# Patient Record
Sex: FEMALE | Race: WHITE | Employment: OTHER | URBAN - METROPOLITAN AREA
[De-identification: names, ages, dates, MRNs, and addresses within clinical notes are randomized per-mention and may not be internally consistent; named-entity substitution may affect disease eponyms.]

---

## 2017-04-04 ENCOUNTER — APPOINTMENT (OUTPATIENT)
Dept: GENERAL RADIOLOGY | Age: 72
End: 2017-04-04
Payer: MEDICARE

## 2017-04-04 ENCOUNTER — HOSPITAL ENCOUNTER (EMERGENCY)
Age: 72
Discharge: HOME | End: 2017-04-04
Attending: EMERGENCY MEDICINE
Payer: MEDICARE

## 2017-04-04 VITALS
HEIGHT: 62 IN | OXYGEN SATURATION: 99 % | SYSTOLIC BLOOD PRESSURE: 179 MMHG | BODY MASS INDEX: 37.54 KG/M2 | WEIGHT: 204 LBS | HEART RATE: 73 BPM | TEMPERATURE: 97.9 F | DIASTOLIC BLOOD PRESSURE: 62 MMHG | RESPIRATION RATE: 18 BRPM
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
## 2017-04-04 DIAGNOSIS — R07.81 RIB PAIN: ICD-10-CM

## 2017-04-04 DIAGNOSIS — M54.50 ACUTE RIGHT-SIDED LOW BACK PAIN WITHOUT SCIATICA: ICD-10-CM

## 2017-04-04 DIAGNOSIS — M79.18 ACUTE BUTTOCK PAIN: ICD-10-CM

## 2017-04-04 LAB
-: ABNORMAL
AMORPHOUS: ABNORMAL
BACTERIA: ABNORMAL
BILIRUBIN URINE: NEGATIVE
CASTS UA: ABNORMAL /LPF
COLOR: YELLOW
COMMENT UA: ABNORMAL
CRYSTALS, UA: ABNORMAL /HPF
EPITHELIAL CELLS UA: ABNORMAL /HPF (ref 0–5)
GLUCOSE URINE: NEGATIVE
KETONES, URINE: NEGATIVE
LEUKOCYTE ESTERASE, URINE: NEGATIVE
MUCUS: ABNORMAL
NITRITE, URINE: NEGATIVE
OTHER OBSERVATIONS UA: ABNORMAL
PH UA: 6.5 (ref 5–8)
PROTEIN UA: ABNORMAL
RBC UA: ABNORMAL /HPF (ref 0–2)
RENAL EPITHELIAL, UA: ABNORMAL /HPF
SPECIFIC GRAVITY UA: 1.01 (ref 1–1.03)
TRICHOMONAS: ABNORMAL
TURBIDITY: CLEAR
URINE HGB: ABNORMAL
UROBILINOGEN, URINE: ABNORMAL
WBC UA: ABNORMAL /HPF (ref 0–5)
YEAST: ABNORMAL

## 2017-04-04 PROCEDURE — 72170 X-RAY EXAM OF PELVIS: CPT

## 2017-04-04 PROCEDURE — 72100 X-RAY EXAM L-S SPINE 2/3 VWS: CPT

## 2017-04-04 PROCEDURE — 81001 URINALYSIS AUTO W/SCOPE: CPT

## 2017-04-04 PROCEDURE — 99283 EMERGENCY DEPT VISIT LOW MDM: CPT

## 2017-04-04 PROCEDURE — 71020 XR CHEST STANDARD TWO VW: CPT

## 2017-04-05 ENCOUNTER — HOSPITAL ENCOUNTER (EMERGENCY)
Age: 72
Discharge: HOME | End: 2017-04-05
Attending: EMERGENCY MEDICINE
Payer: MEDICARE

## 2017-04-05 ENCOUNTER — APPOINTMENT (OUTPATIENT)
Dept: CT IMAGING | Age: 72
End: 2017-04-05
Payer: MEDICARE

## 2017-04-05 VITALS
SYSTOLIC BLOOD PRESSURE: 105 MMHG | DIASTOLIC BLOOD PRESSURE: 78 MMHG | OXYGEN SATURATION: 98 % | RESPIRATION RATE: 20 BRPM | HEIGHT: 61 IN | BODY MASS INDEX: 37.95 KG/M2 | WEIGHT: 201 LBS | HEART RATE: 65 BPM | TEMPERATURE: 98.4 F

## 2017-04-05 DIAGNOSIS — S00.03XA CONTUSION OF SCALP, INITIAL ENCOUNTER: Primary | ICD-10-CM

## 2017-04-05 LAB
-: ABNORMAL
AMORPHOUS: ABNORMAL
ANION GAP SERPL CALCULATED.3IONS-SCNC: 14 MMOL/L (ref 9–17)
BACTERIA: ABNORMAL
BILIRUBIN URINE: NEGATIVE
BUN BLDV-MCNC: 16 MG/DL (ref 8–23)
BUN/CREAT BLD: ABNORMAL (ref 9–20)
CALCIUM SERPL-MCNC: 9.2 MG/DL (ref 8.6–10.4)
CASTS UA: ABNORMAL /LPF
CHLORIDE BLD-SCNC: 97 MMOL/L (ref 98–107)
CO2: 29 MMOL/L (ref 20–31)
COLOR: YELLOW
COMMENT UA: ABNORMAL
CREAT SERPL-MCNC: 0.81 MG/DL (ref 0.5–0.9)
CRYSTALS, UA: ABNORMAL /HPF
EPITHELIAL CELLS UA: ABNORMAL /HPF (ref 0–5)
GFR AFRICAN AMERICAN: >60 ML/MIN
GFR NON-AFRICAN AMERICAN: >60 ML/MIN
GFR SERPL CREATININE-BSD FRML MDRD: ABNORMAL ML/MIN/{1.73_M2}
GFR SERPL CREATININE-BSD FRML MDRD: ABNORMAL ML/MIN/{1.73_M2}
GLUCOSE BLD-MCNC: 97 MG/DL (ref 70–99)
GLUCOSE URINE: NEGATIVE
KETONES, URINE: NEGATIVE
LEUKOCYTE ESTERASE, URINE: NEGATIVE
MUCUS: ABNORMAL
NITRITE, URINE: NEGATIVE
OTHER OBSERVATIONS UA: ABNORMAL
PH UA: 7 (ref 5–8)
POTASSIUM SERPL-SCNC: 3.3 MMOL/L (ref 3.7–5.3)
PROTEIN UA: ABNORMAL
RBC UA: ABNORMAL /HPF (ref 0–2)
RENAL EPITHELIAL, UA: ABNORMAL /HPF
SODIUM BLD-SCNC: 140 MMOL/L (ref 135–144)
SPECIFIC GRAVITY UA: 1.01 (ref 1–1.03)
TRICHOMONAS: ABNORMAL
TURBIDITY: CLEAR
URINE HGB: ABNORMAL
UROBILINOGEN, URINE: ABNORMAL
WBC UA: ABNORMAL /HPF (ref 0–5)
YEAST: ABNORMAL

## 2017-04-05 PROCEDURE — 99284 EMERGENCY DEPT VISIT MOD MDM: CPT

## 2017-04-05 PROCEDURE — 36415 COLL VENOUS BLD VENIPUNCTURE: CPT

## 2017-04-05 PROCEDURE — 70450 CT HEAD/BRAIN W/O DYE: CPT

## 2017-04-05 PROCEDURE — 81001 URINALYSIS AUTO W/SCOPE: CPT

## 2017-04-05 PROCEDURE — 80048 BASIC METABOLIC PNL TOTAL CA: CPT

## 2017-08-21 ENCOUNTER — APPOINTMENT (OUTPATIENT)
Dept: GENERAL RADIOLOGY | Age: 72
End: 2017-08-21
Payer: MEDICARE

## 2017-08-21 ENCOUNTER — HOSPITAL ENCOUNTER (EMERGENCY)
Age: 72
Discharge: HOME | End: 2017-08-21
Attending: SPECIALIST
Payer: MEDICARE

## 2017-08-21 VITALS
DIASTOLIC BLOOD PRESSURE: 69 MMHG | BODY MASS INDEX: 37.22 KG/M2 | SYSTOLIC BLOOD PRESSURE: 158 MMHG | WEIGHT: 197 LBS | OXYGEN SATURATION: 96 % | RESPIRATION RATE: 16 BRPM | TEMPERATURE: 97.5 F | HEART RATE: 57 BPM

## 2017-08-21 DIAGNOSIS — R07.9 CHEST PAIN, UNSPECIFIED TYPE: Primary | ICD-10-CM

## 2017-08-21 DIAGNOSIS — R79.89 ELEVATED BRAIN NATRIURETIC PEPTIDE (BNP) LEVEL: ICD-10-CM

## 2017-08-21 LAB
ABSOLUTE EOS #: 0.1 K/UL (ref 0–0.4)
ABSOLUTE LYMPH #: 1.2 K/UL (ref 1–4.8)
ABSOLUTE MONO #: 1 K/UL (ref 0.1–1.2)
ALBUMIN SERPL-MCNC: 4.3 G/DL (ref 3.5–5.2)
ALBUMIN/GLOBULIN RATIO: 1.8 (ref 1–2.5)
ALP BLD-CCNC: 71 U/L (ref 35–104)
ALT SERPL-CCNC: 11 U/L (ref 5–33)
ANION GAP SERPL CALCULATED.3IONS-SCNC: 16 MMOL/L (ref 9–17)
AST SERPL-CCNC: 14 U/L
BASOPHILS # BLD: 1 %
BASOPHILS ABSOLUTE: 0 K/UL (ref 0–0.2)
BILIRUB SERPL-MCNC: 0.82 MG/DL (ref 0.3–1.2)
BNP INTERPRETATION: ABNORMAL
BUN BLDV-MCNC: 13 MG/DL (ref 8–23)
BUN/CREAT BLD: ABNORMAL (ref 9–20)
CALCIUM SERPL-MCNC: 9 MG/DL (ref 8.6–10.4)
CHLORIDE BLD-SCNC: 105 MMOL/L (ref 98–107)
CO2: 23 MMOL/L (ref 20–31)
CREAT SERPL-MCNC: 0.84 MG/DL (ref 0.5–0.9)
DIFFERENTIAL TYPE: ABNORMAL
EOSINOPHILS RELATIVE PERCENT: 2 %
GFR AFRICAN AMERICAN: >60 ML/MIN
GFR NON-AFRICAN AMERICAN: >60 ML/MIN
GFR SERPL CREATININE-BSD FRML MDRD: ABNORMAL ML/MIN/{1.73_M2}
GFR SERPL CREATININE-BSD FRML MDRD: ABNORMAL ML/MIN/{1.73_M2}
GLUCOSE BLD-MCNC: 92 MG/DL (ref 70–99)
HCT VFR BLD CALC: 37.8 % (ref 36–46)
HEMOGLOBIN: 12.5 G/DL (ref 12–16)
LIPASE: 80 U/L (ref 13–60)
LYMPHOCYTES # BLD: 16 %
MCH RBC QN AUTO: 29.8 PG (ref 26–34)
MCHC RBC AUTO-ENTMCNC: 33.1 G/DL (ref 31–37)
MCV RBC AUTO: 89.9 FL (ref 80–100)
MONOCYTES # BLD: 13 %
PDW BLD-RTO: 17.8 % (ref 12.5–15.4)
PLATELET # BLD: 215 K/UL (ref 140–450)
PLATELET ESTIMATE: ABNORMAL
PMV BLD AUTO: 10.1 FL (ref 6–12)
POTASSIUM SERPL-SCNC: 3.6 MMOL/L (ref 3.7–5.3)
PRO-BNP: 2967 PG/ML
RBC # BLD: 4.21 M/UL (ref 4–5.2)
RBC # BLD: ABNORMAL 10*6/UL
SEG NEUTROPHILS: 68 %
SEGMENTED NEUTROPHILS ABSOLUTE COUNT: 5.2 K/UL (ref 1.8–7.7)
SODIUM BLD-SCNC: 144 MMOL/L (ref 135–144)
TOTAL PROTEIN: 6.7 G/DL (ref 6.4–8.3)
TROPONIN INTERP: NORMAL
TROPONIN INTERP: NORMAL
TROPONIN T: <0.03 NG/ML
TROPONIN T: <0.03 NG/ML
WBC # BLD: 7.5 K/UL (ref 3.5–11)
WBC # BLD: ABNORMAL 10*3/UL

## 2017-08-21 PROCEDURE — 83880 ASSAY OF NATRIURETIC PEPTIDE: CPT

## 2017-08-21 PROCEDURE — 85025 COMPLETE CBC W/AUTO DIFF WBC: CPT

## 2017-08-21 PROCEDURE — 84484 ASSAY OF TROPONIN QUANT: CPT

## 2017-08-21 PROCEDURE — 36415 COLL VENOUS BLD VENIPUNCTURE: CPT

## 2017-08-21 PROCEDURE — 6370000000 HC RX 637 (ALT 250 FOR IP): Performed by: PHYSICIAN ASSISTANT

## 2017-08-21 PROCEDURE — 93005 ELECTROCARDIOGRAM TRACING: CPT

## 2017-08-21 PROCEDURE — 83690 ASSAY OF LIPASE: CPT

## 2017-08-21 PROCEDURE — 99284 EMERGENCY DEPT VISIT MOD MDM: CPT

## 2017-08-21 PROCEDURE — 71020 XR CHEST STANDARD TWO VW: CPT

## 2017-08-21 PROCEDURE — 80053 COMPREHEN METABOLIC PANEL: CPT

## 2017-08-21 RX ORDER — ASPIRIN 81 MG/1
324 TABLET, CHEWABLE ORAL ONCE
Status: COMPLETED | OUTPATIENT
Start: 2017-08-21 | End: 2017-08-21

## 2017-08-21 RX ADMIN — ASPIRIN 81 MG 324 MG: 81 TABLET ORAL at 16:27

## 2017-08-21 ASSESSMENT — PAIN DESCRIPTION - ONSET: ONSET: ON-GOING

## 2017-08-21 ASSESSMENT — PAIN DESCRIPTION - FREQUENCY: FREQUENCY: CONTINUOUS

## 2017-08-21 ASSESSMENT — PAIN SCALES - GENERAL: PAINLEVEL_OUTOF10: 8

## 2017-08-21 ASSESSMENT — PAIN DESCRIPTION - PAIN TYPE: TYPE: ACUTE PAIN

## 2017-08-21 ASSESSMENT — PAIN DESCRIPTION - LOCATION: LOCATION: CHEST

## 2017-08-23 LAB
EKG ATRIAL RATE: 51 BPM
EKG ATRIAL RATE: 55 BPM
EKG P AXIS: 54 DEGREES
EKG P AXIS: 88 DEGREES
EKG P-R INTERVAL: 136 MS
EKG P-R INTERVAL: 142 MS
EKG Q-T INTERVAL: 508 MS
EKG Q-T INTERVAL: 526 MS
EKG QRS DURATION: 76 MS
EKG QRS DURATION: 76 MS
EKG QTC CALCULATION (BAZETT): 484 MS
EKG QTC CALCULATION (BAZETT): 485 MS
EKG R AXIS: -44 DEGREES
EKG R AXIS: -46 DEGREES
EKG T AXIS: 90 DEGREES
EKG T AXIS: 92 DEGREES
EKG VENTRICULAR RATE: 51 BPM
EKG VENTRICULAR RATE: 55 BPM

## 2017-11-30 ENCOUNTER — HOSPITAL ENCOUNTER (OUTPATIENT)
Dept: CT IMAGING | Age: 72
Discharge: HOME | End: 2017-11-30
Payer: MEDICARE

## 2017-11-30 ENCOUNTER — HOSPITAL ENCOUNTER (OUTPATIENT)
Age: 72
Discharge: HOME | End: 2017-11-30
Payer: MEDICARE

## 2017-11-30 DIAGNOSIS — K31.1 PYLORIC STENOSIS: ICD-10-CM

## 2017-11-30 LAB
ABSOLUTE EOS #: 0.15 K/UL (ref 0–0.44)
ABSOLUTE IMMATURE GRANULOCYTE: 0.05 K/UL (ref 0–0.3)
ABSOLUTE LYMPH #: 1.38 K/UL (ref 1.1–3.7)
ABSOLUTE MONO #: 1 K/UL (ref 0.1–1.2)
ALBUMIN SERPL-MCNC: 4.2 G/DL (ref 3.5–5.2)
ALBUMIN/GLOBULIN RATIO: 1.4 (ref 1–2.5)
ALP BLD-CCNC: 75 U/L (ref 35–104)
ALT SERPL-CCNC: 14 U/L (ref 5–33)
ANION GAP SERPL CALCULATED.3IONS-SCNC: 13 MMOL/L (ref 9–17)
AST SERPL-CCNC: 17 U/L
BASOPHILS # BLD: 1 % (ref 0–2)
BASOPHILS ABSOLUTE: 0.06 K/UL (ref 0–0.2)
BILIRUB SERPL-MCNC: 0.33 MG/DL (ref 0.3–1.2)
BILIRUBIN DIRECT: 0.09 MG/DL
BILIRUBIN, INDIRECT: 0.24 MG/DL (ref 0–1)
BUN BLDV-MCNC: 22 MG/DL (ref 8–23)
BUN/CREAT BLD: NORMAL (ref 9–20)
CALCIUM SERPL-MCNC: 9.4 MG/DL (ref 8.6–10.4)
CHLORIDE BLD-SCNC: 98 MMOL/L (ref 98–107)
CO2: 27 MMOL/L (ref 20–31)
CREAT SERPL-MCNC: 0.85 MG/DL (ref 0.5–0.9)
DIFFERENTIAL TYPE: ABNORMAL
EOSINOPHILS RELATIVE PERCENT: 2 % (ref 1–4)
FERRITIN: 156 UG/L (ref 13–150)
FOLATE: 9 NG/ML
GFR AFRICAN AMERICAN: >60 ML/MIN
GFR NON-AFRICAN AMERICAN: >60 ML/MIN
GFR SERPL CREATININE-BSD FRML MDRD: NORMAL ML/MIN/{1.73_M2}
GFR SERPL CREATININE-BSD FRML MDRD: NORMAL ML/MIN/{1.73_M2}
GLOBULIN: NORMAL G/DL (ref 1.5–3.8)
GLUCOSE BLD-MCNC: 90 MG/DL (ref 70–99)
HCT VFR BLD CALC: 37.4 % (ref 36.3–47.1)
HEMOGLOBIN: 12.4 G/DL (ref 11.9–15.1)
IMMATURE GRANULOCYTES: 1 %
IRON SATURATION: 18 % (ref 20–55)
IRON: 60 UG/DL (ref 37–145)
LIPASE: 87 U/L (ref 13–60)
LYMPHOCYTES # BLD: 16 % (ref 24–43)
MCH RBC QN AUTO: 32 PG (ref 25.2–33.5)
MCHC RBC AUTO-ENTMCNC: 33.2 G/DL (ref 28.4–34.8)
MCV RBC AUTO: 96.6 FL (ref 82.6–102.9)
MONOCYTES # BLD: 11 % (ref 3–12)
PDW BLD-RTO: 15.1 % (ref 11.8–14.4)
PLATELET # BLD: 242 K/UL (ref 138–453)
PLATELET ESTIMATE: ABNORMAL
PMV BLD AUTO: 11.7 FL (ref 8.1–13.5)
POTASSIUM SERPL-SCNC: 4.6 MMOL/L (ref 3.7–5.3)
RBC # BLD: 3.87 M/UL (ref 3.95–5.11)
RBC # BLD: ABNORMAL 10*6/UL
SEG NEUTROPHILS: 69 % (ref 36–65)
SEGMENTED NEUTROPHILS ABSOLUTE COUNT: 6.15 K/UL (ref 1.5–8.1)
SODIUM BLD-SCNC: 138 MMOL/L (ref 135–144)
TOTAL IRON BINDING CAPACITY: 334 UG/DL (ref 250–450)
TOTAL PROTEIN: 7.1 G/DL (ref 6.4–8.3)
UNSATURATED IRON BINDING CAPACITY: 274 UG/DL (ref 112–347)
VITAMIN B-12: 204 PG/ML (ref 211–946)
WBC # BLD: 8.8 K/UL (ref 3.5–11.3)
WBC # BLD: ABNORMAL 10*3/UL

## 2017-11-30 PROCEDURE — 82746 ASSAY OF FOLIC ACID SERUM: CPT

## 2017-11-30 PROCEDURE — 83540 ASSAY OF IRON: CPT

## 2017-11-30 PROCEDURE — 2580000003 HC RX 258: Performed by: INTERNAL MEDICINE

## 2017-11-30 PROCEDURE — 82607 VITAMIN B-12: CPT

## 2017-11-30 PROCEDURE — 80076 HEPATIC FUNCTION PANEL: CPT

## 2017-11-30 PROCEDURE — 82728 ASSAY OF FERRITIN: CPT

## 2017-11-30 PROCEDURE — 74177 CT ABD & PELVIS W/CONTRAST: CPT

## 2017-11-30 PROCEDURE — 6360000004 HC RX CONTRAST MEDICATION: Performed by: INTERNAL MEDICINE

## 2017-11-30 PROCEDURE — 83690 ASSAY OF LIPASE: CPT

## 2017-11-30 PROCEDURE — 36415 COLL VENOUS BLD VENIPUNCTURE: CPT

## 2017-11-30 PROCEDURE — 80048 BASIC METABOLIC PNL TOTAL CA: CPT

## 2017-11-30 PROCEDURE — 85025 COMPLETE CBC W/AUTO DIFF WBC: CPT

## 2017-11-30 PROCEDURE — 83550 IRON BINDING TEST: CPT

## 2017-11-30 RX ORDER — SODIUM CHLORIDE 0.9 % (FLUSH) 0.9 %
10 SYRINGE (ML) INJECTION PRN
Status: DISCONTINUED | OUTPATIENT
Start: 2017-11-30 | End: 2017-12-03 | Stop reason: HOSPADM

## 2017-11-30 RX ORDER — 0.9 % SODIUM CHLORIDE 0.9 %
100 INTRAVENOUS SOLUTION INTRAVENOUS ONCE
Status: COMPLETED | OUTPATIENT
Start: 2017-11-30 | End: 2017-11-30

## 2017-11-30 RX ADMIN — IOHEXOL 50 ML: 240 INJECTION, SOLUTION INTRATHECAL; INTRAVASCULAR; INTRAVENOUS; ORAL at 16:13

## 2017-11-30 RX ADMIN — IOPAMIDOL 75 ML: 755 INJECTION, SOLUTION INTRAVENOUS at 16:14

## 2017-11-30 RX ADMIN — Medication 10 ML: at 16:14

## 2017-11-30 RX ADMIN — SODIUM CHLORIDE 80 ML: 9 INJECTION, SOLUTION INTRAVENOUS at 16:14

## 2017-12-19 ENCOUNTER — ANESTHESIA (OUTPATIENT)
Dept: ENDOSCOPY | Age: 72
End: 2017-12-19
Payer: MEDICARE

## 2017-12-19 ENCOUNTER — ANESTHESIA EVENT (OUTPATIENT)
Dept: ENDOSCOPY | Age: 72
End: 2017-12-19
Payer: MEDICARE

## 2017-12-19 ENCOUNTER — HOSPITAL ENCOUNTER (OUTPATIENT)
Age: 72
Setting detail: OUTPATIENT SURGERY
Discharge: HOME | End: 2017-12-19
Attending: INTERNAL MEDICINE | Admitting: INTERNAL MEDICINE
Payer: MEDICARE

## 2017-12-19 VITALS
BODY MASS INDEX: 35.7 KG/M2 | DIASTOLIC BLOOD PRESSURE: 58 MMHG | TEMPERATURE: 97.8 F | OXYGEN SATURATION: 98 % | HEIGHT: 62 IN | HEART RATE: 55 BPM | RESPIRATION RATE: 14 BRPM | WEIGHT: 194 LBS | SYSTOLIC BLOOD PRESSURE: 148 MMHG

## 2017-12-19 VITALS
RESPIRATION RATE: 18 BRPM | DIASTOLIC BLOOD PRESSURE: 40 MMHG | OXYGEN SATURATION: 99 % | SYSTOLIC BLOOD PRESSURE: 106 MMHG

## 2017-12-19 PROCEDURE — 2500000003 HC RX 250 WO HCPCS: Performed by: NURSE ANESTHETIST, CERTIFIED REGISTERED

## 2017-12-19 PROCEDURE — 7100000010 HC PHASE II RECOVERY - FIRST 15 MIN: Performed by: INTERNAL MEDICINE

## 2017-12-19 PROCEDURE — 3700000000 HC ANESTHESIA ATTENDED CARE: Performed by: INTERNAL MEDICINE

## 2017-12-19 PROCEDURE — 3609010300 HC COLONOSCOPY W/BIOPSY SINGLE/MULTIPLE: Performed by: INTERNAL MEDICINE

## 2017-12-19 PROCEDURE — C1726 CATH, BAL DIL, NON-VASCULAR: HCPCS | Performed by: INTERNAL MEDICINE

## 2017-12-19 PROCEDURE — 6360000002 HC RX W HCPCS: Performed by: NURSE ANESTHETIST, CERTIFIED REGISTERED

## 2017-12-19 PROCEDURE — 3609012400 HC EGD TRANSORAL BIOPSY SINGLE/MULTIPLE: Performed by: INTERNAL MEDICINE

## 2017-12-19 PROCEDURE — 3700000001 HC ADD 15 MINUTES (ANESTHESIA): Performed by: INTERNAL MEDICINE

## 2017-12-19 PROCEDURE — 3609012500 HC EGD DILATION BALLOON: Performed by: INTERNAL MEDICINE

## 2017-12-19 PROCEDURE — 88305 TISSUE EXAM BY PATHOLOGIST: CPT

## 2017-12-19 PROCEDURE — 2580000003 HC RX 258: Performed by: INTERNAL MEDICINE

## 2017-12-19 PROCEDURE — 7100000011 HC PHASE II RECOVERY - ADDTL 15 MIN: Performed by: INTERNAL MEDICINE

## 2017-12-19 RX ORDER — CHLORTHALIDONE 25 MG/1
25 TABLET ORAL DAILY
Status: ON HOLD | COMMUNITY
End: 2019-05-21 | Stop reason: HOSPADM

## 2017-12-19 RX ORDER — PROPOFOL 10 MG/ML
INJECTION, EMULSION INTRAVENOUS PRN
Status: DISCONTINUED | OUTPATIENT
Start: 2017-12-19 | End: 2017-12-19 | Stop reason: SDUPTHER

## 2017-12-19 RX ORDER — DILTIAZEM HYDROCHLORIDE 240 MG/1
240 CAPSULE, COATED, EXTENDED RELEASE ORAL DAILY
Status: ON HOLD | COMMUNITY
End: 2019-05-21 | Stop reason: HOSPADM

## 2017-12-19 RX ORDER — AZATHIOPRINE 50 MG/1
50 TABLET ORAL DAILY
COMMUNITY

## 2017-12-19 RX ORDER — SODIUM CHLORIDE 9 MG/ML
INJECTION, SOLUTION INTRAVENOUS CONTINUOUS
Status: DISCONTINUED | OUTPATIENT
Start: 2017-12-19 | End: 2017-12-19 | Stop reason: HOSPADM

## 2017-12-19 RX ORDER — LIDOCAINE HYDROCHLORIDE 10 MG/ML
INJECTION, SOLUTION EPIDURAL; INFILTRATION; INTRACAUDAL; PERINEURAL PRN
Status: DISCONTINUED | OUTPATIENT
Start: 2017-12-19 | End: 2017-12-19 | Stop reason: SDUPTHER

## 2017-12-19 RX ADMIN — SODIUM CHLORIDE: 9 INJECTION, SOLUTION INTRAVENOUS at 11:14

## 2017-12-19 RX ADMIN — SODIUM CHLORIDE: 9 INJECTION, SOLUTION INTRAVENOUS at 13:35

## 2017-12-19 RX ADMIN — LIDOCAINE HYDROCHLORIDE 50 MG: 10 INJECTION, SOLUTION EPIDURAL; INFILTRATION; INTRACAUDAL; PERINEURAL at 13:22

## 2017-12-19 RX ADMIN — PROPOFOL 100 MG: 10 INJECTION, EMULSION INTRAVENOUS at 13:45

## 2017-12-19 RX ADMIN — PROPOFOL 300 MG: 10 INJECTION, EMULSION INTRAVENOUS at 13:22

## 2017-12-19 RX ADMIN — PROPOFOL 60 MG: 10 INJECTION, EMULSION INTRAVENOUS at 13:55

## 2017-12-19 ASSESSMENT — PAIN - FUNCTIONAL ASSESSMENT: PAIN_FUNCTIONAL_ASSESSMENT: 0-10

## 2017-12-19 ASSESSMENT — LIFESTYLE VARIABLES: SMOKING_STATUS: 0

## 2017-12-19 ASSESSMENT — PAIN SCALES - GENERAL
PAINLEVEL_OUTOF10: 0

## 2017-12-19 ASSESSMENT — ENCOUNTER SYMPTOMS: SHORTNESS OF BREATH: 1

## 2017-12-19 NOTE — H&P
montelukast sodium; morphine; norvasc [amlodipine besylate]; phenobarbital; robaxin [methocarbamol]; sinequan [doxepin hcl]; sudafed [pseudoephedrine hcl]; tranquil-cecil; wellbutrin [bupropion hcl]; actifed cold-allergy [chlorpheniramine-phenylephrine]; clindamycin/lincomycin; codeine; metoprolol succinate; and seldane [terfenadine]. Family History    family history includes Other in her father, maternal aunt, mother, and sister. Family Status   Relation Status    Father     Mother     Sister     Maternal Aunt          Social History  Social History     Social History    Marital status: Single     Spouse name: N/A    Number of children: N/A    Years of education: N/A     Occupational History    Not on file. Social History Main Topics    Smoking status: Never Smoker    Smokeless tobacco: Never Used    Alcohol use No    Drug use: No    Sexual activity: No     Other Topics Concern    Not on file     Social History Narrative    No narrative on file                 Hobbies:  Brain games     OBJECTIVE:   VITALS:  vitals were not taken for this visit. CONSTITUTIONAL: Alert and oriented times 3, no acute distress and cooperative to examination. friendly and pleasant     SKIN: rash No    HEENT: Head is normocephalic, atraumatic. EOMI, PERRLA    Oral air way :slightly narrow Yes    NECK: neck supple, no lymphadenopathy noted, trachea midline and straight       2+ carotid, no bruit    LUNGS: Chest expands equally bilaterally upon respiration, no accessory muscle used. Ausculation reveals no adventitious breath sounds. CARDIOVASCULAR: \"Heart sounds are normal.  Regular rate and rhythm without murmur,    ABDOMEN: Bowel sounds are present in all four quadrants  , obese    GENATALIA:Deferred. NEUROLOGIC: \"CN II-XII are grossly intact.        EXTREMITIES: Pitting edema:  No,  Varicose veins: No     Dorsal pedal/posterior tibial pulses palpable: Yes         Strength:  Normal       Patient Active

## 2017-12-19 NOTE — ANESTHESIA PRE PROCEDURE
Department of Anesthesiology  Preprocedure Note       Name:  Erin Portillo   Age:  81 y.o.  :  1945                                          MRN:  [id number]         Date:  2017      Surgeon: Natasha Vasquez):  Diana Arango MD    Procedure: Procedure(s):  EGD DILATION SAVORY  COLONOSCOPY WITH BIOPSY    Medications prior to admission:   Prior to Admission medications    Medication Sig Start Date End Date Taking? Authorizing Provider   azaTHIOprine (IMURAN) 50 MG tablet Take 25 mg by mouth daily   Yes Historical Provider, MD   diltiazem (CARDIZEM CD) 240 MG extended release capsule Take 240 mg by mouth daily   Yes Historical Provider, MD   chlorthalidone (HYGROTON) 25 MG tablet Take 25 mg by mouth daily   Yes Historical Provider, MD   isosorbide mononitrate (IMDUR) 120 MG CR tablet Take 1 tablet by mouth daily 6/10/16  Yes Anna Anne MD   ranolazine (RANEXA) 500 MG SR tablet Take 500 mg by mouth 2 times daily. Yes Historical Provider, MD   PARoxetine (PAXIL) 20 MG tablet Take 20 mg by mouth every morning. Yes Historical Provider, MD   metoprolol (TOPROL XL) 50 MG XL tablet Take 50 mg by mouth daily. 1/2 tablet taken at bedtime. Yes Historical Provider, MD   acetaminophen (TYLENOL) 325 MG tablet Take 650 mg by mouth every 6 hours as needed. Yes Historical Provider, MD   bumetanide (BUMEX) 1 MG tablet Take 1 mg by mouth 2 times daily. Yes Historical Provider, MD   allopurinol (ZYLOPRIM) 100 MG tablet Take 100 mg by mouth daily. Yes Historical Provider, MD   clopidogrel (PLAVIX) 75 MG tablet Take 75 mg by mouth daily. Yes Historical Provider, MD   losartan (COZAAR) 100 MG tablet Take 100 mg by mouth daily. Yes Historical Provider, MD   pravastatin (PRAVACHOL) 40 MG tablet Take 40 mg by mouth daily. Yes Historical Provider, MD   BENZOYL PEROXIDE Apply  topically. Used on face for cystic acne    Yes Historical Provider, MD   Ciclopirox (LOPROX EX) Apply  topically.  2.5% strength, cream for facial acne    Yes Historical Provider, MD   nitroGLYCERIN (NITROSTAT) 0.4 MG SL tablet Place 0.4 mg under the tongue every 5 minutes. Indications: Chest Pain    Historical Provider, MD   RABEprazole Sodium (ACIPHEX PO) Take 20 mg by mouth daily. Historical Provider, MD   Calcium Carbonate Antacid (MAALOX) 600 MG CHEW Take  by mouth as needed. Historical Provider, MD   UNABLE TO FIND Actnomel cream for acne    Historical Provider, MD       Current medications:    Current Facility-Administered Medications   Medication Dose Route Frequency Provider Last Rate Last Dose    0.9 % sodium chloride infusion   Intravenous Continuous Yao Smith  mL/hr at 12/19/17 1114         Allergies:     Allergies   Allergen Reactions    Lisinopril-Hydrochlorothiazide Anaphylaxis and Hives    Sulfa Antibiotics Anaphylaxis    Penicillins Hives    Polyethylene Glycol Hives    Actifed Cold-Allergy [Chlorpheniramine-Phenylephrine]     Altace [Ramipril]      Chronic cough    Cephalosporins Hives    Coreg [Carvedilol]      bloating    Dml Facial Moisturizer     Elavil [Amitriptyline Hcl]     Entex [Ami-Jose]     Ethylene Glycol     Levofloxacin      ach tendon    Metoprolol      Stomach pain    Montelukast Sodium      Heart effects    Morphine Itching    Norvasc [Amlodipine Besylate]      Stomach pain    Phenobarbital Hives    Robaxin [Methocarbamol]     Sinequan [Doxepin Hcl]     Sudafed [Pseudoephedrine Hcl]     Tranquil-[place]     Wellbutrin [Bupropion Hcl]      Hypotension    Actifed Cold-Allergy [Chlorpheniramine-Phenylephrine] Palpitations    Clindamycin/Lincomycin Nausea And Vomiting    Codeine Nausea And Vomiting    Metoprolol Succinate Nausea And Vomiting    Seldane [Terfenadine] Palpitations       Problem List:    Patient Active Problem List   Diagnosis Code    Bloody discharge from nipple N64.52    Osteopenia M85.80    Multiple sclerosis (HCC) G35    HTN bleeding ulcers found    EYE SURGERY      FOREIGN BODY REMOVAL  1962    surgical sponges left in during appendectomy    HAMMER TOE SURGERY  1989    head resection of phalanx, left foot    LAPAROSCOPY  1983    with D&C    SEPTOPLASTY  1985    Turbinate resection with rt and left ethmoidectomy    SHOULDER ARTHROSCOPY  1998    rotator cuff impingement, right arm    SINUS ENDOSCOPY      SINUS SURGERY      multiple    UPPER GASTROINTESTINAL ENDOSCOPY      2-4 times per year for pyloric stenosis       Social History:    Social History   Substance Use Topics    Smoking status: Never Smoker    Smokeless tobacco: Never Used    Alcohol use No                                Counseling given: Not Answered      Vital Signs (Current):   Vitals:    12/19/17 1042 12/19/17 1116   BP: (!) 146/49    Pulse: 56    Resp: 22    Temp: 98.6 °F (37 °C)    TempSrc: Oral    SpO2: 97%    Weight:  194 lb (88 kg)   Height:  5' 2\" (1.575 m)                                              BP Readings from Last 3 Encounters:   12/19/17 (!) 146/49   08/21/17 (!) 158/69   04/05/17 105/78       NPO Status: Time of last liquid consumption: 0756                        Time of last solid consumption: 1200                        Date of last liquid consumption: 12/19/17 (sips with meds)                        Date of last solid food consumption: 12/15/17    BMI:   Wt Readings from Last 3 Encounters:   12/19/17 194 lb (88 kg)   08/21/17 197 lb (89.4 kg)   04/05/17 201 lb (91.2 kg)     Body mass index is 35.48 kg/m².     CBC:   Lab Results   Component Value Date    WBC 8.8 11/30/2017    RBC 3.87 11/30/2017    RBC 4.04 04/30/2012    HGB 12.4 11/30/2017    HCT 37.4 11/30/2017    MCV 96.6 11/30/2017    RDW 15.1 11/30/2017     11/30/2017     04/30/2012       CMP:   Lab Results   Component Value Date     11/30/2017    K 4.6 11/30/2017    CL 98 11/30/2017    CO2 27 11/30/2017    BUN 22 11/30/2017    CREATININE 0.85 11/30/2017

## 2017-12-19 NOTE — ANESTHESIA POSTPROCEDURE EVALUATION
Department of Anesthesiology  Postprocedure Note    Patient: Taylor Christian  MRN: [id number]  YOB: 1945  Date of evaluation: 12/19/2017  Time:  3:02 PM     Procedure Summary     Date:  12/19/17 Room / Location:  [facility] ENDO 04 / [facility]    Anesthesia Start:  1319 Anesthesia Stop:  1412    Procedures:       COLONOSCOPY WITH BIOPSY (N/A )      EGD BIOPSY      EGD DILATION BALLOON Diagnosis:  (PYLORIC STENOSIS, ADENOMATOUS POLYP)    Surgeon:  Kendra Mitchell MD Responsible Provider:  Tim Howard MD    Anesthesia Type:  MAC ASA Status:  4          Anesthesia Type: MAC    Brandi Phase I: Brandi Score: 10    Brandi Phase II: Brandi Score: 10    Last vitals: Reviewed and per EMR flowsheets.        Anesthesia Post Evaluation    Patient location during evaluation: PACU  Patient participation: complete - patient participated  Level of consciousness: awake and alert  Pain score: 1  Airway patency: patent  Nausea & Vomiting: no nausea and no vomiting  Complications: no  Cardiovascular status: blood pressure returned to baseline  Respiratory status: acceptable and spontaneous ventilation  Hydration status: euvolemic

## 2017-12-19 NOTE — OP NOTE
[id number] [facility]  EGD & COLONOSCOPY      Patient:   Carey Bonilla    :    1945    Referring/PCP: Gudelia Burdick MD  Procedure:   Colonoscopy with polypectomy (cold snare), polypectomy (cold biopsy); Esophagogastroduodenoscopy  --diagnostic, with biopsy, pyloric stenosis dilation with TTS balloon . Facility:  [address] [place] [address]  Date:     2017  Endoscopist:  Nancyann Seip, MD    Indication:  previous adenomatous polyp. Anemia , dysphagia     Anesthesia:  MAC    Complications: None    Description of Procedure:  Informed consent was obtained from the patient after explanation of the procedure including indications, description of the procedure,  benefits and possible risks and complications of the procedure, and alternatives. Questions were answered. The patient's history was reviewed and a directed physical examination was performed prior to the procedure. Patient was monitored throughout the procedure with pulse oximetry and periodic assessment of vital signs. Patient was sedated as noted above. With the patient initially in the left lateral decubitus position, a digital rectal examination was performed and revealed negative without mass, lesions or tenderness, negative without mass or lesions. The Olympus video colonoscope was placed in the patient's rectum and advanced without difficulty  to the cecum, which was identified by the ileocecal valve and appendiceal orifice. The prep was fair. Examination of the mucosa was performed during both introduction and withdrawal of the colonoscope. Retroflexed view of the rectum was performed. Findings:   1. 7 mm sessile polyp seen in the ascending colon . Cold snare polypectomy performed. Resection and retrieval was complete   2. 5 mm sessile polyp seen in the descending colon. Cold forceps' polypectomy performed . 3. Sigmoid diverticulosis   4. Internal hemorrhoids seen during retroflexion view.     With the patient in the left lateral decubitus position, the Olympus videoendoscope was placed in the patient's mouth and under direct visualization passed into the esophagus. Visualization of the esophagus, stomach, and duodenum was performed during both introduction and withdrawal of the endoscope and retroflexed view of the proximal stomach was obtained. The scope was passed to the second portion of the duodenum. The patient tolerated the procedure well and was taken to the recovery area in good condition. Findings[de-identified]   Esophagus: Irregular Z line . Biopsied    Stomach: Gastritis characterized by erythema and scattered erosions were seen in the antrum of the stomach. Biopsied to rule out H pylori infection. Sessile small gastric polyps seen in the body of the stomach. Biopsied                    Normal stomach seen during retroflexion view                    Mild pyloric stenosis seen. Dilation of the stenosis was done with TTS balloon with mild resistance at  12 mm and moderate resistance at 13.5 mm   Duodenum: Normal. Biopsies done to rule out celiac disease       Recommendations: Follow up with the biopsy results                                       Protonix 20 mg daily for 8 weeks                                       Repeat colon in 3 years as today's prep was fair                                       Restart Plavix after 24 hours. Follow up at the GI office . 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                                       Electronically signed Jaden Hathaway  on 12/19/2017 at 1:35 PM

## 2017-12-19 NOTE — ANESTHESIA PRE PROCEDURE
stenosis       Social History:    Social History   Substance Use Topics    Smoking status: Never Smoker    Smokeless tobacco: Never Used    Alcohol use No                                Counseling given: Not Answered      Vital Signs (Current): There were no vitals filed for this visit. BP Readings from Last 3 Encounters:   08/21/17 (!) 158/69   04/05/17 105/78   04/04/17 (!) 179/62       NPO Status:                                                                                 BMI:   Wt Readings from Last 3 Encounters:   08/21/17 197 lb (89.4 kg)   04/05/17 201 lb (91.2 kg)   04/04/17 204 lb (92.5 kg)     There is no height or weight on file to calculate BMI.    CBC:   Lab Results   Component Value Date    WBC 8.8 11/30/2017    RBC 3.87 11/30/2017    RBC 4.04 04/30/2012    HGB 12.4 11/30/2017    HCT 37.4 11/30/2017    MCV 96.6 11/30/2017    RDW 15.1 11/30/2017     11/30/2017     04/30/2012       CMP:   Lab Results   Component Value Date     11/30/2017    K 4.6 11/30/2017    CL 98 11/30/2017    CO2 27 11/30/2017    BUN 22 11/30/2017    CREATININE 0.85 11/30/2017    GFRAA >60 11/30/2017    LABGLOM >60 11/30/2017    GLUCOSE 90 11/30/2017    GLUCOSE 108 04/30/2012    PROT 7.1 11/30/2017    CALCIUM 9.4 11/30/2017    BILITOT 0.33 11/30/2017    ALKPHOS 75 11/30/2017    AST 17 11/30/2017    ALT 14 11/30/2017       POC Tests: No results for input(s): POCGLU, POCNA, POCK, POCCL, POCBUN, POCHEMO, POCHCT in the last 72 hours.     Coags:   Lab Results   Component Value Date    PROTIME 10.1 11/03/2015    INR 0.9 11/03/2015    APTT 24.8 11/03/2015       HCG (If Applicable): No results found for: PREGTESTUR, PREGSERUM, HCG, HCGQUANT     ABGs: No results found for: PHART, PO2ART, YPB1SGQ, VDZ7GLL, BEART, L5GXDACF     Type & Screen (If Applicable):  No results found for: LABABO, [address]    Anesthesia Evaluation  Nursing notes reviewed no history of anesthetic

## 2017-12-21 LAB — SURGICAL PATHOLOGY REPORT: NORMAL

## 2018-05-24 ENCOUNTER — HOSPITAL ENCOUNTER (OUTPATIENT)
Age: 73
Setting detail: OUTPATIENT SURGERY
Discharge: HOME | End: 2018-05-24
Attending: INTERNAL MEDICINE | Admitting: INTERNAL MEDICINE
Payer: MEDICARE

## 2018-05-24 ENCOUNTER — ANESTHESIA EVENT (OUTPATIENT)
Dept: ENDOSCOPY | Age: 73
End: 2018-05-24
Payer: MEDICARE

## 2018-05-24 ENCOUNTER — ANESTHESIA (OUTPATIENT)
Dept: ENDOSCOPY | Age: 73
End: 2018-05-24
Payer: MEDICARE

## 2018-05-24 VITALS
SYSTOLIC BLOOD PRESSURE: 112 MMHG | WEIGHT: 205 LBS | TEMPERATURE: 98.1 F | HEART RATE: 60 BPM | HEIGHT: 61 IN | BODY MASS INDEX: 38.71 KG/M2 | OXYGEN SATURATION: 98 % | RESPIRATION RATE: 17 BRPM | DIASTOLIC BLOOD PRESSURE: 44 MMHG

## 2018-05-24 VITALS
DIASTOLIC BLOOD PRESSURE: 42 MMHG | OXYGEN SATURATION: 98 % | RESPIRATION RATE: 16 BRPM | SYSTOLIC BLOOD PRESSURE: 111 MMHG

## 2018-05-24 PROCEDURE — 88305 TISSUE EXAM BY PATHOLOGIST: CPT

## 2018-05-24 PROCEDURE — 6360000002 HC RX W HCPCS: Performed by: NURSE ANESTHETIST, CERTIFIED REGISTERED

## 2018-05-24 PROCEDURE — 2580000003 HC RX 258: Performed by: INTERNAL MEDICINE

## 2018-05-24 PROCEDURE — 2500000003 HC RX 250 WO HCPCS: Performed by: NURSE ANESTHETIST, CERTIFIED REGISTERED

## 2018-05-24 PROCEDURE — 3609012700 HC EGD DILATION SAVORY: Performed by: INTERNAL MEDICINE

## 2018-05-24 PROCEDURE — 7100000010 HC PHASE II RECOVERY - FIRST 15 MIN: Performed by: INTERNAL MEDICINE

## 2018-05-24 PROCEDURE — 3700000000 HC ANESTHESIA ATTENDED CARE: Performed by: INTERNAL MEDICINE

## 2018-05-24 PROCEDURE — 3700000001 HC ADD 15 MINUTES (ANESTHESIA): Performed by: INTERNAL MEDICINE

## 2018-05-24 PROCEDURE — 7100000011 HC PHASE II RECOVERY - ADDTL 15 MIN: Performed by: INTERNAL MEDICINE

## 2018-05-24 RX ORDER — PROPOFOL 10 MG/ML
INJECTION, EMULSION INTRAVENOUS PRN
Status: DISCONTINUED | OUTPATIENT
Start: 2018-05-24 | End: 2018-05-24 | Stop reason: SDUPTHER

## 2018-05-24 RX ORDER — LIDOCAINE HYDROCHLORIDE 10 MG/ML
INJECTION, SOLUTION INFILTRATION; PERINEURAL PRN
Status: DISCONTINUED | OUTPATIENT
Start: 2018-05-24 | End: 2018-05-24 | Stop reason: SDUPTHER

## 2018-05-24 RX ORDER — SODIUM CHLORIDE 9 MG/ML
INJECTION, SOLUTION INTRAVENOUS CONTINUOUS
Status: DISCONTINUED | OUTPATIENT
Start: 2018-05-24 | End: 2018-05-24 | Stop reason: HOSPADM

## 2018-05-24 RX ORDER — PANTOPRAZOLE SODIUM 40 MG/1
40 TABLET, DELAYED RELEASE ORAL 2 TIMES DAILY
COMMUNITY

## 2018-05-24 RX ORDER — TIZANIDINE 2 MG/1
2 TABLET ORAL EVERY 6 HOURS PRN
COMMUNITY
End: 2020-07-26

## 2018-05-24 RX ORDER — GLYCOPYRROLATE 1 MG/5 ML
SYRINGE (ML) INTRAVENOUS PRN
Status: DISCONTINUED | OUTPATIENT
Start: 2018-05-24 | End: 2018-05-24 | Stop reason: SDUPTHER

## 2018-05-24 RX ADMIN — PROPOFOL 50 MG: 10 INJECTION, EMULSION INTRAVENOUS at 09:28

## 2018-05-24 RX ADMIN — PROPOFOL 10 MG: 10 INJECTION, EMULSION INTRAVENOUS at 09:38

## 2018-05-24 RX ADMIN — PROPOFOL 10 MG: 10 INJECTION, EMULSION INTRAVENOUS at 09:36

## 2018-05-24 RX ADMIN — PROPOFOL 10 MG: 10 INJECTION, EMULSION INTRAVENOUS at 09:34

## 2018-05-24 RX ADMIN — PROPOFOL 10 MG: 10 INJECTION, EMULSION INTRAVENOUS at 09:32

## 2018-05-24 RX ADMIN — PROPOFOL 20 MG: 10 INJECTION, EMULSION INTRAVENOUS at 09:30

## 2018-05-24 RX ADMIN — LIDOCAINE HYDROCHLORIDE 50 MG: 10 INJECTION, SOLUTION INFILTRATION; PERINEURAL at 09:28

## 2018-05-24 RX ADMIN — SODIUM CHLORIDE: 9 INJECTION, SOLUTION INTRAVENOUS at 09:02

## 2018-05-24 RX ADMIN — Medication 0.2 MG: at 09:25

## 2018-05-24 ASSESSMENT — ENCOUNTER SYMPTOMS
SHORTNESS OF BREATH: 1
STRIDOR: 0

## 2018-05-24 ASSESSMENT — LIFESTYLE VARIABLES: SMOKING_STATUS: 0

## 2018-05-24 ASSESSMENT — PAIN - FUNCTIONAL ASSESSMENT: PAIN_FUNCTIONAL_ASSESSMENT: 0-10

## 2018-05-24 ASSESSMENT — PAIN SCALES - GENERAL
PAINLEVEL_OUTOF10: 0
PAINLEVEL_OUTOF10: 0

## 2018-05-25 LAB — SURGICAL PATHOLOGY REPORT: NORMAL

## 2018-06-13 ENCOUNTER — HOSPITAL ENCOUNTER (EMERGENCY)
Age: 73
Discharge: HOME | End: 2018-06-13
Attending: EMERGENCY MEDICINE
Payer: MEDICARE

## 2018-06-13 ENCOUNTER — APPOINTMENT (OUTPATIENT)
Dept: GENERAL RADIOLOGY | Age: 73
End: 2018-06-13
Payer: MEDICARE

## 2018-06-13 VITALS
DIASTOLIC BLOOD PRESSURE: 51 MMHG | OXYGEN SATURATION: 95 % | HEART RATE: 54 BPM | TEMPERATURE: 98.1 F | SYSTOLIC BLOOD PRESSURE: 145 MMHG | RESPIRATION RATE: 18 BRPM | BODY MASS INDEX: 38.83 KG/M2 | WEIGHT: 211 LBS | HEIGHT: 62 IN

## 2018-06-13 DIAGNOSIS — N39.0 URINARY TRACT INFECTION WITHOUT HEMATURIA, SITE UNSPECIFIED: Primary | ICD-10-CM

## 2018-06-13 LAB
-: ABNORMAL
ABSOLUTE EOS #: 0.2 K/UL (ref 0–0.4)
ABSOLUTE IMMATURE GRANULOCYTE: ABNORMAL K/UL (ref 0–0.3)
ABSOLUTE LYMPH #: 0.8 K/UL (ref 1–4.8)
ABSOLUTE MONO #: 0.9 K/UL (ref 0.1–1.2)
ALBUMIN SERPL-MCNC: 4 G/DL (ref 3.5–5.2)
ALBUMIN/GLOBULIN RATIO: 1.3 (ref 1–2.5)
ALP BLD-CCNC: 82 U/L (ref 35–104)
ALT SERPL-CCNC: 11 U/L (ref 5–33)
AMORPHOUS: ABNORMAL
ANION GAP SERPL CALCULATED.3IONS-SCNC: 16 MMOL/L (ref 9–17)
AST SERPL-CCNC: 15 U/L
BACTERIA: ABNORMAL
BASOPHILS # BLD: 1 % (ref 0–2)
BASOPHILS ABSOLUTE: 0.1 K/UL (ref 0–0.2)
BILIRUB SERPL-MCNC: 0.34 MG/DL (ref 0.3–1.2)
BILIRUBIN URINE: ABNORMAL
BUN BLDV-MCNC: 23 MG/DL (ref 8–23)
BUN/CREAT BLD: ABNORMAL (ref 9–20)
CALCIUM SERPL-MCNC: 9.3 MG/DL (ref 8.6–10.4)
CASTS UA: ABNORMAL /LPF
CHLORIDE BLD-SCNC: 96 MMOL/L (ref 98–107)
CO2: 27 MMOL/L (ref 20–31)
COLOR: YELLOW
COMMENT UA: ABNORMAL
CREAT SERPL-MCNC: 1.01 MG/DL (ref 0.5–0.9)
CRYSTALS, UA: ABNORMAL /HPF
DIFFERENTIAL TYPE: ABNORMAL
EKG ATRIAL RATE: 60 BPM
EKG P AXIS: 47 DEGREES
EKG P-R INTERVAL: 144 MS
EKG Q-T INTERVAL: 466 MS
EKG QRS DURATION: 78 MS
EKG QTC CALCULATION (BAZETT): 466 MS
EKG R AXIS: -31 DEGREES
EKG T AXIS: 63 DEGREES
EKG VENTRICULAR RATE: 60 BPM
EOSINOPHILS RELATIVE PERCENT: 2 % (ref 1–4)
EPITHELIAL CELLS UA: ABNORMAL /HPF (ref 0–5)
GFR AFRICAN AMERICAN: >60 ML/MIN
GFR NON-AFRICAN AMERICAN: 54 ML/MIN
GFR SERPL CREATININE-BSD FRML MDRD: ABNORMAL ML/MIN/{1.73_M2}
GFR SERPL CREATININE-BSD FRML MDRD: ABNORMAL ML/MIN/{1.73_M2}
GLUCOSE BLD-MCNC: 104 MG/DL (ref 70–99)
GLUCOSE URINE: NEGATIVE
HCT VFR BLD CALC: 38.1 % (ref 36–46)
HEMOGLOBIN: 13.5 G/DL (ref 12–16)
IMMATURE GRANULOCYTES: ABNORMAL %
KETONES, URINE: NEGATIVE
LEUKOCYTE ESTERASE, URINE: ABNORMAL
LIPASE: 38 U/L (ref 13–60)
LYMPHOCYTES # BLD: 9 % (ref 24–44)
MCH RBC QN AUTO: 34.4 PG (ref 26–34)
MCHC RBC AUTO-ENTMCNC: 35.4 G/DL (ref 31–37)
MCV RBC AUTO: 97.3 FL (ref 80–100)
MONOCYTES # BLD: 11 % (ref 2–11)
MUCUS: ABNORMAL
NITRITE, URINE: POSITIVE
NRBC AUTOMATED: ABNORMAL PER 100 WBC
OTHER OBSERVATIONS UA: ABNORMAL
PDW BLD-RTO: 13.7 % (ref 12.5–15.4)
PH UA: 5.5 (ref 5–8)
PLATELET # BLD: 227 K/UL (ref 140–450)
PLATELET ESTIMATE: ABNORMAL
PMV BLD AUTO: 9.7 FL (ref 6–12)
POTASSIUM SERPL-SCNC: 4.1 MMOL/L (ref 3.7–5.3)
PROTEIN UA: ABNORMAL
RBC # BLD: 3.92 M/UL (ref 4–5.2)
RBC # BLD: ABNORMAL 10*6/UL
RBC UA: ABNORMAL /HPF (ref 0–2)
RENAL EPITHELIAL, UA: ABNORMAL /HPF
SEG NEUTROPHILS: 77 % (ref 36–66)
SEGMENTED NEUTROPHILS ABSOLUTE COUNT: 6.5 K/UL (ref 1.8–7.7)
SODIUM BLD-SCNC: 139 MMOL/L (ref 135–144)
SPECIFIC GRAVITY UA: 1.03 (ref 1–1.03)
TOTAL PROTEIN: 7.1 G/DL (ref 6.4–8.3)
TRICHOMONAS: ABNORMAL
TROPONIN INTERP: NORMAL
TROPONIN T: <0.03 NG/ML
TURBIDITY: ABNORMAL
URINE HGB: ABNORMAL
UROBILINOGEN, URINE: NORMAL
WBC # BLD: 8.4 K/UL (ref 3.5–11)
WBC # BLD: ABNORMAL 10*3/UL
WBC UA: ABNORMAL /HPF (ref 0–5)
YEAST: ABNORMAL

## 2018-06-13 PROCEDURE — 87186 SC STD MICRODIL/AGAR DIL: CPT

## 2018-06-13 PROCEDURE — 72100 X-RAY EXAM L-S SPINE 2/3 VWS: CPT

## 2018-06-13 PROCEDURE — 84484 ASSAY OF TROPONIN QUANT: CPT

## 2018-06-13 PROCEDURE — 6370000000 HC RX 637 (ALT 250 FOR IP): Performed by: PHYSICIAN ASSISTANT

## 2018-06-13 PROCEDURE — 87086 URINE CULTURE/COLONY COUNT: CPT

## 2018-06-13 PROCEDURE — 99284 EMERGENCY DEPT VISIT MOD MDM: CPT

## 2018-06-13 PROCEDURE — 85025 COMPLETE CBC W/AUTO DIFF WBC: CPT

## 2018-06-13 PROCEDURE — 81001 URINALYSIS AUTO W/SCOPE: CPT

## 2018-06-13 PROCEDURE — 87077 CULTURE AEROBIC IDENTIFY: CPT

## 2018-06-13 PROCEDURE — 71046 X-RAY EXAM CHEST 2 VIEWS: CPT

## 2018-06-13 PROCEDURE — 93005 ELECTROCARDIOGRAM TRACING: CPT

## 2018-06-13 PROCEDURE — 80053 COMPREHEN METABOLIC PANEL: CPT

## 2018-06-13 PROCEDURE — 83690 ASSAY OF LIPASE: CPT

## 2018-06-13 RX ORDER — NITROFURANTOIN 25; 75 MG/1; MG/1
100 CAPSULE ORAL 2 TIMES DAILY
Qty: 10 CAPSULE | Refills: 0 | Status: SHIPPED | OUTPATIENT
Start: 2018-06-13 | End: 2018-06-18

## 2018-06-13 RX ORDER — NITROFURANTOIN 25; 75 MG/1; MG/1
100 CAPSULE ORAL ONCE
Status: COMPLETED | OUTPATIENT
Start: 2018-06-13 | End: 2018-06-13

## 2018-06-13 RX ADMIN — NITROFURANTOIN MONOHYDRATE AND NITROFURANTOIN MACROCRYSTALLINE 100 MG: 75; 25 CAPSULE ORAL at 18:22

## 2018-06-13 ASSESSMENT — PAIN DESCRIPTION - LOCATION: LOCATION: BACK;HIP;HEAD

## 2018-06-13 ASSESSMENT — PAIN DESCRIPTION - PAIN TYPE: TYPE: ACUTE PAIN

## 2018-06-13 ASSESSMENT — PAIN DESCRIPTION - ORIENTATION: ORIENTATION: LEFT;RIGHT

## 2018-06-13 ASSESSMENT — PAIN SCALES - GENERAL: PAINLEVEL_OUTOF10: 8

## 2018-06-15 LAB
CULTURE: ABNORMAL
CULTURE: ABNORMAL
Lab: ABNORMAL
ORGANISM: ABNORMAL
SPECIMEN DESCRIPTION: ABNORMAL
SPECIMEN DESCRIPTION: ABNORMAL
STATUS: ABNORMAL

## 2018-09-28 ENCOUNTER — HOSPITAL ENCOUNTER (OUTPATIENT)
Dept: ULTRASOUND IMAGING | Age: 73
Discharge: HOME | End: 2018-09-30
Payer: MEDICARE

## 2018-09-28 DIAGNOSIS — R10.11 RIGHT UPPER QUADRANT PAIN: ICD-10-CM

## 2018-09-28 PROCEDURE — 76705 ECHO EXAM OF ABDOMEN: CPT

## 2019-01-12 ENCOUNTER — APPOINTMENT (OUTPATIENT)
Dept: GENERAL RADIOLOGY | Age: 74
End: 2019-01-12
Payer: MEDICARE

## 2019-01-12 ENCOUNTER — HOSPITAL ENCOUNTER (OUTPATIENT)
Age: 74
Setting detail: OBSERVATION
Discharge: HOME | End: 2019-01-13
Attending: EMERGENCY MEDICINE | Admitting: FAMILY MEDICINE
Payer: MEDICARE

## 2019-01-12 DIAGNOSIS — R07.9 CHEST PAIN, UNSPECIFIED TYPE: ICD-10-CM

## 2019-01-12 DIAGNOSIS — T50.905A ADVERSE EFFECT OF DRUG, INITIAL ENCOUNTER: ICD-10-CM

## 2019-01-12 DIAGNOSIS — I50.32 CHRONIC DIASTOLIC CONGESTIVE HEART FAILURE (HCC): Primary | Chronic | ICD-10-CM

## 2019-01-12 LAB
ABSOLUTE EOS #: 0.2 K/UL (ref 0–0.4)
ABSOLUTE IMMATURE GRANULOCYTE: ABNORMAL K/UL (ref 0–0.3)
ABSOLUTE LYMPH #: 1.3 K/UL (ref 1–4.8)
ABSOLUTE MONO #: 1.1 K/UL (ref 0.1–1.2)
ANION GAP SERPL CALCULATED.3IONS-SCNC: 13 MMOL/L (ref 9–17)
BASOPHILS # BLD: 0 % (ref 0–2)
BASOPHILS ABSOLUTE: 0 K/UL (ref 0–0.2)
BNP INTERPRETATION: ABNORMAL
BUN BLDV-MCNC: 21 MG/DL (ref 8–23)
BUN/CREAT BLD: ABNORMAL (ref 9–20)
CALCIUM SERPL-MCNC: 9.2 MG/DL (ref 8.6–10.4)
CHLORIDE BLD-SCNC: 100 MMOL/L (ref 98–107)
CO2: 27 MMOL/L (ref 20–31)
CREAT SERPL-MCNC: 1.09 MG/DL (ref 0.5–0.9)
DIFFERENTIAL TYPE: ABNORMAL
EOSINOPHILS RELATIVE PERCENT: 2 % (ref 1–4)
GFR AFRICAN AMERICAN: 60 ML/MIN
GFR NON-AFRICAN AMERICAN: 49 ML/MIN
GFR SERPL CREATININE-BSD FRML MDRD: ABNORMAL ML/MIN/{1.73_M2}
GFR SERPL CREATININE-BSD FRML MDRD: ABNORMAL ML/MIN/{1.73_M2}
GLUCOSE BLD-MCNC: 99 MG/DL (ref 70–99)
HCT VFR BLD CALC: 38.2 % (ref 36–46)
HEMOGLOBIN: 13.1 G/DL (ref 12–16)
IMMATURE GRANULOCYTES: ABNORMAL %
LYMPHOCYTES # BLD: 12 % (ref 24–44)
MCH RBC QN AUTO: 33 PG (ref 26–34)
MCHC RBC AUTO-ENTMCNC: 34.2 G/DL (ref 31–37)
MCV RBC AUTO: 96.6 FL (ref 80–100)
MONOCYTES # BLD: 10 % (ref 2–11)
MYOGLOBIN: 31 NG/ML (ref 25–58)
NRBC AUTOMATED: ABNORMAL PER 100 WBC
PDW BLD-RTO: 14.9 % (ref 12.5–15.4)
PLATELET # BLD: 232 K/UL (ref 140–450)
PLATELET ESTIMATE: ABNORMAL
PMV BLD AUTO: 9.6 FL (ref 6–12)
POTASSIUM SERPL-SCNC: 3.4 MMOL/L (ref 3.7–5.3)
PRO-BNP: 1098 PG/ML
RBC # BLD: 3.96 M/UL (ref 4–5.2)
RBC # BLD: ABNORMAL 10*6/UL
SEG NEUTROPHILS: 76 % (ref 36–66)
SEGMENTED NEUTROPHILS ABSOLUTE COUNT: 8.4 K/UL (ref 1.8–7.7)
SODIUM BLD-SCNC: 140 MMOL/L (ref 135–144)
TROPONIN INTERP: ABNORMAL
TROPONIN T: ABNORMAL NG/ML
TROPONIN, HIGH SENSITIVITY: 17 NG/L (ref 0–14)
WBC # BLD: 11.1 K/UL (ref 3.5–11)
WBC # BLD: ABNORMAL 10*3/UL

## 2019-01-12 PROCEDURE — 80048 BASIC METABOLIC PNL TOTAL CA: CPT

## 2019-01-12 PROCEDURE — 93005 ELECTROCARDIOGRAM TRACING: CPT

## 2019-01-12 PROCEDURE — 83880 ASSAY OF NATRIURETIC PEPTIDE: CPT

## 2019-01-12 PROCEDURE — 83874 ASSAY OF MYOGLOBIN: CPT

## 2019-01-12 PROCEDURE — 36415 COLL VENOUS BLD VENIPUNCTURE: CPT

## 2019-01-12 PROCEDURE — 85025 COMPLETE CBC W/AUTO DIFF WBC: CPT

## 2019-01-12 PROCEDURE — 84484 ASSAY OF TROPONIN QUANT: CPT

## 2019-01-12 PROCEDURE — 71046 X-RAY EXAM CHEST 2 VIEWS: CPT

## 2019-01-12 PROCEDURE — 99285 EMERGENCY DEPT VISIT HI MDM: CPT

## 2019-01-12 ASSESSMENT — PAIN SCALES - GENERAL: PAINLEVEL_OUTOF10: 5

## 2019-01-13 VITALS
SYSTOLIC BLOOD PRESSURE: 150 MMHG | OXYGEN SATURATION: 95 % | WEIGHT: 210.76 LBS | BODY MASS INDEX: 39.79 KG/M2 | RESPIRATION RATE: 16 BRPM | TEMPERATURE: 97.6 F | HEIGHT: 61 IN | DIASTOLIC BLOOD PRESSURE: 60 MMHG | HEART RATE: 59 BPM

## 2019-01-13 PROBLEM — I50.32 CHRONIC DIASTOLIC CONGESTIVE HEART FAILURE (HCC): Chronic | Status: ACTIVE | Noted: 2019-01-13

## 2019-01-13 LAB
MYOGLOBIN: 28 NG/ML (ref 25–58)
TROPONIN INTERP: ABNORMAL
TROPONIN T: ABNORMAL NG/ML
TROPONIN, HIGH SENSITIVITY: 15 NG/L (ref 0–14)
TROPONIN, HIGH SENSITIVITY: 17 NG/L (ref 0–14)
TROPONIN, HIGH SENSITIVITY: 19 NG/L (ref 0–14)
TSH SERPL DL<=0.05 MIU/L-ACNC: 2.15 MIU/L (ref 0.3–5)
URIC ACID: 6.3 MG/DL (ref 2.4–5.7)
VITAMIN D 25-HYDROXY: 11.6 NG/ML (ref 30–100)

## 2019-01-13 PROCEDURE — 96374 THER/PROPH/DIAG INJ IV PUSH: CPT

## 2019-01-13 PROCEDURE — 93005 ELECTROCARDIOGRAM TRACING: CPT

## 2019-01-13 PROCEDURE — 84550 ASSAY OF BLOOD/URIC ACID: CPT

## 2019-01-13 PROCEDURE — G0378 HOSPITAL OBSERVATION PER HR: HCPCS

## 2019-01-13 PROCEDURE — 82306 VITAMIN D 25 HYDROXY: CPT

## 2019-01-13 PROCEDURE — 83874 ASSAY OF MYOGLOBIN: CPT

## 2019-01-13 PROCEDURE — 99217 PR OBSERVATION CARE DISCHARGE MANAGEMENT: CPT | Performed by: FAMILY MEDICINE

## 2019-01-13 PROCEDURE — 6370000000 HC RX 637 (ALT 250 FOR IP): Performed by: FAMILY MEDICINE

## 2019-01-13 PROCEDURE — 84443 ASSAY THYROID STIM HORMONE: CPT

## 2019-01-13 PROCEDURE — 6370000000 HC RX 637 (ALT 250 FOR IP): Performed by: NURSE PRACTITIONER

## 2019-01-13 PROCEDURE — 96372 THER/PROPH/DIAG INJ SC/IM: CPT

## 2019-01-13 PROCEDURE — 6360000002 HC RX W HCPCS: Performed by: EMERGENCY MEDICINE

## 2019-01-13 PROCEDURE — 84484 ASSAY OF TROPONIN QUANT: CPT

## 2019-01-13 PROCEDURE — 6370000000 HC RX 637 (ALT 250 FOR IP): Performed by: EMERGENCY MEDICINE

## 2019-01-13 PROCEDURE — 6360000002 HC RX W HCPCS: Performed by: NURSE PRACTITIONER

## 2019-01-13 PROCEDURE — 2580000003 HC RX 258: Performed by: NURSE PRACTITIONER

## 2019-01-13 PROCEDURE — S0028 INJECTION, FAMOTIDINE, 20 MG: HCPCS | Performed by: EMERGENCY MEDICINE

## 2019-01-13 PROCEDURE — 36415 COLL VENOUS BLD VENIPUNCTURE: CPT

## 2019-01-13 RX ORDER — PAROXETINE HYDROCHLORIDE 20 MG/1
20 TABLET, FILM COATED ORAL EVERY MORNING
Status: DISCONTINUED | OUTPATIENT
Start: 2019-01-13 | End: 2019-01-13 | Stop reason: HOSPADM

## 2019-01-13 RX ORDER — ALLOPURINOL 100 MG/1
100 TABLET ORAL DAILY
Status: DISCONTINUED | OUTPATIENT
Start: 2019-01-13 | End: 2019-01-13 | Stop reason: HOSPADM

## 2019-01-13 RX ORDER — BUMETANIDE 1 MG/1
2 TABLET ORAL DAILY
Status: DISCONTINUED | OUTPATIENT
Start: 2019-01-13 | End: 2019-01-13

## 2019-01-13 RX ORDER — CHLORTHALIDONE 25 MG/1
25 TABLET ORAL DAILY
Status: DISCONTINUED | OUTPATIENT
Start: 2019-01-13 | End: 2019-01-13

## 2019-01-13 RX ORDER — BUMETANIDE 1 MG/1
1 TABLET ORAL DAILY
Qty: 30 TABLET | Refills: 3 | OUTPATIENT
Start: 2019-01-14

## 2019-01-13 RX ORDER — PSEUDOEPHEDRINE HCL 30 MG
100 TABLET ORAL 2 TIMES DAILY
Qty: 60 CAPSULE | Refills: 0 | OUTPATIENT
Start: 2019-01-13

## 2019-01-13 RX ORDER — ASPIRIN 81 MG/1
324 TABLET, CHEWABLE ORAL ONCE
Status: COMPLETED | OUTPATIENT
Start: 2019-01-13 | End: 2019-01-13

## 2019-01-13 RX ORDER — BUMETANIDE 1 MG/1
1 TABLET ORAL DAILY
Status: DISCONTINUED | OUTPATIENT
Start: 2019-01-14 | End: 2019-01-13 | Stop reason: HOSPADM

## 2019-01-13 RX ORDER — POTASSIUM CHLORIDE 7.45 MG/ML
10 INJECTION INTRAVENOUS PRN
Status: DISCONTINUED | OUTPATIENT
Start: 2019-01-13 | End: 2019-01-13 | Stop reason: HOSPADM

## 2019-01-13 RX ORDER — NITROGLYCERIN 0.4 MG/1
0.4 TABLET SUBLINGUAL
Status: DISCONTINUED | OUTPATIENT
Start: 2019-01-13 | End: 2019-01-13 | Stop reason: SDUPTHER

## 2019-01-13 RX ORDER — POTASSIUM CHLORIDE 20MEQ/15ML
40 LIQUID (ML) ORAL PRN
Status: DISCONTINUED | OUTPATIENT
Start: 2019-01-13 | End: 2019-01-13 | Stop reason: HOSPADM

## 2019-01-13 RX ORDER — ONDANSETRON 2 MG/ML
4 INJECTION INTRAMUSCULAR; INTRAVENOUS EVERY 6 HOURS PRN
Status: DISCONTINUED | OUTPATIENT
Start: 2019-01-13 | End: 2019-01-13 | Stop reason: HOSPADM

## 2019-01-13 RX ORDER — LOSARTAN POTASSIUM 50 MG/1
100 TABLET ORAL DAILY
Status: DISCONTINUED | OUTPATIENT
Start: 2019-01-13 | End: 2019-01-13 | Stop reason: HOSPADM

## 2019-01-13 RX ORDER — MAGNESIUM SULFATE 1 G/100ML
1 INJECTION INTRAVENOUS PRN
Status: DISCONTINUED | OUTPATIENT
Start: 2019-01-13 | End: 2019-01-13 | Stop reason: HOSPADM

## 2019-01-13 RX ORDER — POTASSIUM CHLORIDE 20 MEQ/1
40 TABLET, EXTENDED RELEASE ORAL PRN
Status: DISCONTINUED | OUTPATIENT
Start: 2019-01-13 | End: 2019-01-13 | Stop reason: HOSPADM

## 2019-01-13 RX ORDER — ACETAMINOPHEN 500 MG
1000 TABLET ORAL ONCE
Status: COMPLETED | OUTPATIENT
Start: 2019-01-13 | End: 2019-01-13

## 2019-01-13 RX ORDER — RANOLAZINE 500 MG/1
500 TABLET, EXTENDED RELEASE ORAL 2 TIMES DAILY
Status: DISCONTINUED | OUTPATIENT
Start: 2019-01-13 | End: 2019-01-13 | Stop reason: HOSPADM

## 2019-01-13 RX ORDER — ISOSORBIDE MONONITRATE 60 MG/1
60 TABLET, EXTENDED RELEASE ORAL DAILY
Status: DISCONTINUED | OUTPATIENT
Start: 2019-01-13 | End: 2019-01-13 | Stop reason: HOSPADM

## 2019-01-13 RX ORDER — PANTOPRAZOLE SODIUM 40 MG/1
40 TABLET, DELAYED RELEASE ORAL DAILY
Status: DISCONTINUED | OUTPATIENT
Start: 2019-01-13 | End: 2019-01-13 | Stop reason: HOSPADM

## 2019-01-13 RX ORDER — DILTIAZEM HYDROCHLORIDE 120 MG/1
240 CAPSULE, COATED, EXTENDED RELEASE ORAL DAILY
Status: DISCONTINUED | OUTPATIENT
Start: 2019-01-13 | End: 2019-01-13 | Stop reason: HOSPADM

## 2019-01-13 RX ORDER — POTASSIUM CHLORIDE 20 MEQ/1
40 TABLET, EXTENDED RELEASE ORAL ONCE
Status: COMPLETED | OUTPATIENT
Start: 2019-01-13 | End: 2019-01-13

## 2019-01-13 RX ORDER — CLOPIDOGREL BISULFATE 75 MG/1
75 TABLET ORAL DAILY
Status: DISCONTINUED | OUTPATIENT
Start: 2019-01-13 | End: 2019-01-13 | Stop reason: HOSPADM

## 2019-01-13 RX ORDER — NITROGLYCERIN 0.4 MG/1
0.4 TABLET SUBLINGUAL EVERY 5 MIN PRN
Status: DISCONTINUED | OUTPATIENT
Start: 2019-01-13 | End: 2019-01-13 | Stop reason: HOSPADM

## 2019-01-13 RX ORDER — ASPIRIN 81 MG/1
325 TABLET ORAL DAILY
Qty: 60 TABLET | Refills: 3 | OUTPATIENT
Start: 2019-01-14

## 2019-01-13 RX ORDER — BISACODYL 10 MG
10 SUPPOSITORY, RECTAL RECTAL DAILY PRN
Status: DISCONTINUED | OUTPATIENT
Start: 2019-01-13 | End: 2019-01-13 | Stop reason: HOSPADM

## 2019-01-13 RX ORDER — VALSARTAN 160 MG/1
80 TABLET ORAL DAILY
Qty: 60 TABLET | Refills: 0 | Status: SHIPPED | OUTPATIENT
Start: 2019-01-13 | End: 2019-03-10 | Stop reason: ALTCHOICE

## 2019-01-13 RX ORDER — METOPROLOL SUCCINATE 50 MG/1
50 TABLET, EXTENDED RELEASE ORAL DAILY
Status: DISCONTINUED | OUTPATIENT
Start: 2019-01-13 | End: 2019-01-13 | Stop reason: HOSPADM

## 2019-01-13 RX ORDER — PRAVASTATIN SODIUM 20 MG
40 TABLET ORAL DAILY
Status: DISCONTINUED | OUTPATIENT
Start: 2019-01-13 | End: 2019-01-13 | Stop reason: HOSPADM

## 2019-01-13 RX ORDER — AZATHIOPRINE 50 MG/1
25 TABLET ORAL DAILY
Status: DISCONTINUED | OUTPATIENT
Start: 2019-01-13 | End: 2019-01-13 | Stop reason: HOSPADM

## 2019-01-13 RX ORDER — SODIUM CHLORIDE 0.9 % (FLUSH) 0.9 %
10 SYRINGE (ML) INJECTION PRN
Status: DISCONTINUED | OUTPATIENT
Start: 2019-01-13 | End: 2019-01-13 | Stop reason: HOSPADM

## 2019-01-13 RX ORDER — SODIUM CHLORIDE 0.9 % (FLUSH) 0.9 %
10 SYRINGE (ML) INJECTION EVERY 12 HOURS SCHEDULED
Status: DISCONTINUED | OUTPATIENT
Start: 2019-01-13 | End: 2019-01-13 | Stop reason: HOSPADM

## 2019-01-13 RX ORDER — DOCUSATE SODIUM 100 MG/1
100 CAPSULE, LIQUID FILLED ORAL 2 TIMES DAILY
Status: DISCONTINUED | OUTPATIENT
Start: 2019-01-13 | End: 2019-01-13 | Stop reason: HOSPADM

## 2019-01-13 RX ADMIN — SODIUM CHLORIDE, PRESERVATIVE FREE 10 ML: 5 INJECTION INTRAVENOUS at 09:25

## 2019-01-13 RX ADMIN — DILTIAZEM HYDROCHLORIDE 240 MG: 120 CAPSULE, COATED, EXTENDED RELEASE ORAL at 09:04

## 2019-01-13 RX ADMIN — ENOXAPARIN SODIUM 40 MG: 40 INJECTION SUBCUTANEOUS at 09:03

## 2019-01-13 RX ADMIN — PANTOPRAZOLE SODIUM 40 MG: 40 TABLET, DELAYED RELEASE ORAL at 09:04

## 2019-01-13 RX ADMIN — CLOPIDOGREL BISULFATE 75 MG: 75 TABLET ORAL at 09:03

## 2019-01-13 RX ADMIN — POTASSIUM CHLORIDE 40 MEQ: 20 TABLET, EXTENDED RELEASE ORAL at 12:04

## 2019-01-13 RX ADMIN — PAROXETINE HYDROCHLORIDE HEMIHYDRATE 20 MG: 20 TABLET, FILM COATED ORAL at 09:04

## 2019-01-13 RX ADMIN — FAMOTIDINE 20 MG: 10 INJECTION, SOLUTION INTRAVENOUS at 02:51

## 2019-01-13 RX ADMIN — ASPIRIN 81 MG 324 MG: 81 TABLET ORAL at 02:51

## 2019-01-13 RX ADMIN — ALLOPURINOL 100 MG: 100 TABLET ORAL at 09:04

## 2019-01-13 RX ADMIN — ACETAMINOPHEN 1000 MG: 500 TABLET ORAL at 02:16

## 2019-01-13 RX ADMIN — LOSARTAN POTASSIUM 100 MG: 50 TABLET ORAL at 09:04

## 2019-01-13 ASSESSMENT — ENCOUNTER SYMPTOMS
SORE THROAT: 0
DIARRHEA: 0
WHEEZING: 0
COLOR CHANGE: 0
VOMITING: 0
ABDOMINAL PAIN: 0
NAUSEA: 0
STRIDOR: 0
EYE PAIN: 0
EYE REDNESS: 0
CONSTIPATION: 0
SHORTNESS OF BREATH: 1
EYE DISCHARGE: 0
COUGH: 0

## 2019-01-13 ASSESSMENT — PAIN SCALES - GENERAL
PAINLEVEL_OUTOF10: 4
PAINLEVEL_OUTOF10: 5

## 2019-01-13 ASSESSMENT — PAIN DESCRIPTION - LOCATION: LOCATION: GENERALIZED

## 2019-01-13 ASSESSMENT — PAIN DESCRIPTION - PAIN TYPE: TYPE: CHRONIC PAIN

## 2019-01-14 LAB
EKG ATRIAL RATE: 52 BPM
EKG ATRIAL RATE: 58 BPM
EKG P AXIS: 63 DEGREES
EKG P AXIS: 73 DEGREES
EKG P-R INTERVAL: 134 MS
EKG P-R INTERVAL: 140 MS
EKG Q-T INTERVAL: 502 MS
EKG Q-T INTERVAL: 524 MS
EKG QRS DURATION: 82 MS
EKG QRS DURATION: 84 MS
EKG QTC CALCULATION (BAZETT): 487 MS
EKG QTC CALCULATION (BAZETT): 492 MS
EKG R AXIS: -37 DEGREES
EKG R AXIS: -39 DEGREES
EKG T AXIS: 49 DEGREES
EKG T AXIS: 68 DEGREES
EKG VENTRICULAR RATE: 52 BPM
EKG VENTRICULAR RATE: 58 BPM

## 2019-03-03 ENCOUNTER — HOSPITAL ENCOUNTER (EMERGENCY)
Age: 74
Discharge: HOME | End: 2019-03-03
Attending: EMERGENCY MEDICINE
Payer: MEDICARE

## 2019-03-03 VITALS
RESPIRATION RATE: 18 BRPM | HEIGHT: 62 IN | DIASTOLIC BLOOD PRESSURE: 82 MMHG | HEART RATE: 61 BPM | OXYGEN SATURATION: 93 % | BODY MASS INDEX: 37.73 KG/M2 | SYSTOLIC BLOOD PRESSURE: 120 MMHG | WEIGHT: 205 LBS | TEMPERATURE: 97.5 F

## 2019-03-03 DIAGNOSIS — T20.26XA: Primary | ICD-10-CM

## 2019-03-03 DIAGNOSIS — T24.221A PARTIAL THICKNESS BURN OF RIGHT KNEE, INITIAL ENCOUNTER: ICD-10-CM

## 2019-03-03 PROCEDURE — 6370000000 HC RX 637 (ALT 250 FOR IP): Performed by: EMERGENCY MEDICINE

## 2019-03-03 PROCEDURE — 99283 EMERGENCY DEPT VISIT LOW MDM: CPT

## 2019-03-03 RX ORDER — GINSENG 100 MG
CAPSULE ORAL
Qty: 1 TUBE | Refills: 3 | Status: SHIPPED | OUTPATIENT
Start: 2019-03-03 | End: 2019-03-10 | Stop reason: ALTCHOICE

## 2019-03-03 RX ORDER — GINSENG 100 MG
CAPSULE ORAL 3 TIMES DAILY
Status: DISCONTINUED | OUTPATIENT
Start: 2019-03-03 | End: 2019-03-03 | Stop reason: HOSPADM

## 2019-03-03 RX ADMIN — BACITRACIN: 500 OINTMENT TOPICAL at 17:17

## 2019-03-03 ASSESSMENT — PAIN SCALES - GENERAL: PAINLEVEL_OUTOF10: 6

## 2019-03-03 ASSESSMENT — PAIN DESCRIPTION - PAIN TYPE: TYPE: CHRONIC PAIN

## 2019-03-10 ENCOUNTER — HOSPITAL ENCOUNTER (EMERGENCY)
Age: 74
Discharge: HOME | End: 2019-03-10
Attending: EMERGENCY MEDICINE
Payer: MEDICARE

## 2019-03-10 VITALS
DIASTOLIC BLOOD PRESSURE: 64 MMHG | SYSTOLIC BLOOD PRESSURE: 198 MMHG | HEART RATE: 63 BPM | HEIGHT: 62 IN | TEMPERATURE: 97.7 F | RESPIRATION RATE: 20 BRPM | OXYGEN SATURATION: 94 % | WEIGHT: 205 LBS | BODY MASS INDEX: 37.73 KG/M2

## 2019-03-10 DIAGNOSIS — L03.115 CELLULITIS OF RIGHT LOWER EXTREMITY: Primary | ICD-10-CM

## 2019-03-10 LAB
ABSOLUTE EOS #: 0.2 K/UL (ref 0–0.4)
ABSOLUTE IMMATURE GRANULOCYTE: ABNORMAL K/UL (ref 0–0.3)
ABSOLUTE LYMPH #: 1.3 K/UL (ref 1–4.8)
ABSOLUTE MONO #: 1.2 K/UL (ref 0.1–1.2)
ANION GAP SERPL CALCULATED.3IONS-SCNC: 15 MMOL/L (ref 9–17)
BASOPHILS # BLD: 1 % (ref 0–2)
BASOPHILS ABSOLUTE: 0 K/UL (ref 0–0.2)
BUN BLDV-MCNC: 15 MG/DL (ref 8–23)
BUN/CREAT BLD: ABNORMAL (ref 9–20)
CALCIUM SERPL-MCNC: 9.3 MG/DL (ref 8.6–10.4)
CHLORIDE BLD-SCNC: 100 MMOL/L (ref 98–107)
CO2: 27 MMOL/L (ref 20–31)
CREAT SERPL-MCNC: 1 MG/DL (ref 0.5–0.9)
DIFFERENTIAL TYPE: ABNORMAL
EOSINOPHILS RELATIVE PERCENT: 2 % (ref 1–4)
GFR AFRICAN AMERICAN: >60 ML/MIN
GFR NON-AFRICAN AMERICAN: 55 ML/MIN
GFR SERPL CREATININE-BSD FRML MDRD: ABNORMAL ML/MIN/{1.73_M2}
GFR SERPL CREATININE-BSD FRML MDRD: ABNORMAL ML/MIN/{1.73_M2}
GLUCOSE BLD-MCNC: 88 MG/DL (ref 70–99)
HCT VFR BLD CALC: 38.8 % (ref 36–46)
HEMOGLOBIN: 13.2 G/DL (ref 12–16)
IMMATURE GRANULOCYTES: ABNORMAL %
LYMPHOCYTES # BLD: 13 % (ref 24–44)
MCH RBC QN AUTO: 32.6 PG (ref 26–34)
MCHC RBC AUTO-ENTMCNC: 34 G/DL (ref 31–37)
MCV RBC AUTO: 96 FL (ref 80–100)
MONOCYTES # BLD: 12 % (ref 2–11)
NRBC AUTOMATED: ABNORMAL PER 100 WBC
PDW BLD-RTO: 14.8 % (ref 12.5–15.4)
PLATELET # BLD: 271 K/UL (ref 140–450)
PLATELET ESTIMATE: ABNORMAL
PMV BLD AUTO: 9.4 FL (ref 6–12)
POTASSIUM SERPL-SCNC: 3.6 MMOL/L (ref 3.7–5.3)
RBC # BLD: 4.04 M/UL (ref 4–5.2)
RBC # BLD: ABNORMAL 10*6/UL
SEG NEUTROPHILS: 72 % (ref 36–66)
SEGMENTED NEUTROPHILS ABSOLUTE COUNT: 7.3 K/UL (ref 1.8–7.7)
SODIUM BLD-SCNC: 142 MMOL/L (ref 135–144)
WBC # BLD: 10.1 K/UL (ref 3.5–11)
WBC # BLD: ABNORMAL 10*3/UL

## 2019-03-10 PROCEDURE — 80048 BASIC METABOLIC PNL TOTAL CA: CPT

## 2019-03-10 PROCEDURE — 87040 BLOOD CULTURE FOR BACTERIA: CPT

## 2019-03-10 PROCEDURE — 6370000000 HC RX 637 (ALT 250 FOR IP): Performed by: EMERGENCY MEDICINE

## 2019-03-10 PROCEDURE — 99283 EMERGENCY DEPT VISIT LOW MDM: CPT

## 2019-03-10 PROCEDURE — 85025 COMPLETE CBC W/AUTO DIFF WBC: CPT

## 2019-03-10 PROCEDURE — 36415 COLL VENOUS BLD VENIPUNCTURE: CPT

## 2019-03-10 RX ORDER — DOXYCYCLINE HYCLATE 100 MG
100 TABLET ORAL ONCE
Status: COMPLETED | OUTPATIENT
Start: 2019-03-10 | End: 2019-03-10

## 2019-03-10 RX ORDER — ACETAMINOPHEN 500 MG
1000 TABLET ORAL ONCE
Status: COMPLETED | OUTPATIENT
Start: 2019-03-10 | End: 2019-03-10

## 2019-03-10 RX ORDER — DOXYCYCLINE HYCLATE 100 MG
100 TABLET ORAL 2 TIMES DAILY
Qty: 20 TABLET | Refills: 0 | Status: SHIPPED | OUTPATIENT
Start: 2019-03-10 | End: 2019-03-20

## 2019-03-10 RX ORDER — ACETAMINOPHEN 500 MG
1000 TABLET ORAL 2 TIMES DAILY PRN
Status: ON HOLD | COMMUNITY
End: 2021-08-05

## 2019-03-10 RX ADMIN — ACETAMINOPHEN 1000 MG: 500 TABLET ORAL at 21:28

## 2019-03-10 RX ADMIN — DOXYCYCLINE HYCLATE 100 MG: 100 TABLET, COATED ORAL at 22:06

## 2019-03-10 ASSESSMENT — PAIN DESCRIPTION - LOCATION
LOCATION: LEG
LOCATION: LEG

## 2019-03-10 ASSESSMENT — PAIN DESCRIPTION - ORIENTATION
ORIENTATION: RIGHT
ORIENTATION: RIGHT

## 2019-03-10 ASSESSMENT — PAIN SCALES - GENERAL
PAINLEVEL_OUTOF10: 2
PAINLEVEL_OUTOF10: 4
PAINLEVEL_OUTOF10: 4

## 2019-03-17 LAB
CULTURE: NORMAL
CULTURE: NORMAL
Lab: NORMAL
Lab: NORMAL
SPECIMEN DESCRIPTION: NORMAL
SPECIMEN DESCRIPTION: NORMAL

## 2019-04-30 ENCOUNTER — HOSPITAL ENCOUNTER (OUTPATIENT)
Dept: NUCLEAR MEDICINE | Age: 74
Discharge: HOME | End: 2019-05-02
Payer: MEDICARE

## 2019-04-30 ENCOUNTER — HOSPITAL ENCOUNTER (OUTPATIENT)
Dept: NON INVASIVE DIAGNOSTICS | Age: 74
Discharge: HOME | End: 2019-04-30
Payer: MEDICARE

## 2019-04-30 DIAGNOSIS — I25.110 ATHEROSCLEROSIS OF NATIVE CORONARY ARTERY WITH UNSTABLE ANGINA PECTORIS, UNSPECIFIED WHETHER NATIVE OR TRANSPLANTED HEART (HCC): ICD-10-CM

## 2019-04-30 DIAGNOSIS — I50.32 CHRONIC DIASTOLIC CONGESTIVE HEART FAILURE (HCC): Chronic | ICD-10-CM

## 2019-04-30 LAB
LV EF: 65 %
LV EF: 72 %
LVEF MODALITY: NORMAL
LVEF MODALITY: NORMAL

## 2019-04-30 PROCEDURE — 93306 TTE W/DOPPLER COMPLETE: CPT

## 2019-04-30 PROCEDURE — 93017 CV STRESS TEST TRACING ONLY: CPT | Performed by: NURSE PRACTITIONER

## 2019-04-30 PROCEDURE — 2580000003 HC RX 258: Performed by: PHYSICIAN ASSISTANT

## 2019-04-30 PROCEDURE — A9500 TC99M SESTAMIBI: HCPCS | Performed by: PHYSICIAN ASSISTANT

## 2019-04-30 PROCEDURE — 3430000000 HC RX DIAGNOSTIC RADIOPHARMACEUTICAL: Performed by: PHYSICIAN ASSISTANT

## 2019-04-30 PROCEDURE — 6360000002 HC RX W HCPCS: Performed by: PHYSICIAN ASSISTANT

## 2019-04-30 PROCEDURE — 78452 HT MUSCLE IMAGE SPECT MULT: CPT

## 2019-04-30 RX ORDER — AMINOPHYLLINE DIHYDRATE 25 MG/ML
50 INJECTION, SOLUTION INTRAVENOUS PRN
Status: DISCONTINUED | OUTPATIENT
Start: 2019-04-30 | End: 2019-04-30

## 2019-04-30 RX ORDER — SODIUM CHLORIDE 0.9 % (FLUSH) 0.9 %
10 SYRINGE (ML) INJECTION PRN
Status: DISCONTINUED | OUTPATIENT
Start: 2019-04-30 | End: 2019-05-03 | Stop reason: HOSPADM

## 2019-04-30 RX ORDER — ATROPINE SULFATE 0.1 MG/ML
0.5 INJECTION INTRAVENOUS EVERY 5 MIN PRN
Status: DISCONTINUED | OUTPATIENT
Start: 2019-04-30 | End: 2019-04-30

## 2019-04-30 RX ORDER — NITROGLYCERIN 0.4 MG/1
0.4 TABLET SUBLINGUAL EVERY 5 MIN PRN
Status: DISCONTINUED | OUTPATIENT
Start: 2019-04-30 | End: 2019-04-30

## 2019-04-30 RX ORDER — SODIUM CHLORIDE 9 MG/ML
500 INJECTION, SOLUTION INTRAVENOUS CONTINUOUS PRN
Status: DISCONTINUED | OUTPATIENT
Start: 2019-04-30 | End: 2019-04-30

## 2019-04-30 RX ORDER — SODIUM CHLORIDE 0.9 % (FLUSH) 0.9 %
10 SYRINGE (ML) INJECTION PRN
Status: DISCONTINUED | OUTPATIENT
Start: 2019-04-30 | End: 2019-04-30

## 2019-04-30 RX ADMIN — TETRAKIS(2-METHOXYISOBUTYLISOCYANIDE)COPPER(I) TETRAFLUOROBORATE 16 MILLICURIE: 1 INJECTION, POWDER, LYOPHILIZED, FOR SOLUTION INTRAVENOUS at 08:50

## 2019-04-30 RX ADMIN — SODIUM CHLORIDE, PRESERVATIVE FREE 10 ML: 5 INJECTION INTRAVENOUS at 08:50

## 2019-04-30 RX ADMIN — Medication 10 ML: at 10:39

## 2019-04-30 RX ADMIN — SODIUM CHLORIDE, PRESERVATIVE FREE 10 ML: 5 INJECTION INTRAVENOUS at 10:47

## 2019-04-30 RX ADMIN — TETRAKIS(2-METHOXYISOBUTYLISOCYANIDE)COPPER(I) TETRAFLUOROBORATE 40.1 MILLICURIE: 1 INJECTION, POWDER, LYOPHILIZED, FOR SOLUTION INTRAVENOUS at 10:47

## 2019-04-30 RX ADMIN — REGADENOSON 0.4 MG: 0.08 INJECTION, SOLUTION INTRAVENOUS at 10:47

## 2019-05-18 ENCOUNTER — APPOINTMENT (OUTPATIENT)
Dept: GENERAL RADIOLOGY | Age: 74
DRG: 246 | End: 2019-05-18
Payer: MEDICARE

## 2019-05-18 ENCOUNTER — HOSPITAL ENCOUNTER (INPATIENT)
Age: 74
LOS: 3 days | Discharge: HOME | DRG: 246 | End: 2019-05-21
Attending: EMERGENCY MEDICINE | Admitting: INTERNAL MEDICINE
Payer: MEDICARE

## 2019-05-18 DIAGNOSIS — I50.9 ACUTE ON CHRONIC CONGESTIVE HEART FAILURE, UNSPECIFIED HEART FAILURE TYPE (HCC): Primary | ICD-10-CM

## 2019-05-18 PROBLEM — I50.33 ACUTE ON CHRONIC DIASTOLIC CHF (CONGESTIVE HEART FAILURE) (HCC): Status: ACTIVE | Noted: 2019-05-18

## 2019-05-18 PROBLEM — I50.31 ACUTE DIASTOLIC HF (HEART FAILURE) (HCC): Status: ACTIVE | Noted: 2019-05-18

## 2019-05-18 LAB
-: NORMAL
ABSOLUTE EOS #: 0.15 K/UL (ref 0–0.44)
ABSOLUTE IMMATURE GRANULOCYTE: 0.05 K/UL (ref 0–0.3)
ABSOLUTE LYMPH #: 1.02 K/UL (ref 1.1–3.7)
ABSOLUTE MONO #: 1.01 K/UL (ref 0.1–1.2)
ANION GAP SERPL CALCULATED.3IONS-SCNC: 17 MMOL/L (ref 9–17)
BASOPHILS # BLD: 0 % (ref 0–2)
BASOPHILS ABSOLUTE: 0.04 K/UL (ref 0–0.2)
BNP INTERPRETATION: ABNORMAL
BUN BLDV-MCNC: 17 MG/DL (ref 8–23)
BUN/CREAT BLD: ABNORMAL (ref 9–20)
CALCIUM SERPL-MCNC: 9.3 MG/DL (ref 8.6–10.4)
CHLORIDE BLD-SCNC: 103 MMOL/L (ref 98–107)
CO2: 24 MMOL/L (ref 20–31)
CREAT SERPL-MCNC: 0.66 MG/DL (ref 0.5–0.9)
DIFFERENTIAL TYPE: ABNORMAL
EOSINOPHILS RELATIVE PERCENT: 1 % (ref 1–4)
GFR AFRICAN AMERICAN: >60 ML/MIN
GFR NON-AFRICAN AMERICAN: >60 ML/MIN
GFR SERPL CREATININE-BSD FRML MDRD: ABNORMAL ML/MIN/{1.73_M2}
GFR SERPL CREATININE-BSD FRML MDRD: ABNORMAL ML/MIN/{1.73_M2}
GLUCOSE BLD-MCNC: 90 MG/DL (ref 70–99)
HCT VFR BLD CALC: 38.8 % (ref 36.3–47.1)
HEMOGLOBIN: 13.2 G/DL (ref 11.9–15.1)
IMMATURE GRANULOCYTES: 0 %
LYMPHOCYTES # BLD: 9 % (ref 24–43)
MCH RBC QN AUTO: 32 PG (ref 25.2–33.5)
MCHC RBC AUTO-ENTMCNC: 34 G/DL (ref 28.4–34.8)
MCV RBC AUTO: 94.2 FL (ref 82.6–102.9)
MONOCYTES # BLD: 9 % (ref 3–12)
NRBC AUTOMATED: 0 PER 100 WBC
PDW BLD-RTO: 15.1 % (ref 11.8–14.4)
PLATELET # BLD: 245 K/UL (ref 138–453)
PLATELET ESTIMATE: ABNORMAL
PMV BLD AUTO: 12.4 FL (ref 8.1–13.5)
POTASSIUM SERPL-SCNC: 3.2 MMOL/L (ref 3.7–5.3)
PRO-BNP: 1257 PG/ML
RBC # BLD: 4.12 M/UL (ref 3.95–5.11)
RBC # BLD: ABNORMAL 10*6/UL
REASON FOR REJECTION: NORMAL
SEG NEUTROPHILS: 81 % (ref 36–65)
SEGMENTED NEUTROPHILS ABSOLUTE COUNT: 9 K/UL (ref 1.5–8.1)
SODIUM BLD-SCNC: 144 MMOL/L (ref 135–144)
TROPONIN INTERP: ABNORMAL
TROPONIN T: ABNORMAL NG/ML
TROPONIN, HIGH SENSITIVITY: 18 NG/L (ref 0–14)
WBC # BLD: 11.3 K/UL (ref 3.5–11.3)
WBC # BLD: ABNORMAL 10*3/UL
ZZ NTE CLEAN UP: ORDERED TEST: NORMAL
ZZ NTE WITH NAME CLEAN UP: SPECIMEN SOURCE: NORMAL

## 2019-05-18 PROCEDURE — 99285 EMERGENCY DEPT VISIT HI MDM: CPT

## 2019-05-18 PROCEDURE — 84484 ASSAY OF TROPONIN QUANT: CPT

## 2019-05-18 PROCEDURE — 85025 COMPLETE CBC W/AUTO DIFF WBC: CPT

## 2019-05-18 PROCEDURE — 2500000003 HC RX 250 WO HCPCS: Performed by: EMERGENCY MEDICINE

## 2019-05-18 PROCEDURE — 2060000000 HC ICU INTERMEDIATE R&B

## 2019-05-18 PROCEDURE — 6370000000 HC RX 637 (ALT 250 FOR IP): Performed by: EMERGENCY MEDICINE

## 2019-05-18 PROCEDURE — 83880 ASSAY OF NATRIURETIC PEPTIDE: CPT

## 2019-05-18 PROCEDURE — 71046 X-RAY EXAM CHEST 2 VIEWS: CPT

## 2019-05-18 PROCEDURE — 96365 THER/PROPH/DIAG IV INF INIT: CPT

## 2019-05-18 PROCEDURE — 80048 BASIC METABOLIC PNL TOTAL CA: CPT

## 2019-05-18 PROCEDURE — 36415 COLL VENOUS BLD VENIPUNCTURE: CPT

## 2019-05-18 PROCEDURE — 93005 ELECTROCARDIOGRAM TRACING: CPT

## 2019-05-18 PROCEDURE — 96366 THER/PROPH/DIAG IV INF ADDON: CPT

## 2019-05-18 RX ORDER — BUMETANIDE 0.25 MG/ML
1 INJECTION, SOLUTION INTRAMUSCULAR; INTRAVENOUS ONCE
Status: COMPLETED | OUTPATIENT
Start: 2019-05-18 | End: 2019-05-19

## 2019-05-18 RX ORDER — FUROSEMIDE 10 MG/ML
20 INJECTION INTRAMUSCULAR; INTRAVENOUS ONCE
Status: DISCONTINUED | OUTPATIENT
Start: 2019-05-18 | End: 2019-05-18

## 2019-05-18 RX ORDER — NITROGLYCERIN 20 MG/100ML
5 INJECTION INTRAVENOUS CONTINUOUS
Status: DISCONTINUED | OUTPATIENT
Start: 2019-05-18 | End: 2019-05-19

## 2019-05-18 RX ORDER — POTASSIUM CHLORIDE 20 MEQ/1
40 TABLET, EXTENDED RELEASE ORAL ONCE
Status: COMPLETED | OUTPATIENT
Start: 2019-05-18 | End: 2019-05-19

## 2019-05-18 RX ORDER — ASPIRIN 81 MG/1
324 TABLET, CHEWABLE ORAL ONCE
Status: COMPLETED | OUTPATIENT
Start: 2019-05-18 | End: 2019-05-18

## 2019-05-18 RX ADMIN — ASPIRIN 81 MG 324 MG: 81 TABLET ORAL at 18:14

## 2019-05-18 RX ADMIN — NITROGLYCERIN 5 MCG/MIN: 20 INJECTION INTRAVENOUS at 20:31

## 2019-05-18 ASSESSMENT — ENCOUNTER SYMPTOMS
COUGH: 0
ABDOMINAL PAIN: 0
SORE THROAT: 0
SHORTNESS OF BREATH: 1
VOMITING: 0
NAUSEA: 0

## 2019-05-18 ASSESSMENT — PAIN DESCRIPTION - DESCRIPTORS: DESCRIPTORS: CONSTANT

## 2019-05-18 ASSESSMENT — PAIN SCALES - GENERAL: PAINLEVEL_OUTOF10: 8

## 2019-05-18 ASSESSMENT — PAIN DESCRIPTION - FREQUENCY: FREQUENCY: CONTINUOUS

## 2019-05-18 ASSESSMENT — PAIN DESCRIPTION - ONSET: ONSET: ON-GOING

## 2019-05-18 ASSESSMENT — PAIN DESCRIPTION - ORIENTATION: ORIENTATION: LEFT

## 2019-05-18 ASSESSMENT — PAIN DESCRIPTION - PAIN TYPE: TYPE: ACUTE PAIN;CHRONIC PAIN

## 2019-05-18 ASSESSMENT — PAIN DESCRIPTION - LOCATION: LOCATION: CHEST

## 2019-05-18 NOTE — ED TRIAGE NOTES
Pt presents to ED via EMS c/o shortness of breath x3 days and left sided chest pain radiating down her left arm x1 week. Pt states she has a heart cath scheduled for Wednesday 5/22/19. Pt very demanding during triage stating \"you need to call my doctor immediately! And I want to speak with the person in charge of the emergency department. \"  Writer seeking clarification, pt responded \"I'll just talk to them. \"  Pt placed on full cardiac monitor, changed into gown and provided with warm blanket. NAD noted rr even and non labored, lungs clear. Call light within reach, will continue to monitor.

## 2019-05-18 NOTE — ED PROVIDER NOTES
sinus surgery; Shoulder arthroscopy (1998); Esophagoscopy (1966, 1969); Colonoscopy (2010); Foreign Body Removal (1962); laparoscopy (5030); Coronary angioplasty with stent (5/2011); Coronary angioplasty with stent (6/2011); Upper gastrointestinal endoscopy; eye surgery; pr colonoscopy w/biopsy single/multiple (N/A, 12/19/2017); pr egd transoral biopsy single/multiple (12/19/2017); Upper gastrointestinal endoscopy (12/19/2017); and Upper gastrointestinal endoscopy (5/24/2018).     Social History     Socioeconomic History    Marital status: Single     Spouse name: Not on file    Number of children: Not on file    Years of education: Not on file    Highest education level: Not on file   Occupational History    Occupation: retired   Social Needs    Financial resource strain: Not on file    Food insecurity:     Worry: Not on file     Inability: Not on file   Cognitive Security needs:     Medical: Not on file     Non-medical: Not on file   Tobacco Use    Smoking status: Never Smoker    Smokeless tobacco: Never Used   Substance and Sexual Activity    Alcohol use: No    Drug use: No    Sexual activity: Never   Lifestyle    Physical activity:     Days per week: Not on file     Minutes per session: Not on file    Stress: Not on file   Relationships    Social connections:     Talks on phone: Not on file     Gets together: Not on file     Attends Faith service: Not on file     Active member of club or organization: Not on file     Attends meetings of clubs or organizations: Not on file     Relationship status: Not on file    Intimate partner violence:     Fear of current or ex partner: Not on file     Emotionally abused: Not on file     Physically abused: Not on file     Forced sexual activity: Not on file   Other Topics Concern    Not on file   Social History Narrative    She uses [facility] physician for primary care with Dr. Vic Wood       Family History   Problem Relation Age of Onset    Other Father Heart problems    Other Mother         heart problems requiring open heart surgery, HTN    Other Sister         HTN    Other Maternal Aunt         breast cancer     Allergies:  Lisinopril-hydrochlorothiazide; Sulfa antibiotics; Penicillins; Polyethylene glycol; Actifed cold-allergy [chlorpheniramine-phenylephrine]; Altace [ramipril]; Cephalosporins; Coreg [carvedilol]; Dml facial moisturizer; Elavil [amitriptyline hcl]; Entex [ami-margot]; Ethylene glycol; Levofloxacin; Lisinopril-hydrochlorothiazide; Metoprolol; Montelukast sodium; Morphine; Norvasc [amlodipine besylate]; Phenobarbital; Robaxin [methocarbamol]; Sinequan [doxepin hcl]; Sudafed [pseudoephedrine hcl]; Sudafed [pseudoephedrine hcl]; Tranquil-cecil; Wellbutrin [bupropion hcl]; Actifed cold-allergy [chlorpheniramine-phenylephrine]; Clindamycin/lincomycin; Codeine; Metoprolol succinate; and Seldane [terfenadine]    Home Medications:  Prior to Admission medications    Medication Sig Start Date End Date Taking?  Authorizing Provider   acetaminophen (TYLENOL) 500 MG tablet Take 1,000 mg by mouth 2 times daily as needed for Pain    Historical Provider, MD   Immune Globulin, Human, (HIZENTRA) 10 GM/50ML SOLN Inject 20 % into the skin    Historical Provider, MD   tiZANidine (ZANAFLEX) 2 MG tablet Take 2 mg by mouth every 6 hours as needed     Historical Provider, MD   pantoprazole (PROTONIX) 40 MG tablet Take 40 mg by mouth daily    Historical Provider, MD   azaTHIOprine (IMURAN) 50 MG tablet Take 50 mg by mouth daily     Historical Provider, MD   diltiazem (CARDIZEM CD) 240 MG extended release capsule Take 240 mg by mouth daily Patient states no longer taking taking this med    Historical Provider, MD   chlorthalidone (HYGROTON) 25 MG tablet Take 25 mg by mouth daily    Historical Provider, MD   isosorbide mononitrate (IMDUR) 120 MG CR tablet Take 1 tablet by mouth daily  Patient taking differently: Take 60 mg by mouth daily  6/10/16   Tawanna Taveras MD ranolazine (RANEXA) 500 MG SR tablet Take 500 mg by mouth 2 times daily. Historical Provider, MD   nitroGLYCERIN (NITROSTAT) 0.4 MG SL tablet Place 0.4 mg under the tongue every 5 minutes. Indications: Chest Pain    Historical Provider, MD   RABEprazole Sodium (ACIPHEX PO) Take 20 mg by mouth daily. Historical Provider, MD   PARoxetine (PAXIL) 20 MG tablet Take 20 mg by mouth every morning. Historical Provider, MD   metoprolol (TOPROL XL) 50 MG XL tablet Take 50 mg by mouth daily     Historical Provider, MD   Calcium Carbonate Antacid (MAALOX) 600 MG CHEW Take  by mouth as needed. Historical Provider, MD   bumetanide (BUMEX) 1 MG tablet Take 2 mg by mouth daily     Historical Provider, MD   allopurinol (ZYLOPRIM) 100 MG tablet Take 100 mg by mouth daily. Historical Provider, MD   clopidogrel (PLAVIX) 75 MG tablet Take 75 mg by mouth daily. Historical Provider, MD   losartan (COZAAR) 100 MG tablet Take 100 mg by mouth daily     Historical Provider, MD   pravastatin (PRAVACHOL) 40 MG tablet Take 40 mg by mouth daily. Historical Provider, MD   BENZOYL PEROXIDE Apply  topically. Used on face for cystic acne     Historical Provider, MD   Ciclopirox (LOPROX EX) Apply  topically. 2.5% strength, cream for facial acne     Historical Provider, MD       REVIEW OF SYSTEMS    (2-9 systems for level 4, 10 or more for level 5)      Review of Systems   Constitutional: Positive for chills. Negative for fever. HENT: Negative for sore throat. Respiratory: Positive for shortness of breath. Negative for cough. Cardiovascular: Positive for chest pain and leg swelling. Negative for palpitations. Positive for PND, positive for orthopnea   Gastrointestinal: Negative for abdominal pain, nausea and vomiting. Genitourinary: Negative for dysuria. Musculoskeletal: Negative for myalgias. Skin: Negative for wound. Neurological: Positive for light-headedness. Negative for syncope. 38.8 36.3 - 47.1 %    MCV 94.2 82.6 - 102.9 fL    MCH 32.0 25.2 - 33.5 pg    MCHC 34.0 28.4 - 34.8 g/dL    RDW 15.1 (H) 11.8 - 14.4 %    Platelets 727 222 - 288 k/uL    MPV 12.4 8.1 - 13.5 fL    NRBC Automated 0.0 0.0 per 100 WBC    Differential Type NOT REPORTED     Seg Neutrophils 81 (H) 36 - 65 %    Lymphocytes 9 (L) 24 - 43 %    Monocytes 9 3 - 12 %    Eosinophils % 1 1 - 4 %    Basophils 0 0 - 2 %    Immature Granulocytes 0 0 %    Segs Absolute 9.00 (H) 1.50 - 8.10 k/uL    Absolute Lymph # 1.02 (L) 1.10 - 3.70 k/uL    Absolute Mono # 1.01 0.10 - 1.20 k/uL    Absolute Eos # 0.15 0.00 - 0.44 k/uL    Basophils # 0.04 0.00 - 0.20 k/uL    Absolute Immature Granulocyte 0.05 0.00 - 0.30 k/uL    WBC Morphology NOT REPORTED     RBC Morphology ANISOCYTOSIS PRESENT     Platelet Estimate NOT REPORTED    SPECIMEN REJECTION   Result Value Ref Range    Specimen Source . BLOOD     Ordered Test BMPX, BNP, TROPI     Reason for Rejection Unable to perform testing: Specimen hemolyzed. - NOT REPORTED    SPECIMEN REJECTION   Result Value Ref Range    Specimen Source . BLOOD     Ordered Test BNP BMP TROPI     Reason for Rejection Unable to perform testing: Specimen hemolyzed. - NOT REPORTED    SPECIMEN REJECTION   Result Value Ref Range    Specimen Source . BLOOD     Ordered Test BMP BNP TROPI     Reason for Rejection Unable to perform testing: Specimen hemolyzed. - NOT REPORTED    SPECIMEN REJECTION   Result Value Ref Range    Specimen Source . BLOOD     Ordered Test BMP BNP TROPI     Reason for Rejection Unable to perform testing: Specimen hemolyzed.      - NOT REPORTED    Basic Metabolic Panel   Result Value Ref Range    Glucose 90 70 - 99 mg/dL    BUN 17 8 - 23 mg/dL    CREATININE 0.66 0.50 - 0.90 mg/dL    Bun/Cre Ratio NOT REPORTED 9 - 20    Calcium 9.3 8.6 - 10.4 mg/dL    Sodium 144 135 - 144 mmol/L    Potassium 3.2 (L) 3.7 - 5.3 mmol/L    Chloride 103 98 - 107 mmol/L    CO2 24 20 - 31 mmol/L    Anion Gap 17 9 - 17 mmol/L    GFR Non-African American >60 >60 mL/min    GFR African American >60 >60 mL/min    GFR Comment          GFR Staging NOT REPORTED    Brain Natriuretic Peptide   Result Value Ref Range    Pro-BNP 1,257 (H) <300 pg/mL    BNP Interpretation Pro-BNP Reference Range:    Troponin   Result Value Ref Range    Troponin, High Sensitivity 18 (H) 0 - 14 ng/L    Troponin T NOT REPORTED <0.03 ng/mL    Troponin Interp NOT REPORTED        RADIOLOGY:  Xr Chest Standard (2 Vw)    Result Date: 5/18/2019  EXAMINATION: TWO XRAY VIEWS OF THE CHEST 5/18/2019 6:52 pm COMPARISON: January 12, 2019 HISTORY: ORDERING SYSTEM PROVIDED HISTORY: chest pain, SOB TECHNOLOGIST PROVIDED HISTORY: chest pain, SOB Ordering Physician Provided Reason for Exam: SOB, CP Acuity: Unknown Type of Exam: Unknown FINDINGS: Moderate edema. Moderate cardiomegaly. Mediastinum normal.  Bony thorax intact. Moderate CHF.        EKG  Rhythm: normal sinus   Rate: normal  Axis: left  Ectopy: none  Conduction: normal  ST Segments: no acute change  T Waves: flattening in  II, III and aVf  Q Waves: multiple - none new compared to prior    Clinical Impression: no acute changes compared to prior dated 13-JAN-2019    Johan Aguila DO     All EKG's are interpreted by the Emergency Department Physician who either signs or Co-signs this chart in the absence of a cardiologist.    HEART Risk Score for Chest Pain Patients   History and Physical Exam Suspicion Level  (Nausea, Vomiting, Diaphoresis, Radiation, Exertion)   Slightly Suspicious (0 pts)   Moderately Suspicious (1 pt)   Highly Suspicious (2 pts)   EKG Interpretation   Normal (0 pts)   Non-Specific Repolarization Disturbance (1 pt)   Significant ST-Depression (2 pts)   Age of Patient (in years)   = 39 (0 pts)   46-64 (1 pt)   = 65 (2 pts)   Risk Factors   No Risk Factors (0 pts)   1-2 Risk Factors (1 pt)   = 3 Risk Factors (2 pts)   Risk Factors Include:   Hypercholesterolemia   Hypertension   Diabetes Mellitus   Cigarette smoking   Positive family history   Obesity   CAD   (SLE, CKDz, HIV, Cocaine abuse)   Troponin Levels   = Normal Limit (0 pts)   1-3 Times Normal Limit (1 pt)   > 3 Times Normal Limit (2 pts)  TOTAL: 7    Percent Risk for Major Adverse Cardiac Event (MACE)  0-3 pts indicates low risk for MACE   2.5% (DISCHARGE)   4-7 pts indicates moderate risk for MACE  20.3% (OBS)  8-10 pts indicates high risk for MACE  72.7% (EARLY INVASIVE TX)     DIFFERENTIAL DIAGNOSIS:  pulmonary embolism, pneumothorax, ACS/MI, pericarditis, esophageal rupture (Boerhave's), thoracic aortic dissection, pneumonia, CHF     EMERGENCY DEPARTMENT COURSE & MDM:  72 y.o. female presents with a chief complaint of chest pain and shortness of breath. Vitals notable for hypertension, otherwise stable. Patient is well-appearing and in no acute distress. Will get cardiac workup to further assess and anticipate touching base with the patient's cardiology group. Given patient has a cardiac cath scheduled for later this week, anticipate admission. ECHo from 4/30/2019 demonstrates advanced diastolic dysfunction, mild mitral regurg, trace tricuspid regurg. Stress test from 4/20/2019 demonstrates reversible medium size moderate severity anterior wall perfusion defect representing ischemia in the mid to apical region. ED Course as of May 18 2224   Sat May 18, 2019   1837 No anemia or leukocytosis noted on CBC. [BJ]   1957 Still waiting on labs    [BJ]   2025 CXR notable for moderate CHF - will start nitroglycerin drip. XR CHEST STANDARD (2 VW) [BJ]   2153 The patient's troponin is 18. EKG non-diagnostic for STEMI. Using the high sensitivity troponin flow sheet, will order another troponin value in 1 hour to further assess for ACS. Troponin, High Sensitivity(!): 18 [BJ]   2154 BNP elevated, consistent with history of CHF   Pro-BNP(!): 1,257 [BJ]   2154  Mild hyponatremia noted that does not require corrective this time.   Other electrolytes stable. Glucose level is stable; neither severe hyper- or hypo-glycemia is present to require immediate correction. Kidney function is normal; no ISABELLA or CKD. [BJ]   2223 Patient accepted for admission by internal medicine under Dr. Fanny Severs    [BJ]      ED Course User Index  [BJ] Cornel Keenan DO     [facility] did not return my page while patient was in the ED. Patient transported to floor on nitro drip. PROCEDURES:  None    CONSULTS:  None    CRITICAL CARE:  Please see attending physician note. FINAL IMPRESSION      1. Acute on chronic congestive heart failure, unspecified heart failure type ([facility] [address]          DISPOSITION / PLAN     DISPOSITION  Admitted    PATIENT REFERRED TO:  No follow-up provider specified.     DISCHARGE MEDICATIONS:  New Prescriptions    No medications on file       Cornel Keenan DO  Emergency Medicine Resident    (Please note that portions ofthis note were completed with a voice recognition program.  Efforts were made to edit the dictations but occasionally words are mis-transcribed.)        Cornel Keenan DO  Resident  05/19/19 0103

## 2019-05-18 NOTE — ED PROVIDER NOTES
I performed a history and physical examination of the patient and discussed management with the resident. I reviewed the residents note and agree with the documented findings and plan of care. Any areas of disagreement are noted on the chart. I was personally present for the key portions of any procedures. I have documented in the chart those procedures where I was not present during the key portions. I have reviewed the emergency nurses triage note. I agree with the chief complaint, past medical history, past surgical history, allergies, medications, social and family history as documented unless otherwise noted below. Documentation of the HPI, Physical Exam and Medical Decision Making performed by medical students or scribes is based on my personal performance of the HPI, PE and MDM. For Phys Assistant/ Nurse Practitioner cases/documentation I have personally evaluated this patient and have completed at least one if not all key elements of the E/M (history, physical exam, and MDM). I find the patient's history and physical exam are consistent with the NP/PA documentation. I agree with the care provided, treatment rendered, disposition and followup plan. Additional findings are as noted. Diego Corbin. Tiny Vega MD  Attending Emergency  Physician    EKG Interpretation    Interpreted by me    Rhythm: normal sinus   Rate: normal  Axis: Left axis deviation. Ectopy: none  Conduction: normal  ST Segments: no acute change  T Waves: Biphasic T waves noted in leads 3 and aVF and possibly lead to. Q Waves: Q waves in leads V1 and V2. Clinical Impression: no acute changes. 90s as noted above. T-wave abnormalities noted in leads 3, aVF and possibly lead II appear to be new when compared to prior tracing dated 13 January 2019    C/O CHEST PAIN, SOB, HERNANDEZ FOR SEV DAYS. NO FEVER, CHILLS, SPUTUM PROD, VOMITING, DIARRHEA, DIAPHORESIS. C/O MILD SWELLING LEFT FOOT ONLY. NO ORTHOPNEA. HX OF CAD, CHF.  SCHEDULED CATH IN 4D-SEE MOST RECENT STRESS TEST RESULTS. REPORTS SHE'S TAKING MEDS AS RX'ED. AWAKE, ALERT, COOP, RESP. LUNGS SLIGHT DRY BASILAR RALES OMAR. NO RHONCHI, WHEEZES, STRIDOR, RETRACTIONS. CARDIAC-S1S2, RRR, NO MRG. NO JVD, HJR, EDEMA. DORSUM OF LEFT FOOT MILDLY PUFFY. NO ERYTHEMA, WARMTH, TENDERNESS. ABD SOFT, NONDISTENDED, NONTENDER. NORMAL BOWEL SOUNDS. AVAILABLE LAB RESULTS REVIEWED. CXR-MILD/MOD CHF/CARDIOMEGALY. IMP-CHEST PAIN, CHF, CAD. PLAN-AWAIT LAB RESULTS. NTG INFUSION, FUROSEMIDE. ADMIT, CARDIOLOGY CONSULT.         Leonidas Ramirez MD  05/18/19 2032

## 2019-05-18 NOTE — ED NOTES
Dr. Anna Ross at bedside      [facility], [address] [place] [address]  05/18/19 [phone]

## 2019-05-19 ENCOUNTER — APPOINTMENT (OUTPATIENT)
Dept: GENERAL RADIOLOGY | Age: 74
DRG: 246 | End: 2019-05-19
Payer: MEDICARE

## 2019-05-19 PROBLEM — I25.10 CAD (CORONARY ARTERY DISEASE): Status: ACTIVE | Noted: 2019-05-19

## 2019-05-19 LAB
ABSOLUTE EOS #: 0.13 K/UL (ref 0–0.44)
ABSOLUTE IMMATURE GRANULOCYTE: 0.05 K/UL (ref 0–0.3)
ABSOLUTE LYMPH #: 0.76 K/UL (ref 1.1–3.7)
ABSOLUTE MONO #: 0.9 K/UL (ref 0.1–1.2)
ANION GAP SERPL CALCULATED.3IONS-SCNC: 15 MMOL/L (ref 9–17)
BASOPHILS # BLD: 1 % (ref 0–2)
BASOPHILS ABSOLUTE: 0.05 K/UL (ref 0–0.2)
BUN BLDV-MCNC: 17 MG/DL (ref 8–23)
BUN/CREAT BLD: ABNORMAL (ref 9–20)
CALCIUM SERPL-MCNC: 9 MG/DL (ref 8.6–10.4)
CHLORIDE BLD-SCNC: 100 MMOL/L (ref 98–107)
CHOLESTEROL/HDL RATIO: 3
CHOLESTEROL: 152 MG/DL
CO2: 26 MMOL/L (ref 20–31)
CREAT SERPL-MCNC: 0.89 MG/DL (ref 0.5–0.9)
DIFFERENTIAL TYPE: ABNORMAL
EOSINOPHILS RELATIVE PERCENT: 1 % (ref 1–4)
ESTIMATED AVERAGE GLUCOSE: 97 MG/DL
GFR AFRICAN AMERICAN: >60 ML/MIN
GFR NON-AFRICAN AMERICAN: >60 ML/MIN
GFR SERPL CREATININE-BSD FRML MDRD: ABNORMAL ML/MIN/{1.73_M2}
GFR SERPL CREATININE-BSD FRML MDRD: ABNORMAL ML/MIN/{1.73_M2}
GLUCOSE BLD-MCNC: 98 MG/DL (ref 70–99)
HBA1C MFR BLD: 5 % (ref 4–6)
HCT VFR BLD CALC: 37.5 % (ref 36.3–47.1)
HDLC SERPL-MCNC: 51 MG/DL
HEMOGLOBIN: 12.9 G/DL (ref 11.9–15.1)
IMMATURE GRANULOCYTES: 1 %
LDL CHOLESTEROL: 82 MG/DL (ref 0–130)
LYMPHOCYTES # BLD: 8 % (ref 24–43)
MAGNESIUM: 1.9 MG/DL (ref 1.6–2.6)
MCH RBC QN AUTO: 31.7 PG (ref 25.2–33.5)
MCHC RBC AUTO-ENTMCNC: 34.4 G/DL (ref 28.4–34.8)
MCV RBC AUTO: 92.1 FL (ref 82.6–102.9)
MONOCYTES # BLD: 9 % (ref 3–12)
NRBC AUTOMATED: 0 PER 100 WBC
PDW BLD-RTO: 14 % (ref 11.8–14.4)
PLATELET # BLD: 246 K/UL (ref 138–453)
PLATELET ESTIMATE: ABNORMAL
PMV BLD AUTO: 11.7 FL (ref 8.1–13.5)
POTASSIUM SERPL-SCNC: 3.5 MMOL/L (ref 3.7–5.3)
RBC # BLD: 4.07 M/UL (ref 3.95–5.11)
RBC # BLD: ABNORMAL 10*6/UL
SEG NEUTROPHILS: 80 % (ref 36–65)
SEGMENTED NEUTROPHILS ABSOLUTE COUNT: 7.74 K/UL (ref 1.5–8.1)
SODIUM BLD-SCNC: 141 MMOL/L (ref 135–144)
TRIGL SERPL-MCNC: 94 MG/DL
TROPONIN INTERP: ABNORMAL
TROPONIN INTERP: ABNORMAL
TROPONIN T: ABNORMAL NG/ML
TROPONIN T: ABNORMAL NG/ML
TROPONIN, HIGH SENSITIVITY: 22 NG/L (ref 0–14)
TROPONIN, HIGH SENSITIVITY: 22 NG/L (ref 0–14)
VLDLC SERPL CALC-MCNC: NORMAL MG/DL (ref 1–30)
WBC # BLD: 9.6 K/UL (ref 3.5–11.3)
WBC # BLD: ABNORMAL 10*3/UL

## 2019-05-19 PROCEDURE — 6360000002 HC RX W HCPCS: Performed by: HOSPITALIST

## 2019-05-19 PROCEDURE — 80048 BASIC METABOLIC PNL TOTAL CA: CPT

## 2019-05-19 PROCEDURE — 2060000000 HC ICU INTERMEDIATE R&B

## 2019-05-19 PROCEDURE — 36415 COLL VENOUS BLD VENIPUNCTURE: CPT

## 2019-05-19 PROCEDURE — 80061 LIPID PANEL: CPT

## 2019-05-19 PROCEDURE — 85025 COMPLETE CBC W/AUTO DIFF WBC: CPT

## 2019-05-19 PROCEDURE — 83735 ASSAY OF MAGNESIUM: CPT

## 2019-05-19 PROCEDURE — 71046 X-RAY EXAM CHEST 2 VIEWS: CPT

## 2019-05-19 PROCEDURE — 84484 ASSAY OF TROPONIN QUANT: CPT

## 2019-05-19 PROCEDURE — 2580000003 HC RX 258: Performed by: INTERNAL MEDICINE

## 2019-05-19 PROCEDURE — 2580000003 HC RX 258: Performed by: HOSPITALIST

## 2019-05-19 PROCEDURE — 99222 1ST HOSP IP/OBS MODERATE 55: CPT | Performed by: INTERNAL MEDICINE

## 2019-05-19 PROCEDURE — 6370000000 HC RX 637 (ALT 250 FOR IP): Performed by: HOSPITALIST

## 2019-05-19 PROCEDURE — 83036 HEMOGLOBIN GLYCOSYLATED A1C: CPT

## 2019-05-19 PROCEDURE — 2500000003 HC RX 250 WO HCPCS: Performed by: HOSPITALIST

## 2019-05-19 RX ORDER — ISOSORBIDE MONONITRATE 60 MG/1
60 TABLET, EXTENDED RELEASE ORAL DAILY
Status: DISCONTINUED | OUTPATIENT
Start: 2019-05-19 | End: 2019-05-21 | Stop reason: HOSPADM

## 2019-05-19 RX ORDER — LOSARTAN POTASSIUM 50 MG/1
100 TABLET ORAL DAILY
Status: DISCONTINUED | OUTPATIENT
Start: 2019-05-19 | End: 2019-05-21 | Stop reason: HOSPADM

## 2019-05-19 RX ORDER — SODIUM CHLORIDE 0.9 % (FLUSH) 0.9 %
10 SYRINGE (ML) INJECTION EVERY 12 HOURS SCHEDULED
Status: DISCONTINUED | OUTPATIENT
Start: 2019-05-19 | End: 2019-05-21 | Stop reason: HOSPADM

## 2019-05-19 RX ORDER — AZATHIOPRINE 50 MG/1
50 TABLET ORAL DAILY
Status: DISCONTINUED | OUTPATIENT
Start: 2019-05-19 | End: 2019-05-21 | Stop reason: HOSPADM

## 2019-05-19 RX ORDER — RANOLAZINE 500 MG/1
500 TABLET, EXTENDED RELEASE ORAL 2 TIMES DAILY
Status: DISCONTINUED | OUTPATIENT
Start: 2019-05-19 | End: 2019-05-21 | Stop reason: HOSPADM

## 2019-05-19 RX ORDER — POTASSIUM CHLORIDE 7.45 MG/ML
10 INJECTION INTRAVENOUS PRN
Status: DISCONTINUED | OUTPATIENT
Start: 2019-05-19 | End: 2019-05-21 | Stop reason: HOSPADM

## 2019-05-19 RX ORDER — ACETAMINOPHEN 325 MG/1
650 TABLET ORAL EVERY 4 HOURS PRN
Status: DISCONTINUED | OUTPATIENT
Start: 2019-05-19 | End: 2019-05-21 | Stop reason: HOSPADM

## 2019-05-19 RX ORDER — BUMETANIDE 0.25 MG/ML
0.5 INJECTION, SOLUTION INTRAMUSCULAR; INTRAVENOUS DAILY
Status: DISCONTINUED | OUTPATIENT
Start: 2019-05-20 | End: 2019-05-20

## 2019-05-19 RX ORDER — MAGNESIUM SULFATE 1 G/100ML
1 INJECTION INTRAVENOUS PRN
Status: DISCONTINUED | OUTPATIENT
Start: 2019-05-19 | End: 2019-05-21 | Stop reason: HOSPADM

## 2019-05-19 RX ORDER — PRAVASTATIN SODIUM 20 MG
40 TABLET ORAL DAILY
Status: DISCONTINUED | OUTPATIENT
Start: 2019-05-19 | End: 2019-05-21 | Stop reason: HOSPADM

## 2019-05-19 RX ORDER — METOPROLOL SUCCINATE 50 MG/1
50 TABLET, EXTENDED RELEASE ORAL DAILY
Status: DISCONTINUED | OUTPATIENT
Start: 2019-05-19 | End: 2019-05-21 | Stop reason: HOSPADM

## 2019-05-19 RX ORDER — PANTOPRAZOLE SODIUM 40 MG/1
40 TABLET, DELAYED RELEASE ORAL DAILY
Status: DISCONTINUED | OUTPATIENT
Start: 2019-05-19 | End: 2019-05-21 | Stop reason: HOSPADM

## 2019-05-19 RX ORDER — ONDANSETRON 2 MG/ML
4 INJECTION INTRAMUSCULAR; INTRAVENOUS EVERY 6 HOURS PRN
Status: DISCONTINUED | OUTPATIENT
Start: 2019-05-19 | End: 2019-05-21 | Stop reason: HOSPADM

## 2019-05-19 RX ORDER — NITROGLYCERIN 20 MG/100ML
5 INJECTION INTRAVENOUS CONTINUOUS
Status: DISCONTINUED | OUTPATIENT
Start: 2019-05-19 | End: 2019-05-19

## 2019-05-19 RX ORDER — CLOPIDOGREL BISULFATE 75 MG/1
75 TABLET ORAL DAILY
Status: DISCONTINUED | OUTPATIENT
Start: 2019-05-19 | End: 2019-05-21 | Stop reason: HOSPADM

## 2019-05-19 RX ORDER — ALLOPURINOL 100 MG/1
100 TABLET ORAL DAILY
Status: DISCONTINUED | OUTPATIENT
Start: 2019-05-19 | End: 2019-05-21 | Stop reason: HOSPADM

## 2019-05-19 RX ORDER — PAROXETINE HYDROCHLORIDE 20 MG/1
20 TABLET, FILM COATED ORAL EVERY MORNING
Status: DISCONTINUED | OUTPATIENT
Start: 2019-05-19 | End: 2019-05-21 | Stop reason: HOSPADM

## 2019-05-19 RX ORDER — BUMETANIDE 0.25 MG/ML
1 INJECTION, SOLUTION INTRAMUSCULAR; INTRAVENOUS DAILY
Status: DISCONTINUED | OUTPATIENT
Start: 2019-05-19 | End: 2019-05-19

## 2019-05-19 RX ORDER — SODIUM CHLORIDE 0.9 % (FLUSH) 0.9 %
10 SYRINGE (ML) INJECTION PRN
Status: DISCONTINUED | OUTPATIENT
Start: 2019-05-19 | End: 2019-05-21 | Stop reason: HOSPADM

## 2019-05-19 RX ORDER — POTASSIUM CHLORIDE 20 MEQ/1
40 TABLET, EXTENDED RELEASE ORAL PRN
Status: DISCONTINUED | OUTPATIENT
Start: 2019-05-19 | End: 2019-05-21 | Stop reason: HOSPADM

## 2019-05-19 RX ADMIN — ISOSORBIDE MONONITRATE 60 MG: 60 TABLET ORAL at 02:25

## 2019-05-19 RX ADMIN — PRAVASTATIN SODIUM 40 MG: 20 TABLET ORAL at 20:54

## 2019-05-19 RX ADMIN — BUMETANIDE 1 MG: 0.25 INJECTION INTRAMUSCULAR; INTRAVENOUS at 08:41

## 2019-05-19 RX ADMIN — Medication 10 ML: at 21:02

## 2019-05-19 RX ADMIN — AZATHIOPRINE 50 MG: 50 TABLET ORAL at 02:26

## 2019-05-19 RX ADMIN — NITROGLYCERIN 5 MCG: 20 INJECTION INTRAVENOUS at 04:39

## 2019-05-19 RX ADMIN — RANOLAZINE 500 MG: 500 TABLET, FILM COATED, EXTENDED RELEASE ORAL at 20:55

## 2019-05-19 RX ADMIN — Medication 10 ML: at 08:41

## 2019-05-19 RX ADMIN — POTASSIUM BICARBONATE 40 MEQ: 782 TABLET, EFFERVESCENT ORAL at 02:31

## 2019-05-19 RX ADMIN — PANTOPRAZOLE SODIUM 40 MG: 40 TABLET, DELAYED RELEASE ORAL at 08:40

## 2019-05-19 RX ADMIN — AZATHIOPRINE 50 MG: 50 TABLET ORAL at 20:54

## 2019-05-19 RX ADMIN — POTASSIUM CHLORIDE 40 MEQ: 1500 TABLET, EXTENDED RELEASE ORAL at 02:31

## 2019-05-19 RX ADMIN — Medication 10 ML: at 21:01

## 2019-05-19 RX ADMIN — RANOLAZINE 500 MG: 500 TABLET, FILM COATED, EXTENDED RELEASE ORAL at 08:41

## 2019-05-19 RX ADMIN — BUMETANIDE 1 MG: 0.25 INJECTION INTRAMUSCULAR; INTRAVENOUS at 02:27

## 2019-05-19 RX ADMIN — ISOSORBIDE MONONITRATE 60 MG: 60 TABLET ORAL at 20:54

## 2019-05-19 RX ADMIN — ENOXAPARIN SODIUM 40 MG: 40 INJECTION SUBCUTANEOUS at 08:45

## 2019-05-19 RX ADMIN — LOSARTAN POTASSIUM 100 MG: 50 TABLET, FILM COATED ORAL at 08:40

## 2019-05-19 RX ADMIN — PAROXETINE HYDROCHLORIDE HEMIHYDRATE 20 MG: 20 TABLET, FILM COATED ORAL at 08:40

## 2019-05-19 RX ADMIN — CLOPIDOGREL 75 MG: 75 TABLET, FILM COATED ORAL at 08:40

## 2019-05-19 RX ADMIN — RANOLAZINE 500 MG: 500 TABLET, FILM COATED, EXTENDED RELEASE ORAL at 02:25

## 2019-05-19 RX ADMIN — PRAVASTATIN SODIUM 40 MG: 20 TABLET ORAL at 02:25

## 2019-05-19 RX ADMIN — ALLOPURINOL 100 MG: 100 TABLET ORAL at 08:41

## 2019-05-19 ASSESSMENT — PAIN DESCRIPTION - ONSET
ONSET: ON-GOING
ONSET: ON-GOING

## 2019-05-19 ASSESSMENT — PAIN DESCRIPTION - LOCATION
LOCATION: LEG
LOCATION: LEG

## 2019-05-19 ASSESSMENT — PAIN SCALES - GENERAL
PAINLEVEL_OUTOF10: 0
PAINLEVEL_OUTOF10: 2
PAINLEVEL_OUTOF10: 0
PAINLEVEL_OUTOF10: 2

## 2019-05-19 ASSESSMENT — PAIN DESCRIPTION - FREQUENCY
FREQUENCY: CONTINUOUS
FREQUENCY: INTERMITTENT

## 2019-05-19 ASSESSMENT — PAIN DESCRIPTION - PAIN TYPE
TYPE: CHRONIC PAIN
TYPE: CHRONIC PAIN

## 2019-05-19 ASSESSMENT — PAIN DESCRIPTION - DESCRIPTORS
DESCRIPTORS: CRAMPING
DESCRIPTORS: CRAMPING

## 2019-05-19 ASSESSMENT — PAIN DESCRIPTION - ORIENTATION
ORIENTATION: RIGHT;LEFT
ORIENTATION: RIGHT;LEFT

## 2019-05-19 NOTE — H&P
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HCC)     Multiple sclerosis ([facility]     progressive    Myoclonic seizures ([facility]     Optic neuritis     Richardean Pavithra Pupil    Osteopenia     Pyloric stenosis     Raynaud's disease     Vasomotor rhinitis        PAST SURGICAL HISTORY:        Procedure Laterality Date    APPENDECTOMY  1955    sponges left in abdomen, at [address] [place] [address] Way    stereotactic type, right breast, benign    COLONOSCOPY  2010    Polyp    CORONARY ANGIOPLASTY WITH STENT PLACEMENT  5/2011    failed attempt to place stent    CORONARY ANGIOPLASTY WITH STENT PLACEMENT  6/2011    successful placement of 2 stent in same artery   [address]    bleeding ulcers found   [id number] HESKA    surgical sponges left in during appendectomy    HAMMER TOE SURGERY  1989    head resection of phalanx, left foot    LAPAROSCOPY  1983    with D&C    SD COLONOSCOPY W/BIOPSY SINGLE/MULTIPLE N/A 12/19/2017    COLONOSCOPY WITH BIOPSY performed by Blaire Tello MD at [facility]    SD EGD TRANSORAL BIOPSY SINGLE/MULTIPLE  12/19/2017    EGD BIOPSY performed by Blaire Tello MD at [facility] resection with rt and left ethmoidectomy    SHOULDER ARTHROSCOPY  1998    rotator cuff impingement, right arm    SINUS ENDOSCOPY      SINUS SURGERY      multiple    UPPER GASTROINTESTINAL ENDOSCOPY      2-4 times per year for pyloric stenosis    UPPER GASTROINTESTINAL ENDOSCOPY  12/19/2017    EGD DILATION BALLOON performed by Blaire Tello MD at [facility]    Actifed cold-allergy [chlorpheniramine-phenylephrine]; Altace [ramipril]; Cephalosporins; Coreg [carvedilol]; Dml facial moisturizer; Elavil [amitriptyline hcl]; Entex [ami-margot]; Ethylene glycol; Levofloxacin; Lisinopril-hydrochlorothiazide; Metoprolol; Montelukast sodium; Morphine; Norvasc [amlodipine besylate]; Phenobarbital; Robaxin [methocarbamol]; Sinequan [doxepin hcl]; Sudafed [pseudoephedrine hcl]; Sudafed [pseudoephedrine hcl]; Tranquil-cecil; Wellbutrin [bupropion hcl]; Actifed cold-allergy [chlorpheniramine-phenylephrine]; Clindamycin/lincomycin; Codeine; Metoprolol succinate; and Seldane [terfenadine]    SOCIAL HISTORY:   TOBACCO:   reports that she has never smoked. She has never used smokeless tobacco.  ETOH:   reports that she does not drink alcohol. DRUGS:   reports that she does not use drugs. FAMILY HISTORY:       Problem Relation Age of Onset    Other Father         Heart problems    Other Mother         heart problems requiring open heart surgery, HTN    Other Sister         HTN    Other Maternal Aunt         breast cancer       REVIEW OF SYSTEMS:  Constitutional:  Negative for fever. HENT: Negative for sore throat. Respiratory: Positive for shortness of breath. Negative for cough. Cardiovascular: Positive for chest pain and leg swelling. Negative for palpitations, positive for PND, orthopnea  Gastrointestinal: Negative for abdominal pain, nausea and vomiting. Genitourinary: Negative for dysuria. Musculoskeletal: Negative for myalgias. Skin: Negative for wound. Neurological: Negative for syncope. Hematological: Does not bruise/bleed easily.      PHYSICAL EXAM:  BP (!) 161/51   Pulse 60   Temp 98.4 °F (36.9 °C) (Oral)   Resp 18   SpO2 97%   CONSTITUTIONAL:  awake, alert, cooperative, no apparent distress, and appears stated age  LUNGS:  No increased work of breathing, good air exchange, clear to auscultation bilaterally, no crackles or wheezing  CARDIOVASCULAR:  Normal apical impulse, regular rate and rhythm, normal S1 and S2, no S3 or S4, and no murmur noted  ABDOMEN:  No scars, normal bowel sounds, soft, non-distended, non-tender, no masses palpated, no hepatosplenomegally  MUSCULOSKELETAL:  There is no redness, warmth, or swelling of the joints. Full range of motion noted. Motor strength is 5 out of 5 all extremities bilaterally. Tone is normal.  NEUROLOGIC:  Awake, alert, oriented to name, place and time. Cranial nerves II-XII are grossly intact. Motor is 5 out of 5 bilaterally. Cerebellar finger to nose, heel to shin intact. Sensory is intact.   Babinski down going, Romberg negative, and gait is normal.    DATA:  CBC with Differential:    Lab Results   Component Value Date    WBC 11.3 05/18/2019    RBC 4.12 05/18/2019    RBC 4.04 04/30/2012    HGB 13.2 05/18/2019    HCT 38.8 05/18/2019     05/18/2019     04/30/2012    MCV 94.2 05/18/2019    MCH 32.0 05/18/2019    MCHC 34.0 05/18/2019    RDW 15.1 05/18/2019    LYMPHOPCT 9 05/18/2019    MONOPCT 9 05/18/2019    BASOPCT 0 05/18/2019    MONOSABS 1.01 05/18/2019    LYMPHSABS 1.02 05/18/2019    EOSABS 0.15 05/18/2019    BASOSABS 0.04 05/18/2019    DIFFTYPE NOT REPORTED 05/18/2019     BMP:    Lab Results   Component Value Date     05/18/2019    K 3.2 05/18/2019     05/18/2019    CO2 24 05/18/2019    BUN 17 05/18/2019    LABALBU 4.0 06/13/2018    CREATININE 0.66 05/18/2019    CALCIUM 9.3 05/18/2019    GFRAA >60 05/18/2019    LABGLOM >60 05/18/2019    GLUCOSE 90 05/18/2019    GLUCOSE 108 04/30/2012     Hepatic Function Panel:    Lab Results   Component Value Date    ALKPHOS 82 06/13/2018    ALT 11 06/13/2018    AST 15 06/13/2018    PROT 7.1 06/13/2018    BILITOT 0.34 06/13/2018    BILIDIR 0.09 11/30/2017    IBILI 0.24 11/30/2017    LABALBU 4.0 06/13/2018     Ionized Calcium:  No results found for: IONCA  Magnesium:    Lab Results   Component Value Date    MG 2.2 10/23/2012     Phosphorus:  No results found for: PHOS  Warfarin PT/INR:  No components found for: Rafita Siddiqui  PTT:    Lab Results   Component Value Date    APTT 24.8 11/03/2015   [APTT}  Troponin:    Lab Results   Component Value Date    TROPONINI NOT REPORTED 03/10/2014    TROPONINI <0.01 04/30/2012     Last 3 Troponin:    Lab Results   Component Value Date    TROPONINI NOT REPORTED 03/10/2014    TROPONINI 0.01 03/22/2013    TROPONINI 0.01 03/22/2013    TROPONINI <0.01 04/30/2012    TROPONINI <0.01 04/30/2012    TROPONINI <0.01 04/30/2012     U/A:    Lab Results   Component Value Date    COLORU YELLOW 06/13/2018    PROTEINU TRACE 06/13/2018    PHUR 5.5 06/13/2018    WBCUA 5 TO 10 06/13/2018    RBCUA 2 TO 5 06/13/2018    MUCUS NOT REPORTED 06/13/2018    TRICHOMONAS NOT REPORTED 06/13/2018    YEAST NOT REPORTED 06/13/2018    BACTERIA MANY 06/13/2018    SPECGRAV 1.026 06/13/2018    LEUKOCYTESUR SMALL 06/13/2018    UROBILINOGEN Normal 06/13/2018    BILIRUBINUR SMALL 06/13/2018    GLUCOSEU NEGATIVE 06/13/2018    AMORPHOUS NOT REPORTED 06/13/2018     HgBA1c:  No results found for: LABA1C  FLP:    Lab Results   Component Value Date    TRIG 58 04/10/2015    HDL 59 04/10/2015     TSH:    Lab Results   Component Value Date    TSH 2.15 01/13/2019       NM MYOCARDIAL SPECT REST EXERCISE OR RX   Impression       Medium-size reversible anterior wall perfusion defect representing ischemia. This is in the mid to apical region.       Normal LVEF.     ECHO Complete 2D W Doppler W Color  Summary  Left ventricle is normal in size with normal systolic function. Calculated ejection fraction is 65%. Increased left ventricular wall thickness. Evidence of advance diastolic dysfunction. Left atrium is moderately dilated. Right atrial enlargement. Increase in right ventricular free wall thickness. Mildly dilated left ventricle with mildly reduced function. Aortic leaflet calcification without stenosis.   Mild thickening of the mitral leaflets without stenosis. Mild mitral regurgitation. Trace tricuspid regurgitation. Estimated right ventricular systolic pressure  is 34 mmHg. ASSESSMENT/PLAN:  Principal Problem:    Acute on chronic diastolic CHF (congestive heart failure) ([facility])  Active Problems:    Multiple sclerosis (HCC)    HTN (hypertension)    Dyslipidemia    GERD (gastroesophageal reflux disease)    Fibromyalgia    Chronic diastolic congestive heart failure (HCC)    Acute diastolic HF (heart failure) (HCC)    CAD (coronary artery disease)  Resolved Problems:    * No resolved hospital problems. *    Plan:  Acute on chronic diastolic CHF  Cont nitro drip to titrate for . Hold if pressures. Drop. Cont bumex 1 mg OD  Cardiology consulted  Trend troponins. Will likely need heart cath this admission. Cont toprol and losartan. Hold     CAD s/p stents   Plavix, pravastatin, ranexa, imdur,     Multiple sclerosis  Azathioprine     GERD  protonix     Discharge planning. PT/OT/SW    Paulina Mosqueda MD  Internal Medicine PGY3  5/18/2019 11:04 PM        BP running low this AM. Off Nitro drip  Chronic bradycardia  Hold Imdur and CCB  On BB  Needs coronary angiogram  Monitor I/O's  Attending Physician Statement  I have discussed the care of Jessie Frederick, including pertinent history and exam findings,  with the resident. I have seen and examined the patient and the key elements of all parts of the encounter have been performed by me. I agree with the assessment, plan and orders as documented by the resident.    Cristela Camarena MD

## 2019-05-19 NOTE — ED NOTES
Pt. Resting on stretcher, NAD, breathing unlabored, RR 17  Full cardiac monitor on  No needs expressed at this time  Will continue to monitor and reassess      Aaron Waller RN  05/18/19 [id number]

## 2019-05-19 NOTE — ED NOTES
Pt. Resting on stretcher with eyes closed, NAD, breathing unlabored RR 20  Full cardiac monitor on  Pt.  Denies all needs at this time  Will continue to monitor and reassess     Margi Apple RN  05/18/19 [id number]

## 2019-05-19 NOTE — CONSULTS
Cardiology Consult           Date of Admission:  5/18/2019  Date of Consultation:  5/19/2019      PCP:  Petra Kate MD      Chief Complaint: SOB and CP    History of Present Illness:  Amber Blackwell is a 72 y.o. female who presented to ED via EMS with worsening SOB and CP. Patient had been seen in one of our offices last month and stress test was ordered which showed moderate anterior wall ischemia. At that time cardiac cath was recommended and patient wanted here at [facility] and by Dr. Rachel mcfarland, this was scheduled for this coming Wednesday. Patient has diuresed some her in hospital which has improved her breathing. She still gets winded with minimal activity and has associated chest pressure. She does have history of prior LAD stent. She does seem to have a lot of stress and angst against the medical world and particularly mid-level providers. PMH:   has a past medical history of Abnormal Pap smear, Asthma, Bloody discharge from nipple, Cataracts, bilateral, Colon polyp, CVA (cerebral vascular accident) ([address] Demyelinating disease ([address] Fibromyalgia, High-risk sexual behavior, History of bone density study, History of migraines, HTN (hypertension), IgG deficiency ([address] Multiple sclerosis ([address] Myoclonic seizures ([address] Optic neuritis, Osteopenia, Pyloric stenosis, Raynaud's disease, and Vasomotor rhinitis. PSH:   has a past surgical history that includes Dilation & curettage (1982); Breast biopsy (1990); Appendectomy (1955); Septoplasty (1985); Hammer toe surgery (1989); Sinus endoscopy; sinus surgery; Shoulder arthroscopy (1998); Esophagoscopy (1966, 1969); Colonoscopy (2010); Foreign Body Removal (1962); laparoscopy (7943); Coronary angioplasty with stent (5/2011); Coronary angioplasty with stent (6/2011); Upper gastrointestinal endoscopy; eye surgery; pr colonoscopy w/biopsy single/multiple (N/A, 12/19/2017); pr egd transoral biopsy single/multiple (12/19/2017);  Upper gastrointestinal endoscopy (12/19/2017); and Upper gastrointestinal endoscopy (5/24/2018). Allergies: Allergies   Allergen Reactions    Lisinopril-Hydrochlorothiazide Anaphylaxis and Hives    Sulfa Antibiotics Anaphylaxis    Penicillins Hives    Polyethylene Glycol Hives    Actifed Cold-Allergy [Chlorpheniramine-Phenylephrine]     Altace [Ramipril]      Chronic cough    Cephalosporins Hives    Coreg [Carvedilol]      bloating    Dml Facial Moisturizer     Elavil [Amitriptyline Hcl]     Entex [Ami-Jose]     Ethylene Glycol     Levofloxacin      ach tendon    Lisinopril-Hydrochlorothiazide     Metoprolol      Stomach pain    Montelukast Sodium      Heart effects    Morphine Itching    Norvasc [Amlodipine Besylate]      Stomach pain    Phenobarbital Hives    Robaxin [Methocarbamol]     Sinequan [Doxepin Hcl]     Sudafed [Pseudoephedrine Hcl]     Sudafed [Pseudoephedrine Hcl]     Tranquil-[place]     Wellbutrin [Bupropion Hcl]      Hypotension    Actifed Cold-Allergy [Chlorpheniramine-Phenylephrine] Palpitations    Clindamycin/Lincomycin Nausea And Vomiting    Codeine Nausea And Vomiting    Metoprolol Succinate Nausea And Vomiting    Seldane [Terfenadine] Palpitations        Home Meds:    Prior to Admission medications    Medication Sig Start Date End Date Taking?  Authorizing Provider   acetaminophen (TYLENOL) 500 MG tablet Take 1,000 mg by mouth 2 times daily as needed for Pain    Historical Provider, MD   Immune Globulin, Human, (HIZENTRA) 10 GM/50ML SOLN Inject 20 % into the skin    Historical Provider, MD   tiZANidine (ZANAFLEX) 2 MG tablet Take 2 mg by mouth every 6 hours as needed     Historical Provider, MD   pantoprazole (PROTONIX) 40 MG tablet Take 40 mg by mouth daily    Historical Provider, MD   azaTHIOprine (IMURAN) 50 MG tablet Take 50 mg by mouth daily     Historical Provider, MD   diltiazem (CARDIZEM CD) 240 MG extended release capsule Take 240 mg by mouth daily Patient acetaminophen (TYLENOL) tablet 650 mg  650 mg Oral Q4H PRN Tiffanie Salgado MD        azaTHIOprine [facility]) tablet 50 mg  50 mg Oral Daily Letty Ledesma MD   50 mg at 05/19/19 0226    allopurinol (ZYLOPRIM) tablet 100 mg  100 mg Oral Daily Letty Ledesma MD   100 mg at 05/19/19 0841    clopidogrel (PLAVIX) tablet 75 mg  75 mg Oral Daily Letty Ledesma MD   75 mg at 05/19/19 0840    isosorbide mononitrate (IMDUR) extended release tablet 60 mg  60 mg Oral Daily Letty Ledesma MD   60 mg at 05/19/19 0225    losartan (COZAAR) tablet 100 mg  100 mg Oral Daily Letty Ledesma MD   100 mg at 05/19/19 0840    metoprolol succinate (TOPROL XL) extended release tablet 50 mg  50 mg Oral Daily Letty Ledesma MD        PARoxetine (PAXIL) tablet 20 mg  20 mg Oral QAM Letty Ledesma MD   20 mg at 05/19/19 0840    pravastatin (PRAVACHOL) tablet 40 mg  40 mg Oral Daily Letty Ledesma MD   40 mg at 05/19/19 0225    ranolazine (RANEXA) extended release tablet 500 mg  500 mg Oral BID Letty Ledesma MD   500 mg at 05/19/19 0841    sodium chloride flush 0.9 % injection 10 mL  10 mL Intravenous 2 times per day Letty Ledesma MD   10 mL at 05/19/19 0841    sodium chloride flush 0.9 % injection 10 mL  10 mL Intravenous PRN Letty Ledesma MD        ondansetron (ZOFRAN) injection 4 mg  4 mg Intravenous Q6H PRN Letty Ledesma MD        enoxaparin (LOVENOX) injection 40 mg  40 mg Subcutaneous Daily Letty Ledesma MD   40 mg at 05/19/19 0845    potassium chloride (KLOR-CON M) extended release tablet 40 mEq  40 mEq Oral PRN Letty Ledesma MD        Or    potassium bicarb-citric acid (EFFER-K) effervescent tablet 40 mEq  40 mEq Oral PRN Letty Ledesma MD   40 mEq at 05/19/19 0231    Or    potassium chloride 10 mEq/100 mL IVPB (Peripheral Line)  10 mEq Intravenous PRN Letty Ledesma MD        magnesium sulfate 1 g in dextrose 5% 100 mL IVPB  1 g Intravenous PRN Letty Stanton MD  magnesium hydroxide (MILK OF MAGNESIA) 400 MG/5ML suspension 30 mL  30 mL Oral Daily PRN Letty Ledesma MD        pantoprazole (PROTONIX) tablet 40 mg  40 mg Oral Daily Letty Ledesma MD   40 mg at 05/19/19 0840    [START ON 5/20/2019] bumetanide (BUMEX) injection 0.5 mg  0.5 mg Intravenous Daily Letty Ledesma MD           Social History:       TOBACCO:   reports that she has never smoked. She has never used smokeless tobacco.  ETOH:   reports that she does not drink alcohol. DRUGS:  reports that she does not use drugs. OCCUPATION:          Family Histroy:         Problem Relation Age of Onset    Other Father         Heart problems    Other Mother         heart problems requiring open heart surgery, HTN    Other Sister         HTN    Other Maternal Aunt         breast cancer           Review of Systems:   · Constitutional: there has been no unanticipated weight loss. There's been no change in energy level, sleep pattern, or activity level. · Eyes: No visual changes or diplopia. No scleral icterus. · ENT: No Headaches, hearing loss or vertigo. No mouth sores or sore throat. · Cardiovascular: No chest pain, dyspnea on exertion, palpitations or loss of consciousness. No cough, hemoptysis, pleuritic pain, or phlebitis. · Respiratory: No cough or wheezing, no sputum production. No hematemesis. · Gastrointestinal: No abdominal pain, appetite loss, blood in stools. No change in bowel or bladder habits. · Genitourinary: No dysuria, trouble voiding, or hematuria. · Musculoskeletal:  No gait disturbance, weakness or joint complaints. · Integumentary: No rash or pruritis. · Neurological: No headache, diplopia, change in muscle strength, numbness or tingling. No change in gait, balance, coordination, mood, affect, memory, mentation, behavior. · Psychiatric: No anxiety, or depression. · Endocrine: No temperature intolerance. No excessive thirst, fluid intake, or urination.  No tremor. · Hematologic/Lymphatic: No abnormal bruising or bleeding, blood clots or swollen lymph nodes. · Allergic/Immunologic: No nasal congestion or hives. Physical Exam    Vital Signs: BP (!) 123/37   Pulse 53   Temp 97.3 °F (36.3 °C) (Oral)   Resp 20   Ht 5' 1\" (1.549 m)   Wt 203 lb 8 oz (92.3 kg)   SpO2 (!) 87%   BMI 38.45 kg/m²        Admission Weight: 204 lb (92.5 kg)     General appearance: Awake, Alert Cooperative    Head: Normocephalic, without obvious abnormality, atraumatic    Eyes: Conjunctivae/corneas clear. PERRL, EOM's intact. Fundi benign    Neck: no adenopathy, no carotid bruit, no JVD, supple, symmetrical, trachea midline and thyroid: not enlarged, symmetric, no tenderness/mass/nodules    Lungs: clear to auscultation bilaterally    Heart: regular rate and rhythm, S1, S2 normal, no murmur, click, rub or gallop    Abdomen: Soft, non-tender. Bowel sounds normal. No masses,  no organomegaly    Extremities: extremities normal, atraumatic, no cyanosis or edema    Skin: Skin color, texture, turgor normal. No rashes or lesions    Neurologic: Grossly normal            Labs:      CBC:   Recent Labs     05/18/19 1815 05/19/19  1105   WBC 11.3 9.6   HGB 13.2 12.9   HCT 38.8 37.5   MCV 94.2 92.1    246     BMP:   Recent Labs     05/18/19 2059 05/19/19  1105    141   K 3.2* 3.5*    100   CO2 24 26   BUN 17 17   CREATININE 0.66 0.89     PT/INR: No results for input(s): PROTIME, INR in the last 72 hours. APTT: No results for input(s): APTT in the last 72 hours. MAG:   Recent Labs     05/19/19  1105   MG 1.9     D Dimer: No results for input(s): DDIMER in the last 72 hours.   Troponin T   Recent Labs     05/18/19 2059 05/19/19  0345 05/19/19  1105   TROPONINT NOT REPORTED NOT REPORTED NOT REPORTED     ProBNP Invalid input(s): PRO-BNP          Diagnosis:  Principal Problem:    Acute on chronic diastolic CHF (congestive heart failure) ([facility])  Active Problems:    Multiple sclerosis ([facility]    HTN (hypertension)    Dyslipidemia    GERD (gastroesophageal reflux disease)    Fibromyalgia    Chronic diastolic congestive heart failure (HCC)    Acute diastolic HF (heart failure) (HCC)    CAD (coronary artery disease)  Resolved Problems:    * No resolved hospital problems. *          Plan:    1. Unstable angina  2. HERNANDEZ  3. CAD with prior LAD stent  4. A on C diastolic heart failure  5. Abnormal stress test with anterior defect    Continue patients current home medications at their current doses. Will plan for cardiac cath tomorrow now rather then Wednesday given progression of symptoms. Further delineation of care dependent on cath findings.       Electronically signed by Richa Fletcher DO on 5/19/2019 at 12:04 PM

## 2019-05-19 NOTE — PROGRESS NOTES
DATE: 2019    NAME: Bharti Mckeon  MRN: [id number]   : 1945    Patient not seen this date for Physical Therapy due to:  [] Blood transfusion in progress  [] Hemodialysis  [] Patient Declined  [] Spine Precautions   [] Strict Bedrest  [] Surgery/ Procedure  [] Testing      [x] Other - pt has progressive MS and states she is functioning at baseline. Awaiting cardiac cath (scheduled  but may have sooner). Will eval following cath. [] PT being discontinued at this time. Patient independent. No further needs. [] PT being discontinued at this time as the patient has been transferred to palliative care. No further needs.     Stephanie Brandt, PT, DPT, CMPT

## 2019-05-20 ENCOUNTER — APPOINTMENT (OUTPATIENT)
Dept: CARDIAC CATH/INVASIVE PROCEDURES | Age: 74
DRG: 246 | End: 2019-05-20
Payer: MEDICARE

## 2019-05-20 LAB
ABSOLUTE EOS #: 0.14 K/UL (ref 0–0.44)
ABSOLUTE IMMATURE GRANULOCYTE: 0.04 K/UL (ref 0–0.3)
ABSOLUTE LYMPH #: 1.14 K/UL (ref 1.1–3.7)
ABSOLUTE MONO #: 0.99 K/UL (ref 0.1–1.2)
ANION GAP SERPL CALCULATED.3IONS-SCNC: 13 MMOL/L (ref 9–17)
BASOPHILS # BLD: 1 % (ref 0–2)
BASOPHILS ABSOLUTE: 0.04 K/UL (ref 0–0.2)
BUN BLDV-MCNC: 19 MG/DL (ref 8–23)
BUN/CREAT BLD: ABNORMAL (ref 9–20)
CALCIUM SERPL-MCNC: 8.7 MG/DL (ref 8.6–10.4)
CHLORIDE BLD-SCNC: 101 MMOL/L (ref 98–107)
CO2: 27 MMOL/L (ref 20–31)
CREAT SERPL-MCNC: 0.92 MG/DL (ref 0.5–0.9)
DIFFERENTIAL TYPE: ABNORMAL
EKG ATRIAL RATE: 62 BPM
EKG P AXIS: 80 DEGREES
EKG P-R INTERVAL: 150 MS
EKG Q-T INTERVAL: 480 MS
EKG QRS DURATION: 84 MS
EKG QTC CALCULATION (BAZETT): 487 MS
EKG R AXIS: -39 DEGREES
EKG T AXIS: 62 DEGREES
EKG VENTRICULAR RATE: 62 BPM
EOSINOPHILS RELATIVE PERCENT: 2 % (ref 1–4)
GFR AFRICAN AMERICAN: >60 ML/MIN
GFR NON-AFRICAN AMERICAN: >60 ML/MIN
GFR SERPL CREATININE-BSD FRML MDRD: ABNORMAL ML/MIN/{1.73_M2}
GFR SERPL CREATININE-BSD FRML MDRD: ABNORMAL ML/MIN/{1.73_M2}
GLUCOSE BLD-MCNC: 94 MG/DL (ref 70–99)
HCT VFR BLD CALC: 36.6 % (ref 36.3–47.1)
HEMOGLOBIN: 12.3 G/DL (ref 11.9–15.1)
IMMATURE GRANULOCYTES: 1 %
LYMPHOCYTES # BLD: 13 % (ref 24–43)
MAGNESIUM: 2.1 MG/DL (ref 1.6–2.6)
MCH RBC QN AUTO: 31.1 PG (ref 25.2–33.5)
MCHC RBC AUTO-ENTMCNC: 33.6 G/DL (ref 28.4–34.8)
MCV RBC AUTO: 92.4 FL (ref 82.6–102.9)
MONOCYTES # BLD: 12 % (ref 3–12)
NRBC AUTOMATED: 0 PER 100 WBC
PDW BLD-RTO: 14 % (ref 11.8–14.4)
PLATELET # BLD: 228 K/UL (ref 138–453)
PLATELET ESTIMATE: ABNORMAL
PMV BLD AUTO: 11.6 FL (ref 8.1–13.5)
POTASSIUM SERPL-SCNC: 3.4 MMOL/L (ref 3.7–5.3)
RBC # BLD: 3.96 M/UL (ref 3.95–5.11)
RBC # BLD: ABNORMAL 10*6/UL
SEG NEUTROPHILS: 71 % (ref 36–65)
SEGMENTED NEUTROPHILS ABSOLUTE COUNT: 6.22 K/UL (ref 1.5–8.1)
SODIUM BLD-SCNC: 141 MMOL/L (ref 135–144)
WBC # BLD: 8.6 K/UL (ref 3.5–11.3)
WBC # BLD: ABNORMAL 10*3/UL

## 2019-05-20 PROCEDURE — 6360000002 HC RX W HCPCS

## 2019-05-20 PROCEDURE — 80048 BASIC METABOLIC PNL TOTAL CA: CPT

## 2019-05-20 PROCEDURE — 2580000003 HC RX 258: Performed by: INTERNAL MEDICINE

## 2019-05-20 PROCEDURE — 93458 L HRT ARTERY/VENTRICLE ANGIO: CPT | Performed by: INTERNAL MEDICINE

## 2019-05-20 PROCEDURE — 6360000002 HC RX W HCPCS: Performed by: INTERNAL MEDICINE

## 2019-05-20 PROCEDURE — C1725 CATH, TRANSLUMIN NON-LASER: HCPCS

## 2019-05-20 PROCEDURE — 6370000000 HC RX 637 (ALT 250 FOR IP)

## 2019-05-20 PROCEDURE — B2111ZZ FLUOROSCOPY OF MULTIPLE CORONARY ARTERIES USING LOW OSMOLAR CONTRAST: ICD-10-PCS | Performed by: INTERNAL MEDICINE

## 2019-05-20 PROCEDURE — C1760 CLOSURE DEV, VASC: HCPCS

## 2019-05-20 PROCEDURE — C1769 GUIDE WIRE: HCPCS

## 2019-05-20 PROCEDURE — 6370000000 HC RX 637 (ALT 250 FOR IP): Performed by: INTERNAL MEDICINE

## 2019-05-20 PROCEDURE — 2709999900 HC NON-CHARGEABLE SUPPLY

## 2019-05-20 PROCEDURE — 85025 COMPLETE CBC W/AUTO DIFF WBC: CPT

## 2019-05-20 PROCEDURE — C1894 INTRO/SHEATH, NON-LASER: HCPCS

## 2019-05-20 PROCEDURE — C1887 CATHETER, GUIDING: HCPCS

## 2019-05-20 PROCEDURE — B2151ZZ FLUOROSCOPY OF LEFT HEART USING LOW OSMOLAR CONTRAST: ICD-10-PCS | Performed by: INTERNAL MEDICINE

## 2019-05-20 PROCEDURE — 99232 SBSQ HOSP IP/OBS MODERATE 35: CPT | Performed by: INTERNAL MEDICINE

## 2019-05-20 PROCEDURE — 6360000004 HC RX CONTRAST MEDICATION

## 2019-05-20 PROCEDURE — 027034Z DILATION OF CORONARY ARTERY, ONE ARTERY WITH DRUG-ELUTING INTRALUMINAL DEVICE, PERCUTANEOUS APPROACH: ICD-10-PCS | Performed by: INTERNAL MEDICINE

## 2019-05-20 PROCEDURE — C9600 PERC DRUG-EL COR STENT SING: HCPCS | Performed by: INTERNAL MEDICINE

## 2019-05-20 PROCEDURE — 2060000000 HC ICU INTERMEDIATE R&B

## 2019-05-20 PROCEDURE — 83735 ASSAY OF MAGNESIUM: CPT

## 2019-05-20 PROCEDURE — 4A023N7 MEASUREMENT OF CARDIAC SAMPLING AND PRESSURE, LEFT HEART, PERCUTANEOUS APPROACH: ICD-10-PCS | Performed by: INTERNAL MEDICINE

## 2019-05-20 PROCEDURE — 2500000003 HC RX 250 WO HCPCS

## 2019-05-20 RX ORDER — ONDANSETRON 2 MG/ML
4 INJECTION INTRAMUSCULAR; INTRAVENOUS EVERY 6 HOURS PRN
Status: DISCONTINUED | OUTPATIENT
Start: 2019-05-20 | End: 2019-05-21 | Stop reason: HOSPADM

## 2019-05-20 RX ORDER — BUMETANIDE 0.25 MG/ML
0.5 INJECTION, SOLUTION INTRAMUSCULAR; INTRAVENOUS DAILY
Status: DISCONTINUED | OUTPATIENT
Start: 2019-05-21 | End: 2019-05-21 | Stop reason: HOSPADM

## 2019-05-20 RX ORDER — HYDRALAZINE HYDROCHLORIDE 20 MG/ML
10 INJECTION INTRAMUSCULAR; INTRAVENOUS EVERY 4 HOURS PRN
Status: DISCONTINUED | OUTPATIENT
Start: 2019-05-20 | End: 2019-05-21 | Stop reason: HOSPADM

## 2019-05-20 RX ORDER — SODIUM CHLORIDE 0.9 % (FLUSH) 0.9 %
10 SYRINGE (ML) INJECTION PRN
Status: DISCONTINUED | OUTPATIENT
Start: 2019-05-20 | End: 2019-05-21 | Stop reason: HOSPADM

## 2019-05-20 RX ORDER — ACETAMINOPHEN 325 MG/1
650 TABLET ORAL EVERY 4 HOURS PRN
Status: DISCONTINUED | OUTPATIENT
Start: 2019-05-20 | End: 2019-05-21 | Stop reason: HOSPADM

## 2019-05-20 RX ORDER — SODIUM CHLORIDE 0.9 % (FLUSH) 0.9 %
10 SYRINGE (ML) INJECTION EVERY 12 HOURS SCHEDULED
Status: DISCONTINUED | OUTPATIENT
Start: 2019-05-20 | End: 2019-05-21 | Stop reason: HOSPADM

## 2019-05-20 RX ORDER — TIZANIDINE 2 MG/1
2 TABLET ORAL EVERY 6 HOURS PRN
Status: DISCONTINUED | OUTPATIENT
Start: 2019-05-20 | End: 2019-05-21 | Stop reason: HOSPADM

## 2019-05-20 RX ORDER — ASPIRIN 81 MG/1
81 TABLET, CHEWABLE ORAL DAILY
Status: DISCONTINUED | OUTPATIENT
Start: 2019-05-20 | End: 2019-05-21 | Stop reason: HOSPADM

## 2019-05-20 RX ORDER — ATORVASTATIN CALCIUM 80 MG/1
80 TABLET, FILM COATED ORAL NIGHTLY
Status: DISCONTINUED | OUTPATIENT
Start: 2019-05-20 | End: 2019-05-20

## 2019-05-20 RX ADMIN — PANTOPRAZOLE SODIUM 40 MG: 40 TABLET, DELAYED RELEASE ORAL at 11:56

## 2019-05-20 RX ADMIN — Medication 10 ML: at 21:00

## 2019-05-20 RX ADMIN — Medication 10 ML: at 11:58

## 2019-05-20 RX ADMIN — LOSARTAN POTASSIUM 100 MG: 50 TABLET, FILM COATED ORAL at 11:57

## 2019-05-20 RX ADMIN — ENOXAPARIN SODIUM 40 MG: 40 INJECTION SUBCUTANEOUS at 11:56

## 2019-05-20 RX ADMIN — ACETAMINOPHEN 650 MG: 325 TABLET ORAL at 12:14

## 2019-05-20 RX ADMIN — AZATHIOPRINE 50 MG: 50 TABLET ORAL at 21:00

## 2019-05-20 RX ADMIN — ALLOPURINOL 100 MG: 100 TABLET ORAL at 11:56

## 2019-05-20 RX ADMIN — ASPIRIN 81 MG: 81 TABLET, CHEWABLE ORAL at 21:02

## 2019-05-20 RX ADMIN — PAROXETINE HYDROCHLORIDE HEMIHYDRATE 20 MG: 20 TABLET, FILM COATED ORAL at 11:57

## 2019-05-20 RX ADMIN — POTASSIUM CHLORIDE 40 MEQ: 20 TABLET, EXTENDED RELEASE ORAL at 11:55

## 2019-05-20 RX ADMIN — RANOLAZINE 500 MG: 500 TABLET, FILM COATED, EXTENDED RELEASE ORAL at 11:57

## 2019-05-20 RX ADMIN — RANOLAZINE 500 MG: 500 TABLET, FILM COATED, EXTENDED RELEASE ORAL at 20:59

## 2019-05-20 RX ADMIN — PRAVASTATIN SODIUM 40 MG: 20 TABLET ORAL at 21:00

## 2019-05-20 RX ADMIN — Medication 10 ML: at 11:59

## 2019-05-20 RX ADMIN — CLOPIDOGREL 75 MG: 75 TABLET, FILM COATED ORAL at 11:56

## 2019-05-20 RX ADMIN — ACETAMINOPHEN 650 MG: 325 TABLET ORAL at 18:19

## 2019-05-20 RX ADMIN — ISOSORBIDE MONONITRATE 60 MG: 60 TABLET ORAL at 21:00

## 2019-05-20 ASSESSMENT — PAIN SCALES - GENERAL
PAINLEVEL_OUTOF10: 7
PAINLEVEL_OUTOF10: 0
PAINLEVEL_OUTOF10: 7
PAINLEVEL_OUTOF10: 0
PAINLEVEL_OUTOF10: 4
PAINLEVEL_OUTOF10: 0

## 2019-05-20 NOTE — PROGRESS NOTES
[address] [place] [address]   Department of Internal Medicine  Internal Medicine Residency Program  Daily Progress Note  ______________________________________________________________________    Patient: Pamela Morillo  YOB: 1945   MRN: [id number]    Acct: [de-identified]     Admit date: 5/18/2019  Today's date: 05/20/19    Admitting Diagnosis: acute on chronic diastolic heart failure. Subjective:   Pt seen and Chart reviewed. Patient went for cardiac cath today  Had one JEFFERY placed in proximal LAD.     Objective:   Vital Sign:  /79   Pulse 60   Temp 97.2 °F (36.2 °C) (Temporal)   Resp 16   Ht 5' 1\" (1.549 m)   Wt 204 lb (92.5 kg)   SpO2 95%   BMI 38.55 kg/m²       Physical Exam:  General appearance: alert, well appearing, and in no distress  Mental Status: alert, oriented to person, place, and time  Neurologic:  alert, oriented, normal speech, no focal findings or movement disorder noted  Lungs:  clear to auscultation, no wheezes, rales or rhonchi, symmetric air entry  Heart[de-identified] normal rate, regular rhythm, normal S1, S2, no murmurs, rubs, clicks or gallops  Abdomen:  soft, nontender, nondistended, no masses or organomegaly  Extremities: peripheral pulses normal, no pedal edema, no clubbing or cyanosis   Skin: normal coloration and turgor, no rashes, no suspicious skin lesions noted    Medications:  Scheduled Medications   [START ON 5/21/2019] bumetanide  0.5 mg Intravenous Daily    sodium chloride flush  10 mL Intravenous 2 times per day    azaTHIOprine  50 mg Oral Daily    allopurinol  100 mg Oral Daily    clopidogrel  75 mg Oral Daily    isosorbide mononitrate  60 mg Oral Daily    losartan  100 mg Oral Daily    metoprolol succinate  50 mg Oral Daily    PARoxetine  20 mg Oral QAM    pravastatin  40 mg Oral Daily    ranolazine  500 mg Oral BID    sodium chloride flush  10 mL Intravenous 2 times per day    enoxaparin  40 mg Subcutaneous Daily    pantoprazole  40 mg Yoly, including pertinent history and exam findings,  with the resident. I have seen and examined the patient and the key elements of all parts of the encounter have been performed by me. I agree with the assessment, plan and orders as documented by the resident.    Cristela Camarena MD

## 2019-05-20 NOTE — PROGRESS NOTES
Cardiac Catheterization       Ms. Frederick  YOB: 1945   [id number]      Pre-operative Diagnosis: ACS    Post-operative Diagnosis: Same    Informed consent was obtained from the patient after explanation of the procedure including indications, description of the procedure, benefits and possible risks and complications of the procedure, and alternatives. Questions were answered.  The patient's history was reviewed and a directed physical examination was performed prior to the procedure      Procedure: LHC, LVgram, Coronary angiography, Stent LAD     LV gram - Normal     Cors    LM - Normal    LAD - Severe    LCF - irregular    RCA - small     Catheters 6 Fr XB 3.5 LAD   0.014 Whisper   3.0 X 15 Xience      Anesthesia: conscious sedation    Surgeons/Assistants: Mer    Estimated Blood Loss: Minimal    Complications: None      Recommendations Patent vessel no residuals        Arabella Mckenzie MD, [facility]  5/20/2019 8:57 AM

## 2019-05-20 NOTE — PROGRESS NOTES
[facility]  Occupational Therapy Not Seen Note    DATE: 2019  Name: Nickie Wells  : 1945  MRN: [id number]    Patient not available for Occupational Therapy due to:    [] Testing:    [] Hemodialysis    [] Blood Transfusion in Progress    []Refusal by Patient:    [] Surgery/Procedure:    [x] Strict Bedrest Post cath lab x4 hrs    [] Sedation    [] Spine Precautions     [] Pt being transferred to palliative care at this time. Spoke with pt/family and OT services to be defered. [] Pt independent with functional mobility and functional tasks.  Pt with no OT acute care needs at this time, will defer OT eval.    [] Other    Next Scheduled Treatment: Check back as able or     Signature: Alesia RENNER/LISA

## 2019-05-20 NOTE — DISCHARGE INSTR - COC
Continuity of Care Form    Patient Name: Jairo Sosa   :  1945  MRN:  [id number]    Admit date:  2019  Discharge date:  ***    Code Status Order: Full Code   Advance Directives:   Advance Care Flowsheet Documentation     Date/Time Healthcare Directive Type of Healthcare Directive Copy in [address] Agent's Name Healthcare Agent's Phone Number    19 0110  No, patient does not have an advance directive for healthcare treatment -- -- -- -- --          Admitting Physician:  Cristofer Ritchie MD  PCP: Michael Canada MD    Discharging Nurse: [facility] Unit/Room#: 3005/3005-01  Discharging Unit Phone Number: ***    Emergency Contact:   Extended Emergency Contact Information  Primary Emergency Contact: Sharee Mendez  Address: [address] Phone: [phone]  Relation: Child   needed?  No    Past Surgical History:  Past Surgical History:   Procedure Laterality Date    APPENDECTOMY      sponges left in abdomen, at [address] [place] [address] Way    stereotactic type, right breast, benign    COLONOSCOPY      Polyp    CORONARY ANGIOPLASTY WITH STENT PLACEMENT  2011    failed attempt to place stent    CORONARY ANGIOPLASTY WITH STENT PLACEMENT  2011    successful placement of 2 stent in same artery   [address]    bleeding ulcers found   [address]    surgical sponges left in during appendectomy    HAMMER TOE SURGERY      head resection of phalanx, left foot   [facility]    with D&C    MS COLONOSCOPY W/BIOPSY SINGLE/MULTIPLE N/A 2017    COLONOSCOPY WITH BIOPSY performed by Tyler Hogue MD at [facility]    MS EGD TRANSORAL BIOPSY SINGLE/MULTIPLE  2017    EGD BIOPSY performed by Tyler Hogue MD at [address] [place] [address] resection with rt and left ethmoidectomy    SHOULDER ARTHROSCOPY  1998    rotator cuff impingement, right arm    SINUS ENDOSCOPY      SINUS SURGERY      multiple    UPPER GASTROINTESTINAL ENDOSCOPY      2-4 times per year for pyloric stenosis    UPPER GASTROINTESTINAL ENDOSCOPY  12/19/2017    EGD DILATION BALLOON performed by Elle Kulkarni MD at [address] [place] [address] ENDOSCOPY  5/24/2018    EGD DILATION SAVORY performed by Jeanna Warner MD at [facility]       Immunization History: There is no immunization history on file for this patient.     Active Problems:  Patient Active Problem List   Diagnosis Code    Bloody discharge from nipple N64.52    Osteopenia M85.80    Multiple sclerosis ([facility] G35    HTN (hypertension) I10    Asthma J45.909    Chest pain R07.9    SOB (shortness of breath) R06.02    Lower leg pain M79.669    Noncompliance with medications Z91.14    Dyslipidemia E78.5    GERD (gastroesophageal reflux disease) K21.9    Fibromyalgia M79.7    Pyloric stenosis K31.1    Demyelinating disease ([facility] G37.9    H/O: CVA (cerebrovascular accident) Z80.78    Epigastric abdominal pain R10.13    Incontinence in female R32    Chest pain with high risk for cardiac etiology R07.9    Migraine without status migrainosus, not intractable G43.909    Hypokalemia E87.6    Chronic diastolic congestive heart failure (HCC) I50.32    Acute diastolic HF (heart failure) (HCC) I50.31    Acute on chronic diastolic CHF (congestive heart failure) (HCC) I50.33    CAD (coronary artery disease) I25.10       Isolation/Infection:   Isolation          No Isolation            Nurse Assessment:  Last Vital Signs: BP (!) 141/40   Pulse 65   Temp 97.2 °F (36.2 °C) (Temporal)   Resp 17   Ht 5' 1\" (1.549 m)   Wt 204 lb (92.5 kg)   SpO2 95%   BMI 38.55 kg/m²     Last documented pain score (0-10 scale): Pain Level: 7  Last Weight:   Wt Readings from Last 1 Encounters:   05/20/19 204 lb (92.5 kg) Mental Status:  {IP PT MENTAL STATUS:14048}    IV Access:  { BINU IV ACCESS:626875870}    Nursing Mobility/ADLs:  Walking   {CHP DME MIVS:475814829}  Transfer  {CHP DME FTKA:276321864}  Bathing  {CHP DME OBTE:560469381}  Dressing  {CHP DME EUQB:147793555}  Toileting  {CHP DME IZFF:703906741}  Feeding  {CHP DME DSQN:329799130}  Med Admin  {CHP DME SQUE:712546129}  Med Delivery   { BINU MED Delivery:096315705}    Wound Care Documentation and Therapy:        Elimination:  Continence:   · Bowel: {YES / JZ:89728}  · Bladder: {YES / LV:60577}  Urinary Catheter: {Urinary Catheter:631934580}   Colostomy/Ileostomy/Ileal Conduit: {YES / FE:29738}       Date of Last BM: ***    Intake/Output Summary (Last 24 hours) at 2019 1227  Last data filed at 2019 0330  Gross per 24 hour   Intake 410 ml   Output 650 ml   Net -240 ml     I/O last 3 completed shifts: In: 540 [P.O.:530;  I.V.:10]  Out: 750 [Urine:750]    Safety Concerns:     [address] Safety Concerns:290768355}    Impairments/Disabilities:      [address] Impairments/Disabilities:105232075}    Nutrition Therapy:  Current Nutrition Therapy:   [address] Diet List:683386649}    Routes of Feeding: {[facility] DME Other Feedings:145699994}  Liquids: {Slp liquid thickness:31924}  Daily Fluid Restriction: {CHP DME Yes amt example:411375965}  Last Modified Barium Swallow with Video (Video Swallowing Test): {Done Not Done JBXV:447307473}    Treatments at the Time of Hospital Discharge:   Respiratory Treatments: ***  Oxygen Therapy:  {Therapy; copd oxygen:62801}  Ventilator:    { CC Vent TMDQ:320573802}    Rehab Therapies: {THERAPEUTIC INTERVENTION:1522258664}  Weight Bearing Status/Restrictions: [address]  Weight Bearin}  Other Medical Equipment (for information only, NOT a DME order):  {EQUIPMENT:665679113}  Other Treatments: ***    Patient's personal belongings (please select all that are sent with patient):  {CHP DME Belongings:640520743}    RN SIGNATURE: {Esignature:402016310}    CASE MANAGEMENT/SOCIAL WORK SECTION    Inpatient Status Date: ***    Readmission Risk Assessment Score:  Readmission Risk              Risk of Unplanned Readmission:        21           Discharging to Facility/ Agency   · Name:   · Address:  · Phone:  · Fax:    Dialysis Facility (if applicable)   · Name:  · Address:  · Dialysis Schedule:  · Phone:  · Fax:    / signature: {Esignature:463144850}    PHYSICIAN SECTION    Prognosis: {Prognosis:6203766257}    Condition at Discharge: [address] [place] [address] Patient Condition:293645035}    Rehab Potential (if transferring to Rehab): {Prognosis:0025962505}    Recommended Labs or Other Treatments After Discharge: ***    Physician Certification: I certify the above information and transfer of Az Ibarra  is necessary for the continuing treatment of the diagnosis listed and that she requires {Admit to Appropriate Level of Care:99778} for {GREATER/LESS:419453170} 30 days.      Update Admission H&P: {CHP DME Changes in ZRQLT:468613710}    PHYSICIAN SIGNATURE:  Electronically signed by Berry Vaughn MD on 5/20/19 at 12:27 PM

## 2019-05-20 NOTE — PROGRESS NOTES
Smoking Cessation - topics covered   []  Health Risks  []  Benefits of Quitting   []  Smoking Cessation  [x]  Patient has no history of tobacco use  []  Patient is former smoker. [x]  No need for tobacco cessation education. []  Booklet given  []  Patient verbalizes understanding. []  Patient denies need for tobacco cessation education. []  Unable to meet with patient today. Will follow up as able.   Venus Mckeon  10:45 AM

## 2019-05-21 VITALS
DIASTOLIC BLOOD PRESSURE: 71 MMHG | OXYGEN SATURATION: 98 % | HEART RATE: 55 BPM | WEIGHT: 203.5 LBS | HEIGHT: 61 IN | SYSTOLIC BLOOD PRESSURE: 113 MMHG | TEMPERATURE: 98.4 F | BODY MASS INDEX: 38.42 KG/M2 | RESPIRATION RATE: 19 BRPM

## 2019-05-21 LAB
ABSOLUTE EOS #: 0.27 K/UL (ref 0–0.44)
ABSOLUTE IMMATURE GRANULOCYTE: 0.05 K/UL (ref 0–0.3)
ABSOLUTE LYMPH #: 1.04 K/UL (ref 1.1–3.7)
ABSOLUTE MONO #: 1.06 K/UL (ref 0.1–1.2)
ANION GAP SERPL CALCULATED.3IONS-SCNC: 13 MMOL/L (ref 9–17)
BASOPHILS # BLD: 1 % (ref 0–2)
BASOPHILS ABSOLUTE: 0.05 K/UL (ref 0–0.2)
BUN BLDV-MCNC: 18 MG/DL (ref 8–23)
BUN/CREAT BLD: ABNORMAL (ref 9–20)
CALCIUM SERPL-MCNC: 8.8 MG/DL (ref 8.6–10.4)
CHLORIDE BLD-SCNC: 99 MMOL/L (ref 98–107)
CO2: 24 MMOL/L (ref 20–31)
CREAT SERPL-MCNC: 0.82 MG/DL (ref 0.5–0.9)
DIFFERENTIAL TYPE: ABNORMAL
EOSINOPHILS RELATIVE PERCENT: 3 % (ref 1–4)
GFR AFRICAN AMERICAN: >60 ML/MIN
GFR NON-AFRICAN AMERICAN: >60 ML/MIN
GFR SERPL CREATININE-BSD FRML MDRD: ABNORMAL ML/MIN/{1.73_M2}
GFR SERPL CREATININE-BSD FRML MDRD: ABNORMAL ML/MIN/{1.73_M2}
GLUCOSE BLD-MCNC: 86 MG/DL (ref 70–99)
HCT VFR BLD CALC: 36.7 % (ref 36.3–47.1)
HEMOGLOBIN: 12.5 G/DL (ref 11.9–15.1)
IMMATURE GRANULOCYTES: 1 %
LYMPHOCYTES # BLD: 11 % (ref 24–43)
MAGNESIUM: 2.2 MG/DL (ref 1.6–2.6)
MCH RBC QN AUTO: 31 PG (ref 25.2–33.5)
MCHC RBC AUTO-ENTMCNC: 34.1 G/DL (ref 28.4–34.8)
MCV RBC AUTO: 91.1 FL (ref 82.6–102.9)
MONOCYTES # BLD: 11 % (ref 3–12)
NRBC AUTOMATED: 0 PER 100 WBC
PDW BLD-RTO: 14 % (ref 11.8–14.4)
PLATELET # BLD: 180 K/UL (ref 138–453)
PLATELET ESTIMATE: ABNORMAL
PMV BLD AUTO: 11.7 FL (ref 8.1–13.5)
POTASSIUM SERPL-SCNC: 3.6 MMOL/L (ref 3.7–5.3)
RBC # BLD: 4.03 M/UL (ref 3.95–5.11)
RBC # BLD: ABNORMAL 10*6/UL
SEG NEUTROPHILS: 73 % (ref 36–65)
SEGMENTED NEUTROPHILS ABSOLUTE COUNT: 7.04 K/UL (ref 1.5–8.1)
SODIUM BLD-SCNC: 136 MMOL/L (ref 135–144)
WBC # BLD: 9.5 K/UL (ref 3.5–11.3)
WBC # BLD: ABNORMAL 10*3/UL

## 2019-05-21 PROCEDURE — 2580000003 HC RX 258: Performed by: INTERNAL MEDICINE

## 2019-05-21 PROCEDURE — 80048 BASIC METABOLIC PNL TOTAL CA: CPT

## 2019-05-21 PROCEDURE — 2500000003 HC RX 250 WO HCPCS: Performed by: INTERNAL MEDICINE

## 2019-05-21 PROCEDURE — 99232 SBSQ HOSP IP/OBS MODERATE 35: CPT | Performed by: INTERNAL MEDICINE

## 2019-05-21 PROCEDURE — 6360000002 HC RX W HCPCS: Performed by: INTERNAL MEDICINE

## 2019-05-21 PROCEDURE — 97530 THERAPEUTIC ACTIVITIES: CPT

## 2019-05-21 PROCEDURE — 85025 COMPLETE CBC W/AUTO DIFF WBC: CPT

## 2019-05-21 PROCEDURE — 36415 COLL VENOUS BLD VENIPUNCTURE: CPT

## 2019-05-21 PROCEDURE — 6370000000 HC RX 637 (ALT 250 FOR IP): Performed by: INTERNAL MEDICINE

## 2019-05-21 PROCEDURE — 6370000000 HC RX 637 (ALT 250 FOR IP): Performed by: STUDENT IN AN ORGANIZED HEALTH CARE EDUCATION/TRAINING PROGRAM

## 2019-05-21 PROCEDURE — 83735 ASSAY OF MAGNESIUM: CPT

## 2019-05-21 PROCEDURE — 97162 PT EVAL MOD COMPLEX 30 MIN: CPT

## 2019-05-21 RX ORDER — POTASSIUM CHLORIDE 20 MEQ/1
20 TABLET, EXTENDED RELEASE ORAL 2 TIMES DAILY WITH MEALS
Status: DISCONTINUED | OUTPATIENT
Start: 2019-05-21 | End: 2019-05-21 | Stop reason: HOSPADM

## 2019-05-21 RX ORDER — ASPIRIN 81 MG/1
81 TABLET, CHEWABLE ORAL DAILY
Qty: 30 TABLET | Refills: 3 | Status: SHIPPED | OUTPATIENT
Start: 2019-05-22 | End: 2020-07-26

## 2019-05-21 RX ADMIN — ENOXAPARIN SODIUM 40 MG: 40 INJECTION SUBCUTANEOUS at 08:56

## 2019-05-21 RX ADMIN — CLOPIDOGREL 75 MG: 75 TABLET, FILM COATED ORAL at 08:55

## 2019-05-21 RX ADMIN — PAROXETINE HYDROCHLORIDE HEMIHYDRATE 20 MG: 20 TABLET, FILM COATED ORAL at 09:05

## 2019-05-21 RX ADMIN — RANOLAZINE 500 MG: 500 TABLET, FILM COATED, EXTENDED RELEASE ORAL at 08:55

## 2019-05-21 RX ADMIN — LOSARTAN POTASSIUM 100 MG: 50 TABLET, FILM COATED ORAL at 08:55

## 2019-05-21 RX ADMIN — PANTOPRAZOLE SODIUM 40 MG: 40 TABLET, DELAYED RELEASE ORAL at 08:55

## 2019-05-21 RX ADMIN — Medication 10 ML: at 08:57

## 2019-05-21 RX ADMIN — POTASSIUM CHLORIDE 20 MEQ: 1500 TABLET, EXTENDED RELEASE ORAL at 10:35

## 2019-05-21 RX ADMIN — Medication 10 ML: at 08:58

## 2019-05-21 RX ADMIN — ALLOPURINOL 100 MG: 100 TABLET ORAL at 09:06

## 2019-05-21 RX ADMIN — BUMETANIDE 0.5 MG: 0.25 INJECTION INTRAMUSCULAR; INTRAVENOUS at 10:35

## 2019-05-21 RX ADMIN — ASPIRIN 81 MG: 81 TABLET, CHEWABLE ORAL at 08:55

## 2019-05-21 ASSESSMENT — PAIN SCALES - GENERAL: PAINLEVEL_OUTOF10: 6

## 2019-05-21 ASSESSMENT — PAIN DESCRIPTION - LOCATION: LOCATION: BACK

## 2019-05-21 ASSESSMENT — PAIN DESCRIPTION - PAIN TYPE: TYPE: CHRONIC PAIN

## 2019-05-21 ASSESSMENT — PAIN DESCRIPTION - ORIENTATION: ORIENTATION: LOWER

## 2019-05-21 NOTE — PROGRESS NOTES
(6/2011); Upper gastrointestinal endoscopy; eye surgery; pr colonoscopy w/biopsy single/multiple (N/A, 12/19/2017); pr egd transoral biopsy single/multiple (12/19/2017); Upper gastrointestinal endoscopy (12/19/2017); and Upper gastrointestinal endoscopy (5/24/2018). Restrictions  Restrictions/Precautions  Restrictions/Precautions: General Precautions, Fall Risk, Up as Tolerated  Required Braces or Orthoses?: No  Position Activity Restriction  Other position/activity restrictions: up with assist, Hx MS, pyloric stenosis (per pt). s/p JEFFERY proximal LAD 5/20  Vision/Hearing  Vision: Within Functional Limits  Hearing: Within functional limits     Subjective  General  Chart Reviewed: Yes  Patient assessed for rehabilitation services?: Yes  Response To Previous Treatment: Not applicable  Family / Caregiver Present: No  Follows Commands: Within Functional Limits  Subjective  Subjective: RN and pt agreeable to PT. Pt alert in bed upon arrival.   Pain Screening  Patient Currently in Pain: Yes  Pain Assessment  Pain Assessment: 0-10  Pain Level: 6  Pain Type: Chronic pain  Pain Location: Back  Pain Orientation: Lower(\"L5 OA per pt\")  Non-Pharmaceutical Pain Intervention(s): Ambulation/Increased Activity;Repositioned; Emotional support  Response to Pain Intervention: Patient Satisfied  Vital Signs  Patient Currently in Pain: Yes  Pre Treatment Pain Screening  Intervention List: Patient able to continue with treatment    Orientation  Orientation  Overall Orientation Status: Within Functional Limits  Social/Functional History  Social/Functional History  Lives With: Alone  Type of Home: Apartment  Home Layout: One level  Home Access: Level entry  Bathroom Shower/Tub: Tub/Shower unit  [employer]: Grab bars in shower  Bathroom Accessibility: Accessible  Home Equipment: (rollator, used going outside.  Per pt, it does not fit in house)  Receives Help From: Friend(s)  ADL Assistance: [address] [place] [address] Avenue: Independent  Homemaking Responsibilities: Yes  Ambulation Assistance: Independent(rollator use out of house)  Transfer Assistance: Independent  Active : Yes  Additional Comments: Pt reprots electric cart use at grocery store, and reprots difficulty bringing it into home. Cognition   Cognition  Overall Cognitive Status: WFL    Objective          AROM RLE (degrees)  RLE AROM: WFL  AROM LLE (degrees)  LLE AROM : WFL  AROM RUE (degrees)  RUE AROM : WFL  AROM LUE (degrees)  LUE AROM : WFL  Strength RLE  Strength RLE: WFL  Strength LLE  Strength LLE: WFL  Strength RUE  Strength RUE: WFL  Strength LUE  Strength LUE: WFL  Strength Other  Other: grossly 4-, Pt reprots weaker LLE from old cva and weaker RUE     Sensation  Overall Sensation Status: Impaired(chronic numbness/ tingling entires UEs (from MS) and intermittantly in LEs.)  Bed mobility  Supine to Sit: Stand by assistance  Sit to Supine: Stand by assistance  Scooting: Stand by assistance  Comment: increased time for bed mobility, rolled R sidelying first and multiple attempts to finished. Pt requested to try a few times herself before author assisted  Transfers  Sit to Stand: Stand by assistance  Stand to sit: Stand by assistance  Ambulation  Ambulation?: Yes  Ambulation 1  Surface: level tile  Device: Rolling Walker  Assistance: Contact guard assistance  Quality of Gait: intermittant LLE failing to clear and resulting self-corrected trip, slowed jin, no LOB, fatigued  Distance: 150'  Stairs/Curb  Stairs?: No        Exercises  Comments: increased time for mobility, fatigues, several mintues recovering EOB after amb prior to attempting rest room. RN notified to going to restroom.      Plan   Plan  Times per week: 3-5x/wk  Current Treatment Recommendations: Strengthening, Balance Training, Functional Mobility Training, Transfer Training, Endurance Training, Gait Training, Stair training, Home Exercise Program, Safety Education & Training, Equipment

## 2019-05-21 NOTE — PLAN OF CARE
Jessie Frederick requires the assistance of a Semi-Electric Hospital Bed to make frequent and immediate changes of body positions not feasible with an ordinary bed to alleviate pain. Patient needs bed height requirements to perform patient transfers, grooming, and daily living tasks. The patient requires the head of the bed to be elevated more than 30 degrees most of the time, due to patient's diagnosis. Pillows and wedges have been considered and ruled out.     Electronically signed by Nba Garrett RN on 5/20/2019 at 03:00 AM
Patient on Room Air, denies SOB, ambulated to bathroom, will continue to monitor   Problem: Falls - Risk of:  Goal: Will remain free from falls  Description  Will remain free from falls  5/19/2019 1851 by Cammy Hook RN  Outcome: Ongoing  5/19/2019 0525 by Tereza Street RN  Outcome: Ongoing  Goal: Absence of physical injury  Description  Absence of physical injury  5/19/2019 1851 by Cammy Hook RN  Outcome: Ongoing  5/19/2019 0525 by Tereza Street RN  Outcome: Ongoing     Problem: OXYGENATION/RESPIRATORY FUNCTION  Goal: Patient will maintain patent airway  5/19/2019 1851 by Cammy Hook RN  Outcome: Ongoing  5/19/2019 0525 by Tereza Street RN  Outcome: Ongoing  Goal: Patient will achieve/maintain normal respiratory rate/effort  Description  Respiratory rate and effort will be within normal limits for the patient  5/19/2019 1851 by Cammy Hook RN  Outcome: Ongoing  5/19/2019 0525 by Tereza Street RN  Outcome: Ongoing
Problem: Falls - Risk of:  Goal: Will remain free from falls  Description  Will remain free from falls  Outcome: Ongoing  Goal: Absence of physical injury  Description  Absence of physical injury  Outcome: Ongoing     Problem: OXYGENATION/RESPIRATORY FUNCTION  Goal: Patient will maintain patent airway  Outcome: Ongoing  Goal: Patient will achieve/maintain normal respiratory rate/effort  Description  Respiratory rate and effort will be within normal limits for the patient  Outcome: Ongoing     Problem: Pain:  Goal: Pain level will decrease  Description  Pain level will decrease  Outcome: Ongoing  Goal: Control of acute pain  Description  Control of acute pain  Outcome: Ongoing  Goal: Control of chronic pain  Description  Control of chronic pain  Outcome: Ongoing
Problem: Risk for Impaired Skin Integrity  Goal: Tissue integrity - skin and mucous membranes  Description  Structural intactness and normal physiological function of skin and  mucous membranes.   Outcome: Completed     Problem: Falls - Risk of:  Goal: Will remain free from falls  Description  Will remain free from falls  5/21/2019 1656 by Suzette Nunes RN  Outcome: Completed  5/21/2019 1055 by Suzette Nunes RN  Outcome: Ongoing  Goal: Absence of physical injury  Description  Absence of physical injury  5/21/2019 1656 by Suzette Nunes RN  Outcome: Completed  5/21/2019 1055 by Suzette Nunes RN  Outcome: Ongoing     Problem: OXYGENATION/RESPIRATORY FUNCTION  Goal: Patient will maintain patent airway  5/21/2019 1656 by Suzette Nunes RN  Outcome: Completed  5/21/2019 1055 by Suzette Nunes RN  Outcome: Ongoing  Goal: Patient will achieve/maintain normal respiratory rate/effort  Description  Respiratory rate and effort will be within normal limits for the patient  5/21/2019 1656 by Suzette Nunes RN  Outcome: Completed  5/21/2019 1055 by Suzette Nunes RN  Outcome: Ongoing     Problem: Pain:  Goal: Pain level will decrease  Description  Pain level will decrease  5/21/2019 1656 by Suzette Nunes RN  Outcome: Completed  5/21/2019 1055 by Suzette Nunes RN  Outcome: Ongoing  Goal: Control of acute pain  Description  Control of acute pain  5/21/2019 1656 by Suzette Nunes RN  Outcome: Completed  5/21/2019 1055 by Suzette Nunes RN  Outcome: Ongoing  Goal: Control of chronic pain  Description  Control of chronic pain  5/21/2019 1656 by Suzette Nunes RN  Outcome: Completed  5/21/2019 1055 by Suzette Nunes RN  Outcome: Ongoing     Problem: Musculor/Skeletal Functional Status  Goal: Highest potential functional level  Outcome: Completed
pain  Description  Control of chronic pain  5/20/2019 1123 by Madina Vazquez RN  Outcome: Ongoing  5/20/2019 0643 by Marianne Fofana RN  Outcome: Ongoing

## 2019-05-21 NOTE — PROGRESS NOTES
[address] [place] [address]   Department of Internal Medicine  Internal Medicine Residency Program  Daily Progress Note  ______________________________________________________________________    Patient: Bharti Mckeon  YOB: 1945   MRN: [id number]    Acct: [de-identified]     Admit date: 5/18/2019  Today's date: 05/21/19    Admitting Diagnosis: acute on chronic diastolic heart failure. Subjective:   Pt seen and Chart reviewed. S/p JEFFERY placement. Doing well today  Wishes to go home. Has not been evaluated by PT/OT as yet.      Objective:   Vital Sign:  BP (!) 119/44   Pulse 57   Temp 98.1 °F (36.7 °C) (Oral)   Resp 16   Ht 5' 1\" (1.549 m)   Wt 203 lb 8 oz (92.3 kg)   SpO2 94%   BMI 38.45 kg/m²       Physical Exam:  General appearance: alert, well appearing, and in no distress  Mental Status: alert, oriented to person, place, and time  Neurologic:  alert, oriented, normal speech, no focal findings or movement disorder noted  Lungs:  clear to auscultation, no wheezes, rales or rhonchi, symmetric air entry  Heart[de-identified] normal rate, regular rhythm, normal S1, S2, no murmurs, rubs, clicks or gallops  Abdomen:  soft, nontender, nondistended, no masses or organomegaly  Extremities: peripheral pulses normal, no pedal edema, no clubbing or cyanosis   Skin: normal coloration and turgor, no rashes, no suspicious skin lesions noted    Medications:  Scheduled Medications   bumetanide  0.5 mg Intravenous Daily    sodium chloride flush  10 mL Intravenous 2 times per day    aspirin  81 mg Oral Daily    sodium chloride flush  10 mL Intravenous 2 times per day    azaTHIOprine  50 mg Oral Daily    allopurinol  100 mg Oral Daily    clopidogrel  75 mg Oral Daily    isosorbide mononitrate  60 mg Oral Daily    losartan  100 mg Oral Daily    metoprolol succinate  50 mg Oral Daily    PARoxetine  20 mg Oral QAM    pravastatin  40 mg Oral Daily    ranolazine  500 mg Oral BID    sodium chloride flush 10 mL Intravenous 2 times per day    enoxaparin  40 mg Subcutaneous Daily    pantoprazole  40 mg Oral Daily       PRN Medications    sodium chloride flush 10 mL PRN   acetaminophen 650 mg Q4H PRN   magnesium hydroxide 30 mL Daily PRN   ondansetron 4 mg Q6H PRN   hydrALAZINE 10 mg Q4H PRN   tiZANidine 2 mg Q6H PRN   sodium chloride flush 10 mL PRN   acetaminophen 650 mg Q4H PRN   sodium chloride flush 10 mL PRN   ondansetron 4 mg Q6H PRN   potassium chloride 40 mEq PRN   Or     potassium alternative oral replacement 40 mEq PRN   Or     potassium chloride 10 mEq PRN   magnesium sulfate 1 g PRN   magnesium hydroxide 30 mL Daily PRN       Diagnostic Labs and Imaging:  CBC:  Recent Labs     05/19/19  1105 05/20/19  0528 05/21/19  0534   WBC 9.6 8.6 9.5   HGB 12.9 12.3 12.5    228 180     BMP:   Recent Labs     05/19/19  1105 05/20/19  0528 05/21/19  0534    141 136   K 3.5* 3.4* 3.6*    101 99   CO2 26 27 24   BUN 17 19 18   CREATININE 0.89 0.92* 0.82   GLUCOSE 98 94 86     Hepatic: No results for input(s): AST, ALT, ALB, BILITOT, ALKPHOS in the last 72 hours. INR: No results for input(s): INR in the last 72 hours. Troponin: No results for input(s): TROPONINI in the last 72 hours. Assessment and Plan:   Principal Problem:    Acute on chronic diastolic CHF (congestive heart failure) (HCC)  Active Problems:    Multiple sclerosis (HCC)    HTN (hypertension)    Dyslipidemia    GERD (gastroesophageal reflux disease)    Fibromyalgia    Chronic diastolic congestive heart failure (HCC)    Acute diastolic HF (heart failure) (HCC)    CAD (coronary artery disease)  Resolved Problems:    * No resolved hospital problems. *    Plan:  Acute on chronic diastolic CHF. Stable   Cont bumex. Cardiology following  S/p heart cath  Cont toprol and losartan. Hold cardizem      CAD s/p stents   Plavix, pravastatin, ranexa, imdur. Aspirin started yesterday. Cardiology following.  Will ask if ok with discharge.     Multiple sclerosis  Azathioprine   zanaflex for spasms     GERD  protonix      Discharge planning. Plan for discharge home possibly today. Needs to be seen by PT/OT today. If ambulating well will likely be going home,       Letty Gutierrez MD  Internal Medicine Resident PGY3  5/21/2019 7:32 AM        Attending Physician Statement  I have discussed the care of Jessie Frederick, including pertinent history and exam findings,  with the resident. I have seen and examined the patient and the key elements of all parts of the encounter have been performed by me. I agree with the assessment, plan and orders as documented by the resident.     Cristela Camarena MD

## 2019-05-21 NOTE — DISCHARGE SUMMARY
request rather than [facility]. She states she was unable to tolerate her shortness of breath and came in per her PCPs advice as there was concern for heart failure. She is on bumex at home which she takes every other day due to it causing her potassium to go down. She took it yesterday last. She states she has orthopnea, PND, and feels better lying more on her side or when using a few pillows to sleep on. She states she also has some pitting edema. In ER CXR was concerning for moderate CHF. Her blood pressure was elevated in the 200s. She was started on nitroglycerin drip and was given bumex IV. Her initial troponin was 18. EKG showed no ischemic changes. BNP 1257. She was initially started on nitro drip and was given bumex IV. Patient was seen by cardiology and planned to perform heart cath sooner. Heart cath done by Dr. Salina Esquivel and she had one JEFFERY stent placed in proximal LAD. Patient's symptoms improved. She was started on aspirin along with her plavix.      Consults:   cardiology    Procedures:  Cardiac cath    Any Hospital Acquired Infections: none     PATIENT'S DISCHARGE CONDITION:  Stable     PATIENT/FAMILY INSTRUCTIONS:   Current Discharge Medication List      START taking these medications    Details   aspirin 81 MG chewable tablet Take 1 tablet by mouth daily  Qty: 30 tablet, Refills: 3         CONTINUE these medications which have NOT CHANGED    Details   acetaminophen (TYLENOL) 500 MG tablet Take 1,000 mg by mouth 2 times daily as needed for Pain      Immune Globulin, Human, (HIZENTRA) 10 GM/50ML SOLN Inject 20 % into the skin      tiZANidine (ZANAFLEX) 2 MG tablet Take 2 mg by mouth every 6 hours as needed       pantoprazole (PROTONIX) 40 MG tablet Take 40 mg by mouth daily      azaTHIOprine (IMURAN) 50 MG tablet Take 50 mg by mouth daily       isosorbide mononitrate (IMDUR) 120 MG CR tablet Take 1 tablet by mouth daily  Qty: 30 tablet, Refills: 1      ranolazine (RANEXA) 500 MG SR tablet Take 500 mg by mouth 2 times daily. nitroGLYCERIN (NITROSTAT) 0.4 MG SL tablet Place 0.4 mg under the tongue every 5 minutes. Indications: Chest Pain      RABEprazole Sodium (ACIPHEX PO) Take 20 mg by mouth daily. PARoxetine (PAXIL) 20 MG tablet Take 20 mg by mouth every morning. metoprolol (TOPROL XL) 50 MG XL tablet Take 50 mg by mouth daily       Calcium Carbonate Antacid (MAALOX) 600 MG CHEW Take  by mouth as needed. bumetanide (BUMEX) 1 MG tablet Take 2 mg by mouth daily       allopurinol (ZYLOPRIM) 100 MG tablet Take 100 mg by mouth daily. clopidogrel (PLAVIX) 75 MG tablet Take 75 mg by mouth daily. losartan (COZAAR) 100 MG tablet Take 100 mg by mouth daily       pravastatin (PRAVACHOL) 40 MG tablet Take 40 mg by mouth daily. BENZOYL PEROXIDE Apply  topically. Used on face for cystic acne       Ciclopirox (LOPROX EX) Apply  topically. 2.5% strength, cream for facial acne          STOP taking these medications       diltiazem (CARDIZEM CD) 240 MG extended release capsule Comments:   Reason for Stopping:         chlorthalidone (HYGROTON) 25 MG tablet Comments:   Reason for Stopping:             Activity: activity as tolerated    Diet: cardiac diet    Disposition: home    Follow-up:  in 1 week with MD Herberth Kaba, [address]  [phone]    In 1 week  f/u [facility], [address] [place] [address] [phone]    In 2 weeks  s/p heart cath at [facility]. Vs.       Follow up labs: none     Follow up imaging: none     Time Spent on discharge is more than 30 minutes in the examination, evaluation, counseling and review of medications and discharge plan.     Letty Medina MD  PGY-3, Internal medicine resident  [facility], [place], [place]

## 2019-05-21 NOTE — PROGRESS NOTES
Okay to discharge follow-up [facility] in 2 weeks

## 2019-09-21 ENCOUNTER — HOSPITAL ENCOUNTER (EMERGENCY)
Age: 74
Discharge: HOME | End: 2019-09-21
Attending: EMERGENCY MEDICINE
Payer: MEDICARE

## 2019-09-21 ENCOUNTER — APPOINTMENT (OUTPATIENT)
Dept: GENERAL RADIOLOGY | Age: 74
End: 2019-09-21
Payer: MEDICARE

## 2019-09-21 ENCOUNTER — APPOINTMENT (OUTPATIENT)
Dept: CT IMAGING | Age: 74
End: 2019-09-21
Payer: MEDICARE

## 2019-09-21 VITALS
DIASTOLIC BLOOD PRESSURE: 45 MMHG | TEMPERATURE: 97.5 F | OXYGEN SATURATION: 96 % | HEART RATE: 49 BPM | RESPIRATION RATE: 16 BRPM | SYSTOLIC BLOOD PRESSURE: 147 MMHG

## 2019-09-21 DIAGNOSIS — T78.40XA ALLERGIC REACTION TO DRUG, INITIAL ENCOUNTER: Primary | ICD-10-CM

## 2019-09-21 LAB
ABSOLUTE EOS #: 0.1 K/UL (ref 0–0.4)
ABSOLUTE IMMATURE GRANULOCYTE: ABNORMAL K/UL (ref 0–0.3)
ABSOLUTE LYMPH #: 0.5 K/UL (ref 1–4.8)
ABSOLUTE MONO #: 0.4 K/UL (ref 0.1–1.2)
ANION GAP SERPL CALCULATED.3IONS-SCNC: 13 MMOL/L (ref 9–17)
BASOPHILS # BLD: 0 % (ref 0–2)
BASOPHILS ABSOLUTE: 0 K/UL (ref 0–0.2)
BNP INTERPRETATION: ABNORMAL
BUN BLDV-MCNC: 19 MG/DL (ref 8–23)
BUN/CREAT BLD: ABNORMAL (ref 9–20)
CALCIUM SERPL-MCNC: 8.8 MG/DL (ref 8.6–10.4)
CHLORIDE BLD-SCNC: 99 MMOL/L (ref 98–107)
CO2: 23 MMOL/L (ref 20–31)
CREAT SERPL-MCNC: 0.81 MG/DL (ref 0.5–0.9)
DIFFERENTIAL TYPE: ABNORMAL
EOSINOPHILS RELATIVE PERCENT: 2 % (ref 1–4)
GFR AFRICAN AMERICAN: >60 ML/MIN
GFR NON-AFRICAN AMERICAN: >60 ML/MIN
GFR SERPL CREATININE-BSD FRML MDRD: ABNORMAL ML/MIN/{1.73_M2}
GFR SERPL CREATININE-BSD FRML MDRD: ABNORMAL ML/MIN/{1.73_M2}
GLUCOSE BLD-MCNC: 105 MG/DL (ref 70–99)
HCT VFR BLD CALC: 35.5 % (ref 36–46)
HEMOGLOBIN: 11.9 G/DL (ref 12–16)
IMMATURE GRANULOCYTES: ABNORMAL %
LYMPHOCYTES # BLD: 7 % (ref 24–44)
MCH RBC QN AUTO: 31.8 PG (ref 26–34)
MCHC RBC AUTO-ENTMCNC: 33.3 G/DL (ref 31–37)
MCV RBC AUTO: 95.3 FL (ref 80–100)
MONOCYTES # BLD: 6 % (ref 2–11)
NRBC AUTOMATED: ABNORMAL PER 100 WBC
PDW BLD-RTO: 15.5 % (ref 12.5–15.4)
PLATELET # BLD: 175 K/UL (ref 140–450)
PLATELET ESTIMATE: ABNORMAL
PMV BLD AUTO: 10.3 FL (ref 6–12)
POTASSIUM SERPL-SCNC: 4 MMOL/L (ref 3.7–5.3)
PRO-BNP: 1039 PG/ML
RBC # BLD: 3.73 M/UL (ref 4–5.2)
RBC # BLD: ABNORMAL 10*6/UL
SEG NEUTROPHILS: 85 % (ref 36–66)
SEGMENTED NEUTROPHILS ABSOLUTE COUNT: 6.4 K/UL (ref 1.8–7.7)
SODIUM BLD-SCNC: 135 MMOL/L (ref 135–144)
TROPONIN INTERP: ABNORMAL
TROPONIN T: ABNORMAL NG/ML
TROPONIN, HIGH SENSITIVITY: 15 NG/L (ref 0–14)
WBC # BLD: 7.5 K/UL (ref 3.5–11)
WBC # BLD: ABNORMAL 10*3/UL

## 2019-09-21 PROCEDURE — 84484 ASSAY OF TROPONIN QUANT: CPT

## 2019-09-21 PROCEDURE — 93005 ELECTROCARDIOGRAM TRACING: CPT | Performed by: EMERGENCY MEDICINE

## 2019-09-21 PROCEDURE — 36415 COLL VENOUS BLD VENIPUNCTURE: CPT

## 2019-09-21 PROCEDURE — 71045 X-RAY EXAM CHEST 1 VIEW: CPT

## 2019-09-21 PROCEDURE — 85025 COMPLETE CBC W/AUTO DIFF WBC: CPT

## 2019-09-21 PROCEDURE — 83880 ASSAY OF NATRIURETIC PEPTIDE: CPT

## 2019-09-21 PROCEDURE — 70450 CT HEAD/BRAIN W/O DYE: CPT

## 2019-09-21 PROCEDURE — 99284 EMERGENCY DEPT VISIT MOD MDM: CPT

## 2019-09-21 PROCEDURE — 80048 BASIC METABOLIC PNL TOTAL CA: CPT

## 2019-09-21 RX ORDER — PREDNISONE 20 MG/1
20 TABLET ORAL DAILY
Qty: 3 TABLET | Refills: 0 | Status: SHIPPED | OUTPATIENT
Start: 2019-09-21 | End: 2019-09-24

## 2019-09-21 ASSESSMENT — PAIN DESCRIPTION - LOCATION: LOCATION: HEAD

## 2019-09-21 ASSESSMENT — ENCOUNTER SYMPTOMS: SHORTNESS OF BREATH: 1

## 2019-09-21 ASSESSMENT — PAIN DESCRIPTION - ONSET: ONSET: PROGRESSIVE

## 2019-09-21 ASSESSMENT — PAIN SCALES - GENERAL: PAINLEVEL_OUTOF10: 8

## 2019-09-21 ASSESSMENT — PAIN DESCRIPTION - FREQUENCY: FREQUENCY: CONTINUOUS

## 2019-09-21 ASSESSMENT — PAIN DESCRIPTION - PROGRESSION: CLINICAL_PROGRESSION: NOT CHANGED

## 2019-09-21 ASSESSMENT — PAIN DESCRIPTION - DESCRIPTORS: DESCRIPTORS: ACHING

## 2019-09-21 NOTE — ED PROVIDER NOTES
Constitutional: She appears well-developed and well-nourished. HENT:   Head: Normocephalic and atraumatic. Right Ear: External ear normal.   Left Ear: External ear normal.   Mouth/Throat: Oropharynx is clear and moist.   Eyes: Pupils are equal, round, and reactive to light. Conjunctivae and EOM are normal.   Neck: Normal range of motion. Neck supple. No JVD present. No tracheal deviation present. No meningeal signs   Cardiovascular: Normal rate, regular rhythm and normal heart sounds. Pulmonary/Chest: Effort normal and breath sounds normal. No stridor. No respiratory distress. She has no wheezes. She has no rales. Abdominal: Soft. Bowel sounds are normal. She exhibits no distension and no mass. There is no tenderness. There is no rebound and no guarding. Musculoskeletal: Normal range of motion. She exhibits no edema or tenderness. Lymphadenopathy:     She has no cervical adenopathy. Neurological: She is alert. GCS of 15 with no focal deficits appreciated   Skin: Skin is warm and dry. No rash noted. Psychiatric: She has a normal mood and affect. Nursing note and vitals reviewed. DIFFERENTIAL DIAGNOSIS/ MDM:     I will get a CT scan of the head a chest x-ray EKG and labs    DIAGNOSTIC RESULTS     EKG: All EKG's are interpreted by the Emergency Department Physician who either signs or Co-signs this chart in the [address] a cardiologist.      Interpreted by Jovita Edwards MD     Rhythm: Sinus bradycardia  Rate: 49  Axis: -35  Ectopy: none  Conduction: normal  ST Segments: no acute change  T Waves: no acute change  Q Waves: none    Clinical Impression: Sinus bradycardia with no acute changes/normal EKG. No acute infarction/ischemia noted.       RADIOLOGY:  Non-plain film images such as CT, Ultrasound and MRI are read by the radiologist. Plain radiographic images are visualized and the radiologist interpretations are reviewed as follows:         Interpretation per the Radiologist below, mg/dL    BUN 19 8 - 23 mg/dL    CREATININE 0.81 0.50 - 0.90 mg/dL    Bun/Cre Ratio NOT REPORTED 9 - 20    Calcium 8.8 8.6 - 10.4 mg/dL    Sodium 135 135 - 144 mmol/L    Potassium 4.0 3.7 - 5.3 mmol/L    Chloride 99 98 - 107 mmol/L    CO2 23 20 - 31 mmol/L    Anion Gap 13 9 - 17 mmol/L    GFR Non-African American >60 >60 mL/min    GFR African American >60 >60 mL/min    GFR Comment          GFR Staging NOT REPORTED    Troponin   Result Value Ref Range    Troponin, High Sensitivity 15 (H) 0 - 14 ng/L    Troponin T NOT REPORTED <0.03 ng/mL    Troponin Interp NOT REPORTED    Brain Natriuretic Peptide   Result Value Ref Range    Pro-BNP 1,039 (H) <300 pg/mL    BNP Interpretation Pro-BNP Reference Range:            EMERGENCY DEPARTMENT COURSE:   Vitals:    Vitals:    09/21/19 0919 09/21/19 0930 09/21/19 0945 09/21/19 1045   BP: (!) 186/62 (!) 169/61 (!) 188/68 (!) 147/45   Pulse: 56 51 53 (!) 49   Resp: 16 19 18 16   Temp: 97.5 °F (36.4 °C)      TempSrc: Oral      SpO2: 96% 97%  96%     -------------------------  BP: (!) 147/45, Temp: 97.5 °F (36.4 °C), Pulse: (!) 49, Resp: 16      RE-EVALUATION:  Ab work is improved from the patient's previous medical encounters the patient has chronic congestive heart failure she has had even greatly elevated BNP is previously she has no respiratory distress speaking in full sentences she is not hypoxic the patient believes this is an allergic reaction to her injection which she had she has had this previously when she is also had that same injection so I will place her on a 20 mg prednisone tablet for the next 3 days recommending that she return to the ER for worsening of symptoms increasing shortness of breath any chest pain abdominal pain fever greater than 101 vomiting or other concerns otherwise she is to follow-up with her family doctor calling on Monday for an appointment  At this time the patient is without objective evidence of an acute process requiring hospitalization or

## 2019-09-23 LAB
EKG ATRIAL RATE: 49 BPM
EKG P AXIS: 74 DEGREES
EKG P-R INTERVAL: 158 MS
EKG Q-T INTERVAL: 524 MS
EKG QRS DURATION: 86 MS
EKG QTC CALCULATION (BAZETT): 473 MS
EKG R AXIS: -35 DEGREES
EKG T AXIS: 58 DEGREES
EKG VENTRICULAR RATE: 49 BPM

## 2020-06-26 ENCOUNTER — HOSPITAL ENCOUNTER (OUTPATIENT)
Dept: NUCLEAR MEDICINE | Age: 75
Discharge: HOME | End: 2020-06-28
Payer: MEDICARE

## 2020-06-26 ENCOUNTER — HOSPITAL ENCOUNTER (OUTPATIENT)
Age: 75
Discharge: HOME | End: 2020-06-26
Payer: MEDICARE

## 2020-06-26 LAB
ALBUMIN SERPL-MCNC: 4.2 G/DL (ref 3.5–5.2)
ALBUMIN/GLOBULIN RATIO: 2 (ref 1–2.5)
ALP BLD-CCNC: 81 U/L (ref 35–104)
ALT SERPL-CCNC: 8 U/L (ref 5–33)
AST SERPL-CCNC: 14 U/L
BILIRUB SERPL-MCNC: 0.61 MG/DL (ref 0.3–1.2)
BILIRUBIN DIRECT: 0.19 MG/DL
BILIRUBIN, INDIRECT: 0.42 MG/DL (ref 0–1)
GLOBULIN: ABNORMAL G/DL (ref 1.5–3.8)
TOTAL PROTEIN: 6.3 G/DL (ref 6.4–8.3)

## 2020-06-26 PROCEDURE — 80076 HEPATIC FUNCTION PANEL: CPT

## 2020-06-26 PROCEDURE — A9541 TC99M SULFUR COLLOID: HCPCS | Performed by: INTERNAL MEDICINE

## 2020-06-26 PROCEDURE — 36415 COLL VENOUS BLD VENIPUNCTURE: CPT

## 2020-06-26 PROCEDURE — 3430000000 HC RX DIAGNOSTIC RADIOPHARMACEUTICAL: Performed by: INTERNAL MEDICINE

## 2020-06-26 PROCEDURE — 78264 GASTRIC EMPTYING IMG STUDY: CPT

## 2020-06-26 RX ADMIN — Medication 0.92 MILLICURIE: at 08:28

## 2020-06-27 ENCOUNTER — HOSPITAL ENCOUNTER (OUTPATIENT)
Dept: PREADMISSION TESTING | Age: 75
Setting detail: SPECIMEN
Discharge: HOME | End: 2020-07-01
Payer: MEDICARE

## 2020-06-27 PROCEDURE — U0003 INFECTIOUS AGENT DETECTION BY NUCLEIC ACID (DNA OR RNA); SEVERE ACUTE RESPIRATORY SYNDROME CORONAVIRUS 2 (SARS-COV-2) (CORONAVIRUS DISEASE [COVID-19]), AMPLIFIED PROBE TECHNIQUE, MAKING USE OF HIGH THROUGHPUT TECHNOLOGIES AS DESCRIBED BY CMS-2020-01-R: HCPCS

## 2020-06-29 ENCOUNTER — HOSPITAL ENCOUNTER (OUTPATIENT)
Dept: MAMMOGRAPHY | Age: 75
Discharge: HOME | End: 2020-07-01
Payer: MEDICARE

## 2020-06-29 PROCEDURE — 77063 BREAST TOMOSYNTHESIS BI: CPT

## 2020-06-30 LAB — SARS-COV-2, NAA: NOT DETECTED

## 2020-07-01 ENCOUNTER — TELEPHONE (OUTPATIENT)
Dept: PRIMARY CARE CLINIC | Age: 75
End: 2020-07-01

## 2020-07-02 ENCOUNTER — HOSPITAL ENCOUNTER (OUTPATIENT)
Dept: GENERAL RADIOLOGY | Age: 75
Discharge: HOME | End: 2020-07-04
Payer: MEDICARE

## 2020-07-02 ENCOUNTER — HOSPITAL ENCOUNTER (OUTPATIENT)
Dept: ULTRASOUND IMAGING | Age: 75
Discharge: HOME | End: 2020-07-04
Payer: MEDICARE

## 2020-07-02 PROCEDURE — 2500000003 HC RX 250 WO HCPCS: Performed by: INTERNAL MEDICINE

## 2020-07-02 PROCEDURE — 76705 ECHO EXAM OF ABDOMEN: CPT

## 2020-07-02 PROCEDURE — 74018 RADEX ABDOMEN 1 VIEW: CPT

## 2020-07-02 PROCEDURE — 74246 X-RAY XM UPR GI TRC 2CNTRST: CPT

## 2020-07-02 RX ADMIN — BARIUM SULFATE 140 ML: 980 POWDER, FOR SUSPENSION ORAL at 09:00

## 2020-07-17 ENCOUNTER — HOSPITAL ENCOUNTER (OUTPATIENT)
Age: 75
Discharge: HOME | End: 2020-07-19
Payer: MEDICARE

## 2020-07-17 ENCOUNTER — HOSPITAL ENCOUNTER (OUTPATIENT)
Dept: GENERAL RADIOLOGY | Age: 75
Discharge: HOME | End: 2020-07-19
Payer: MEDICARE

## 2020-07-17 PROCEDURE — 71046 X-RAY EXAM CHEST 2 VIEWS: CPT

## 2020-07-26 ENCOUNTER — HOSPITAL ENCOUNTER (EMERGENCY)
Age: 75
Discharge: HOME | End: 2020-07-26
Attending: SPECIALIST
Payer: MEDICARE

## 2020-07-26 VITALS
SYSTOLIC BLOOD PRESSURE: 136 MMHG | OXYGEN SATURATION: 95 % | RESPIRATION RATE: 20 BRPM | TEMPERATURE: 97.9 F | HEART RATE: 112 BPM | HEIGHT: 62 IN | DIASTOLIC BLOOD PRESSURE: 95 MMHG | BODY MASS INDEX: 36.44 KG/M2 | WEIGHT: 198 LBS

## 2020-07-26 PROCEDURE — 6370000000 HC RX 637 (ALT 250 FOR IP): Performed by: SPECIALIST

## 2020-07-26 PROCEDURE — 12001 RPR S/N/AX/GEN/TRNK 2.5CM/<: CPT

## 2020-07-26 PROCEDURE — 2500000003 HC RX 250 WO HCPCS: Performed by: SPECIALIST

## 2020-07-26 PROCEDURE — 99282 EMERGENCY DEPT VISIT SF MDM: CPT

## 2020-07-26 RX ORDER — LIDOCAINE HYDROCHLORIDE 10 MG/ML
5 INJECTION, SOLUTION INFILTRATION; PERINEURAL ONCE
Status: COMPLETED | OUTPATIENT
Start: 2020-07-26 | End: 2020-07-26

## 2020-07-26 RX ORDER — GINSENG 100 MG
CAPSULE ORAL ONCE
Status: COMPLETED | OUTPATIENT
Start: 2020-07-26 | End: 2020-07-26

## 2020-07-26 RX ADMIN — LIDOCAINE HYDROCHLORIDE 5 ML: 10 INJECTION, SOLUTION INFILTRATION; PERINEURAL at 21:23

## 2020-07-26 RX ADMIN — BACITRACIN: 500 OINTMENT TOPICAL at 21:19

## 2020-07-26 ASSESSMENT — PAIN DESCRIPTION - LOCATION: LOCATION: FINGER (COMMENT WHICH ONE)

## 2020-07-26 ASSESSMENT — PAIN SCALES - GENERAL
PAINLEVEL_OUTOF10: 5
PAINLEVEL_OUTOF10: 5

## 2020-07-26 ASSESSMENT — PAIN DESCRIPTION - ORIENTATION: ORIENTATION: LEFT

## 2020-07-27 NOTE — ED PROVIDER NOTES
Gracy [facility] 1778 ENCOUNTER      Pt Name: Rios Zavala  MRN: [id number]  Adonisgfmary 1945  Date of evaluation: 7/27/20      CHIEF COMPLAINT       Chief Complaint   Patient presents with    Laceration     cut finger with knife         HISTORY OF PRESENT ILLNESS    Rios Zavala is a 69 y.o. female who presents to the emergency department after patient was using the kitchen knife to cut the ribs into smaller portions when she accidentally sustained laceration on the left index finger at about 6 PM prior to arrival.  Patient is on Plavix and Eliquis and was unable to stop the bleeding and that she comes to the emergency department. She denies any tingling, numbness or weakness distally. She describes pain as sharp in character 5 out of 10 in intensity at the site of laceration. There are no exacerbating or relieving factors and patient has not taken any medication for the pain prior to arrival.  Last tetanus injection was in July 2015. Patient is right-hand dominant. She denies any previous injuries to the left hand. No history of diabetes mellitus. REVIEW OF SYSTEMS       Review of Systems    All systems reviewed and negative unless noted in HPI. The patient denies fever or constitutional symptoms. Denies any sore throat or rhinorrhea. Denies any neck pain or stiffness. Denies chest pain or shortness of breath. No nausea,  vomiting or diarrhea. Denies any dysuria. Denies urinary frequency or hematuria. Denies any weakness, numbness or focal neurologic deficit.         PAST MEDICAL HISTORY    has a past medical history of Abnormal Pap smear, Asthma, Bloody discharge from nipple, Cataracts, bilateral, Colon polyp, CVA (cerebral vascular accident) ([address] Demyelinating disease ([address] Fibromyalgia, High-risk sexual behavior, History of bone density study, History of migraines, HTN (hypertension), IgG deficiency ([address] Multiple sclerosis ([address] Myoclonic seizures ([facility] Optic neuritis, Osteopenia, Pyloric stenosis, Raynaud's disease, and Vasomotor rhinitis. SURGICAL HISTORY      has a past surgical history that includes Dilation & curettage (1982); Breast biopsy (1990); Appendectomy (1955); Septoplasty (1985); Hammer toe surgery (1989); Sinus endoscopy; sinus surgery; Shoulder arthroscopy (1998); Esophagoscopy (1966, 1969); Colonoscopy (2010); Foreign Body Removal (1962); laparoscopy (8939); Coronary angioplasty with stent (5/2011); Coronary angioplasty with stent (6/2011); Upper gastrointestinal endoscopy; eye surgery; pr colonoscopy w/biopsy single/multiple (N/A, 12/19/2017); pr egd transoral biopsy single/multiple (12/19/2017); Upper gastrointestinal endoscopy (12/19/2017); and Upper gastrointestinal endoscopy (5/24/2018). CURRENT MEDICATIONS       Discharge Medication List as of 7/26/2020  9:12 PM      CONTINUE these medications which have NOT CHANGED    Details   apixaban (ELIQUIS) 5 MG TABS tablet Take by mouth 2 times dailyHistorical Med      acetaminophen (TYLENOL) 500 MG tablet Take 1,000 mg by mouth 2 times daily as needed for PainHistorical Med      Immune Globulin, Human, (HIZENTRA) 10 GM/50ML SOLN Inject 20 % into the skinHistorical Med      pantoprazole (PROTONIX) 40 MG tablet Take 40 mg by mouth dailyHistorical Med      azaTHIOprine (IMURAN) 50 MG tablet Take 50 mg by mouth daily Historical Med      isosorbide mononitrate (IMDUR) 120 MG CR tablet Take 1 tablet by mouth daily, Disp-30 tablet, R-1      ranolazine (RANEXA) 500 MG SR tablet Take 500 mg by mouth 2 times daily. nitroGLYCERIN (NITROSTAT) 0.4 MG SL tablet Place 0.4 mg under the tongue every 5 minutes. Indications: Chest Pain      RABEprazole Sodium (ACIPHEX PO) Take 20 mg by mouth daily. PARoxetine (PAXIL) 20 MG tablet Take 20 mg by mouth every morning.         metoprolol (TOPROL XL) 50 MG XL tablet Take 50 mg by mouth daily Historical Med      Calcium Carbonate Antacid (MAALOX) 600 MG 89.8 kg (198 lb). Her oral temperature is 97.9 °F (36.6 °C). Her blood pressure is 136/95 (abnormal) and her pulse is 112. Her respiration is 20 and oxygen saturation is 95%. Physical Exam  Vitals signs and nursing note reviewed. Constitutional:       Appearance: She is well-developed. HENT:      Head: Normocephalic and atraumatic. Nose: Nose normal.   Eyes:      Extraocular Movements: Extraocular movements intact. Pupils: Pupils are equal, round, and reactive to light. Neck:      Musculoskeletal: Normal range of motion and neck supple. Cardiovascular:      Rate and Rhythm: Normal rate and regular rhythm. Heart sounds: Normal heart sounds. No murmur. Pulmonary:      Effort: Pulmonary effort is normal. No respiratory distress. Breath sounds: Normal breath sounds. Abdominal:      General: Bowel sounds are normal. There is no distension. Palpations: Abdomen is soft. Tenderness: There is no abdominal tenderness. Musculoskeletal:      Left hand: She exhibits laceration. Comments: There is approximately 0.75 cm laceration on the radial aspect of terminal phalanx of the left index finger near the nail plate. The nailbed and nail plate are not involved. Neurovascular examination is intact distally. Skin:     General: Skin is warm and dry. Findings: Laceration present. Neurological:      General: No focal deficit present. Mental Status: She is alert and oriented to person, place, and time.            DIFFERENTIAL DIAGNOSIS/ MDM:     Laceration left index finger  Will suture repair under ring block    DIAGNOSTIC RESULTS     EKG: All EKG's are interpreted by the Emergency Department Physician who either signs or Co-signs this chart in the absence of a cardiologist.    None obtained    RADIOLOGY:   Non-plain film images such as CT, Ultrasound and MRI are read by the radiologist. Plain radiographic images are visualized and the radiologist interpretations are reviewed as follows: No results found. LABS:  No results found for this visit on 07/26/20. EMERGENCY DEPARTMENT COURSE:   Vitals:    Vitals:    07/26/20 2022   BP: (!) 136/95   Pulse: 112   Resp: 20   Temp: 97.9 °F (36.6 °C)   TempSrc: Oral   SpO2: 95%   Weight: 89.8 kg (198 lb)   Height: 5' 1.5\" (1.562 m)     -------------------------  BP: (!) 136/95, Temp: 97.9 °F (36.6 °C), Pulse: 112, Resp: 20    Orders Placed This Encounter   Medications    lidocaine 1 % injection 5 mL    bacitracin ointment         During the emergency department course, laceration was suture repaired by myself. Please see procedure note. Patient tolerated procedure well. Bacitracin ointment and dressing was applied and patient was given wound care instructions. She was advised to watch carefully for any signs of infection, take Tylenol and/or ibuprofen as needed, sutures removal in 10 days, follow-up with PCP, return if worse. CONSULTS:  None    PROCEDURES:  Laceration Repair Procedure Note    Indication: Laceration    Procedure: The patient was placed in the appropriate position and anesthesia around the laceration was obtained with a full digital block of the left index finger using 1% Lidocaine without epinephrine. The area was then cleansed with betadine and draped in a sterile fashion. The laceration was closed with 5-0 Prolene using interrupted sutures. There were no additional lacerations requiring repair. The wound area was then dressed with bacitracin and a sterile dressing. Total repaired wound length: 0.75 cm. Other Items: Suture count: 2    The patient tolerated the procedure well. Complications: None        FINAL IMPRESSION      1.  Laceration of left index finger without foreign body without damage to nail, initial encounter          DISPOSITION/PLAN       PATIENT REFERRED TO:  Kimi Welch MD  [address] [place] [address]  [phone]    Call in 2 days  For wound re-check    Brenda Hernandez MD  [address] [facility] [address] [facility]  [phone]    Call in 10 days  For suture removal    [facility] ED  [address] [facility] [address] [phone]    If symptoms worsen      DISCHARGE MEDICATIONS:  Discharge Medication List as of 7/26/2020  9:12 PM          (Please note that portions of this note were completed with a voice recognition program.  Efforts were made to edit the dictations but occasionally words are mis-transcribed.)    Loo MD, F.A.C.E.P.   Attending Emergency Medicine Physician      Gurdeep Rodriguez MD  07/27/20 1872

## 2020-07-28 ENCOUNTER — HOSPITAL ENCOUNTER (OUTPATIENT)
Dept: NUCLEAR MEDICINE | Age: 75
Discharge: HOME | DRG: 251 | End: 2020-07-30
Payer: MEDICARE

## 2020-07-28 ENCOUNTER — HOSPITAL ENCOUNTER (OUTPATIENT)
Dept: NON INVASIVE DIAGNOSTICS | Age: 75
Discharge: HOME | DRG: 251 | End: 2020-07-28
Payer: MEDICARE

## 2020-07-28 LAB
LV EF: 57 %
LVEF MODALITY: NORMAL

## 2020-07-28 PROCEDURE — 78452 HT MUSCLE IMAGE SPECT MULT: CPT

## 2020-07-28 PROCEDURE — 6360000002 HC RX W HCPCS: Performed by: NURSE PRACTITIONER

## 2020-07-28 PROCEDURE — A9500 TC99M SESTAMIBI: HCPCS | Performed by: NURSE PRACTITIONER

## 2020-07-28 PROCEDURE — 3430000000 HC RX DIAGNOSTIC RADIOPHARMACEUTICAL: Performed by: NURSE PRACTITIONER

## 2020-07-28 PROCEDURE — 2580000003 HC RX 258: Performed by: NURSE PRACTITIONER

## 2020-07-28 PROCEDURE — 93017 CV STRESS TEST TRACING ONLY: CPT

## 2020-07-28 RX ORDER — SODIUM CHLORIDE 0.9 % (FLUSH) 0.9 %
10 SYRINGE (ML) INJECTION PRN
Status: DISCONTINUED | OUTPATIENT
Start: 2020-07-28 | End: 2020-07-28 | Stop reason: ALTCHOICE

## 2020-07-28 RX ORDER — SODIUM CHLORIDE 0.9 % (FLUSH) 0.9 %
10 SYRINGE (ML) INJECTION PRN
Status: DISCONTINUED | OUTPATIENT
Start: 2020-07-28 | End: 2020-07-31 | Stop reason: HOSPADM

## 2020-07-28 RX ORDER — NITROGLYCERIN 0.4 MG/1
0.4 TABLET SUBLINGUAL EVERY 5 MIN PRN
Status: DISCONTINUED | OUTPATIENT
Start: 2020-07-28 | End: 2020-07-28 | Stop reason: ALTCHOICE

## 2020-07-28 RX ORDER — ATROPINE SULFATE 0.1 MG/ML
0.5 INJECTION INTRAVENOUS EVERY 5 MIN PRN
Status: DISCONTINUED | OUTPATIENT
Start: 2020-07-28 | End: 2020-07-28 | Stop reason: ALTCHOICE

## 2020-07-28 RX ORDER — SODIUM CHLORIDE 9 MG/ML
500 INJECTION, SOLUTION INTRAVENOUS CONTINUOUS PRN
Status: DISCONTINUED | OUTPATIENT
Start: 2020-07-28 | End: 2020-07-28 | Stop reason: ALTCHOICE

## 2020-07-28 RX ORDER — AMINOPHYLLINE DIHYDRATE 25 MG/ML
50 INJECTION, SOLUTION INTRAVENOUS PRN
Status: DISCONTINUED | OUTPATIENT
Start: 2020-07-28 | End: 2020-07-28 | Stop reason: ALTCHOICE

## 2020-07-28 RX ADMIN — TETRAKIS(2-METHOXYISOBUTYLISOCYANIDE)COPPER(I) TETRAFLUOROBORATE 40 MILLICURIE: 1 INJECTION, POWDER, LYOPHILIZED, FOR SOLUTION INTRAVENOUS at 08:40

## 2020-07-28 RX ADMIN — SODIUM CHLORIDE, PRESERVATIVE FREE 10 ML: 5 INJECTION INTRAVENOUS at 10:20

## 2020-07-28 RX ADMIN — Medication 10 ML: at 10:18

## 2020-07-28 RX ADMIN — TETRAKIS(2-METHOXYISOBUTYLISOCYANIDE)COPPER(I) TETRAFLUOROBORATE 16 MILLICURIE: 1 INJECTION, POWDER, LYOPHILIZED, FOR SOLUTION INTRAVENOUS at 08:40

## 2020-07-28 RX ADMIN — REGADENOSON 0.4 MG: 0.08 INJECTION, SOLUTION INTRAVENOUS at 10:18

## 2020-07-28 RX ADMIN — Medication 10 ML: at 10:07

## 2020-07-28 RX ADMIN — SODIUM CHLORIDE, PRESERVATIVE FREE 10 ML: 5 INJECTION INTRAVENOUS at 08:40

## 2020-07-28 NOTE — PROCEDURES
[address] [facility] [address]                              CARDIAC STRESS TEST    PATIENT NAME: Samra Gold                     :        1945  MED REC NO:   [id number]                             ROOM:  ACCOUNT NO:   [de-identified]                           ADMIT DATE: 2020  PROVIDER:     Tyrone Madden STRESS STUDY    DATE OF STUDY:  2020  ORDERING PROVIDER:  MANUEL Walters  PRIMARY CARE PROVIDER:  Ant Muhammad  INDICATION: CAD  CONSENT:  The test was explained and consent was signed. PROTOCOL: Lexiscan, 0.4 mg infused. PREINFUSION EKG: Abnormal-poor R wave progression in V1-V3  PREINFUSION HR: 106 bpm, infusion HR, 121 bpm.  HR response to Lexiscan  was Normal,. PREINFUSION BP: 137/87 mmHg, infusion BP, 164/96 mmHg. BP response to  Lexiscan was appropriate. CHEST PAIN:  No chest discomfort with Lexiscan nor in recovery. ISCHEMIC EKG CHANGES: None    IMPRESSION:  Electrocardiographically negative Lexiscan stress study.   **Cardiolite report issued from the department of Nuclear Medicine**      Stanford Griffiths    D: 2020 14:09:48       T: 2020 14:11:01     [email or web address]  Job#: [id number]     Doc#: Unknown

## 2020-07-30 ENCOUNTER — APPOINTMENT (OUTPATIENT)
Dept: GENERAL RADIOLOGY | Age: 75
DRG: 251 | End: 2020-07-30
Payer: MEDICARE

## 2020-07-30 ENCOUNTER — HOSPITAL ENCOUNTER (INPATIENT)
Age: 75
LOS: 4 days | Discharge: HOME | DRG: 251 | End: 2020-08-03
Attending: EMERGENCY MEDICINE | Admitting: INTERNAL MEDICINE
Payer: MEDICARE

## 2020-07-30 LAB
ABSOLUTE EOS #: 0.1 K/UL (ref 0–0.4)
ABSOLUTE IMMATURE GRANULOCYTE: ABNORMAL K/UL (ref 0–0.3)
ABSOLUTE LYMPH #: 0.7 K/UL (ref 1–4.8)
ABSOLUTE MONO #: 0.6 K/UL (ref 0.1–1.2)
ANION GAP SERPL CALCULATED.3IONS-SCNC: 10 MMOL/L (ref 9–17)
ANION GAP SERPL CALCULATED.3IONS-SCNC: 17 MMOL/L (ref 9–17)
BASOPHILS # BLD: 1 % (ref 0–2)
BASOPHILS ABSOLUTE: 0.1 K/UL (ref 0–0.2)
BNP INTERPRETATION: ABNORMAL
BUN BLDV-MCNC: 22 MG/DL (ref 8–23)
BUN BLDV-MCNC: 23 MG/DL (ref 8–23)
BUN/CREAT BLD: ABNORMAL (ref 9–20)
BUN/CREAT BLD: ABNORMAL (ref 9–20)
CALCIUM SERPL-MCNC: 9.1 MG/DL (ref 8.6–10.4)
CALCIUM SERPL-MCNC: 9.4 MG/DL (ref 8.6–10.4)
CHLORIDE BLD-SCNC: 105 MMOL/L (ref 98–107)
CHLORIDE BLD-SCNC: 106 MMOL/L (ref 98–107)
CO2: 19 MMOL/L (ref 20–31)
CO2: 23 MMOL/L (ref 20–31)
CREAT SERPL-MCNC: 0.99 MG/DL (ref 0.5–0.9)
CREAT SERPL-MCNC: 1.01 MG/DL (ref 0.5–0.9)
DIFFERENTIAL TYPE: ABNORMAL
EOSINOPHILS RELATIVE PERCENT: 2 % (ref 1–4)
GFR AFRICAN AMERICAN: >60 ML/MIN
GFR AFRICAN AMERICAN: >60 ML/MIN
GFR NON-AFRICAN AMERICAN: 54 ML/MIN
GFR NON-AFRICAN AMERICAN: 55 ML/MIN
GFR SERPL CREATININE-BSD FRML MDRD: ABNORMAL ML/MIN/{1.73_M2}
GLUCOSE BLD-MCNC: 103 MG/DL (ref 70–99)
GLUCOSE BLD-MCNC: 109 MG/DL (ref 70–99)
HCT VFR BLD CALC: 36.8 % (ref 36–46)
HEMOGLOBIN: 12.5 G/DL (ref 12–16)
IMMATURE GRANULOCYTES: ABNORMAL %
LYMPHOCYTES # BLD: 9 % (ref 24–44)
MAGNESIUM: 2.1 MG/DL (ref 1.6–2.6)
MCH RBC QN AUTO: 33.2 PG (ref 26–34)
MCHC RBC AUTO-ENTMCNC: 34 G/DL (ref 31–37)
MCV RBC AUTO: 97.8 FL (ref 80–100)
MONOCYTES # BLD: 8 % (ref 2–11)
MYOGLOBIN: 45 NG/ML (ref 25–58)
NRBC AUTOMATED: ABNORMAL PER 100 WBC
PDW BLD-RTO: 15.3 % (ref 12.5–15.4)
PLATELET # BLD: 206 K/UL (ref 140–450)
PLATELET ESTIMATE: ABNORMAL
PMV BLD AUTO: 9.8 FL (ref 6–12)
POTASSIUM SERPL-SCNC: 4 MMOL/L (ref 3.7–5.3)
POTASSIUM SERPL-SCNC: 4.1 MMOL/L (ref 3.7–5.3)
PRO-BNP: 3057 PG/ML
RBC # BLD: 3.76 M/UL (ref 4–5.2)
RBC # BLD: ABNORMAL 10*6/UL
SARS-COV-2, PCR: NORMAL
SARS-COV-2, RAPID: NOT DETECTED
SARS-COV-2: NORMAL
SEG NEUTROPHILS: 80 % (ref 36–66)
SEGMENTED NEUTROPHILS ABSOLUTE COUNT: 5.9 K/UL (ref 1.8–7.7)
SODIUM BLD-SCNC: 138 MMOL/L (ref 135–144)
SODIUM BLD-SCNC: 142 MMOL/L (ref 135–144)
SOURCE: NORMAL
TROPONIN INTERP: ABNORMAL
TROPONIN T: ABNORMAL NG/ML
TROPONIN, HIGH SENSITIVITY: 18 NG/L (ref 0–14)
TROPONIN, HIGH SENSITIVITY: 19 NG/L (ref 0–14)
TROPONIN, HIGH SENSITIVITY: 22 NG/L (ref 0–14)
TSH SERPL DL<=0.05 MIU/L-ACNC: 1.96 MIU/L (ref 0.3–5)
WBC # BLD: 7.3 K/UL (ref 3.5–11)
WBC # BLD: ABNORMAL 10*3/UL

## 2020-07-30 PROCEDURE — 84443 ASSAY THYROID STIM HORMONE: CPT

## 2020-07-30 PROCEDURE — 80048 BASIC METABOLIC PNL TOTAL CA: CPT

## 2020-07-30 PROCEDURE — U0002 COVID-19 LAB TEST NON-CDC: HCPCS

## 2020-07-30 PROCEDURE — 99285 EMERGENCY DEPT VISIT HI MDM: CPT

## 2020-07-30 PROCEDURE — 6370000000 HC RX 637 (ALT 250 FOR IP): Performed by: PHYSICIAN ASSISTANT

## 2020-07-30 PROCEDURE — 83880 ASSAY OF NATRIURETIC PEPTIDE: CPT

## 2020-07-30 PROCEDURE — 36415 COLL VENOUS BLD VENIPUNCTURE: CPT

## 2020-07-30 PROCEDURE — 84484 ASSAY OF TROPONIN QUANT: CPT

## 2020-07-30 PROCEDURE — 83874 ASSAY OF MYOGLOBIN: CPT

## 2020-07-30 PROCEDURE — 96365 THER/PROPH/DIAG IV INF INIT: CPT

## 2020-07-30 PROCEDURE — 71045 X-RAY EXAM CHEST 1 VIEW: CPT

## 2020-07-30 PROCEDURE — 85025 COMPLETE CBC W/AUTO DIFF WBC: CPT

## 2020-07-30 PROCEDURE — 2580000003 HC RX 258: Performed by: PHYSICIAN ASSISTANT

## 2020-07-30 PROCEDURE — 6370000000 HC RX 637 (ALT 250 FOR IP): Performed by: NURSE PRACTITIONER

## 2020-07-30 PROCEDURE — 99222 1ST HOSP IP/OBS MODERATE 55: CPT | Performed by: NURSE PRACTITIONER

## 2020-07-30 PROCEDURE — 6360000002 HC RX W HCPCS: Performed by: PHYSICIAN ASSISTANT

## 2020-07-30 PROCEDURE — 93005 ELECTROCARDIOGRAM TRACING: CPT | Performed by: PHYSICIAN ASSISTANT

## 2020-07-30 PROCEDURE — 2060000000 HC ICU INTERMEDIATE R&B

## 2020-07-30 PROCEDURE — 6360000002 HC RX W HCPCS: Performed by: NURSE PRACTITIONER

## 2020-07-30 PROCEDURE — 83735 ASSAY OF MAGNESIUM: CPT

## 2020-07-30 RX ORDER — POTASSIUM CHLORIDE 7.45 MG/ML
10 INJECTION INTRAVENOUS PRN
Status: DISCONTINUED | OUTPATIENT
Start: 2020-07-30 | End: 2020-08-03 | Stop reason: HOSPADM

## 2020-07-30 RX ORDER — ASPIRIN 81 MG/1
324 TABLET, CHEWABLE ORAL ONCE
Status: COMPLETED | OUTPATIENT
Start: 2020-07-30 | End: 2020-07-30

## 2020-07-30 RX ORDER — ACETAMINOPHEN 325 MG/1
650 TABLET ORAL EVERY 6 HOURS PRN
Status: DISCONTINUED | OUTPATIENT
Start: 2020-07-30 | End: 2020-08-03 | Stop reason: HOSPADM

## 2020-07-30 RX ORDER — BUMETANIDE 0.25 MG/ML
1 INJECTION, SOLUTION INTRAMUSCULAR; INTRAVENOUS ONCE
Status: DISCONTINUED | OUTPATIENT
Start: 2020-07-30 | End: 2020-07-30

## 2020-07-30 RX ORDER — PANTOPRAZOLE SODIUM 40 MG/1
40 TABLET, DELAYED RELEASE ORAL DAILY
Status: DISCONTINUED | OUTPATIENT
Start: 2020-07-30 | End: 2020-08-03 | Stop reason: HOSPADM

## 2020-07-30 RX ORDER — ISOSORBIDE MONONITRATE 60 MG/1
120 TABLET, EXTENDED RELEASE ORAL DAILY
Status: DISCONTINUED | OUTPATIENT
Start: 2020-07-30 | End: 2020-08-03 | Stop reason: HOSPADM

## 2020-07-30 RX ORDER — METOPROLOL SUCCINATE 50 MG/1
50 TABLET, EXTENDED RELEASE ORAL DAILY
Status: DISCONTINUED | OUTPATIENT
Start: 2020-07-30 | End: 2020-08-02

## 2020-07-30 RX ORDER — ALLOPURINOL 100 MG/1
100 TABLET ORAL DAILY
Status: DISCONTINUED | OUTPATIENT
Start: 2020-07-30 | End: 2020-08-03 | Stop reason: HOSPADM

## 2020-07-30 RX ORDER — MAGNESIUM SULFATE 1 G/100ML
1 INJECTION INTRAVENOUS PRN
Status: DISCONTINUED | OUTPATIENT
Start: 2020-07-30 | End: 2020-08-03 | Stop reason: HOSPADM

## 2020-07-30 RX ORDER — CLOPIDOGREL BISULFATE 75 MG/1
75 TABLET ORAL DAILY
Status: DISCONTINUED | OUTPATIENT
Start: 2020-07-30 | End: 2020-08-03 | Stop reason: HOSPADM

## 2020-07-30 RX ORDER — RABEPRAZOLE SODIUM 20 MG/1
20 TABLET, DELAYED RELEASE ORAL DAILY
Status: DISCONTINUED | OUTPATIENT
Start: 2020-07-30 | End: 2020-07-30

## 2020-07-30 RX ORDER — ACETAMINOPHEN 500 MG
1000 TABLET ORAL EVERY 6 HOURS PRN
Status: DISCONTINUED | OUTPATIENT
Start: 2020-07-30 | End: 2020-08-03 | Stop reason: HOSPADM

## 2020-07-30 RX ORDER — BUMETANIDE 1 MG/1
2 TABLET ORAL DAILY
Status: DISCONTINUED | OUTPATIENT
Start: 2020-07-31 | End: 2020-08-02

## 2020-07-30 RX ORDER — PRAVASTATIN SODIUM 20 MG
40 TABLET ORAL DAILY
Status: DISCONTINUED | OUTPATIENT
Start: 2020-07-30 | End: 2020-08-03 | Stop reason: HOSPADM

## 2020-07-30 RX ORDER — RANOLAZINE 500 MG/1
500 TABLET, EXTENDED RELEASE ORAL 2 TIMES DAILY
Status: DISCONTINUED | OUTPATIENT
Start: 2020-07-30 | End: 2020-08-03 | Stop reason: HOSPADM

## 2020-07-30 RX ORDER — ONDANSETRON 2 MG/ML
4 INJECTION INTRAMUSCULAR; INTRAVENOUS EVERY 6 HOURS PRN
Status: DISCONTINUED | OUTPATIENT
Start: 2020-07-30 | End: 2020-08-03 | Stop reason: HOSPADM

## 2020-07-30 RX ORDER — SODIUM CHLORIDE 0.9 % (FLUSH) 0.9 %
10 SYRINGE (ML) INJECTION EVERY 12 HOURS SCHEDULED
Status: DISCONTINUED | OUTPATIENT
Start: 2020-07-30 | End: 2020-08-03 | Stop reason: HOSPADM

## 2020-07-30 RX ORDER — POTASSIUM CHLORIDE 20 MEQ/1
40 TABLET, EXTENDED RELEASE ORAL PRN
Status: DISCONTINUED | OUTPATIENT
Start: 2020-07-30 | End: 2020-08-03 | Stop reason: HOSPADM

## 2020-07-30 RX ORDER — LOSARTAN POTASSIUM 50 MG/1
100 TABLET ORAL DAILY
Status: DISCONTINUED | OUTPATIENT
Start: 2020-07-30 | End: 2020-08-02

## 2020-07-30 RX ORDER — PROMETHAZINE HYDROCHLORIDE 25 MG/1
12.5 TABLET ORAL EVERY 6 HOURS PRN
Status: DISCONTINUED | OUTPATIENT
Start: 2020-07-30 | End: 2020-08-03 | Stop reason: HOSPADM

## 2020-07-30 RX ORDER — ACETAMINOPHEN 650 MG/1
650 SUPPOSITORY RECTAL EVERY 6 HOURS PRN
Status: DISCONTINUED | OUTPATIENT
Start: 2020-07-30 | End: 2020-08-03 | Stop reason: HOSPADM

## 2020-07-30 RX ORDER — BUMETANIDE 1 MG/1
2 TABLET ORAL DAILY
Status: DISCONTINUED | OUTPATIENT
Start: 2020-07-30 | End: 2020-07-30

## 2020-07-30 RX ORDER — AZATHIOPRINE 50 MG/1
50 TABLET ORAL DAILY
Status: DISCONTINUED | OUTPATIENT
Start: 2020-07-30 | End: 2020-08-03 | Stop reason: HOSPADM

## 2020-07-30 RX ORDER — SODIUM CHLORIDE 0.9 % (FLUSH) 0.9 %
10 SYRINGE (ML) INJECTION PRN
Status: DISCONTINUED | OUTPATIENT
Start: 2020-07-30 | End: 2020-08-03 | Stop reason: HOSPADM

## 2020-07-30 RX ORDER — PAROXETINE HYDROCHLORIDE 20 MG/1
20 TABLET, FILM COATED ORAL EVERY MORNING
Status: DISCONTINUED | OUTPATIENT
Start: 2020-07-31 | End: 2020-08-03 | Stop reason: HOSPADM

## 2020-07-30 RX ORDER — ACETAMINOPHEN 500 MG
1000 TABLET ORAL ONCE
Status: COMPLETED | OUTPATIENT
Start: 2020-07-30 | End: 2020-07-30

## 2020-07-30 RX ADMIN — AZATHIOPRINE 50 MG: 50 TABLET ORAL at 23:16

## 2020-07-30 RX ADMIN — METOPROLOL SUCCINATE 50 MG: 50 TABLET, FILM COATED, EXTENDED RELEASE ORAL at 23:17

## 2020-07-30 RX ADMIN — ACETAMINOPHEN 1000 MG: 500 TABLET ORAL at 19:35

## 2020-07-30 RX ADMIN — ISOSORBIDE MONONITRATE 120 MG: 60 TABLET ORAL at 23:18

## 2020-07-30 RX ADMIN — ASPIRIN 81 MG 324 MG: 81 TABLET ORAL at 12:06

## 2020-07-30 RX ADMIN — PRAVASTATIN SODIUM 40 MG: 20 TABLET ORAL at 23:17

## 2020-07-30 RX ADMIN — ACETAMINOPHEN 1000 MG: 500 TABLET ORAL at 23:18

## 2020-07-30 RX ADMIN — RANOLAZINE 500 MG: 500 TABLET, FILM COATED, EXTENDED RELEASE ORAL at 23:17

## 2020-07-30 RX ADMIN — PANTOPRAZOLE SODIUM 40 MG: 40 TABLET, DELAYED RELEASE ORAL at 23:17

## 2020-07-30 RX ADMIN — AMIODARONE HYDROCHLORIDE 300 MG: 150 INJECTION, SOLUTION INTRAVENOUS at 15:58

## 2020-07-30 RX ADMIN — AMIODARONE HYDROCHLORIDE 1 MG/MIN: 50 INJECTION, SOLUTION INTRAVENOUS at 16:27

## 2020-07-30 RX ADMIN — APIXABAN 5 MG: 5 TABLET, FILM COATED ORAL at 23:18

## 2020-07-30 RX ADMIN — CLOPIDOGREL 75 MG: 75 TABLET, FILM COATED ORAL at 23:18

## 2020-07-30 ASSESSMENT — PAIN DESCRIPTION - FREQUENCY: FREQUENCY: CONTINUOUS

## 2020-07-30 ASSESSMENT — PAIN DESCRIPTION - DESCRIPTORS
DESCRIPTORS: PRESSURE;HEAVINESS
DESCRIPTORS: TIGHTNESS;HEAVINESS

## 2020-07-30 ASSESSMENT — PAIN DESCRIPTION - ONSET: ONSET: ON-GOING

## 2020-07-30 ASSESSMENT — PAIN DESCRIPTION - ORIENTATION
ORIENTATION: LEFT
ORIENTATION: LEFT

## 2020-07-30 ASSESSMENT — PAIN DESCRIPTION - PAIN TYPE
TYPE: ACUTE PAIN
TYPE: ACUTE PAIN

## 2020-07-30 ASSESSMENT — PAIN SCALES - GENERAL
PAINLEVEL_OUTOF10: 6
PAINLEVEL_OUTOF10: 8
PAINLEVEL_OUTOF10: 6
PAINLEVEL_OUTOF10: 8

## 2020-07-30 ASSESSMENT — PAIN DESCRIPTION - LOCATION
LOCATION: CHEST
LOCATION: CHEST

## 2020-07-30 ASSESSMENT — PAIN DESCRIPTION - PROGRESSION: CLINICAL_PROGRESSION: NOT CHANGED

## 2020-07-30 NOTE — ED PROVIDER NOTES
[address] [facility] ED  [address] [address] [facility] [facility] [address]  Phone: [phone]  Fax: [phone]      Attending Physician Attestation    I performed a history and physical examination of the patient and discussed management with the mid level provider. I reviewed the mid level provider's note and agree with the documented findings and plan of care. Any areas of disagreement are noted on the chart. I was personally present for the key portions of any procedures. I have documented in the chart those procedures where I was not present during the key portions. I have reviewed the emergency nurses triage note. I agree with the chief complaint, past medical history, past surgical history, allergies, medications, social and family history as documented unless otherwise noted below. Documentation of the HPI, Physical Exam and Medical Decision Making performed by mid level providers is based on my personal performance of the HPI, PE and MDM. For Physician Assistant/ Nurse Practitioner cases/documentation I have personally evaluated this patient and have completed at least one if not all key elements of the E/M (history, physical exam, and MDM). Additional findings are as noted.       CHIEF COMPLAINT       Chief Complaint   Patient presents with    Chest Pain     radiate into left shoulder       DIAGNOSTIC RESULTS     LABS:  Labs Reviewed   CBC WITH AUTO DIFFERENTIAL - Abnormal; Notable for the following components:       Result Value    RBC 3.76 (*)     Seg Neutrophils 80 (*)     Lymphocytes 9 (*)     Absolute Lymph # 0.70 (*)     All other components within normal limits   BASIC METABOLIC PANEL - Abnormal; Notable for the following components:    Glucose 109 (*)     CREATININE 0.99 (*)     GFR Non- 55 (*)     All other components within normal limits   TROPONIN - Abnormal; Notable for the following components:    Troponin, High Sensitivity 19 (*)     All other components within normal limits   BRAIN NATRIURETIC PEPTIDE - Abnormal; Notable for the following components:    Pro-BNP 3,057 (*)     All other components within normal limits   TROPONIN - Abnormal; Notable for the following components:    Troponin, High Sensitivity 18 (*)     All other components within normal limits   COVID-19       All other labs were within normal range or not returned as of this dictation. RADIOLOGY:  XR CHEST PORTABLE   Final Result   Cardiomegaly and congestion. No evidence of consolidation, pulmonary edema   or pleural disease. Stable chest x-ray. EMERGENCY DEPARTMENT COURSE:   Vitals:    Vitals:    07/30/20 1200 07/30/20 1230 07/30/20 1300 07/30/20 1330   BP: (!) 159/125 121/79 132/87 (!) 145/93   Pulse: 103 98 94    Resp: 21 19 19    Temp:       TempSrc:       SpO2: 96% 97% (!) 89%    Weight:       Height:         -------------------------  BP: (!) 145/93, Temp: 98.6 °F (37 °C), Pulse: 94, Resp: 19             PERTINENT ATTENDING PHYSICIAN COMMENTS:    Patient presents to the emergency department with the complaint of chest pain. She was recently diagnosed with a new onset of atrial fibrillation, she did have a stress test which did show some inferior defect. She was to be following up with her cardiologist but she does not want to see [facility] anymore. Her EKG did show signs of atrial fibrillation and she had some intermittent uncontrolled rates in the emergency department. Her troponin was negative but based on her chest pain and recent stress test abnormality we contacted cardiology and spoke with Dr. Marc Beckwith who is requesting she be admitted where she will likely undergo cardiac cath. He is requesting that we start the patient on amiodarone bolus and infusion and transfer her to [address] [place] [address] Drive is on a case-by-case basis, will also consider [facility] Tank Evans:  The high probability of sudden, clinically significant deterioration in the patient's condition required the highest level of my preparedness to intervene urgently. The services I provided to this patient were to treat and/or prevent clinically significant deterioration. Services included the following: chart data review, reviewing nursing notes and/or old charts, documentation time, consultant collaboration regarding findings and treatment options, medication orders and management, direct patient care, vital sign assessments and ordering, interpreting and reviewing diagnostic studies/lab tests. Aggregate critical care time includes only time during which I was engaged in work directly related to the patient's care, as described above, whether at the bedside or elsewhere in the Emergency Department. It did not include time spent performing other reported procedures or the services of residents, students, nurses or physician assistants.     Critical Care:   30 minutes      (Please note that portions of this note were completed with a voice recognition program.  Efforts were made to edit the dictations but occasionally words are mis-transcribed.)    Jefferson Howe DO  Attending Emergency Medicine Physician       Jefferson Howe DO  07/30/20 1525

## 2020-07-30 NOTE — ED PROVIDER NOTES
[address] [facility]  [address] [facility] [address]  Phone: [phone]  Fax: [address]      Pt Name: Effie Humphries  MRN: [id number]  [place] 1945  Date of evaluation: 7/30/2020  Provider: Cathie Apple PA-C    CHIEF COMPLAINT       Chief Complaint   Patient presents with    Chest Pain     radiate into left shoulder         HISTORY OF PRESENT ILLNESS  (Location/Symptom, Timing/Onset, Context/Setting, Quality, Duration, Modifying Factors, Severity.)   Effie Humphries is a 69 y.o. female who presents to the emergency department complaining of chest symptoms. Context/Setting:    Patient here for evaluation of 2 to 3 weeks of chest pain. Patient reportedly had to take 3 nitros yesterday for the symptoms. Patient has been following closely with her established cardiologist, and even had a stress test earlier this week. That cardiology office contacted her today in regards to her coming in to discuss some significant findings of that stress test.  Patient was unhappy with the fact that she is only seeing a nurse practitioner and not an actual doctor, so she refused to go to that office for this appointment and came here instead. The cardiology office did recommend that she come to emergency department as she was not going to make their appointment because she was having more frequent chest pain symptoms. Patient denies any fever/chills/nausea/vomiting. She reports that she has been having vacillating levels of exertional dyspnea over these last 2 to 3 weeks with this mild chest pain. She is having no diaphoresis or syncope. Patient has a CHF history, as well as previous stent placement and was just diagnosed with A. fib last week. Patient is now taking Eliquis in addition to her Plavix reportedly. She is having no new swelling of the legs. She has no other complaints for us. Location/Symptom:  Pleuritic pain? NO   Trauma?      NO pain/nausea/vomiting/diarrhea. Musculoskeletal: Denies focal weakness. Neurologic: per HPI  Skin:  No rash. Except as noted above the remainder of the review of systems was reviewed and negative. PAST MEDICAL HISTORY   History reviewed. Diagnosis Date    Abnormal Pap smear 10/2010    paucity or absence of TZx 3 years in a row. 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[address]     Multiple sclerosis ([address]     progressive    Myoclonic seizures ([address]     Optic neuritis     [employer] [employer] [facility]    Osteopenia     Pyloric stenosis     Raynaud's disease     Vasomotor rhinitis          SURGICAL HISTORY           Procedure Laterality Date    APPENDECTOMY  1955    sponges left in abdomen, at [address] [place] [address] Way    stereotactic type, right breast, benign    COLONOSCOPY  2010    Polyp    CORONARY ANGIOPLASTY WITH STENT PLACEMENT  5/2011    failed attempt to place stent    CORONARY ANGIOPLASTY WITH STENT PLACEMENT  6/2011    successful placement of 2 stent in same artery   [address]    bleeding ulcers found   [address]    surgical sponges left in during appendectomy    HAMMER TOE SURGERY  1989    head resection of phalanx, left foot   [facility]    with D&C    NC COLONOSCOPY W/BIOPSY SINGLE/MULTIPLE N/A 12/19/2017    COLONOSCOPY WITH BIOPSY performed by Fayetta Spurling, MD at [facility]    NC EGD TRANSORAL BIOPSY SINGLE/MULTIPLE  12/19/2017    EGD BIOPSY performed by Lafrances Olszewski, MD at [address] resection with rt and left ethmoidectomy    SHOULDER ARTHROSCOPY  1998    rotator cuff impingement, right arm    SINUS ENDOSCOPY      SINUS SURGERY      multiple    UPPER GASTROINTESTINAL ENDOSCOPY      2-4 times per year for pyloric stenosis    UPPER GASTROINTESTINAL ENDOSCOPY  12/19/2017    EGD DILATION BALLOON performed by Lafrances Olszewski, MD at [facility] ENDOSCOPY  5/24/2018    EGD DILATION SAVORY performed by Thee Clark MD at [facility]       Previous Medications    ACETAMINOPHEN (TYLENOL) 500 MG TABLET    Take 1,000 mg by mouth 2 times daily as needed for Pain    ALLOPURINOL (ZYLOPRIM) 100 MG TABLET    Take 100 mg by mouth daily. APIXABAN (ELIQUIS) 5 MG TABS TABLET    Take by mouth 2 times daily    AZATHIOPRINE (IMURAN) 50 MG TABLET    Take 50 mg by mouth daily     BENZOYL PEROXIDE    Apply  topically. Used on face for cystic acne     BUMETANIDE (BUMEX) 1 MG TABLET    Take 2 mg by mouth daily     CALCIUM CARBONATE ANTACID (MAALOX) 600 MG CHEW    Take  by mouth as needed. CICLOPIROX (LOPROX EX)    Apply  topically. 2.5% strength, cream for facial acne     CLOPIDOGREL (PLAVIX) 75 MG TABLET    Take 75 mg by mouth daily. IMMUNE GLOBULIN, HUMAN, (HIZENTRA) 10 GM/50ML SOLN    Inject 20 % into the skin    ISOSORBIDE MONONITRATE (IMDUR) 120 MG CR TABLET    Take 1 tablet by mouth daily    LOSARTAN (COZAAR) 100 MG TABLET    Take 100 mg by mouth daily     METOPROLOL (TOPROL XL) 50 MG XL TABLET    Take 50 mg by mouth daily     NITROGLYCERIN (NITROSTAT) 0.4 MG SL TABLET    Place 0.4 mg under the tongue every 5 minutes. Indications: Chest Pain    PANTOPRAZOLE (PROTONIX) 40 MG TABLET    Take 40 mg by mouth daily    PAROXETINE (PAXIL) 20 MG TABLET    Take 20 mg by mouth every morning.       PRAVASTATIN ears normal, nose normal  Respiratory:  Clear to auscultation bilaterally with good air exchange, no W/R/R. No chestwall tenderness  Cardiovascular:  Intermittent tachycardiac, <105 BPM.  irreg rate and rhythm. Normal S1 and S2. Gastrointestinal/Abdomen:  Soft, NT. BS wnl. Musculoskeletal:  No edema, no tenderness, no deformities. Compression stockings BLE, no edema appreciated. Back:  No CVA tenderness. Integument:  No rash. Neurologic:  Alert, appropriate mentation/interaction, no focal deficits noted     DIAGNOSTIC RESULTS     EKG: All EKG's are interpreted by the Emergency Department Physician who either signs or Co-signs this chart in the absence of a cardiologist.    EKG Interpretation    Interpreted by attending    Rhythm: Atrial flutter  Rate: Tachy  Axis: normal  Ectopy: none  Conduction: variable AV block  ST Segments: no acute change  T Waves: no acute change  Q Waves: none    Clinical Impression: no acute changes,  Abnormal  EKG    ___________________________    7/28/20   Pedrito  [facility], [facility]  \"Mild reversible defect is noted in the anteriro apical and lateral apical segments. 10% of the myocardium is involved with perfusion defects at stress, 4% at rest\". RADIOLOGY:   Non-plain film images such as CT, Ultrasound and MRI are read by the radiologist. Plain radiographic images are visualized and preliminarily interpreted by the emergency physician with the below findings:      Interpretation per the Radiologist below, if available at the time of this note:    XR CHEST PORTABLE   Final Result   Cardiomegaly and congestion. No evidence of consolidation, pulmonary edema   or pleural disease. Stable chest x-ray.                LABS:  Labs Reviewed   CBC WITH AUTO DIFFERENTIAL - Abnormal; Notable for the following components:       Result Value    RBC 3.76 (*)     Seg Neutrophils 80 (*)     Lymphocytes 9 (*)     Absolute Lymph # 0.70 (*)     All other components within normal limits   BASIC METABOLIC PANEL - Abnormal; Notable for the following components:    Glucose 109 (*)     CREATININE 0.99 (*)     GFR Non- 55 (*)     All other components within normal limits   TROPONIN - Abnormal; Notable for the following components:    Troponin, High Sensitivity 19 (*)     All other components within normal limits   BRAIN NATRIURETIC PEPTIDE - Abnormal; Notable for the following components:    Pro-BNP 3,057 (*)     All other components within normal limits   TROPONIN - Abnormal; Notable for the following components:    Troponin, High Sensitivity 18 (*)     All other components within normal limits   COVID-19       All other labs were within normal range or not returned as of this dictation. EMERGENCY DEPARTMENT COURSE and DIFFERENTIAL DIAGNOSIS/MDM:   Vitals:    Vitals:    07/30/20 1230 07/30/20 1300 07/30/20 1330 07/30/20 1530   BP: 121/79 132/87 (!) 145/93 (!) 149/89   Pulse: 98 94  95   Resp: 19 19  17   Temp:       TempSrc:       SpO2: 97% (!) 89%  94%   Weight:       Height:         Orders Placed This Encounter   Medications    aspirin chewable tablet 324 mg    amiodarone (CORDARONE) 300 mg in dextrose 5 % 100 mL bolus    amiodarone (CORDARONE) 450 mg in dextrose 5 % 250 mL infusion       1202  Cardiac workup initiated. Pt here with CP complaint but appears very comfortable. HR high 80s to 100s. Extensive CAD history and just had Stress Test earlier this week. Was to have appt with NP this week at [address] [facility] to discussed significant findings on that stress test. Pt unhappy with seeing an NP and no an MD.  She took 3 Nitros last night so with that and intermittent CP they told her to be seen at an ED today. I have touched base with that [facility] office, they are aware she is here. Small \"reversible defect\" of LV on that Stress test and they are recommending a cath. 1254  I am paging out our Cardiologist to discuss treatment plan.   Pt adamant that she does

## 2020-07-30 NOTE — ED NOTES
Pt to exam room 6 via wheelchair with c/o left sided chest pain, with pain radiation to left shoulder. she had quite an extensive cardiac history, she also has A fib and is on plavix and elequist. This Am she began to have chest pain, she took 3 SL Nitro, no relief, she reported here for treatment. She denies nausea, vomiting, or diaphoresis. She had a lexiscan stress test Tuesday at [facility], she also has had several stents placed from a previous cardiac cath. She has a history of CHF.    pt placed on cardiac monitor and continuous pulse ox  Hua Lackey, ANGELIA  07/30/20 [address], RN  07/30/20 [address], RN  07/30/20 1195

## 2020-07-31 ENCOUNTER — APPOINTMENT (OUTPATIENT)
Dept: CARDIAC CATH/INVASIVE PROCEDURES | Age: 75
DRG: 251 | End: 2020-07-31
Payer: MEDICARE

## 2020-07-31 PROBLEM — I50.33 ACUTE ON CHRONIC DIASTOLIC CONGESTIVE HEART FAILURE (HCC): Status: ACTIVE | Noted: 2019-01-13

## 2020-07-31 PROBLEM — I48.92 ATRIAL FLUTTER (HCC): Status: ACTIVE | Noted: 2020-07-31

## 2020-07-31 LAB
ACTIVATED CLOTTING TIME: 293 SEC (ref 79–149)
ANION GAP SERPL CALCULATED.3IONS-SCNC: 15 MMOL/L (ref 9–17)
BUN BLDV-MCNC: 22 MG/DL (ref 8–23)
BUN/CREAT BLD: ABNORMAL (ref 9–20)
CALCIUM SERPL-MCNC: 9 MG/DL (ref 8.6–10.4)
CHLORIDE BLD-SCNC: 106 MMOL/L (ref 98–107)
CO2: 19 MMOL/L (ref 20–31)
CREAT SERPL-MCNC: 0.9 MG/DL (ref 0.5–0.9)
GFR AFRICAN AMERICAN: >60 ML/MIN
GFR NON-AFRICAN AMERICAN: >60 ML/MIN
GFR SERPL CREATININE-BSD FRML MDRD: ABNORMAL ML/MIN/{1.73_M2}
GFR SERPL CREATININE-BSD FRML MDRD: ABNORMAL ML/MIN/{1.73_M2}
GLUCOSE BLD-MCNC: 121 MG/DL (ref 70–99)
HCT VFR BLD CALC: 36.6 % (ref 36.3–47.1)
HEMOGLOBIN: 12 G/DL (ref 11.9–15.1)
INR BLD: 1.1
MCH RBC QN AUTO: 33.2 PG (ref 25.2–33.5)
MCHC RBC AUTO-ENTMCNC: 32.8 G/DL (ref 28.4–34.8)
MCV RBC AUTO: 101.4 FL (ref 82.6–102.9)
MYOGLOBIN: 38 NG/ML (ref 25–58)
MYOGLOBIN: 40 NG/ML (ref 25–58)
NRBC AUTOMATED: 0 PER 100 WBC
PDW BLD-RTO: 14.7 % (ref 11.8–14.4)
PLATELET # BLD: 193 K/UL (ref 138–453)
PMV BLD AUTO: 12 FL (ref 8.1–13.5)
POTASSIUM SERPL-SCNC: 4 MMOL/L (ref 3.7–5.3)
PROTHROMBIN TIME: 11.1 SEC (ref 9–12)
RBC # BLD: 3.61 M/UL (ref 3.95–5.11)
SODIUM BLD-SCNC: 140 MMOL/L (ref 135–144)
TROPONIN INTERP: ABNORMAL
TROPONIN INTERP: ABNORMAL
TROPONIN T: ABNORMAL NG/ML
TROPONIN T: ABNORMAL NG/ML
TROPONIN, HIGH SENSITIVITY: 20 NG/L (ref 0–14)
TROPONIN, HIGH SENSITIVITY: 22 NG/L (ref 0–14)
WBC # BLD: 8.1 K/UL (ref 3.5–11.3)

## 2020-07-31 PROCEDURE — 97166 OT EVAL MOD COMPLEX 45 MIN: CPT

## 2020-07-31 PROCEDURE — 83874 ASSAY OF MYOGLOBIN: CPT

## 2020-07-31 PROCEDURE — 6360000002 HC RX W HCPCS

## 2020-07-31 PROCEDURE — 02703ZZ DILATION OF CORONARY ARTERY, ONE ARTERY, PERCUTANEOUS APPROACH: ICD-10-PCS | Performed by: INTERNAL MEDICINE

## 2020-07-31 PROCEDURE — 93458 L HRT ARTERY/VENTRICLE ANGIO: CPT | Performed by: INTERNAL MEDICINE

## 2020-07-31 PROCEDURE — C1887 CATHETER, GUIDING: HCPCS

## 2020-07-31 PROCEDURE — 6370000000 HC RX 637 (ALT 250 FOR IP)

## 2020-07-31 PROCEDURE — C1894 INTRO/SHEATH, NON-LASER: HCPCS

## 2020-07-31 PROCEDURE — 85610 PROTHROMBIN TIME: CPT

## 2020-07-31 PROCEDURE — 85027 COMPLETE CBC AUTOMATED: CPT

## 2020-07-31 PROCEDURE — B2111ZZ FLUOROSCOPY OF MULTIPLE CORONARY ARTERIES USING LOW OSMOLAR CONTRAST: ICD-10-PCS | Performed by: INTERNAL MEDICINE

## 2020-07-31 PROCEDURE — 2580000003 HC RX 258: Performed by: NURSE PRACTITIONER

## 2020-07-31 PROCEDURE — 4A023N7 MEASUREMENT OF CARDIAC SAMPLING AND PRESSURE, LEFT HEART, PERCUTANEOUS APPROACH: ICD-10-PCS | Performed by: INTERNAL MEDICINE

## 2020-07-31 PROCEDURE — 92960 CARDIOVERSION ELECTRIC EXT: CPT | Performed by: INTERNAL MEDICINE

## 2020-07-31 PROCEDURE — 6370000000 HC RX 637 (ALT 250 FOR IP): Performed by: NURSE PRACTITIONER

## 2020-07-31 PROCEDURE — 99232 SBSQ HOSP IP/OBS MODERATE 35: CPT | Performed by: STUDENT IN AN ORGANIZED HEALTH CARE EDUCATION/TRAINING PROGRAM

## 2020-07-31 PROCEDURE — 6360000002 HC RX W HCPCS: Performed by: NURSE PRACTITIONER

## 2020-07-31 PROCEDURE — 93325 DOPPLER ECHO COLOR FLOW MAPG: CPT

## 2020-07-31 PROCEDURE — 85347 COAGULATION TIME ACTIVATED: CPT

## 2020-07-31 PROCEDURE — 6360000004 HC RX CONTRAST MEDICATION

## 2020-07-31 PROCEDURE — B2151ZZ FLUOROSCOPY OF LEFT HEART USING LOW OSMOLAR CONTRAST: ICD-10-PCS | Performed by: INTERNAL MEDICINE

## 2020-07-31 PROCEDURE — 2060000000 HC ICU INTERMEDIATE R&B

## 2020-07-31 PROCEDURE — 97530 THERAPEUTIC ACTIVITIES: CPT

## 2020-07-31 PROCEDURE — 36415 COLL VENOUS BLD VENIPUNCTURE: CPT

## 2020-07-31 PROCEDURE — 92920 PRQ TRLUML C ANGIOP 1ART&/BR: CPT | Performed by: INTERNAL MEDICINE

## 2020-07-31 PROCEDURE — 80048 BASIC METABOLIC PNL TOTAL CA: CPT

## 2020-07-31 PROCEDURE — 93005 ELECTROCARDIOGRAM TRACING: CPT | Performed by: STUDENT IN AN ORGANIZED HEALTH CARE EDUCATION/TRAINING PROGRAM

## 2020-07-31 PROCEDURE — 76937 US GUIDE VASCULAR ACCESS: CPT

## 2020-07-31 PROCEDURE — 2709999900 HC NON-CHARGEABLE SUPPLY

## 2020-07-31 PROCEDURE — 2580000003 HC RX 258: Performed by: INTERNAL MEDICINE

## 2020-07-31 PROCEDURE — 93320 DOPPLER ECHO COMPLETE: CPT

## 2020-07-31 PROCEDURE — 93312 ECHO TRANSESOPHAGEAL: CPT

## 2020-07-31 PROCEDURE — C1769 GUIDE WIRE: HCPCS

## 2020-07-31 PROCEDURE — 5A2204Z RESTORATION OF CARDIAC RHYTHM, SINGLE: ICD-10-PCS | Performed by: INTERNAL MEDICINE

## 2020-07-31 PROCEDURE — C1725 CATH, TRANSLUMIN NON-LASER: HCPCS

## 2020-07-31 PROCEDURE — B246ZZ4 ULTRASONOGRAPHY OF RIGHT AND LEFT HEART, TRANSESOPHAGEAL: ICD-10-PCS | Performed by: INTERNAL MEDICINE

## 2020-07-31 PROCEDURE — 2500000003 HC RX 250 WO HCPCS

## 2020-07-31 PROCEDURE — 97535 SELF CARE MNGMENT TRAINING: CPT

## 2020-07-31 PROCEDURE — 84484 ASSAY OF TROPONIN QUANT: CPT

## 2020-07-31 PROCEDURE — 97162 PT EVAL MOD COMPLEX 30 MIN: CPT

## 2020-07-31 RX ORDER — SODIUM CHLORIDE 0.9 % (FLUSH) 0.9 %
10 SYRINGE (ML) INJECTION PRN
Status: DISCONTINUED | OUTPATIENT
Start: 2020-07-31 | End: 2020-08-03 | Stop reason: HOSPADM

## 2020-07-31 RX ORDER — SODIUM CHLORIDE 9 MG/ML
INJECTION, SOLUTION INTRAVENOUS CONTINUOUS
Status: DISCONTINUED | OUTPATIENT
Start: 2020-07-31 | End: 2020-08-02

## 2020-07-31 RX ORDER — SODIUM CHLORIDE 0.9 % (FLUSH) 0.9 %
10 SYRINGE (ML) INJECTION EVERY 12 HOURS SCHEDULED
Status: DISCONTINUED | OUTPATIENT
Start: 2020-07-31 | End: 2020-08-03 | Stop reason: HOSPADM

## 2020-07-31 RX ORDER — ACETAMINOPHEN 325 MG/1
650 TABLET ORAL EVERY 4 HOURS PRN
Status: DISCONTINUED | OUTPATIENT
Start: 2020-07-31 | End: 2020-08-03 | Stop reason: HOSPADM

## 2020-07-31 RX ADMIN — BUMETANIDE 2 MG: 1 TABLET ORAL at 08:29

## 2020-07-31 RX ADMIN — PRAVASTATIN SODIUM 40 MG: 20 TABLET ORAL at 19:54

## 2020-07-31 RX ADMIN — AMIODARONE HYDROCHLORIDE 1 MG/MIN: 50 INJECTION, SOLUTION INTRAVENOUS at 08:33

## 2020-07-31 RX ADMIN — ISOSORBIDE MONONITRATE 120 MG: 60 TABLET ORAL at 08:29

## 2020-07-31 RX ADMIN — CLOPIDOGREL 75 MG: 75 TABLET, FILM COATED ORAL at 08:30

## 2020-07-31 RX ADMIN — PANTOPRAZOLE SODIUM 40 MG: 40 TABLET, DELAYED RELEASE ORAL at 08:30

## 2020-07-31 RX ADMIN — RANOLAZINE 500 MG: 500 TABLET, FILM COATED, EXTENDED RELEASE ORAL at 08:30

## 2020-07-31 RX ADMIN — METOPROLOL SUCCINATE 50 MG: 50 TABLET, FILM COATED, EXTENDED RELEASE ORAL at 08:30

## 2020-07-31 RX ADMIN — RANOLAZINE 500 MG: 500 TABLET, FILM COATED, EXTENDED RELEASE ORAL at 19:55

## 2020-07-31 RX ADMIN — PAROXETINE HYDROCHLORIDE HEMIHYDRATE 20 MG: 20 TABLET, FILM COATED ORAL at 08:30

## 2020-07-31 RX ADMIN — AZATHIOPRINE 50 MG: 50 TABLET ORAL at 10:00

## 2020-07-31 RX ADMIN — LOSARTAN POTASSIUM 100 MG: 50 TABLET, FILM COATED ORAL at 08:30

## 2020-07-31 RX ADMIN — SODIUM CHLORIDE: 9 INJECTION, SOLUTION INTRAVENOUS at 17:24

## 2020-07-31 RX ADMIN — APIXABAN 5 MG: 5 TABLET, FILM COATED ORAL at 19:54

## 2020-07-31 RX ADMIN — ACETAMINOPHEN 1000 MG: 500 TABLET ORAL at 19:54

## 2020-07-31 RX ADMIN — ALLOPURINOL 100 MG: 100 TABLET ORAL at 08:29

## 2020-07-31 ASSESSMENT — PAIN SCALES - GENERAL
PAINLEVEL_OUTOF10: 7
PAINLEVEL_OUTOF10: 8
PAINLEVEL_OUTOF10: 6
PAINLEVEL_OUTOF10: 0

## 2020-07-31 ASSESSMENT — ENCOUNTER SYMPTOMS
VOMITING: 0
BLOOD IN STOOL: 0
NAUSEA: 0
SHORTNESS OF BREATH: 1
DIARRHEA: 0
CHEST TIGHTNESS: 1
COUGH: 0
CONSTIPATION: 0
STRIDOR: 0
WHEEZING: 0
ABDOMINAL PAIN: 0

## 2020-07-31 ASSESSMENT — PAIN DESCRIPTION - LOCATION
LOCATION: CHEST

## 2020-07-31 ASSESSMENT — PAIN DESCRIPTION - PAIN TYPE
TYPE: ACUTE PAIN

## 2020-07-31 ASSESSMENT — PAIN DESCRIPTION - DESCRIPTORS: DESCRIPTORS: DULL;ACHING

## 2020-07-31 NOTE — PROGRESS NOTES
[address]     Second Visit Note  For more detailed information please refer to the progress note of the day      7/31/2020    6:16 PM    Name:   Rios Zavala  MRN:     [id number]     [place]:      [de-identified]   Room:   [facility]2852-93   Day:  1  Admit Date:  7/30/2020 11:10 AM    PCP:   Lisa Hamm MD  Code Status:  Full Code      Pt vitals were reviewed   New labs were reviewed   Patient was seen had cardioversion and cardiac cathterization. Feels tired after procedure. Complaining if wrist bleed but appears to have stopped. Patient asymptomatic bradycardia. Cardiology aware. Updated plan :     1. Resume eliquis and plavix  2. Remains on amio per cardiology  3. Follow up PT, OT  4.  Continue other home meds        Ayana Guerra MD  7/31/2020  6:16 PM

## 2020-07-31 NOTE — CARE COORDINATION
Case Management Initial Discharge Plan  Jessie Frederick,             Met with:patient to discuss discharge plans. Information verified: address, contacts, phone number, , insurance Yes    Emergency Contact/Next of Kin name & number: Paxton Sheffield (daughter) [phone]    PCP: Stan Garcia MD  Date of last visit: 20     Insurance Provider: Pauline Villagran    Discharge Planning    Living Arrangements:  Alone   Support Systems:  Children, Family Members    Home has 1 stories  0 stairs to climb to get into front door, n/a stairs to climb to reach second floor  Location of bedroom/bathroom in home main level    Patient able to perform ADL's:Independent    Current Services (outpatient & in home) DME  DME equipment: Rollator  DME provider:      Receiving oral anticoagulation therapy? Yes    If indicated:   Physician managing anticoagulation treatment:    Where does patient obtain lab work for ATC treatment? n/a      Potential Assistance Needed:  [address] [place]    Patient agreeable to home care: No  [place] of choice provided:  n/a    Prior SNF/Rehab Placement and Facility: Yes  Agreeable to SNF/Rehab: No  [place] of choice provided: n/a     Evaluation: yes    Expected Discharge date:  20    Patient expects to be discharged to:  home with assistance  Follow Up Appointment: Best Day/ Time: Wednesday AM    Transportation provider: self  Transportation arrangements needed for discharge: Yes     Readmission Risk              Risk of Unplanned Readmission:        15             Does patient have a readmission risk score greater than 14?: Yes  If yes, follow-up appointment must be made within 7 days of discharge. Goals of Care:       Discharge Plan: Home independently, has Rollator, declined HC (has had before and not helpful)  She has limited support at home, only has 1 daughter who is not around much d/t COVID precautions.  Patient expressed having difficulty with bills due to fixed income and has not had any luck obtaining assistance through [facility] includes the [facility]. Patient states IRS has recently started taking money away from monthly income and plans to obtain an  to fight this. SW consult entered for utility and legal resources.     Home pharmacy: [facility]      Electronically signed by Luciana Ward RN on 7/31/20 at 12:10 PM EDT

## 2020-07-31 NOTE — PROGRESS NOTES
Occupational Therapy   Occupational Therapy Initial Assessment  Date: 2020   Patient Name: Howard Thomas  MRN: [id number]     : 1945    Date of Service: 2020    Discharge Recommendations:    Further therapy recommended at discharge. continue to assess  OT Equipment Recommendations  Equipment Needed: Yes  Mobility Devices: ADL Assistive Devices  ADL Assistive Devices: Shower Chair with back    Assessment   Performance deficits / Impairments: Decreased functional mobility ; Decreased ADL status; Decreased high-level IADLs;Decreased endurance  Assessment: pt would benefit from further assistance provided in home environment, due to pt reported difficulties with ADLS and IADLs. pt reported fatiguing quickly and having falls at home due to [address] Treatment Diagnosis: chest pain  Prognosis: Good  Decision Making: Medium Complexity  Patient Education: pt ed on POC, purpose of eval, importance of balancing rest with movement, modifications to daily tasks. good return  REQUIRES OT FOLLOW UP: Yes  Activity Tolerance  Activity Tolerance: Patient limited by fatigue;Patient Tolerated treatment well  Safety Devices  Safety Devices in place: Yes  Type of devices: Left in bed;Call light within reach  Restraints  Initially in place: No           Patient Diagnosis(es): The encounter diagnosis was Chest pain due to myocardial ischemia, unspecified ischemic chest pain type. has a past medical history of Abnormal Pap smear, Asthma, Bloody discharge from nipple, Cataracts, bilateral, Colon polyp, CVA (cerebral vascular accident) ([address] Demyelinating disease ([address] Fibromyalgia, High-risk sexual behavior, History of bone density study, History of migraines, HTN (hypertension), IgG deficiency ([address] Multiple sclerosis ([address] Myoclonic seizures ([address] Optic neuritis, Osteopenia, Pyloric stenosis, Raynaud's disease, and Vasomotor rhinitis. has a past surgical history that includes Dilation & curettage ();  Breast biopsy (1990); Appendectomy (1955); Septoplasty (1985); Hammer toe surgery (1989); Sinus endoscopy; sinus surgery; Shoulder arthroscopy (1998); Esophagoscopy (1966, 1969); Colonoscopy (2010); Foreign Body Removal (1962); laparoscopy (6847); Coronary angioplasty with stent (5/2011); Coronary angioplasty with stent (6/2011); Upper gastrointestinal endoscopy; eye surgery; pr colonoscopy w/biopsy single/multiple (N/A, 12/19/2017); pr egd transoral biopsy single/multiple (12/19/2017); Upper gastrointestinal endoscopy (12/19/2017); and Upper gastrointestinal endoscopy (5/24/2018). Treatment Diagnosis: chest pain      Restrictions  Restrictions/Precautions  Restrictions/Precautions: Fall Risk  Required Braces or Orthoses?: No  Position Activity Restriction  Other position/activity restrictions: up with assist    Subjective   General  Patient assessed for rehabilitation services?: Yes  Family / Caregiver Present: No  Diagnosis: chest pain  Subjective  Subjective: pt verbalized frustrations with medical issues and problems over the years. writer provided emotional support and active listening throughout  General Comment  Comments: RN ok'd for therapy this morning. Pt agreeable to participate in session and cooperative/pleasant throughout  Patient Currently in Pain: Yes  Pain Assessment  Pain Assessment: 0-10  Pain Level: 7  Pain Type: Acute pain  Pain Location: Chest  Non-Pharmaceutical Pain Intervention(s): Ambulation/Increased Activity; Distraction; Therapeutic presence  Response to Pain Intervention: Patient Satisfied    Social/Functional History  Social/Functional History  Lives With: Alone  Type of Home: Apartment  Home Layout: Multi-level(pt reported living on 1st floor)  Home Access: Level entry  Bathroom Shower/Tub: Tub/Shower unit  Bathroom Toilet: Standard  Bathroom Equipment: Grab bars in [address] [place]: 4 wheeled walker(pt reported inability to use walker due to doesn't fit through apartment doorways)  ADL Assistance: Independent  Homemaking Assistance: Independent  Homemaking Responsibilities: Yes  Meal Prep Responsibility: Primary  Laundry Responsibility: Primary(in apartment)  Cleaning Responsibility: Primary  Ambulation Assistance: Independent(pt reported holds ont furniture)  Transfer Assistance: Independent  Active : Yes  Mode of Transportation: Car  Occupation: Retired  Additional Comments: pt reported using electric cart in grocery store. pt limited support from family/friends       Objective   Vision: Within Functional Limits  Hearing: Within functional limits    Orientation  Overall Orientation Status: Within Functional Limits     Balance  Sitting Balance: Modified independent (~35 minutes on chair in bathroom, on toilet, on commode and on EOB)  Standing Balance: Contact guard assistance  Standing Balance  Time: ~4 minutes total  Activity: pt completed functional mobility bathroom>bed>commode>bed. pt slow to complete functional mobility but no major LOB  Comment: pt limited due to endurance/fatigue  ADL  Feeding: Modified independent   Grooming: Modified independent   UE Bathing: Supervision  LE Bathing: Contact guard assistance  UE Dressing: Supervision  LE Dressing: Contact guard assistance  Toileting: Stand by assistance  Additional Comments: pt washing face and hands sitting at sink in bathroom upon arrival. pt transferred to toilet and used restroom with SBA for casimiro-care. Pt also used commode during session with SBA. pt reported significant fatigue and some SOB/chest pain throughout session.   Tone RUE  RUE Tone: Normotonic  Tone LUE  LUE Tone: Normotonic  Coordination  Movements Are Fluid And Coordinated: Yes     Bed mobility  Supine to Sit: (pt in bathroom upon arrival)  Sit to Supine: Supervision  Scooting: Supervision  Transfers  Sit to stand: Stand by assistance  Stand to sit: Stand by assistance     Cognition  Overall Cognitive Status: WFL        Sensation  Overall Sensation Status: Impaired(pt reported chronic N/T to B UE and  BLE due to progressive MS)        LUE AROM (degrees)  LUE AROM : WFL  Left Hand AROM (degrees)  Left Hand AROM: WFL  RUE AROM (degrees)  RUE AROM : WFL  Right Hand AROM (degrees)  Right Hand AROM: WFL  LUE Strength  Gross LUE Strength: Exceptions to [facility]  L Hand General: 4/5  RUE Strength  Gross RUE Strength: Exceptions to [facility]  R Hand General: 4/5         Plan   Plan  Times per week: 2-4x/wk           AM-PAC Score        AM-PAC Inpatient Daily Activity Raw Score: 20 (07/31/20 1046)  AM-PAC Inpatient ADL T-Scale Score : 42.03 (07/31/20 1046)  ADL Inpatient CMS 0-100% Score: 38.32 (07/31/20 1046)  ADL Inpatient CMS G-Code Modifier : CJ (07/31/20 1046)    Goals  Short term goals  Time Frame for Short term goals: pt will, by discharge  Short term goal 1: complete LB ADLs and toileting tasks with supervision and AE, as needed  Short term goal 2: complete UB ADLs and grooming tasks with mod I  Short term goal 3: dem understanding and ind initiate use of 2-3 energy conservation tech  Short term goal 4: increase activity tolerance to 20+ minutes in order to participate in daily tasks  Short term goal 5: dem supervision during functional transfers/functional mobility with LRD, as needed       Therapy Time   Individual Concurrent Group Co-treatment   Time In 0945         Time Out 1026         Minutes 41      co-eval with PT    Timed Code Treatment Minutes: [address] [facility], OTR/L

## 2020-07-31 NOTE — PROGRESS NOTES
Cardiology notified via Perfect Serve of pt's bradycardia. Pt states asymptomatic. Awaiting further instructions. Will continue to monitor.

## 2020-07-31 NOTE — PLAN OF CARE
Problem: Pain:  Goal: Pain level will decrease  Description: Pain level will decrease  7/31/2020 1758 by Armin Wagner RN  Outcome: Met This Shift     Problem: Falls - Risk of:  Goal: Will remain free from falls  Description: Will remain free from falls  7/31/2020 1758 by Armin Wagner RN  Outcome: Met This Shift

## 2020-07-31 NOTE — PROCEDURES
[facility]   JOVITA Cardioversion Procedure Note         Today's Date:  7/31/2020  Patient name:  Bettye Newton  MRN:   [id number]  YOB: 1945  PCP:    Laz Rivera MD    Indication:  Atrial Flutter  Operators:    Primary: Robert Dela Cruz  Assistant: Amilcar Davidson MD (CV Fellow)    Patient seen and examined. History and Physical reviewed. Labs reviewed. After informed consent was obtained with explanation of the risks and benefits, the patient was brought to Cath lab. All sedation was administered by the cardiologist. The oropharynx was pre-anesthetisized with cetacaine spray. Transesophageal Echocardiogram (JOVITA) :    Structures:  LA: Normal  CARMEN: No thrombus  RA: Normal  RV:  Normal  LV: Normal in size with mildly reduced systolic function   Estimated LVEF: 45%    Aorta: Mild atheromatous disease arch  Percardium: No pericardial effusion  Septum: No intracardiac shunt via color Doppler. Valves:  Mitral Valve: Structurally normal. Mild regurgitation is identified. Aortic Valve: The aortic valve is trileaflet and opens adequately. Mild regurgitiation is identified. Tricuspid valve: Structurally normal. Mild regurgitation is identified. Pulmonary valve: Normal. No significant regurgitation    No valvular vegetations or thrombus identified. Summary:     1. A JOVITA was performed without complications. 2. Normal LV size with mildly reduced systolic function. Estimated LVEF 45%  3. No thrombus or valvular vegetation identified. 4. Mild MR, TR and AI      CARDIOVERSION:  After an adequate level of sedation was achieved, 100J in biphasic synchronized delivery was administered. conversion to normal sinus rhythm with PACs. The patient awoke without complications. A post procedure 12 L ECG was ordered and reviewed.         Amilcar Davidson MD  Fellow, Cardiovascular Diseases    [facility]        Attending Physician Statement  I was present during entire procedure and performed all critical elements.        Zahira Ward MD, [facility]

## 2020-07-31 NOTE — CONSULTS
stress test showed mild reversible defect in the apical and lateral apical region. Patient's last heart cath was in 2019 and at that time she underwent LAD PCI. Patient was also diagnosed with A. fib around a week ago, started on Eliquis outpatient. No cardioversion done. Patient presented to the ED with chest pain 8 on 10 intensity, progressive, radiating to left shoulder and arm and back. Patient took 4 nitros without any relief, was given aspirin loading dose. Patient was in atrial flutter, started on amiodarone drip. Past Medical History:   has a past medical history of Abnormal Pap smear, Asthma, Bloody discharge from nipple, Cataracts, bilateral, Colon polyp, CVA (cerebral vascular accident) ([address] Demyelinating disease ([address] Fibromyalgia, High-risk sexual behavior, History of bone density study, History of migraines, HTN (hypertension), IgG deficiency ([address] Multiple sclerosis ([address] Myoclonic seizures ([address] Optic neuritis, Osteopenia, Pyloric stenosis, Raynaud's disease, and Vasomotor rhinitis. Past Surgical History:   has a past surgical history that includes Dilation & curettage (1982); Breast biopsy (1990); Appendectomy (1955); Septoplasty (1985); Hammer toe surgery (1989); Sinus endoscopy; sinus surgery; Shoulder arthroscopy (1998); Esophagoscopy (1966, 1969); Colonoscopy (2010); Foreign Body Removal (1962); laparoscopy (0016); Coronary angioplasty with stent (5/2011); Coronary angioplasty with stent (6/2011); Upper gastrointestinal endoscopy; eye surgery; pr colonoscopy w/biopsy single/multiple (N/A, 12/19/2017); pr egd transoral biopsy single/multiple (12/19/2017); Upper gastrointestinal endoscopy (12/19/2017); and Upper gastrointestinal endoscopy (5/24/2018). Home Medications:    Prior to Admission medications    Medication Sig Start Date End Date Taking?  Authorizing Provider   apixaban (ELIQUIS) 5 MG TABS tablet Take by mouth 2 times daily   Yes Historical Provider, MD acetaminophen (TYLENOL) 500 MG tablet Take 1,000 mg by mouth 2 times daily as needed for Pain   Yes Historical Provider, MD   pantoprazole (PROTONIX) 40 MG tablet Take 40 mg by mouth daily   Yes Historical Provider, MD   azaTHIOprine (IMURAN) 50 MG tablet Take 50 mg by mouth daily    Yes Historical Provider, MD   ranolazine (RANEXA) 500 MG SR tablet Take 500 mg by mouth 2 times daily. Yes Historical Provider, MD   PARoxetine (PAXIL) 20 MG tablet Take 20 mg by mouth every morning. Yes Historical Provider, MD   metoprolol (TOPROL XL) 50 MG XL tablet Take 50 mg by mouth daily    Yes Historical Provider, MD   bumetanide (BUMEX) 1 MG tablet Take 2 mg by mouth daily    Yes Historical Provider, MD   allopurinol (ZYLOPRIM) 100 MG tablet Take 100 mg by mouth daily. Yes Historical Provider, MD   clopidogrel (PLAVIX) 75 MG tablet Take 75 mg by mouth daily. Yes Historical Provider, MD   losartan (COZAAR) 100 MG tablet Take 100 mg by mouth daily    Yes Historical Provider, MD   pravastatin (PRAVACHOL) 40 MG tablet Take 40 mg by mouth daily. Yes Historical Provider, MD   isosorbide mononitrate (IMDUR) 120 MG CR tablet Take 1 tablet by mouth daily  Patient taking differently: Take 60 mg by mouth daily  6/10/16   Gala Dancer, MD   nitroGLYCERIN (NITROSTAT) 0.4 MG SL tablet Place 0.4 mg under the tongue every 5 minutes. Indications: Chest Pain    Historical Provider, MD   Calcium Carbonate Antacid (MAALOX) 600 MG CHEW Take  by mouth as needed. Historical Provider, MD   BENZOYL PEROXIDE Apply  topically. Used on face for cystic acne     Historical Provider, MD   Ciclopirox (LOPROX EX) Apply  topically.  2.5% strength, cream for facial acne     Historical Provider, MD      Current Facility-Administered Medications: amiodarone (CORDARONE) 450 mg in dextrose 5 % 250 mL infusion, 1 mg/min, Intravenous, Continuous  acetaminophen (TYLENOL) tablet 1,000 mg, 1,000 mg, Oral, Q6H PRN  allopurinol (ZYLOPRIM) tablet 100 Phenobarbital; Robaxin [methocarbamol]; Sinequan [doxepin hcl]; Sudafed [pseudoephedrine hcl]; Sudafed [pseudoephedrine hcl]; Tranquil-cecil; Wellbutrin [bupropion hcl]; Actifed cold-allergy [chlorpheniramine-phenylephrine]; Clindamycin/lincomycin; Codeine; Metoprolol succinate; and Seldane [terfenadine]    Social History:   reports that she has never smoked. She has never used smokeless tobacco. She reports that she does not drink alcohol or use drugs. Family History: family history includes Breast Cancer in her maternal aunt; Heart Disease in her father and mother; Hypertension in her sister; Other in her father, maternal aunt, mother, and sister. No h/o sudden cardiac death. Yes for premature CAD    REVIEW OF SYSTEMS:    · Constitutional: there has been no unanticipated weight loss. There's been No change in energy level, No change in activity level. · Eyes: No visual changes or diplopia. No scleral icterus. · ENT: No Headaches  · Cardiovascular: Positive cardiac history as above   · respiratory: No previous pulmonary problems, No cough  · Gastrointestinal: No abdominal pain. No change in bowel or bladder habits. · Genitourinary: GERD positive, recent diagnosis of gastroparesis  · Musculoskeletal:  No gait disturbance, No weakness or joint complaints. · Integumentary: No rash or pruritis. · Neurological: History of MS  · Psychiatric: No anxiety, or depression. · Endocrine: No temperature intolerance. No excessive thirst, fluid intake, or urination. No tremor. PHYSICAL EXAM:      BP (!) 116/53   Pulse 83   Temp 97.9 °F (36.6 °C) (Oral)   Resp 21   Ht 5' 1.5\" (1.562 m)   Wt 200 lb 12.8 oz (91.1 kg)   SpO2 97%   BMI 37.33 kg/m²    Constitutional and General Appearance: alert, cooperative, no distress and appears stated age  HEENT: PERRL, no cervical lymphadenopathy. No masses palpable.  Normal oral mucosa  Respiratory:  · Normal excursion and expansion without use of accessory muscles  · Resp angina  Multiple sclerosis  Gastroparesis    RECOMMENDATIONS:  Patient is significant history of coronary artery disease and now has reversible apical defect on the stress test, will need heart cath on this admission. Hold Eliquis morning dose will plan heart cath today. Covid negative    Patient has atrial flutter, will plan for JOVITA and possible cardioversion today. No Signs of fluid overload    Discussed with patient and Nurse.     Electronically signed by Delaney Luna MD on 7/31/2020 at 8:38 AM    [facility]      [phone]

## 2020-07-31 NOTE — H&P
[address] [facility]    HISTORY AND PHYSICAL EXAMINATION            Date:   7/30/2020  Patient name:  Bettye Newton  Date of admission:  7/30/2020 11:10 AM  MRN:   [id number]  Account:  [de-identified]  YOB: 1945  PCP:    Laz Rivera MD  Room:   8922/8489-17  Code Status:    Full Code    Chief Complaint:     Chief Complaint   Patient presents with    Chest Pain     radiate into left shoulder       History Obtained From:     patient, electronic medical record    History of Present Illness:     Bettye Newton is a 69 y.o. Non-/non  female who presents with Chest Pain (radiate into left shoulder)   and is admitted to the hospital for the management of Atrial Flutter    Patient presented to [facility] Emergency room for the complain of worsening fatigue and chest pain. Patient states that she has been having ongoing chest pain for about 2-3 weeks and actually just had a stress test that she found out was \"abnormal\". Patient states that developed worsening of the mid chest pain with worsening of the dyspnea and took 3 SL nitro and decided to seek treatment in the emergency room. Her work up in the emergency room showed    Metabolic panel with a sodium of 138, potassium 4.1, chloride 105, CO2 23, creat 0.99. Her ProBNP 3057 and high sensitivity troponin of 18. Hemogram with no leukocytosis, or acute anemias with a wbc of 7.3, hgb 12.5, 36.8,   CXR with mild congestion no consolidations. EKG Atrial flutter with variable A-V block Left axis deviation Septal infarct , age undetermined Possible Lateral infarct , age undetermined Abnormal ECG. Past Medical History:     Past Medical History:   Diagnosis Date    Abnormal Pap smear 10/2010    paucity or absence of TZx 3 years in a row.   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HCC)     Multiple sclerosis ([facility]     progressive    Myoclonic seizures ([facility]     Optic neuritis     Earnest[employer] Dang Pupil    Osteopenia     Pyloric stenosis     Raynaud's disease     Vasomotor rhinitis         Past Surgical History:     Past Surgical History:   Procedure Laterality Date    APPENDECTOMY  1955    sponges left in abdomen, at [address] [place] [address] Way    stereotactic type, right breast, benign    COLONOSCOPY  2010    Polyp    CORONARY ANGIOPLASTY WITH STENT PLACEMENT  5/2011    failed attempt to place stent    CORONARY ANGIOPLASTY WITH STENT PLACEMENT  6/2011    successful placement of 2 stent in same artery   [address]    bleeding ulcers found   [id number]    surgical sponges left in during appendectomy    HAMMER TOE SURGERY  1989    head resection of phalanx, left foot   [address]    with D&C    OR COLONOSCOPY W/BIOPSY SINGLE/MULTIPLE N/A 12/19/2017    COLONOSCOPY WITH BIOPSY performed by Vonn Simmonds, MD at [facility]    OR EGD TRANSORAL BIOPSY SINGLE/MULTIPLE  12/19/2017    EGD BIOPSY performed by Vonn Simmonds, MD at [facility] resection with rt and left ethmoidectomy    SHOULDER ARTHROSCOPY  1998    rotator cuff impingement, right arm    SINUS ENDOSCOPY      SINUS SURGERY      multiple    UPPER GASTROINTESTINAL ENDOSCOPY      2-4 times per year for pyloric stenosis    UPPER GASTROINTESTINAL ENDOSCOPY  12/19/2017    EGD DILATION BALLOON performed by Mracela Muro MD at [facility]    UPPER GASTROINTESTINAL ENDOSCOPY  5/24/2018    EGD DILATION SAVORY performed by Shoshana Brooks MD at [facility]        Medications Prior to Admission:     Prior to Admission medications    Medication Sig Start Date End Date Taking? Authorizing Provider   apixaban (ELIQUIS) 5 MG TABS tablet Take by mouth 2 times daily   Yes Historical Provider, MD   acetaminophen (TYLENOL) 500 MG tablet Take 1,000 mg by mouth 2 times daily as needed for Pain   Yes Historical Provider, MD   pantoprazole (PROTONIX) 40 MG tablet Take 40 mg by mouth daily   Yes Historical Provider, MD   azaTHIOprine (IMURAN) 50 MG tablet Take 50 mg by mouth daily    Yes Historical Provider, MD   ranolazine (RANEXA) 500 MG SR tablet Take 500 mg by mouth 2 times daily. Yes Historical Provider, MD   PARoxetine (PAXIL) 20 MG tablet Take 20 mg by mouth every morning. Yes Historical Provider, MD   metoprolol (TOPROL XL) 50 MG XL tablet Take 50 mg by mouth daily    Yes Historical Provider, MD   bumetanide (BUMEX) 1 MG tablet Take 2 mg by mouth daily    Yes Historical Provider, MD   allopurinol (ZYLOPRIM) 100 MG tablet Take 100 mg by mouth daily. Yes Historical Provider, MD   clopidogrel (PLAVIX) 75 MG tablet Take 75 mg by mouth daily. Yes Historical Provider, MD   losartan (COZAAR) 100 MG tablet Take 100 mg by mouth daily    Yes Historical Provider, MD   pravastatin (PRAVACHOL) 40 MG tablet Take 40 mg by mouth daily. Yes Historical Provider, MD   isosorbide mononitrate (IMDUR) 120 MG CR tablet Take 1 tablet by mouth daily  Patient taking differently: Take 60 mg by mouth daily  6/10/16   Satya Almaraz MD   nitroGLYCERIN (NITROSTAT) 0.4 MG SL tablet Place 0.4 mg under the tongue every 5 minutes. Indications: Chest Pain    Historical Provider, MD   Calcium Carbonate Antacid (MAALOX) 600 MG CHEW Take  by mouth as needed. Historical Provider, MD   BENZOYL PEROXIDE Apply  topically.  Used on face for cystic acne     Historical Provider, MD   Ciclopirox (LOPROX EX) Apply  topically. 2.5% strength, cream for facial acne     Historical Provider, MD        Allergies:     Lisinopril-hydrochlorothiazide; Sulfa antibiotics; Penicillins; Polyethylene glycol; Actifed cold-allergy [chlorpheniramine-phenylephrine]; Altace [ramipril]; Cephalosporins; Coreg [carvedilol]; Dml facial moisturizer; Elavil [amitriptyline hcl]; Entex [ami-margot]; Ethylene glycol; Fentanyl; Hizentra [immune globulin (human)]; Hydralazine; Levofloxacin; Lisinopril-hydrochlorothiazide; Metoprolol; Montelukast sodium; Morphine; Nifedipine; Norvasc [amlodipine besylate]; Other; Phenobarbital; Robaxin [methocarbamol]; Sinequan [doxepin hcl]; Sudafed [pseudoephedrine hcl]; Sudafed [pseudoephedrine hcl]; Tranquil-cecil; Wellbutrin [bupropion hcl]; Actifed cold-allergy [chlorpheniramine-phenylephrine]; Clindamycin/lincomycin; Codeine; Metoprolol succinate; and Seldane [terfenadine]    Social History:     Tobacco:    reports that she has never smoked. She has never used smokeless tobacco.  Alcohol:      reports no history of alcohol use. Drug Use:  reports no history of drug use. Family History:     Family History   Problem Relation Age of Onset    Other Father         Heart problems    Other Mother         heart problems requiring open heart surgery, HTN    Other Sister         HTN    Other Maternal Aunt         breast cancer       Review of Systems:     Positive and Negative as described in HPI. Review of Systems   Constitutional: Positive for fatigue. Negative for chills, diaphoresis and fever. HENT: Negative for congestion and hearing loss. Respiratory: Positive for chest tightness and shortness of breath. Negative for cough, wheezing and stridor. Cardiovascular: Positive for chest pain (\"preassure, not pain\"). Negative for palpitations and leg swelling (stable).    Gastrointestinal: Negative for abdominal pain, blood in stool, constipation, diarrhea, nausea and vomiting. Genitourinary: Negative for dysuria and frequency. Musculoskeletal: Positive for myalgias. Skin: Negative for rash. Neurological: Positive for weakness. Negative for dizziness, seizures and headaches. Psychiatric/Behavioral: Negative for suicidal ideas. The patient is not nervous/anxious. Physical Exam:   /78   Pulse 88   Temp 97.8 °F (36.6 °C) (Oral)   Resp 16   Ht 5' 1.5\" (1.562 m)   Wt 203 lb 6.4 oz (92.3 kg)   SpO2 96%   BMI 37.81 kg/m²   Temp (24hrs), Av.2 °F (36.8 °C), Min:97.8 °F (36.6 °C), Max:98.6 °F (37 °C)    No results for input(s): POCGLU in the last 72 hours. Intake/Output Summary (Last 24 hours) at 2020  Last data filed at 2020 1620  Gross per 24 hour   Intake 100 ml   Output --   Net 100 ml       Physical Exam  Vitals signs and nursing note reviewed. Constitutional:       General: She is not in acute distress. Appearance: She is well-developed. She is not diaphoretic. HENT:      Head: Normocephalic and atraumatic. Right Ear: Hearing normal.      Left Ear: Hearing normal.      Nose: Nose normal. No rhinorrhea. Eyes:      General: Lids are normal.      Extraocular Movements:      Right eye: Normal extraocular motion. Left eye: Normal extraocular motion. Conjunctiva/sclera: Conjunctivae normal.      Right eye: Right conjunctiva is not injected. Left eye: Left conjunctiva is not injected. Pupils: Pupils are equal, round, and reactive to light. Pupils are equal.      Right eye: Pupil is reactive. Left eye: Pupil is reactive. Neck:      Musculoskeletal: Neck supple. Thyroid: No thyromegaly. Vascular: No carotid bruit. Trachea: Trachea and phonation normal. No tracheal deviation. Cardiovascular:      Rate and Rhythm: Normal rate. Rhythm irregularly irregular. Pulses: Normal pulses. Heart sounds: Normal heart sounds. No murmur.       Comments: Compression stockings on ( brown) therefore unable to gauge true edema. Pulmonary:      Effort: Pulmonary effort is normal. No respiratory distress. Breath sounds: Normal breath sounds. No stridor. No decreased breath sounds. Abdominal:      General: Bowel sounds are normal. There is no distension. Palpations: Abdomen is soft. There is no mass. Tenderness: There is no abdominal tenderness. There is no guarding. Musculoskeletal:         General: No tenderness. Skin:     General: Skin is warm and dry. Findings: No erythema, lesion or rash. Neurological:      General: No focal deficit present. Mental Status: She is alert and oriented to person, place, and time. She is not disoriented. GCS: GCS eye subscore is 4. GCS verbal subscore is 5. GCS motor subscore is 6. Cranial Nerves: No cranial nerve deficit or dysarthria. Sensory: No sensory deficit. Motor: Weakness and atrophy (mild) present. Comments: Generalized weakness   Psychiatric:         Speech: Speech normal.         Behavior: Behavior normal. Behavior is cooperative.          Investigations:      Laboratory Testing:  Recent Results (from the past 24 hour(s))   EKG 12 Lead    Collection Time: 07/30/20 11:15 AM   Result Value Ref Range    Ventricular Rate 105 BPM    Atrial Rate 312 BPM    QRS Duration 82 ms    Q-T Interval 382 ms    QTc Calculation (Bazett) 504 ms    R Axis -56 degrees    T Axis 77 degrees   CBC Auto Differential    Collection Time: 07/30/20 11:26 AM   Result Value Ref Range    WBC 7.3 3.5 - 11.0 k/uL    RBC 3.76 (L) 4.0 - 5.2 m/uL    Hemoglobin 12.5 12.0 - 16.0 g/dL    Hematocrit 36.8 36 - 46 %    MCV 97.8 80 - 100 fL    MCH 33.2 26 - 34 pg    MCHC 34.0 31 - 37 g/dL    RDW 15.3 12.5 - 15.4 %    Platelets 023 368 - 430 k/uL    MPV 9.8 6.0 - 12.0 fL    NRBC Automated NOT REPORTED per 100 WBC    Differential Type NOT REPORTED     Seg Neutrophils 80 (H) 36 - 66 %    Lymphocytes 9 (L) 24 - 44 %    Monocytes 8 2 - 11 %    Eosinophils % 2 1 - 4 %    Basophils 1 0 - 2 %    Immature Granulocytes NOT REPORTED 0 %    Segs Absolute 5.90 1.8 - 7.7 k/uL    Absolute Lymph # 0.70 (L) 1.0 - 4.8 k/uL    Absolute Mono # 0.60 0.1 - 1.2 k/uL    Absolute Eos # 0.10 0.0 - 0.4 k/uL    Basophils Absolute 0.10 0.0 - 0.2 k/uL    Absolute Immature Granulocyte NOT REPORTED 0.00 - 0.30 k/uL    WBC Morphology NOT REPORTED     RBC Morphology NOT REPORTED     Platelet Estimate NOT REPORTED    Basic Metabolic Panel    Collection Time: 07/30/20 11:26 AM   Result Value Ref Range    Glucose 109 (H) 70 - 99 mg/dL    BUN 23 8 - 23 mg/dL    CREATININE 0.99 (H) 0.50 - 0.90 mg/dL    Bun/Cre Ratio NOT REPORTED 9 - 20    Calcium 9.4 8.6 - 10.4 mg/dL    Sodium 138 135 - 144 mmol/L    Potassium 4.1 3.7 - 5.3 mmol/L    Chloride 105 98 - 107 mmol/L    CO2 23 20 - 31 mmol/L    Anion Gap 10 9 - 17 mmol/L    GFR Non-African American 55 (L) >60 mL/min    GFR African American >60 >60 mL/min    GFR Comment          GFR Staging NOT REPORTED    Troponin    Collection Time: 07/30/20 11:26 AM   Result Value Ref Range    Troponin, High Sensitivity 19 (H) 0 - 14 ng/L    Troponin T NOT REPORTED <0.03 ng/mL    Troponin Interp NOT REPORTED    Brain Natriuretic Peptide    Collection Time: 07/30/20 11:26 AM   Result Value Ref Range    Pro-BNP 3,057 (H) <300 pg/mL    BNP Interpretation Pro-BNP Reference Range:    Troponin    Collection Time: 07/30/20  2:00 PM   Result Value Ref Range    Troponin, High Sensitivity 18 (H) 0 - 14 ng/L    Troponin T NOT REPORTED <0.03 ng/mL    Troponin Interp NOT REPORTED    COVID-19    Collection Time: 07/30/20  3:55 PM    Specimen: Other   Result Value Ref Range    SARS-CoV-2          SARS-CoV-2, Rapid Not Detected Not Detected    Source COLLECTION TIME CORRECTED PREVIOUSLY 1355     SARS-CoV-2, PCR             Imaging/Diagnostics:  Xr Chest Portable    Result Date: 7/30/2020  Cardiomegaly and congestion.   No evidence of consolidation, pulmonary edema or pleural disease. Stable chest x-ray. Nm Myocardial Spect Rest Exercise Or Rx    Result Date: 7/29/2020  Perfusion:  Normal exam Function:  Normal exam Risk stratification: Intermediate Note on Risk Stratification: The above risk stratification is based on myocardial perfusion findings. Final risk assessment may be adjusted based on other clinical and noninvasive test findings. Risk stratification criteria are adapted from \"Noninvasive Risk Stratification\" criteria from [facility]. al, ACC/AATS/AHA/ASE/ASNC/SCAI/SCCT/STS 2017 Appropriate Use Criteria For Coronary Revascularization in Patients With Stable Ischemic Heart Disease [facility] Volume 69, Issue 17, May 2017 High risk (>3% annual death or MI) 1. Severe resting LV dysfunction (LVEF >35%) not readily explained by non coronary causes 2. Resting perfusion abnormalities greater than 10% of the myocardium in patients without prior history or evidence of MI 3. Stress-induced perfusion abnormalities encumbering greater than or equal to 10% myocardium or stress segmental scores indicating multiple vascular territories with abnormalities 4. Stress-induced LV dilatation (TID ratio greater than 1.19 for exercise and greater than 1.39 for regadenoson) Intermediate risk (1% to 3% annual death or MI) 1. Mild/moderate resting LV dysfunction (LVEF 35% to 49%) not readily explained by non coronary causes. 2. Resting perfusion abnormalities in 5%-9.9% of the myocardium in patients without a history or prior evidence of MI 3. Stress-induced perfusion abnormality encumbering 5%-9.9% of the myocardium or stress segmental scores indicating 1 vascular territory with abnormalities but without LV dilation 4. Small wall motion abnormality involving 1-2 segments and only 1 coronary bed. Low Risk (Less than 1% annual death or MI) 1. Normal or small myocardial perfusion defect at rest or with stress encumbering less than 5% of the myocardium.

## 2020-07-31 NOTE — PROGRESS NOTES
Post cardiac cath - POBA to LAD, no stent  - resume eliquis from tonight  - continue daily plavix    [facility]

## 2020-07-31 NOTE — PROGRESS NOTES
138, potassium 4.1, chloride 105, CO2 23, creat 0.99. Her ProBNP 3057 and high sensitivity troponin of 18. Hemogram with no leukocytosis, or acute anemias with a wbc of 7.3, hgb 12.5, 36.8,   CXR with mild congestion no consolidations. EKG Atrial flutter with variable A-V block Left axis deviation Septal infarct , age undetermined Possible Lateral infarct , age undetermined Abnormal ECG. Review of Systems:     Constitutional:  negative for chills, fevers, sweats  Respiratory:  negative for cough, dyspnea on exertion, shortness of breath, wheezing  Cardiovascular: Positive for some chest pain, negative for chest pressure/discomfort, lower extremity edema, palpitations  Gastrointestinal:  negative for abdominal pain, constipation, diarrhea, nausea, vomiting  Neurological:  negative for dizziness, headache    Medications: Allergies:     Allergies   Allergen Reactions    Lisinopril-Hydrochlorothiazide Anaphylaxis and Hives    Sulfa Antibiotics Anaphylaxis    Penicillins Hives    Polyethylene Glycol Hives    Actifed Cold-Allergy [Chlorpheniramine-Phenylephrine]     Altace [Ramipril]      Chronic cough    Cephalosporins Hives    Coreg [Carvedilol]      bloating    Dml Facial Moisturizer     Elavil [Amitriptyline Hcl]     Entex [Ami-Jose]     Ethylene Glycol     Fentanyl     Hizentra [Immune Globulin (Human)]     Hydralazine     Levofloxacin      ach tendon    Lisinopril-Hydrochlorothiazide     Metoprolol      Stomach pain    Montelukast Sodium      Heart effects    Morphine Itching    Nifedipine     Norvasc [Amlodipine Besylate]      Stomach pain    Other      IV IG     Phenobarbital Hives    Robaxin [Methocarbamol]     Sinequan [Doxepin Hcl]     Sudafed [Pseudoephedrine Hcl]     Sudafed [Pseudoephedrine Hcl]     Tranquil-[place]     Wellbutrin [Bupropion Hcl]      Hypotension    Actifed Cold-Allergy [Chlorpheniramine-Phenylephrine] Palpitations    Clindamycin/Lincomycin Nausea And Vomiting    Codeine Nausea And Vomiting    Metoprolol Succinate Nausea And Vomiting    Seldane [Terfenadine] Palpitations       Current Meds:   Scheduled Meds:    allopurinol  100 mg Oral Daily    apixaban  5 mg Oral BID    azaTHIOprine  50 mg Oral Daily    clopidogrel  75 mg Oral Daily    isosorbide mononitrate  120 mg Oral Daily    losartan  100 mg Oral Daily    metoprolol succinate  50 mg Oral Daily    pantoprazole  40 mg Oral Daily    PARoxetine  20 mg Oral QAM    pravastatin  40 mg Oral Daily    ranolazine  500 mg Oral BID    sodium chloride flush  10 mL Intravenous 2 times per day    bumetanide  2 mg Oral Daily     Continuous Infusions:    sodium chloride      amiodarone 450mg/250ml D5W infusion 1 mg/min (07/31/20 0833)     PRN Meds: acetaminophen, sodium chloride flush, potassium chloride **OR** potassium alternative oral replacement **OR** potassium chloride, magnesium sulfate, acetaminophen **OR** acetaminophen, promethazine **OR** ondansetron    Data:     Past Medical History:   has a past medical history of Abnormal Pap smear, Asthma, Bloody discharge from nipple, Cataracts, bilateral, Colon polyp, CVA (cerebral vascular accident) ([facility] [address] Demyelinating disease ([facility] [address] Fibromyalgia, High-risk sexual behavior, History of bone density study, History of migraines, HTN (hypertension), IgG deficiency ([facility] [address] Multiple sclerosis ([facility] [address] Myoclonic seizures ([facility] [address] Optic neuritis, Osteopenia, Pyloric stenosis, Raynaud's disease, and Vasomotor rhinitis. Social History:   reports that she has never smoked. She has never used smokeless tobacco. She reports that she does not drink alcohol or use drugs.      Family History:   Family History   Problem Relation Age of Onset    Other Father         Heart problems    Heart Disease Father     Other Mother         heart problems requiring open heart surgery, HTN    Heart Disease Mother     Other Sister         HTN    Hypertension Sister    [facility] Other Maternal Aunt         breast cancer    Breast Cancer Maternal Aunt        Vitals:  BP (!) 152/87   Pulse 84   Temp 97.9 °F (36.6 °C) (Temporal)   Resp 17   Ht 5' 1.5\" (1.562 m)   Wt 200 lb 12.8 oz (91.1 kg)   SpO2 99%   BMI 37.33 kg/m²   Temp (24hrs), Av.9 °F (36.6 °C), Min:97.8 °F (36.6 °C), Max:97.9 °F (36.6 °C)    No results for input(s): POCGLU in the last 72 hours. I/O (24Hr): Intake/Output Summary (Last 24 hours) at 2020 1257  Last data filed at 2020 1620  Gross per 24 hour   Intake 100 ml   Output --   Net 100 ml       Labs:  Hematology:  Recent Labs     20  1126 20  0725   WBC 7.3 8.1   RBC 3.76* 3.61*   HGB 12.5 12.0   HCT 36.8 36.6   MCV 97.8 101.4   MCH 33.2 33.2   MCHC 34.0 32.8   RDW 15.3 14.7*    193   MPV 9.8 12.0   INR  --  1.1     Chemistry:  Recent Labs     20  1126  20  2117 20  0236 20  0725     --  142  --  140   K 4.1  --  4.0  --  4.0     --  106  --  106   CO2 23  --  19*  --  19*   GLUCOSE 109*  --  103*  --  121*   BUN 23  --  22  --  22   CREATININE 0.99*  --  1.01*  --  0.90   MG  --   --  2.1  --   --    ANIONGAP 10  --  17  --  15   LABGLOM 55*  --  54*  --  >60   GFRAA >60  --  >60  --  >60   CALCIUM 9.4  --  9.1  --  9.0   PROBNP 3,057*  --   --   --   --    TROPHS 19*   < > 22* 22* 20*   MYOGLOBIN  --   --  45 40 38    < > = values in this interval not displayed. Recent Labs     207   TSH 1.96     ABG:No results found for: POCPH, PHART, PH, POCPCO2, CUT5QYJ, PCO2, POCPO2, PO2ART, PO2, POCHCO3, TCP4TEW, HCO3, NBEA, PBEA, BEART, BE, THGBART, THB, OCE2XKZ, FGOI1FYC, L7AMKCRV, O2SAT, FIO2  Lab Results   Component Value Date/Time    [facility] 03/10/2019 08:42 PM     Lab Results   Component Value Date/Time    CULTURE NO GROWTH 6 DAYS 03/10/2019 08:42 PM       Radiology:  [facility] Chest Portable    Result Date: 2020  Cardiomegaly and congestion.   No evidence of consolidation, pulmonary edema or pleural disease. Stable chest x-ray. Nm Myocardial Spect Rest Exercise Or Rx    Result Date: 7/29/2020  Perfusion:  Normal exam Function:  Normal exam Risk stratification: Intermediate Note on Risk Stratification: The above risk stratification is based on myocardial perfusion findings. Final risk assessment may be adjusted based on other clinical and noninvasive test findings. Risk stratification criteria are adapted from \"Noninvasive Risk Stratification\" criteria from [facility]. al, ACC/AATS/AHA/ASE/ASNC/SCAI/SCCT/STS 2017 Appropriate Use Criteria For Coronary Revascularization in Patients With Stable Ischemic Heart Disease [facility] Volume 69, Issue 17, May 2017 High risk (>3% annual death or MI) 1. Severe resting LV dysfunction (LVEF >35%) not readily explained by non coronary causes 2. Resting perfusion abnormalities greater than 10% of the myocardium in patients without prior history or evidence of MI 3. Stress-induced perfusion abnormalities encumbering greater than or equal to 10% myocardium or stress segmental scores indicating multiple vascular territories with abnormalities 4. Stress-induced LV dilatation (TID ratio greater than 1.19 for exercise and greater than 1.39 for regadenoson) Intermediate risk (1% to 3% annual death or MI) 1. Mild/moderate resting LV dysfunction (LVEF 35% to 49%) not readily explained by non coronary causes. 2. Resting perfusion abnormalities in 5%-9.9% of the myocardium in patients without a history or prior evidence of MI 3. Stress-induced perfusion abnormality encumbering 5%-9.9% of the myocardium or stress segmental scores indicating 1 vascular territory with abnormalities but without LV dilation 4. Small wall motion abnormality involving 1-2 segments and only 1 coronary bed. Low Risk (Less than 1% annual death or MI) 1. Normal or small myocardial perfusion defect at rest or with stress encumbering less than 5% of the myocardium.        Physical Examination: General appearance:  alert, cooperative and no distress  Mental Status:  oriented to person, place and time and normal affect  Lungs:  clear to auscultation bilaterally, normal effort  Heart:  irregularly irregular, no S3, no S4. No murmur  Abdomen:  soft, nontender, nondistended, normal bowel sounds  Extremities:  no edema, redness, tenderness in the calves  Skin:  no gross lesions, rashes, induration    Assessment:        Hospital Problems           Last Modified POA    * (Principal) Chest pain 7/31/2020 Yes    Multiple sclerosis ([facility] [address] 7/31/2020 Yes    Essential hypertension 7/31/2020 Yes    Demyelinating disease ([facility] [address] 7/31/2020 Yes    Overview Signed 5/24/2015 11:03 AM by NENA Cordova CNP     ephaphtic transmissions due to advanced demyelination, NOT SEIZURES         H/O: CVA (cerebrovascular accident) 7/31/2020 Yes    CAD (coronary artery disease) 7/31/2020 Yes    Atrial flutter ([facility] [address] 7/31/2020 Yes          Plan:        1. With history of coronary artery disease and LAD stentsLeft-sided chest pain with history of diastolic heart failure. Was evaluated by cardiology this morning. On amiodarone drip. Plan for possible JOVITA and cardioversion today. Eliquis morning dose was held plan for heart cath today as well Per cardiology. Continue Ranexa, metoprolol, Bumex home dose, Plavix, Cozaar 100 mg, Pravachol 40 mg, Imdur 120 mg  2. History of atrial flutter. Eliquis is held, plan for possible JOVITA cardioversion today  3. History of multiple sclerosis. Continue home medications of Imuran, allopurinol  4. Continue to monitor intake and output.     Ayana Guerra MD  7/31/2020  12:57 PM

## 2020-07-31 NOTE — PROGRESS NOTES
Physical Therapy    Facility/Department: [facility] 4B STEPDOWN  Initial Assessment    NAME: Ok Sandhu  : 1945  MRN: [id number]    Chief Complaint   Patient presents with    Chest Pain     radiate into left shoulder       Date of Service: 2020    Discharge Recommendations:    Further therapy recommended at discharge. PT Equipment Recommendations  Equipment Needed: No    Assessment   Body structures, Functions, Activity limitations: Decreased functional mobility ; Decreased endurance  Assessment: Pt grossly CGA to SBA, pt amb 15' no AD CGA. Pt would benefit from continued acute PT to address deficits. Prognosis: Good  Decision Making: Medium Complexity  PT Education: Plan of Care;General Safety  REQUIRES PT FOLLOW UP: Yes  Activity Tolerance  Activity Tolerance: Patient Tolerated treatment well;Patient limited by endurance       Patient Diagnosis(es): The encounter diagnosis was Chest pain due to myocardial ischemia, unspecified ischemic chest pain type. has a past medical history of Abnormal Pap smear, Asthma, Bloody discharge from nipple, Cataracts, bilateral, Colon polyp, CVA (cerebral vascular accident) ([address] Demyelinating disease ([address] Fibromyalgia, High-risk sexual behavior, History of bone density study, History of migraines, HTN (hypertension), IgG deficiency ([address] Multiple sclerosis ([address] Myoclonic seizures ([address] Optic neuritis, Osteopenia, Pyloric stenosis, Raynaud's disease, and Vasomotor rhinitis. has a past surgical history that includes Dilation & curettage (); Breast biopsy (); Appendectomy (); Septoplasty (); Hammer toe surgery (); Sinus endoscopy; sinus surgery; Shoulder arthroscopy (); Esophagoscopy (, ); Colonoscopy (); Foreign Body Removal (); laparoscopy (); Coronary angioplasty with stent (2011); Coronary angioplasty with stent (2011);  Upper gastrointestinal endoscopy; eye surgery; pr colonoscopy w/biopsy single/multiple (N/A, 12/19/2017); pr egd transoral biopsy single/multiple (12/19/2017); Upper gastrointestinal endoscopy (12/19/2017); and Upper gastrointestinal endoscopy (5/24/2018). Restrictions  Restrictions/Precautions  Restrictions/Precautions: Fall Risk, General Precautions  Required Braces or Orthoses?: No  Position Activity Restriction  Other position/activity restrictions: up with assist  Vision/Hearing  Vision: Within Functional Limits  Hearing: Within functional limits     Subjective  General  Chart Reviewed: Yes  Patient assessed for rehabilitation services?: Yes  Response To Previous Treatment: Not applicable  Family / Caregiver Present: No  Follows Commands: Within Functional Limits  General Comment  Comments: OT co-eval  Subjective  Subjective: RN and pt agreeable to PT. Pt alert in bed upon arrival.  Pain Screening  Patient Currently in Pain: Yes  Pain Assessment  Pain Assessment: 0-10  Pain Level: 7  Pain Type: Acute pain  Pain Location: Chest  Pain Descriptors: Dull;Aching  Non-Pharmaceutical Pain Intervention(s): Ambulation/Increased Activity; Distraction; Emotional support  Response to Pain Intervention: Patient Satisfied  Vital Signs  Patient Currently in Pain: Yes  Pre Treatment Pain Screening  Intervention List: Patient able to continue with treatment    Orientation  Orientation  Overall Orientation Status: Within Functional Limits  Social/Functional History  Social/Functional History  Lives With: Alone  Type of Home: Apartment  Home Layout: Multi-level(pt reported living on 1st floor)  Home Access: Level entry  Bathroom Shower/Tub: Tub/Shower unit  Bathroom Toilet: Standard  Bathroom Equipment: Grab bars in [address] [place]: 4 wheeled walker(pt reported inability to use walker due to doesn't fit through apartment doorways)  ADL Assistance: Independent  Homemaking Assistance: Independent  Homemaking Responsibilities: Yes  Meal Prep Responsibility: Primary  Laundry Responsibility: Primary(in apartment)  Cleaning Responsibility: Primary  Ambulation Assistance: Independent(pt reported holds Vivaty)  Transfer Assistance: Independent  Active : Yes  Mode of Transportation: Car  Occupation: Retired  Additional Comments: pt reported using electric cart in grocery store.  pt limited support from family/friends  Cognition        Objective          AROM RLE (degrees)  RLE AROM: WFL  AROM LLE (degrees)  LLE AROM : WFL  AROM RUE (degrees)  RUE AROM : WFL  AROM LUE (degrees)  LUE AROM : WFL  Strength RLE  Strength RLE: WFL  Strength LLE  Strength LLE: WFL  Strength RUE  Strength RUE: WFL  Strength LUE  Strength LUE: WFL     Sensation  Overall Sensation Status: Impaired(reports numbness tingling all extreminities, chronic d/t MS)  Bed mobility  Supine to Sit: (Pt in bathroom upon arrival)  Sit to Supine: Supervision  Scooting: Supervision  Transfers  Sit to Stand: Contact guard assistance  Stand to sit: Contact guard assistance  Ambulation  Ambulation?: Yes  Ambulation 1  Surface: level tile  Device: No Device  Assistance: Contact guard assistance  Quality of Gait: steady, no LOB  Distance: 15'  Stairs/Curb  Stairs?: No     Balance  Posture: Good  Sitting - Static: Good  Sitting - Dynamic: Good  Standing - Static: Good;-  Standing - Dynamic: Fair;+  Comments: no AD used  Exercises  Comments: toileted, see OT     Plan   Plan  Times per week: 5x/wk  Current Treatment Recommendations: Strengthening, Balance Training, Endurance Training, Functional Mobility Training, Transfer Training, Gait Training, Home Exercise Program, Safety Education & Training, Patient/Caregiver Education & Training, Equipment Evaluation, Education, & procurement  Safety Devices  Type of devices: Call light within reach, Nurse notified, Left in bed, All fall risk precautions in place  Restraints  Initially in place: No    AM-PAC Score  AM-PAC Inpatient Mobility Raw Score : 21 (07/31/20 1511)  AM-PAC Inpatient T-Scale Score : 50.25 (07/31/20 1511)  Mobility Inpatient CMS 0-100% Score: 28.97 (07/31/20 1511)  Mobility Inpatient CMS G-Code Modifier : CJ (07/31/20 1511)          Goals  Short term goals  Time Frame for Short term goals: 14 visits  Short term goal 1: Pt will be [place] bed mobility  Short term goal 2: Pt will be [place] transfers  Short term goal 3: Pt will be [place] amb 200'       Therapy Time   Individual Concurrent Group Co-treatment   Time In 3533         Time Out 1027         Minutes 40         Timed Code Treatment Minutes: 8 Minutes       Tyree Walters, PT

## 2020-07-31 NOTE — OP NOTE
and non-dominant, patent, mild disease      Coronary Tree        Dominance: Left     The LV gram not performed. Estimated Blood Loss: 5 mL     Conclusions        Procedure Summary        1. CAD with patent previous LAD stent    2. High grade stenosis in apical LAD s/p POBA due to abnormal stress test    in apical area and anginal symptoms        Recommendations        1. Routine Post Cath orders.    2. Medical therapy    3. Risk factor modification. ____________________________________________________________________    History and Risk Factors    [x] Hypertension     [] Family history of CAD  [x] Hyperlipidemia     [] Cerebrovascular Disease   [] Prior MI       [] Peripheral Vascular disease   [x] Prior PCI              [] Diabetes Mellitus    [] Left Main PCI. [] Currently on Dialysis. [] Prior CABG. [] Currently smoker. [] Cardiac Arrest outside of healthcare facility. [] Yes    [x] No        Witnessed     [] Yes   [] No     Arrest after arrival of EMS  [] Yes   [] No     [] Cardiac Arrest at other Facility. [] Yes   [x] No    Pre-Procedure Information. Heart Failure       [] Yes    [x] No        Class  [] I      [] II  [] III    [] IV. New Diagnosis    [] Yes  [] No    HF Type      [] Systolic   [] Diastolic          [] Unknown. Diagnostic Test:   EKG       [] Normal   [x] Abnormal    New antiarrhythmia medications:    [] Yes   [x] No   New onset atrial fibrillation / Flutter     [x] Yes   [] No   ECG Abnormalities:      [] V. Fib   [] [place] V. Tach           [] NS V. T   [] New LBBB           [] T.  Inv  []  ST dev > 0.5 mm         [] PVC's freq  [] PVC's infrequent    Stress Test Performed:      [x] Yes    [] No     Type:     [] Stress Echo   [] Exercise Stress Test (no imaging)      [] Stress Nuclear  [x] Stress Imaging     Results   [] Negative   [x] Positive        [] Indeterminate  [] Unavailable     If Positive/ Risk / Extent of Ischemia:       [] Low  [] Intermediate [x] High  [] Unavailable      Cardiac CTA Performed:     [] Yes    [x] No      Results   [] CAD   [] Non obstructive CAD      [] No CAD   [] Uncertain      [] Unknown   [] Structural Disease.      Pre Procedure Medications:   [x] Yes    [] No         [] ASA   [x] Beta Blockers      [] Nitrate   [] Ca Channel Blockers      [] Ranolazine   [x] Statin       [x] Plavix/Others antiplatelets      Attending Physician     Anders Watson MD, [facility] [place]

## 2020-08-01 PROBLEM — J44.89 CHRONIC BRONCHIOLITIS: Status: ACTIVE | Noted: 2020-08-01

## 2020-08-01 PROBLEM — J44.9 CHRONIC BRONCHIOLITIS (HCC): Status: ACTIVE | Noted: 2020-08-01

## 2020-08-01 PROBLEM — I48.91 ATRIAL FIBRILLATION STATUS POST CARDIOVERSION (HCC): Status: ACTIVE | Noted: 2020-08-01

## 2020-08-01 PROBLEM — R00.1 SINUS BRADYCARDIA: Status: ACTIVE | Noted: 2020-08-01

## 2020-08-01 LAB
ANION GAP SERPL CALCULATED.3IONS-SCNC: 12 MMOL/L (ref 9–17)
BUN BLDV-MCNC: 27 MG/DL (ref 8–23)
BUN/CREAT BLD: ABNORMAL (ref 9–20)
CALCIUM SERPL-MCNC: 8.8 MG/DL (ref 8.6–10.4)
CHLORIDE BLD-SCNC: 106 MMOL/L (ref 98–107)
CO2: 22 MMOL/L (ref 20–31)
CREAT SERPL-MCNC: 1.06 MG/DL (ref 0.5–0.9)
EKG ATRIAL RATE: 312 BPM
EKG ATRIAL RATE: 45 BPM
EKG P AXIS: 76 DEGREES
EKG P-R INTERVAL: 154 MS
EKG Q-T INTERVAL: 382 MS
EKG Q-T INTERVAL: 524 MS
EKG QRS DURATION: 78 MS
EKG QRS DURATION: 82 MS
EKG QTC CALCULATION (BAZETT): 453 MS
EKG QTC CALCULATION (BAZETT): 504 MS
EKG R AXIS: -39 DEGREES
EKG R AXIS: -56 DEGREES
EKG T AXIS: 73 DEGREES
EKG T AXIS: 77 DEGREES
EKG VENTRICULAR RATE: 105 BPM
EKG VENTRICULAR RATE: 45 BPM
GFR AFRICAN AMERICAN: >60 ML/MIN
GFR NON-AFRICAN AMERICAN: 51 ML/MIN
GFR SERPL CREATININE-BSD FRML MDRD: ABNORMAL ML/MIN/{1.73_M2}
GFR SERPL CREATININE-BSD FRML MDRD: ABNORMAL ML/MIN/{1.73_M2}
GLUCOSE BLD-MCNC: 100 MG/DL (ref 70–99)
POTASSIUM SERPL-SCNC: 3.7 MMOL/L (ref 3.7–5.3)
SODIUM BLD-SCNC: 140 MMOL/L (ref 135–144)

## 2020-08-01 PROCEDURE — 6360000002 HC RX W HCPCS: Performed by: NURSE PRACTITIONER

## 2020-08-01 PROCEDURE — 1200000000 HC SEMI PRIVATE

## 2020-08-01 PROCEDURE — 36415 COLL VENOUS BLD VENIPUNCTURE: CPT

## 2020-08-01 PROCEDURE — 6370000000 HC RX 637 (ALT 250 FOR IP): Performed by: NURSE PRACTITIONER

## 2020-08-01 PROCEDURE — 2580000003 HC RX 258: Performed by: NURSE PRACTITIONER

## 2020-08-01 PROCEDURE — 93005 ELECTROCARDIOGRAM TRACING: CPT | Performed by: STUDENT IN AN ORGANIZED HEALTH CARE EDUCATION/TRAINING PROGRAM

## 2020-08-01 PROCEDURE — 99232 SBSQ HOSP IP/OBS MODERATE 35: CPT | Performed by: STUDENT IN AN ORGANIZED HEALTH CARE EDUCATION/TRAINING PROGRAM

## 2020-08-01 PROCEDURE — 80048 BASIC METABOLIC PNL TOTAL CA: CPT

## 2020-08-01 PROCEDURE — 2580000003 HC RX 258: Performed by: STUDENT IN AN ORGANIZED HEALTH CARE EDUCATION/TRAINING PROGRAM

## 2020-08-01 RX ADMIN — ACETAMINOPHEN 1000 MG: 500 TABLET ORAL at 10:57

## 2020-08-01 RX ADMIN — RANOLAZINE 500 MG: 500 TABLET, FILM COATED, EXTENDED RELEASE ORAL at 21:28

## 2020-08-01 RX ADMIN — PANTOPRAZOLE SODIUM 40 MG: 40 TABLET, DELAYED RELEASE ORAL at 09:16

## 2020-08-01 RX ADMIN — APIXABAN 5 MG: 5 TABLET, FILM COATED ORAL at 09:18

## 2020-08-01 RX ADMIN — ISOSORBIDE MONONITRATE 120 MG: 60 TABLET ORAL at 09:13

## 2020-08-01 RX ADMIN — AZATHIOPRINE 50 MG: 50 TABLET ORAL at 23:11

## 2020-08-01 RX ADMIN — PRAVASTATIN SODIUM 40 MG: 20 TABLET ORAL at 21:28

## 2020-08-01 RX ADMIN — ALLOPURINOL 100 MG: 100 TABLET ORAL at 09:17

## 2020-08-01 RX ADMIN — Medication 10 ML: at 09:19

## 2020-08-01 RX ADMIN — ACETAMINOPHEN 1000 MG: 500 TABLET ORAL at 21:28

## 2020-08-01 RX ADMIN — BUMETANIDE 2 MG: 1 TABLET ORAL at 09:16

## 2020-08-01 RX ADMIN — LOSARTAN POTASSIUM 100 MG: 50 TABLET, FILM COATED ORAL at 09:15

## 2020-08-01 RX ADMIN — SODIUM CHLORIDE, PRESERVATIVE FREE 10 ML: 5 INJECTION INTRAVENOUS at 22:47

## 2020-08-01 RX ADMIN — APIXABAN 5 MG: 5 TABLET, FILM COATED ORAL at 21:30

## 2020-08-01 RX ADMIN — CLOPIDOGREL 75 MG: 75 TABLET, FILM COATED ORAL at 09:15

## 2020-08-01 RX ADMIN — PAROXETINE HYDROCHLORIDE HEMIHYDRATE 20 MG: 20 TABLET, FILM COATED ORAL at 09:16

## 2020-08-01 RX ADMIN — RANOLAZINE 500 MG: 500 TABLET, FILM COATED, EXTENDED RELEASE ORAL at 09:16

## 2020-08-01 ASSESSMENT — PAIN SCALES - GENERAL
PAINLEVEL_OUTOF10: 6
PAINLEVEL_OUTOF10: 6
PAINLEVEL_OUTOF10: 7

## 2020-08-01 NOTE — PLAN OF CARE
Problem: Falls - Risk of:  Goal: Will remain free from falls  Description: Will remain free from falls  8/1/2020 0235 by Ruiz Hernandez RN  Outcome: Met This Shift  Patient's bed remained locked and in lowest position throughout shift. Patient belonings and call light remain within reach. 2/4 side rails are up, and non-skid footwear is on. Problem: Cardiac:  Goal: Ability to maintain an adequate cardiac output will improve  Description: Ability to maintain an adequate cardiac output will improve  Outcome: Met This Shift     Problem: Respiratory:  Goal: Respiratory status will improve  Description: Respiratory status will improve  Outcome: Met This Shift     Problem: Skin Integrity:  Goal: Will show no infection signs and symptoms  Description: Will show no infection signs and symptoms  Outcome: Met This Shift     Problem: Pain:  Description: Pain management should include both nonpharmacologic and pharmacologic interventions. Goal: Pain level will decrease  Description: Pain level will decrease  8/1/2020 0235 by Ruiz Hernandez RN  Outcome: Ongoing  Patient rated pain a  5 on a scale from 0-10. Writer administered PRN pain medications to patient.     Electronically signed by Ruiz Hernandez RN on 8/1/2020 at 2:37 AM

## 2020-08-01 NOTE — PROGRESS NOTES
Leopoldo Roy 21    Progress Note    8/1/2020    11:29 AM    Name:   Howard Thomas  MRN:     [id number]     Lalyside:      [de-identified]   Room:   [address] [place] [address] Day:  2  Admit Date:  7/30/2020 11:10 AM    PCP:   Tawanda Rebolledo MD  Code Status:  Full Code    Subjective:     C/C:   Chief Complaint   Patient presents with    Chest Pain     radiate into left shoulder     Interval History Status: improved. Chest pain shortness of breath have improved. Patient still feels fatigued. States that she feels lethargic unable to move around. Yesterday underwent JOVITA cardioversion as well as cardiac catheterization. No stent was placed yesterday, bradycardia improved when amiodarone was stopped. Has been eating okay, has been having difficulty with eating larger meals. Brief History:     Briefly this is a 53-year-old female past medical history of MS that presented to [facility] emergency room for worsening fatigue and chest pain. Has been having on and off for about 2 to 3 weeks underwent abnormal stress test at the end of July 2020. Review of Systems:     Constitutional:  negative for chills, fevers, sweats  Respiratory:  negative for cough, dyspnea on exertion, shortness of breath, wheezing  Cardiovascular:  negative for chest pain, chest pressure/discomfort, lower extremity edema, palpitations  Gastrointestinal:  negative for abdominal pain, constipation, diarrhea, nausea, vomiting  Neurological:  negative for dizziness, headache    Medications: Allergies:     Allergies   Allergen Reactions    Lisinopril-Hydrochlorothiazide Anaphylaxis and Hives    Sulfa Antibiotics Anaphylaxis    Penicillins Hives    Polyethylene Glycol Hives    Actifed Cold-Allergy [Chlorpheniramine-Phenylephrine]     Altace [Ramipril]      Chronic cough    Cephalosporins Hives    Coreg [Carvedilol]      bloating    Dml Facial Moisturizer     Elavil [Amitriptyline Hcl]     Entex [Ami-Jose]     Ethylene Glycol     Fentanyl     Hizentra [Immune Globulin (Human)]     Hydralazine     Levofloxacin      ach tendon    Lisinopril-Hydrochlorothiazide     Metoprolol      Stomach pain    Montelukast Sodium      Heart effects    Morphine Itching    Nifedipine     Norvasc [Amlodipine Besylate]      Stomach pain    Other      IV IG     Phenobarbital Hives    Robaxin [Methocarbamol]     Sinequan [Doxepin Hcl]     Sudafed [Pseudoephedrine Hcl]     Sudafed [Pseudoephedrine Hcl]     Tranquil-London     Wellbutrin [Bupropion Hcl]      Hypotension    Actifed Cold-Allergy [Chlorpheniramine-Phenylephrine] Palpitations    Clindamycin/Lincomycin Nausea And Vomiting    Codeine Nausea And Vomiting    Metoprolol Succinate Nausea And Vomiting    Seldane [Terfenadine] Palpitations       Current Meds:   Scheduled Meds:    sodium chloride flush  10 mL Intravenous 2 times per day    allopurinol  100 mg Oral Daily    apixaban  5 mg Oral BID    azaTHIOprine  50 mg Oral Daily    clopidogrel  75 mg Oral Daily    isosorbide mononitrate  120 mg Oral Daily    losartan  100 mg Oral Daily    metoprolol succinate  50 mg Oral Daily    pantoprazole  40 mg Oral Daily    PARoxetine  20 mg Oral QAM    pravastatin  40 mg Oral Daily    ranolazine  500 mg Oral BID    sodium chloride flush  10 mL Intravenous 2 times per day    bumetanide  2 mg Oral Daily     Continuous Infusions:    sodium chloride 75 mL/hr at 07/31/20 1724     PRN Meds: sodium chloride flush, acetaminophen, acetaminophen, sodium chloride flush, potassium chloride **OR** potassium alternative oral replacement **OR** potassium chloride, magnesium sulfate, acetaminophen **OR** acetaminophen, promethazine **OR** ondansetron    Data:     Past Medical History:   has a past medical history of Abnormal Pap smear, Asthma, Bloody discharge from nipple, Cataracts, bilateral, Colon polyp, CVA (cerebral vascular accident) [facility]), Demyelinating disease ([facility] [address] Fibromyalgia, High-risk sexual behavior, History of bone density study, History of migraines, HTN (hypertension), IgG deficiency ([facility] [address] Multiple sclerosis ([facility] [address] Myoclonic seizures ([facility] [address] Optic neuritis, Osteopenia, Pyloric stenosis, Raynaud's disease, and Vasomotor rhinitis. Social History:   reports that she has never smoked. She has never used smokeless tobacco. She reports that she does not drink alcohol or use drugs. Family History:   Family History   Problem Relation Age of Onset    Other Father         Heart problems    Heart Disease Father     Other Mother         heart problems requiring open heart surgery, HTN    Heart Disease Mother     Other Sister         HTN    Hypertension Sister     Other Maternal Aunt         breast cancer    Breast Cancer Maternal Aunt        Vitals:  BP (!) 158/70   Pulse 55   Temp 98.3 °F (36.8 °C) (Axillary)   Resp 18   Ht 5' 1.5\" (1.562 m)   Wt 198 lb 8 oz (90 kg)   SpO2 96%   BMI 36.90 kg/m²   Temp (24hrs), Av.2 °F (36.8 °C), Min:97.4 °F (36.3 °C), Max:98.6 °F (37 °C)    No results for input(s): POCGLU in the last 72 hours. I/O (24Hr):     Intake/Output Summary (Last 24 hours) at 2020 1129  Last data filed at 2020 0612  Gross per 24 hour   Intake 1245 ml   Output 300 ml   Net 945 ml       Labs:  Hematology:  Recent Labs     20  11220  0725   WBC 7.3 8.1   RBC 3.76* 3.61*   HGB 12.5 12.0   HCT 36.8 36.6   MCV 97.8 101.4   MCH 33.2 33.2   MCHC 34.0 32.8   RDW 15.3 14.7*    193   MPV 9.8 12.0   INR  --  1.1     Chemistry:  Recent Labs     20  1126  20  2117 20  0236 20  0725 20  0744     --  142  --  140 140   K 4.1  --  4.0  --  4.0 3.7     --  106  --  106 106   CO2 23  --  19*  --  19* 22   GLUCOSE 109*  --  103*  --  121* 100*   BUN 23  --  22  --  22 27*   CREATININE 0.99*  --  1.01*  --  0.90 1.06*   MG  --   --  2.1  --   --   -- mg daily  2. Appreciate cardiology recommendations  3. We will continue to encourage patient to work with physical therapy and Occupational Therapy as patient is severely fatigued and is having deconditioning  4. Continue home medications for multiple sclerosis  5. We will monitor how patient performs today and see if she improves enough to go home if not we will monitor her and possibly think about discharging to nursing facility tomorrow  6.  GI prophylaxis with Protonix    Angel Kumar MD  8/1/2020  11:29 AM

## 2020-08-01 NOTE — CARE COORDINATION
Met with pt after receiving a social work consult for resources in [facility] and legal resources. Pt is alert and oriented. Discussed her home situation. She has MS and she says she can't get any help. Pt stated that she has applied to [facility] and [facility] for services and they both turned her down. Asked pt to explain why they denied services. Pt stated that they will not provide services for free and she does not feel she should have to pay for care. Explained to pt that Passport through [facility] is a Medicaid program and she cannot have more than 1500 in the bank. Pt says she has money in the bank but she needs that for car repairs and other things that come up. Attempted to explain that she is eligible for services but she would have to private pay. Pt does not seem to understand that and just kept saying that she has worked all her life and has always taken care of others. Pt states that she has one daughter but she is busy raising her family and working and doesn't have time for her. Discussed her legal issues and she stated that her SSI is being garnished by $400 a month. Pt doesn't seem to understand why this is happening. Provided pt the number for  to see if they can help. Pt declined a Passport application as she will not pay down the money she has in the bank to be eligible.

## 2020-08-01 NOTE — PROGRESS NOTES
[facility]   Progress Note                   Date:   8/1/2020  Patient name: Bettye Newton  Date of admission:  7/30/2020 11:10 AM  MRN:   [id number]  YOB: 1945  PCP: Laz Rivera MD    Reason for Admission:  Atrial flutter, angina     Subjective: There were no acute events overnight, remained hemodynamically stable, denies chest pain, dyspnea, orthopnea or palpitations. Remains in normal sinus rhythm, resting HR  50-55    Medications:   Scheduled Meds:   sodium chloride flush  10 mL Intravenous 2 times per day    allopurinol  100 mg Oral Daily    apixaban  5 mg Oral BID    azaTHIOprine  50 mg Oral Daily    clopidogrel  75 mg Oral Daily    isosorbide mononitrate  120 mg Oral Daily    losartan  100 mg Oral Daily    metoprolol succinate  50 mg Oral Daily    pantoprazole  40 mg Oral Daily    PARoxetine  20 mg Oral QAM    pravastatin  40 mg Oral Daily    ranolazine  500 mg Oral BID    sodium chloride flush  10 mL Intravenous 2 times per day    bumetanide  2 mg Oral Daily       Continuous Infusions:   sodium chloride 75 mL/hr at 07/31/20 1724       CBC:   Recent Labs     07/30/20  1126 07/31/20  0725   WBC 7.3 8.1   HGB 12.5 12.0    193     BMP:    Recent Labs     07/30/20  2117 07/31/20  0725 08/01/20  0744    140 140   K 4.0 4.0 3.7    106 106   CO2 19* 19* 22   BUN 22 22 27*   CREATININE 1.01* 0.90 1.06*   GLUCOSE 103* 121* 100*     Hepatic: No results for input(s): AST, ALT, ALB, BILITOT, ALKPHOS in the last 72 hours. Troponin: No results for input(s): TROPONINI in the last 72 hours. BNP: No results for input(s): BNP in the last 72 hours. Lipids: No results for input(s): CHOL, HDL in the last 72 hours.     Invalid input(s): LDLCALCU  INR:   Recent Labs     07/31/20  0725   INR 1.1       Objective:   Vitals: BP (!) 158/70   Pulse 53   Temp 98.3 °F (36.8 °C) (Axillary)   Resp 18   Ht 5' 1.5\" (1.562 m)   Wt 198 lb 8 oz (90 kg) SpO2 94%   BMI 36.90 kg/m²     General appearance: awake, alert, in no apparent respiratory distress   HEENT: Head: Normocephalic, no lesions, without obvious abnormality  Neck: no JVD  Lungs: clear to auscultation bilaterally, no basilar rales, no wheezing   Heart: regular rate and rhythm, S1, S2 normal, no murmur, click, rub or gallop  Abdomen: soft, non-tender; bowel sounds normal  Extremities: No LE edema  Neurologic: Mental status: Alert, oriented. Motor and sensory not done. Echocardiogram 4/30/2019  Left ventricle is normal in size with normal systolic function. Calculated ejection fraction is 65%. Increased left ventricular wall thickness. Evidence of advance diastolic dysfunction. Left atrium is moderately dilated. Right atrial enlargement. Increase in right ventricular free wall thickness. Mildly dilated left ventricle with mildly reduced function. Aortic leaflet calcification without stenosis. Mild thickening of the mitral leaflets without stenosis. Mild mitral regurgitation. Trace tricuspid regurgitation. Estimated right ventricular systolic pressure  is 34 mmH      Coronary Angiography 7/31/20:   LMCA: Mild irregularities 10-20%. LAD: Patent mid stent, distal 40% stenosis. Apical vessel had 80% stenosis, given symptoms and apical ischemia on stress   test, balloon angioplasty was performed with reduction of stenosis to 20%. LCx: Mild irregularities 10-20%. Large and dominant with mild disease   OM1 and OM2 are large, patent with mild disease   RCA: Small and non-dominant, patent, mild disease      JOVITA/CV: 7/31/2020    Assessment:   1. Atrial flutter, new onset, s/p syuccessgful JOVITA/CV   2. Angina, crescendo, with recent abnormal stress test s/p POBA apical LAD due to ischemia in that territory ( stenting was deferred due to small caliber vessel)  3.  Multiple sclerosis       Patient Active Problem List:     Bloody discharge from nipple     Osteopenia     Multiple sclerosis ([facility] [address] Essential hypertension     Asthma     Chest pain     SOB (shortness of breath)     Lower leg pain     Noncompliance with medications     Dyslipidemia     GERD (gastroesophageal reflux disease)     Fibromyalgia     Pyloric stenosis     Demyelinating disease (HCC)     H/O: CVA (cerebrovascular accident)     Epigastric abdominal pain     Incontinence in female     Chest pain with high risk for cardiac etiology     Migraine without status migrainosus, not intractable     Hypokalemia     Acute on chronic diastolic congestive heart failure (HCC)     Acute diastolic HF (heart failure) (HCC)     Acute on chronic diastolic CHF (congestive heart failure) (HCC)     CAD (coronary artery disease)     Atrial flutter ([facility] [address]      Treatment Plan:   1. D/c amio infusion   2. Cont toprol xl 50 mg qd   3. Anticoagulation with eliquis 5 mg bid   4. Cont plavix and high intensity statin  5. Ok to d.c home from cardiology standpoint with op follow up in 4 weeks       Discussed with patient and nursing.        Cris Miller MD, [facility]

## 2020-08-02 PROCEDURE — 1200000000 HC SEMI PRIVATE

## 2020-08-02 PROCEDURE — 6370000000 HC RX 637 (ALT 250 FOR IP): Performed by: STUDENT IN AN ORGANIZED HEALTH CARE EDUCATION/TRAINING PROGRAM

## 2020-08-02 PROCEDURE — 6360000002 HC RX W HCPCS: Performed by: NURSE PRACTITIONER

## 2020-08-02 PROCEDURE — 93005 ELECTROCARDIOGRAM TRACING: CPT | Performed by: STUDENT IN AN ORGANIZED HEALTH CARE EDUCATION/TRAINING PROGRAM

## 2020-08-02 PROCEDURE — 2580000003 HC RX 258: Performed by: NURSE PRACTITIONER

## 2020-08-02 PROCEDURE — 2580000003 HC RX 258: Performed by: STUDENT IN AN ORGANIZED HEALTH CARE EDUCATION/TRAINING PROGRAM

## 2020-08-02 PROCEDURE — 6370000000 HC RX 637 (ALT 250 FOR IP): Performed by: NURSE PRACTITIONER

## 2020-08-02 PROCEDURE — 99232 SBSQ HOSP IP/OBS MODERATE 35: CPT | Performed by: STUDENT IN AN ORGANIZED HEALTH CARE EDUCATION/TRAINING PROGRAM

## 2020-08-02 RX ORDER — METOPROLOL SUCCINATE 25 MG/1
25 TABLET, EXTENDED RELEASE ORAL DAILY
Status: DISCONTINUED | OUTPATIENT
Start: 2020-08-02 | End: 2020-08-03 | Stop reason: HOSPADM

## 2020-08-02 RX ORDER — LOSARTAN POTASSIUM 50 MG/1
50 TABLET ORAL DAILY
Status: DISCONTINUED | OUTPATIENT
Start: 2020-08-03 | End: 2020-08-03 | Stop reason: HOSPADM

## 2020-08-02 RX ORDER — BUMETANIDE 1 MG/1
1 TABLET ORAL DAILY
Status: DISCONTINUED | OUTPATIENT
Start: 2020-08-03 | End: 2020-08-03

## 2020-08-02 RX ORDER — SODIUM CHLORIDE 9 MG/ML
INJECTION, SOLUTION INTRAVENOUS CONTINUOUS
Status: ACTIVE | OUTPATIENT
Start: 2020-08-02 | End: 2020-08-02

## 2020-08-02 RX ADMIN — APIXABAN 5 MG: 5 TABLET, FILM COATED ORAL at 09:38

## 2020-08-02 RX ADMIN — ACETAMINOPHEN 1000 MG: 500 TABLET ORAL at 09:53

## 2020-08-02 RX ADMIN — SODIUM CHLORIDE, PRESERVATIVE FREE 10 ML: 5 INJECTION INTRAVENOUS at 09:56

## 2020-08-02 RX ADMIN — PANTOPRAZOLE SODIUM 40 MG: 40 TABLET, DELAYED RELEASE ORAL at 09:38

## 2020-08-02 RX ADMIN — ALLOPURINOL 100 MG: 100 TABLET ORAL at 09:38

## 2020-08-02 RX ADMIN — PAROXETINE HYDROCHLORIDE HEMIHYDRATE 20 MG: 20 TABLET, FILM COATED ORAL at 09:38

## 2020-08-02 RX ADMIN — SODIUM CHLORIDE, PRESERVATIVE FREE 10 ML: 5 INJECTION INTRAVENOUS at 22:22

## 2020-08-02 RX ADMIN — APIXABAN 5 MG: 5 TABLET, FILM COATED ORAL at 20:24

## 2020-08-02 RX ADMIN — RANOLAZINE 500 MG: 500 TABLET, FILM COATED, EXTENDED RELEASE ORAL at 20:24

## 2020-08-02 RX ADMIN — ACETAMINOPHEN 1000 MG: 500 TABLET ORAL at 20:34

## 2020-08-02 RX ADMIN — CLOPIDOGREL 75 MG: 75 TABLET, FILM COATED ORAL at 09:38

## 2020-08-02 RX ADMIN — PRAVASTATIN SODIUM 40 MG: 20 TABLET ORAL at 20:24

## 2020-08-02 RX ADMIN — AZATHIOPRINE 50 MG: 50 TABLET ORAL at 09:37

## 2020-08-02 RX ADMIN — SODIUM CHLORIDE: 9 INJECTION, SOLUTION INTRAVENOUS at 09:44

## 2020-08-02 RX ADMIN — METOPROLOL SUCCINATE 25 MG: 50 TABLET, FILM COATED, EXTENDED RELEASE ORAL at 09:22

## 2020-08-02 RX ADMIN — RANOLAZINE 500 MG: 500 TABLET, FILM COATED, EXTENDED RELEASE ORAL at 09:38

## 2020-08-02 RX ADMIN — Medication 10 ML: at 09:55

## 2020-08-02 ASSESSMENT — PAIN SCALES - GENERAL
PAINLEVEL_OUTOF10: 7
PAINLEVEL_OUTOF10: 6

## 2020-08-02 NOTE — PLAN OF CARE
Problem: Falls - Risk of:  Goal: Will remain free from falls  Description: Will remain free from falls  Outcome: Met This Shift   Patient's bed remained locked and in lowest position throughout shift. Patient belonings and call light remain within reach. 2/4 side rails are up, and non-skid footwear is on. Problem: Skin Integrity:  Goal: Will show no infection signs and symptoms  Description: Will show no infection signs and symptoms  Outcome: Met This Shift     Problem: Pain:  Description: Pain management should include both nonpharmacologic and pharmacologic interventions. Goal: Pain level will decrease  Description: Pain level will decrease  Outcome: Ongoing   Patient rated pain a  6 on a scale from 0-10. Writer administered PRN pain medications to patient.       Problem: Cardiac:  Goal: Ability to maintain an adequate cardiac output will improve  Description: Ability to maintain an adequate cardiac output will improve  Outcome: Ongoing   Electronically signed by Ta Bowman RN on 8/2/2020 at 2:57 AM

## 2020-08-02 NOTE — PROGRESS NOTES
Leopoldo Roy 19    Progress Note    8/2/2020    9:28 AM    Name:   Sandrine Malone  MRN:     [id number]     Leanne Sales:      [de-identified]   Room:   [facility] [place] [address] Day:  3  Admit Date:  7/30/2020 11:10 AM    PCP:   Makayla Pace MD  Code Status:  Full Code    Subjective:     C/C:   Chief Complaint   Patient presents with    Chest Pain     radiate into left shoulder     Interval History Status: improved. Patient appears to have improved but she states she is still feeling weak. HR was in 47 when sleeping but did improve up to 60 when she was awake and answering questions. Blood pressure lower today and borderline hypotensive. Patient admits to poor appetite. Brief History:     Briefly this is a 22-year-old female past medical history of MS that presented to [facility] emergency room for worsening fatigue and chest pain. Has been having on and off for about 2 to 3 weeks underwent abnormal stress test at the end of July 2020. Review of Systems:     Constitutional: positive for fatigue  negative for chills, fevers, sweats  Respiratory:  negative for cough, dyspnea on exertion, shortness of breath, wheezing  Cardiovascular:  negative for chest pain, chest pressure/discomfort, lower extremity edema, palpitations  Gastrointestinal:  negative for abdominal pain, constipation, diarrhea, nausea, vomiting  Neurological:  negative for dizziness, headache    Medications: Allergies:     Allergies   Allergen Reactions    Lisinopril-Hydrochlorothiazide Anaphylaxis and Hives    Sulfa Antibiotics Anaphylaxis    Penicillins Hives    Polyethylene Glycol Hives    Actifed Cold-Allergy [Chlorpheniramine-Phenylephrine]     Altace [Ramipril]      Chronic cough    Cephalosporins Hives    Coreg [Carvedilol]      bloating    Dml Facial Moisturizer     Elavil [Amitriptyline Hcl]     Entex [Ami-Jose]     Ethylene Glycol     Fentanyl     Hizentra [Immune Globulin (Human)]     Hydralazine     Levofloxacin      ach tendon    Lisinopril-Hydrochlorothiazide     Metoprolol      Stomach pain    Montelukast Sodium      Heart effects    Morphine Itching    Nifedipine     Norvasc [Amlodipine Besylate]      Stomach pain    Other      IV IG     Phenobarbital Hives    Robaxin [Methocarbamol]     Sinequan [Doxepin Hcl]     Sudafed [Pseudoephedrine Hcl]     Sudafed [Pseudoephedrine Hcl]     Tranquil-London     Wellbutrin [Bupropion Hcl]      Hypotension    Actifed Cold-Allergy [Chlorpheniramine-Phenylephrine] Palpitations    Clindamycin/Lincomycin Nausea And Vomiting    Codeine Nausea And Vomiting    Metoprolol Succinate Nausea And Vomiting    Seldane [Terfenadine] Palpitations       Current Meds:   Scheduled Meds:    metoprolol succinate  25 mg Oral Daily    sodium chloride flush  10 mL Intravenous 2 times per day    allopurinol  100 mg Oral Daily    apixaban  5 mg Oral BID    azaTHIOprine  50 mg Oral Daily    clopidogrel  75 mg Oral Daily    isosorbide mononitrate  120 mg Oral Daily    losartan  100 mg Oral Daily    pantoprazole  40 mg Oral Daily    PARoxetine  20 mg Oral QAM    pravastatin  40 mg Oral Daily    ranolazine  500 mg Oral BID    sodium chloride flush  10 mL Intravenous 2 times per day    bumetanide  2 mg Oral Daily     Continuous Infusions:    sodium chloride       PRN Meds: sodium chloride flush, acetaminophen, acetaminophen, sodium chloride flush, potassium chloride **OR** potassium alternative oral replacement **OR** potassium chloride, magnesium sulfate, acetaminophen **OR** acetaminophen, promethazine **OR** ondansetron    Data:     Past Medical History:   has a past medical history of Abnormal Pap smear, Asthma, Bloody discharge from nipple, Cataracts, bilateral, Colon polyp, CVA (cerebral vascular accident) ([facility] [address] Demyelinating disease ([facility] [address] Fibromyalgia, High-risk sexual behavior, History of bone density study, History of migraines, HTN (hypertension), IgG deficiency (HCC), Multiple sclerosis ([place] [address] Myoclonic seizures ([facility] [address] Optic neuritis, Osteopenia, Pyloric stenosis, Raynaud's disease, and Vasomotor rhinitis. Social History:   reports that she has never smoked. She has never used smokeless tobacco. She reports that she does not drink alcohol or use drugs. Family History:   Family History   Problem Relation Age of Onset    Other Father         Heart problems    Heart Disease Father     Other Mother         heart problems requiring open heart surgery, HTN    Heart Disease Mother     Other Sister         HTN    Hypertension Sister     Other Maternal Aunt         breast cancer    Breast Cancer Maternal Aunt        Vitals:  BP (!) 129/40   Pulse (!) 47   Temp 97.2 °F (36.2 °C) (Temporal)   Resp 16   Ht 5' 1.5\" (1.562 m)   Wt 202 lb (91.6 kg)   SpO2 99%   BMI 37.55 kg/m²   Temp (24hrs), Av.1 °F (36.7 °C), Min:97.2 °F (36.2 °C), Max:98.8 °F (37.1 °C)    No results for input(s): POCGLU in the last 72 hours. I/O (24Hr):     Intake/Output Summary (Last 24 hours) at 2020 09  Last data filed at 2020 0603  Gross per 24 hour   Intake 1760 ml   Output 1500 ml   Net 260 ml       Labs:  Hematology:  Recent Labs     20  1126 20  0725   WBC 7.3 8.1   RBC 3.76* 3.61*   HGB 12.5 12.0   HCT 36.8 36.6   MCV 97.8 101.4   MCH 33.2 33.2   MCHC 34.0 32.8   RDW 15.3 14.7*    193   MPV 9.8 12.0   INR  --  1.1     Chemistry:  Recent Labs     20  1126  20  2117 20  0236 20  0725 20  0744     --  142  --  140 140   K 4.1  --  4.0  --  4.0 3.7     --  106  --  106 106   CO2 23  --  19*  --  19* 22   GLUCOSE 109*  --  103*  --  121* 100*   BUN 23  --  22  --  22 27*   CREATININE 0.99*  --  1.01*  --  0.90 1.06*   MG  --   --  2.1  --   --   --    ANIONGAP 10  --  17  --  15 12   LABGLOM 55*  --  54*  --  >60 51*   GFRAA >60  --  >60  --  >60 >60   CALCIUM 9.4  --  9.1  --  9.0 8.8   PROBNP 3,057*  --   --   --   --   --    TROPHS 19*   < > 22* 22* 20*  --    MYOGLOBIN  --   --  45 40 38  --     < > = values in this interval not displayed. Recent Labs     07/30/20  2117   TSH 1.96     ABG:No results found for: POCPH, PHART, PH, POCPCO2, OEY6HZA, PCO2, POCPO2, PO2ART, PO2, POCHCO3, BYQ2YYZ, HCO3, NBEA, PBEA, BEART, BE, THGBART, THB, ZXM2PUG, BWWB6UMO, D9AKVPYI, O2SAT, FIO2  Lab Results   Component Value Date/Time    SPECIAL 20 ML RIGHT [facility] 03/10/2019 08:42 PM     Lab Results   Component Value Date/Time    CULTURE NO GROWTH 6 DAYS 03/10/2019 08:42 PM       Radiology:  [facility] Chest Portable    Result Date: 7/30/2020  Cardiomegaly and congestion. No evidence of consolidation, pulmonary edema or pleural disease. Stable chest x-ray. Nm Myocardial Spect Rest Exercise Or Rx    Result Date: 7/29/2020  Perfusion:  Normal exam Function:  Normal exam Risk stratification: Intermediate Note on Risk Stratification: The above risk stratification is based on myocardial perfusion findings. Final risk assessment may be adjusted based on other clinical and noninvasive test findings. Risk stratification criteria are adapted from \"Noninvasive Risk Stratification\" criteria from [facility]. al, ACC/AATS/AHA/ASE/ASNC/SCAI/SCCT/STS 2017 Appropriate Use Criteria For Coronary Revascularization in Patients With Stable Ischemic Heart Disease [facility] Volume 69, Issue 17, May 2017 High risk (>3% annual death or MI) 1. Severe resting LV dysfunction (LVEF >35%) not readily explained by non coronary causes 2. Resting perfusion abnormalities greater than 10% of the myocardium in patients without prior history or evidence of MI 3. Stress-induced perfusion abnormalities encumbering greater than or equal to 10% myocardium or stress segmental scores indicating multiple vascular territories with abnormalities 4.  Stress-induced LV dilatation (TID ratio greater than 1.19 for exercise and greater than 1.39 for XL decreased to 25 mg, eluquis 5 mg BID, Plavix, pravochol, ranexa 500 mg BID  3. Appreciate cardiology recs  4. Mionitor for hypotension  5. Hold imdur, bumex, imdur today  6. Continue MS medications imuran and zyloprim  7.  Encourage ambulation    Ayana Guerra MD  8/2/2020  9:28 AM

## 2020-08-02 NOTE — PROGRESS NOTES
Patient was discharged to [place] [address] voluntarily via [place]. LDAs were removed before discharged. 2 bags of personal belongings were given to [employer]. No signs of distress were noted at time of discharge.

## 2020-08-03 VITALS
SYSTOLIC BLOOD PRESSURE: 137 MMHG | OXYGEN SATURATION: 97 % | BODY MASS INDEX: 37.17 KG/M2 | DIASTOLIC BLOOD PRESSURE: 53 MMHG | RESPIRATION RATE: 16 BRPM | TEMPERATURE: 98 F | WEIGHT: 202 LBS | HEIGHT: 62 IN | HEART RATE: 55 BPM

## 2020-08-03 LAB
ANION GAP SERPL CALCULATED.3IONS-SCNC: 14 MMOL/L (ref 9–17)
BUN BLDV-MCNC: 19 MG/DL (ref 8–23)
BUN/CREAT BLD: ABNORMAL (ref 9–20)
CALCIUM SERPL-MCNC: 8.8 MG/DL (ref 8.6–10.4)
CHLORIDE BLD-SCNC: 103 MMOL/L (ref 98–107)
CO2: 22 MMOL/L (ref 20–31)
CREAT SERPL-MCNC: 0.96 MG/DL (ref 0.5–0.9)
EKG ATRIAL RATE: 47 BPM
EKG ATRIAL RATE: 54 BPM
EKG P AXIS: 71 DEGREES
EKG P AXIS: 73 DEGREES
EKG P-R INTERVAL: 156 MS
EKG P-R INTERVAL: 166 MS
EKG Q-T INTERVAL: 600 MS
EKG Q-T INTERVAL: 668 MS
EKG QRS DURATION: 82 MS
EKG QRS DURATION: 86 MS
EKG QTC CALCULATION (BAZETT): 569 MS
EKG QTC CALCULATION (BAZETT): 591 MS
EKG R AXIS: -43 DEGREES
EKG R AXIS: -49 DEGREES
EKG T AXIS: -119 DEGREES
EKG T AXIS: -89 DEGREES
EKG VENTRICULAR RATE: 47 BPM
EKG VENTRICULAR RATE: 54 BPM
GFR AFRICAN AMERICAN: >60 ML/MIN
GFR NON-AFRICAN AMERICAN: 57 ML/MIN
GFR SERPL CREATININE-BSD FRML MDRD: ABNORMAL ML/MIN/{1.73_M2}
GFR SERPL CREATININE-BSD FRML MDRD: ABNORMAL ML/MIN/{1.73_M2}
GLUCOSE BLD-MCNC: 94 MG/DL (ref 70–99)
MAGNESIUM: 1.8 MG/DL (ref 1.6–2.6)
POTASSIUM SERPL-SCNC: 3.5 MMOL/L (ref 3.7–5.3)
SODIUM BLD-SCNC: 139 MMOL/L (ref 135–144)

## 2020-08-03 PROCEDURE — 93010 ELECTROCARDIOGRAM REPORT: CPT | Performed by: INTERNAL MEDICINE

## 2020-08-03 PROCEDURE — 6370000000 HC RX 637 (ALT 250 FOR IP): Performed by: STUDENT IN AN ORGANIZED HEALTH CARE EDUCATION/TRAINING PROGRAM

## 2020-08-03 PROCEDURE — 83735 ASSAY OF MAGNESIUM: CPT

## 2020-08-03 PROCEDURE — 36415 COLL VENOUS BLD VENIPUNCTURE: CPT

## 2020-08-03 PROCEDURE — 80048 BASIC METABOLIC PNL TOTAL CA: CPT

## 2020-08-03 PROCEDURE — 6370000000 HC RX 637 (ALT 250 FOR IP): Performed by: NURSE PRACTITIONER

## 2020-08-03 PROCEDURE — 2580000003 HC RX 258: Performed by: STUDENT IN AN ORGANIZED HEALTH CARE EDUCATION/TRAINING PROGRAM

## 2020-08-03 PROCEDURE — 99239 HOSP IP/OBS DSCHRG MGMT >30: CPT | Performed by: STUDENT IN AN ORGANIZED HEALTH CARE EDUCATION/TRAINING PROGRAM

## 2020-08-03 RX ORDER — ISOSORBIDE MONONITRATE 120 MG/1
60 TABLET, EXTENDED RELEASE ORAL DAILY
Qty: 15 TABLET | Refills: 0 | Status: SHIPPED | OUTPATIENT
Start: 2020-08-03 | End: 2022-02-06

## 2020-08-03 RX ORDER — BUMETANIDE 1 MG/1
0.5 TABLET ORAL DAILY
Status: DISCONTINUED | OUTPATIENT
Start: 2020-08-03 | End: 2020-08-03 | Stop reason: HOSPADM

## 2020-08-03 RX ORDER — METOPROLOL SUCCINATE 25 MG/1
25 TABLET, EXTENDED RELEASE ORAL DAILY
Qty: 30 TABLET | Refills: 3 | Status: ON HOLD | OUTPATIENT
Start: 2020-08-04 | End: 2020-08-27 | Stop reason: HOSPADM

## 2020-08-03 RX ORDER — BUMETANIDE 1 MG/1
1 TABLET ORAL DAILY
Qty: 30 TABLET | Refills: 0 | Status: ON HOLD | OUTPATIENT
Start: 2020-08-03 | End: 2020-08-27 | Stop reason: SDUPTHER

## 2020-08-03 RX ADMIN — BUMETANIDE 0.5 MG: 1 TABLET ORAL at 08:57

## 2020-08-03 RX ADMIN — Medication 10 ML: at 08:58

## 2020-08-03 RX ADMIN — ALLOPURINOL 100 MG: 100 TABLET ORAL at 08:58

## 2020-08-03 RX ADMIN — ACETAMINOPHEN 1000 MG: 500 TABLET ORAL at 09:36

## 2020-08-03 RX ADMIN — PANTOPRAZOLE SODIUM 40 MG: 40 TABLET, DELAYED RELEASE ORAL at 08:57

## 2020-08-03 RX ADMIN — RANOLAZINE 500 MG: 500 TABLET, FILM COATED, EXTENDED RELEASE ORAL at 08:57

## 2020-08-03 RX ADMIN — LOSARTAN POTASSIUM 50 MG: 50 TABLET, FILM COATED ORAL at 08:57

## 2020-08-03 RX ADMIN — APIXABAN 5 MG: 5 TABLET, FILM COATED ORAL at 08:58

## 2020-08-03 RX ADMIN — CLOPIDOGREL 75 MG: 75 TABLET, FILM COATED ORAL at 08:58

## 2020-08-03 RX ADMIN — ISOSORBIDE MONONITRATE 120 MG: 60 TABLET ORAL at 08:57

## 2020-08-03 RX ADMIN — PAROXETINE HYDROCHLORIDE HEMIHYDRATE 20 MG: 20 TABLET, FILM COATED ORAL at 08:57

## 2020-08-03 ASSESSMENT — PAIN SCALES - GENERAL: PAINLEVEL_OUTOF10: 5

## 2020-08-03 NOTE — DISCHARGE SUMMARY
Leopoldo Roy [facility]    Discharge Summary     Patient ID: Alejandra Mcarthur  :  1945   MRN: [id number]     ACCOUNT:  [de-identified]   Patient's PCP: Cody Albert MD  Admit Date: 2020   Discharge Date: 8/3/2020     Length of Stay: 4  Code Status:  Prior  Admitting Physician: Pollyann Krabbe, MD  Discharge Physician: Angel Kumar MD     Active Discharge Diagnoses:     Hospital Problem Lists:  Principal Problem:    Chest pain  Active Problems:    Multiple sclerosis ([address]    Essential hypertension    SOB (shortness of breath)    Dyslipidemia    Demyelinating disease ([address]    H/O: CVA (cerebrovascular accident)    CAD (coronary artery disease)    Atrial flutter ([facility]    Chronic bronchiolitis ([facility]    Atrial fibrillation status post cardioversion [facility])    Sinus bradycardia  Resolved Problems:    * No resolved hospital problems. *      Admission Condition:  poor     Discharged Condition: fair    Hospital Stay:     Hospital Course:  Alejandra Mcarthur is a 69 y.o. female who was admitted for the management of   Chest pain , presented to ER with Chest Pain (radiate into left shoulder)  79-year-old female past medical history of MS presented to [facility] emergency room for worsening fatigue and chest pain. Patient also noted to have abnormal stress test at the end of July of this year. Patient was admitted was noted to be in atrial fibrillation underwent JOVITA cardioversion as well as cardiac catheterization. No stent was placed. Patient was encouraged to continue her medications. Patient stated after her cardiac catheterization due to prolonged bradycardia that improved by decreasing her beta-blocker and stopping amiodarone. Patient instructed that she should follow-up with her cardiologist as an outpatient, and her PCP as well.         Significant therapeutic interventions:     Cardiac cath 2020:   1. CAD with patent previous LAD stent annual death or MI) 1. Severe resting LV dysfunction (LVEF >35%) not readily explained by non coronary causes 2. Resting perfusion abnormalities greater than 10% of the myocardium in patients without prior history or evidence of MI 3. Stress-induced perfusion abnormalities encumbering greater than or equal to 10% myocardium or stress segmental scores indicating multiple vascular territories with abnormalities 4. Stress-induced LV dilatation (TID ratio greater than 1.19 for exercise and greater than 1.39 for regadenoson) Intermediate risk (1% to 3% annual death or MI) 1. Mild/moderate resting LV dysfunction (LVEF 35% to 49%) not readily explained by non coronary causes. 2. Resting perfusion abnormalities in 5%-9.9% of the myocardium in patients without a history or prior evidence of MI 3. Stress-induced perfusion abnormality encumbering 5%-9.9% of the myocardium or stress segmental scores indicating 1 vascular territory with abnormalities but without LV dilation 4. Small wall motion abnormality involving 1-2 segments and only 1 coronary bed. Low Risk (Less than 1% annual death or MI) 1. Normal or small myocardial perfusion defect at rest or with stress encumbering less than 5% of the myocardium. Consultations:    Consults:     Final Specialist Recommendations/Findings:   IP CONSULT TO CARDIOLOGY  IP CONSULT TO IV TEAM  IP CONSULT TO SOCIAL WORK      The patient was seen and examined on day of discharge and this discharge summary is in conjunction with any daily progress note from day of discharge.     Discharge plan:     Disposition: Home    Physician Follow Up:     Brenda Hernandez MD  [address] [place] [address]  [phone]             Requiring Further Evaluation/Follow Up POST HOSPITALIZATION/Incidental Findings: None    Diet: cardiac diet    Activity: As tolerated    Instructions to Patient:   Please follow up with your PCP and Cardiologist as outpatient    Discharge Medications:      Medication List      CHANGE how you take these medications    bumetanide 1 MG tablet  Commonly known as:  BUMEX  Take 1 tablet by mouth daily  What changed:  how much to take     metoprolol succinate 25 MG extended release tablet  Commonly known as:  TOPROL XL  Take 1 tablet by mouth daily  Start taking on:  August 4, 2020  What changed:    · medication strength  · how much to take        CONTINUE taking these medications    acetaminophen 500 MG tablet  Commonly known as:  TYLENOL     allopurinol 100 MG tablet  Commonly known as:  ZYLOPRIM     azaTHIOprine 50 MG tablet  Commonly known as:  IMURAN     BENZOYL PEROXIDE     Eliquis 5 MG Tabs tablet  Generic drug:  apixaban     isosorbide mononitrate 120 MG extended release tablet  Commonly known as:  IMDUR  Take 0.5 tablets by mouth daily     LOPROX EX     losartan 100 MG tablet  Commonly known as:  COZAAR     Maalox 600 MG Chew  Generic drug:  Calcium Carbonate Antacid     nitroGLYCERIN 0.4 MG SL tablet  Commonly known as:  NITROSTAT     pantoprazole 40 MG tablet  Commonly known as:  PROTONIX     PARoxetine 20 MG tablet  Commonly known as:  PAXIL     Plavix 75 MG tablet  Generic drug:  clopidogrel     pravastatin 40 MG tablet  Commonly known as:  PRAVACHOL     ranolazine 500 MG extended release tablet  Commonly known as:  RANEXA        STOP taking these medications    ACIPHEX PO     Hizentra 10 GM/50ML Soln  Generic drug:  Immune Globulin (Human)           Where to Get Your Medications      These medications were sent to [facility] [address] [facility] [address] [address] - P [phone] - F [phone]  [address] [place] [address]    Phone:  [phone]   · bumetanide 1 MG tablet  · isosorbide mononitrate 120 MG extended release tablet  · metoprolol succinate 25 MG extended release tablet         No discharge procedures on file.     Time Spent on discharge is  31 mins in patient examination, evaluation, counseling as well as medication reconciliation, prescriptions for required medications, discharge plan and follow up. Electronically signed by   Reginald Juares MD  8/3/2020  6:57 PM      Thank you Dr. Makayla Pace MD for the opportunity to be involved in this patient's care.

## 2020-08-03 NOTE — PROGRESS NOTES
Leopoldo Roy [address]    Progress Note    8/3/2020    9:16 AM    Name:   Mike Grimaldo  MRN:     [id number]     [place]:      [de-identified]   Room:   [address] [place] [address] Day:  4  Admit Date:  7/30/2020 11:10 AM    PCP:   Ayo Summers MD  Code Status:  Full Code    Subjective:     C/C:   Chief Complaint   Patient presents with    Chest Pain     radiate into left shoulder     Interval History Status: significantly improved. Feeling better. Unfortunately was not able to sleep due to commotion from other patient rooms. No acute complaints would like to go home. Appears in better mood than yesterday. Brief History:        Briefly this is a 82-year-old female past medical history of MS that presented to [facility] emergency room for worsening fatigue and chest pain.  Has been having on and off for about 2 to 3 weeks underwent abnormal stress test at the end of July 2020.        Review of Systems:     Constitutional:  negative for chills, fevers, sweats  Respiratory:  negative for cough, dyspnea on exertion, shortness of breath, wheezing  Cardiovascular:  negative for chest pain, chest pressure/discomfort, lower extremity edema, palpitations  Gastrointestinal:  negative for abdominal pain, constipation, diarrhea, nausea, vomiting  Neurological:  negative for dizziness, headache    Medications: Allergies:     Allergies   Allergen Reactions    Lisinopril-Hydrochlorothiazide Anaphylaxis and Hives    Sulfa Antibiotics Anaphylaxis    Penicillins Hives    Polyethylene Glycol Hives    Actifed Cold-Allergy [Chlorpheniramine-Phenylephrine]     Altace [Ramipril]      Chronic cough    Cephalosporins Hives    Coreg [Carvedilol]      bloating    Dml Facial Moisturizer     Elavil [Amitriptyline Hcl]     Entex [Ami-Jose]     Ethylene Glycol     Fentanyl     Hizentra [Immune Globulin (Human)]     Hydralazine     Levofloxacin      ach tendon    THGBART, THB, KQW2RLI, FMQN0UYC, B1FENCGM, O2SAT, FIO2  Lab Results   Component Value Date/Time    SPECIAL 20 ML RIGHT [facility] 03/10/2019 08:42 PM     Lab Results   Component Value Date/Time    CULTURE NO GROWTH 6 DAYS 03/10/2019 08:42 PM       Radiology:  Xr Chest Portable    Result Date: 7/30/2020  Cardiomegaly and congestion. No evidence of consolidation, pulmonary edema or pleural disease. Stable chest x-ray. Nm Myocardial Spect Rest Exercise Or Rx    Result Date: 7/29/2020  Perfusion:  Normal exam Function:  Normal exam Risk stratification: Intermediate Note on Risk Stratification: The above risk stratification is based on myocardial perfusion findings. Final risk assessment may be adjusted based on other clinical and noninvasive test findings. Risk stratification criteria are adapted from \"Noninvasive Risk Stratification\" criteria from [facility]. al, ACC/AATS/AHA/ASE/ASNC/SCAI/SCCT/STS 2017 Appropriate Use Criteria For Coronary Revascularization in Patients With Stable Ischemic Heart Disease [facility] Volume 69, Issue 17, May 2017 High risk (>3% annual death or MI) 1. Severe resting LV dysfunction (LVEF >35%) not readily explained by non coronary causes 2. Resting perfusion abnormalities greater than 10% of the myocardium in patients without prior history or evidence of MI 3. Stress-induced perfusion abnormalities encumbering greater than or equal to 10% myocardium or stress segmental scores indicating multiple vascular territories with abnormalities 4. Stress-induced LV dilatation (TID ratio greater than 1.19 for exercise and greater than 1.39 for regadenoson) Intermediate risk (1% to 3% annual death or MI) 1. Mild/moderate resting LV dysfunction (LVEF 35% to 49%) not readily explained by non coronary causes. 2. Resting perfusion abnormalities in 5%-9.9% of the myocardium in patients without a history or prior evidence of MI 3.  Stress-induced perfusion abnormality encumbering 5%-9.9% of the myocardium or stress segmental scores indicating 1 vascular territory with abnormalities but without LV dilation 4. Small wall motion abnormality involving 1-2 segments and only 1 coronary bed. Low Risk (Less than 1% annual death or MI) 1. Normal or small myocardial perfusion defect at rest or with stress encumbering less than 5% of the myocardium. Physical Examination:        General appearance:  alert, cooperative and no distress  Mental Status:  oriented to person, place and time and normal affect  Lungs:  clear to auscultation bilaterally, normal effort  Heart:  regular rate and rhythm, no murmur  Abdomen:  soft, nontender, nondistended, normal bowel sounds, no masses, hepatomegaly, splenomegaly  Extremities:  no edema, redness, tenderness in the calves  Skin:  no gross lesions, rashes, induration    Assessment:        Hospital Problems           Last Modified POA    * (Principal) Chest pain 7/31/2020 Yes    Multiple sclerosis ([address] 7/31/2020 Yes    Essential hypertension 7/31/2020 Yes    SOB (shortness of breath) 8/1/2020 Yes    Dyslipidemia 8/1/2020 Yes    Demyelinating disease ([address] 7/31/2020 Yes    Overview Signed 5/24/2015 11:03 AM by NENA Holman CNP     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 Conitnue toprol XL at current dose  2. Follow up with cardiology as outpaitent  3. Bradycardia improved  4. Imdur bumex resumed  5. Conintue MS meds  6.  Discharge today    Ellen Gomes MD  8/3/2020  9:16 AM

## 2020-08-03 NOTE — CARE COORDINATION
Discharge [facility] Case Management Department  Written by: Dipika Hernandez RN    Patient Name: Rosemary Griffin  Attending Provider: Lula Townsend MD  Admit Date: 2020 11:10 AM  MRN: [id number]  Account: [de-identified]                     : 1945  Discharge Date:  2020    Disposition: home independently. Daughter to transport home. Refuses any skilled needs.     Dipika Hernandez RN

## 2020-08-13 NOTE — PROGRESS NOTES
Preprocedure call completed. History and medications were reviewed and pt was instructed on NPO status. Informed which meds to take, which to hold prior to procedure and to bring them to hospital in labeled bottles.    Discussed current visitor policy and where to enter hospital.

## 2020-08-14 ENCOUNTER — HOSPITAL ENCOUNTER (OUTPATIENT)
Dept: CARDIAC CATH/INVASIVE PROCEDURES | Age: 75
Discharge: HOME | End: 2020-08-14
Payer: MEDICARE

## 2020-08-14 VITALS
WEIGHT: 194 LBS | OXYGEN SATURATION: 96 % | RESPIRATION RATE: 19 BRPM | BODY MASS INDEX: 36.63 KG/M2 | SYSTOLIC BLOOD PRESSURE: 97 MMHG | TEMPERATURE: 97.8 F | DIASTOLIC BLOOD PRESSURE: 48 MMHG | HEIGHT: 61 IN | HEART RATE: 50 BPM

## 2020-08-14 PROCEDURE — 2580000003 HC RX 258: Performed by: STUDENT IN AN ORGANIZED HEALTH CARE EDUCATION/TRAINING PROGRAM

## 2020-08-14 PROCEDURE — 7100000001 HC PACU RECOVERY - ADDTL 15 MIN

## 2020-08-14 PROCEDURE — 92960 CARDIOVERSION ELECTRIC EXT: CPT | Performed by: INTERNAL MEDICINE

## 2020-08-14 PROCEDURE — 2709999900 HC NON-CHARGEABLE SUPPLY

## 2020-08-14 PROCEDURE — 6360000002 HC RX W HCPCS

## 2020-08-14 PROCEDURE — 6360000002 HC RX W HCPCS: Performed by: STUDENT IN AN ORGANIZED HEALTH CARE EDUCATION/TRAINING PROGRAM

## 2020-08-14 PROCEDURE — 7100000000 HC PACU RECOVERY - FIRST 15 MIN

## 2020-08-14 PROCEDURE — 93005 ELECTROCARDIOGRAM TRACING: CPT | Performed by: INTERNAL MEDICINE

## 2020-08-14 RX ORDER — SODIUM CHLORIDE 9 MG/ML
INJECTION, SOLUTION INTRAVENOUS CONTINUOUS
Status: DISCONTINUED | OUTPATIENT
Start: 2020-08-14 | End: 2020-08-15 | Stop reason: HOSPADM

## 2020-08-14 RX ORDER — ACETAMINOPHEN 325 MG/1
650 TABLET ORAL EVERY 4 HOURS PRN
Status: CANCELLED | OUTPATIENT
Start: 2020-08-14

## 2020-08-14 RX ORDER — SODIUM CHLORIDE 0.9 % (FLUSH) 0.9 %
10 SYRINGE (ML) INJECTION EVERY 12 HOURS SCHEDULED
Status: CANCELLED | OUTPATIENT
Start: 2020-08-14

## 2020-08-14 RX ORDER — SODIUM CHLORIDE 0.9 % (FLUSH) 0.9 %
10 SYRINGE (ML) INJECTION PRN
Status: CANCELLED | OUTPATIENT
Start: 2020-08-14

## 2020-08-14 RX ORDER — AMIODARONE HYDROCHLORIDE 200 MG/1
200 TABLET ORAL DAILY
Qty: 30 TABLET | Refills: 1 | Status: ON HOLD | OUTPATIENT
Start: 2020-08-15 | End: 2020-08-27 | Stop reason: HOSPADM

## 2020-08-14 RX ADMIN — SODIUM CHLORIDE: 9 INJECTION, SOLUTION INTRAVENOUS at 10:38

## 2020-08-14 RX ADMIN — AMIODARONE HYDROCHLORIDE 150 MG: 150 INJECTION, SOLUTION INTRAVENOUS at 12:25

## 2020-08-14 NOTE — H&P
[facility]  Procedure History and Physical Update          Patient Name: Bettye Newton  MRN:    [id number]  YOB: 1945  Date of evaluation:  8/14/2020    Procedure:   Transesophageal Echocardiogram (JOVITA)/Cardioversion (CV)    Indication for procedure:  Atrial Fibrillation      Please refer to the office note completed by Dr. Robert Dela Cruz on 08/13/2020 in the medical record and note that:    [x] I have examined the patient and reviewed the H&P/Consult and there are no changes to be made to the assessment or plan. [] I have examined the patient and reviewed the H&P/Consult and have noted the following changes:    Past Medical History:   Diagnosis Date    Abnormal Pap smear 10/2010    paucity or absence of TZx 3 years in a row. 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HCC)     Multiple sclerosis ([address]     progressive    Myoclonic seizures ([address]     Optic neuritis     Araceli Cedric Pupil    Osteopenia     Pyloric stenosis     Raynaud's disease     Vasomotor rhinitis        Past Surgical History:   Procedure Laterality Date    APPENDECTOMY  1955    sponges left in abdomen, at [address] [place] [address] Way    stereotactic type, right breast, benign    COLONOSCOPY  2010    Polyp    CORONARY ANGIOPLASTY WITH STENT PLACEMENT  5/2011    failed attempt to place stent    CORONARY ANGIOPLASTY WITH STENT PLACEMENT  6/2011    successful placement of 2 stent in same artery   Akash Rashid8 ESOPHAGOSCOPY  1966, 1969    bleeding ulcers found    EYE SURGERY      FOREIGN BODY REMOVAL  1962    surgical sponges left in during appendectomy    HAMMER TOE SURGERY  1989    head resection of phalanx, left foot    LAPAROSCOPY  1983    with D&C    NH COLONOSCOPY W/BIOPSY SINGLE/MULTIPLE N/A 12/19/2017    COLONOSCOPY WITH BIOPSY performed by Vonn Simmonds, MD at [facility]    NH EGD TRANSORAL BIOPSY SINGLE/MULTIPLE  12/19/2017    EGD BIOPSY performed by Vonn Simmonds, MD at [facility]    SEPTOPLASTY  1985    Turbinate resection with rt and left ethmoidectomy    SHOULDER ARTHROSCOPY  1998    rotator cuff impingement, right arm    SINUS ENDOSCOPY      SINUS SURGERY      multiple    UPPER GASTROINTESTINAL ENDOSCOPY      2-4 times per year for pyloric stenosis    UPPER GASTROINTESTINAL ENDOSCOPY  12/19/2017    EGD DILATION BALLOON performed by Vonn Simmonds, MD at [facility]    UPPER GASTROINTESTINAL ENDOSCOPY  5/24/2018    EGD DILATION SAVORY performed by Jose Ryan MD at [facility]       Family History   Problem Relation Age of Onset    Other Father         Heart problems    Heart Disease Father     Other Mother         heart problems requiring open heart surgery, HTN    Heart Disease Mother     Other Sister         HTN    Hypertension Sister     Other Maternal Aunt         breast cancer    Breast Cancer Maternal Aunt        Allergies   Allergen Reactions    Lisinopril-Hydrochlorothiazide Anaphylaxis and Hives    Sulfa Antibiotics Anaphylaxis    Penicillins Hives    Polyethylene Glycol Hives    Actifed Cold-Allergy [Chlorpheniramine-Phenylephrine]     Altace [Ramipril]      Chronic cough    Cephalosporins Hives    Coreg [Carvedilol]      bloating    Dml Facial Moisturizer     Elavil [Amitriptyline Hcl]     Entex [Ami-Jose]     Ethylene Glycol     Fentanyl     Hizentra [Immune Globulin (Human)]     Hydralazine     Levofloxacin      ach mouth as needed. Historical Provider, MD   allopurinol (ZYLOPRIM) 100 MG tablet Take 100 mg by mouth daily. Historical Provider, MD   clopidogrel (PLAVIX) 75 MG tablet Take 75 mg by mouth daily. Historical Provider, MD   losartan (COZAAR) 100 MG tablet Take 100 mg by mouth daily     Historical Provider, MD   pravastatin (PRAVACHOL) 40 MG tablet Take 40 mg by mouth daily. Historical Provider, MD   BENZOYL PEROXIDE Apply  topically. Used on face for cystic acne     Historical Provider, MD   Ciclopirox (LOPROX EX) Apply  topically. 2.5% strength, cream for facial acne     Historical Provider, MD         There were no vitals filed for this visit. Constitutional and General Appearance:   alert, cooperative, no distress and appears stated age  [de-identified]:  · PERRL, EOMI  Respiratory:  · Normal excursion and expansion without use of accessory muscles  · Resp Auscultation:  Good respiratory effort. No for increased work of breathing. On auscultation: clear to auscultation bilaterally  Cardiovascular:  · Regular rate and rhythm. · S1/S2  · No murmurs  · The apical impulse is not displaced  Abdomen:  · Soft  · Bowel sounds present  · Non-tender to palpation  Extremities:  · No cyanosis or clubbing  · No Lower extremity edema  Skin:  · Warm and dry  Neurological:  · Alert and oriented. · Moves all extremities well      Plan:  · Proceed with planned procedure. · Further orders to follow. Transesophageal echocardiography (procedure) has been fully reviewed with the patient and written informed consent has been obtained. The risks, benefits, and alternatives were discussed, in detail, with patient. Briefly, the potential risks include, but are not limited to, bleeding, damage to teeth or pharynx, esophageal damage or rupture, chamber perforation, tamponade, respiratory failure requiring intubation, need for emergent surgery, and even death. All questions and concerns were answered.  Patient agreed to

## 2020-08-14 NOTE — PROGRESS NOTES
Received post procedure to [facility] to room 4. Assessment obtained. Restrictions reviewed with patient. Post procedure pathway initiated. ,  Family at side. Patient without complaints.

## 2020-08-14 NOTE — PROGRESS NOTES
All discharge instructions reviewed, questions answered, paper signed and given copy. Patient discharged per ambulatory with friend and belongings. Amiodarone prescription originally sent to [facility] pharmacy. Arrangements made to transfer script to [place].

## 2020-08-14 NOTE — H&P
SURGERY      FOREIGN BODY REMOVAL  1962    surgical sponges left in during appendectomy    HAMMER TOE SURGERY  1989    head resection of phalanx, left foot    LAPAROSCOPY  1983    with D&C    DE COLONOSCOPY W/BIOPSY SINGLE/MULTIPLE N/A 12/19/2017    COLONOSCOPY WITH BIOPSY performed by Jerry Patel MD at [facility]    DE EGD TRANSORAL BIOPSY SINGLE/MULTIPLE  12/19/2017    EGD BIOPSY performed by [facility]el [facility] at [facility] resection with rt and left ethmoidectomy    SHOULDER ARTHROSCOPY  1998    rotator cuff impingement, right arm    SINUS ENDOSCOPY      SINUS SURGERY      multiple    UPPER GASTROINTESTINAL ENDOSCOPY      2-4 times per year for pyloric stenosis    UPPER GASTROINTESTINAL ENDOSCOPY  12/19/2017    EGD DILATION BALLOON performed by Jerry Patel MD at [address] [place] [address]  5/24/2018    EGD DILATION SAVORY performed by Roque Tolbert MD at [facility]        reports that she has never smoked. She has never used smokeless tobacco. She reports that she does not drink alcohol or use drugs. Prior to Admission medications    Medication Sig Start Date End Date Taking?  Authorizing Provider   bumetanide (BUMEX) 1 MG tablet Take 1 tablet by mouth daily 8/3/20 9/2/20 Yes Maximino Foote MD   isosorbide mononitrate (IMDUR) 120 MG extended release tablet Take 0.5 tablets by mouth daily  Patient taking differently: Take 120 mg by mouth daily  8/3/20 9/2/20 Yes Maximino Foote MD   metoprolol succinate (TOPROL XL) 25 MG extended release tablet Take 1 tablet by mouth daily 8/4/20 9/3/20 Yes Maximino Foote MD   apixaban (ELIQUIS) 5 MG TABS tablet Take by mouth 2 times daily   Yes Historical Provider, MD   acetaminophen (TYLENOL) 500 MG tablet Take 1,000 mg by mouth 2 times daily as needed for Pain   Yes Historical Provider, MD   pantoprazole (PROTONIX) 40 MG tablet Take 40 mg by mouth daily Yes Historical Provider, MD   azaTHIOprine (IMURAN) 50 MG tablet Take 50 mg by mouth daily    Yes Historical Provider, MD   ranolazine (RANEXA) 500 MG SR tablet Take 500 mg by mouth 2 times daily. Yes Historical Provider, MD   PARoxetine (PAXIL) 20 MG tablet Take 20 mg by mouth every morning. Yes Historical Provider, MD   allopurinol (ZYLOPRIM) 100 MG tablet Take 100 mg by mouth daily. Yes Historical Provider, MD   clopidogrel (PLAVIX) 75 MG tablet Take 75 mg by mouth daily. Yes Historical Provider, MD   losartan (COZAAR) 100 MG tablet Take 100 mg by mouth daily    Yes Historical Provider, MD   pravastatin (PRAVACHOL) 40 MG tablet Take 40 mg by mouth daily. Yes Historical Provider, MD   nitroGLYCERIN (NITROSTAT) 0.4 MG SL tablet Place 0.4 mg under the tongue every 5 minutes.  Indications: Chest Pain    Historical Provider, MD       Allergies   Allergen Reactions    Lisinopril-Hydrochlorothiazide Anaphylaxis and Hives    Sulfa Antibiotics Anaphylaxis    Penicillins Hives    Polyethylene Glycol Hives    Actifed Cold-Allergy [Chlorpheniramine-Phenylephrine]     Altace [Ramipril]      Chronic cough    Cephalosporins Hives    Coreg [Carvedilol]      bloating    Dml Facial Moisturizer     Elavil [Amitriptyline Hcl]     Entex [Ami-Jose]     Ethylene Glycol     Fentanyl     Hizentra [Immune Globulin (Human)]     Hydralazine     Levofloxacin      ach tendon    Lisinopril-Hydrochlorothiazide     Metoprolol      Stomach pain    Montelukast Sodium      Heart effects    Morphine Itching    Nifedipine     Norvasc [Amlodipine Besylate]      Stomach pain    Other      IV IG     Phenobarbital Hives    Robaxin [Methocarbamol]     Sinequan [Doxepin Hcl]     Sudafed [Pseudoephedrine Hcl]     Sudafed [Pseudoephedrine Hcl]     Tranquil-[place]     Wellbutrin [Bupropion Hcl]      Hypotension    Actifed Cold-Allergy [Chlorpheniramine-Phenylephrine] Palpitations    Clindamycin/Lincomycin Nausea And Vomiting    Codeine Nausea And Vomiting    Metoprolol Succinate Nausea And Vomiting    Seldane [Terfenadine] Palpitations         REVIEW OF SYSTEMS:     A detailed review of system was performed as already noted and is otherwise as above. PHYSICAL EXAM:     Vitals:    08/14/20 1024   BP: 118/67   Pulse: 88   Resp: 16   Temp: 97.8 °F (36.6 °C)   SpO2: 96%        Constitutional and General Appearance: alert, cooperative, no distress and appears stated age  HEENT: PERRL, no cervical lymphadenopathy. No masses palpable. Normal oral mucosa  Respiratory:  · Normal excursion and expansion without use of accessory muscles  · Resp Auscultation: Good respiratory effort. No for increased work of breathing. On auscultation: clear to auscultation bilaterally  Cardiovascular:  · The apical impulse is not displaced  · Heart tones are crisp and normal. irregular S1 and S2.  · Jugular venous pulsation Normal  · The carotid upstroke is normal in amplitude and contour without delay or bruit  · Peripheral pulses are symmetrical and full  Abdomen:  · No masses or tenderness  · Bowel sounds present  Extremities:  ·  No Cyanosis or Clubbing  ·  Lower extremity edema: No  · Skin: Warm and dry  Neurological:  · Alert and oriented. · Moves all extremities well  · No abnormalities of mood, affect, memory, mentation, or behavior are noted      Active Problems:    * No active hospital problems. *  Resolved Problems:    * No resolved hospital problems. *      Assessment:  1. Coronary disease status post PCI to LAD with balloon angioplasty only   2. Paroxysmal atrial flutter,/fibrillation post JOVITA cardioversion in July 2020 with conversion to normal sinus rhythm, patient is back in atrial fibrillation with rapid ventricular response  3. Multiple sclerosis    Plan:  1. Proceed with planned cardioversion . 2. Further orders to follow.         The risks, benefits, and alternatives of the prcoedure were discussed in detail with the patient. The patient agrees to proceed with the procedure, verbalizes understanding and signed consent.        Shea Jonas MD  Fellow, [facility]

## 2020-08-14 NOTE — PROGRESS NOTES
Ambulated to bathroom and in halls. Gait steady. Slightly unsteady. States this is from her MS.  Also states she is less groggy now

## 2020-08-14 NOTE — PROGRESS NOTES
Patient admitted, consent signed and questions answered. Patient ready for procedure. Call light to reach with side rails up 2 of 2. More Relic at bedside with patient. History and physical complete. EKG done.

## 2020-08-15 LAB
EKG ATRIAL RATE: 264 BPM
EKG Q-T INTERVAL: 274 MS
EKG QRS DURATION: 74 MS
EKG QTC CALCULATION (BAZETT): 368 MS
EKG R AXIS: -62 DEGREES
EKG T AXIS: 106 DEGREES
EKG VENTRICULAR RATE: 109 BPM

## 2020-08-24 ENCOUNTER — APPOINTMENT (OUTPATIENT)
Dept: GENERAL RADIOLOGY | Age: 75
DRG: 291 | End: 2020-08-24
Payer: MEDICARE

## 2020-08-24 ENCOUNTER — HOSPITAL ENCOUNTER (INPATIENT)
Age: 75
LOS: 2 days | Discharge: HOME | DRG: 291 | End: 2020-08-27
Attending: EMERGENCY MEDICINE | Admitting: INTERNAL MEDICINE
Payer: MEDICARE

## 2020-08-24 LAB
ABSOLUTE EOS #: 0.2 K/UL (ref 0–0.4)
ABSOLUTE IMMATURE GRANULOCYTE: ABNORMAL K/UL (ref 0–0.3)
ABSOLUTE LYMPH #: 1 K/UL (ref 1–4.8)
ABSOLUTE MONO #: 0.9 K/UL (ref 0.1–1.2)
ANION GAP SERPL CALCULATED.3IONS-SCNC: 15 MMOL/L (ref 9–17)
BASOPHILS # BLD: 0 % (ref 0–2)
BASOPHILS ABSOLUTE: 0 K/UL (ref 0–0.2)
BNP INTERPRETATION: ABNORMAL
BUN BLDV-MCNC: 21 MG/DL (ref 8–23)
BUN/CREAT BLD: ABNORMAL (ref 9–20)
CALCIUM SERPL-MCNC: 9.1 MG/DL (ref 8.6–10.4)
CHLORIDE BLD-SCNC: 101 MMOL/L (ref 98–107)
CO2: 23 MMOL/L (ref 20–31)
CREAT SERPL-MCNC: 0.85 MG/DL (ref 0.5–0.9)
D-DIMER QUANTITATIVE: <0.19 MG/L FEU
DIFFERENTIAL TYPE: ABNORMAL
EOSINOPHILS RELATIVE PERCENT: 2 % (ref 1–4)
GFR AFRICAN AMERICAN: >60 ML/MIN
GFR NON-AFRICAN AMERICAN: >60 ML/MIN
GFR SERPL CREATININE-BSD FRML MDRD: ABNORMAL ML/MIN/{1.73_M2}
GFR SERPL CREATININE-BSD FRML MDRD: ABNORMAL ML/MIN/{1.73_M2}
GLUCOSE BLD-MCNC: 93 MG/DL (ref 70–99)
HCT VFR BLD CALC: 33.8 % (ref 36–46)
HEMOGLOBIN: 11.8 G/DL (ref 12–16)
IMMATURE GRANULOCYTES: ABNORMAL %
LYMPHOCYTES # BLD: 10 % (ref 24–44)
MCH RBC QN AUTO: 33 PG (ref 26–34)
MCHC RBC AUTO-ENTMCNC: 34.8 G/DL (ref 31–37)
MCV RBC AUTO: 94.7 FL (ref 80–100)
MONOCYTES # BLD: 10 % (ref 2–11)
NRBC AUTOMATED: ABNORMAL PER 100 WBC
PDW BLD-RTO: 15.5 % (ref 12.5–15.4)
PLATELET # BLD: 211 K/UL (ref 140–450)
PLATELET ESTIMATE: ABNORMAL
PMV BLD AUTO: 9.8 FL (ref 6–12)
POTASSIUM SERPL-SCNC: 3.4 MMOL/L (ref 3.7–5.3)
PRO-BNP: 2247 PG/ML
RBC # BLD: 3.57 M/UL (ref 4–5.2)
RBC # BLD: ABNORMAL 10*6/UL
SEG NEUTROPHILS: 78 % (ref 36–66)
SEGMENTED NEUTROPHILS ABSOLUTE COUNT: 7.4 K/UL (ref 1.8–7.7)
SODIUM BLD-SCNC: 139 MMOL/L (ref 135–144)
TROPONIN INTERP: ABNORMAL
TROPONIN INTERP: ABNORMAL
TROPONIN T: ABNORMAL NG/ML
TROPONIN T: ABNORMAL NG/ML
TROPONIN, HIGH SENSITIVITY: 16 NG/L (ref 0–14)
TROPONIN, HIGH SENSITIVITY: 20 NG/L (ref 0–14)
WBC # BLD: 9.5 K/UL (ref 3.5–11)
WBC # BLD: ABNORMAL 10*3/UL

## 2020-08-24 PROCEDURE — 71045 X-RAY EXAM CHEST 1 VIEW: CPT

## 2020-08-24 PROCEDURE — 96375 TX/PRO/DX INJ NEW DRUG ADDON: CPT

## 2020-08-24 PROCEDURE — 93005 ELECTROCARDIOGRAM TRACING: CPT | Performed by: EMERGENCY MEDICINE

## 2020-08-24 PROCEDURE — 83880 ASSAY OF NATRIURETIC PEPTIDE: CPT

## 2020-08-24 PROCEDURE — 6360000002 HC RX W HCPCS: Performed by: EMERGENCY MEDICINE

## 2020-08-24 PROCEDURE — 80048 BASIC METABOLIC PNL TOTAL CA: CPT

## 2020-08-24 PROCEDURE — 84484 ASSAY OF TROPONIN QUANT: CPT

## 2020-08-24 PROCEDURE — 85379 FIBRIN DEGRADATION QUANT: CPT

## 2020-08-24 PROCEDURE — 85025 COMPLETE CBC W/AUTO DIFF WBC: CPT

## 2020-08-24 PROCEDURE — 99285 EMERGENCY DEPT VISIT HI MDM: CPT

## 2020-08-24 PROCEDURE — 36415 COLL VENOUS BLD VENIPUNCTURE: CPT

## 2020-08-24 RX ORDER — ASPIRIN 81 MG/1
324 TABLET, CHEWABLE ORAL ONCE
Status: DISCONTINUED | OUTPATIENT
Start: 2020-08-24 | End: 2020-08-27 | Stop reason: HOSPADM

## 2020-08-24 RX ORDER — MAGNESIUM SULFATE 1 G/100ML
1 INJECTION INTRAVENOUS ONCE
Status: COMPLETED | OUTPATIENT
Start: 2020-08-24 | End: 2020-08-25

## 2020-08-24 RX ORDER — BUMETANIDE 0.25 MG/ML
2 INJECTION, SOLUTION INTRAMUSCULAR; INTRAVENOUS ONCE
Status: DISCONTINUED | OUTPATIENT
Start: 2020-08-24 | End: 2020-08-24

## 2020-08-24 RX ORDER — FUROSEMIDE 10 MG/ML
40 INJECTION INTRAMUSCULAR; INTRAVENOUS ONCE
Status: COMPLETED | OUTPATIENT
Start: 2020-08-24 | End: 2020-08-24

## 2020-08-24 RX ADMIN — FUROSEMIDE 40 MG: 10 INJECTION, SOLUTION INTRAMUSCULAR; INTRAVENOUS at 21:59

## 2020-08-24 ASSESSMENT — PAIN DESCRIPTION - ORIENTATION: ORIENTATION: MID;UPPER

## 2020-08-24 ASSESSMENT — PAIN SCALES - GENERAL: PAINLEVEL_OUTOF10: 7

## 2020-08-24 ASSESSMENT — PAIN DESCRIPTION - PAIN TYPE: TYPE: ACUTE PAIN

## 2020-08-24 ASSESSMENT — PAIN DESCRIPTION - FREQUENCY: FREQUENCY: CONTINUOUS

## 2020-08-24 ASSESSMENT — PAIN DESCRIPTION - LOCATION: LOCATION: CHEST

## 2020-08-24 ASSESSMENT — PAIN DESCRIPTION - DESCRIPTORS: DESCRIPTORS: HEAVINESS

## 2020-08-24 NOTE — ED PROVIDER NOTES
daily.      RANOLAZINE (RANEXA) 500 MG SR TABLET    Take 500 mg by mouth 2 times daily. ALLERGIES     is allergic to lisinopril-hydrochlorothiazide; sulfa antibiotics; penicillins; polyethylene glycol; actifed cold-allergy [chlorpheniramine-phenylephrine]; altace [ramipril]; cephalosporins; coreg [carvedilol]; dml facial moisturizer; elavil [amitriptyline hcl]; entex [ami-margot]; ethylene glycol; fentanyl; hizentra [immune globulin (human)]; hydralazine; levofloxacin; lisinopril-hydrochlorothiazide; metoprolol; montelukast sodium; morphine; nifedipine; norvasc [amlodipine besylate]; other; phenobarbital; robaxin [methocarbamol]; sinequan [doxepin hcl]; sudafed [pseudoephedrine hcl]; sudafed [pseudoephedrine hcl]; tranquil-cecil; wellbutrin [bupropion hcl]; actifed cold-allergy [chlorpheniramine-phenylephrine]; clindamycin/lincomycin; codeine; metoprolol succinate; and seldane [terfenadine]. FAMILY HISTORY     She indicated that the status of her mother is unknown. She indicated that the status of her father is unknown. She indicated that the status of her sister is unknown. She indicated that the status of her maternal aunt is unknown.     family history includes Breast Cancer in her maternal aunt; Heart Disease in her father and mother; Hypertension in her sister; Other in her father, maternal aunt, mother, and sister. SOCIAL HISTORY      reports that she has never smoked. She has never used smokeless tobacco. She reports that she does not drink alcohol or use drugs.     PHYSICAL EXAM       ED Triage Vitals [08/24/20 1839]   BP Temp Temp src Pulse Resp SpO2 Height Weight   (!) 153/51 -- -- 50 16 97 % 5' 1.5\" (1.562 m) 202 lb (91.6 kg)       Constitutional: Alert, oriented x3, nontoxic, answering questions appropriately, acting properly for age, in no acute distress   HEENT: Extraocular muscles intact,   Neck: Trachea midline   Cardiovascular: Regular rhythm and rate no S3, S4, or murmurs   Respiratory: Clear follow-up provider specified.     DISCHARGE MEDICATIONS:  New Prescriptions    No medications on file       (Please note that portions of thisnote were completed with a voice recognition program.  Efforts were made to edit the dictations but occasionally words are mis-transcribed.)    Hernandez DO  Attending Emergency Physician        Art Fried DO  08/24/20 1391

## 2020-08-24 NOTE — ED PROVIDER NOTES
[id number] [facility] ED  [address] [facility] [place] [address]  Phone: [phone]  Fax: [phone]        ADDENDUM:      Care of this patient was assumed from Dr. Efrain Don. The patient was seen for Chest Pain and Shortness of Breath  . The patient's initial evaluation and plan have been discussed with the prior provider who initially evaluated the patient. Nursing Notes, Past Medical Hx, Past Surgical Hx, Allergies, were all reviewed. PAST MEDICAL HISTORY    has a past medical history of Abnormal Pap smear, Asthma, Bloody discharge from nipple, CAD (coronary artery disease), Cataracts, bilateral, Colon polyp, CVA (cerebral vascular accident) ([address] Demyelinating disease ([address] Fibromyalgia, High-risk sexual behavior, History of bone density study, History of migraines, HTN (hypertension), IgG deficiency ([address] Multiple sclerosis ([address] Myoclonic seizures ([address] Optic neuritis, Osteopenia, Pyloric stenosis, Raynaud's disease, and Vasomotor rhinitis. SURGICAL HISTORY      has a past surgical history that includes Dilation & curettage (1982); Breast biopsy (1990); Appendectomy (1955); Septoplasty (1985); Hammer toe surgery (1989); Sinus endoscopy; sinus surgery; Shoulder arthroscopy (1998); Esophagoscopy (1966, 1969); Colonoscopy (2010); Foreign Body Removal (1962); laparoscopy (1289); Coronary angioplasty with stent (5/2011); Coronary angioplasty with stent (6/2011); Upper gastrointestinal endoscopy; eye surgery; pr colonoscopy w/biopsy single/multiple (N/A, 12/19/2017); pr egd transoral biopsy single/multiple (12/19/2017); Upper gastrointestinal endoscopy (12/19/2017); Upper gastrointestinal endoscopy (5/24/2018); and Cardiac surgery. CURRENT MEDICATIONS       Previous Medications    ACETAMINOPHEN (TYLENOL) 500 MG TABLET    Take 1,000 mg by mouth 2 times daily as needed for Pain    ALLOPURINOL (ZYLOPRIM) 100 MG TABLET    Take 100 mg by mouth daily.       AMIODARONE (CORDARONE) 200 MG TABLET    Take 1 tablet by mouth daily    APIXABAN (ELIQUIS) 5 MG TABS TABLET    Take by mouth 2 times daily    AZATHIOPRINE (IMURAN) 50 MG TABLET    Take 50 mg by mouth daily     BUMETANIDE (BUMEX) 1 MG TABLET    Take 1 tablet by mouth daily    CLOPIDOGREL (PLAVIX) 75 MG TABLET    Take 75 mg by mouth daily. ISOSORBIDE MONONITRATE (IMDUR) 120 MG EXTENDED RELEASE TABLET    Take 0.5 tablets by mouth daily    LOSARTAN (COZAAR) 100 MG TABLET    Take 100 mg by mouth daily     METOPROLOL SUCCINATE (TOPROL XL) 25 MG EXTENDED RELEASE TABLET    Take 1 tablet by mouth daily    NITROGLYCERIN (NITROSTAT) 0.4 MG SL TABLET    Place 0.4 mg under the tongue every 5 minutes. Indications: Chest Pain    PANTOPRAZOLE (PROTONIX) 40 MG TABLET    Take 40 mg by mouth daily    PAROXETINE (PAXIL) 20 MG TABLET    Take 20 mg by mouth every morning. PRAVASTATIN (PRAVACHOL) 40 MG TABLET    Take 40 mg by mouth daily. RANOLAZINE (RANEXA) 500 MG SR TABLET    Take 500 mg by mouth 2 times daily. ALLERGIES     is allergic to lisinopril-hydrochlorothiazide; sulfa antibiotics; penicillins; polyethylene glycol; actifed cold-allergy [chlorpheniramine-phenylephrine]; altace [ramipril]; cephalosporins; coreg [carvedilol]; dml facial moisturizer; elavil [amitriptyline hcl]; entex [ami-margot]; ethylene glycol; fentanyl; hizentra [immune globulin (human)]; hydralazine; levofloxacin; lisinopril-hydrochlorothiazide; metoprolol; montelukast sodium; morphine; nifedipine; norvasc [amlodipine besylate]; other; phenobarbital; robaxin [methocarbamol]; sinequan [doxepin hcl]; sudafed [pseudoephedrine hcl]; sudafed [pseudoephedrine hcl]; tranquil-cecil; wellbutrin [bupropion hcl]; actifed cold-allergy [chlorpheniramine-phenylephrine]; clindamycin/lincomycin; codeine; metoprolol succinate; and seldane [terfenadine].       Diagnostic Results     LABS:   Results for orders placed or performed during the hospital encounter of 08/24/20   CBC Auto Differential Result Value Ref Range    WBC 9.5 3.5 - 11.0 k/uL    RBC 3.57 (L) 4.0 - 5.2 m/uL    Hemoglobin 11.8 (L) 12.0 - 16.0 g/dL    Hematocrit 33.8 (L) 36 - 46 %    MCV 94.7 80 - 100 fL    MCH 33.0 26 - 34 pg    MCHC 34.8 31 - 37 g/dL    RDW 15.5 (H) 12.5 - 15.4 %    Platelets 375 685 - 382 k/uL    MPV 9.8 6.0 - 12.0 fL    NRBC Automated NOT REPORTED per 100 WBC    Differential Type NOT REPORTED     Seg Neutrophils 78 (H) 36 - 66 %    Lymphocytes 10 (L) 24 - 44 %    Monocytes 10 2 - 11 %    Eosinophils % 2 1 - 4 %    Basophils 0 0 - 2 %    Immature Granulocytes NOT REPORTED 0 %    Segs Absolute 7.40 1.8 - 7.7 k/uL    Absolute Lymph # 1.00 1.0 - 4.8 k/uL    Absolute Mono # 0.90 0.1 - 1.2 k/uL    Absolute Eos # 0.20 0.0 - 0.4 k/uL    Basophils Absolute 0.00 0.0 - 0.2 k/uL    Absolute Immature Granulocyte NOT REPORTED 0.00 - 0.30 k/uL    WBC Morphology NOT REPORTED     RBC Morphology NOT REPORTED     Platelet Estimate NOT REPORTED    Basic Metabolic Panel   Result Value Ref Range    Glucose 93 70 - 99 mg/dL    BUN 21 8 - 23 mg/dL    CREATININE 0.85 0.50 - 0.90 mg/dL    Bun/Cre Ratio NOT REPORTED 9 - 20    Calcium 9.1 8.6 - 10.4 mg/dL    Sodium 139 135 - 144 mmol/L    Potassium 3.4 (L) 3.7 - 5.3 mmol/L    Chloride 101 98 - 107 mmol/L    CO2 23 20 - 31 mmol/L    Anion Gap 15 9 - 17 mmol/L    GFR Non-African American >60 >60 mL/min    GFR African American >60 >60 mL/min    GFR Comment          GFR Staging NOT REPORTED    Troponin   Result Value Ref Range    Troponin, High Sensitivity 20 (H) 0 - 14 ng/L    Troponin T NOT REPORTED <0.03 ng/mL    Troponin Interp NOT REPORTED    Brain Natriuretic Peptide   Result Value Ref Range    Pro-BNP 2,247 (H) <300 pg/mL    BNP Interpretation Pro-BNP Reference Range:    D-Dimer, Quantitative   Result Value Ref Range    D-Dimer, Quant <0.19 mg/L FEU   Troponin   Result Value Ref Range    Troponin, High Sensitivity 16 (H) 0 - 14 ng/L    Troponin T NOT REPORTED <0.03 ng/mL    Troponin Interp NOT REPORTED    EKG 12 Lead   Result Value Ref Range    Ventricular Rate 48 BPM    Atrial Rate 48 BPM    P-R Interval 144 ms    QRS Duration 80 ms    Q-T Interval 536 ms    QTc Calculation (Bazett) 478 ms    P Axis 78 degrees    R Axis -46 degrees    T Axis 87 degrees       RADIOLOGY:  XR CHEST PORTABLE   Final Result   Chronic findings in the chest without acute airspace disease identified. RECENT VITALS:  BP: (!) 148/47, Temp: 98 °F (36.7 °C), Pulse: 54, Resp: 20     ED Course     The patient was given the following medications:  Orders Placed This Encounter   Medications    aspirin chewable tablet 324 mg    DISCONTD: bumetanide (BUMEX) injection 2 mg    furosemide (LASIX) injection 40 mg    magnesium sulfate 1 g in dextrose 5% 100 mL IVPB       Medical Decision Making           The patient is a 79-year-old female with history of CAD status post recent angioplasty, CHF, multiple sclerosis, hypertension, asthma, hyperlipidemia, fibromyalgia, pyloric stenosis, demyelinating disease, CVA, migraines, immunocompromise, and A. fib status post cardioversion who presents for evaluation of chest pain. The patient reports that she developed chest pain last night that did not improve with nitroglycerin. She called her cardiologist, Dr. Cuba Grayson, and was sent to the emergency department for further evaluation. Initial EKG interpreted by Dr. sIaiah Davies is sinus bradycardia without any ST changes. Vital signs are stable. At time of signout lab work and chest x-ray are pending. Troponin x2 are unchanged from baseline. CBC, BMP, and d-dimer are unremarkable. BNP is elevated at 2247 but this is improved from 1 month ago. Chest x-ray shows chronic findings of vascular congestion without any effusion or focal airspace disease.   Repeat EKG 2037 shows sinus bradycardia, rate 48 bpm, left axis deviation, normal NH and QRS intervals, QT prolonged at 536 ms,  ms, no ST elevation or depression, T wave inversion in

## 2020-08-25 PROBLEM — N18.30 CHRONIC RENAL INSUFFICIENCY, STAGE III (MODERATE) (HCC): Chronic | Status: ACTIVE | Noted: 2018-10-12

## 2020-08-25 PROBLEM — I48.0 PAROXYSMAL A-FIB (HCC): Chronic | Status: ACTIVE | Noted: 2020-08-01

## 2020-08-25 PROBLEM — I50.41 ACUTE COMBINED SYSTOLIC AND DIASTOLIC CHF, NYHA CLASS 2 (HCC): Status: ACTIVE | Noted: 2020-08-25

## 2020-08-25 PROBLEM — F09 MILD COGNITIVE DISORDER: Status: ACTIVE | Noted: 2018-06-25

## 2020-08-25 PROBLEM — I50.43 ACUTE ON CHRONIC COMBINED SYSTOLIC AND DIASTOLIC CHF (CONGESTIVE HEART FAILURE) (HCC): Status: ACTIVE | Noted: 2019-05-18

## 2020-08-25 PROBLEM — I48.0 PAROXYSMAL A-FIB (HCC): Status: ACTIVE | Noted: 2020-08-01

## 2020-08-25 PROBLEM — N18.30 CHRONIC RENAL INSUFFICIENCY, STAGE III (MODERATE) (HCC): Status: ACTIVE | Noted: 2018-10-12

## 2020-08-25 PROBLEM — I25.10 CAD (CORONARY ARTERY DISEASE): Chronic | Status: ACTIVE | Noted: 2019-05-19

## 2020-08-25 PROBLEM — I48.92 ATRIAL FLUTTER (HCC): Chronic | Status: ACTIVE | Noted: 2020-07-31

## 2020-08-25 PROBLEM — R07.89 ATYPICAL CHEST PAIN: Status: ACTIVE | Noted: 2020-08-25

## 2020-08-25 LAB
EKG ATRIAL RATE: 48 BPM
EKG ATRIAL RATE: 53 BPM
EKG P AXIS: 65 DEGREES
EKG P AXIS: 78 DEGREES
EKG P-R INTERVAL: 136 MS
EKG P-R INTERVAL: 144 MS
EKG Q-T INTERVAL: 434 MS
EKG Q-T INTERVAL: 536 MS
EKG QRS DURATION: 76 MS
EKG QRS DURATION: 80 MS
EKG QTC CALCULATION (BAZETT): 407 MS
EKG QTC CALCULATION (BAZETT): 478 MS
EKG R AXIS: -46 DEGREES
EKG R AXIS: -46 DEGREES
EKG T AXIS: 87 DEGREES
EKG T AXIS: 91 DEGREES
EKG VENTRICULAR RATE: 48 BPM
EKG VENTRICULAR RATE: 53 BPM
MAGNESIUM: 2.2 MG/DL (ref 1.6–2.6)
POTASSIUM SERPL-SCNC: 3.8 MMOL/L (ref 3.7–5.3)
SARS-COV-2, PCR: NORMAL
SARS-COV-2, RAPID: NOT DETECTED
SARS-COV-2: NORMAL
SOURCE: NORMAL
TROPONIN INTERP: ABNORMAL
TROPONIN T: ABNORMAL NG/ML
TROPONIN, HIGH SENSITIVITY: 25 NG/L (ref 0–14)
VITAMIN D 25-HYDROXY: 12 NG/ML (ref 30–100)

## 2020-08-25 PROCEDURE — 6370000000 HC RX 637 (ALT 250 FOR IP): Performed by: NURSE PRACTITIONER

## 2020-08-25 PROCEDURE — 2580000003 HC RX 258: Performed by: NURSE PRACTITIONER

## 2020-08-25 PROCEDURE — G0378 HOSPITAL OBSERVATION PER HR: HCPCS

## 2020-08-25 PROCEDURE — 84484 ASSAY OF TROPONIN QUANT: CPT

## 2020-08-25 PROCEDURE — 6360000002 HC RX W HCPCS: Performed by: INTERNAL MEDICINE

## 2020-08-25 PROCEDURE — 1210000000 HC MED SURG R&B

## 2020-08-25 PROCEDURE — APPSS180 APP SPLIT SHARED TIME > 60 MINUTES: Performed by: NURSE PRACTITIONER

## 2020-08-25 PROCEDURE — 82272 OCCULT BLD FECES 1-3 TESTS: CPT

## 2020-08-25 PROCEDURE — 83735 ASSAY OF MAGNESIUM: CPT

## 2020-08-25 PROCEDURE — 84132 ASSAY OF SERUM POTASSIUM: CPT

## 2020-08-25 PROCEDURE — U0002 COVID-19 LAB TEST NON-CDC: HCPCS

## 2020-08-25 PROCEDURE — 36415 COLL VENOUS BLD VENIPUNCTURE: CPT

## 2020-08-25 PROCEDURE — 96365 THER/PROPH/DIAG IV INF INIT: CPT

## 2020-08-25 PROCEDURE — 6360000002 HC RX W HCPCS: Performed by: EMERGENCY MEDICINE

## 2020-08-25 PROCEDURE — 6370000000 HC RX 637 (ALT 250 FOR IP): Performed by: INTERNAL MEDICINE

## 2020-08-25 PROCEDURE — 82306 VITAMIN D 25 HYDROXY: CPT

## 2020-08-25 PROCEDURE — 6360000002 HC RX W HCPCS: Performed by: NURSE PRACTITIONER

## 2020-08-25 PROCEDURE — 99220 PR INITIAL OBSERVATION CARE/DAY 70 MINUTES: CPT | Performed by: INTERNAL MEDICINE

## 2020-08-25 RX ORDER — ACETAMINOPHEN 325 MG/1
650 TABLET ORAL EVERY 6 HOURS PRN
Status: DISCONTINUED | OUTPATIENT
Start: 2020-08-25 | End: 2020-08-25

## 2020-08-25 RX ORDER — ACETAMINOPHEN 650 MG/1
650 SUPPOSITORY RECTAL EVERY 6 HOURS PRN
Status: DISCONTINUED | OUTPATIENT
Start: 2020-08-25 | End: 2020-08-25

## 2020-08-25 RX ORDER — CLOPIDOGREL BISULFATE 75 MG/1
75 TABLET ORAL DAILY
Status: DISCONTINUED | OUTPATIENT
Start: 2020-08-25 | End: 2020-08-27 | Stop reason: HOSPADM

## 2020-08-25 RX ORDER — POTASSIUM CHLORIDE 20 MEQ/1
40 TABLET, EXTENDED RELEASE ORAL PRN
Status: DISCONTINUED | OUTPATIENT
Start: 2020-08-25 | End: 2020-08-27 | Stop reason: HOSPADM

## 2020-08-25 RX ORDER — FUROSEMIDE 10 MG/ML
40 INJECTION INTRAMUSCULAR; INTRAVENOUS 2 TIMES DAILY
Status: DISCONTINUED | OUTPATIENT
Start: 2020-08-25 | End: 2020-08-26

## 2020-08-25 RX ORDER — AZATHIOPRINE 50 MG/1
50 TABLET ORAL DAILY
Status: DISCONTINUED | OUTPATIENT
Start: 2020-08-25 | End: 2020-08-27 | Stop reason: HOSPADM

## 2020-08-25 RX ORDER — ISOSORBIDE MONONITRATE 60 MG/1
120 TABLET, EXTENDED RELEASE ORAL DAILY
Status: DISCONTINUED | OUTPATIENT
Start: 2020-08-25 | End: 2020-08-25 | Stop reason: SDUPTHER

## 2020-08-25 RX ORDER — AMIODARONE HYDROCHLORIDE 200 MG/1
200 TABLET ORAL DAILY
Status: DISCONTINUED | OUTPATIENT
Start: 2020-08-25 | End: 2020-08-25

## 2020-08-25 RX ORDER — MAGNESIUM SULFATE 1 G/100ML
1 INJECTION INTRAVENOUS PRN
Status: DISCONTINUED | OUTPATIENT
Start: 2020-08-25 | End: 2020-08-27 | Stop reason: HOSPADM

## 2020-08-25 RX ORDER — NITROGLYCERIN 0.4 MG/1
0.4 TABLET SUBLINGUAL
Status: DISCONTINUED | OUTPATIENT
Start: 2020-08-25 | End: 2020-08-25 | Stop reason: SDUPTHER

## 2020-08-25 RX ORDER — SENNA PLUS 8.6 MG/1
1 TABLET ORAL DAILY PRN
Status: DISCONTINUED | OUTPATIENT
Start: 2020-08-25 | End: 2020-08-27 | Stop reason: HOSPADM

## 2020-08-25 RX ORDER — PROMETHAZINE HYDROCHLORIDE 25 MG/1
12.5 TABLET ORAL EVERY 6 HOURS PRN
Status: DISCONTINUED | OUTPATIENT
Start: 2020-08-25 | End: 2020-08-27 | Stop reason: HOSPADM

## 2020-08-25 RX ORDER — PAROXETINE HYDROCHLORIDE 20 MG/1
40 TABLET, FILM COATED ORAL EVERY MORNING
Status: DISCONTINUED | OUTPATIENT
Start: 2020-08-25 | End: 2020-08-25

## 2020-08-25 RX ORDER — ISOSORBIDE MONONITRATE 120 MG/1
120 TABLET, EXTENDED RELEASE ORAL DAILY
Status: DISCONTINUED | OUTPATIENT
Start: 2020-08-26 | End: 2020-08-27 | Stop reason: HOSPADM

## 2020-08-25 RX ORDER — ALLOPURINOL 100 MG/1
100 TABLET ORAL DAILY
Status: DISCONTINUED | OUTPATIENT
Start: 2020-08-25 | End: 2020-08-27 | Stop reason: HOSPADM

## 2020-08-25 RX ORDER — PAROXETINE HYDROCHLORIDE 20 MG/1
20 TABLET, FILM COATED ORAL EVERY MORNING
Status: DISCONTINUED | OUTPATIENT
Start: 2020-08-25 | End: 2020-08-27 | Stop reason: HOSPADM

## 2020-08-25 RX ORDER — SODIUM CHLORIDE 0.9 % (FLUSH) 0.9 %
10 SYRINGE (ML) INJECTION EVERY 12 HOURS SCHEDULED
Status: DISCONTINUED | OUTPATIENT
Start: 2020-08-25 | End: 2020-08-27 | Stop reason: HOSPADM

## 2020-08-25 RX ORDER — ONDANSETRON 2 MG/ML
4 INJECTION INTRAMUSCULAR; INTRAVENOUS EVERY 6 HOURS PRN
Status: DISCONTINUED | OUTPATIENT
Start: 2020-08-25 | End: 2020-08-27 | Stop reason: HOSPADM

## 2020-08-25 RX ORDER — NITROGLYCERIN 0.4 MG/1
0.4 TABLET SUBLINGUAL EVERY 5 MIN PRN
Status: DISCONTINUED | OUTPATIENT
Start: 2020-08-25 | End: 2020-08-27 | Stop reason: HOSPADM

## 2020-08-25 RX ORDER — POTASSIUM CHLORIDE 7.45 MG/ML
10 INJECTION INTRAVENOUS PRN
Status: DISCONTINUED | OUTPATIENT
Start: 2020-08-25 | End: 2020-08-27 | Stop reason: HOSPADM

## 2020-08-25 RX ORDER — FUROSEMIDE 10 MG/ML
40 INJECTION INTRAMUSCULAR; INTRAVENOUS DAILY
Status: DISCONTINUED | OUTPATIENT
Start: 2020-08-25 | End: 2020-08-25

## 2020-08-25 RX ORDER — PANTOPRAZOLE SODIUM 40 MG/1
40 TABLET, DELAYED RELEASE ORAL
Status: DISCONTINUED | OUTPATIENT
Start: 2020-08-25 | End: 2020-08-25 | Stop reason: SDUPTHER

## 2020-08-25 RX ORDER — AMIODARONE HYDROCHLORIDE 200 MG/1
100 TABLET ORAL DAILY
Status: DISCONTINUED | OUTPATIENT
Start: 2020-08-26 | End: 2020-08-26

## 2020-08-25 RX ORDER — SODIUM CHLORIDE 0.9 % (FLUSH) 0.9 %
10 SYRINGE (ML) INJECTION PRN
Status: DISCONTINUED | OUTPATIENT
Start: 2020-08-25 | End: 2020-08-27 | Stop reason: HOSPADM

## 2020-08-25 RX ORDER — RANOLAZINE 500 MG/1
500 TABLET, EXTENDED RELEASE ORAL 2 TIMES DAILY
Status: DISCONTINUED | OUTPATIENT
Start: 2020-08-25 | End: 2020-08-27 | Stop reason: HOSPADM

## 2020-08-25 RX ORDER — ACETAMINOPHEN 500 MG
1000 TABLET ORAL EVERY 6 HOURS PRN
Status: DISCONTINUED | OUTPATIENT
Start: 2020-08-25 | End: 2020-08-27 | Stop reason: HOSPADM

## 2020-08-25 RX ORDER — PRAVASTATIN SODIUM 40 MG
40 TABLET ORAL NIGHTLY
Status: DISCONTINUED | OUTPATIENT
Start: 2020-08-25 | End: 2020-08-27 | Stop reason: HOSPADM

## 2020-08-25 RX ORDER — BUMETANIDE 1 MG/1
2 TABLET ORAL DAILY
Status: DISCONTINUED | OUTPATIENT
Start: 2020-08-25 | End: 2020-08-25

## 2020-08-25 RX ORDER — PRAVASTATIN SODIUM 20 MG
40 TABLET ORAL NIGHTLY
Status: DISCONTINUED | OUTPATIENT
Start: 2020-08-25 | End: 2020-08-25 | Stop reason: SDUPTHER

## 2020-08-25 RX ORDER — METOPROLOL SUCCINATE 25 MG/1
12.5 TABLET, EXTENDED RELEASE ORAL DAILY
Status: DISCONTINUED | OUTPATIENT
Start: 2020-08-25 | End: 2020-08-26

## 2020-08-25 RX ORDER — FAMOTIDINE 20 MG/1
20 TABLET, FILM COATED ORAL 2 TIMES DAILY
Status: DISCONTINUED | OUTPATIENT
Start: 2020-08-25 | End: 2020-08-25

## 2020-08-25 RX ORDER — PANTOPRAZOLE SODIUM 40 MG/1
40 TABLET, DELAYED RELEASE ORAL
Status: DISCONTINUED | OUTPATIENT
Start: 2020-08-26 | End: 2020-08-27 | Stop reason: HOSPADM

## 2020-08-25 RX ADMIN — PANTOPRAZOLE SODIUM 40 MG: 40 TABLET, DELAYED RELEASE ORAL at 14:47

## 2020-08-25 RX ADMIN — MAGNESIUM SULFATE HEPTAHYDRATE 1 G: 1 INJECTION, SOLUTION INTRAVENOUS at 00:22

## 2020-08-25 RX ADMIN — RANOLAZINE 500 MG: 500 TABLET, FILM COATED, EXTENDED RELEASE ORAL at 20:49

## 2020-08-25 RX ADMIN — Medication 10 ML: at 20:49

## 2020-08-25 RX ADMIN — FUROSEMIDE 40 MG: 10 INJECTION, SOLUTION INTRAMUSCULAR; INTRAVENOUS at 20:49

## 2020-08-25 RX ADMIN — AZATHIOPRINE 50 MG: 50 TABLET ORAL at 14:47

## 2020-08-25 RX ADMIN — Medication 40 MG: at 20:49

## 2020-08-25 RX ADMIN — APIXABAN 5 MG: 5 TABLET, FILM COATED ORAL at 20:49

## 2020-08-25 RX ADMIN — ALLOPURINOL 100 MG: 100 TABLET ORAL at 14:47

## 2020-08-25 RX ADMIN — CLOPIDOGREL BISULFATE 75 MG: 75 TABLET ORAL at 14:47

## 2020-08-25 RX ADMIN — ISOSORBIDE MONONITRATE 120 MG: 60 TABLET ORAL at 14:46

## 2020-08-25 RX ADMIN — POTASSIUM CHLORIDE 40 MEQ: 1500 TABLET, EXTENDED RELEASE ORAL at 14:47

## 2020-08-25 RX ADMIN — Medication 1000 MG: at 23:58

## 2020-08-25 RX ADMIN — METOPROLOL SUCCINATE 12.5 MG: 25 TABLET, EXTENDED RELEASE ORAL at 14:49

## 2020-08-25 RX ADMIN — RANOLAZINE 500 MG: 500 TABLET, FILM COATED, EXTENDED RELEASE ORAL at 14:47

## 2020-08-25 RX ADMIN — Medication 1000 MG: at 17:37

## 2020-08-25 RX ADMIN — PAROXETINE HYDROCHLORIDE 20 MG: 20 TABLET, FILM COATED ORAL at 14:47

## 2020-08-25 RX ADMIN — APIXABAN 5 MG: 5 TABLET, FILM COATED ORAL at 14:47

## 2020-08-25 RX ADMIN — FUROSEMIDE 40 MG: 10 INJECTION, SOLUTION INTRAMUSCULAR; INTRAVENOUS at 14:47

## 2020-08-25 ASSESSMENT — ENCOUNTER SYMPTOMS
EYES NEGATIVE: 1
SHORTNESS OF BREATH: 1
GASTROINTESTINAL NEGATIVE: 1
ALLERGIC/IMMUNOLOGIC NEGATIVE: 1

## 2020-08-25 ASSESSMENT — PAIN SCALES - GENERAL
PAINLEVEL_OUTOF10: 5
PAINLEVEL_OUTOF10: 0
PAINLEVEL_OUTOF10: 3
PAINLEVEL_OUTOF10: 6

## 2020-08-25 NOTE — CARE COORDINATION
Case Management Initial Discharge Plan  Jessie Frederick,             Met with:patient to discuss discharge plans. Information verified: address, contacts, phone number, , insurance Yes    Emergency Contact/Next of Kin name & number: daughter Anselmo Madrigal [phone]    PCP: Arik Orellana MD  Date of last visit: 2020    Insurance Provider: Medicare, [facility]    Discharge Planning    Living Arrangements:      Support Systems:       Home has 1 stories   stairs to climb to get into front door, stairs to climb to reach second floor  Location of bedroom/bathroom in home 1    Patient able to perform ADL's:Independent    Current Services (outpatient & in home)   DME equipment: rollator  DME provider:     Receiving oral anticoagulation therapy? Yes    If indicated:   Physician managing anticoagulation treatment:   Where does patient obtain lab work for ATC treatment? Potential Assistance Needed:       Patient agreeable to home care: No  [place] of choice provided:  no    Prior SNF/Rehab Placement and Facility: no  Agreeable to SNF/Rehab: No  [place] of choice provided: no     Evaluation: no    Expected Discharge date:       Patient expects to be discharged to: Follow Up Appointment: Best Day/ Time:      Transportation provider: self  Transportation arrangements needed for discharge: No    Readmission Risk              Risk of Unplanned Readmission:        20             Does patient have a readmission risk score greater than 14?: Yes  If yes, follow-up appointment must be made within 7 days of discharge.      Goals of Care: elana       Discharge Plan: home, independent - may need transport home          Electronically signed by Vivien Davis RN on 20 at 2:15 PM EDT

## 2020-08-25 NOTE — ED PROVIDER NOTES
[address] [facility]  [address] [facility] [address]  Phone: [phone]        ADDENDUM:      Care of this patient was assumed from Dr. Tr Raymond. The patient was seen for Chest Pain and Shortness of Breath  . The patient's initial evaluation and plan have been discussed with the prior provider who initially evaluated the patient. Nursing Notes, Past Medical Hx, Past Surgical Hx, Allergies, were all reviewed. PAST MEDICAL HISTORY    has a past medical history of Abnormal Pap smear, Asthma, Atrial fibrillation (HCC), Bloody discharge from nipple, CAD (coronary artery disease), Cataracts, bilateral, Colon polyp, CVA (cerebral vascular accident) ([facility] [address] Demyelinating disease ([facility] [address] Fibromyalgia, High-risk sexual behavior, History of bone density study, History of migraines, HTN (hypertension), IgG deficiency ([facility] [address] Multiple sclerosis ([facility] [address] Myoclonic seizures ([facility] [address] Optic neuritis, Osteopenia, Pyloric stenosis, Raynaud's disease, and Vasomotor rhinitis. SURGICAL HISTORY      has a past surgical history that includes Dilation & curettage (1982); Breast biopsy (1990); Appendectomy (1955); Septoplasty (1985); Hammer toe surgery (1989); Sinus endoscopy; sinus surgery; Shoulder arthroscopy (1998); Esophagoscopy (1966, 1969); Colonoscopy (2010); Foreign Body Removal (1962); laparoscopy (5389); Coronary angioplasty with stent (5/2011); Coronary angioplasty with stent (6/2011); Upper gastrointestinal endoscopy; eye surgery; pr colonoscopy w/biopsy single/multiple (N/A, 12/19/2017); pr egd transoral biopsy single/multiple (12/19/2017); Upper gastrointestinal endoscopy (12/19/2017); Upper gastrointestinal endoscopy (5/24/2018); Cardiac surgery; Cardiac catheterization (07/31/2020); Cardioversion (08/14/2020); and Cardioversion (07/31/2020).     CURRENT MEDICATIONS       Current Discharge Medication List      CONTINUE these medications which have NOT CHANGED    Details   isosorbide mononitrate (IMDUR) 120 MG extended release tablet Take 0.5 tablets by mouth daily  Qty: 15 tablet, Refills: 0      metoprolol succinate (TOPROL XL) 25 MG extended release tablet Take 1 tablet by mouth daily  Qty: 30 tablet, Refills: 3      apixaban (ELIQUIS) 5 MG TABS tablet Take by mouth 2 times daily      pantoprazole (PROTONIX) 40 MG tablet Take 40 mg by mouth daily      azaTHIOprine (IMURAN) 50 MG tablet Take 50 mg by mouth daily       ranolazine (RANEXA) 500 MG SR tablet Take 500 mg by mouth 2 times daily. PARoxetine (PAXIL) 20 MG tablet Take 20 mg by mouth every morning. allopurinol (ZYLOPRIM) 100 MG tablet Take 100 mg by mouth daily. clopidogrel (PLAVIX) 75 MG tablet Take 75 mg by mouth daily. pravastatin (PRAVACHOL) 40 MG tablet Take 40 mg by mouth daily. amiodarone (CORDARONE) 200 MG tablet Take 1 tablet by mouth daily  Qty: 30 tablet, Refills: 1      bumetanide (BUMEX) 1 MG tablet Take 1 tablet by mouth daily  Qty: 30 tablet, Refills: 0      acetaminophen (TYLENOL) 500 MG tablet Take 1,000 mg by mouth 2 times daily as needed for Pain      nitroGLYCERIN (NITROSTAT) 0.4 MG SL tablet Place 0.4 mg under the tongue every 5 minutes.  Indications: Chest Pain      losartan (COZAAR) 100 MG tablet Take 100 mg by mouth daily              ALLERGIES     is allergic to lisinopril-hydrochlorothiazide; sulfa antibiotics; penicillins; polyethylene glycol; actifed cold-allergy [chlorpheniramine-phenylephrine]; altace [ramipril]; cephalosporins; coreg [carvedilol]; dml facial moisturizer; elavil [amitriptyline hcl]; entex [ami-margot]; ethylene glycol; fentanyl; hizentra [immune globulin (human)]; hydralazine; levofloxacin; lisinopril-hydrochlorothiazide; metoprolol; montelukast sodium; morphine; nifedipine; norvasc [amlodipine besylate]; other; phenobarbital; robaxin [methocarbamol]; sinequan [doxepin hcl]; sudafed [pseudoephedrine hcl]; sudafed [pseudoephedrine hcl]; tranquil-cecil; wellbutrin [bupropion hcl]; actifed cold-allergy [chlorpheniramine-phenylephrine]; clindamycin/lincomycin; codeine; metoprolol succinate; and seldane [terfenadine].       Diagnostic Results       LABS:   Results for orders placed or performed during the hospital encounter of 08/24/20   CBC Auto Differential   Result Value Ref Range    WBC 9.5 3.5 - 11.0 k/uL    RBC 3.57 (L) 4.0 - 5.2 m/uL    Hemoglobin 11.8 (L) 12.0 - 16.0 g/dL    Hematocrit 33.8 (L) 36 - 46 %    MCV 94.7 80 - 100 fL    MCH 33.0 26 - 34 pg    MCHC 34.8 31 - 37 g/dL    RDW 15.5 (H) 12.5 - 15.4 %    Platelets 847 892 - 127 k/uL    MPV 9.8 6.0 - 12.0 fL    NRBC Automated NOT REPORTED per 100 WBC    Differential Type NOT REPORTED     Seg Neutrophils 78 (H) 36 - 66 %    Lymphocytes 10 (L) 24 - 44 %    Monocytes 10 2 - 11 %    Eosinophils % 2 1 - 4 %    Basophils 0 0 - 2 %    Immature Granulocytes NOT REPORTED 0 %    Segs Absolute 7.40 1.8 - 7.7 k/uL    Absolute Lymph # 1.00 1.0 - 4.8 k/uL    Absolute Mono # 0.90 0.1 - 1.2 k/uL    Absolute Eos # 0.20 0.0 - 0.4 k/uL    Basophils Absolute 0.00 0.0 - 0.2 k/uL    Absolute Immature Granulocyte NOT REPORTED 0.00 - 0.30 k/uL    WBC Morphology NOT REPORTED     RBC Morphology NOT REPORTED     Platelet Estimate NOT REPORTED    Basic Metabolic Panel   Result Value Ref Range    Glucose 93 70 - 99 mg/dL    BUN 21 8 - 23 mg/dL    CREATININE 0.85 0.50 - 0.90 mg/dL    Bun/Cre Ratio NOT REPORTED 9 - 20    Calcium 9.1 8.6 - 10.4 mg/dL    Sodium 139 135 - 144 mmol/L    Potassium 3.4 (L) 3.7 - 5.3 mmol/L    Chloride 101 98 - 107 mmol/L    CO2 23 20 - 31 mmol/L    Anion Gap 15 9 - 17 mmol/L    GFR Non-African American >60 >60 mL/min    GFR African American >60 >60 mL/min    GFR Comment          GFR Staging NOT REPORTED    Troponin   Result Value Ref Range    Troponin, High Sensitivity 20 (H) 0 - 14 ng/L    Troponin T NOT REPORTED <0.03 ng/mL    Troponin Interp NOT REPORTED    Brain Natriuretic Peptide   Result Value Ref Range    Pro-BNP 2,247 (H) <300 pg/mL    BNP Interpretation Pro-BNP Reference Range:    D-Dimer, Quantitative   Result Value Ref Range    D-Dimer, Quant <0.19 mg/L FEU   Troponin   Result Value Ref Range    Troponin, High Sensitivity 16 (H) 0 - 14 ng/L    Troponin T NOT REPORTED <0.03 ng/mL    Troponin Interp NOT REPORTED    COVID-19    Specimen: Other   Result Value Ref Range    SARS-CoV-2          SARS-CoV-2, Rapid Not Detected Not Detected    Source . NASOPHARYNGEAL SWAB     SARS-CoV-2, PCR         Troponin   Result Value Ref Range    Troponin, High Sensitivity 25 (H) 0 - 14 ng/L    Troponin T NOT REPORTED <0.03 ng/mL    Troponin Interp NOT REPORTED    EKG 12 Lead   Result Value Ref Range    Ventricular Rate 53 BPM    Atrial Rate 53 BPM    P-R Interval 136 ms    QRS Duration 76 ms    Q-T Interval 434 ms    QTc Calculation (Bazett) 407 ms    P Axis 65 degrees    R Axis -46 degrees    T Axis 91 degrees   EKG 12 Lead   Result Value Ref Range    Ventricular Rate 48 BPM    Atrial Rate 48 BPM    P-R Interval 144 ms    QRS Duration 80 ms    Q-T Interval 536 ms    QTc Calculation (Bazett) 478 ms    P Axis 78 degrees    R Axis -46 degrees    T Axis 87 degrees       RADIOLOGY:  XR CHEST PORTABLE   Final Result   Chronic findings in the chest without acute airspace disease identified.              RECENT VITALS:  BP: (!) 142/65, Temp: 98 °F (36.7 °C), Pulse: (!) 49, Resp: 14     ED Course     The patient was given the following medications:  Orders Placed This Encounter   Medications    aspirin chewable tablet 324 mg    DISCONTD: bumetanide (BUMEX) injection 2 mg    furosemide (LASIX) injection 40 mg    allopurinol (ZYLOPRIM) tablet 100 mg    DISCONTD: amiodarone (CORDARONE) tablet 200 mg    apixaban (ELIQUIS) tablet 5 mg    azaTHIOprine (IMURAN) tablet 50 mg    DISCONTD: bumetanide (BUMEX) tablet 2 mg    clopidogrel (PLAVIX) tablet 75 mg    isosorbide mononitrate (IMDUR) extended release tablet 120 mg    nitroGLYCERIN (NITROSTAT) SL tablet 0.4 mg    pantoprazole (PROTONIX) tablet 40 mg    PARoxetine (PAXIL) tablet 40 mg    pravastatin (PRAVACHOL) tablet 40 mg    ranolazine (RANEXA) extended release tablet 500 mg    sodium chloride flush 0.9 % injection 10 mL    sodium chloride flush 0.9 % injection 10 mL    OR Linked Order Group     potassium chloride (KLOR-CON M) extended release tablet 40 mEq     potassium bicarb-citric acid (EFFER-K) effervescent tablet 40 mEq     potassium chloride 10 mEq/100 mL IVPB (Peripheral Line)    OR Linked Order Group     acetaminophen (TYLENOL) tablet 650 mg     acetaminophen (TYLENOL) suppository 650 mg    OR Linked Order Group     promethazine (PHENERGAN) tablet 12.5 mg     ondansetron (ZOFRAN) injection 4 mg    famotidine (PEPCID) tablet 20 mg     May cancel order if already on H2 blocker/PPI alternative as a home medication to avoid duplication of therapy.  nitroGLYCERIN (NITROSTAT) SL tablet 0.4 mg    perflutren lipid microspheres (DEFINITY) injection 1.65 mg    DISCONTD: aspirin EC tablet 325 mg    senna (SENOKOT) tablet 8.6 mg    magnesium sulfate 1 g in dextrose 5% 100 mL IVPB    DISCONTD: furosemide (LASIX) injection 40 mg    furosemide (LASIX) injection 40 mg    amiodarone (CORDARONE) tablet 100 mg    metoprolol succinate (TOPROL XL) extended release tablet 12.5 mg       Medical Decision Making      Dr. Núñez informs me that the patient is to be transferred to [facility] at the request of the cardiologist.  She had a recent cardiac procedure and they thought she should go there. There are no beds available, so she is being held in the emergency department.     EMERGENCY DEPARTMENT COURSE:   Vitals:    Vitals:    08/25/20 1130 08/25/20 1200 08/25/20 1239 08/25/20 1240   BP:    (!) 142/65   Pulse: 50 51 (!) 49    Resp:       Temp:       SpO2:   95%    Weight:       Height:         -------------------------  BP: (!) 142/65, Temp: 98 °F (36.7 °C), Pulse: (!) 49, Resp: 14      RE-EVALUATION:    The patient has had no complaint while here. The nursing supervisor arranged for the patient to be admitted here rather than to go to see [facility].  I did not speak with any practitioners about this, but the nursing staff did speak with the nurse practitioner on for [facility]. The patient has been accepted here in [place]. The patient is admitted in stable condition. CONSULTS:    56  [facility] agrees to accept pt to [facility]. PROCEDURES:  None    FINAL IMPRESSION      1. Chest pain due to myocardial ischemia, unspecified ischemic chest pain type    2. Acute on chronic congestive heart failure, unspecified heart failure type [facility])          DISPOSITION/PLAN   DISPOSITION Admitted 08/25/2020 11:51:58 AM      CONDITION ON DISPOSITION:     stable    PATIENT REFERRED TO:  Aspen Chua MD  [address] [place] [address]  [phone]            DISCHARGE MEDICATIONS:  Current Discharge Medication List          (Please note that portions of this note were completed with a voicerecognition program.  Efforts were made to edit the dictations but occasionally words are mis-transcribed.)    Kelly MD, F.A.C.E.P.   Attending Emergency Medicine Physician                     Emmit Mcburney, MD  08/25/20 2847

## 2020-08-25 NOTE — H&P
[facility]    HISTORY AND PHYSICAL EXAMINATION            Date:   8/25/2020  Patient name:  Sandrine Malone  Date of admission:  8/24/2020  6:23 PM  MRN:   [id number]  Account:  [de-identified]  YOB: 1945  PCP:    Makayla Pace MD  Room:   637/377-96  Code Status:    Full Code    Chief Complaint:     Chief Complaint   Patient presents with    Chest Pain    Shortness of Breath       History Obtained From:     patient, electronic medical record    History of Present Illness:     Sandrine Malone is a 69 y.o. Non-/non  female who presents with Chest Pain and Shortness of Breath   and is admitted to the hospital for the management of Acute on chronic combined systolic and diastolic CHF (congestive heart failure) ([facility] [address] The patient complains of constant substernal chest pressure and SOB that started Sunday evening. She states she took a Nitro last night but had no relief. She states she was up all night and feels fatigued. She denies N/V or Diaphoresis. She denies new leg swelling. She had a recent cardiac cath with a POBA to the LAD and was cardioverted on 7/31 and again on 8/14. After the cardioversion she was prescribed amiodarone but states she never started taking it. She also misunderstood her discharge instructions from [address] [place] [address] and has been taking Toprol XL 50 mg not 25 mg. She admits to skipping her Bumex on Sunday and Monday morning because she didn't feel well.      On arrival to the ER her vitals were 153/51,50,16, 36.7  CXR shows no acute changes  K+ 3.4, BNP 2247, trops 20-16-25  EKG 1833 sinus rhythm rate 53  QRS 76  no acute ST elevations  Repeat EKG 2037 shows sinus bradycardia, rate 48 bpm, left axis deviation, normal WA and QRS intervals, QT prolonged at 536 ms,  ms, no ST elevation or depression, T wave inversion in 3, poor R wave progression, Q wave in aVR, no change from first EKG COLONOSCOPY  2010    Polyp    CORONARY ANGIOPLASTY WITH STENT PLACEMENT  5/2011    failed attempt to place stent    CORONARY ANGIOPLASTY WITH STENT PLACEMENT  6/2011    successful placement of 2 stent in same artery   [address]    bleeding ulcers found   [address]    surgical sponges left in during appendectomy    HAMMER TOE SURGERY  1989    head resection of phalanx, left foot    LAPAROSCOPY  1983    with D&C    NC COLONOSCOPY W/BIOPSY SINGLE/MULTIPLE N/A 12/19/2017    COLONOSCOPY WITH BIOPSY performed by Vonn Simmonds, MD at [facility]    NC EGD TRANSORAL BIOPSY SINGLE/MULTIPLE  12/19/2017    EGD BIOPSY performed by Vonn Simmonds, MD at [address] [place] [address] resection with rt and left ethmoidectomy    SHOULDER ARTHROSCOPY  1998    rotator cuff impingement, right arm    SINUS ENDOSCOPY      SINUS SURGERY      multiple    UPPER GASTROINTESTINAL ENDOSCOPY      2-4 times per year for pyloric stenosis    UPPER GASTROINTESTINAL ENDOSCOPY  12/19/2017    EGD DILATION BALLOON performed by Vonn Simmonds, MD at [facility]    UPPER GASTROINTESTINAL ENDOSCOPY  5/24/2018    EGD DILATION SAVORY performed by Jose Ryan MD at [facility]        Medications Prior to Admission:     Prior to Admission medications    Medication Sig Start Date End Date Taking?  Authorizing Provider   isosorbide mononitrate (IMDUR) 120 MG extended release tablet Take 0.5 tablets by mouth daily  Patient taking differently: Take 120 mg by mouth daily  8/3/20 9/2/20 Yes Pierre Corey MD   metoprolol succinate (TOPROL XL) 25 MG extended release tablet Take 1 tablet by mouth daily 8/4/20 9/3/20 Yes Pierre Corey MD   apixaban (ELIQUIS) 5 MG TABS tablet Take by mouth 2 times daily   Yes Historical Provider, MD   pantoprazole (PROTONIX) 40 MG tablet Take 40 mg by mouth daily   Yes Historical Provider, MD   azaTHIOprine (IMURAN) 50 MG tablet Take 50 mg by mouth daily    Yes Historical Provider, MD   ranolazine (RANEXA) 500 MG SR tablet Take 500 mg by mouth 2 times daily. Yes Historical Provider, MD   PARoxetine (PAXIL) 20 MG tablet Take 20 mg by mouth every morning. Yes Historical Provider, MD   allopurinol (ZYLOPRIM) 100 MG tablet Take 100 mg by mouth daily. Yes Historical Provider, MD   clopidogrel (PLAVIX) 75 MG tablet Take 75 mg by mouth daily. Yes Historical Provider, MD   pravastatin (PRAVACHOL) 40 MG tablet Take 40 mg by mouth daily. Yes Historical Provider, MD   amiodarone (CORDARONE) 200 MG tablet Take 1 tablet by mouth daily 8/15/20   Julian Marrero MD   bumetanide (BUMEX) 1 MG tablet Take 1 tablet by mouth daily  Patient taking differently: Take 2 mg by mouth daily  8/3/20 9/2/20  Ayana Guerra MD   acetaminophen (TYLENOL) 500 MG tablet Take 1,000 mg by mouth 2 times daily as needed for Pain    Historical Provider, MD   nitroGLYCERIN (NITROSTAT) 0.4 MG SL tablet Place 0.4 mg under the tongue every 5 minutes. Indications: Chest Pain    Historical Provider, MD   losartan (COZAAR) 100 MG tablet Take 100 mg by mouth daily     Historical Provider, MD        Allergies:     Lisinopril-hydrochlorothiazide; Sulfa antibiotics; Penicillins; Polyethylene glycol; Actifed cold-allergy [chlorpheniramine-phenylephrine]; Altace [ramipril]; Cephalosporins; Coreg [carvedilol]; Dml facial moisturizer; Elavil [amitriptyline hcl]; Entex [ami-margot]; Ethylene glycol; Fentanyl; Hizentra [immune globulin (human)]; Hydralazine; Levofloxacin; Lisinopril-hydrochlorothiazide; Metoprolol; Montelukast sodium; Morphine; Nifedipine; Norvasc [amlodipine besylate]; Other; Phenobarbital; Robaxin [methocarbamol]; Sinequan [doxepin hcl]; Sudafed [pseudoephedrine hcl]; Sudafed [pseudoephedrine hcl]; Tranquil-cecil; Wellbutrin [bupropion hcl];  Actifed cold-allergy [chlorpheniramine-phenylephrine]; Clindamycin/lincomycin; Codeine; Metoprolol succinate; and Seldane [terfenadine]    Social History:     Tobacco:    reports that she has never smoked. She has never used smokeless tobacco.  Alcohol:      reports no history of alcohol use. Drug Use:  reports no history of drug use. Family History:     Family History   Problem Relation Age of Onset    Other Father         Heart problems    Heart Disease Father     Other Mother         heart problems requiring open heart surgery, HTN    Heart Disease Mother     Other Sister         HTN    Hypertension Sister     Other Maternal Aunt         breast cancer    Breast Cancer Maternal Aunt        Review of Systems:     Positive and Negative as described in HPI. Review of Systems   Constitutional: Positive for activity change (unable to sleep). HENT: Negative. Eyes: Negative. Respiratory: Positive for shortness of breath. Cardiovascular: Positive for chest pain. Gastrointestinal: Negative. Endocrine: Negative. Genitourinary: Negative. Musculoskeletal: Negative. Skin: Negative. Allergic/Immunologic: Negative. Neurological: Negative. Hematological: Negative. Psychiatric/Behavioral: Negative. Depressed       Physical Exam:   BP (!) 148/70   Pulse 54   Temp 97.8 °F (36.6 °C) (Oral)   Resp 20   Ht 5' 1\" (1.549 m)   Wt 201 lb 6.4 oz (91.4 kg)   SpO2 97%   BMI 38.05 kg/m²   Temp (24hrs), Av.9 °F (36.6 °C), Min:97.8 °F (36.6 °C), Max:98 °F (36.7 °C)    No results for input(s): POCGLU in the last 72 hours. No intake or output data in the 24 hours ending 20 1319    Physical Exam  Vitals signs and nursing note reviewed. Constitutional:       Appearance: Normal appearance. Eyes:      Extraocular Movements: Extraocular movements intact. Conjunctiva/sclera: Conjunctivae normal.   Neck:      Musculoskeletal: Normal range of motion. Cardiovascular:      Rate and Rhythm: Regular rhythm.  Bradycardia present. Pulses: Normal pulses. Heart sounds: Normal heart sounds. Pulmonary:      Effort: Pulmonary effort is normal.      Breath sounds: Normal breath sounds. Abdominal:      General: Bowel sounds are normal.      Palpations: Abdomen is soft. Musculoskeletal: Normal range of motion. Right lower leg: No edema. Left lower leg: No edema. Skin:     General: Skin is warm and dry. Capillary Refill: Capillary refill takes less than 2 seconds. Neurological:      Mental Status: She is alert and oriented to person, place, and time. GCS: GCS eye subscore is 4. GCS verbal subscore is 5. GCS motor subscore is 6. Psychiatric:         Attention and Perception: Attention normal.         Mood and Affect: Mood is depressed. Behavior: Behavior is cooperative.          Investigations:      Laboratory Testing:  Recent Results (from the past 24 hour(s))   EKG 12 Lead    Collection Time: 08/24/20  6:33 PM   Result Value Ref Range    Ventricular Rate 53 BPM    Atrial Rate 53 BPM    P-R Interval 136 ms    QRS Duration 76 ms    Q-T Interval 434 ms    QTc Calculation (Bazett) 407 ms    P Axis 65 degrees    R Axis -46 degrees    T Axis 91 degrees   CBC Auto Differential    Collection Time: 08/24/20  6:35 PM   Result Value Ref Range    WBC 9.5 3.5 - 11.0 k/uL    RBC 3.57 (L) 4.0 - 5.2 m/uL    Hemoglobin 11.8 (L) 12.0 - 16.0 g/dL    Hematocrit 33.8 (L) 36 - 46 %    MCV 94.7 80 - 100 fL    MCH 33.0 26 - 34 pg    MCHC 34.8 31 - 37 g/dL    RDW 15.5 (H) 12.5 - 15.4 %    Platelets 629 450 - 010 k/uL    MPV 9.8 6.0 - 12.0 fL    NRBC Automated NOT REPORTED per 100 WBC    Differential Type NOT REPORTED     Seg Neutrophils 78 (H) 36 - 66 %    Lymphocytes 10 (L) 24 - 44 %    Monocytes 10 2 - 11 %    Eosinophils % 2 1 - 4 %    Basophils 0 0 - 2 %    Immature Granulocytes NOT REPORTED 0 %    Segs Absolute 7.40 1.8 - 7.7 k/uL    Absolute Lymph # 1.00 1.0 - 4.8 k/uL    Absolute Mono # 0.90 0.1 - 1.2 k/uL Absolute Eos # 0.20 0.0 - 0.4 k/uL    Basophils Absolute 0.00 0.0 - 0.2 k/uL    Absolute Immature Granulocyte NOT REPORTED 0.00 - 0.30 k/uL    WBC Morphology NOT REPORTED     RBC Morphology NOT REPORTED     Platelet Estimate NOT REPORTED    Basic Metabolic Panel    Collection Time: 08/24/20  6:35 PM   Result Value Ref Range    Glucose 93 70 - 99 mg/dL    BUN 21 8 - 23 mg/dL    CREATININE 0.85 0.50 - 0.90 mg/dL    Bun/Cre Ratio NOT REPORTED 9 - 20    Calcium 9.1 8.6 - 10.4 mg/dL    Sodium 139 135 - 144 mmol/L    Potassium 3.4 (L) 3.7 - 5.3 mmol/L    Chloride 101 98 - 107 mmol/L    CO2 23 20 - 31 mmol/L    Anion Gap 15 9 - 17 mmol/L    GFR Non-African American >60 >60 mL/min    GFR African American >60 >60 mL/min    GFR Comment          GFR Staging NOT REPORTED    Troponin    Collection Time: 08/24/20  6:35 PM   Result Value Ref Range    Troponin, High Sensitivity 20 (H) 0 - 14 ng/L    Troponin T NOT REPORTED <0.03 ng/mL    Troponin Interp NOT REPORTED    Brain Natriuretic Peptide    Collection Time: 08/24/20  6:35 PM   Result Value Ref Range    Pro-BNP 2,247 (H) <300 pg/mL    BNP Interpretation Pro-BNP Reference Range:    D-Dimer, Quantitative    Collection Time: 08/24/20  6:35 PM   Result Value Ref Range    D-Dimer, Quant <0.19 mg/L FEU   Troponin    Collection Time: 08/24/20  8:35 PM   Result Value Ref Range    Troponin, High Sensitivity 16 (H) 0 - 14 ng/L    Troponin T NOT REPORTED <0.03 ng/mL    Troponin Interp NOT REPORTED    EKG 12 Lead    Collection Time: 08/24/20  8:37 PM   Result Value Ref Range    Ventricular Rate 48 BPM    Atrial Rate 48 BPM    P-R Interval 144 ms    QRS Duration 80 ms    Q-T Interval 536 ms    QTc Calculation (Bazett) 478 ms    P Axis 78 degrees    R Axis -46 degrees    T Axis 87 degrees   Troponin    Collection Time: 08/25/20  7:44 AM   Result Value Ref Range    Troponin, High Sensitivity 25 (H) 0 - 14 ng/L    Troponin T NOT REPORTED <0.03 ng/mL    Troponin Interp NOT REPORTED breath    Discussed medication compliance with the patient    Encourage patient to follow-up with CHF clinic/care coordinator as outpatient. She states she followed with them in the past but felt it was an unnecessary expense.         Consultations:   [facility] CONSULT TO CARDIOLOGY        NENA Wilson - CNP  8/25/2020  1:19 PM    Copy sent to Dr. Emerson Hamm MD

## 2020-08-25 NOTE — ED NOTES
[id number] [facility] with water, soap, washcloths and towels given. Patient states she does not need assistance other than set-up of supplies.      Hernan Guerrero RN  08/25/20 7635

## 2020-08-25 NOTE — ED NOTES
[facility] Access notified patient will be kept here at [facility].      Hernan Guerrero RN  08/25/20 6394

## 2020-08-25 NOTE — PLAN OF CARE
I discussed her Saira b 12 level with her and offered replacement. She declines starting any treatment and states she will follow up with her PCP about it. She states she takes too medications already.

## 2020-08-25 NOTE — ED NOTES
Spoke with Dr. Dinesh Magallanes (cardiology), update given, he is [facility] [address] [facility] [address] [facility] [address]  08/25/20 1129

## 2020-08-25 NOTE — ED NOTES
Pt. Given sandwich, cookies, and a water. Pt. On bedside commode. RR equal and non labored. NAD noted. Will continue to monitor.      Vesta Mercedes RN  08/24/20 3929

## 2020-08-25 NOTE — PLAN OF CARE
I notified Dr. Murray Arvizu that her reported Toprol dose and amnio were inaccurate.  He said to continue with the current doses he prescribed

## 2020-08-25 NOTE — ED NOTES
Dr. Michael Newman at bedside updating pt. On poc for admission to [facility]. Pt. Agreeable to poc.      Dayne Hernandez RN  08/24/20 [id number]

## 2020-08-25 NOTE — FLOWSHEET NOTE
Writer received referral from RN to visit Patient, who appeared to be sleeping. Writer offered silent prayer. Writer told RN that Pt was not available.      08/25/20 5338   Encounter Summary   Services provided to: Patient   Referral/Consult From: Nurse   Support System Family members   Continue Visiting   (8/25/20)   Complexity of Encounter Low   Length of Encounter 15 minutes   Routine   Type Initial   Assessment Sleeping   Intervention Sustaining presence/ Ministry of presence   Outcome Did not respond     Electronically signed by Mia Carr, Oncology Outpatient [facility] 19, [address] [facility] Radiation Oncology  8/25/2020  6:19 PM

## 2020-08-25 NOTE — CONSULTS
[facility]  In Patient Cardiology Consult             Date:   8/25/2020  Patient name: Maria Guadalupe Coates  Date of admission:  8/24/2020  6:23 PM  MRN:   [id number]  YOB: 1945    Reason for Admission:  SOB, CP    CHIEF COMPLAINT:  SOB, CP     History Obtained From:  Pt and chart    HISTORY OF PRESENT ILLNESS:      This is a 69year old female who presents with SOB and CP. States she has been dealing with LE edema, weight gain and SOB. Denies any chest pressure, diaphoresis, nausea, vomiting. Had recent admission with abnormal stress test, had cardiac cath with POBA to apical LAD, then JOVITA/ CV. Then had another CV as outpatient on 8/14/20. We were consulted for acute on chronic CHF. Past Medical History:   has a past medical history of Abnormal Pap smear, Asthma, Bloody discharge from nipple, CAD (coronary artery disease), Cataracts, bilateral, Colon polyp, CVA (cerebral vascular accident) ([address] Demyelinating disease ([address] Fibromyalgia, High-risk sexual behavior, History of bone density study, History of migraines, HTN (hypertension), IgG deficiency ([address] Multiple sclerosis ([address] Myoclonic seizures ([address] Optic neuritis, Osteopenia, Pyloric stenosis, Raynaud's disease, and Vasomotor rhinitis. Past Surgical History:   has a past surgical history that includes Dilation & curettage (1982); Breast biopsy (1990); Appendectomy (1955); Septoplasty (1985); Hammer toe surgery (1989); Sinus endoscopy; sinus surgery; Shoulder arthroscopy (1998); Esophagoscopy (1966, 1969); Colonoscopy (2010); Foreign Body Removal (1962); laparoscopy (4177); Coronary angioplasty with stent (5/2011); Coronary angioplasty with stent (6/2011); Upper gastrointestinal endoscopy; eye surgery; pr colonoscopy w/biopsy single/multiple (N/A, 12/19/2017); pr egd transoral biopsy single/multiple (12/19/2017); Upper gastrointestinal endoscopy (12/19/2017); Upper gastrointestinal endoscopy (5/24/2018);  Cardiac surgery; Cardiac catheterization (07/31/2020); and Cardioversion (08/14/2020). Home Medications:    Prior to Admission medications    Medication Sig Start Date End Date Taking? Authorizing Provider   isosorbide mononitrate (IMDUR) 120 MG extended release tablet Take 0.5 tablets by mouth daily  Patient taking differently: Take 120 mg by mouth daily  8/3/20 9/2/20 Yes Kristal Freeman MD   metoprolol succinate (TOPROL XL) 25 MG extended release tablet Take 1 tablet by mouth daily 8/4/20 9/3/20 Yes Kristal Freeman MD   apixaban (ELIQUIS) 5 MG TABS tablet Take by mouth 2 times daily   Yes Historical Provider, MD   pantoprazole (PROTONIX) 40 MG tablet Take 40 mg by mouth daily   Yes Historical Provider, MD   azaTHIOprine (IMURAN) 50 MG tablet Take 50 mg by mouth daily    Yes Historical Provider, MD   ranolazine (RANEXA) 500 MG SR tablet Take 500 mg by mouth 2 times daily. Yes Historical Provider, MD   PARoxetine (PAXIL) 20 MG tablet Take 20 mg by mouth every morning. Yes Historical Provider, MD   allopurinol (ZYLOPRIM) 100 MG tablet Take 100 mg by mouth daily. Yes Historical Provider, MD   clopidogrel (PLAVIX) 75 MG tablet Take 75 mg by mouth daily. Yes Historical Provider, MD   pravastatin (PRAVACHOL) 40 MG tablet Take 40 mg by mouth daily. Yes Historical Provider, MD   amiodarone (CORDARONE) 200 MG tablet Take 1 tablet by mouth daily 8/15/20   More Manzo MD   bumetanide (BUMEX) 1 MG tablet Take 1 tablet by mouth daily  Patient taking differently: Take 2 mg by mouth daily  8/3/20 9/2/20  Kristal Freeman MD   acetaminophen (TYLENOL) 500 MG tablet Take 1,000 mg by mouth 2 times daily as needed for Pain    Historical Provider, MD   nitroGLYCERIN (NITROSTAT) 0.4 MG SL tablet Place 0.4 mg under the tongue every 5 minutes.  Indications: Chest Pain    Historical Provider, MD   losartan (COZAAR) 100 MG tablet Take 100 mg by mouth daily     Historical Provider, MD       Allergies: Lisinopril-hydrochlorothiazide; Sulfa antibiotics; Penicillins; Polyethylene glycol; Actifed cold-allergy [chlorpheniramine-phenylephrine]; Altace [ramipril]; Cephalosporins; Coreg [carvedilol]; Dml facial moisturizer; Elavil [amitriptyline hcl]; Entex [ami-margot]; Ethylene glycol; Fentanyl; Hizentra [immune globulin (human)]; Hydralazine; Levofloxacin; Lisinopril-hydrochlorothiazide; Metoprolol; Montelukast sodium; Morphine; Nifedipine; Norvasc [amlodipine besylate]; Other; Phenobarbital; Robaxin [methocarbamol]; Sinequan [doxepin hcl]; Sudafed [pseudoephedrine hcl]; Sudafed [pseudoephedrine hcl]; Tranquil-cecil; Wellbutrin [bupropion hcl]; Actifed cold-allergy [chlorpheniramine-phenylephrine]; Clindamycin/lincomycin; Codeine; Metoprolol succinate; and Seldane [terfenadine]    Social History:   reports that she has never smoked. She has never used smokeless tobacco. She reports that she does not drink alcohol or use drugs. Family History:   neg for early CAD    REVIEW OF SYSTEMS:    · Constitutional: there has been no unanticipated weight loss. There's been No change in energy level, No change in activity level. · Eyes: No visual changes or diplopia. No scleral icterus. · ENT: No Headaches, hearing loss or vertigo. No mouth sores or sore throat. · Cardiovascular: As HPI  · Respiratory: As HPI  · Gastrointestinal: No abdominal pain, appetite loss, blood in stools. No change in bowel or bladder habits. · Genitourinary: No dysuria, trouble voiding, or hematuria. · Musculoskeletal:  No gait disturbance, No weakness or joint complaints. · Integumentary: No rash or pruritis. · Neurological: No headache, diplopia, change in muscle strength, numbness or tingling. No change in gait, balance, coordination, mood, affect, memory, mentation, behavior. · Psychiatric: No anxiety, or depression. · Endocrine: No temperature intolerance. No excessive thirst, fluid intake, or urination.  No evident in Lateral leads  QT has lengthened     Labs:     CBC:   Recent Labs     08/24/20  1835   WBC 9.5   HGB 11.8*   HCT 33.8*        BMP:   Recent Labs     08/24/20  1835      K 3.4*   CO2 23   BUN 21   CREATININE 0.85   LABGLOM >60   GLUCOSE 93     BNP: No results for input(s): BNP in the last 72 hours. PT/INR: No results for input(s): PROTIME, INR in the last 72 hours. APTT:No results for input(s): APTT in the last 72 hours. CARDIAC ENZYMES:  Recent Labs     08/24/20  1835 08/24/20  2035   TROPHS 20* 16*     FASTING LIPID PANEL:  Lab Results   Component Value Date    HDL 51 05/19/2019    TRIG 94 05/19/2019     LIVER PROFILE:No results for input(s): AST, ALT, LABALBU in the last 72 hours. Echocardiogram 4/30/2019  Left ventricle is normal in size with normal systolic function. Calculated ejection fraction is 65%. Increased left ventricular wall thickness. Evidence of advance diastolic dysfunction. Left atrium is moderately dilated. Right atrial enlargement. Increase in right ventricular free wall thickness. Mildly dilated left ventricle with mildly reduced function. Aortic leaflet calcification without stenosis. Mild thickening of the mitral leaflets without stenosis. Mild mitral regurgitation. Trace tricuspid regurgitation. Estimated right ventricular systolic pressure  is 34 mmH        Coronary Angiography 7/31/20:   LMCA: Mild irregularities 10-20%. LAD: Patent mid stent, distal 40% stenosis. Apical vessel had 80% stenosis, given symptoms and apical ischemia on stress   test, balloon angioplasty was performed with reduction of stenosis to 20%. LCx: Mild irregularities 10-20%. Large and dominant with mild disease   OM1 and OM2 are large, patent with mild disease   RCA: Small and non-dominant, patent, mild disease    CARDIOVERSION:  After an adequate level of sedation was achieved, 150J in biphasic synchronized delivery was administered. conversion to normal sinus rhythm.  The patient awoke without complications.      The patient will continue with the discharge m. There were no complications encountered.        Fina Stoddard MD  Fellow, [address]         IMPRESSION:       · Acute on chronic combined systolic/diastolic CHF- EF 70%. She missed some doses of lasix due to frequent urination  · Parox Atrial flutter s/p JOVITA CV on 7/31/20- then 8/14/20- in NSR with bradycardia  · CAD s/p Cath on 8/20- s/p POBA apical LAD due to ischemia in that territory ( stenting was deferred due to small caliber vessel)  · Multiple sclerosis       RECOMMENDATIONS:  - acute MI ruled out with troponins  - IV lasix 40 mg bid  - decrease amio  - decrease toprol  - continue Eliquis, plavix, imdur, statin  - strict I and O    Dispo: tomorrow hopefully change to po bumex 2 mg po bid and insure compliance. Discussed with patient and nursing. Thank you for allowing me to participate in the care of this patient, please do not hesitate to call if you have any questions. Adin Steel DO, [facility] [place], [facility]  [email or web address]  [phone]

## 2020-08-26 LAB
ALBUMIN SERPL-MCNC: 4 G/DL (ref 3.5–5.2)
ALBUMIN/GLOBULIN RATIO: 2.1 (ref 1–2.5)
ALP BLD-CCNC: 75 U/L (ref 35–104)
ALT SERPL-CCNC: 7 U/L (ref 5–33)
ANION GAP SERPL CALCULATED.3IONS-SCNC: 14 MMOL/L (ref 9–17)
AST SERPL-CCNC: 8 U/L
BILIRUB SERPL-MCNC: 0.65 MG/DL (ref 0.3–1.2)
BNP INTERPRETATION: ABNORMAL
BUN BLDV-MCNC: 22 MG/DL (ref 8–23)
BUN/CREAT BLD: ABNORMAL (ref 9–20)
CALCIUM SERPL-MCNC: 8.9 MG/DL (ref 8.6–10.4)
CHLORIDE BLD-SCNC: 101 MMOL/L (ref 98–107)
CHOLESTEROL/HDL RATIO: 2.5
CHOLESTEROL: 128 MG/DL
CO2: 27 MMOL/L (ref 20–31)
CREAT SERPL-MCNC: 1 MG/DL (ref 0.5–0.9)
EKG ATRIAL RATE: 93 BPM
EKG P AXIS: 68 DEGREES
EKG P-R INTERVAL: 136 MS
EKG Q-T INTERVAL: 384 MS
EKG QRS DURATION: 80 MS
EKG QTC CALCULATION (BAZETT): 339 MS
EKG R AXIS: -37 DEGREES
EKG T AXIS: 149 DEGREES
EKG VENTRICULAR RATE: 47 BPM
GFR AFRICAN AMERICAN: >60 ML/MIN
GFR NON-AFRICAN AMERICAN: 55 ML/MIN
GFR SERPL CREATININE-BSD FRML MDRD: ABNORMAL ML/MIN/{1.73_M2}
GFR SERPL CREATININE-BSD FRML MDRD: ABNORMAL ML/MIN/{1.73_M2}
GLUCOSE BLD-MCNC: 97 MG/DL (ref 70–99)
HCT VFR BLD CALC: 35 % (ref 36–46)
HDLC SERPL-MCNC: 52 MG/DL
HEMOGLOBIN: 12.2 G/DL (ref 12–16)
LDL CHOLESTEROL: 60 MG/DL (ref 0–130)
MAGNESIUM: 2.1 MG/DL (ref 1.6–2.6)
MCH RBC QN AUTO: 33.1 PG (ref 26–34)
MCHC RBC AUTO-ENTMCNC: 35 G/DL (ref 31–37)
MCV RBC AUTO: 94.7 FL (ref 80–100)
NRBC AUTOMATED: ABNORMAL PER 100 WBC
PDW BLD-RTO: 15.3 % (ref 12.5–15.4)
PLATELET # BLD: 194 K/UL (ref 140–450)
PMV BLD AUTO: 9.8 FL (ref 6–12)
POTASSIUM SERPL-SCNC: 3.4 MMOL/L (ref 3.7–5.3)
PRO-BNP: 1912 PG/ML
RBC # BLD: 3.69 M/UL (ref 4–5.2)
SODIUM BLD-SCNC: 142 MMOL/L (ref 135–144)
TOTAL PROTEIN: 5.9 G/DL (ref 6.4–8.3)
TRIGL SERPL-MCNC: 78 MG/DL
VLDLC SERPL CALC-MCNC: NORMAL MG/DL (ref 1–30)
WBC # BLD: 7.3 K/UL (ref 3.5–11)

## 2020-08-26 PROCEDURE — 2580000003 HC RX 258: Performed by: NURSE PRACTITIONER

## 2020-08-26 PROCEDURE — 1210000000 HC MED SURG R&B

## 2020-08-26 PROCEDURE — 93005 ELECTROCARDIOGRAM TRACING: CPT | Performed by: NURSE PRACTITIONER

## 2020-08-26 PROCEDURE — 83735 ASSAY OF MAGNESIUM: CPT

## 2020-08-26 PROCEDURE — 83880 ASSAY OF NATRIURETIC PEPTIDE: CPT

## 2020-08-26 PROCEDURE — 85027 COMPLETE CBC AUTOMATED: CPT

## 2020-08-26 PROCEDURE — APPSS45 APP SPLIT SHARED TIME 31-45 MINUTES: Performed by: NURSE PRACTITIONER

## 2020-08-26 PROCEDURE — 6370000000 HC RX 637 (ALT 250 FOR IP): Performed by: NURSE PRACTITIONER

## 2020-08-26 PROCEDURE — 36415 COLL VENOUS BLD VENIPUNCTURE: CPT

## 2020-08-26 PROCEDURE — 80053 COMPREHEN METABOLIC PANEL: CPT

## 2020-08-26 PROCEDURE — 99232 SBSQ HOSP IP/OBS MODERATE 35: CPT | Performed by: INTERNAL MEDICINE

## 2020-08-26 PROCEDURE — 80061 LIPID PANEL: CPT

## 2020-08-26 RX ORDER — BUMETANIDE 1 MG/1
1 TABLET ORAL DAILY
Status: DISCONTINUED | OUTPATIENT
Start: 2020-08-26 | End: 2020-08-27 | Stop reason: HOSPADM

## 2020-08-26 RX ORDER — POTASSIUM CHLORIDE 20 MEQ/1
20 TABLET, EXTENDED RELEASE ORAL
Status: DISCONTINUED | OUTPATIENT
Start: 2020-08-27 | End: 2020-08-27 | Stop reason: HOSPADM

## 2020-08-26 RX ORDER — METOPROLOL SUCCINATE 25 MG/1
25 TABLET, EXTENDED RELEASE ORAL DAILY
Status: DISCONTINUED | OUTPATIENT
Start: 2020-08-26 | End: 2020-08-27 | Stop reason: HOSPADM

## 2020-08-26 RX ADMIN — Medication 1000 MG: at 08:35

## 2020-08-26 RX ADMIN — Medication 10 ML: at 20:28

## 2020-08-26 RX ADMIN — CLOPIDOGREL BISULFATE 75 MG: 75 TABLET ORAL at 08:38

## 2020-08-26 RX ADMIN — PAROXETINE HYDROCHLORIDE 20 MG: 20 TABLET, FILM COATED ORAL at 08:37

## 2020-08-26 RX ADMIN — APIXABAN 5 MG: 5 TABLET, FILM COATED ORAL at 20:26

## 2020-08-26 RX ADMIN — APIXABAN 5 MG: 5 TABLET, FILM COATED ORAL at 08:36

## 2020-08-26 RX ADMIN — POTASSIUM CHLORIDE 40 MEQ: 1500 TABLET, EXTENDED RELEASE ORAL at 08:42

## 2020-08-26 RX ADMIN — Medication 40 MG: at 20:26

## 2020-08-26 RX ADMIN — ALLOPURINOL 100 MG: 100 TABLET ORAL at 08:37

## 2020-08-26 RX ADMIN — PANTOPRAZOLE SODIUM 40 MG: 40 TABLET, DELAYED RELEASE ORAL at 06:33

## 2020-08-26 RX ADMIN — RANOLAZINE 500 MG: 500 TABLET, FILM COATED, EXTENDED RELEASE ORAL at 20:26

## 2020-08-26 RX ADMIN — METOPROLOL SUCCINATE 25 MG: 25 TABLET, FILM COATED, EXTENDED RELEASE ORAL at 18:46

## 2020-08-26 RX ADMIN — RANOLAZINE 500 MG: 500 TABLET, FILM COATED, EXTENDED RELEASE ORAL at 08:37

## 2020-08-26 RX ADMIN — ISOSORBIDE MONONITRATE 120 MG: 120 TABLET, EXTENDED RELEASE ORAL at 08:38

## 2020-08-26 RX ADMIN — AZATHIOPRINE 50 MG: 50 TABLET ORAL at 08:37

## 2020-08-26 RX ADMIN — Medication 1000 MG: at 23:17

## 2020-08-26 ASSESSMENT — PAIN DESCRIPTION - LOCATION
LOCATION: HEAD;HIP
LOCATION: HIP;BACK
LOCATION: HEAD;HIP

## 2020-08-26 ASSESSMENT — ENCOUNTER SYMPTOMS
ABDOMINAL PAIN: 0
SHORTNESS OF BREATH: 0
NAUSEA: 0
BLOOD IN STOOL: 0
WHEEZING: 0
VOMITING: 0
DIARRHEA: 0
COUGH: 0
CONSTIPATION: 0
STRIDOR: 0

## 2020-08-26 ASSESSMENT — PAIN DESCRIPTION - FREQUENCY
FREQUENCY: CONTINUOUS
FREQUENCY: CONTINUOUS

## 2020-08-26 ASSESSMENT — PAIN SCALES - GENERAL
PAINLEVEL_OUTOF10: 5
PAINLEVEL_OUTOF10: 6
PAINLEVEL_OUTOF10: 4
PAINLEVEL_OUTOF10: 6

## 2020-08-26 ASSESSMENT — PAIN DESCRIPTION - PAIN TYPE
TYPE: CHRONIC PAIN

## 2020-08-26 ASSESSMENT — PAIN DESCRIPTION - PROGRESSION: CLINICAL_PROGRESSION: NOT CHANGED

## 2020-08-26 ASSESSMENT — PAIN DESCRIPTION - DESCRIPTORS
DESCRIPTORS: ACHING;DISCOMFORT
DESCRIPTORS: ACHING

## 2020-08-26 ASSESSMENT — PAIN DESCRIPTION - ORIENTATION: ORIENTATION: LEFT;MID

## 2020-08-26 ASSESSMENT — PAIN DESCRIPTION - ONSET: ONSET: ON-GOING

## 2020-08-26 NOTE — PROGRESS NOTES
@[facility] [email or web address]@    [address] [place] [address]    Progress Note    8/26/2020    7:18 AM    Name:   Desiree Villagomez  MRN:     [id number]     Acct:      [de-identified]   Room:   [address] [place] [address] Day:  2  Admit Date:  8/24/2020  6:23 PM    PCP:   Arik Orellana MD  Code Status:  Full Code    Subjective:     C/C:   Chief Complaint   Patient presents with    Chest Pain    Shortness of Breath     Interval History Status: improved. She denies further chest pressure or pain. She denies feeling sob but she's been primarily resting in bed. I encouraged her to get up and ambulate today. She states she has been taking Bumex 2 mg po every other day but sometimes skips doses because she has too spend too much time in the bathroom. She is agreeable to try Bumex 1 mg po daily. I explained to her the importance of medication compliance. Brief History:     Per previous documentation    Desiree Villagomez is a 69 y.o. Non-/non  female who presents with Chest Pain and Shortness of Breath   and is admitted to the hospital for the management of Acute on chronic combined systolic and diastolic CHF (congestive heart failure) ([facility]    The patient complains of constant substernal chest pressure and SOB that started Sunday evening. She states she took a Nitro last night but had no relief. She states she was up all night and feels fatigued. She denies N/V or Diaphoresis. She denies new leg swelling. She had a recent cardiac cath with a POBA to the LAD and was cardioverted on 7/31 and again on 8/14. After the cardioversion she was prescribed amiodarone but states she never started taking it. She also misunderstood her discharge instructions from [address] [place] [address] and has been taking Toprol XL 50 mg not 25 mg.  She admits to skipping her Bumex on Sunday and Monday morning because she didn't feel well.      On arrival to the ER her vitals were 153/51,50,16, 36.7  CXR shows no acute changes  K+ 3.4, BNP 2247, trops 20-16-25  EKG 1833 sinus rhythm rate 53  QRS 76  no acute ST elevations  Repeat EKG 2037 shows sinus bradycardia, rate 48 bpm, left axis deviation, normal OR and QRS intervals, QT prolonged at 536 ms,  ms, no ST elevation or depression, T wave inversion in 3, poor R wave progression, Q wave in aVR, no change from first EKG except for prolonged QT.     She was given lasix 40 mg IV x's 1. Initially cardiology requested the patient go to [facility]'s but after 15 hrs in the ER and negative trop Dr. Reyes Learn approved the patient be admitted to [facility] and be treated for Acute CHF.      EKG this morning reveals Sinus rhythm with 2nd degree A-V block with 2:1 A-V conduction   Left axis deviation   Septal infarct (cited on or before 24-AUG-2020)   Abnormal ECG   When compared with ECG of 24-AUG-2020 20:37,   Sinus rhythm is now with 2nd degree A-V block   T wave inversion now evident in Inferior leads   QT has shortened    Toprol XL and amiodarone held until cardiology notified of rhythm. Lasix IV stopped. Bumex 1mg po daily ordered. Activity encouraged.          Proximal atrial flutter status post JOVITA cardioversion on 7/31/2020 then again on 8/14/2020 patient was in normal sinus rhythm with bradycardia post cardioversion.     CAD status post cardiac cath on 8/20/2020. POBA apical LAD due to ischemia in that area.     Echocardiogram 4/30/2019  Left ventricle is normal in size with normal systolic function. Calculated ejection fraction is 65%. Review of Systems:     Review of Systems   Constitutional: Negative for chills, diaphoresis and fever. HENT: Negative for congestion and hearing loss. Respiratory: Negative for cough, shortness of breath, wheezing and stridor. Cardiovascular: Negative for chest pain, palpitations and leg swelling. Gastrointestinal: Negative for abdominal pain, blood in stool, constipation, diarrhea, nausea and vomiting.    Genitourinary: Negative for dysuria and frequency. Musculoskeletal: Positive for myalgias (left hip pain). Skin: Negative for rash. Neurological: Negative for dizziness, seizures and headaches. Psychiatric/Behavioral: The patient is not nervous/anxious. Medications: Allergies:     Allergies   Allergen Reactions    Lisinopril-Hydrochlorothiazide Anaphylaxis and Hives    Sulfa Antibiotics Anaphylaxis    Penicillins Hives    Polyethylene Glycol Hives    Actifed Cold-Allergy [Chlorpheniramine-Phenylephrine]     Altace [Ramipril]      Chronic cough    Cephalosporins Hives    Coreg [Carvedilol]      bloating    Dml Facial Moisturizer     Elavil [Amitriptyline Hcl]     Entex [Ami-Jose]     Ethylene Glycol     Fentanyl     Hizentra [Immune Globulin (Human)]     Hydralazine     Levofloxacin      ach tendon    Lisinopril-Hydrochlorothiazide     Metoprolol      Stomach pain    Montelukast Sodium      Heart effects    Morphine Itching    Nifedipine     Norvasc [Amlodipine Besylate]      Stomach pain    Other      IV IG     Phenobarbital Hives    Robaxin [Methocarbamol]     Sinequan [Doxepin Hcl]     Sudafed [Pseudoephedrine Hcl]     Sudafed [Pseudoephedrine Hcl]     Tranquil-[place]     Wellbutrin [Bupropion Hcl]      Hypotension    Actifed Cold-Allergy [Chlorpheniramine-Phenylephrine] Palpitations    Clindamycin/Lincomycin Nausea And Vomiting    Codeine Nausea And Vomiting    Metoprolol Succinate Nausea And Vomiting    Seldane [Terfenadine] Palpitations       Current Meds:   Scheduled Meds:    allopurinol  100 mg Oral Daily    apixaban  5 mg Oral BID    azaTHIOprine  50 mg Oral Daily    clopidogrel  75 mg Oral Daily    ranolazine  500 mg Oral BID    sodium chloride flush  10 mL Intravenous 2 times per day    furosemide  40 mg Intravenous BID    amiodarone  100 mg Oral Daily    [Held by provider] metoprolol succinate  12.5 mg Oral Daily    PARoxetine  20 mg Oral QAM    isosorbide mononitrate  120 mg Oral Daily    pantoprazole  40 mg Oral BID AC    pravastatin  40 mg Oral Nightly    aspirin  324 mg Oral Once     Continuous Infusions:   PRN Meds: sodium chloride flush, potassium chloride **OR** potassium alternative oral replacement **OR** potassium chloride, promethazine **OR** ondansetron, nitroGLYCERIN, senna, magnesium sulfate, acetaminophen    Data:     Past Medical History:   has a past medical history of Abnormal Pap smear, Asthma, Atrial fibrillation ([facility] Bloody discharge from nipple, CAD (coronary artery disease), Cataracts, bilateral, Colon polyp, CVA (cerebral vascular accident) ([facility] Demyelinating disease ([facility] Fibromyalgia, High-risk sexual behavior, History of bone density study, History of migraines, HTN (hypertension), IgG deficiency ([facility] Multiple sclerosis ([facility] Myoclonic seizures ([facility] Optic neuritis, Osteopenia, Pyloric stenosis, Raynaud's disease, and Vasomotor rhinitis. Social History:   reports that she has never smoked. She has never used smokeless tobacco. She reports that she does not drink alcohol or use drugs. Family History:   Family History   Problem Relation Age of Onset    Other Father         Heart problems    Heart Disease Father     Other Mother         heart problems requiring open heart surgery, HTN    Heart Disease Mother     Other Sister         HTN    Hypertension Sister     Other Maternal Aunt         breast cancer    Breast Cancer Maternal Aunt        Vitals:  BP (!) 109/45   Pulse 50   Temp 98.2 °F (36.8 °C) (Oral)   Resp 16   Ht 5' 1\" (1.549 m)   Wt 202 lb 3.2 oz (91.7 kg)   SpO2 97%   BMI 38.21 kg/m²   Temp (24hrs), Av.1 °F (36.7 °C), Min:97.8 °F (36.6 °C), Max:98.3 °F (36.8 °C)    No results for input(s): POCGLU in the last 72 hours. I/O (24Hr):     Intake/Output Summary (Last 24 hours) at 2020 0718  Last data filed at 2020 0636  Gross per 24 hour   Intake --   Output 700 ml   Net -700 ml Labs:  Hematology:  Recent Labs     08/24/20 1835 08/26/20  0609   WBC 9.5 7.3   RBC 3.57* 3.69*   HGB 11.8* 12.2   HCT 33.8* 35.0*   MCV 94.7 94.7   MCH 33.0 33.1   MCHC 34.8 35.0   RDW 15.5* 15.3    194   MPV 9.8 9.8   DDIMER <0.19  --      Chemistry:  Recent Labs     08/24/20 1835 08/24/20 2035 08/25/20  0744 08/25/20 1822 08/26/20  0609     --   --   --  142   K 3.4*  --   --  3.8 3.4*     --   --   --  101   CO2 23  --   --   --  27   GLUCOSE 93  --   --   --  97   BUN 21  --   --   --  22   CREATININE 0.85  --   --   --  1.00*   MG  --   --   --  2.2 2.1   ANIONGAP 15  --   --   --  14   LABGLOM >60  --   --   --  55*   GFRAA >60  --   --   --  >60   CALCIUM 9.1  --   --   --  8.9   PROBNP 2,247*  --   --   --  1,912*   TROPHS 20* 16* 25*  --   --      Recent Labs     08/26/20  0609   PROT 5.9*   LABALBU 4.0   AST 8   ALT 7   ALKPHOS 75   BILITOT 0.65     ABG:No results found for: POCPH, PHART, PH, POCPCO2, LWG0EKT, PCO2, POCPO2, PO2ART, PO2, POCHCO3, HHY5EEQ, HCO3, NBEA, PBEA, BEART, BE, THGBART, THB, TTY7GHH, SBQO2PCU, N8WQWYHU, O2SAT, FIO2  Lab Results   Component Value Date/Time    SPECIAL 20 ML RIGHT [facility] 03/10/2019 08:42 PM     Lab Results   Component Value Date/Time    CULTURE NO GROWTH 6 DAYS 03/10/2019 08:42 PM       Radiology:  [facility] Chest Portable    Result Date: 8/24/2020  Chronic findings in the chest without acute airspace disease identified. Physical Examination:     Physical Exam  Vitals signs and nursing note reviewed. HENT:      Mouth/Throat:      Mouth: Mucous membranes are moist.   Eyes:      Conjunctiva/sclera: Conjunctivae normal.   Cardiovascular:      Rate and Rhythm: Regular rhythm. Bradycardia present. Pulses: Normal pulses. Pulmonary:      Effort: Pulmonary effort is normal.      Breath sounds: Normal breath sounds. Abdominal:      General: Bowel sounds are normal.      Palpations: Abdomen is soft. Musculoskeletal: Normal range of motion. Right lower leg: No edema. Left lower leg: No edema. Skin:     General: Skin is warm and dry. Capillary Refill: Capillary refill takes less than 2 seconds. Neurological:      Mental Status: She is alert and oriented to person, place, and time. Assessment:     Hospital Problems           Last Modified POA    * (Principal) Acute on chronic combined systolic and diastolic CHF (congestive heart failure) ([address] 8/25/2020 Yes    Multiple sclerosis ([address] (Chronic) 8/25/2020 Yes    Essential hypertension (Chronic) 8/25/2020 Yes    Noncompliance with medications 8/25/2020 Yes    Dyslipidemia (Chronic) 8/25/2020 Yes    H/O: CVA (cerebrovascular accident) (Chronic) 8/25/2020 Yes    CAD (coronary artery disease) (Chronic) 8/25/2020 Yes    Atrial flutter ([address] (Chronic) 8/25/2020 Yes    Paroxysmal A-fib (HCC) (Chronic) 8/25/2020 Yes    Sinus bradycardia 8/25/2020 Yes    Atypical chest pain 8/25/2020 Yes    Chronic renal insufficiency, stage III (moderate) (HCC) (Chronic) 8/25/2020 Yes    Acute combined systolic and diastolic CHF, NYHA class 2 ([address] 8/25/2020 Yes          Plan:     Cardiology consulted. In his opinion the patient's symptoms are most likely related to exacerbation of CHF. Stop IV lasix, resume bumex po 1 mg daily. .  Related to her bradycardia ccardiology decreased her amiodarone to 100 mg daily and the Toprol XL was decreased from 25 mg to 12.5 mg daily. Hold Toprol-XL and amiodarone pending cardiology recommendations related to second-degree AV block    Strict I&O. Monitor and replace electrolytes as needed.     Continue Eliquis related to A.  Fib     Continue aspirin, Pravachol, Plavix, Imdur related to CAD/history of stent     Continue Protonix related to history of GERD     Continue home Imuran, Paxil, and Ranexa     CBC, CMP, fasting lipids, BMP and magnesium in a.m.     Cardiac, low sodium diet     Continue to monitor vital signs and telemetry     Monitor pulse ox related to patient complaint of shortness of breath    Vitamin B12 needs replacement but the patient refuses at this time.      Discussed medication compliance with the patient     Encourage patient to follow-up with CHF clinic/care coordinator as outpatient. She states she followed with them in the past but felt it was an unnecessary expense.     Marylou Fraga, NENA - CNP  8/26/2020  7:18 AM

## 2020-08-26 NOTE — CONSULTS
Nutrition Education    · Verbally reviewed information with Patient  · Educated on Heart Failure Nutrition Therapy  · Written educational materials provided. · Contact name and number provided. · Refer to Patient Education activity for more details.     Electronically signed by Marie Redding RD, LD on 8/26/20 at 10:06 AM EDT    Marie Redding RD,LD  Clinical Dietitian  [facility]  [phone]

## 2020-08-26 NOTE — CARE COORDINATION
TRANSITIONAL CARE PLANNING/ 2 Rehab [facility] Day: 2    Reason for Admission: Chest pain [R07.9]  Atypical chest pain [R07.89]  Acute combined systolic and diastolic CHF, NYHA class 2 (HCC) [I50.41]     Treatment Plan of Care: diurese, bradycardia    Tests/Procedures still needed: Holter monitor    Barriers to Discharge: see above    Readmission Risk :           Risk of Unplanned Readmission:        19       Patient goals/Treatment Preferences/Transitional Plan:     Referrals Made: none - requested assistance with locating a neuro ophthalmologic.  Declines HC referral     Follow Up needed:

## 2020-08-26 NOTE — PROGRESS NOTES
[facility]   Progress Note                   Date:   8/26/2020  Patient name: Rosemary Palacios  Date of admission:  8/24/2020  6:23 PM  MRN:   [id number]  YOB: 1945  PCP: Stan Garcia MD    Reason for Admission: Chest pain [R07.9]  Atypical chest pain [R07.89]  Acute combined systolic and diastolic CHF, NYHA class 2 ([address] [I50.41]    Subjective:       Clinical Changes / Abnormalities: Pt seen and examined in the room. Pt denies any CP. She remains SOB, with exertion, although does not do much do to her MS. This am, EKG reveals second degree AV block.      -700 ml Since admission   Medications:   Scheduled Meds:   bumetanide  1 mg Oral Daily    allopurinol  100 mg Oral Daily    apixaban  5 mg Oral BID    azaTHIOprine  50 mg Oral Daily    clopidogrel  75 mg Oral Daily    ranolazine  500 mg Oral BID    sodium chloride flush  10 mL Intravenous 2 times per day    [Held by provider] amiodarone  100 mg Oral Daily    [Held by provider] metoprolol succinate  12.5 mg Oral Daily    PARoxetine  20 mg Oral QAM    isosorbide mononitrate  120 mg Oral Daily    pantoprazole  40 mg Oral BID AC    pravastatin  40 mg Oral Nightly    aspirin  324 mg Oral Once     Continuous Infusions:  CBC:   Recent Labs     08/24/20 1835 08/26/20  0609   WBC 9.5 7.3   HGB 11.8* 12.2    194     BMP:    Recent Labs     08/24/20 1835 08/25/20 1822 08/26/20  0609     --  142   K 3.4* 3.8 3.4*     --  101   CO2 23  --  27   BUN 21  --  22   CREATININE 0.85  --  1.00*   GLUCOSE 93  --  97     Hepatic:   Recent Labs     08/26/20  0609   AST 8   ALT 7   BILITOT 0.65   ALKPHOS 75     Troponin:   Recent Labs     08/24/20 1835 08/24/20 2035 08/25/20  0744   TROPHS 20* 16* 25*     BNP: No results for input(s): BNP in the last 72 hours. Lipids: No results for input(s): CHOL, HDL in the last 72 hours.     Invalid input(s): LDLCALCU  INR: No results for input(s): INR in the last 72 hours.    Echocardiogram 4/30/2019  Left ventricle is normal in size with normal systolic function. Calculated ejection fraction is 65%. Increased left ventricular wall thickness. Evidence of advance diastolic dysfunction. Left atrium is moderately dilated. Right atrial enlargement. Increase in right ventricular free wall thickness. Mildly dilated left ventricle with mildly reduced function. Aortic leaflet calcification without stenosis. Mild thickening of the mitral leaflets without stenosis. Mild mitral regurgitation. Trace tricuspid regurgitation. Estimated right ventricular systolic pressure  is 34 mmH        Coronary Angiography 7/31/20:   LMCA: Mild irregularities 10-20%. LAD: Patent mid stent, distal 40% stenosis. Apical vessel had 80% stenosis, given symptoms and apical ischemia on stress   test, balloon angioplasty was performed with reduction of stenosis to 20%. LCx: Mild irregularities 10-20%. Large and dominant with mild disease   OM1 and OM2 are large, patent with mild disease   RCA: Small and non-dominant, patent, mild disease     CARDIOVERSION:  After an adequate level of sedation was achieved, 150J in biphasic synchronized delivery was administered. conversion to normal sinus rhythm. The patient awoke without complications.      The patient will continue with the discharge m. There were no complications encountered. Objective:   Vitals: BP (!) 152/65   Pulse 53   Temp 98.1 °F (36.7 °C) (Oral)   Resp 16   Ht 5' 1\" (1.549 m)   Wt 202 lb 3.2 oz (91.7 kg)   SpO2 95%   BMI 38.21 kg/m²   General appearance: alert and cooperative with exam  HEENT: Head: Normocephalic, no lesions, without obvious abnormality.   Neck: no JVD, trachea midline, no adenopathy  Lungs: Diminished to auscultation  Heart: Regular rate and rhythm, s1/s2 auscultated, no murmurs, SB   Abdomen: soft, non-tender, bowel sounds active  Extremities: no edema  Neurologic: not done        Assessment / Acute Cardiac Problems:   · Acute on chronic combined systolic/diastolic CHF- EF 20%. She missed some doses of lasix due to frequent urination  · Parox Atrial flutter s/p JOVITA CV on 7/31/20- then 8/14/20- in NSR with bradycardia  · CAD s/p Cath on 8/20- s/p POBA apical LAD due to ischemia in that territory ( stenting was deferred due to small caliber vessel)  · Multiple sclerosis     Patient Active Problem List:     Bloody discharge from nipple     Osteopenia     Multiple sclerosis (HCC)     Essential hypertension     Asthma     Chest pain     SOB (shortness of breath)     Lower leg pain     Noncompliance with medications     Dyslipidemia     GERD (gastroesophageal reflux disease)     Fibromyalgia     Pyloric stenosis     Demyelinating disease ([facility] [address]     H/O: CVA (cerebrovascular accident)     Epigastric abdominal pain     Incontinence in female     Chest pain with high risk for cardiac etiology     Migraine without status migrainosus, not intractable     Hypokalemia     Acute on chronic diastolic congestive heart failure (HCC)     Acute diastolic HF (heart failure) (HCC)     Acute on chronic combined systolic and diastolic CHF (congestive heart failure) (HCC)     CAD (coronary artery disease)     Atrial flutter (HCC)     Chronic bronchiolitis (HCC)     Paroxysmal A-fib (HCC)     Sinus bradycardia     Atypical chest pain     HTN (hypertension)     Chronic renal insufficiency, stage III (moderate) (HCC)     Mild cognitive disorder     Raynaud's disease     Acute combined systolic and diastolic CHF, NYHA class 2 ([facility] [address]      Plan of Treatment:   1. Acute on chronic diastolic CHF- Improved. Long discussion regarding diuretics. Will continue Bumex 1 mg daily. Advised to take additional 1 pill for weight gain, increased CHF and/swelling. Will add potassium supplement. No ACE or BB at this time. Discussed CHF clinic on discharge  2. AFib- Currently SB. EKG reviewed with Dr. Danuta Diaz and appears to be artifact.   Will hold Toprol on discharge  and Amiodarone 100mg at this time. Conitnue Eliquis   3. CAD- Recent POBA of RCA.  Continue  statin, plavix, Ranexa and imdur    Electronically signed by NENA Sanchez CNP on 8/26/2020 at 9:26 AM  [address]  [phone]

## 2020-08-26 NOTE — PROGRESS NOTES
Pt was earlier seen by our Nurse practetioner    I have talked and seen /examin pt   PLan      1. DC amiodarone    2 . EKG sinus bradycardia with no second degree HR , artifact    3. Will resume toprol 25 mg qd   4. Holter moniter on discharge , possible in am    5. Follow up with DR. Max Green in 1-2 wks    6.  Continue with CHF clinic , watch daily wt

## 2020-08-27 VITALS
DIASTOLIC BLOOD PRESSURE: 62 MMHG | HEART RATE: 50 BPM | RESPIRATION RATE: 20 BRPM | TEMPERATURE: 97.5 F | OXYGEN SATURATION: 99 % | SYSTOLIC BLOOD PRESSURE: 148 MMHG | BODY MASS INDEX: 38.09 KG/M2 | WEIGHT: 201.72 LBS | HEIGHT: 61 IN

## 2020-08-27 PROBLEM — I50.41 ACUTE COMBINED SYSTOLIC AND DIASTOLIC CHF, NYHA CLASS 2 (HCC): Status: RESOLVED | Noted: 2020-08-25 | Resolved: 2020-08-27

## 2020-08-27 PROBLEM — R07.89 ATYPICAL CHEST PAIN: Status: RESOLVED | Noted: 2020-08-25 | Resolved: 2020-08-27

## 2020-08-27 LAB
ANION GAP SERPL CALCULATED.3IONS-SCNC: 13 MMOL/L (ref 9–17)
BUN BLDV-MCNC: 22 MG/DL (ref 8–23)
BUN/CREAT BLD: ABNORMAL (ref 9–20)
CALCIUM SERPL-MCNC: 9.3 MG/DL (ref 8.6–10.4)
CHLORIDE BLD-SCNC: 98 MMOL/L (ref 98–107)
CO2: 27 MMOL/L (ref 20–31)
CREAT SERPL-MCNC: 0.88 MG/DL (ref 0.5–0.9)
GFR AFRICAN AMERICAN: >60 ML/MIN
GFR NON-AFRICAN AMERICAN: >60 ML/MIN
GFR SERPL CREATININE-BSD FRML MDRD: ABNORMAL ML/MIN/{1.73_M2}
GFR SERPL CREATININE-BSD FRML MDRD: ABNORMAL ML/MIN/{1.73_M2}
GLUCOSE BLD-MCNC: 105 MG/DL (ref 70–99)
MAGNESIUM: 2.1 MG/DL (ref 1.6–2.6)
POTASSIUM SERPL-SCNC: 4.4 MMOL/L (ref 3.7–5.3)
SODIUM BLD-SCNC: 138 MMOL/L (ref 135–144)

## 2020-08-27 PROCEDURE — 6370000000 HC RX 637 (ALT 250 FOR IP): Performed by: NURSE PRACTITIONER

## 2020-08-27 PROCEDURE — 83735 ASSAY OF MAGNESIUM: CPT

## 2020-08-27 PROCEDURE — APPSS45 APP SPLIT SHARED TIME 31-45 MINUTES: Performed by: NURSE PRACTITIONER

## 2020-08-27 PROCEDURE — APPNB45 APP NON BILLABLE 31-45 MINUTES: Performed by: NURSE PRACTITIONER

## 2020-08-27 PROCEDURE — 36415 COLL VENOUS BLD VENIPUNCTURE: CPT

## 2020-08-27 PROCEDURE — 99238 HOSP IP/OBS DSCHRG MGMT 30/<: CPT | Performed by: INTERNAL MEDICINE

## 2020-08-27 PROCEDURE — 80048 BASIC METABOLIC PNL TOTAL CA: CPT

## 2020-08-27 PROCEDURE — 2580000003 HC RX 258: Performed by: NURSE PRACTITIONER

## 2020-08-27 RX ORDER — POTASSIUM CHLORIDE 20 MEQ/1
20 TABLET, EXTENDED RELEASE ORAL DAILY
Qty: 180 TABLET | Refills: 1 | Status: SHIPPED | OUTPATIENT
Start: 2020-08-27

## 2020-08-27 RX ORDER — LOSARTAN POTASSIUM 100 MG/1
25 TABLET ORAL DAILY
Qty: 30 TABLET | Refills: 3 | Status: ON HOLD | COMMUNITY
Start: 2020-08-27 | End: 2021-08-08 | Stop reason: SDUPTHER

## 2020-08-27 RX ORDER — METOPROLOL SUCCINATE 25 MG/1
25 TABLET, EXTENDED RELEASE ORAL DAILY
Qty: 30 TABLET | Refills: 3 | Status: ON HOLD | OUTPATIENT
Start: 2020-08-28 | End: 2021-08-05

## 2020-08-27 RX ORDER — BUMETANIDE 1 MG/1
1 TABLET ORAL DAILY
Qty: 30 TABLET | Refills: 1 | Status: ON HOLD | OUTPATIENT
Start: 2020-08-27 | End: 2022-03-07

## 2020-08-27 RX ADMIN — Medication 1000 MG: at 10:12

## 2020-08-27 RX ADMIN — BUMETANIDE 1 MG: 1 TABLET ORAL at 10:04

## 2020-08-27 RX ADMIN — PANTOPRAZOLE SODIUM 40 MG: 40 TABLET, DELAYED RELEASE ORAL at 07:54

## 2020-08-27 RX ADMIN — METOPROLOL SUCCINATE 25 MG: 25 TABLET, FILM COATED, EXTENDED RELEASE ORAL at 10:02

## 2020-08-27 RX ADMIN — PAROXETINE HYDROCHLORIDE 20 MG: 20 TABLET, FILM COATED ORAL at 07:54

## 2020-08-27 RX ADMIN — POTASSIUM CHLORIDE 20 MEQ: 1500 TABLET, EXTENDED RELEASE ORAL at 07:53

## 2020-08-27 RX ADMIN — Medication 10 ML: at 10:04

## 2020-08-27 RX ADMIN — RANOLAZINE 500 MG: 500 TABLET, FILM COATED, EXTENDED RELEASE ORAL at 10:03

## 2020-08-27 RX ADMIN — APIXABAN 5 MG: 5 TABLET, FILM COATED ORAL at 10:03

## 2020-08-27 RX ADMIN — ALLOPURINOL 100 MG: 100 TABLET ORAL at 10:02

## 2020-08-27 ASSESSMENT — ENCOUNTER SYMPTOMS
COUGH: 0
BLOOD IN STOOL: 0
NAUSEA: 0
STRIDOR: 0
CONSTIPATION: 0
VOMITING: 0
DIARRHEA: 0
ABDOMINAL PAIN: 0
WHEEZING: 0
SHORTNESS OF BREATH: 0

## 2020-08-27 ASSESSMENT — PAIN DESCRIPTION - ONSET: ONSET: ON-GOING

## 2020-08-27 ASSESSMENT — PAIN SCALES - GENERAL
PAINLEVEL_OUTOF10: 7
PAINLEVEL_OUTOF10: 2

## 2020-08-27 ASSESSMENT — PAIN DESCRIPTION - LOCATION: LOCATION: HEAD

## 2020-08-27 ASSESSMENT — PAIN DESCRIPTION - DESCRIPTORS: DESCRIPTORS: ACHING;HEADACHE

## 2020-08-27 ASSESSMENT — PAIN DESCRIPTION - PAIN TYPE: TYPE: CHRONIC PAIN

## 2020-08-27 ASSESSMENT — PAIN DESCRIPTION - FREQUENCY: FREQUENCY: CONTINUOUS

## 2020-08-27 NOTE — PLAN OF CARE
Problem: Falls - Risk of:  Goal: Will remain free from falls  Description: Will remain free from falls  Outcome: Ongoing   Fall assessment preformed. Bed in low locked position with call light and tray table within reach. Education given. Will continue to monitor. Problem: Pain:  Goal: Pain level will decrease  Description: Pain level will decrease  Outcome: Ongoing   Pain scale preformed per protocol and pt treated for pain as documented. Education given. Problem: HEMODYNAMIC STATUS  Goal: Patient has stable vital signs and fluid balance  Outcome: Ongoing     Problem: FLUID AND ELECTROLYTE IMBALANCE  Goal: Fluid and electrolyte balance are achieved/maintained  Outcome: Ongoing   Vital signs collected and documented. Labs checked.  Will continue to monitor

## 2020-08-27 NOTE — PROGRESS NOTES
Pt assisted to [facility] per wheelchair; holter monitor placed per tech. Pt back to unit. Awaiting discharge orders.

## 2020-08-27 NOTE — PROGRESS NOTES
Pt off unit per wheelchair and in stable condition. Pt took all of her personal belongings including her home medications. Pt taken to her car which is parked in the ER parking lot; pt will be going to her home.

## 2020-08-27 NOTE — PLAN OF CARE
Problem: Falls - Risk of:  Goal: Will remain free from falls  Description: Will remain free from falls  8/27/2020 1324 by Octavio Cross RN  Outcome: Met This Shift     Problem: Skin Integrity:  Goal: Will show no infection signs and symptoms  Description: Will show no infection signs and symptoms  8/27/2020 1324 by Octavio Cross RN  Outcome: Met This Shift     Problem: Pain:  Goal: Pain level will decrease  Description: Pain level will decrease  8/27/2020 1324 by Octavio Cross RN  Outcome: Met This Shift     Problem: FLUID AND ELECTROLYTE IMBALANCE  Goal: Fluid and electrolyte balance are achieved/maintained  8/27/2020 1324 by Octavio Cross RN  Outcome: Met This Shift     Problem: ACTIVITY INTOLERANCE/IMPAIRED MOBILITY  Goal: Mobility/activity is maintained at optimum level for patient  8/27/2020 1324 by Octavio Cross RN  Outcome: Met This Shift

## 2020-08-27 NOTE — PROGRESS NOTES
@[facility]@    [address] [place] [address]    Progress Note    8/27/2020    7:43 AM    Name:   Brittani Grace  MRN:     [id number]     Acct:      [de-identified]   Room:   [address] [place] [address] Day:  2  Admit Date:  8/24/2020  6:23 PM    PCP:   Karen Steiner MD  Code Status:  Full Code    Subjective:     C/C:   Chief Complaint   Patient presents with    Chest Pain    Shortness of Breath     Interval History Status: improved. This morning she has no complaints. She denies shortness of breath, chest pain, or problems with bowel or bladder. I have encouraged her to get out of bed and ambulate in the halls today and she is agreeable. Tolerating p.o. intake without difficulty. Happy to go home later today. Brief History:     Per previous documentation    Brittani Grace is a 69 y.o. Non-/non  female who presents with Chest Pain and Shortness of Breath   and is admitted to the hospital for the management of Acute on chronic combined systolic and diastolic CHF (congestive heart failure) ([facility] [address]    The patient complains of constant substernal chest pressure and SOB that started Sunday evening. She states she took a Nitro last night but had no relief. She states she was up all night and feels fatigued. She denies N/V or Diaphoresis. She denies new leg swelling. She had a recent cardiac cath with a POBA to the LAD and was cardioverted on 7/31 and again on 8/14. After the cardioversion she was prescribed amiodarone but states she never started taking it. She also misunderstood her discharge instructions from [address] [place] [address] and has been taking Toprol XL 50 mg not 25 mg.  She admits to skipping her Bumex on Sunday and Monday morning because she didn't feel well.      On arrival to the ER her vitals were 153/51,50,16, 36.7  CXR shows no acute changes  K+ 3.4, BNP 2247, trops 20-16-25  EKG 1833 sinus rhythm rate 53  QRS 76  no acute ST elevations  Repeat EKG 2037 shows sinus bradycardia, rate 48 bpm, left axis deviation, normal SC and QRS intervals, QT prolonged at 536 ms,  ms, no ST elevation or depression, T wave inversion in 3, poor R wave progression, Q wave in aVR, no change from first EKG except for prolonged QT.     She was given lasix 40 mg IV x's 1. Initially cardiology requested the patient go to [facility] but after 15 hrs in the ER and negative trop Dr. Danish Tellez approved the patient be admitted to [facility] and be treated for Acute CHF.      EKG this morning reveals Sinus rhythm with 2nd degree A-V block with 2:1 A-V conduction   Left axis deviation   Septal infarct (cited on or before 24-AUG-2020)   Abnormal ECG   When compared with ECG of 24-AUG-2020 20:37,   Sinus rhythm is now with 2nd degree A-V block   T wave inversion now evident in Inferior leads   QT has shortened     Lasix IV stopped. Bumex 1mg po daily ordered. Activity encouraged. Toprol XL 25 mg p.o. daily continued per cardiology. Amiodarone stopped. Plan for discharge home later today         Proximal atrial flutter status post JOVITA cardioversion on 7/31/2020 then again on 8/14/2020 patient was in normal sinus rhythm with bradycardia post cardioversion.     CAD status post cardiac cath on 8/20/2020. POBA apical LAD due to ischemia in that area.     Echocardiogram 4/30/2019  Left ventricle is normal in size with normal systolic function. Calculated ejection fraction is 65%. Review of Systems:     Review of Systems   Constitutional: Negative for chills, diaphoresis and fever. HENT: Negative for congestion and hearing loss. Respiratory: Negative for cough, shortness of breath, wheezing and stridor. Cardiovascular: Negative for chest pain, palpitations and leg swelling. Gastrointestinal: Negative for abdominal pain, blood in stool, constipation, diarrhea, nausea and vomiting. Genitourinary: Negative for dysuria and frequency.    Musculoskeletal: Positive for myalgias (left Oral Nightly    aspirin  324 mg Oral Once     Continuous Infusions:   PRN Meds: sodium chloride flush, potassium chloride **OR** potassium alternative oral replacement **OR** potassium chloride, promethazine **OR** ondansetron, nitroGLYCERIN, senna, magnesium sulfate, acetaminophen    Data:     Past Medical History:   has a past medical history of Abnormal Pap smear, Asthma, Atrial fibrillation ([facility] [address] Bloody discharge from nipple, CAD (coronary artery disease), Cataracts, bilateral, Colon polyp, CVA (cerebral vascular accident) ([facility] [address] Demyelinating disease ([facility] [address] Fibromyalgia, High-risk sexual behavior, History of bone density study, History of migraines, HTN (hypertension), IgG deficiency ([facility] [address] Multiple sclerosis ([facility] [address] Myoclonic seizures ([facility] [address] Optic neuritis, Osteopenia, Pyloric stenosis, Raynaud's disease, and Vasomotor rhinitis. Social History:   reports that she has never smoked. She has never used smokeless tobacco. She reports that she does not drink alcohol or use drugs. Family History:   Family History   Problem Relation Age of Onset    Other Father         Heart problems    Heart Disease Father     Other Mother         heart problems requiring open heart surgery, HTN    Heart Disease Mother     Other Sister         HTN    Hypertension Sister     Other Maternal Aunt         breast cancer    Breast Cancer Maternal Aunt        Vitals:  BP (!) 108/51   Pulse 51   Temp 97.6 °F (36.4 °C) (Oral)   Resp 18   Ht 5' 1\" (1.549 m)   Wt 201 lb 11.5 oz (91.5 kg)   SpO2 97%   BMI 38.11 kg/m²   Temp (24hrs), Av °F (36.7 °C), Min:97.6 °F (36.4 °C), Max:98.8 °F (37.1 °C)    No results for input(s): POCGLU in the last 72 hours. I/O (24Hr):     Intake/Output Summary (Last 24 hours) at 2020 0743  Last data filed at 2020 0000  Gross per 24 hour   Intake 690 ml   Output --   Net 690 ml       Labs:  Hematology:  Recent Labs     20  1835 20  0609   WBC 9.5 7.3   RBC 3.57* 3.69* HGB 11.8* 12.2   HCT 33.8* 35.0*   MCV 94.7 94.7   MCH 33.0 33.1   MCHC 34.8 35.0   RDW 15.5* 15.3    194   MPV 9.8 9.8   DDIMER <0.19  --      Chemistry:  Recent Labs     08/24/20  1835 08/24/20  2035 08/25/20  0744 08/25/20  1822 08/26/20  0609 08/27/20  0528     --   --   --  142 138   K 3.4*  --   --  3.8 3.4* 4.4     --   --   --  101 98   CO2 23  --   --   --  27 27   GLUCOSE 93  --   --   --  97 105*   BUN 21  --   --   --  22 22   CREATININE 0.85  --   --   --  1.00* 0.88   MG  --   --   --  2.2 2.1 2.1   ANIONGAP 15  --   --   --  14 13   LABGLOM >60  --   --   --  55* >60   GFRAA >60  --   --   --  >60 >60   CALCIUM 9.1  --   --   --  8.9 9.3   PROBNP 2,247*  --   --   --  1,912*  --    TROPHS 20* 16* 25*  --   --   --      Recent Labs     08/26/20  0609   PROT 5.9*   LABALBU 4.0   AST 8   ALT 7   ALKPHOS 75   BILITOT 0.65   CHOL 128   HDL 52   LDLCHOLESTEROL 60   CHOLHDLRATIO 2.5   TRIG 78   VLDL NOT REPORTED     ABG:No results found for: POCPH, PHART, PH, POCPCO2, PTY1YXF, PCO2, POCPO2, PO2ART, PO2, POCHCO3, EPA2GVQ, HCO3, NBEA, PBEA, BEART, BE, THGBART, THB, TUX1TRH, YDIZ3PFJ, I9LGYBWR, O2SAT, FIO2  Lab Results   Component Value Date/Time    SPECIAL 20 ML RIGHT [facility] 03/10/2019 08:42 PM     Lab Results   Component Value Date/Time    CULTURE NO GROWTH 6 DAYS 03/10/2019 08:42 PM       Radiology:  [facility] Chest Portable    Result Date: 8/24/2020  Chronic findings in the chest without acute airspace disease identified. Physical Examination:     Physical Exam  Vitals signs and nursing note reviewed. HENT:      Mouth/Throat:      Mouth: Mucous membranes are moist.   Eyes:      Conjunctiva/sclera: Conjunctivae normal.   Cardiovascular:      Rate and Rhythm: Regular rhythm. Bradycardia present. Pulses: Normal pulses. Pulmonary:      Effort: Pulmonary effort is normal.      Breath sounds: Normal breath sounds.    Abdominal:      General: Bowel sounds are normal.      Palpations: Abdomen is soft. Musculoskeletal: Normal range of motion. Right lower leg: No edema. Left lower leg: No edema. Skin:     General: Skin is warm and dry. Capillary Refill: Capillary refill takes less than 2 seconds. Neurological:      Mental Status: She is alert and oriented to person, place, and time. Assessment:     Hospital Problems           Last Modified POA    * (Principal) Acute on chronic combined systolic and diastolic CHF (congestive heart failure) ([address] 8/25/2020 Yes    Multiple sclerosis ([address] (Chronic) 8/25/2020 Yes    Essential hypertension (Chronic) 8/25/2020 Yes    Noncompliance with medications 8/25/2020 Yes    Dyslipidemia (Chronic) 8/25/2020 Yes    H/O: CVA (cerebrovascular accident) (Chronic) 8/25/2020 Yes    CAD (coronary artery disease) (Chronic) 8/25/2020 Yes    Atrial flutter ([address] (Chronic) 8/25/2020 Yes    Paroxysmal A-fib (HCC) (Chronic) 8/25/2020 Yes    Sinus bradycardia 8/25/2020 Yes    Atypical chest pain 8/25/2020 Yes    Chronic renal insufficiency, stage III (moderate) (HCC) (Chronic) 8/25/2020 Yes    Acute combined systolic and diastolic CHF, NYHA class 2 ([address] 8/25/2020 Yes          Plan:     Cardiology consulted. In his opinion the patient's symptoms are most likely related to exacerbation of CHF. Stop IV lasix, resume bumex po 1 mg daily. Toprol XL 25 mg p.o. daily continued. Amiodarone discontinued. Dr. Elmer Montes like the patient to have a Holter monitor placed at discharge    Strict I&O. Monitor and replace electrolytes as needed.     Continue Eliquis related to A.  Fib     Continue aspirin, Pravachol, Plavix, Imdur related to CAD/history of stent     Continue Protonix related to history of GERD     Continue home Imuran, Paxil, and Ranexa     CBC, CMP, fasting lipids, BMP and magnesium in a.m.     Cardiac, low sodium diet     Continue to monitor vital signs and telemetry     Monitor pulse ox related to patient complaint of shortness of breath    Vitamin B12 needs replacement but the patient refuses at this time.      Discussed medication compliance with the patient     Encourage patient to follow-up with CHF clinic/care coordinator as outpatient. She states she followed with them in the past but felt it was an unnecessary expense.     Griselda Bast, NENA - CNP  8/27/2020  7:43 AM

## 2020-08-27 NOTE — DISCHARGE SUMMARY
female who presents with Chest Pain and Shortness of Breath   and is admitted to the hospital for the management of Acute on chronic combined systolic and diastolic CHF (congestive heart failure) ([facility] [address]    The patient complains of constant substernal chest pressure and SOB that started Sunday evening. She states she took a Nitro last night but had no relief. She states she was up all night and feels fatigued. She denies N/V or Diaphoresis. She denies new leg swelling. She had a recent cardiac cath with a POBA to the LAD and was cardioverted on 7/31 and again on 8/14. After the cardioversion she was prescribed amiodarone but states she never started taking it. She also misunderstood her discharge instructions from [address] [place] [address] and has been taking Toprol XL 50 mg not 25 mg. She admits to skipping her Bumex on Sunday and Monday morning because she didn't feel well.      On arrival to the ER her vitals were 153/51,50,16, 36.7  CXR shows no acute changes  K+ 3.4, BNP 2247, trops 20-16-25  PSC 5210 sinus rhythm rate 53  QRS 76  no acute ST elevations  Repeat EKG 2037 shows sinus bradycardia, rate 48 bpm, left axis deviation, normal NH and QRS intervals, QT prolonged at 536 ms,  ms, no ST elevation or depression, T wave inversion in 3, poor R wave progression, Q wave in aVR, no change from first EKG except for prolonged QT.     She was given lasix 40 mg IV x's 1. Initially cardiology requested the patient go to [facility] but after 15 hrs in the ER and negative trop Dr. Filipe Johnson approved the patient be admitted to \[facility]\"" and be treated for Acute CHF.  The patient was admitted and Lasix 40 mg IV twice daily was given to continue diuresis. Patient had very good results with diuresis and felt much better the next day. Her EKG revealed Sinus rhythm with 2nd degree A-V block with 2:1 A-V conduction. Toprol XL 25 mg p.o. daily continued per cardiology. Amiodarone stopped.   IV Lasix was transitioned to Bumex 1mg po daily. Her vitamin B12 level was 12.0. During this admission the patient refused replacement. Prior to discharge the patient was taken down to the cardiology office and a Holter monitor was placed.        Proximal atrial flutter status post JOVITA cardioversion on 7/31/2020 then again on 8/14/2020 patient was in normal sinus rhythm with bradycardia post cardioversion.     CAD status post cardiac cath on 8/20/2020.  POBA apical LAD due to ischemia in that area.     Echocardiogram 4/30/2019  Left ventricle is normal in size with normal systolic function. Calculated ejection fraction is 65%. Significant therapeutic interventions: Lasix IV, Toprol-XL dose adjusted. Home dose of Bumex adjusted. Amiodarone discontinued.   Holter monitor at discharge    Significant Diagnostic Studies:   Labs / Micro:  CBC:   Lab Results   Component Value Date    WBC 7.3 08/26/2020    RBC 3.69 08/26/2020    RBC 4.04 04/30/2012    HGB 12.2 08/26/2020    HCT 35.0 08/26/2020    MCV 94.7 08/26/2020    MCH 33.1 08/26/2020    MCHC 35.0 08/26/2020    RDW 15.3 08/26/2020     08/26/2020     04/30/2012     BMP:    Lab Results   Component Value Date    GLUCOSE 105 08/27/2020    GLUCOSE 108 04/30/2012     08/27/2020    K 4.4 08/27/2020    CL 98 08/27/2020    CO2 27 08/27/2020    ANIONGAP 13 08/27/2020    BUN 22 08/27/2020    CREATININE 0.88 08/27/2020    BUNCRER NOT REPORTED 08/27/2020    CALCIUM 9.3 08/27/2020    LABGLOM >60 08/27/2020    GFRAA >60 08/27/2020    GFR      08/27/2020    GFR NOT REPORTED 08/27/2020     HFP:    Lab Results   Component Value Date    PROT 5.9 08/26/2020     CMP:    Lab Results   Component Value Date    GLUCOSE 105 08/27/2020    GLUCOSE 108 04/30/2012     08/27/2020    K 4.4 08/27/2020    CL 98 08/27/2020    CO2 27 08/27/2020    BUN 22 08/27/2020    CREATININE 0.88 08/27/2020    ANIONGAP 13 08/27/2020    ALKPHOS 75 08/26/2020    ALT 7 08/26/2020    AST 8 08/26/2020    BILITOT 0.65 08/26/2020    LABALBU 4.0 08/26/2020    ALBUMIN 2.1 08/26/2020    LABGLOM >60 08/27/2020    GFRAA >60 08/27/2020    GFR      08/27/2020    GFR NOT REPORTED 08/27/2020    PROT 5.9 08/26/2020    CALCIUM 9.3 08/27/2020     U/A:    Lab Results   Component Value Date    COLORU YELLOW 06/13/2018    TURBIDITY SLIGHTLY CLOUDY 06/13/2018    SPECGRAV 1.026 06/13/2018    HGBUR TRACE 06/13/2018    PHUR 5.5 06/13/2018    PROTEINU TRACE 06/13/2018    GLUCOSEU NEGATIVE 06/13/2018    KETUA NEGATIVE 06/13/2018    BILIRUBINUR SMALL 06/13/2018    UROBILINOGEN Normal 06/13/2018    NITRU POSITIVE 06/13/2018    LEUKOCYTESUR SMALL 06/13/2018     TSH:    Lab Results   Component Value Date    TSH 1.96 07/30/2020        Radiology:  Xr Chest Portable    Result Date: 8/24/2020  Chronic findings in the chest without acute airspace disease identified. Consultations:    Consults:     Final Specialist Recommendations/Findings:   IP CONSULT TO CARDIOLOGY  IP CONSULT TO CARDIOLOGY  IP CONSULT TO DIETITIAN      The patient was seen and examined on day of discharge and this discharge summary is in conjunction with any daily progress note from day of discharge. Discharge plan:     Disposition: Home    Physician Follow Up:     Arik Orellana MD  [address] [place] [address]  [phone]      [facility] follow up in 7-10 days    [facility]  [address] [place] [address]. [address]  On 8/31/2020  at 1pm with DR. ENCINAS [facility]        Requiring Further Evaluation/Follow Up POST HOSPITALIZATION/Incidental Findings: follow-up with CHF clinic/care coordinator as outpatient.   Saira b 12 level 12.0 she refused replacement during admission    Diet: cardiac diet, low fat, low cholesterol diet and low sodium    Activity: As tolerated    Instructions to Patient: Medication compliance is essential. Patient instructed by cardiology to take an additional Bumex with weight gain, increased CHF or leg swelling. Call 911 or return to the ER if you experience a recurrence of your symptoms or start having fever, chills, chest pain or shortness of breath.       Discharge Medications:      Medication List      START taking these medications    potassium chloride 20 MEQ extended release tablet  Commonly known as:  KLOR-CON M  Take 1 tablet by mouth daily        CHANGE how you take these medications    bumetanide 1 MG tablet  Commonly known as:  BUMEX  Take 1 tablet by mouth daily Advised to take additional 1 pill for weight gain, increased CHF and/swelling  What changed:  additional instructions     isosorbide mononitrate 120 MG extended release tablet  Commonly known as:  IMDUR  Take 0.5 tablets by mouth daily  What changed:  how much to take     losartan 100 MG tablet  Commonly known as:  COZAAR  What changed:  additional instructions        CONTINUE taking these medications    acetaminophen 500 MG tablet  Commonly known as:  TYLENOL     allopurinol 100 MG tablet  Commonly known as:  ZYLOPRIM     azaTHIOprine 50 MG tablet  Commonly known as:  IMURAN     Eliquis 5 MG Tabs tablet  Generic drug:  apixaban     metoprolol succinate 25 MG extended release tablet  Commonly known as:  TOPROL XL  Take 1 tablet by mouth daily  Start taking on:  August 28, 2020     nitroGLYCERIN 0.4 MG SL tablet  Commonly known as:  NITROSTAT     pantoprazole 40 MG tablet  Commonly known as:  PROTONIX     PARoxetine 20 MG tablet  Commonly known as:  PAXIL     Plavix 75 MG tablet  Generic drug:  clopidogrel     pravastatin 40 MG tablet  Commonly known as:  PRAVACHOL     ranolazine 500 MG extended release tablet  Commonly known as:  RANEXA        STOP taking these medications    amiodarone 200 MG tablet  Commonly known as:  CORDARONE           Where to Get Your Medications      These medications were sent to [address] [place] [address], [address] [place] [address] Giovanna Vásqueza [phone]  [facility], [facility] Phone:  [phone]   · bumetanide 1 MG tablet  · metoprolol succinate 25 MG extended release tablet  · potassium chloride 20 MEQ extended release tablet         No discharge procedures on file. Time Spent on discharge is  32 mins in patient examination, evaluation, counseling as well as medication reconciliation, prescriptions for required medications, discharge plan and follow up. Electronically signed by   NENA Collazo CNP  8/27/2020  3:55 PM      Thank you Dr. Rachell Bustillos MD for the opportunity to be involved in this patient's care.

## 2020-08-28 ENCOUNTER — CARE COORDINATION (OUTPATIENT)
Dept: CASE MANAGEMENT | Age: 75
End: 2020-08-28

## 2020-08-28 NOTE — CARE COORDINATION
[facility] Transitions Initial Follow Up Call    Call within 2 business days of discharge: Yes    Patient: Ifrah Blanco Patient : 1945   MRN: [id number]  Reason for Admission: CHF, COVID 19 Monitoring  Discharge Date: 20 RARS: Readmission Risk Score: 18      Last Discharge [facility]       Complaint Diagnosis Description Type Department Provider    20 Chest Pain; Shortness of Breath Chest pain due to myocardial ischemia, unspecified ischemic chest pain type . .. ED to Hosp-Admission (Discharged) (ADMITTED) [facility] PB MS Mbonu Glady Ganser, MD; Curtistine Minus Helf. .. Spoke with: [address]: [facility]    Non-face-to-face services provided:  Scheduled appointment with PCP-Patient calling to schedule with PCP  Obtained and reviewed discharge summary and/or continuity of care documents     Spoke with patient who said she is feeling better today. She denies any further chest pains, shortness of breath or swelling. Discharge instructions and medications reviewed. Patient is taking Bumex, states she does not like how this works on her and would like to go back on Lasix. She does have a follow up with her cardiologist on Monday. She denies any viral type symptoms. Patient contacted regarding Alexandru Katz. Discussed COVID-19 related testing which was available at this time. Test results were negative. Patient informed of results, if available? Yes    Care Transition Nurse/ Ambulatory Care Manager contacted the patient by telephone to perform post discharge assessment. Verified name and  with patient as identifiers. Provided introduction to self, and explanation of the CTN/ACM role, and reason for call due to risk factors for infection and/or exposure to COVID-19. Symptoms reviewed with patient who verbalized the following symptoms: no new symptoms. Due to no new or worsening symptoms encounter was not routed to provider for escalation.  Discussed home?:  Yes  Care Transitions Interventions         Follow Up  No future appointments.     Gabby Ayers RN

## 2020-09-04 ENCOUNTER — CARE COORDINATION (OUTPATIENT)
Dept: CASE MANAGEMENT | Age: 75
End: 2020-09-04

## 2020-09-08 ENCOUNTER — CARE COORDINATION (OUTPATIENT)
Dept: CASE MANAGEMENT | Age: 75
End: 2020-09-08

## 2020-09-08 NOTE — CARE COORDINATION
[facility] Transitions Follow Up Call    2020    Patient: Nadine Padilla  Patient : 1945   MRN: [id number]  Reason for Admission: CHF, COVID 19 Monitoring  Discharge Date: 20 RARS: Readmission Risk Score: 25         Spoke with: Viridiana Gold    Spoke with patient who said that she is doing good. She denies any shortness of breath, cough, f/c, n/v or palpitations. She denies any viral type symptoms. She has completed her follow up appointments with PCP and cardiology. She denies any needs or concerns. You Patient resolved from the Care Transitions episode on 20  Discussed COVID-19 related testing which was available at this time. Test results were negative. Patient informed of results, if available? Yes    Patient/family has been provided the following resources and education related to COVID-19:                         Signs, symptoms and red flags related to COVID-19            CDC exposure and quarantine guidelines            [employer] exposure contact - [phone]            Contact for their local Department of Health                 Patient currently reports that the following symptoms have improved:  no new/worsening symptoms     No further outreach scheduled with this CTN/ACM. Episode of Care resolved. Patient has this CTN/ACM contact information if future needs arise. Care Transitions Subsequent and Final Call    Schedule Follow Up Appointment with PCP:  Completed  Subsequent and Final Calls  Do you have any ongoing symptoms?:  No  Have your medications changed?:  No  Do you have any questions related to your medications?:  No  Do you currently have any active services?:  No  Do you have any needs or concerns that I can assist you with?:  No  Identified Barriers:  Lack of Education  Care Transitions Interventions  Other Interventions: Follow Up  No future appointments.     Amber Camp RN

## 2020-09-18 ENCOUNTER — HOSPITAL ENCOUNTER (EMERGENCY)
Age: 75
Discharge: HOME | End: 2020-09-18
Attending: EMERGENCY MEDICINE
Payer: MEDICARE

## 2020-09-18 ENCOUNTER — APPOINTMENT (OUTPATIENT)
Dept: GENERAL RADIOLOGY | Age: 75
End: 2020-09-18
Payer: MEDICARE

## 2020-09-18 VITALS
HEIGHT: 61 IN | HEART RATE: 83 BPM | BODY MASS INDEX: 37.88 KG/M2 | SYSTOLIC BLOOD PRESSURE: 151 MMHG | WEIGHT: 200.62 LBS | RESPIRATION RATE: 18 BRPM | OXYGEN SATURATION: 97 % | DIASTOLIC BLOOD PRESSURE: 81 MMHG | TEMPERATURE: 98.7 F

## 2020-09-18 LAB
ABSOLUTE EOS #: 0.2 K/UL (ref 0–0.4)
ABSOLUTE IMMATURE GRANULOCYTE: ABNORMAL K/UL (ref 0–0.3)
ABSOLUTE LYMPH #: 1.1 K/UL (ref 1–4.8)
ABSOLUTE MONO #: 0.9 K/UL (ref 0.1–1.2)
ANION GAP SERPL CALCULATED.3IONS-SCNC: 13 MMOL/L (ref 9–17)
BASOPHILS # BLD: 0 % (ref 0–2)
BASOPHILS ABSOLUTE: 0 K/UL (ref 0–0.2)
BUN BLDV-MCNC: 23 MG/DL (ref 8–23)
BUN/CREAT BLD: ABNORMAL (ref 9–20)
CALCIUM SERPL-MCNC: 9.4 MG/DL (ref 8.6–10.4)
CHLORIDE BLD-SCNC: 100 MMOL/L (ref 98–107)
CO2: 25 MMOL/L (ref 20–31)
CREAT SERPL-MCNC: 0.81 MG/DL (ref 0.5–0.9)
DIFFERENTIAL TYPE: ABNORMAL
EOSINOPHILS RELATIVE PERCENT: 2 % (ref 1–4)
GFR AFRICAN AMERICAN: >60 ML/MIN
GFR NON-AFRICAN AMERICAN: >60 ML/MIN
GFR SERPL CREATININE-BSD FRML MDRD: ABNORMAL ML/MIN/{1.73_M2}
GFR SERPL CREATININE-BSD FRML MDRD: ABNORMAL ML/MIN/{1.73_M2}
GLUCOSE BLD-MCNC: 102 MG/DL (ref 70–99)
HCT VFR BLD CALC: 39.1 % (ref 36–46)
HEMOGLOBIN: 13.2 G/DL (ref 12–16)
IMMATURE GRANULOCYTES: ABNORMAL %
INR BLD: 1
LYMPHOCYTES # BLD: 11 % (ref 24–44)
MAGNESIUM: 2.1 MG/DL (ref 1.6–2.6)
MCH RBC QN AUTO: 32.1 PG (ref 26–34)
MCHC RBC AUTO-ENTMCNC: 33.7 G/DL (ref 31–37)
MCV RBC AUTO: 95.4 FL (ref 80–100)
MONOCYTES # BLD: 9 % (ref 2–11)
NRBC AUTOMATED: ABNORMAL PER 100 WBC
PARTIAL THROMBOPLASTIN TIME: 27.9 SEC (ref 21.3–31.3)
PDW BLD-RTO: 16.3 % (ref 12.5–15.4)
PLATELET # BLD: 228 K/UL (ref 140–450)
PLATELET ESTIMATE: ABNORMAL
PMV BLD AUTO: 10.3 FL (ref 6–12)
POTASSIUM SERPL-SCNC: 3.7 MMOL/L (ref 3.7–5.3)
PROTHROMBIN TIME: 10.7 SEC (ref 9.4–12.6)
RBC # BLD: 4.1 M/UL (ref 4–5.2)
RBC # BLD: ABNORMAL 10*6/UL
SEG NEUTROPHILS: 78 % (ref 36–66)
SEGMENTED NEUTROPHILS ABSOLUTE COUNT: 8.2 K/UL (ref 1.8–7.7)
SODIUM BLD-SCNC: 138 MMOL/L (ref 135–144)
TROPONIN INTERP: ABNORMAL
TROPONIN INTERP: ABNORMAL
TROPONIN T: ABNORMAL NG/ML
TROPONIN T: ABNORMAL NG/ML
TROPONIN, HIGH SENSITIVITY: 21 NG/L (ref 0–14)
TROPONIN, HIGH SENSITIVITY: 22 NG/L (ref 0–14)
WBC # BLD: 10.4 K/UL (ref 3.5–11)
WBC # BLD: ABNORMAL 10*3/UL

## 2020-09-18 PROCEDURE — 80048 BASIC METABOLIC PNL TOTAL CA: CPT

## 2020-09-18 PROCEDURE — 85730 THROMBOPLASTIN TIME PARTIAL: CPT

## 2020-09-18 PROCEDURE — 71045 X-RAY EXAM CHEST 1 VIEW: CPT

## 2020-09-18 PROCEDURE — 83735 ASSAY OF MAGNESIUM: CPT

## 2020-09-18 PROCEDURE — 36415 COLL VENOUS BLD VENIPUNCTURE: CPT

## 2020-09-18 PROCEDURE — 99285 EMERGENCY DEPT VISIT HI MDM: CPT

## 2020-09-18 PROCEDURE — 84484 ASSAY OF TROPONIN QUANT: CPT

## 2020-09-18 PROCEDURE — 85025 COMPLETE CBC W/AUTO DIFF WBC: CPT

## 2020-09-18 PROCEDURE — 85610 PROTHROMBIN TIME: CPT

## 2020-09-18 PROCEDURE — 93005 ELECTROCARDIOGRAM TRACING: CPT | Performed by: EMERGENCY MEDICINE

## 2020-09-18 RX ORDER — AMIODARONE HYDROCHLORIDE 100 MG/1
100 TABLET ORAL DAILY
Status: ON HOLD | COMMUNITY
End: 2021-08-04

## 2020-09-18 ASSESSMENT — PAIN DESCRIPTION - PAIN TYPE: TYPE: ACUTE PAIN

## 2020-09-18 ASSESSMENT — PAIN SCALES - GENERAL: PAINLEVEL_OUTOF10: 9

## 2020-09-18 ASSESSMENT — PAIN DESCRIPTION - LOCATION: LOCATION: CHEST

## 2020-09-18 NOTE — ED PROVIDER NOTES
surgery (1989); Sinus endoscopy; sinus surgery; Shoulder arthroscopy (1998); Esophagoscopy (1966, 1969); Colonoscopy (2010); Foreign Body Removal (1962); laparoscopy (6504); Coronary angioplasty with stent (5/2011); Coronary angioplasty with stent (6/2011); Upper gastrointestinal endoscopy; eye surgery; pr colonoscopy w/biopsy single/multiple (N/A, 12/19/2017); pr egd transoral biopsy single/multiple (12/19/2017); Upper gastrointestinal endoscopy (12/19/2017); Upper gastrointestinal endoscopy (5/24/2018); Cardiac surgery; Cardiac catheterization (07/31/2020); Cardioversion (08/14/2020); and Cardioversion (07/31/2020). CURRENT MEDICATIONS       Previous Medications    ACETAMINOPHEN (TYLENOL) 500 MG TABLET    Take 1,000 mg by mouth 2 times daily as needed for Pain    ALLOPURINOL (ZYLOPRIM) 100 MG TABLET    Take 100 mg by mouth daily. APIXABAN (ELIQUIS) 5 MG TABS TABLET    Take by mouth 2 times daily    AZATHIOPRINE (IMURAN) 50 MG TABLET    Take 50 mg by mouth daily     BUMETANIDE (BUMEX) 1 MG TABLET    Take 1 tablet by mouth daily Advised to take additional 1 pill for weight gain, increased CHF and/swelling    CLOPIDOGREL (PLAVIX) 75 MG TABLET    Take 75 mg by mouth daily. ISOSORBIDE MONONITRATE (IMDUR) 120 MG EXTENDED RELEASE TABLET    Take 0.5 tablets by mouth daily    LOSARTAN (COZAAR) 100 MG TABLET    Take 1 tablet by mouth daily HOLD UNTIL SEEN BY CARDIOLOGY    METOPROLOL SUCCINATE (TOPROL XL) 25 MG EXTENDED RELEASE TABLET    Take 1 tablet by mouth daily    NITROGLYCERIN (NITROSTAT) 0.4 MG SL TABLET    Place 0.4 mg under the tongue every 5 minutes. Indications: Chest Pain    PANTOPRAZOLE (PROTONIX) 40 MG TABLET    Take 40 mg by mouth daily    PAROXETINE (PAXIL) 20 MG TABLET    Take 20 mg by mouth every morning. POTASSIUM CHLORIDE (KLOR-CON M) 20 MEQ EXTENDED RELEASE TABLET    Take 1 tablet by mouth daily    PRAVASTATIN (PRAVACHOL) 40 MG TABLET    Take 40 mg by mouth daily.       RANOLAZINE (RANEXA) 500 MG SR TABLET    Take 500 mg by mouth 2 times daily. ALLERGIES     is allergic to lisinopril-hydrochlorothiazide; sulfa antibiotics; penicillins; polyethylene glycol; actifed cold-allergy [chlorpheniramine-phenylephrine]; altace [ramipril]; cephalosporins; coreg [carvedilol]; dml facial moisturizer; elavil [amitriptyline hcl]; entex [ami-margot]; ethylene glycol; fentanyl; hizentra [immune globulin (human)]; hydralazine; levofloxacin; lisinopril-hydrochlorothiazide; metoprolol; montelukast sodium; morphine; nifedipine; norvasc [amlodipine besylate]; other; phenobarbital; robaxin [methocarbamol]; sinequan [doxepin hcl]; sudafed [pseudoephedrine hcl]; sudafed [pseudoephedrine hcl]; tranquil-cecil; wellbutrin [bupropion hcl]; actifed cold-allergy [chlorpheniramine-phenylephrine]; clindamycin/lincomycin; codeine; metoprolol succinate; and seldane [terfenadine]. FAMILY HISTORY     She indicated that the status of her mother is unknown. She indicated that the status of her father is unknown. She indicated that the status of her sister is unknown. She indicated that the status of her maternal aunt is unknown.     family history includes Breast Cancer in her maternal aunt; Heart Disease in her father and mother; Hypertension in her sister; Other in her father, maternal aunt, mother, and sister. SOCIAL HISTORY      reports that she has never smoked. She has never used smokeless tobacco. She reports that she does not drink alcohol or use drugs. PHYSICAL EXAM     INITIAL VITALS:  vitals were not taken for this visit. Physical Exam  Vitals signs reviewed. Constitutional:       General: She is not in acute distress. Appearance: She is well-developed. HENT:      Head: Normocephalic and atraumatic. Eyes:      Conjunctiva/sclera: Conjunctivae normal.      Pupils: Pupils are equal, round, and reactive to light. Neck:      Musculoskeletal: Normal range of motion and neck supple.       Trachea: No tracheal deviation. Cardiovascular:      Rate and Rhythm: Tachycardia present. Rhythm irregular. Pulmonary:      Effort: No respiratory distress. Breath sounds: Normal breath sounds. Abdominal:      General: Bowel sounds are normal. There is no distension. Palpations: Abdomen is soft. Tenderness: There is no abdominal tenderness. Musculoskeletal: Normal range of motion. General: No tenderness. Skin:     General: Skin is warm and dry. Neurological:      Mental Status: She is alert and oriented to person, place, and time. Psychiatric:         Behavior: Behavior normal.         Thought Content: Thought content normal.         Judgment: Judgment normal.           DIFFERENTIAL DIAGNOSIS/ MDM:   Cardiac work-up has been initiated. She recently has had a cardiac catheterization which required angioplasty. She is subsequently been admitted to the hospital here again for cardioversion. DIAGNOSTIC RESULTS     EKG:  EKG is interpreted by myself as showing atrial flutter with a variable AV block. She has left axis deviation abnormal R wave progression through the anterior leads. RADIOLOGY:   Xr Chest Portable    Result Date: 8/24/2020  EXAMINATION: ONE XRAY VIEW OF THE CHEST 8/24/2020 7:53 pm COMPARISON: None. HISTORY: ORDERING SYSTEM PROVIDED HISTORY: chest pain TECHNOLOGIST PROVIDED HISTORY: chest pain Reason for Exam: chest pain, shortness of breath Acuity: Acute Type of Exam: Initial FINDINGS: The cardiomediastinal silhouette is unchanged in appearance. Cardiac silhouette enlargement again demonstrated. Similar appearance of vascular congestion. There is no consolidation, pneumothorax, or evidence of edema. No effusion is appreciated. The osseous structures are unchanged in appearance. Chronic findings in the chest without acute airspace disease identified.      Cardiac Diagnostic + PCI Report      Demographics      Patient   [address]         Date of Study Left coronary angiography.       - 6F Catheter JR 4 was used for Right coronary angiography. Unable to      cannulate the vessel.       - 6F Catheter 3DRC was used for Right coronary angiography.     Contrast Material:    - Optiray [id number] ml     Fluoroscopy Time: Diagnostic: 10:12 minutes. Total: 10:12 minutes.     Estimated Blood Loss: 10 ml.      Medical History     Allergies    - *Unlisted:(Ethylene Gljycol, Morphine, fentanyl).      - Lisinopril.       - Sulfa.       - Penicillin.       - Cephalosporins.       - Metoprolol.       - Codeine.       - *Unlisted.      Risk Factors      The patient risk factors include:prior PCI;treated hypercholesterolemia,   treated hypertension, untreated diabetes mellitus, last creatinine: 0.9   mg/dl and creatinine clearance: 80.92 ml/min. 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      Hemodynamics      Condition: Baseline Room Air      Estimated: 158. 86Heart Rate: 50 bpm     Pressure  +-----+--------------------------------------------------------------------+  ! Site ! Pressure                                                            ! +-----+--------------------------------------------------------------------+  ! AO   !131/66 (81)                                                         !  +-----+--------------------------------------------------------------------+     Shunts      Oxygen Values      O2 [id number] [id number]     Discharge Data  Discharge Date: 08/03/2020    LABS:  No results found for this visit on 09/18/20. EMERGENCY DEPARTMENT COURSE:     Thepatient was given the following medications:  No orders of the defined types were placed in this encounter. Vitals: There were no vitals filed for this visit.  -------------------------   ,  ,  ,        Re-evaluation Notes  At 1900 hrs.  I have endorsed this patient to my relieving physician. Please see his addendum.     CONSULTS:  None    CRITICAL CARE:   None    PROCEDURES:  None      (Please note that portions of this note were completed with a voice recognitionprogram.  Efforts were made to edit the dictations but occasionally words are mis-transcribed.)    Dotty Del Angel MD, F.A.C.E.P, F.A.A.E.M  Emergency Physician Attending          Dotty Del Angel MD  09/18/20 9800

## 2020-09-19 LAB
EKG ATRIAL RATE: 264 BPM
EKG ATRIAL RATE: 90 BPM
EKG Q-T INTERVAL: 378 MS
EKG Q-T INTERVAL: 398 MS
EKG QRS DURATION: 84 MS
EKG QRS DURATION: 86 MS
EKG QTC CALCULATION (BAZETT): 469 MS
EKG QTC CALCULATION (BAZETT): 510 MS
EKG R AXIS: -44 DEGREES
EKG R AXIS: -50 DEGREES
EKG T AXIS: 64 DEGREES
EKG T AXIS: 66 DEGREES
EKG VENTRICULAR RATE: 93 BPM
EKG VENTRICULAR RATE: 99 BPM

## 2020-09-19 NOTE — ED PROVIDER NOTES
ADDENDUM:      Care of this patient was assumed from Dr. Bebo Singh. The patient was seen for Chest Pain (pt c/o chest pain and SOB since yesterday)  . The patient's initial evaluation and plan have been discussed with the prior provider who initially evaluated the patient. Nursing Notes, Past Medical Hx, Past Surgical Hx, Social Hx, Allergies, and Family Hx were all reviewed. Diagnostic Results     EKG # 2    Rhythm: irregularly irregular  Rate: 99  Axis: left  Conduction: abnormal - atrial fibrillation  ST Segments: no acute change  T Waves: no acute change  Q Waves: none    Clinical Impression: atrial fibrilation. No acute infarction/ischemia noted. RADIOLOGY:   Non-plain film images such as CT, Ultrasound and MRI are read by the radiologist. [employer] radiographic images are visualized and the radiologist interpretations are reviewed as follows:     Xr Chest Portable    Result Date: 9/18/2020  EXAMINATION: ONE XRAY VIEW OF THE CHEST 9/18/2020 6:22 pm COMPARISON: August 24, 2020 HISTORY: ORDERING SYSTEM PROVIDED HISTORY: chest pain TECHNOLOGIST PROVIDED HISTORY: chest pain Reason for Exam: chest pain, SOB Acuity: Acute Type of Exam: Initial Relevant Medical/Surgical History: Hx of chest pain, CVA, HTN FINDINGS: No focal consolidation. Cardiomegaly. No pulmonary edema. No acute findings. Xr Chest Portable    Result Date: 8/24/2020  EXAMINATION: ONE XRAY VIEW OF THE CHEST 8/24/2020 7:53 pm COMPARISON: None. HISTORY: ORDERING SYSTEM PROVIDED HISTORY: chest pain TECHNOLOGIST PROVIDED HISTORY: chest pain Reason for Exam: chest pain, shortness of breath Acuity: Acute Type of Exam: Initial FINDINGS: The cardiomediastinal silhouette is unchanged in appearance. Cardiac silhouette enlargement again demonstrated. Similar appearance of vascular congestion. There is no consolidation, pneumothorax, or evidence of edema. No effusion is appreciated. The osseous structures are unchanged in appearance. Result Value Ref Range    Troponin, High Sensitivity 21 (H) 0 - 14 ng/L    Troponin T NOT REPORTED <0.03 ng/mL    Troponin Interp NOT REPORTED    Magnesium   Result Value Ref Range    Magnesium 2.1 1.6 - 2.6 mg/dL   EKG 12 Lead   Result Value Ref Range    Ventricular Rate 99 BPM    Atrial Rate 90 BPM    QRS Duration 84 ms    Q-T Interval 398 ms    QTc Calculation (Bazett) 510 ms    R Axis -44 degrees    T Axis 66 degrees       RECENT VITALS:  BP: (!) 151/81, Temp: 98.7 °F (37.1 °C), Pulse: 83, Resp: 18     ED Course     The patient was given the following medications:  No orders of the defined types were placed in this encounter. During the emergency department course, patient was put on the monitor which reveals intermittent atrial fibrillation and atrial flutter. She remained pain-free throughout the emergency department course. She is resting comfortably. Medical Decision Making      70-year-old female presents with intermittent heart pain and palpitations. She denies any shortness of breath, nausea, vomiting, dizziness or diaphoresis. She denies any swelling in the legs or calf pain. Patient is afebrile and vital signs are stable. Lungs are clear to auscultation. Rhythm is irregular without murmurs. Abdomen is soft and nontender with active bowel sounds. Extremities without any edema or tenderness. Her work-up including 2 sets of cardiac markers are unremarkable. Patient prefers to go home. She will follow-up with her cardiologist.  She is advised to call his office tomorrow morning for appointment, return if worse. The patient presents with chest pain that is not suggesting in nature of pulmonary emnbolus, aortic dissection, cardiac ischemia, aortic dissection, or other serious etiology. Given the extremely low risk of these diagnoses further testing and evaluation for these possibilites are not indicated at is time.  The patient has been instructed to return if the symptpoms change or worsen in any way. I have reviewed the disposition diagnosis with the patient and or their family/guardian. I have answered their questions and given discharge instructions. They voiced understanding of these instructions and did not have any further questions or complaints. Disposition     FINAL IMPRESSION      1. Chest pain, unspecified type    2. Paroxysmal atrial fibrillation [facility])          DISPOSITION/PLAN   DISPOSITION Decision To Discharge 09/18/2020 08:57:05 PM      PATIENT REFERRED TO:  Pati Esposito MD  [address] [facility] [address] [facility]  [phone]    Call in 2 days  For reevaluation of current symptoms    Em Schmid DO  [address] [place] [id number]    Call in 2 days  For reevaluation of current symptoms    [facility] ED  [address] [facility]. [address] [place] [address]  [phone]    If symptoms worsen, If chest pain recurs or any new symptoms appear      DISCHARGE MEDICATIONS:  Discharge Medication List as of 9/18/2020  8:58 PM                (Please note that portions of this note were completed with a voice recognition program.  Efforts were made to edit the dictations but occasionally words are mis-transcribed.)    Betts MD, F.A.C.E.P.   Attending Emergency Medicine Physician               Abril Wiley MD  09/18/20 2133

## 2020-09-21 ENCOUNTER — TELEPHONE (OUTPATIENT)
Dept: OTHER | Facility: CLINIC | Age: 75
End: 2020-09-21

## 2020-09-21 NOTE — TELEPHONE ENCOUNTER
RN Access contacted pt to notify her of ED f/u appt. Spoke with pt, she stated she will reschedule appt. She can not make appt Tuesday 9-22 @12:30pm with Dr. Eduardo Garibay.

## 2020-09-21 NOTE — TELEPHONE ENCOUNTER
RN Access contacted Dr. Deleon Waverly Office to schedule ED f/u appt. Spoke with Jared Stanley, appt scheduled for Tuesday 9-22 @12:30pm with Dr. Josiah Alvarez.

## 2020-11-03 PROBLEM — I10 ESSENTIAL HYPERTENSION: Status: RESOLVED | Noted: 2020-11-03 | Resolved: 2020-11-03

## 2021-04-09 ENCOUNTER — HOSPITAL ENCOUNTER (OUTPATIENT)
Age: 76
Discharge: HOME | End: 2021-04-09
Payer: MEDICARE

## 2021-04-09 ENCOUNTER — HOSPITAL ENCOUNTER (OUTPATIENT)
Age: 76
Discharge: HOME | End: 2021-04-11
Payer: MEDICARE

## 2021-04-09 ENCOUNTER — HOSPITAL ENCOUNTER (OUTPATIENT)
Dept: GENERAL RADIOLOGY | Age: 76
Discharge: HOME | End: 2021-04-11
Payer: MEDICARE

## 2021-04-09 DIAGNOSIS — R14.0 ABDOMINAL BLOATING: ICD-10-CM

## 2021-04-09 LAB — TSH SERPL DL<=0.05 MIU/L-ACNC: 2.33 MIU/L (ref 0.3–5)

## 2021-04-09 PROCEDURE — 36415 COLL VENOUS BLD VENIPUNCTURE: CPT

## 2021-04-09 PROCEDURE — 84443 ASSAY THYROID STIM HORMONE: CPT

## 2021-04-09 PROCEDURE — 74018 RADEX ABDOMEN 1 VIEW: CPT

## 2021-06-03 ENCOUNTER — APPOINTMENT (OUTPATIENT)
Dept: GENERAL RADIOLOGY | Age: 76
End: 2021-06-03
Payer: MEDICARE

## 2021-06-03 ENCOUNTER — HOSPITAL ENCOUNTER (EMERGENCY)
Age: 76
Discharge: HOME | End: 2021-06-03
Attending: EMERGENCY MEDICINE
Payer: MEDICARE

## 2021-06-03 VITALS
SYSTOLIC BLOOD PRESSURE: 165 MMHG | DIASTOLIC BLOOD PRESSURE: 80 MMHG | RESPIRATION RATE: 16 BRPM | TEMPERATURE: 98.2 F | HEIGHT: 61 IN | BODY MASS INDEX: 38.89 KG/M2 | OXYGEN SATURATION: 98 % | WEIGHT: 206 LBS | HEART RATE: 93 BPM

## 2021-06-03 DIAGNOSIS — I48.19 PERSISTENT ATRIAL FIBRILLATION (HCC): ICD-10-CM

## 2021-06-03 DIAGNOSIS — R07.9 CHEST PAIN, UNSPECIFIED TYPE: Primary | ICD-10-CM

## 2021-06-03 LAB
ABSOLUTE EOS #: 0.1 K/UL (ref 0–0.4)
ABSOLUTE IMMATURE GRANULOCYTE: ABNORMAL K/UL (ref 0–0.3)
ABSOLUTE LYMPH #: 0.9 K/UL (ref 1–4.8)
ABSOLUTE MONO #: 0.7 K/UL (ref 0.1–1.2)
ALBUMIN SERPL-MCNC: 4.4 G/DL (ref 3.5–5.2)
ALBUMIN/GLOBULIN RATIO: 2.2 (ref 1–2.5)
ALP BLD-CCNC: 110 U/L (ref 35–104)
ALT SERPL-CCNC: 11 U/L (ref 5–33)
ANION GAP SERPL CALCULATED.3IONS-SCNC: 12 MMOL/L (ref 9–17)
AST SERPL-CCNC: 13 U/L
BASOPHILS # BLD: 1 % (ref 0–2)
BASOPHILS ABSOLUTE: 0 K/UL (ref 0–0.2)
BILIRUB SERPL-MCNC: 0.63 MG/DL (ref 0.3–1.2)
BNP INTERPRETATION: ABNORMAL
BUN BLDV-MCNC: 22 MG/DL (ref 8–23)
BUN/CREAT BLD: ABNORMAL (ref 9–20)
CALCIUM SERPL-MCNC: 8.8 MG/DL (ref 8.6–10.4)
CHLORIDE BLD-SCNC: 103 MMOL/L (ref 98–107)
CO2: 23 MMOL/L (ref 20–31)
CREAT SERPL-MCNC: 0.84 MG/DL (ref 0.5–0.9)
DIFFERENTIAL TYPE: ABNORMAL
EOSINOPHILS RELATIVE PERCENT: 1 % (ref 1–4)
GFR AFRICAN AMERICAN: >60 ML/MIN
GFR NON-AFRICAN AMERICAN: >60 ML/MIN
GFR SERPL CREATININE-BSD FRML MDRD: ABNORMAL ML/MIN/{1.73_M2}
GFR SERPL CREATININE-BSD FRML MDRD: ABNORMAL ML/MIN/{1.73_M2}
GLUCOSE BLD-MCNC: 116 MG/DL (ref 70–99)
HCT VFR BLD CALC: 37 % (ref 36–46)
HEMOGLOBIN: 12.1 G/DL (ref 12–16)
IMMATURE GRANULOCYTES: ABNORMAL %
LYMPHOCYTES # BLD: 11 % (ref 24–44)
MCH RBC QN AUTO: 29.7 PG (ref 26–34)
MCHC RBC AUTO-ENTMCNC: 32.8 G/DL (ref 31–37)
MCV RBC AUTO: 90.5 FL (ref 80–100)
MONOCYTES # BLD: 8 % (ref 2–11)
NRBC AUTOMATED: ABNORMAL PER 100 WBC
PDW BLD-RTO: 17.1 % (ref 12.5–15.4)
PLATELET # BLD: 205 K/UL (ref 140–450)
PLATELET ESTIMATE: ABNORMAL
PMV BLD AUTO: 10 FL (ref 6–12)
POTASSIUM SERPL-SCNC: 4 MMOL/L (ref 3.7–5.3)
PRO-BNP: 5056 PG/ML
RBC # BLD: 4.08 M/UL (ref 4–5.2)
RBC # BLD: ABNORMAL 10*6/UL
SEDIMENTATION RATE, ERYTHROCYTE: 4 MM (ref 0–30)
SEG NEUTROPHILS: 79 % (ref 36–66)
SEGMENTED NEUTROPHILS ABSOLUTE COUNT: 7 K/UL (ref 1.8–7.7)
SODIUM BLD-SCNC: 138 MMOL/L (ref 135–144)
TOTAL PROTEIN: 6.4 G/DL (ref 6.4–8.3)
TROPONIN INTERP: ABNORMAL
TROPONIN INTERP: ABNORMAL
TROPONIN T: ABNORMAL NG/ML
TROPONIN T: ABNORMAL NG/ML
TROPONIN, HIGH SENSITIVITY: 18 NG/L (ref 0–14)
TROPONIN, HIGH SENSITIVITY: 21 NG/L (ref 0–14)
WBC # BLD: 8.8 K/UL (ref 3.5–11)
WBC # BLD: ABNORMAL 10*3/UL

## 2021-06-03 PROCEDURE — 83880 ASSAY OF NATRIURETIC PEPTIDE: CPT

## 2021-06-03 PROCEDURE — 84484 ASSAY OF TROPONIN QUANT: CPT

## 2021-06-03 PROCEDURE — 6370000000 HC RX 637 (ALT 250 FOR IP): Performed by: EMERGENCY MEDICINE

## 2021-06-03 PROCEDURE — 80053 COMPREHEN METABOLIC PANEL: CPT

## 2021-06-03 PROCEDURE — 93005 ELECTROCARDIOGRAM TRACING: CPT | Performed by: EMERGENCY MEDICINE

## 2021-06-03 PROCEDURE — 71045 X-RAY EXAM CHEST 1 VIEW: CPT

## 2021-06-03 PROCEDURE — 99285 EMERGENCY DEPT VISIT HI MDM: CPT

## 2021-06-03 PROCEDURE — 36415 COLL VENOUS BLD VENIPUNCTURE: CPT

## 2021-06-03 PROCEDURE — 85652 RBC SED RATE AUTOMATED: CPT

## 2021-06-03 PROCEDURE — 85025 COMPLETE CBC W/AUTO DIFF WBC: CPT

## 2021-06-03 RX ADMIN — NITROGLYCERIN 0.5 INCH: 20 OINTMENT TOPICAL at 18:20

## 2021-06-03 ASSESSMENT — PAIN DESCRIPTION - PAIN TYPE: TYPE: ACUTE PAIN

## 2021-06-03 ASSESSMENT — PAIN DESCRIPTION - LOCATION: LOCATION: CHEST

## 2021-06-03 ASSESSMENT — HEART SCORE: ECG: 1

## 2021-06-03 ASSESSMENT — PAIN SCALES - GENERAL: PAINLEVEL_OUTOF10: 6

## 2021-06-03 NOTE — ED PROVIDER NOTES
[id number] [facility] ED  [address] [facility] [address] [facility] [address]  Phone: [phone]  Fax: [phone] [facility] Name: Victor M Merino  MRN: [id number]  [place] 1945  Date of evaluation: 6/3/2021    CHIEF COMPLAINT       Chief Complaint   Patient presents with    Chest Pain    Shortness of Breath       HISTORY OF PRESENT ILLNESS    Victor M Merino is a 70 y.o. female who presents chronic progressive chest pain generalized fatigue. Patient states that she has histories of A. fib and with multiple failed cardioversions, in addition has a history of MS that seems to be progressive. She has been at her baseline chest pain but has noted a slight increase over the last couple of days associated with her increasing shortness of breath. States that she sees a cardiologist on a regular basis and has been to the [facility] several times for evaluation of her coronary disease. Patient states that she is not been using her nitroglycerin pills as her nitroglycerin prescription has run out. Patient states that she takes both Plavix and Eliquis and has not missed any of her doses.     REVIEW OF SYSTEMS     Constitutional: No fevers or chills   HEENT: No sore throat, rhinorrhea, or earache   Eyes: No blurry vision or double vision no drainage   Cardiovascular: See above  Respiratory: See above  Gastrointestinal: No nausea, vomiting, diarrhea, constipation, or abdominal pain   : No hematuria or dysuria   Musculoskeletal: No swelling or pain   Skin: No rash   Neurological: No focal neurologic complaints, paresthesias, weakness, or headache   PAST MEDICAL HISTORY    has a past medical history of Abnormal Pap smear, Asthma, Atrial fibrillation ([address] Bloody discharge from nipple, CAD (coronary artery disease), Cataracts, bilateral, Colon polyp, CVA (cerebral vascular accident) ([address] Demyelinating disease ([address] Fibromyalgia, High-risk sexual behavior, History of bone density by mouth daily    NITROGLYCERIN (NITROSTAT) 0.4 MG SL TABLET    Place 0.4 mg under the tongue every 5 minutes. Indications: Chest Pain    PANTOPRAZOLE (PROTONIX) 40 MG TABLET    Take 40 mg by mouth daily    PAROXETINE (PAXIL) 20 MG TABLET    Take 20 mg by mouth every morning. POTASSIUM CHLORIDE (KLOR-CON M) 20 MEQ EXTENDED RELEASE TABLET    Take 1 tablet by mouth daily    PRAVASTATIN (PRAVACHOL) 40 MG TABLET    Take 40 mg by mouth daily. RANOLAZINE (RANEXA) 500 MG SR TABLET    Take 500 mg by mouth 2 times daily. ALLERGIES     is allergic to lisinopril-hydrochlorothiazide, sulfa antibiotics, penicillins, polyethylene glycol, actifed cold-allergy [chlorpheniramine-phenylephrine], altace [ramipril], cephalosporins, coreg [carvedilol], dml facial moisturizer, elavil [amitriptyline hcl], entex [ami-margot], ethylene glycol, fentanyl, hizentra [immune globulin (human)], hydralazine, levofloxacin, lisinopril-hydrochlorothiazide, metoprolol, montelukast sodium, morphine, nifedipine, norvasc [amlodipine besylate], other, phenobarbital, robaxin [methocarbamol], sinequan [doxepin hcl], sudafed [pseudoephedrine hcl], sudafed [pseudoephedrine hcl], tranquil-cecil, wellbutrin [bupropion hcl], actifed cold-allergy [chlorpheniramine-phenylephrine], clindamycin/lincomycin, codeine, metoprolol succinate, and seldane [terfenadine]. FAMILY HISTORY     She indicated that the status of her mother is unknown. She indicated that the status of her father is unknown. She indicated that the status of her sister is unknown. She indicated that the status of her maternal aunt is unknown.     family history includes Breast Cancer in her maternal aunt; Heart Disease in her father and mother; Hypertension in her sister; Other in her father, maternal aunt, mother, and sister. SOCIAL HISTORY      reports that she has never smoked.  She has never used smokeless tobacco. She reports that she does not drink alcohol and does not use drugs.    PHYSICAL EXAM       ED Triage Vitals   BP Temp Temp src Pulse Resp SpO2 Height Weight   -- -- -- -- -- -- -- --     Constitutional: Alert, oriented x3, nontoxic, answering questions appropriately, acting properly for age, in no acute distress   HEENT: Extraocular muscles intact, mucus membranes moist, TMs clear bilaterally, no posterior pharyngeal erythema or exudates, Pupils equal, round, reactive to light,   Neck: Trachea midline   Cardiovascular: Irregularly irregular rhythm and rate no  murmurs   Respiratory: Clear to auscultation bilaterally no wheezes, rhonchi, rales, no respiratory distress no tachypnea no retractions no hypoxia  Gastrointestinal: Soft, nontender, nondistended, positive bowel sounds. No rebound, rigidity, or guarding. Musculoskeletal: No extremity pain or swelling   Neurologic: Moving all 4 extremities without difficulty there are no gross focal neurologic deficits   Skin: Warm and dry     DIFFERENTIAL DIAGNOSIS/ MDM:   Unstable angina versus progressive congestive heart failure chronic adult failure to thrive. 6:53 PM I discussed the case and findings with the patient she is requesting that she be admitted here. I will contact Dr. [place] her cardiologist and make arrangements. Patient appears to be resting comfortably. DIAGNOSTIC RESULTS     EKG: All EKG's are interpreted by the Emergency Department Physician who either signs or Co-signs this chart in the absence of a cardiologist.  EKG shows A. fib with variable block the ventricular rate of 104. The QRS duration was normal at 80. Noted to be a left axis deviation there is a poor R wave progression across the anterior precordium and some ST-T wave changes in the lateral leads as well. Overall no acute ischemic pattern is identified.       Not indicated unless otherwise documented above    LABS:  Results for orders placed or performed during the hospital encounter of 06/03/21   CBC Auto Differential   Result Value Ref Range    WBC 8.8 3.5 - 11.0 k/uL    RBC 4.08 4.0 - 5.2 m/uL    Hemoglobin 12.1 12.0 - 16.0 g/dL    Hematocrit 37.0 36 - 46 %    MCV 90.5 80 - 100 fL    MCH 29.7 26 - 34 pg    MCHC 32.8 31 - 37 g/dL    RDW 17.1 (H) 12.5 - 15.4 %    Platelets 081 309 - 414 k/uL    MPV 10.0 6.0 - 12.0 fL    NRBC Automated NOT REPORTED per 100 WBC    Differential Type NOT REPORTED     Seg Neutrophils 79 (H) 36 - 66 %    Lymphocytes 11 (L) 24 - 44 %    Monocytes 8 2 - 11 %    Eosinophils % 1 1 - 4 %    Basophils 1 0 - 2 %    Immature Granulocytes NOT REPORTED 0 %    Segs Absolute 7.00 1.8 - 7.7 k/uL    Absolute Lymph # 0.90 (L) 1.0 - 4.8 k/uL    Absolute Mono # 0.70 0.1 - 1.2 k/uL    Absolute Eos # 0.10 0.0 - 0.4 k/uL    Basophils Absolute 0.00 0.0 - 0.2 k/uL    Absolute Immature Granulocyte NOT REPORTED 0.00 - 0.30 k/uL    WBC Morphology NOT REPORTED     RBC Morphology NOT REPORTED     Platelet Estimate NOT REPORTED    Sedimentation Rate   Result Value Ref Range    Sed Rate 4 0 - 30 mm   EKG 12 Lead   Result Value Ref Range    Ventricular Rate 104 BPM    Atrial Rate 293 BPM    QRS Duration 80 ms    Q-T Interval 382 ms    QTc Calculation (Bazett) 502 ms    R Axis -49 degrees    T Axis 106 degrees       Not indicated unless otherwise documented above    RADIOLOGY:   I reviewed the radiologist interpretations:    XR CHEST PORTABLE   Final Result   Cardiomegaly, without evidence of CHF             Not indicated unless otherwise documented above    EMERGENCY DEPARTMENT COURSE:     The patient was given the following medications:  Orders Placed This Encounter   Medications    nitroglycerin (NITRO-BID) 2 % ointment 0.5 inch        Vitals:   -------------------------  BP (!) 145/70   Pulse 93   Temp 98.2 °F (36.8 °C) (Oral)   Resp 16   Ht 5' 1\" (1.549 m)   Wt 93.4 kg (206 lb)   SpO2 93%   BMI 38.92 kg/m²         I have reviewed the disposition diagnosis with the patient and or their family/guardian.  I have answered their questions and given discharge instructions. They voiced understanding of these instructions and did not have any furtherquestions or complaints. CRITICAL CARE:    None    CONSULTS:    None    PROCEDURES:    None      OARRS Report if indicated             FINAL IMPRESSION      1. Chest pain, unspecified type    2. Persistent atrial fibrillation (HCC)          DISPOSITION/PLAN   DISPOSITION    At shift change the patient will be turned over to the oncoming physician. CONDITION ON DISPOSITION: STABLE       PATIENT REFERRED TO:  No follow-up provider specified.     DISCHARGE MEDICATIONS:  New Prescriptions    No medications on file       (Please note that portions of thisnote were completed with a voice recognition program.  Efforts were made to edit the dictations but occasionally words are mis-transcribed.)    Katlyn Bermudez MD,, MD  Attending Emergency Physician        Katlyn Bermudez MD  06/04/21 0937

## 2021-06-03 NOTE — ED PROVIDER NOTES
[id number] [facility] ED  [address] [address] [facility] [address] [facility] [address]  Phone: [phone]  Fax: [phone]        ADDENDUM:      Care of this patient was assumed from Dr. Kerri Renteria. The patient was seen for Chest Pain and Shortness of Breath  . The patient's initial evaluation and plan have been discussed with the prior provider who initially evaluated the patient. Nursing Notes, Past Medical Hx, Past Surgical Hx, Allergies, were all reviewed. PAST MEDICAL HISTORY    has a past medical history of Abnormal Pap smear, Asthma, Atrial fibrillation (HCC), Bloody discharge from nipple, CAD (coronary artery disease), Cataracts, bilateral, Colon polyp, CVA (cerebral vascular accident) ([address] Demyelinating disease ([address] Fibromyalgia, High-risk sexual behavior, History of bone density study, History of migraines, HTN (hypertension), IgG deficiency ([address] Multiple sclerosis ([address] Myoclonic seizures ([address] Optic neuritis, Osteopenia, Pyloric stenosis, Raynaud's disease, and Vasomotor rhinitis. SURGICAL HISTORY      has a past surgical history that includes Dilation & curettage (1982); Breast biopsy (1990); Appendectomy (1955); Septoplasty (1985); Hammer toe surgery (1989); Sinus endoscopy; sinus surgery; Shoulder arthroscopy (1998); Esophagoscopy (1966, 1969); Colonoscopy (2010); Foreign Body Removal (1962); laparoscopy (1967); Coronary angioplasty with stent (5/2011); Coronary angioplasty with stent (6/2011); Upper gastrointestinal endoscopy; eye surgery; pr colonoscopy w/biopsy single/multiple (N/A, 12/19/2017); pr egd transoral biopsy single/multiple (12/19/2017); Upper gastrointestinal endoscopy (12/19/2017); Upper gastrointestinal endoscopy (5/24/2018); Cardiac surgery; Cardiac catheterization (07/31/2020); Cardioversion (08/14/2020); and Cardioversion (07/31/2020).     CURRENT MEDICATIONS       Previous Medications    ACETAMINOPHEN (TYLENOL) 500 MG TABLET    Take 1,000 mg by mouth 2 times daily as needed for Pain    ALLOPURINOL (ZYLOPRIM) 100 MG TABLET    Take 100 mg by mouth daily. AMIODARONE (PACERONE) 100 MG TABLET    Take 100 mg by mouth daily Pt unsure of dose    APIXABAN (ELIQUIS) 5 MG TABS TABLET    Take by mouth 2 times daily    AZATHIOPRINE (IMURAN) 50 MG TABLET    Take 50 mg by mouth daily     BUMETANIDE (BUMEX) 1 MG TABLET    Take 1 tablet by mouth daily Advised to take additional 1 pill for weight gain, increased CHF and/swelling    CLOPIDOGREL (PLAVIX) 75 MG TABLET    Take 75 mg by mouth daily. ISOSORBIDE MONONITRATE (IMDUR) 120 MG EXTENDED RELEASE TABLET    Take 0.5 tablets by mouth daily    LOSARTAN (COZAAR) 100 MG TABLET    Take 1 tablet by mouth daily HOLD UNTIL SEEN BY CARDIOLOGY    METOPROLOL SUCCINATE (TOPROL XL) 25 MG EXTENDED RELEASE TABLET    Take 1 tablet by mouth daily    NITROGLYCERIN (NITROSTAT) 0.4 MG SL TABLET    Place 0.4 mg under the tongue every 5 minutes. Indications: Chest Pain    PANTOPRAZOLE (PROTONIX) 40 MG TABLET    Take 40 mg by mouth daily    PAROXETINE (PAXIL) 20 MG TABLET    Take 20 mg by mouth every morning. POTASSIUM CHLORIDE (KLOR-CON M) 20 MEQ EXTENDED RELEASE TABLET    Take 1 tablet by mouth daily    PRAVASTATIN (PRAVACHOL) 40 MG TABLET    Take 40 mg by mouth daily. RANOLAZINE (RANEXA) 500 MG SR TABLET    Take 500 mg by mouth 2 times daily.        ALLERGIES     is allergic to lisinopril-hydrochlorothiazide, sulfa antibiotics, penicillins, polyethylene glycol, actifed cold-allergy [chlorpheniramine-phenylephrine], altace [ramipril], cephalosporins, coreg [carvedilol], dml facial moisturizer, elavil [amitriptyline hcl], entex [ami-margot], ethylene glycol, fentanyl, hizentra [immune globulin (human)], hydralazine, levofloxacin, lisinopril-hydrochlorothiazide, metoprolol, montelukast sodium, morphine, nifedipine, norvasc [amlodipine besylate], other, phenobarbital, robaxin [methocarbamol], sinequan [doxepin hcl], sudafed [pseudoephedrine hcl], sudafed [pseudoephedrine hcl], tranquil-cecil, wellbutrin [bupropion hcl], actifed cold-allergy [chlorpheniramine-phenylephrine], clindamycin/lincomycin, codeine, metoprolol succinate, and seldane [terfenadine].       Diagnostic Results     LABS:   Results for orders placed or performed during the hospital encounter of 06/03/21   CBC Auto Differential   Result Value Ref Range    WBC 8.8 3.5 - 11.0 k/uL    RBC 4.08 4.0 - 5.2 m/uL    Hemoglobin 12.1 12.0 - 16.0 g/dL    Hematocrit 37.0 36 - 46 %    MCV 90.5 80 - 100 fL    MCH 29.7 26 - 34 pg    MCHC 32.8 31 - 37 g/dL    RDW 17.1 (H) 12.5 - 15.4 %    Platelets 383 948 - 762 k/uL    MPV 10.0 6.0 - 12.0 fL    NRBC Automated NOT REPORTED per 100 WBC    Differential Type NOT REPORTED     Seg Neutrophils 79 (H) 36 - 66 %    Lymphocytes 11 (L) 24 - 44 %    Monocytes 8 2 - 11 %    Eosinophils % 1 1 - 4 %    Basophils 1 0 - 2 %    Immature Granulocytes NOT REPORTED 0 %    Segs Absolute 7.00 1.8 - 7.7 k/uL    Absolute Lymph # 0.90 (L) 1.0 - 4.8 k/uL    Absolute Mono # 0.70 0.1 - 1.2 k/uL    Absolute Eos # 0.10 0.0 - 0.4 k/uL    Basophils Absolute 0.00 0.0 - 0.2 k/uL    Absolute Immature Granulocyte NOT REPORTED 0.00 - 0.30 k/uL    WBC Morphology NOT REPORTED     RBC Morphology NOT REPORTED     Platelet Estimate NOT REPORTED    Comprehensive Metabolic Panel w/ Reflex to MG   Result Value Ref Range    Glucose 116 (H) 70 - 99 mg/dL    BUN 22 8 - 23 mg/dL    CREATININE 0.84 0.50 - 0.90 mg/dL    Bun/Cre Ratio NOT REPORTED 9 - 20    Calcium 8.8 8.6 - 10.4 mg/dL    Sodium 138 135 - 144 mmol/L    Potassium 4.0 3.7 - 5.3 mmol/L    Chloride 103 98 - 107 mmol/L    CO2 23 20 - 31 mmol/L    Anion Gap 12 9 - 17 mmol/L    Alkaline Phosphatase 110 (H) 35 - 104 U/L    ALT 11 5 - 33 U/L    AST 13 <32 U/L    Total Bilirubin 0.63 0.3 - 1.2 mg/dL    Total Protein 6.4 6.4 - 8.3 g/dL    Albumin 4.4 3.5 - 5.2 g/dL    Albumin/Globulin Ratio 2.2 1.0 - 2.5    GFR Non-African American >60 >60 mL/min    GFR  >60 >60 mL/min    GFR Comment          GFR Staging NOT REPORTED    Troponin   Result Value Ref Range    Troponin, High Sensitivity 21 (H) 0 - 14 ng/L    Troponin T NOT REPORTED <0.03 ng/mL    Troponin Interp NOT REPORTED    Brain Natriuretic Peptide   Result Value Ref Range    Pro-BNP 5,056 (H) <300 pg/mL    BNP Interpretation Pro-BNP Reference Range:    Sedimentation Rate   Result Value Ref Range    Sed Rate 4 0 - 30 mm   Troponin   Result Value Ref Range    Troponin, High Sensitivity 18 (H) 0 - 14 ng/L    Troponin T NOT REPORTED <0.03 ng/mL    Troponin Interp NOT REPORTED    EKG 12 Lead   Result Value Ref Range    Ventricular Rate 104 BPM    Atrial Rate 293 BPM    QRS Duration 80 ms    Q-T Interval 382 ms    QTc Calculation (Bazett) 502 ms    R Axis -49 degrees    T Axis 106 degrees       RADIOLOGY:  XR CHEST PORTABLE   Final Result   Cardiomegaly, without evidence of CHF             RECENT VITALS:  BP: (!) 140/89, Temp: 98.2 °F (36.8 °C), Pulse: 87, Resp: 16     ED Course     The patient was given the following medications:  Orders Placed This Encounter   Medications    nitroglycerin (NITRO-BID) 2 % ointment 0.5 inch       Medical Decision Making      ED Course as of Jun 03 2028   Thu Jun 03, 2021 1915 I spoke to cardiology, Dr. Luther Christianson, and reviewed the patient's results, EKG, and symptoms. He recommends obtaining a second troponin and if unchanged then she can be discharged home with instructions to follow up outpatient in their office. He recommended giving 5mg Metoprolol IV but on further review the patient is allergic to Metoprolol so this was not ordered.     [AM]   2003 I reevaluated the patient. Her chest pain has resolved.   I reviewed her repeat EKG at 1958 which shows atrial flutter with variable AV block, left axis deviation, normal QRS, QTc reading prolonged at 521 but actual QT upon my calculation is normal, rate 90 bpm, no ST elevation or depression, T wave inversion in aVL, no change from EKG at 1750. [AM]      ED Course User Index  [AM] Jade DO Naveed       The patient is a 70-year-old female who presents for evaluation of acute on chronic chest pain shortness of breath. She has history of CHF on Bumex and atrial fibrillation on Eliquis. She has been seen at [facility] clinic and by her local cardiologist for her symptoms but she states that no one can tell her the cause of her symptoms. Vital signs are stable. She is currently asymptomatic after receiving Nitropaste. CBC, CMP, and ESR are unremarkable. Troponin is at her baseline at 21. BNP is slightly elevated at 5056 but chest x-ray shows no signs of CHF exacerbation. EKG shows atrial fibrillation with rate of 104 bpm.  At time of signout awaiting to speak with the patient's cardiologist.    I spoke with the patient's cardiologist, Dr. Jacquie Strong, and we reviewed the patient's results. He recommends obtaining a second troponin and if it is unchanged and the patient can be discharged home with outpatient follow-up. He also recommended giving 5 mg metoprolol for her atrial fibrillation but upon further review she is allergic to this so it was not given. Awaiting second troponin with repeat EKG. Repeat troponin has trended down to 18 and EKG is unchanged. Heart rate is in the 80s. The patient continues to be asymptomatic. I instructed the patient to follow-up with her cardiologist in 1 to 2 days and to return to the ER for worsening symptoms or any other concern. I have low suspicion for ACS, PE, dissection, esophageal rupture, cardiac tamponade, pneumothorax or infection. The patient understands that at this time there is no evidence for a more malignant underlying process, but also understands that early in the process of an illness or injury, an emergency department work-up can be falsely reassuring.   Routine discharge counseling was given, and the patient understands that worsening, changing or persistent symptoms should prompt a immediate call or follow-up with their primary care physician or return to the emergency department. The importance of appropriate follow-up was also discussed. I have reviewed the disposition diagnosis with the patient. I have answered their questions and given discharge instructions. They voiced understanding of these instructions and did not have any further questions or complaints. Disposition     FINAL IMPRESSION      1. Chest pain, unspecified type    2. Persistent atrial fibrillation [facility])          DISPOSITION/PLAN   DISPOSITION Decision To Discharge 06/03/2021 08:18:34 PM      CONDITION ON DISPOSITION:   Stable    PATIENT REFERRED TO:  Loyda Moran MD  [address] [facility] [address] [facility]  [phone]    Schedule an appointment as soon as possible for a visit in 2 days      Tyree Leggett MD  [facility] [address] [place] [address]    Schedule an appointment as soon as possible for a visit in 2 days      [facility] ED  [address] [facility] [id number] [phone]  Go to   If symptoms worsen      DISCHARGE MEDICATIONS:  New Prescriptions    No medications on file             (Please note that portions of this note were completed with a voice recognition program.  Efforts were made to edit the dictations but occasionally words are mis-transcribed.)    Delaney Izaguirre DO  Emergency Medicine Physician                 Delaney Izaguirre DO  06/03/21 2028

## 2021-06-03 NOTE — ED NOTES
Answering service for Dr Emmanuelle Bowman paged and expecting a return call from Dr Rush Israel, covering cardiologist.     Avinash Ruiz RN  06/03/21 7035

## 2021-06-04 LAB
EKG ATRIAL RATE: 286 BPM
EKG ATRIAL RATE: 293 BPM
EKG Q-T INTERVAL: 382 MS
EKG Q-T INTERVAL: 426 MS
EKG QRS DURATION: 80 MS
EKG QRS DURATION: 86 MS
EKG QTC CALCULATION (BAZETT): 502 MS
EKG QTC CALCULATION (BAZETT): 521 MS
EKG R AXIS: -47 DEGREES
EKG R AXIS: -49 DEGREES
EKG T AXIS: 106 DEGREES
EKG T AXIS: 135 DEGREES
EKG VENTRICULAR RATE: 104 BPM
EKG VENTRICULAR RATE: 90 BPM

## 2021-06-13 ENCOUNTER — APPOINTMENT (OUTPATIENT)
Dept: GENERAL RADIOLOGY | Age: 76
End: 2021-06-13
Payer: MEDICARE

## 2021-06-13 ENCOUNTER — HOSPITAL ENCOUNTER (EMERGENCY)
Age: 76
Discharge: HOME | End: 2021-06-13
Attending: SPECIALIST
Payer: MEDICARE

## 2021-06-13 VITALS
BODY MASS INDEX: 37.73 KG/M2 | WEIGHT: 205 LBS | DIASTOLIC BLOOD PRESSURE: 76 MMHG | OXYGEN SATURATION: 94 % | SYSTOLIC BLOOD PRESSURE: 149 MMHG | HEART RATE: 96 BPM | RESPIRATION RATE: 20 BRPM | HEIGHT: 62 IN | TEMPERATURE: 98.1 F

## 2021-06-13 DIAGNOSIS — I48.92 ATRIAL FLUTTER WITH RAPID VENTRICULAR RESPONSE (HCC): Primary | ICD-10-CM

## 2021-06-13 LAB
ABSOLUTE EOS #: 0.2 K/UL (ref 0–0.4)
ABSOLUTE IMMATURE GRANULOCYTE: ABNORMAL K/UL (ref 0–0.3)
ABSOLUTE LYMPH #: 1.5 K/UL (ref 1–4.8)
ABSOLUTE MONO #: 1.1 K/UL (ref 0.1–1.2)
ANION GAP SERPL CALCULATED.3IONS-SCNC: 10 MMOL/L (ref 9–17)
BASOPHILS # BLD: 1 % (ref 0–2)
BASOPHILS ABSOLUTE: 0.1 K/UL (ref 0–0.2)
BNP INTERPRETATION: ABNORMAL
BUN BLDV-MCNC: 25 MG/DL (ref 8–23)
BUN/CREAT BLD: ABNORMAL (ref 9–20)
CALCIUM SERPL-MCNC: 9.4 MG/DL (ref 8.6–10.4)
CHLORIDE BLD-SCNC: 100 MMOL/L (ref 98–107)
CO2: 23 MMOL/L (ref 20–31)
CREAT SERPL-MCNC: 0.92 MG/DL (ref 0.5–0.9)
DIFFERENTIAL TYPE: ABNORMAL
EOSINOPHILS RELATIVE PERCENT: 2 % (ref 1–4)
GFR AFRICAN AMERICAN: >60 ML/MIN
GFR NON-AFRICAN AMERICAN: 60 ML/MIN
GFR SERPL CREATININE-BSD FRML MDRD: ABNORMAL ML/MIN/{1.73_M2}
GFR SERPL CREATININE-BSD FRML MDRD: ABNORMAL ML/MIN/{1.73_M2}
GLUCOSE BLD-MCNC: 112 MG/DL (ref 70–99)
HCT VFR BLD CALC: 38.1 % (ref 36–46)
HEMOGLOBIN: 12.5 G/DL (ref 12–16)
IMMATURE GRANULOCYTES: ABNORMAL %
LYMPHOCYTES # BLD: 15 % (ref 24–44)
MCH RBC QN AUTO: 29.7 PG (ref 26–34)
MCHC RBC AUTO-ENTMCNC: 32.9 G/DL (ref 31–37)
MCV RBC AUTO: 90.2 FL (ref 80–100)
MONOCYTES # BLD: 11 % (ref 2–11)
NRBC AUTOMATED: ABNORMAL PER 100 WBC
PDW BLD-RTO: 17.7 % (ref 12.5–15.4)
PLATELET # BLD: 288 K/UL (ref 140–450)
PLATELET ESTIMATE: ABNORMAL
PMV BLD AUTO: 9.4 FL (ref 6–12)
POTASSIUM SERPL-SCNC: 4.2 MMOL/L (ref 3.7–5.3)
PRO-BNP: 7044 PG/ML
RBC # BLD: 4.23 M/UL (ref 4–5.2)
RBC # BLD: ABNORMAL 10*6/UL
SEG NEUTROPHILS: 71 % (ref 36–66)
SEGMENTED NEUTROPHILS ABSOLUTE COUNT: 7.3 K/UL (ref 1.8–7.7)
SODIUM BLD-SCNC: 133 MMOL/L (ref 135–144)
TROPONIN INTERP: ABNORMAL
TROPONIN INTERP: ABNORMAL
TROPONIN T: ABNORMAL NG/ML
TROPONIN T: ABNORMAL NG/ML
TROPONIN, HIGH SENSITIVITY: 19 NG/L (ref 0–14)
TROPONIN, HIGH SENSITIVITY: 23 NG/L (ref 0–14)
WBC # BLD: 10.1 K/UL (ref 3.5–11)
WBC # BLD: ABNORMAL 10*3/UL

## 2021-06-13 PROCEDURE — 83880 ASSAY OF NATRIURETIC PEPTIDE: CPT

## 2021-06-13 PROCEDURE — 84484 ASSAY OF TROPONIN QUANT: CPT

## 2021-06-13 PROCEDURE — 36415 COLL VENOUS BLD VENIPUNCTURE: CPT

## 2021-06-13 PROCEDURE — 99285 EMERGENCY DEPT VISIT HI MDM: CPT

## 2021-06-13 PROCEDURE — 2500000003 HC RX 250 WO HCPCS: Performed by: SPECIALIST

## 2021-06-13 PROCEDURE — 85025 COMPLETE CBC W/AUTO DIFF WBC: CPT

## 2021-06-13 PROCEDURE — 93005 ELECTROCARDIOGRAM TRACING: CPT | Performed by: SPECIALIST

## 2021-06-13 PROCEDURE — 71045 X-RAY EXAM CHEST 1 VIEW: CPT

## 2021-06-13 PROCEDURE — 96374 THER/PROPH/DIAG INJ IV PUSH: CPT

## 2021-06-13 PROCEDURE — 80048 BASIC METABOLIC PNL TOTAL CA: CPT

## 2021-06-13 RX ORDER — DILTIAZEM HYDROCHLORIDE 5 MG/ML
10 INJECTION INTRAVENOUS ONCE
Status: COMPLETED | OUTPATIENT
Start: 2021-06-13 | End: 2021-06-13

## 2021-06-13 RX ADMIN — DILTIAZEM HYDROCHLORIDE 10 MG: 5 INJECTION INTRAVENOUS at 19:31

## 2021-06-13 ASSESSMENT — PAIN DESCRIPTION - LOCATION: LOCATION: SHOULDER;BACK;NECK

## 2021-06-13 ASSESSMENT — PAIN SCALES - GENERAL: PAINLEVEL_OUTOF10: 7

## 2021-06-13 ASSESSMENT — PAIN DESCRIPTION - PAIN TYPE: TYPE: ACUTE PAIN

## 2021-06-13 ASSESSMENT — PAIN DESCRIPTION - ORIENTATION: ORIENTATION: LEFT

## 2021-06-13 NOTE — ED NOTES
Pt to ER by self, ambulated to room with walker, steady slow gate. Pt reports known afib, reports she was been working closely with cardiologist, but states they are having a hard time getting HR under control. Pt reports feeling fast heart rate with SOB, states symptoms present all day. Pt denies CP, but reports left shoulder neck and upper back pain, reports muscle spasms from fibromyalgia. Pt rates pain 7/10, denies analgesics PTA, but does report taking flexeril at night for symptoms. Pt reports metoprolol recently increased and losartan decreased, pt also reports she is taking two blood thinners.  Pt calm, cooperative, respers even non labored, skin warm pink, no distress, here for eval.      Lucina Mulligan, ANGELIA  06/13/21 1953

## 2021-06-14 NOTE — ED PROVIDER NOTES
Gracy [facility]78 ENCOUNTER      Pt Name: Leonidas Reza  MRN: [id number]  [place] 1945  Date of evaluation: 6/14/21      CHIEF COMPLAINT       Chief Complaint   Patient presents with    Tachycardia     reports feeling fast heart rate all day, reports known afib, states can't get rate controlled     Shortness of Breath         HISTORY OF PRESENT ILLNESS    Leonidas Reza is a 70 y.o. female who presents to the emergency department for evaluation of palpitations, elevated blood pressure and shortness of breath. Patient states blood pressure was 163/112 prior to arrival.  Palpitations have been off and on for last several weeks, worse over last 1 week. Patient was seen in our emergency department with similar symptoms 10 days ago and was discharged home after she was treated. She followed up with her cardiologist Dr. Agustina Lam and was advised to take her Toprol-XL 50 mg twice daily instead of 25 mg once daily. Patient states they are having hard time getting heart rate under control and that she returns to the emergency department. She denies any chest pain but reports left shoulder pain, neck pain and upper back pain as well as muscle spasms from fibromyalgia. She grades pain as 7 out of 10 in intensity. She has been taking 2 blood thinners due to history of coronary artery disease and atrial fibrillation. She is on Eliquis and Plavix. She admits to having lightheadedness, dizziness, nausea and diaphoresis. She denies any cough, fever or chills. She denies any swelling in the legs or calf pain. There are no exacerbating or relieving factors. REVIEW OF SYSTEMS       Review of Systems    All systems reviewed and negative unless noted in HPI. The patient denies fever or constitutional symptoms. Denies vision change. Denies any sore throat or rhinorrhea. Denies any neck pain or stiffness. Denies chest pain or diaphoresis  No vomiting or diarrhea.     Denies any dysuria. Denies urinary frequency or hematuria. Denies musculoskeletal injury or pain. Denies any weakness, numbness or focal neurologic deficit. Denies any skin rash or edema. No recent psychiatric issues. No easy bruising or bleeding. Denies any polyuria, polydypsia       PAST MEDICAL HISTORY    has a past medical history of Abnormal Pap smear, Asthma, Atrial fibrillation ([address] Bloody discharge from nipple, CAD (coronary artery disease), Cataracts, bilateral, Colon polyp, CVA (cerebral vascular accident) ([address] Demyelinating disease ([address] Fibromyalgia, High-risk sexual behavior, History of bone density study, History of migraines, HTN (hypertension), IgG deficiency ([address] Multiple sclerosis ([address] Myoclonic seizures ([address] Optic neuritis, Osteopenia, Pyloric stenosis, Raynaud's disease, and Vasomotor rhinitis. SURGICAL HISTORY      has a past surgical history that includes Dilation & curettage (1982); Breast biopsy (1990); Appendectomy (1955); Septoplasty (1985); Hammer toe surgery (1989); Sinus endoscopy; sinus surgery; Shoulder arthroscopy (1998); Esophagoscopy (1966, 1969); Colonoscopy (2010); Foreign Body Removal (1962); laparoscopy (1615); Coronary angioplasty with stent (5/2011); Coronary angioplasty with stent (6/2011); Upper gastrointestinal endoscopy; eye surgery; pr colonoscopy w/biopsy single/multiple (N/A, 12/19/2017); pr egd transoral biopsy single/multiple (12/19/2017); Upper gastrointestinal endoscopy (12/19/2017); Upper gastrointestinal endoscopy (5/24/2018); Cardiac surgery; Cardiac catheterization (07/31/2020); Cardioversion (08/14/2020); and Cardioversion (07/31/2020).     CURRENT MEDICATIONS       Discharge Medication List as of 6/13/2021 10:29 PM      CONTINUE these medications which have NOT CHANGED    Details   Cyclobenzaprine HCl (FLEXERIL PO) Take by mouthHistorical Med      amiodarone (PACERONE) 100 MG tablet Take 100 mg by mouth daily Pt unsure of doseHistorical Med metoprolol succinate (TOPROL XL) 25 MG extended release tablet Take 1 tablet by mouth daily, Disp-30 tablet,R-3Normal      bumetanide (BUMEX) 1 MG tablet Take 1 tablet by mouth daily Advised to take additional 1 pill for weight gain, increased CHF and/swelling, Disp-30 tablet,R-1Normal      losartan (COZAAR) 100 MG tablet Take 25 mg by mouth daily HOLD UNTIL SEEN BY CARDIOLOGY, Disp-30 tablet, R-3Historical Med      potassium chloride (KLOR-CON M) 20 MEQ extended release tablet Take 1 tablet by mouth daily, Disp-180 tablet,R-1Normal      isosorbide mononitrate (IMDUR) 120 MG extended release tablet Take 0.5 tablets by mouth daily, Disp-15 tablet,R-0Normal      apixaban (ELIQUIS) 5 MG TABS tablet Take by mouth 2 times dailyHistorical Med      acetaminophen (TYLENOL) 500 MG tablet Take 1,000 mg by mouth 2 times daily as needed for PainHistorical Med      pantoprazole (PROTONIX) 40 MG tablet Take 40 mg by mouth dailyHistorical Med      azaTHIOprine (IMURAN) 50 MG tablet Take 50 mg by mouth daily Historical Med      ranolazine (RANEXA) 500 MG SR tablet Take 500 mg by mouth 2 times daily. nitroGLYCERIN (NITROSTAT) 0.4 MG SL tablet Place 0.4 mg under the tongue every 5 minutes. Indications: Chest Pain      PARoxetine (PAXIL) 20 MG tablet Take 20 mg by mouth every morning. allopurinol (ZYLOPRIM) 100 MG tablet Take 100 mg by mouth daily. clopidogrel (PLAVIX) 75 MG tablet Take 75 mg by mouth daily. pravastatin (PRAVACHOL) 40 MG tablet Take 40 mg by mouth daily.                ALLERGIES     is allergic to lisinopril-hydrochlorothiazide, sulfa antibiotics, penicillins, polyethylene glycol, actifed cold-allergy [chlorpheniramine-phenylephrine], altace [ramipril], cephalosporins, coreg [carvedilol], dml facial moisturizer, elavil [amitriptyline hcl], entex [ami-margot], ethylene glycol, fentanyl, hizentra [immune globulin (human)], hydralazine, levofloxacin, lisinopril-hydrochlorothiazide, montelukast sodium, morphine, nifedipine, norvasc [amlodipine besylate], other, phenobarbital, robaxin [methocarbamol], sinequan [doxepin hcl], sudafed [pseudoephedrine hcl], sudafed [pseudoephedrine hcl], tranquil-cecil, wellbutrin [bupropion hcl], actifed cold-allergy [chlorpheniramine-phenylephrine], clindamycin/lincomycin, codeine, metoprolol, metoprolol succinate, and seldane [terfenadine]. FAMILY HISTORY     She indicated that the status of her mother is unknown. She indicated that the status of her father is unknown. She indicated that the status of her sister is unknown. She indicated that the status of her maternal aunt is unknown.     family history includes Breast Cancer in her maternal aunt; Heart Disease in her father and mother; Hypertension in her sister; Other in her father, maternal aunt, mother, and sister. SOCIAL HISTORY      reports that she has never smoked. She has never used smokeless tobacco. She reports that she does not drink alcohol and does not use drugs. PHYSICAL EXAM     INITIAL VITALS:  height is 5' 1.5\" (1.562 m) and weight is 93 kg (205 lb). Her oral temperature is 98.1 °F (36.7 °C). Her blood pressure is 149/76 (abnormal) and her pulse is 96. Her respiration is 20 and oxygen saturation is 94%. Physical Exam  Vitals and nursing note reviewed. Constitutional:       Appearance: She is well-developed. HENT:      Head: Normocephalic and atraumatic. Nose: Nose normal.   Eyes:      Extraocular Movements: Extraocular movements intact. Pupils: Pupils are equal, round, and reactive to light. Cardiovascular:      Rate and Rhythm: Tachycardia present. Rhythm irregularly irregular. Heart sounds: Normal heart sounds. No murmur heard. Pulmonary:      Effort: Pulmonary effort is normal. No respiratory distress. Breath sounds: Normal breath sounds. Abdominal:      General: Bowel sounds are normal. There is no distension. Palpations: Abdomen is soft. Tenderness: There is no abdominal tenderness. Musculoskeletal:      Cervical back: Normal range of motion and neck supple. Skin:     General: Skin is warm and dry. Neurological:      General: No focal deficit present. Mental Status: She is alert and oriented to person, place, and time. DIFFERENTIAL DIAGNOSIS/ MDM:     Atrial fibrillation/flutter with rapid ventricular response, bronchitis, pneumonia, COPD exacerbation, CHF, ACS, Asthma, PE, Anxiety, Pneumothorax, Pulmonary Fibrosis    DIAGNOSTIC RESULTS     EKG: All EKG's are interpreted by the Emergency Department Physician who either signs or Co-signs this chart in the absence of a cardiologist.    EKG Interpretation #1    Interpreted by emergency department physician    Rhythm: atrial flutter  Rate: tachycardia  Axis: left  Ectopy: none  Conduction: normal  ST Segments: nonspecific changes  T Waves: non specific changes  Q Waves: v1, v2 and v3    Clinical Impression: atrial flutter (chronic) with variable block and RVR    Adrienne Foss MD     EKG Interpretation #2    Interpreted by emergency department physician    Rhythm: atrial flutter  Rate: normal  Axis: left  Ectopy: none  Conduction: normal  ST Segments: nonspecific changes  T Waves: non specific changes  Q Waves: v1, v2 and v3    Clinical Impression: atrial flutter (chronic)    Adrienne Foss MD        RADIOLOGY:   Interpretation per the Radiologist below, if available at the time of this note:    XR CHEST PORTABLE    Result Date: 6/13/2021  EXAMINATION: ONE XRAY VIEW OF THE CHEST 6/13/2021 7:45 pm COMPARISON: 06/01/2021 HISTORY: ORDERING SYSTEM PROVIDED HISTORY: SOB TECHNOLOGIST PROVIDED HISTORY: SOB Reason for Exam: Pt c/o SOB, tachycardia Acuity: Unknown Type of Exam: Unknown FINDINGS: The cardiomediastinal silhouette is mildly enlarged with mild perihilar congestion. .  The lungs are clear. No pleural effusion or pneumothorax is present.      No acute cardiopulmonary process LABS:  Results for orders placed or performed during the hospital encounter of 06/13/21   CBC Auto Differential   Result Value Ref Range    WBC 10.1 3.5 - 11.0 k/uL    RBC 4.23 4.0 - 5.2 m/uL    Hemoglobin 12.5 12.0 - 16.0 g/dL    Hematocrit 38.1 36 - 46 %    MCV 90.2 80 - 100 fL    MCH 29.7 26 - 34 pg    MCHC 32.9 31 - 37 g/dL    RDW 17.7 (H) 12.5 - 15.4 %    Platelets 469 183 - 643 k/uL    MPV 9.4 6.0 - 12.0 fL    NRBC Automated NOT REPORTED per 100 WBC    Differential Type NOT REPORTED     Seg Neutrophils 71 (H) 36 - 66 %    Lymphocytes 15 (L) 24 - 44 %    Monocytes 11 2 - 11 %    Eosinophils % 2 1 - 4 %    Basophils 1 0 - 2 %    Immature Granulocytes NOT REPORTED 0 %    Segs Absolute 7.30 1.8 - 7.7 k/uL    Absolute Lymph # 1.50 1.0 - 4.8 k/uL    Absolute Mono # 1.10 0.1 - 1.2 k/uL    Absolute Eos # 0.20 0.0 - 0.4 k/uL    Basophils Absolute 0.10 0.0 - 0.2 k/uL    Absolute Immature Granulocyte NOT REPORTED 0.00 - 0.30 k/uL    WBC Morphology NOT REPORTED     RBC Morphology NOT REPORTED     Platelet Estimate NOT REPORTED    Basic Metabolic Panel w/ Reflex to MG   Result Value Ref Range    Glucose 112 (H) 70 - 99 mg/dL    BUN 25 (H) 8 - 23 mg/dL    CREATININE 0.92 (H) 0.50 - 0.90 mg/dL    Bun/Cre Ratio NOT REPORTED 9 - 20    Calcium 9.4 8.6 - 10.4 mg/dL    Sodium 133 (L) 135 - 144 mmol/L    Potassium 4.2 3.7 - 5.3 mmol/L    Chloride 100 98 - 107 mmol/L    CO2 23 20 - 31 mmol/L    Anion Gap 10 9 - 17 mmol/L    GFR Non-African American 60 (L) >60 mL/min    GFR African American >60 >60 mL/min    GFR Comment          GFR Staging NOT REPORTED    Troponin   Result Value Ref Range    Troponin, High Sensitivity 23 (H) 0 - 14 ng/L    Troponin T NOT REPORTED <0.03 ng/mL    Troponin Interp NOT REPORTED    Brain Natriuretic Peptide   Result Value Ref Range    Pro-BNP 7,044 (H) <300 pg/mL    BNP Interpretation Pro-BNP Reference Range:    Troponin   Result Value Ref Range    Troponin, High Sensitivity 19 (H) 0 - 14 ng/L Troponin T NOT REPORTED <0.03 ng/mL    Troponin Interp NOT REPORTED        I reviewed all the lab results and these are unremarkable. EMERGENCY DEPARTMENT COURSE:   Vitals:    Vitals:    06/13/21 2137 06/13/21 2145 06/13/21 2200 06/13/21 2207   BP: (!) 134/90   (!) 149/76   Pulse: 92 94 96 96   Resp: 22 22 15 20   Temp:       TempSrc:       SpO2: 94% 95% 95% 94%   Weight:       Height:         -------------------------  BP: (!) 149/76, Temp: 98.1 °F (36.7 °C), Pulse: 96, Resp: 20    Orders Placed This Encounter   Medications    dilTIAZem injection 10 mg         During the emergency department course, patient was put on the monitor which reveals atrial flutter with variable block and rapid ventricular response. Saline Hep-Lock was started and patient was given Cardizem 10 mg slow IV push after which her heart rate has come down to 96 and she is feeling much better and resting comfortably. She remained pain-free throughout the ED course and has remained hemodynamically stable and neurologically intact. She prefers to go home. 2 sets of cardiac markers are unremarkable. Patient will call her cardiologist office tomorrow morning for a follow-up appointment. She is advised to return if symptoms recur or if she develops any new symptoms. The patient and significant other understands that at this time there is no evidence for a more malignant underlying process, but also understands that early in the process of an illness or injury, an emergency department workup can be falsely reassuring. Routine discharge counseling was given, and it is understood that worsening, changing or persistent symptoms should prompt an immediate call or follow up with their primary physician or return to the emergency department. The importance of appropriate follow up was also discussed. I have reviewed the disposition diagnosis. I have answered the questions and given discharge instructions.   There was voiced understanding of these instructions and no further questions or complaints. CONSULTS:  None    PROCEDURES:  None    FINAL IMPRESSION      1. Atrial flutter with rapid ventricular response ([facility] [address]          DISPOSITION/PLAN       PATIENT REFERRED TO:  Madeline Mott MD  [address] [place] [address]. [address] [facility]    Call in 1 day  For reevaluation of current symptoms    [facility] ED  [address] [place] [address]  [phone]    If symptoms worsen      DISCHARGE MEDICATIONS:  Discharge Medication List as of 6/13/2021 10:29 PM          (Please note that portions of this note were completed with a voice recognition program.  Efforts were made to edit the dictations but occasionally words are mis-transcribed.)    Waldemar Friedman MD,, MD, F.A.C.E.P.   Attending Emergency Medicine Physician     Waldemar Friedman MD  06/14/21 [id number]

## 2021-06-15 LAB
EKG ATRIAL RATE: 102 BPM
EKG ATRIAL RATE: 288 BPM
EKG Q-T INTERVAL: 332 MS
EKG Q-T INTERVAL: 368 MS
EKG QRS DURATION: 86 MS
EKG QRS DURATION: 86 MS
EKG QTC CALCULATION (BAZETT): 457 MS
EKG QTC CALCULATION (BAZETT): 459 MS
EKG R AXIS: -56 DEGREES
EKG R AXIS: -56 DEGREES
EKG T AXIS: 117 DEGREES
EKG T AXIS: 150 DEGREES
EKG VENTRICULAR RATE: 115 BPM
EKG VENTRICULAR RATE: 93 BPM

## 2021-07-12 ENCOUNTER — APPOINTMENT (OUTPATIENT)
Dept: GENERAL RADIOLOGY | Age: 76
End: 2021-07-12
Payer: MEDICARE

## 2021-07-12 ENCOUNTER — HOSPITAL ENCOUNTER (EMERGENCY)
Age: 76
Discharge: HOME | End: 2021-07-12
Attending: EMERGENCY MEDICINE
Payer: MEDICARE

## 2021-07-12 ENCOUNTER — APPOINTMENT (OUTPATIENT)
Dept: CT IMAGING | Age: 76
End: 2021-07-12
Payer: MEDICARE

## 2021-07-12 VITALS
RESPIRATION RATE: 16 BRPM | OXYGEN SATURATION: 98 % | SYSTOLIC BLOOD PRESSURE: 154 MMHG | HEART RATE: 48 BPM | DIASTOLIC BLOOD PRESSURE: 78 MMHG

## 2021-07-12 DIAGNOSIS — S80.00XA CONTUSION OF KNEE, UNSPECIFIED LATERALITY, INITIAL ENCOUNTER: ICD-10-CM

## 2021-07-12 DIAGNOSIS — S70.00XA CONTUSION OF HIP, UNSPECIFIED LATERALITY, INITIAL ENCOUNTER: ICD-10-CM
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
## 2021-07-12 LAB
ABSOLUTE EOS #: 0.1 K/UL (ref 0–0.4)
ABSOLUTE IMMATURE GRANULOCYTE: ABNORMAL K/UL (ref 0–0.3)
ABSOLUTE LYMPH #: 0.7 K/UL (ref 1–4.8)
ABSOLUTE MONO #: 0.7 K/UL (ref 0.1–1.2)
ANION GAP SERPL CALCULATED.3IONS-SCNC: 15 MMOL/L (ref 9–17)
BASOPHILS # BLD: 1 % (ref 0–2)
BASOPHILS ABSOLUTE: 0.1 K/UL (ref 0–0.2)
BUN BLDV-MCNC: 19 MG/DL (ref 8–23)
BUN/CREAT BLD: ABNORMAL (ref 9–20)
CALCIUM SERPL-MCNC: 9.3 MG/DL (ref 8.6–10.4)
CHLORIDE BLD-SCNC: 103 MMOL/L (ref 98–107)
CO2: 19 MMOL/L (ref 20–31)
CREAT SERPL-MCNC: 0.96 MG/DL (ref 0.5–0.9)
DIFFERENTIAL TYPE: ABNORMAL
EOSINOPHILS RELATIVE PERCENT: 2 % (ref 1–4)
GFR AFRICAN AMERICAN: >60 ML/MIN
GFR NON-AFRICAN AMERICAN: 57 ML/MIN
GFR SERPL CREATININE-BSD FRML MDRD: ABNORMAL ML/MIN/{1.73_M2}
GFR SERPL CREATININE-BSD FRML MDRD: ABNORMAL ML/MIN/{1.73_M2}
GLUCOSE BLD-MCNC: 108 MG/DL (ref 70–99)
HCT VFR BLD CALC: 37.4 % (ref 36–46)
HEMOGLOBIN: 11.9 G/DL (ref 12–16)
IMMATURE GRANULOCYTES: ABNORMAL %
LYMPHOCYTES # BLD: 10 % (ref 24–44)
MCH RBC QN AUTO: 28.9 PG (ref 26–34)
MCHC RBC AUTO-ENTMCNC: 31.9 G/DL (ref 31–37)
MCV RBC AUTO: 90.4 FL (ref 80–100)
MONOCYTES # BLD: 10 % (ref 2–11)
NRBC AUTOMATED: ABNORMAL PER 100 WBC
PDW BLD-RTO: 18.3 % (ref 12.5–15.4)
PLATELET # BLD: 238 K/UL (ref 140–450)
PLATELET ESTIMATE: ABNORMAL
PMV BLD AUTO: 9.7 FL (ref 6–12)
POTASSIUM SERPL-SCNC: 4.3 MMOL/L (ref 3.7–5.3)
RBC # BLD: 4.13 M/UL (ref 4–5.2)
RBC # BLD: ABNORMAL 10*6/UL
SEG NEUTROPHILS: 77 % (ref 36–66)
SEGMENTED NEUTROPHILS ABSOLUTE COUNT: 6 K/UL (ref 1.8–7.7)
SODIUM BLD-SCNC: 137 MMOL/L (ref 135–144)
TROPONIN INTERP: ABNORMAL
TROPONIN INTERP: ABNORMAL
TROPONIN T: ABNORMAL NG/ML
TROPONIN T: ABNORMAL NG/ML
TROPONIN, HIGH SENSITIVITY: 19 NG/L (ref 0–14)
TROPONIN, HIGH SENSITIVITY: 26 NG/L (ref 0–14)
WBC # BLD: 7.7 K/UL (ref 3.5–11)
WBC # BLD: ABNORMAL 10*3/UL

## 2021-07-12 PROCEDURE — 85025 COMPLETE CBC W/AUTO DIFF WBC: CPT

## 2021-07-12 PROCEDURE — 99283 EMERGENCY DEPT VISIT LOW MDM: CPT

## 2021-07-12 PROCEDURE — 73562 X-RAY EXAM OF KNEE 3: CPT

## 2021-07-12 PROCEDURE — 70450 CT HEAD/BRAIN W/O DYE: CPT

## 2021-07-12 PROCEDURE — 73522 X-RAY EXAM HIPS BI 3-4 VIEWS: CPT

## 2021-07-12 PROCEDURE — 84484 ASSAY OF TROPONIN QUANT: CPT

## 2021-07-12 PROCEDURE — 80048 BASIC METABOLIC PNL TOTAL CA: CPT

## 2021-07-12 PROCEDURE — 72125 CT NECK SPINE W/O DYE: CPT

## 2021-07-12 PROCEDURE — 36415 COLL VENOUS BLD VENIPUNCTURE: CPT

## 2021-07-12 PROCEDURE — 93005 ELECTROCARDIOGRAM TRACING: CPT | Performed by: EMERGENCY MEDICINE

## 2021-07-12 RX ORDER — ACETAMINOPHEN 500 MG
1000 TABLET ORAL ONCE
Status: DISCONTINUED | OUTPATIENT
Start: 2021-07-12 | End: 2021-07-12 | Stop reason: HOSPADM

## 2021-07-12 ASSESSMENT — ENCOUNTER SYMPTOMS
COUGH: 0
SORE THROAT: 0
PHOTOPHOBIA: 0
ABDOMINAL PAIN: 0
VOMITING: 0
SHORTNESS OF BREATH: 0
NAUSEA: 0
BACK PAIN: 0

## 2021-07-12 NOTE — ED TRIAGE NOTES
Pt to ED via EMS c/o slip and fall in shower. Per EMS pt landing on her buttocks with her knees together and feet to her sides. Pt denies hitting head/LOC. Pt states she was in position approx 45 mins. EMS contacted [employer] d/t living conditions consistent with hoarding. Expressed concerns of pt's safety.

## 2021-07-12 NOTE — ED NOTES
Officer Gabriela Hayden, [facility] [employer] at bedside. States he will make referral to Adult [facility].       Mario Robertson RN  07/12/21 1300

## 2021-07-12 NOTE — ED PROVIDER NOTES
[address] [facility] ED  [address] [facility] [[email or web address] [facility] [address]  Phone: [phone]  Fax: [phone]        Pt Name: Angel Calderon  MRN: [id number]  [place] 1945  Date of evaluation: 7/12/21      CHIEF COMPLAINT     Chief Complaint   Patient presents with    Fall         HISTORY OF PRESENT ILLNESS  (Location/Symptom, Timing/Onset, Context/Setting, Quality, Duration, Modifying Factors, Severity.)    Angel Calderon is a 70 y.o. female who presents having a fall in her shower. The patient states that she had started the shower, and then climbed into the tub to sit in the tub. She states when she was trying to get back out, she was not strong enough to lift her self out of the tub. She then fell back into the tub, with her knees somewhat splayed out. Patient denies striking her head. No loss of conscious. No neck, back, or abdominal pain. Patient is anticoagulated on Eliquis secondary to history of atrial fibrillation. Patient also has a history of MS, and is not very steady on her feet or able to get around very well at home. She reports that she has been having multiple falls. REVIEW OF SYSTEMS    (2-9 systems for level 4, 10 or more for level 5)     Review of Systems   Constitutional: Negative for chills and fever. HENT: Negative for congestion and sore throat. Eyes: Negative for photophobia and visual disturbance. Respiratory: Negative for cough and shortness of breath. Cardiovascular: Negative for chest pain and palpitations. Gastrointestinal: Negative for abdominal pain, nausea and vomiting. Genitourinary: Negative for dysuria, frequency and urgency. Musculoskeletal: Positive for arthralgias. Negative for back pain and neck pain. Skin: Negative for rash and wound. Neurological: Positive for headaches. Negative for dizziness and light-headedness. Hematological: Negative for adenopathy. Bruises/bleeds easily.        PAST MEDICAL HISTORY    has a past medical history of Abnormal Pap smear, Asthma, Atrial fibrillation (HCC), Bloody discharge from nipple, CAD (coronary artery disease), Cataracts, bilateral, Colon polyp, CVA (cerebral vascular accident) ([facility] [address] Demyelinating disease ([facility] [address] Fibromyalgia, High-risk sexual behavior, History of bone density study, History of migraines, HTN (hypertension), IgG deficiency ([facility] [address] Multiple sclerosis ([facility] [address] Myoclonic seizures ([facility] [address] Optic neuritis, Osteopenia, Pyloric stenosis, Raynaud's disease, and Vasomotor rhinitis. SURGICAL HISTORY      has a past surgical history that includes Dilation & curettage (1982); Breast biopsy (1990); Appendectomy (1955); Septoplasty (1985); Hammer toe surgery (1989); Sinus endoscopy; sinus surgery; Shoulder arthroscopy (1998); Esophagoscopy (1966, 1969); Colonoscopy (2010); Foreign Body Removal (1962); laparoscopy (2863); Coronary angioplasty with stent (5/2011); Coronary angioplasty with stent (6/2011); Upper gastrointestinal endoscopy; eye surgery; pr colonoscopy w/biopsy single/multiple (N/A, 12/19/2017); pr egd transoral biopsy single/multiple (12/19/2017); Upper gastrointestinal endoscopy (12/19/2017); Upper gastrointestinal endoscopy (5/24/2018); Cardiac surgery; Cardiac catheterization (07/31/2020); Cardioversion (08/14/2020); and Cardioversion (07/31/2020). CURRENTMEDICATIONS       Previous Medications    ACETAMINOPHEN (TYLENOL) 500 MG TABLET    Take 1,000 mg by mouth 2 times daily as needed for Pain    ALLOPURINOL (ZYLOPRIM) 100 MG TABLET    Take 100 mg by mouth daily.       AMIODARONE (PACERONE) 100 MG TABLET    Take 100 mg by mouth daily Pt unsure of dose    APIXABAN (ELIQUIS) 5 MG TABS TABLET    Take by mouth 2 times daily    AZATHIOPRINE (IMURAN) 50 MG TABLET    Take 50 mg by mouth daily     BUMETANIDE (BUMEX) 1 MG TABLET    Take 1 tablet by mouth daily Advised to take additional 1 pill for weight gain, increased CHF and/swelling    CLOPIDOGREL (PLAVIX) 75 MG TABLET    Take 75 mg by mouth daily. CYCLOBENZAPRINE HCL (FLEXERIL PO)    Take by mouth    ISOSORBIDE MONONITRATE (IMDUR) 120 MG EXTENDED RELEASE TABLET    Take 0.5 tablets by mouth daily    LOSARTAN (COZAAR) 100 MG TABLET    Take 25 mg by mouth daily HOLD UNTIL SEEN BY CARDIOLOGY    METOPROLOL SUCCINATE (TOPROL XL) 25 MG EXTENDED RELEASE TABLET    Take 1 tablet by mouth daily    NITROGLYCERIN (NITROSTAT) 0.4 MG SL TABLET    Place 0.4 mg under the tongue every 5 minutes. Indications: Chest Pain    PANTOPRAZOLE (PROTONIX) 40 MG TABLET    Take 40 mg by mouth daily    PAROXETINE (PAXIL) 20 MG TABLET    Take 20 mg by mouth every morning. POTASSIUM CHLORIDE (KLOR-CON M) 20 MEQ EXTENDED RELEASE TABLET    Take 1 tablet by mouth daily    PRAVASTATIN (PRAVACHOL) 40 MG TABLET    Take 40 mg by mouth daily. RANOLAZINE (RANEXA) 500 MG SR TABLET    Take 500 mg by mouth 2 times daily. ALLERGIES     is allergic to lisinopril-hydrochlorothiazide, sulfa antibiotics, penicillins, polyethylene glycol, actifed cold-allergy [chlorpheniramine-phenylephrine], altace [ramipril], cephalosporins, coreg [carvedilol], dml facial moisturizer, elavil [amitriptyline hcl], entex [ami-margot], ethylene glycol, fentanyl, hizentra [immune globulin (human)], hydralazine, levofloxacin, lisinopril-hydrochlorothiazide, montelukast sodium, morphine, nifedipine, norvasc [amlodipine besylate], other, phenobarbital, robaxin [methocarbamol], sinequan [doxepin hcl], sudafed [pseudoephedrine hcl], sudafed [pseudoephedrine hcl], tranquil-cecil, wellbutrin [bupropion hcl], actifed cold-allergy [chlorpheniramine-phenylephrine], clindamycin/lincomycin, codeine, metoprolol, metoprolol succinate, and seldane [terfenadine]. FAMILY HISTORY     She indicated that the status of her mother is unknown. She indicated that the status of her father is unknown. She indicated that the status of her sister is unknown.  She indicated that the status of her maternal aunt is unknown.     family history includes Breast Cancer in her maternal aunt; Heart Disease in her father and mother; Hypertension in her sister; Other in her father, maternal aunt, mother, and sister. SOCIAL HISTORY      reports that she has never smoked. She has never used smokeless tobacco. She reports that she does not drink alcohol and does not use drugs. PHYSICAL EXAM    (up to 7 for level 4, 8 or more for level 5)   INITIAL VITALS:  blood pressure is 154/78 (abnormal) and her pulse is 48 (abnormal). Her respiration is 16 and oxygen saturation is 98%. Physical Exam  Vitals and nursing note reviewed. Constitutional:       Appearance: Normal appearance. She is not ill-appearing or diaphoretic. HENT:      Head: Normocephalic. Nose: Nose normal.      Mouth/Throat:      Mouth: Mucous membranes are moist.      Pharynx: Oropharynx is clear. Eyes:      Extraocular Movements: Extraocular movements intact. Conjunctiva/sclera: Conjunctivae normal.      Pupils: Pupils are equal, round, and reactive to light. Cardiovascular:      Rate and Rhythm: Regular rhythm. Bradycardia present. Pulses: Normal pulses. Heart sounds: Normal heart sounds. No murmur heard. No friction rub. No gallop. Pulmonary:      Effort: Pulmonary effort is normal.      Breath sounds: Normal breath sounds. No wheezing, rhonchi or rales. Abdominal:      General: Abdomen is flat. Bowel sounds are normal.      Palpations: Abdomen is soft. Tenderness: There is no abdominal tenderness. There is no guarding or rebound. Musculoskeletal:         General: Tenderness present. Normal range of motion. Cervical back: Normal range of motion and neck supple. No tenderness. Comments: Tender to palpation over the bilateral knees and bilateral hips, especially on the lateral aspect. Patient is able to flex the hips and flex and extend the knees however. Distal motor and sensory are intact. No obvious deformity. Compartments are soft. DP and PT pulses are palpable and symmetrical bilaterally. The pelvis is stable. Skin:     General: Skin is warm and dry. Capillary Refill: Capillary refill takes less than 2 seconds. Neurological:      Mental Status: She is alert and oriented to person, place, and time. Mental status is at baseline. Psychiatric:         Behavior: Behavior normal.         Thought Content: Thought content normal.         DIFFERENTIAL DIAGNOSIS/ MDM:     EMS providers reported that the patient's house was in significant disarray, and they had concerns about her ability to take care of herself at home. Therefore, [place] police were called. A [place]  came to the patient's bedside to receive a statement from her. Adult Protective Services will be consulted by the police. We will also discuss with our . The patient states that her daughter was previously her power of , but there are some issues in the relationship such that the patient does not want to continue this arrangement. Care Coordination discussed placement with the patient. She does not wish to go to an ECF or a SNF. She declined home care.      DIAGNOSTIC RESULTS     EKG: All EKG's are interpreted by the Emergency Department Physician who either signs or Co-signs this chart in the absence of a cardiologist.    EKG Interpretation    Interpreted by emergency department physician    Rhythm: sinus bradycardia  Rate: bradycardia  Axis: left  Ectopy: none  Conduction: normal  ST Segments: nonspecific changes  T Waves: non specific changes  Q Waves: none    Clinical Impression: non-specific EKG and sinus bradycardia    Alvaro Love MD      RADIOLOGY:        Interpretation per the Radiologist below, if available at the time of this note:    XR KNEE LEFT (3 VIEWS)    Result Date: 7/12/2021  EXAMINATION: THREE XRAY VIEWS OF THE LEFT KNEE; ONE XRAY VIEW OF THE PELVIS AND TWO XRAY VIEWS OF EACH OF THE BILATERAL HIPS; THREE XRAY VIEWS OF THE RIGHT KNEE 7/12/2021 11:20 am COMPARISON: None. HISTORY: ORDERING SYSTEM PROVIDED HISTORY: fall TECHNOLOGIST PROVIDED HISTORY: fall Reason for Exam: Pt states fall - bilateral knee and hip pain - hx of MS Acuity: Acute Type of Exam: Initial 82-year-old female with bilateral knee and hip pain FINDINGS: Pelvis/bilateral hips: Relative preservation of the bilateral hip joint spaces. Both femoral heads project over the bilateral acetabula without clear evidence for acute fracture, dislocation or femoral head flattening. Iliac wings and pubic rami symmetric in appearance. Moderate to severe stool burden. Mild degenerative changes in the lower lumbar/lumbosacral spine. Mild degenerative changes of the pubic symphysis. Suture material projects over the midline pelvis and abdomen to the left of midline. Pelvic phleboliths. Right knee: No sizable joint effusion is identified. Osseous alignment is normal.  Mild degenerative changes of the patellofemoral and medial compartments. Diffuse osteopenia. No acute fracture or gross dislocation is seen. No suspicious osteolytic or osteoblastic lesions are identified. Left knee: No sizable joint effusion is identified. Osseous alignment is normal.  Joint spaces are well maintained. No acute fracture or gross dislocation is seen. No suspicious osteolytic or osteoblastic lesions are identified. Diffuse osteopenia. Pelvis/bilateral hips: No acute fracture or dislocation. Right knee: 1. Diffuse osteopenia. Mild degenerative changes of the patellofemoral and medial compartments. 2. No acute fracture or dislocation. Left knee: No acute fracture or dislocation. Diffuse osteopenia. XR KNEE RIGHT (3 VIEWS)    Result Date: 7/12/2021  EXAMINATION: THREE XRAY VIEWS OF THE LEFT KNEE; ONE XRAY VIEW OF THE PELVIS AND TWO XRAY VIEWS OF EACH OF THE BILATERAL HIPS; THREE XRAY VIEWS OF THE RIGHT KNEE 7/12/2021 11:20 am COMPARISON: None. HISTORY: ORDERING SYSTEM PROVIDED HISTORY: fall TECHNOLOGIST PROVIDED HISTORY: fall Reason for Exam: Pt states fall - bilateral knee and hip pain - hx of MS Acuity: Acute Type of Exam: Initial 74-year-old female with bilateral knee and hip pain FINDINGS: Pelvis/bilateral hips: Relative preservation of the bilateral hip joint spaces. Both femoral heads project over the bilateral acetabula without clear evidence for acute fracture, dislocation or femoral head flattening. Iliac wings and pubic rami symmetric in appearance. Moderate to severe stool burden. Mild degenerative changes in the lower lumbar/lumbosacral spine. Mild degenerative changes of the pubic symphysis. Suture material projects over the midline pelvis and abdomen to the left of midline. Pelvic phleboliths. Right knee: No sizable joint effusion is identified. Osseous alignment is normal.  Mild degenerative changes of the patellofemoral and medial compartments. Diffuse osteopenia. No acute fracture or gross dislocation is seen. No suspicious osteolytic or osteoblastic lesions are identified. Left knee: No sizable joint effusion is identified. Osseous alignment is normal.  Joint spaces are well maintained. No acute fracture or gross dislocation is seen. No suspicious osteolytic or osteoblastic lesions are identified. Diffuse osteopenia. Pelvis/bilateral hips: No acute fracture or dislocation. Right knee: 1. Diffuse osteopenia. Mild degenerative changes of the patellofemoral and medial compartments. 2. No acute fracture or dislocation. Left knee: No acute fracture or dislocation. Diffuse osteopenia.      CT HEAD WO CONTRAST    Result Date: 7/12/2021  EXAMINATION: CT OF THE HEAD WITHOUT CONTRAST; CT OF THE CERVICAL SPINE WITHOUT CONTRAST 7/12/2021 11:20 am TECHNIQUE: CT of the head was performed without the administration of intravenous contrast. Dose modulation, iterative reconstruction, and/or weight based adjustment of the mA/kV was utilized to reduce the radiation dose to as low as reasonably achievable.; CT of the cervical spine was performed without the administration of intravenous contrast. Multiplanar reformatted images are provided for review. Dose modulation, iterative reconstruction, and/or weight based adjustment of the mA/kV was utilized to reduce the radiation dose to as low as reasonably achievable. COMPARISON: CT head 09/21/2019, x-ray cervical spine 12/16/2014 HISTORY: ORDERING SYSTEM PROVIDED HISTORY: fall TECHNOLOGIST PROVIDED HISTORY: fall Reason for Exam: head pain Acuity: Acute Type of Exam: Initial Mechanism of Injury: fall Relevant Medical/Surgical History: hx HTN, CAD, morbid obesity FINDINGS: CT head: BRAIN/VENTRICLES: Mild cerebral atrophy. No midline shift. Basal cisterns are normally outlined. No intra or extra-axial hemorrhage. No abnormal fluid collections. ORBITS: The visualized portion of the orbits demonstrate no acute abnormality. SINUSES: The visualized paranasal sinuses and mastoid air cells demonstrate no acute abnormality. SOFT TISSUES/SKULL:  No acute abnormality of the visualized skull or soft tissues. CT CERVICAL SPINE: BONES/ALIGNMENT: There is no evidence of an acute cervical spine fracture. There is normal alignment of the cervical spine. DEGENERATIVE CHANGES: Mild degenerative changes. SOFT TISSUES: There is no prevertebral soft tissue swelling. No acute intracranial abnormality. Mild cerebral atrophy. No acute fracture or subluxation of the cervical spine.   Mild degenerative changes     CT CERVICAL SPINE WO CONTRAST    Result Date: 7/12/2021  EXAMINATION: CT OF THE HEAD WITHOUT CONTRAST; CT OF THE CERVICAL SPINE WITHOUT CONTRAST 7/12/2021 11:20 am TECHNIQUE: CT of the head was performed without the administration of intravenous contrast. Dose modulation, iterative reconstruction, and/or weight based adjustment of the mA/kV was utilized to reduce the radiation dose to as low as reasonably achievable.; CT of the cervical spine was performed without the administration of intravenous contrast. Multiplanar reformatted images are provided for review. Dose modulation, iterative reconstruction, and/or weight based adjustment of the mA/kV was utilized to reduce the radiation dose to as low as reasonably achievable. COMPARISON: CT head 09/21/2019, x-ray cervical spine 12/16/2014 HISTORY: ORDERING SYSTEM PROVIDED HISTORY: fall TECHNOLOGIST PROVIDED HISTORY: fall Reason for Exam: head pain Acuity: Acute Type of Exam: Initial Mechanism of Injury: fall Relevant Medical/Surgical History: hx HTN, CAD, morbid obesity FINDINGS: CT head: BRAIN/VENTRICLES: Mild cerebral atrophy. No midline shift. Basal cisterns are normally outlined. No intra or extra-axial hemorrhage. No abnormal fluid collections. ORBITS: The visualized portion of the orbits demonstrate no acute abnormality. SINUSES: The visualized paranasal sinuses and mastoid air cells demonstrate no acute abnormality. SOFT TISSUES/SKULL:  No acute abnormality of the visualized skull or soft tissues. CT CERVICAL SPINE: BONES/ALIGNMENT: There is no evidence of an acute cervical spine fracture. There is normal alignment of the cervical spine. DEGENERATIVE CHANGES: Mild degenerative changes. SOFT TISSUES: There is no prevertebral soft tissue swelling. No acute intracranial abnormality. Mild cerebral atrophy. No acute fracture or subluxation of the cervical spine. Mild degenerative changes     XR CHEST PORTABLE    Result Date: 6/13/2021  EXAMINATION: ONE XRAY VIEW OF THE CHEST 6/13/2021 7:45 pm COMPARISON: 06/01/2021 HISTORY: ORDERING SYSTEM PROVIDED HISTORY: SOB TECHNOLOGIST PROVIDED HISTORY: SOB Reason for Exam: Pt c/o SOB, tachycardia Acuity: Unknown Type of Exam: Unknown FINDINGS: The cardiomediastinal silhouette is mildly enlarged with mild perihilar congestion. .  The lungs are clear. No pleural effusion or pneumothorax is present.      No acute cardiopulmonary process     XR HIP 3-4 VW W PELVIS BILATERAL    Result Date: 7/12/2021  EXAMINATION: THREE XRAY VIEWS OF THE LEFT KNEE; ONE XRAY VIEW OF THE PELVIS AND TWO XRAY VIEWS OF EACH OF THE BILATERAL HIPS; THREE XRAY VIEWS OF THE RIGHT KNEE 7/12/2021 11:20 am COMPARISON: None. HISTORY: ORDERING SYSTEM PROVIDED HISTORY: fall TECHNOLOGIST PROVIDED HISTORY: fall Reason for Exam: Pt states fall - bilateral knee and hip pain - hx of MS Acuity: Acute Type of Exam: Initial 51-year-old female with bilateral knee and hip pain FINDINGS: Pelvis/bilateral hips: Relative preservation of the bilateral hip joint spaces. Both femoral heads project over the bilateral acetabula without clear evidence for acute fracture, dislocation or femoral head flattening. Iliac wings and pubic rami symmetric in appearance. Moderate to severe stool burden. Mild degenerative changes in the lower lumbar/lumbosacral spine. Mild degenerative changes of the pubic symphysis. Suture material projects over the midline pelvis and abdomen to the left of midline. Pelvic phleboliths. Right knee: No sizable joint effusion is identified. Osseous alignment is normal.  Mild degenerative changes of the patellofemoral and medial compartments. Diffuse osteopenia. No acute fracture or gross dislocation is seen. No suspicious osteolytic or osteoblastic lesions are identified. Left knee: No sizable joint effusion is identified. Osseous alignment is normal.  Joint spaces are well maintained. No acute fracture or gross dislocation is seen. No suspicious osteolytic or osteoblastic lesions are identified. Diffuse osteopenia. Pelvis/bilateral hips: No acute fracture or dislocation. Right knee: 1. Diffuse osteopenia. Mild degenerative changes of the patellofemoral and medial compartments. 2. No acute fracture or dislocation. Left knee: No acute fracture or dislocation. Diffuse osteopenia.        LABS:  Results for orders placed or performed during the hospital encounter of 07/12/21   CBC Auto Differential   Result Value Ref Range    WBC 7.7 3.5 - 11.0 k/uL    RBC 4.13 4.0 - 5.2 m/uL    Hemoglobin 11.9 (L) 12.0 - 16.0 g/dL    Hematocrit 37.4 36 - 46 %    MCV 90.4 80 - 100 fL    MCH 28.9 26 - 34 pg    MCHC 31.9 31 - 37 g/dL    RDW 18.3 (H) 12.5 - 15.4 %    Platelets 176 987 - 632 k/uL    MPV 9.7 6.0 - 12.0 fL    NRBC Automated NOT REPORTED per 100 WBC    Differential Type NOT REPORTED     Seg Neutrophils 77 (H) 36 - 66 %    Lymphocytes 10 (L) 24 - 44 %    Monocytes 10 2 - 11 %    Eosinophils % 2 1 - 4 %    Basophils 1 0 - 2 %    Immature Granulocytes NOT REPORTED 0 %    Segs Absolute 6.00 1.8 - 7.7 k/uL    Absolute Lymph # 0.70 (L) 1.0 - 4.8 k/uL    Absolute Mono # 0.70 0.1 - 1.2 k/uL    Absolute Eos # 0.10 0.0 - 0.4 k/uL    Basophils Absolute 0.10 0.0 - 0.2 k/uL    Absolute Immature Granulocyte NOT REPORTED 0.00 - 0.30 k/uL    WBC Morphology NOT REPORTED     RBC Morphology NOT REPORTED     Platelet Estimate NOT REPORTED    Basic Metabolic Panel   Result Value Ref Range    Glucose 108 (H) 70 - 99 mg/dL    BUN 19 8 - 23 mg/dL    CREATININE 0.96 (H) 0.50 - 0.90 mg/dL    Bun/Cre Ratio NOT REPORTED 9 - 20    Calcium 9.3 8.6 - 10.4 mg/dL    Sodium 137 135 - 144 mmol/L    Potassium 4.3 3.7 - 5.3 mmol/L    Chloride 103 98 - 107 mmol/L    CO2 19 (L) 20 - 31 mmol/L    Anion Gap 15 9 - 17 mmol/L    GFR Non-African American 57 (L) >60 mL/min    GFR African American >60 >60 mL/min    GFR Comment          GFR Staging NOT REPORTED    Troponin   Result Value Ref Range    Troponin, High Sensitivity 26 (H) 0 - 14 ng/L    Troponin T NOT REPORTED <0.03 ng/mL    Troponin Interp NOT REPORTED    Troponin   Result Value Ref Range    Troponin, High Sensitivity 19 (H) 0 - 14 ng/L    Troponin T NOT REPORTED <0.03 ng/mL    Troponin Interp NOT REPORTED        Elevated troponin    EMERGENCY DEPARTMENT COURSE:   Vitals:    Vitals:    07/12/21 1028 07/12/21 1035 07/12/21 1408   BP: (!) 146/82  (!) 154/78   Pulse: (!) 46  (!) 48   Resp: 16  16   SpO2:  98% 98%     -------------------------  BP: (!) 154/78,  , Pulse: (!) 48, Resp: 16      RE-EVALUATION:  12:36 PM EDT  Patient updated on results. CONSULTS:  Case Management    PROCEDURES:  None    FINAL IMPRESSION      1. Fall, initial encounter    2. Contusion of hip, unspecified laterality, initial encounter    3.  Contusion of knee, unspecified laterality, initial encounter          DISPOSITION/PLAN   DISPOSITION        CONDITION ON DISPOSITION:   Stable     PATIENT REFERRED TO:  MD Jae  [address] [facility] [address]  [phone]    Go on 7/15/2021  at 4:30pm for ER follow-up      DISCHARGE MEDICATIONS:  New Prescriptions    No medications on file       (Please note that portions of this note were completed with a voicerecognition program.  Efforts were made to edit the dictations but occasionally words are mis-transcribed.)    Jay Pereira MD  Attending Emergency Medicine Physician        Jay Pereira MD  07/12/21 7334

## 2021-07-12 NOTE — CARE COORDINATION
Met with patient at request of ER staff that patient wants to be placed. CM inquired which facility she would like to go to and she says that she doesn't want to go to a nursing home. She says that her rent is being raised and that she cannot afford to live there anymore. She has recently changed physicians and was put in contact with a  through Dr. Barcenas Client who sent her [place] lot\" of paper work to look over. She reports that she has been reviewed for Medicaid but has \"too much money\". At home uses no adaptive equipment - uses rollator - drives self to doctor appts, stores, ect. Has daughter, but is not helpful. CM has offered [facility] [place]. assistance, declined.    PCP appt made

## 2021-07-12 NOTE — ED NOTES
Writer inquires to APS about status of referral, [facility] APS intake states they cannot give information because writer wasn't the one of made referral. New referral was made by Tomi Moses RN  07/12/21 5326

## 2021-07-13 LAB
EKG ATRIAL RATE: 45 BPM
EKG P AXIS: 77 DEGREES
EKG P-R INTERVAL: 142 MS
EKG Q-T INTERVAL: 514 MS
EKG QRS DURATION: 76 MS
EKG QTC CALCULATION (BAZETT): 444 MS
EKG R AXIS: -54 DEGREES
EKG T AXIS: 139 DEGREES
EKG VENTRICULAR RATE: 45 BPM

## 2021-08-04 ENCOUNTER — HOSPITAL ENCOUNTER (INPATIENT)
Age: 76
LOS: 4 days | Discharge: HOME | DRG: 281 | End: 2021-08-08
Attending: EMERGENCY MEDICINE | Admitting: INTERNAL MEDICINE
Payer: MEDICARE

## 2021-08-04 ENCOUNTER — APPOINTMENT (OUTPATIENT)
Dept: CT IMAGING | Age: 76
DRG: 281 | End: 2021-08-04
Payer: MEDICARE

## 2021-08-04 DIAGNOSIS — R09.02 HYPOXIA: ICD-10-CM

## 2021-08-04 DIAGNOSIS — R00.1 BRADYCARDIA: ICD-10-CM

## 2021-08-04 DIAGNOSIS — R29.6 FREQUENT FALLS: ICD-10-CM

## 2021-08-04 DIAGNOSIS — R53.81 PHYSICAL DEBILITY: ICD-10-CM

## 2021-08-04 DIAGNOSIS — G35 MULTIPLE SCLEROSIS (HCC): ICD-10-CM

## 2021-08-04 DIAGNOSIS — R55 SYNCOPE AND COLLAPSE: Primary | ICD-10-CM

## 2021-08-04 LAB
-: ABNORMAL
ABSOLUTE EOS #: 0.1 K/UL (ref 0–0.4)
ABSOLUTE IMMATURE GRANULOCYTE: ABNORMAL K/UL (ref 0–0.3)
ABSOLUTE LYMPH #: 0.8 K/UL (ref 1–4.8)
ABSOLUTE MONO #: 0.8 K/UL (ref 0.1–1.2)
ALBUMIN SERPL-MCNC: 4.1 G/DL (ref 3.5–5.2)
ALBUMIN/GLOBULIN RATIO: 2.3 (ref 1–2.5)
ALP BLD-CCNC: 76 U/L (ref 35–104)
ALT SERPL-CCNC: 9 U/L (ref 5–33)
AMORPHOUS: ABNORMAL
ANION GAP SERPL CALCULATED.3IONS-SCNC: 14 MMOL/L (ref 9–17)
AST SERPL-CCNC: 12 U/L
BACTERIA: ABNORMAL
BASOPHILS # BLD: 1 % (ref 0–2)
BASOPHILS ABSOLUTE: 0.1 K/UL (ref 0–0.2)
BILIRUB SERPL-MCNC: 1.5 MG/DL (ref 0.3–1.2)
BILIRUBIN DIRECT: 0.7 MG/DL
BILIRUBIN URINE: NEGATIVE
BILIRUBIN, INDIRECT: 0.8 MG/DL (ref 0–1)
BNP INTERPRETATION: ABNORMAL
BNP INTERPRETATION: ABNORMAL
BUN BLDV-MCNC: 22 MG/DL (ref 8–23)
BUN/CREAT BLD: ABNORMAL (ref 9–20)
CALCIUM SERPL-MCNC: 9.3 MG/DL (ref 8.6–10.4)
CASTS UA: ABNORMAL /LPF
CASTS UA: ABNORMAL /LPF
CHLORIDE BLD-SCNC: 103 MMOL/L (ref 98–107)
CO2: 23 MMOL/L (ref 20–31)
COLOR: YELLOW
COMMENT UA: ABNORMAL
CREAT SERPL-MCNC: 0.99 MG/DL (ref 0.5–0.9)
CRYSTALS, UA: ABNORMAL /HPF
DIFFERENTIAL TYPE: ABNORMAL
EOSINOPHILS RELATIVE PERCENT: 1 % (ref 1–4)
EPITHELIAL CELLS UA: ABNORMAL /HPF (ref 0–5)
GFR AFRICAN AMERICAN: >60 ML/MIN
GFR NON-AFRICAN AMERICAN: 55 ML/MIN
GFR SERPL CREATININE-BSD FRML MDRD: ABNORMAL ML/MIN/{1.73_M2}
GFR SERPL CREATININE-BSD FRML MDRD: ABNORMAL ML/MIN/{1.73_M2}
GLOBULIN: ABNORMAL G/DL (ref 1.5–3.8)
GLUCOSE BLD-MCNC: 113 MG/DL (ref 70–99)
GLUCOSE URINE: NEGATIVE
HCT VFR BLD CALC: 34.7 % (ref 36–46)
HEMOGLOBIN: 11.1 G/DL (ref 12–16)
IMMATURE GRANULOCYTES: ABNORMAL %
KETONES, URINE: NEGATIVE
LEUKOCYTE ESTERASE, URINE: NEGATIVE
LYMPHOCYTES # BLD: 11 % (ref 24–44)
MCH RBC QN AUTO: 28.6 PG (ref 26–34)
MCHC RBC AUTO-ENTMCNC: 32.1 G/DL (ref 31–37)
MCV RBC AUTO: 89.2 FL (ref 80–100)
MONOCYTES # BLD: 10 % (ref 2–11)
MUCUS: ABNORMAL
NITRITE, URINE: POSITIVE
NRBC AUTOMATED: ABNORMAL PER 100 WBC
OTHER OBSERVATIONS UA: ABNORMAL
PDW BLD-RTO: 17.9 % (ref 12.5–15.4)
PH UA: 5.5 (ref 5–8)
PLATELET # BLD: 213 K/UL (ref 140–450)
PLATELET ESTIMATE: ABNORMAL
PMV BLD AUTO: 10 FL (ref 6–12)
POTASSIUM SERPL-SCNC: 3.4 MMOL/L (ref 3.7–5.3)
PRO-BNP: 9700 PG/ML
PRO-BNP: 9720 PG/ML
PROTEIN UA: ABNORMAL
RBC # BLD: 3.89 M/UL (ref 4–5.2)
RBC # BLD: ABNORMAL 10*6/UL
RBC UA: ABNORMAL /HPF (ref 0–2)
RENAL EPITHELIAL, UA: ABNORMAL /HPF
SEG NEUTROPHILS: 77 % (ref 36–66)
SEGMENTED NEUTROPHILS ABSOLUTE COUNT: 5.7 K/UL (ref 1.8–7.7)
SODIUM BLD-SCNC: 140 MMOL/L (ref 135–144)
SPECIFIC GRAVITY UA: 1.02 (ref 1–1.03)
TOTAL PROTEIN: 5.9 G/DL (ref 6.4–8.3)
TRICHOMONAS: ABNORMAL
TROPONIN INTERP: ABNORMAL
TROPONIN INTERP: ABNORMAL
TROPONIN T: ABNORMAL NG/ML
TROPONIN T: ABNORMAL NG/ML
TROPONIN, HIGH SENSITIVITY: 32 NG/L (ref 0–14)
TROPONIN, HIGH SENSITIVITY: 35 NG/L (ref 0–14)
TURBIDITY: ABNORMAL
URINE HGB: ABNORMAL
UROBILINOGEN, URINE: NORMAL
WBC # BLD: 7.4 K/UL (ref 3.5–11)
WBC # BLD: ABNORMAL 10*3/UL
WBC UA: ABNORMAL /HPF (ref 0–5)
YEAST: ABNORMAL

## 2021-08-04 PROCEDURE — 36415 COLL VENOUS BLD VENIPUNCTURE: CPT

## 2021-08-04 PROCEDURE — 2060000000 HC ICU INTERMEDIATE R&B

## 2021-08-04 PROCEDURE — 6370000000 HC RX 637 (ALT 250 FOR IP): Performed by: NURSE PRACTITIONER

## 2021-08-04 PROCEDURE — 99285 EMERGENCY DEPT VISIT HI MDM: CPT

## 2021-08-04 PROCEDURE — 70450 CT HEAD/BRAIN W/O DYE: CPT

## 2021-08-04 PROCEDURE — 80048 BASIC METABOLIC PNL TOTAL CA: CPT

## 2021-08-04 PROCEDURE — 2580000003 HC RX 258: Performed by: EMERGENCY MEDICINE

## 2021-08-04 PROCEDURE — 80076 HEPATIC FUNCTION PANEL: CPT

## 2021-08-04 PROCEDURE — 2580000003 HC RX 258: Performed by: NURSE PRACTITIONER

## 2021-08-04 PROCEDURE — 6360000004 HC RX CONTRAST MEDICATION: Performed by: EMERGENCY MEDICINE

## 2021-08-04 PROCEDURE — 81001 URINALYSIS AUTO W/SCOPE: CPT

## 2021-08-04 PROCEDURE — 93005 ELECTROCARDIOGRAM TRACING: CPT | Performed by: EMERGENCY MEDICINE

## 2021-08-04 PROCEDURE — 71260 CT THORAX DX C+: CPT

## 2021-08-04 PROCEDURE — 83880 ASSAY OF NATRIURETIC PEPTIDE: CPT

## 2021-08-04 PROCEDURE — 85025 COMPLETE CBC W/AUTO DIFF WBC: CPT

## 2021-08-04 PROCEDURE — 84484 ASSAY OF TROPONIN QUANT: CPT

## 2021-08-04 PROCEDURE — 82746 ASSAY OF FOLIC ACID SERUM: CPT

## 2021-08-04 PROCEDURE — 82607 VITAMIN B-12: CPT

## 2021-08-04 RX ORDER — SODIUM CHLORIDE 0.9 % (FLUSH) 0.9 %
10 SYRINGE (ML) INJECTION PRN
Status: DISCONTINUED | OUTPATIENT
Start: 2021-08-04 | End: 2021-08-08 | Stop reason: HOSPADM

## 2021-08-04 RX ORDER — PAROXETINE HYDROCHLORIDE 20 MG/1
20 TABLET, FILM COATED ORAL EVERY MORNING
Status: DISCONTINUED | OUTPATIENT
Start: 2021-08-05 | End: 2021-08-08 | Stop reason: HOSPADM

## 2021-08-04 RX ORDER — ACETAMINOPHEN 325 MG/1
650 TABLET ORAL EVERY 6 HOURS PRN
Status: DISCONTINUED | OUTPATIENT
Start: 2021-08-04 | End: 2021-08-08 | Stop reason: HOSPADM

## 2021-08-04 RX ORDER — POTASSIUM CHLORIDE 20 MEQ/1
40 TABLET, EXTENDED RELEASE ORAL PRN
Status: DISCONTINUED | OUTPATIENT
Start: 2021-08-04 | End: 2021-08-08 | Stop reason: HOSPADM

## 2021-08-04 RX ORDER — SENNA PLUS 8.6 MG/1
1 TABLET ORAL DAILY PRN
Status: DISCONTINUED | OUTPATIENT
Start: 2021-08-04 | End: 2021-08-08 | Stop reason: HOSPADM

## 2021-08-04 RX ORDER — SODIUM CHLORIDE 9 MG/ML
25 INJECTION, SOLUTION INTRAVENOUS PRN
Status: DISCONTINUED | OUTPATIENT
Start: 2021-08-04 | End: 2021-08-08 | Stop reason: HOSPADM

## 2021-08-04 RX ORDER — POTASSIUM CHLORIDE 20 MEQ/1
20 TABLET, EXTENDED RELEASE ORAL DAILY
Status: DISCONTINUED | OUTPATIENT
Start: 2021-08-04 | End: 2021-08-08 | Stop reason: HOSPADM

## 2021-08-04 RX ORDER — CLOPIDOGREL BISULFATE 75 MG/1
75 TABLET ORAL DAILY
Status: DISCONTINUED | OUTPATIENT
Start: 2021-08-04 | End: 2021-08-08 | Stop reason: HOSPADM

## 2021-08-04 RX ORDER — LOSARTAN POTASSIUM 25 MG/1
25 TABLET ORAL DAILY
Status: DISCONTINUED | OUTPATIENT
Start: 2021-08-04 | End: 2021-08-05

## 2021-08-04 RX ORDER — MAGNESIUM SULFATE 1 G/100ML
1000 INJECTION INTRAVENOUS PRN
Status: DISCONTINUED | OUTPATIENT
Start: 2021-08-04 | End: 2021-08-08 | Stop reason: HOSPADM

## 2021-08-04 RX ORDER — ALLOPURINOL 100 MG/1
100 TABLET ORAL DAILY
Status: DISCONTINUED | OUTPATIENT
Start: 2021-08-04 | End: 2021-08-08 | Stop reason: HOSPADM

## 2021-08-04 RX ORDER — RANOLAZINE 500 MG/1
500 TABLET, EXTENDED RELEASE ORAL 2 TIMES DAILY
Status: DISCONTINUED | OUTPATIENT
Start: 2021-08-04 | End: 2021-08-08 | Stop reason: HOSPADM

## 2021-08-04 RX ORDER — ONDANSETRON 2 MG/ML
4 INJECTION INTRAMUSCULAR; INTRAVENOUS EVERY 6 HOURS PRN
Status: DISCONTINUED | OUTPATIENT
Start: 2021-08-04 | End: 2021-08-08 | Stop reason: HOSPADM

## 2021-08-04 RX ORDER — PRAVASTATIN SODIUM 20 MG
40 TABLET ORAL DAILY
Status: DISCONTINUED | OUTPATIENT
Start: 2021-08-04 | End: 2021-08-08 | Stop reason: HOSPADM

## 2021-08-04 RX ORDER — POTASSIUM CHLORIDE 7.45 MG/ML
10 INJECTION INTRAVENOUS PRN
Status: DISCONTINUED | OUTPATIENT
Start: 2021-08-04 | End: 2021-08-08 | Stop reason: HOSPADM

## 2021-08-04 RX ORDER — ONDANSETRON 4 MG/1
4 TABLET, ORALLY DISINTEGRATING ORAL EVERY 8 HOURS PRN
Status: DISCONTINUED | OUTPATIENT
Start: 2021-08-04 | End: 2021-08-08 | Stop reason: HOSPADM

## 2021-08-04 RX ORDER — PANTOPRAZOLE SODIUM 40 MG/1
40 TABLET, DELAYED RELEASE ORAL DAILY
Status: DISCONTINUED | OUTPATIENT
Start: 2021-08-04 | End: 2021-08-05

## 2021-08-04 RX ORDER — AZATHIOPRINE 50 MG/1
50 TABLET ORAL DAILY
Status: DISCONTINUED | OUTPATIENT
Start: 2021-08-04 | End: 2021-08-08 | Stop reason: HOSPADM

## 2021-08-04 RX ORDER — 0.9 % SODIUM CHLORIDE 0.9 %
80 INTRAVENOUS SOLUTION INTRAVENOUS ONCE
Status: COMPLETED | OUTPATIENT
Start: 2021-08-04 | End: 2021-08-04

## 2021-08-04 RX ORDER — BUMETANIDE 1 MG/1
1 TABLET ORAL DAILY
Status: DISCONTINUED | OUTPATIENT
Start: 2021-08-04 | End: 2021-08-08 | Stop reason: HOSPADM

## 2021-08-04 RX ORDER — SODIUM CHLORIDE 0.9 % (FLUSH) 0.9 %
5-40 SYRINGE (ML) INJECTION EVERY 12 HOURS SCHEDULED
Status: DISCONTINUED | OUTPATIENT
Start: 2021-08-04 | End: 2021-08-08 | Stop reason: HOSPADM

## 2021-08-04 RX ORDER — SODIUM CHLORIDE 9 MG/ML
INJECTION, SOLUTION INTRAVENOUS CONTINUOUS
Status: DISCONTINUED | OUTPATIENT
Start: 2021-08-04 | End: 2021-08-05

## 2021-08-04 RX ORDER — ACETAMINOPHEN 650 MG/1
650 SUPPOSITORY RECTAL EVERY 6 HOURS PRN
Status: DISCONTINUED | OUTPATIENT
Start: 2021-08-04 | End: 2021-08-08 | Stop reason: HOSPADM

## 2021-08-04 RX ORDER — 0.9 % SODIUM CHLORIDE 0.9 %
500 INTRAVENOUS SOLUTION INTRAVENOUS ONCE
Status: DISCONTINUED | OUTPATIENT
Start: 2021-08-04 | End: 2021-08-05

## 2021-08-04 RX ADMIN — APIXABAN 5 MG: 5 TABLET, FILM COATED ORAL at 21:56

## 2021-08-04 RX ADMIN — SODIUM CHLORIDE: 9 INJECTION, SOLUTION INTRAVENOUS at 21:56

## 2021-08-04 RX ADMIN — RANOLAZINE 500 MG: 500 TABLET, EXTENDED RELEASE ORAL at 21:56

## 2021-08-04 RX ADMIN — SODIUM CHLORIDE 80 ML: 9 INJECTION, SOLUTION INTRAVENOUS at 18:20

## 2021-08-04 RX ADMIN — SODIUM CHLORIDE, PRESERVATIVE FREE 10 ML: 5 INJECTION INTRAVENOUS at 21:56

## 2021-08-04 RX ADMIN — IOPAMIDOL 75 ML: 755 INJECTION, SOLUTION INTRAVENOUS at 18:20

## 2021-08-04 RX ADMIN — POTASSIUM CHLORIDE 20 MEQ: 1500 TABLET, EXTENDED RELEASE ORAL at 21:56

## 2021-08-04 RX ADMIN — PANTOPRAZOLE SODIUM 40 MG: 40 TABLET, DELAYED RELEASE ORAL at 21:56

## 2021-08-04 RX ADMIN — SODIUM CHLORIDE, PRESERVATIVE FREE 10 ML: 5 INJECTION INTRAVENOUS at 18:20

## 2021-08-04 NOTE — ED PROVIDER NOTES
ADDENDUM:      Care of this patient was assumed from Dr. Cassie Lawrence. The patient was seen for Bradycardia  . The patient's initial evaluation and plan have been discussed with the prior provider who initially evaluated the patient. Nursing Notes, Past Medical Hx, Past Surgical Hx, Social Hx, Allergies, and Family Hx were all reviewed. Diagnostic Results       RADIOLOGY:   Non-plain film images such as CT, Ultrasound and MRI are read by the radiologist. Ulus Feather radiographic images are visualized and the radiologist interpretations are reviewed as follows:     XR KNEE LEFT (3 VIEWS)    Result Date: 7/12/2021  EXAMINATION: THREE XRAY VIEWS OF THE LEFT KNEE; ONE XRAY VIEW OF THE PELVIS AND TWO XRAY VIEWS OF EACH OF THE BILATERAL HIPS; THREE XRAY VIEWS OF THE RIGHT KNEE 7/12/2021 11:20 am COMPARISON: None. HISTORY: ORDERING SYSTEM PROVIDED HISTORY: fall TECHNOLOGIST PROVIDED HISTORY: fall Reason for Exam: Pt states fall - bilateral knee and hip pain - hx of MS Acuity: Acute Type of Exam: Initial 81-year-old female with bilateral knee and hip pain FINDINGS: Pelvis/bilateral hips: Relative preservation of the bilateral hip joint spaces. Both femoral heads project over the bilateral acetabula without clear evidence for acute fracture, dislocation or femoral head flattening. Iliac wings and pubic rami symmetric in appearance. Moderate to severe stool burden. Mild degenerative changes in the lower lumbar/lumbosacral spine. Mild degenerative changes of the pubic symphysis. Suture material projects over the midline pelvis and abdomen to the left of midline. Pelvic phleboliths. Right knee: No sizable joint effusion is identified. Osseous alignment is normal.  Mild degenerative changes of the patellofemoral and medial compartments. Diffuse osteopenia. No acute fracture or gross dislocation is seen. No suspicious osteolytic or osteoblastic lesions are identified. Left knee: No sizable joint effusion is identified. Osseous alignment is normal.  Joint spaces are well maintained. No acute fracture or gross dislocation is seen. No suspicious osteolytic or osteoblastic lesions are identified. Diffuse osteopenia. Pelvis/bilateral hips: No acute fracture or dislocation. Right knee: 1. Diffuse osteopenia. Mild degenerative changes of the patellofemoral and medial compartments. 2. No acute fracture or dislocation. Left knee: No acute fracture or dislocation. Diffuse osteopenia. XR KNEE RIGHT (3 VIEWS)    Result Date: 7/12/2021  EXAMINATION: THREE XRAY VIEWS OF THE LEFT KNEE; ONE XRAY VIEW OF THE PELVIS AND TWO XRAY VIEWS OF EACH OF THE BILATERAL HIPS; THREE XRAY VIEWS OF THE RIGHT KNEE 7/12/2021 11:20 am COMPARISON: None. HISTORY: ORDERING SYSTEM PROVIDED HISTORY: fall TECHNOLOGIST PROVIDED HISTORY: fall Reason for Exam: Pt states fall - bilateral knee and hip pain - hx of MS Acuity: Acute Type of Exam: Initial 72-year-old female with bilateral knee and hip pain FINDINGS: Pelvis/bilateral hips: Relative preservation of the bilateral hip joint spaces. Both femoral heads project over the bilateral acetabula without clear evidence for acute fracture, dislocation or femoral head flattening. Iliac wings and pubic rami symmetric in appearance. Moderate to severe stool burden. Mild degenerative changes in the lower lumbar/lumbosacral spine. Mild degenerative changes of the pubic symphysis. Suture material projects over the midline pelvis and abdomen to the left of midline. Pelvic phleboliths. Right knee: No sizable joint effusion is identified. Osseous alignment is normal.  Mild degenerative changes of the patellofemoral and medial compartments. Diffuse osteopenia. No acute fracture or gross dislocation is seen. No suspicious osteolytic or osteoblastic lesions are identified. Left knee: No sizable joint effusion is identified. Osseous alignment is normal.  Joint spaces are well maintained.   No acute fracture or gross dislocation is seen. No suspicious osteolytic or osteoblastic lesions are identified. Diffuse osteopenia. Pelvis/bilateral hips: No acute fracture or dislocation. Right knee: 1. Diffuse osteopenia. Mild degenerative changes of the patellofemoral and medial compartments. 2. No acute fracture or dislocation. Left knee: No acute fracture or dislocation. Diffuse osteopenia. CT HEAD WO CONTRAST    Result Date: 8/4/2021  EXAMINATION: CT OF THE HEAD WITHOUT CONTRAST  8/4/2021 5:37 pm TECHNIQUE: CT of the head was performed without the administration of intravenous contrast. Dose modulation, iterative reconstruction, and/or weight based adjustment of the mA/kV was utilized to reduce the radiation dose to as low as reasonably achievable. COMPARISON: CT head 07/12/2021 HISTORY: ORDERING SYSTEM PROVIDED HISTORY: syncope TECHNOLOGIST PROVIDED HISTORY: syncope Decision Support Exception - unselect if not a suspected or confirmed emergency medical condition->Emergency Medical Condition (MA) Reason for Exam: syncope, bradycardia Acuity: Acute Type of Exam: Initial FINDINGS: BRAIN/VENTRICLES: There is no acute intracranial hemorrhage, mass effect or midline shift. No abnormal extra-axial fluid collection. The gray-white differentiation is maintained without evidence of an acute infarct. There is prominence of the ventricles and sulci due to global parenchymal volume loss. There are nonspecific areas of hypoattenuation within the periventricular and subcortical white matter, which likely represent chronic microvascular ischemic change. Stable remote lacunar stroke right external capsule and lateral left basal ganglia. ORBITS: The visualized portion of the orbits demonstrate no acute abnormality. SINUSES: The visualized paranasal sinuses and mastoid air cells demonstrate no acute abnormality. SOFT TISSUES/SKULL: No acute abnormality of the visualized skull or soft tissues.  Vascular calcifications are noted reflecting calcific atherosclerosis. 1. No acute intracranial abnormality. 2. Stable remote lacunar stroke right external capsule and lateral left basal ganglia. 3.  White matter hypoattenuation described is typical of microvascular ischemic disease or as sequela of dysmyelinating/demyelinating processes. 4. Stable senescent changes. CT HEAD WO CONTRAST    Result Date: 7/12/2021  EXAMINATION: CT OF THE HEAD WITHOUT CONTRAST; CT OF THE CERVICAL SPINE WITHOUT CONTRAST 7/12/2021 11:20 am TECHNIQUE: CT of the head was performed without the administration of intravenous contrast. Dose modulation, iterative reconstruction, and/or weight based adjustment of the mA/kV was utilized to reduce the radiation dose to as low as reasonably achievable.; CT of the cervical spine was performed without the administration of intravenous contrast. Multiplanar reformatted images are provided for review. Dose modulation, iterative reconstruction, and/or weight based adjustment of the mA/kV was utilized to reduce the radiation dose to as low as reasonably achievable. COMPARISON: CT head 09/21/2019, x-ray cervical spine 12/16/2014 HISTORY: ORDERING SYSTEM PROVIDED HISTORY: fall TECHNOLOGIST PROVIDED HISTORY: fall Reason for Exam: head pain Acuity: Acute Type of Exam: Initial Mechanism of Injury: fall Relevant Medical/Surgical History: hx HTN, CAD, morbid obesity FINDINGS: CT head: BRAIN/VENTRICLES: Mild cerebral atrophy. No midline shift. Basal cisterns are normally outlined. No intra or extra-axial hemorrhage. No abnormal fluid collections. ORBITS: The visualized portion of the orbits demonstrate no acute abnormality. SINUSES: The visualized paranasal sinuses and mastoid air cells demonstrate no acute abnormality. SOFT TISSUES/SKULL:  No acute abnormality of the visualized skull or soft tissues. CT CERVICAL SPINE: BONES/ALIGNMENT: There is no evidence of an acute cervical spine fracture. There is normal alignment of the cervical spine. DEGENERATIVE CHANGES: Mild degenerative changes. SOFT TISSUES: There is no prevertebral soft tissue swelling. No acute intracranial abnormality. Mild cerebral atrophy. No acute fracture or subluxation of the cervical spine. Mild degenerative changes     CT CERVICAL SPINE WO CONTRAST    Result Date: 7/12/2021  EXAMINATION: CT OF THE HEAD WITHOUT CONTRAST; CT OF THE CERVICAL SPINE WITHOUT CONTRAST 7/12/2021 11:20 am TECHNIQUE: CT of the head was performed without the administration of intravenous contrast. Dose modulation, iterative reconstruction, and/or weight based adjustment of the mA/kV was utilized to reduce the radiation dose to as low as reasonably achievable.; CT of the cervical spine was performed without the administration of intravenous contrast. Multiplanar reformatted images are provided for review. Dose modulation, iterative reconstruction, and/or weight based adjustment of the mA/kV was utilized to reduce the radiation dose to as low as reasonably achievable. COMPARISON: CT head 09/21/2019, x-ray cervical spine 12/16/2014 HISTORY: ORDERING SYSTEM PROVIDED HISTORY: fall TECHNOLOGIST PROVIDED HISTORY: fall Reason for Exam: head pain Acuity: Acute Type of Exam: Initial Mechanism of Injury: fall Relevant Medical/Surgical History: hx HTN, CAD, morbid obesity FINDINGS: CT head: BRAIN/VENTRICLES: Mild cerebral atrophy. No midline shift. Basal cisterns are normally outlined. No intra or extra-axial hemorrhage. No abnormal fluid collections. ORBITS: The visualized portion of the orbits demonstrate no acute abnormality. SINUSES: The visualized paranasal sinuses and mastoid air cells demonstrate no acute abnormality. SOFT TISSUES/SKULL:  No acute abnormality of the visualized skull or soft tissues. CT CERVICAL SPINE: BONES/ALIGNMENT: There is no evidence of an acute cervical spine fracture. There is normal alignment of the cervical spine. DEGENERATIVE CHANGES: Mild degenerative changes.  SOFT TISSUES: There is no prevertebral soft tissue swelling. No acute intracranial abnormality. Mild cerebral atrophy. No acute fracture or subluxation of the cervical spine. Mild degenerative changes     CT CHEST PULMONARY EMBOLISM W CONTRAST    Result Date: 8/4/2021  EXAMINATION: CTA OF THE CHEST 8/4/2021 5:37 pm TECHNIQUE: CTA of the chest was performed after the administration of intravenous contrast.  Multiplanar reformatted images are provided for review. MIP images are provided for review. Dose modulation, iterative reconstruction, and/or weight based adjustment of the mA/kV was utilized to reduce the radiation dose to as low as reasonably achievable. COMPARISON: None. HISTORY: ORDERING SYSTEM PROVIDED HISTORY: syncope TECHNOLOGIST PROVIDED HISTORY: syncope Decision Support Exception - unselect if not a suspected or confirmed emergency medical condition->Emergency Medical Condition (MA) Reason for Exam: Syncope Acuity: Acute Type of Exam: Initial FINDINGS: Pulmonary Arteries: Pulmonary arteries are adequately opacified for evaluation. No evidence of intraluminal filling defect to suggest pulmonary embolism. Main pulmonary artery is normal in caliber. Mediastinum: No evidence of mediastinal lymphadenopathy. The heart and pericardium demonstrate no acute abnormality. No evidence for pericardial effusion. No evidence for septal bowing to suggest right ventricular strain. There is no acute abnormality of the thoracic aorta. Lungs/pleura: There is a right-sided pleural effusion. No evidence for infiltrates. No focal consolidation or pulmonary edema. No evidence of pleural effusion or pneumothorax. Upper Abdomen: Limited images of the upper abdomen are unremarkable. Soft Tissues/Bones: No acute bone or soft tissue abnormality. No evidence of pulmonary embolism. No acute infiltrate. Right pleural effusion.      XR HIP 3-4 VW W PELVIS BILATERAL    Result Date: 7/12/2021  EXAMINATION: THREE XRAY VIEWS OF THE LEFT KNEE; ONE XRAY VIEW OF THE PELVIS AND TWO XRAY VIEWS OF EACH OF THE BILATERAL HIPS; THREE XRAY VIEWS OF THE RIGHT KNEE 7/12/2021 11:20 am COMPARISON: None. HISTORY: ORDERING SYSTEM PROVIDED HISTORY: fall TECHNOLOGIST PROVIDED HISTORY: fall Reason for Exam: Pt states fall - bilateral knee and hip pain - hx of MS Acuity: Acute Type of Exam: Initial 51-year-old female with bilateral knee and hip pain FINDINGS: Pelvis/bilateral hips: Relative preservation of the bilateral hip joint spaces. Both femoral heads project over the bilateral acetabula without clear evidence for acute fracture, dislocation or femoral head flattening. Iliac wings and pubic rami symmetric in appearance. Moderate to severe stool burden. Mild degenerative changes in the lower lumbar/lumbosacral spine. Mild degenerative changes of the pubic symphysis. Suture material projects over the midline pelvis and abdomen to the left of midline. Pelvic phleboliths. Right knee: No sizable joint effusion is identified. Osseous alignment is normal.  Mild degenerative changes of the patellofemoral and medial compartments. Diffuse osteopenia. No acute fracture or gross dislocation is seen. No suspicious osteolytic or osteoblastic lesions are identified. Left knee: No sizable joint effusion is identified. Osseous alignment is normal.  Joint spaces are well maintained. No acute fracture or gross dislocation is seen. No suspicious osteolytic or osteoblastic lesions are identified. Diffuse osteopenia. Pelvis/bilateral hips: No acute fracture or dislocation. Right knee: 1. Diffuse osteopenia. Mild degenerative changes of the patellofemoral and medial compartments. 2. No acute fracture or dislocation. Left knee: No acute fracture or dislocation. Diffuse osteopenia.      LABS:   Results for orders placed or performed during the hospital encounter of 08/04/21   CBC Auto Differential   Result Value Ref Range    WBC 7.4 3.5 - 11.0 k/uL    RBC 3.89 (L) 4.0 - 5.2 m/uL    Hemoglobin 11.1 (L) 12.0 - 16.0 g/dL    Hematocrit 34.7 (L) 36 - 46 %    MCV 89.2 80 - 100 fL    MCH 28.6 26 - 34 pg    MCHC 32.1 31 - 37 g/dL    RDW 17.9 (H) 12.5 - 15.4 %    Platelets 304 905 - 125 k/uL    MPV 10.0 6.0 - 12.0 fL    NRBC Automated NOT REPORTED per 100 WBC    Differential Type NOT REPORTED     Seg Neutrophils 77 (H) 36 - 66 %    Lymphocytes 11 (L) 24 - 44 %    Monocytes 10 2 - 11 %    Eosinophils % 1 1 - 4 %    Basophils 1 0 - 2 %    Immature Granulocytes NOT REPORTED 0 %    Segs Absolute 5.70 1.8 - 7.7 k/uL    Absolute Lymph # 0.80 (L) 1.0 - 4.8 k/uL    Absolute Mono # 0.80 0.1 - 1.2 k/uL    Absolute Eos # 0.10 0.0 - 0.4 k/uL    Basophils Absolute 0.10 0.0 - 0.2 k/uL    Absolute Immature Granulocyte NOT REPORTED 0.00 - 0.30 k/uL    WBC Morphology NOT REPORTED     RBC Morphology NOT REPORTED     Platelet Estimate NOT REPORTED    Basic Metabolic Panel   Result Value Ref Range    Glucose 113 (H) 70 - 99 mg/dL    BUN 22 8 - 23 mg/dL    CREATININE 0.99 (H) 0.50 - 0.90 mg/dL    Bun/Cre Ratio NOT REPORTED 9 - 20    Calcium 9.3 8.6 - 10.4 mg/dL    Sodium 140 135 - 144 mmol/L    Potassium 3.4 (L) 3.7 - 5.3 mmol/L    Chloride 103 98 - 107 mmol/L    CO2 23 20 - 31 mmol/L    Anion Gap 14 9 - 17 mmol/L    GFR Non-African American 55 (L) >60 mL/min    GFR African American >60 >60 mL/min    GFR Comment          GFR Staging NOT REPORTED    Hepatic Function Panel   Result Value Ref Range    Albumin 4.1 3.5 - 5.2 g/dL    Alkaline Phosphatase 76 35 - 104 U/L    ALT 9 5 - 33 U/L    AST 12 <32 U/L    Total Bilirubin 1.50 (H) 0.3 - 1.2 mg/dL    Bilirubin, Direct 0.70 (H) <0.31 mg/dL    Bilirubin, Indirect 0.80 0.00 - 1.00 mg/dL    Total Protein 5.9 (L) 6.4 - 8.3 g/dL    Globulin NOT REPORTED 1.5 - 3.8 g/dL    Albumin/Globulin Ratio 2.3 1.0 - 2.5   Brain Natriuretic Peptide   Result Value Ref Range    Pro-BNP 9,720 (H) <300 pg/mL    BNP Interpretation Pro-BNP Reference Range:    Troponin   Result Value Ref Range    Troponin, High Sensitivity 32 (H) 0 - 14 ng/L    Troponin T NOT REPORTED <0.03 ng/mL    Troponin Interp NOT REPORTED        RECENT VITALS:  BP: (!) 164/50, Temp: 98.2 °F (36.8 °C), Pulse: (!) 44, Resp: 16     ED Course     The patient was given the following medications:  Orders Placed This Encounter   Medications    0.9 % sodium chloride bolus    iopamidol (ISOVUE-370) 76 % injection 75 mL    0.9 % sodium chloride bolus    sodium chloride flush 0.9 % injection 10 mL     During the emergency department course, patient was given normal saline bolus followed by infusion. She was put on the monitor which revealed sinus bradycardia. Medical Decision Making      68-year-old female patient presents via EMS for evaluation of generalized weakness and syncopal episode. She was bradycardic and hypoxic upon arrival.  She has maintained her blood pressure well during the ED stay. She will benefit from hospitalization. I discussed the case with OUMAR Winn for hospitalist group and she kindly accepted the patient to be admitted on stepdown unit. Admission condition is stable. Disposition     FINAL IMPRESSION      1. Syncope and collapse    2. Hypoxia    3. Bradycardia          DISPOSITION/PLAN   DISPOSITION        PATIENT REFERRED TO:  No follow-up provider specified. DISCHARGE MEDICATIONS:  New Prescriptions    No medications on file             (Please note that portions of this note were completed with a voice recognition program.  Efforts were made to edit the dictations but occasionally words are mis-transcribed.)    Franck Sharma MD,, MD, F.A.C.E.P.   Attending Emergency Medicine Physician               Franck Sharma MD  08/05/21 [id number]

## 2021-08-04 NOTE — ED NOTES
Pt to ED via EMS c/o weakness and syncopal episode.  Pt notes hx of MS Wilbur Blake, RN  08/04/21 Lesly Garcia RN  08/04/21 [id number]

## 2021-08-04 NOTE — ED PROVIDER NOTES
[address]      Pt Name: Aaron Richards  MRN: [id number]  [place] 1945  Date of evaluation: 8/4/2021      CHIEF COMPLAINT       Chief Complaint   Patient presents with    Bradycardia         HISTORY OF PRESENT ILLNESS      The patient presents with a syncopal episode. She was brought in by squad. She states that she is having worsening leg weakness. She has a history of MS. She says she cannot get the Covid vaccine because of her immunocompromise. The patient is noted to be bradycardic upon arrival.  She says she does not normally use oxygen but is hypoxic as well. She denies chest pain or shortness of breath. She denies abdominal pain. The patient has had some social issues. She says she cannot afford her apartment any longer and she is supposed to be getting help with living in a new situation. She does not want to live in a nursing home however. The patient denies dysuria. She denies fever. Nothing makes her symptoms better or worse otherwise. She denies injury with her syncope. REVIEW OF SYSTEMS       All systems reviewed and negative unless noted in HPI. The patient denies fever or constitutional symptoms. Denies vision change. Denies any sore throat or rhinorrhea. Denies any neck pain or stiffness. Denies chest pain or shortness of breath. No nausea,  vomiting or diarrhea. Denies any dysuria. Denies urinary frequency or hematuria. Denies musculoskeletal injury or pain. History of MS. Notes general weakness of her lower extremities. Denies numbness or focal neurologic deficit. Syncope today. Denies any skin rash or edema. No recent psychiatric issues. Takes Plavix. Denies any polyuria, polydypsia or history of immunocompromise.       PAST MEDICAL HISTORY    has a past medical history of Abnormal Pap smear, Asthma, Atrial fibrillation ([facility] [address] Bloody discharge from nipple, CAD (coronary artery disease), Cataracts, bilateral, Colon polyp, CVA (cerebral vascular accident) ([facility] [address] Demyelinating disease ([facility] [address] Fibromyalgia, High-risk sexual behavior, History of bone density study, History of migraines, HTN (hypertension), IgG deficiency ([facility] [address] Multiple sclerosis ([facility] [address] Myoclonic seizures ([facility] [address] Optic neuritis, Osteopenia, Pyloric stenosis, Raynaud's disease, and Vasomotor rhinitis. SURGICAL HISTORY      has a past surgical history that includes Dilation & curettage (1982); Breast biopsy (1990); Appendectomy (1955); Septoplasty (1985); Hammer toe surgery (1989); Sinus endoscopy; sinus surgery; Shoulder arthroscopy (1998); Esophagoscopy (1966, 1969); Colonoscopy (2010); Foreign Body Removal (1962); laparoscopy (1623); Coronary angioplasty with stent (5/2011); Coronary angioplasty with stent (6/2011); Upper gastrointestinal endoscopy; eye surgery; pr colonoscopy w/biopsy single/multiple (N/A, 12/19/2017); pr egd transoral biopsy single/multiple (12/19/2017); Upper gastrointestinal endoscopy (12/19/2017); Upper gastrointestinal endoscopy (5/24/2018); Cardiac surgery; Cardiac catheterization (07/31/2020); Cardioversion (08/14/2020); and Cardioversion (07/31/2020). CURRENT MEDICATIONS       Previous Medications    ACETAMINOPHEN (TYLENOL) 500 MG TABLET    Take 1,000 mg by mouth 2 times daily as needed for Pain    ALLOPURINOL (ZYLOPRIM) 100 MG TABLET    Take 100 mg by mouth daily. AMIODARONE (PACERONE) 100 MG TABLET    Take 100 mg by mouth daily Pt unsure of dose    APIXABAN (ELIQUIS) 5 MG TABS TABLET    Take by mouth 2 times daily    AZATHIOPRINE (IMURAN) 50 MG TABLET    Take 50 mg by mouth daily     BUMETANIDE (BUMEX) 1 MG TABLET    Take 1 tablet by mouth daily Advised to take additional 1 pill for weight gain, increased CHF and/swelling    CLOPIDOGREL (PLAVIX) 75 MG TABLET    Take 75 mg by mouth daily.       CYCLOBENZAPRINE HCL (FLEXERIL PO)    Take by mouth    ISOSORBIDE MONONITRATE (IMDUR) 120 MG EXTENDED RELEASE TABLET    Take 0.5 tablets by mouth daily    LOSARTAN (COZAAR) 100 MG TABLET    Take 25 mg by mouth daily HOLD UNTIL SEEN BY CARDIOLOGY    METOPROLOL SUCCINATE (TOPROL XL) 25 MG EXTENDED RELEASE TABLET    Take 1 tablet by mouth daily    NITROGLYCERIN (NITROSTAT) 0.4 MG SL TABLET    Place 0.4 mg under the tongue every 5 minutes. Indications: Chest Pain    PANTOPRAZOLE (PROTONIX) 40 MG TABLET    Take 40 mg by mouth daily    PAROXETINE (PAXIL) 20 MG TABLET    Take 20 mg by mouth every morning. POTASSIUM CHLORIDE (KLOR-CON M) 20 MEQ EXTENDED RELEASE TABLET    Take 1 tablet by mouth daily    PRAVASTATIN (PRAVACHOL) 40 MG TABLET    Take 40 mg by mouth daily. RANOLAZINE (RANEXA) 500 MG SR TABLET    Take 500 mg by mouth 2 times daily. ALLERGIES     is allergic to lisinopril-hydrochlorothiazide, sulfa antibiotics, penicillins, polyethylene glycol, actifed cold-allergy [chlorpheniramine-phenylephrine], altace [ramipril], cephalosporins, coreg [carvedilol], dml facial moisturizer, elavil [amitriptyline hcl], entex [ami-margot], ethylene glycol, fentanyl, hizentra [immune globulin (human)], hydralazine, levofloxacin, lisinopril-hydrochlorothiazide, montelukast sodium, morphine, nifedipine, norvasc [amlodipine besylate], other, phenobarbital, robaxin [methocarbamol], sinequan [doxepin hcl], sudafed [pseudoephedrine hcl], sudafed [pseudoephedrine hcl], tranquil-cecil, wellbutrin [bupropion hcl], actifed cold-allergy [chlorpheniramine-phenylephrine], clindamycin/lincomycin, codeine, metoprolol, metoprolol succinate, and seldane [terfenadine]. FAMILY HISTORY     She indicated that the status of her mother is unknown. She indicated that the status of her father is unknown. She indicated that the status of her sister is unknown. She indicated that the status of her maternal aunt is unknown.     family history includes Breast Cancer in her maternal aunt;  Heart Disease in her father and mother; Hypertension in her sister; Other in her father, maternal aunt, mother, and sister. SOCIAL HISTORY      reports that she has never smoked. She has never used smokeless tobacco. She reports that she does not drink alcohol and does not use drugs. PHYSICAL EXAM     INITIAL VITALS:  oral temperature is 98.2 °F (36.8 °C). Her blood pressure is 164/50 (abnormal) and her pulse is 44 (abnormal). Her respiration is 16 and oxygen saturation is 96%. The patient is alert and oriented, in no apparent distress. HEENT is atraumatic. Pupils are PERRL at 4 mm with normal extraocular motion. Mucous membranes moist.    Neck is supple with no lymphadenopathy. No JVD. No meningismus. Heart sounds regular rate and rhythm with no gallops, murmurs, or rubs. Bradycardia noted. Lungs clear, no wheezes, rales or rhonchi. Abdomen: soft, nontender with no pain to palpation. No pulsatile mass. Normal bowel sounds are noted. No rebound or guarding. Musculoskeletal exam shows no evidence of trauma. Normal distal pulses in all extremities. Plus 4 out of 5 gross motor strength in lower extremities. Patient can lift her leg off the bed though with difficulty. Skin: Trace to 1+ edema in legs bilaterally. Neurological exam reveals cranial nerves 2 through 12 grossly intact. Patient has equal  and normal deep tendon reflexes. Psychiatric: no hallucinations or suicidal ideation. Lymphatics.:  No lymphadenopathy. DIFFERENTIAL DIAGNOSIS/ MDM:     Bradycardia, hypoxia, syncope, PE, AMI, ACS    DIAGNOSTIC RESULTS     EKG: All EKG's are interpreted by the Emergency Department Physician who either signs or Co-signs this chart in the absence of a cardiologist.    Sinus bradycardia 45 with nonspecific ST change. Axis -47, , QRS 80, . No morphologic change compared to July 12, 2021.     RADIOLOGY:   I reviewed the radiologist interpretations:  CT HEAD WO CONTRAST    (Results Pending)   CT CHEST PULMONARY EMBOLISM W CONTRAST    (Results Pending)         LABS:  Results for orders placed or performed during the hospital encounter of 08/04/21   CBC Auto Differential   Result Value Ref Range    WBC 7.4 3.5 - 11.0 k/uL    RBC 3.89 (L) 4.0 - 5.2 m/uL    Hemoglobin 11.1 (L) 12.0 - 16.0 g/dL    Hematocrit 34.7 (L) 36 - 46 %    MCV 89.2 80 - 100 fL    MCH 28.6 26 - 34 pg    MCHC 32.1 31 - 37 g/dL    RDW 17.9 (H) 12.5 - 15.4 %    Platelets 034 515 - 352 k/uL    MPV 10.0 6.0 - 12.0 fL    NRBC Automated NOT REPORTED per 100 WBC    Differential Type NOT REPORTED     Seg Neutrophils 77 (H) 36 - 66 %    Lymphocytes 11 (L) 24 - 44 %    Monocytes 10 2 - 11 %    Eosinophils % 1 1 - 4 %    Basophils 1 0 - 2 %    Immature Granulocytes NOT REPORTED 0 %    Segs Absolute 5.70 1.8 - 7.7 k/uL    Absolute Lymph # 0.80 (L) 1.0 - 4.8 k/uL    Absolute Mono # 0.80 0.1 - 1.2 k/uL    Absolute Eos # 0.10 0.0 - 0.4 k/uL    Basophils Absolute 0.10 0.0 - 0.2 k/uL    Absolute Immature Granulocyte NOT REPORTED 0.00 - 0.30 k/uL    WBC Morphology NOT REPORTED     RBC Morphology NOT REPORTED     Platelet Estimate NOT REPORTED    Basic Metabolic Panel   Result Value Ref Range    Glucose 113 (H) 70 - 99 mg/dL    BUN 22 8 - 23 mg/dL    CREATININE 0.99 (H) 0.50 - 0.90 mg/dL    Bun/Cre Ratio NOT REPORTED 9 - 20    Calcium 9.3 8.6 - 10.4 mg/dL    Sodium 140 135 - 144 mmol/L    Potassium 3.4 (L) 3.7 - 5.3 mmol/L    Chloride 103 98 - 107 mmol/L    CO2 23 20 - 31 mmol/L    Anion Gap 14 9 - 17 mmol/L    GFR Non-African American 55 (L) >60 mL/min    GFR African American >60 >60 mL/min    GFR Comment          GFR Staging NOT REPORTED    Hepatic Function Panel   Result Value Ref Range    Albumin 4.1 3.5 - 5.2 g/dL    Alkaline Phosphatase 76 35 - 104 U/L    ALT 9 5 - 33 U/L    AST 12 <32 U/L    Total Bilirubin 1.50 (H) 0.3 - 1.2 mg/dL    Bilirubin, Direct 0.70 (H) <0.31 mg/dL    Bilirubin, Indirect 0.80 0.00 - 1.00 mg/dL    Total Protein 5.9 (L) 6.4 - 8.3 g/dL    Globulin NOT REPORTED 1.5 - 3.8 g/dL    Albumin/Globulin Ratio 2.3 1.0 - 2.5   Brain Natriuretic Peptide   Result Value Ref Range    Pro-BNP 9,720 (H) <300 pg/mL    BNP Interpretation Pro-BNP Reference Range:    Troponin   Result Value Ref Range    Troponin, High Sensitivity 32 (H) 0 - 14 ng/L    Troponin T NOT REPORTED <0.03 ng/mL    Troponin Interp NOT REPORTED          EMERGENCY DEPARTMENT COURSE:   Vitals:    Vitals:    08/04/21 1720 08/04/21 1721   BP: (!) 164/50    Pulse: (!) 44    Resp: 16    Temp: 98.2 °F (36.8 °C)    TempSrc: Oral    SpO2: (!) 88% 96%     -------------------------  BP: (!) 164/50, Temp: 98.2 °F (36.8 °C), Pulse: (!) 44, Resp: 16      Re-evaluation Notes    The patient is bradycardic and requiring supplemental oxygen. This is not her normal baseline. I think she should likely be admitted. The patient will be endorsed to Dr. Raisa Bhakta secondary to end of shift. Please refer to his documentation. FINAL IMPRESSION      1. Syncope and collapse    2. Hypoxia    3. Bradycardia          DISPOSITION/PLAN   DISPOSITION        Condition on Disposition    stable    PATIENT REFERRED TO:  No follow-up provider specified.     DISCHARGE MEDICATIONS:  New Prescriptions    No medications on file       (Please note that portions of this note were completed with a voice recognition program.  Efforts were made to edit the dictations but occasionally words are mis-transcribed.)    Rito Gonzales MD,, MD   Attending Emergency Physician         Rito George MD  08/06/21 [id number]

## 2021-08-05 ENCOUNTER — APPOINTMENT (OUTPATIENT)
Dept: MRI IMAGING | Age: 76
DRG: 281 | End: 2021-08-05
Payer: MEDICARE

## 2021-08-05 PROBLEM — E66.9 OBESITY (BMI 30-39.9): Status: ACTIVE | Noted: 2021-08-05

## 2021-08-05 PROBLEM — I48.20 CHRONIC ATRIAL FIBRILLATION (HCC): Status: ACTIVE | Noted: 2021-08-05

## 2021-08-05 PROBLEM — I50.32 CHRONIC DIASTOLIC HEART FAILURE (HCC): Status: ACTIVE | Noted: 2019-01-13

## 2021-08-05 PROBLEM — I10 ESSENTIAL HYPERTENSION: Chronic | Status: ACTIVE | Noted: 2020-11-03

## 2021-08-05 PROBLEM — R53.81 PHYSICAL DEBILITY: Status: ACTIVE | Noted: 2021-08-05

## 2021-08-05 PROBLEM — R29.6 FREQUENT FALLS: Status: ACTIVE | Noted: 2021-08-05

## 2021-08-05 LAB
-: ABNORMAL
AMORPHOUS: ABNORMAL
ANION GAP SERPL CALCULATED.3IONS-SCNC: 10 MMOL/L (ref 9–17)
BACTERIA: ABNORMAL
BILIRUBIN URINE: NEGATIVE
BUN BLDV-MCNC: 21 MG/DL (ref 8–23)
BUN/CREAT BLD: ABNORMAL (ref 9–20)
CALCIUM SERPL-MCNC: 9.1 MG/DL (ref 8.6–10.4)
CASTS UA: ABNORMAL /LPF
CHLORIDE BLD-SCNC: 107 MMOL/L (ref 98–107)
CO2: 24 MMOL/L (ref 20–31)
COLOR: YELLOW
COMMENT UA: ABNORMAL
CREAT SERPL-MCNC: 0.99 MG/DL (ref 0.5–0.9)
CRYSTALS, UA: ABNORMAL /HPF
EPITHELIAL CELLS UA: ABNORMAL /HPF (ref 0–5)
FOLATE: 8.7 NG/ML
GFR AFRICAN AMERICAN: >60 ML/MIN
GFR NON-AFRICAN AMERICAN: 55 ML/MIN
GFR SERPL CREATININE-BSD FRML MDRD: ABNORMAL ML/MIN/{1.73_M2}
GFR SERPL CREATININE-BSD FRML MDRD: ABNORMAL ML/MIN/{1.73_M2}
GLUCOSE BLD-MCNC: 98 MG/DL (ref 70–99)
GLUCOSE URINE: NEGATIVE
HCT VFR BLD CALC: 34.1 % (ref 36–46)
HEMOGLOBIN: 11 G/DL (ref 12–16)
KETONES, URINE: NEGATIVE
LEUKOCYTE ESTERASE, URINE: ABNORMAL
LV EF: 55 %
LVEF MODALITY: NORMAL
MAGNESIUM: 1.9 MG/DL (ref 1.6–2.6)
MCH RBC QN AUTO: 28.7 PG (ref 26–34)
MCHC RBC AUTO-ENTMCNC: 32.2 G/DL (ref 31–37)
MCV RBC AUTO: 89.4 FL (ref 80–100)
MUCUS: ABNORMAL
MYOGLOBIN: 42 NG/ML (ref 25–58)
MYOGLOBIN: 43 NG/ML (ref 25–58)
NITRITE, URINE: NEGATIVE
NRBC AUTOMATED: ABNORMAL PER 100 WBC
OTHER OBSERVATIONS UA: ABNORMAL
PDW BLD-RTO: 18.3 % (ref 12.5–15.4)
PH UA: 6 (ref 5–8)
PLATELET # BLD: 184 K/UL (ref 140–450)
PMV BLD AUTO: 9.8 FL (ref 6–12)
POTASSIUM SERPL-SCNC: 3.7 MMOL/L (ref 3.7–5.3)
PROTEIN UA: NEGATIVE
RBC # BLD: 3.82 M/UL (ref 4–5.2)
RBC UA: ABNORMAL /HPF (ref 0–2)
RENAL EPITHELIAL, UA: ABNORMAL /HPF
SEDIMENTATION RATE, ERYTHROCYTE: 1 MM (ref 0–30)
SODIUM BLD-SCNC: 141 MMOL/L (ref 135–144)
SPECIFIC GRAVITY UA: 1.01 (ref 1–1.03)
TRICHOMONAS: ABNORMAL
TROPONIN INTERP: ABNORMAL
TROPONIN T: ABNORMAL NG/ML
TROPONIN, HIGH SENSITIVITY: 31 NG/L (ref 0–14)
TROPONIN, HIGH SENSITIVITY: 35 NG/L (ref 0–14)
TROPONIN, HIGH SENSITIVITY: 36 NG/L (ref 0–14)
TURBIDITY: CLEAR
URINE HGB: ABNORMAL
UROBILINOGEN, URINE: NORMAL
VITAMIN B-12: 355 PG/ML (ref 232–1245)
WBC # BLD: 8.2 K/UL (ref 3.5–11)
WBC UA: ABNORMAL /HPF (ref 0–5)
YEAST: ABNORMAL

## 2021-08-05 PROCEDURE — 85027 COMPLETE CBC AUTOMATED: CPT

## 2021-08-05 PROCEDURE — 6370000000 HC RX 637 (ALT 250 FOR IP): Performed by: INTERNAL MEDICINE

## 2021-08-05 PROCEDURE — 97166 OT EVAL MOD COMPLEX 45 MIN: CPT

## 2021-08-05 PROCEDURE — 36415 COLL VENOUS BLD VENIPUNCTURE: CPT

## 2021-08-05 PROCEDURE — 6360000002 HC RX W HCPCS: Performed by: NURSE PRACTITIONER

## 2021-08-05 PROCEDURE — 2580000003 HC RX 258: Performed by: NURSE PRACTITIONER

## 2021-08-05 PROCEDURE — 83036 HEMOGLOBIN GLYCOSYLATED A1C: CPT

## 2021-08-05 PROCEDURE — 83874 ASSAY OF MYOGLOBIN: CPT

## 2021-08-05 PROCEDURE — 99223 1ST HOSP IP/OBS HIGH 75: CPT | Performed by: INTERNAL MEDICINE

## 2021-08-05 PROCEDURE — 6370000000 HC RX 637 (ALT 250 FOR IP): Performed by: NURSE PRACTITIONER

## 2021-08-05 PROCEDURE — 2700000000 HC OXYGEN THERAPY PER DAY

## 2021-08-05 PROCEDURE — 93306 TTE W/DOPPLER COMPLETE: CPT

## 2021-08-05 PROCEDURE — 85652 RBC SED RATE AUTOMATED: CPT

## 2021-08-05 PROCEDURE — 97535 SELF CARE MNGMENT TRAINING: CPT

## 2021-08-05 PROCEDURE — 2500000003 HC RX 250 WO HCPCS: Performed by: INTERNAL MEDICINE

## 2021-08-05 PROCEDURE — 97162 PT EVAL MOD COMPLEX 30 MIN: CPT

## 2021-08-05 PROCEDURE — 97116 GAIT TRAINING THERAPY: CPT

## 2021-08-05 PROCEDURE — 99223 1ST HOSP IP/OBS HIGH 75: CPT | Performed by: PSYCHIATRY & NEUROLOGY

## 2021-08-05 PROCEDURE — 83735 ASSAY OF MAGNESIUM: CPT

## 2021-08-05 PROCEDURE — 81001 URINALYSIS AUTO W/SCOPE: CPT

## 2021-08-05 PROCEDURE — 84484 ASSAY OF TROPONIN QUANT: CPT

## 2021-08-05 PROCEDURE — 80048 BASIC METABOLIC PNL TOTAL CA: CPT

## 2021-08-05 PROCEDURE — 2060000000 HC ICU INTERMEDIATE R&B

## 2021-08-05 RX ORDER — CLONIDINE HYDROCHLORIDE 0.1 MG/1
0.1 TABLET ORAL ONCE
Status: COMPLETED | OUTPATIENT
Start: 2021-08-05 | End: 2021-08-05

## 2021-08-05 RX ORDER — PANTOPRAZOLE SODIUM 40 MG/1
40 TABLET, DELAYED RELEASE ORAL
Status: DISCONTINUED | OUTPATIENT
Start: 2021-08-06 | End: 2021-08-08 | Stop reason: HOSPADM

## 2021-08-05 RX ORDER — METOPROLOL SUCCINATE 50 MG/1
50 TABLET, EXTENDED RELEASE ORAL 2 TIMES DAILY
Status: DISCONTINUED | OUTPATIENT
Start: 2021-08-06 | End: 2021-08-06

## 2021-08-05 RX ORDER — LOSARTAN POTASSIUM 50 MG/1
50 TABLET ORAL DAILY
Status: DISCONTINUED | OUTPATIENT
Start: 2021-08-05 | End: 2021-08-06

## 2021-08-05 RX ORDER — METOPROLOL SUCCINATE 50 MG/1
50 TABLET, EXTENDED RELEASE ORAL 2 TIMES DAILY
Status: ON HOLD | COMMUNITY
End: 2021-08-08 | Stop reason: HOSPADM

## 2021-08-05 RX ORDER — CLONIDINE HYDROCHLORIDE 0.1 MG/1
0.1 TABLET ORAL 3 TIMES DAILY PRN
Status: DISCONTINUED | OUTPATIENT
Start: 2021-08-05 | End: 2021-08-05

## 2021-08-05 RX ORDER — BUMETANIDE 0.25 MG/ML
1 INJECTION, SOLUTION INTRAMUSCULAR; INTRAVENOUS ONCE
Status: COMPLETED | OUTPATIENT
Start: 2021-08-05 | End: 2021-08-05

## 2021-08-05 RX ORDER — ALLOPURINOL 100 MG/1
100 TABLET ORAL DAILY
Status: ON HOLD | COMMUNITY
End: 2021-08-08

## 2021-08-05 RX ORDER — THEOPHYLLINE ANHYDROUS 80 MG/15ML
100 SOLUTION ORAL EVERY 8 HOURS
Status: DISCONTINUED | OUTPATIENT
Start: 2021-08-05 | End: 2021-08-08

## 2021-08-05 RX ADMIN — RANOLAZINE 500 MG: 500 TABLET, EXTENDED RELEASE ORAL at 08:39

## 2021-08-05 RX ADMIN — APIXABAN 5 MG: 5 TABLET, FILM COATED ORAL at 21:20

## 2021-08-05 RX ADMIN — BUMETANIDE 1 MG: 0.25 INJECTION INTRAMUSCULAR; INTRAVENOUS at 21:20

## 2021-08-05 RX ADMIN — PRAVASTATIN SODIUM 40 MG: 20 TABLET ORAL at 21:20

## 2021-08-05 RX ADMIN — THEOPHYLLINE ANHYDROUS 100 MG: 80 LIQUID ORAL at 14:52

## 2021-08-05 RX ADMIN — SODIUM CHLORIDE, PRESERVATIVE FREE 10 ML: 5 INJECTION INTRAVENOUS at 21:21

## 2021-08-05 RX ADMIN — APIXABAN 5 MG: 5 TABLET, FILM COATED ORAL at 08:39

## 2021-08-05 RX ADMIN — PANTOPRAZOLE SODIUM 40 MG: 40 TABLET, DELAYED RELEASE ORAL at 08:39

## 2021-08-05 RX ADMIN — THEOPHYLLINE ANHYDROUS 100 MG: 80 LIQUID ORAL at 21:22

## 2021-08-05 RX ADMIN — AZATHIOPRINE 50 MG: 50 TABLET ORAL at 21:20

## 2021-08-05 RX ADMIN — CLONIDINE HYDROCHLORIDE 0.1 MG: 0.1 TABLET ORAL at 02:28

## 2021-08-05 RX ADMIN — ACETAMINOPHEN 650 MG: 325 TABLET ORAL at 23:54

## 2021-08-05 RX ADMIN — LOSARTAN POTASSIUM 25 MG: 25 TABLET, FILM COATED ORAL at 08:39

## 2021-08-05 RX ADMIN — CLONIDINE HYDROCHLORIDE 0.1 MG: 0.1 TABLET ORAL at 01:29

## 2021-08-05 RX ADMIN — LOSARTAN POTASSIUM 50 MG: 50 TABLET, FILM COATED ORAL at 21:20

## 2021-08-05 RX ADMIN — PAROXETINE HYDROCHLORIDE 20 MG: 20 TABLET, FILM COATED ORAL at 08:39

## 2021-08-05 RX ADMIN — CLOPIDOGREL BISULFATE 75 MG: 75 TABLET ORAL at 08:39

## 2021-08-05 RX ADMIN — RANOLAZINE 500 MG: 500 TABLET, EXTENDED RELEASE ORAL at 21:20

## 2021-08-05 ASSESSMENT — PAIN DESCRIPTION - LOCATION
LOCATION: BACK;LEG;HIP
LOCATION: HIP;BACK;LEG
LOCATION: HIP;BACK;LEG

## 2021-08-05 ASSESSMENT — PAIN SCALES - GENERAL
PAINLEVEL_OUTOF10: 0
PAINLEVEL_OUTOF10: 3

## 2021-08-05 ASSESSMENT — PAIN DESCRIPTION - ORIENTATION
ORIENTATION: LOWER;LEFT
ORIENTATION: LOWER
ORIENTATION: LOWER

## 2021-08-05 ASSESSMENT — PAIN DESCRIPTION - PAIN TYPE
TYPE: CHRONIC PAIN

## 2021-08-05 ASSESSMENT — PAIN - FUNCTIONAL ASSESSMENT: PAIN_FUNCTIONAL_ASSESSMENT: PREVENTS OR INTERFERES SOME ACTIVE ACTIVITIES AND ADLS

## 2021-08-05 ASSESSMENT — PAIN DESCRIPTION - ONSET: ONSET: ON-GOING

## 2021-08-05 ASSESSMENT — PAIN DESCRIPTION - DESCRIPTORS: DESCRIPTORS: CONSTANT

## 2021-08-05 ASSESSMENT — PAIN DESCRIPTION - PROGRESSION: CLINICAL_PROGRESSION: NOT CHANGED

## 2021-08-05 ASSESSMENT — PAIN DESCRIPTION - FREQUENCY: FREQUENCY: CONTINUOUS

## 2021-08-05 NOTE — CARE COORDINATION
Case Management Initial Discharge Plan  Jessie Frederick,             Met with:patient to discuss discharge plans. Information verified: address, contacts, phone number, , insurance Yes  Insurance Provider: Medicare, [employer]    Emergency Contact/Next of Kin name & number: Jeanie Lizarraga [phone]  Who are involved in patient's support system? Patient states no one can help    PCP: Martín Dick MD  Date of last visit: past month      Discharge Planning    Living Arrangements:  Spouse/Significant Other     Home has 1 stories - apartment  0 stairs to climb to get into front door  Location of bedroom/bathroom in home 1    Patient able to perform ADL's:Independent    Current Services (outpatient & in home) none  DME equipment: none  DME provider:     Is patient receiving oral anticoagulation therapy? No    Potential Assistance Needed:  N/A    Patient agreeable to home care: No  [place] of choice provided:  no    Prior SNF/Rehab Placement and Facility: no  Agreeable to SNF/Rehab: No  [place] of choice provided: no     Evaluation: no    Expected Discharge date:  21    Patient expects to be discharged to: If home: is the family and/or caregiver wiling & able to provide support at home? no  Who will be providing this support? Follow Up Appointment: Best Day/ Time: Monday AM    Transportation provider: self - drives  Transportation arrangements needed for discharge: No    Readmission Risk              Risk of Unplanned Readmission:  21             Does patient have a readmission risk score greater than 14?: No  If yes, follow-up appointment must be made within 7 days of discharge.      Goals of Care: stay safe      Educated patient on transitional options, provided freedom of choice and are agreeable with plan      Discharge Plan: home - declines home care or skilled           Electronically signed by Nelwyn Harada, RN on 21 at 9:07 AM EDT

## 2021-08-05 NOTE — CONSULTS
69 yo lady with syncope. She presents to ER at [facility] with syncope along with generalized weakness. She reports that she returned home from gas station feeling hot the next thing she is awakening up after episode of unresponsiveness at kitchen table  . She was found to be bradycardic in 40's . She does have history of chronic atrial fibrillation on eliquis 5 mg po bid along with coronary stenting on plavix 75 mg po qd along with bradycardia having refused cardiac pacemaker placement in the past .There is orthostasis blood pressure supine 188/81 , sitting 158/56 , standing 175/60  She has multiple sclerosis followed by [facility] on imuran 50 mg po qd ambulating with walker with  baseline generalized weakness more prominent in lower extremities reporting this to be greater since January with more frequent falling that can be as frequent as once per week . She does have fibromyalgia with generalized body pain along with intermittent urinary incontinence . Head CT with bilateral periventricular attenuation with old right external capsule lacune . CT cervical spine form past month with mild degenerative changes . Significant medications imuran 50 mg po qd, eliquis 5 mg po bid  plavix 75 mg po qd , pravachol 40 mg po qd  . Testing Head CT with bilateral periventricular attenuation with old right external capsule lacune . CT cervical spine form past month with mild degenerative changes       Past Medical History:   Diagnosis Date    Abnormal Pap smear 10/2010    paucity or absence of TZx 3 years in a row.   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[facility]     Multiple sclerosis ([facility] [address] [facility]     progressive    Myoclonic seizures ([facility] [address] [facility]     Optic neuritis     Mazin Anirudh Pupil    Osteopenia     Pyloric stenosis     Raynaud's disease     Vasomotor rhinitis        Past Surgical History:   Procedure Laterality Date    APPENDECTOMY  1955    sponges left in abdomen, at [address] [place] [address] Way    stereotactic type, right breast, benign    CARDIAC CATHETERIZATION  07/31/2020    POBA lad    CARDIAC SURGERY      C[facility]IOVERSION  08/14/2020    [facility]     CARDIOVERSION  07/31/2020    COLONOSCOPY  2010    Polyp    CORONARY ANGIOPLASTY WITH STENT PLACEMENT  5/2011    failed attempt to place stent    CORONARY ANGIOPLASTY WITH STENT PLACEMENT  6/2011    successful placement of 2 stent in same artery   [address]    bleeding ulcers found   [address] [address]    surgical sponges left in during appendectomy    HAMMER TOE SURGERY  1989    head resection of phalanx, left foot    LAPAROSCOPY  1983    with D&C    MS COLONOSCOPY W/BIOPSY SINGLE/MULTIPLE N/A 12/19/2017    COLONOSCOPY WITH BIOPSY performed by Krystina Ochoa MD at [facility]    MS EGD TRANSORAL BIOPSY SINGLE/MULTIPLE  12/19/2017    EGD BIOPSY performed by Krystina Ochoa MD at [facility] resection with rt and left ethmoidectomy    SHOULDER ARTHROSCOPY  1998    rotator cuff impingement, right arm    SINUS ENDOSCOPY      SINUS SURGERY      multiple    UPPER GASTROINTESTINAL ENDOSCOPY      2-4 times per year for pyloric stenosis    UPPER GASTROINTESTINAL ENDOSCOPY  12/19/2017    EGD DILATION BALLOON performed by Krystina Ochoa MD at [address] [facility] 5/24/2018    EGD DILATION SAVORY performed by Giles Abel MD at [facility]       Family History   Problem Relation Age of Onset    Other Father         Heart problems    Heart Disease Father     Other Mother         heart problems requiring open heart surgery, HTN    Heart Disease Mother     Other Sister         HTN    Hypertension Sister     Other Maternal Aunt         breast cancer    Breast Cancer Maternal Aunt        Social History     Socioeconomic History    Marital status: Single     Spouse name: None    Number of children: None    Years of education: None    Highest education level: None   Occupational History    Occupation: retired   Tobacco Use    Smoking status: Never Smoker    Smokeless tobacco: Never Used   Vaping Use    Vaping Use: Never used   Substance and Sexual Activity    Alcohol use: No    Drug use: No    Sexual activity: Never   Other Topics Concern    None   Social History Narrative    [facility] for [facility] care with Dr. De La Cruz Greek Determinants of Health     Financial Resource Strain:     Difficulty of Paying Living Expenses:    Food Insecurity:     Worried About [address] in the Last Year:     [address] [facility] in the Last Year:    Transportation Needs:     Lack of Transportation (Medical):      Lack of Transportation (Non-Medical):    Physical Activity:     Days of Exercise per Week:     Minutes of Exercise per Session:    Stress:     Feeling of Stress :    Social Connections:     Frequency of Communication with Friends and Family:     Frequency of Social Gatherings with Friends and Family:     Attends [employer] Services:     Active Member of Clubs or Organizations:     Attends Club or Organization Meetings:     Marital Status:    Intimate Partner Violence:     Fear of Current or Ex-Partner:     Emotionally Abused:     Physically Abused:     Sexually Abused:        Current Facility-Administered Medications Medication Dose Route Frequency Provider Last Rate Last Admin    theophylline 80 MG/15ML oral solution 100 mg  100 mg Oral Q8H Johny MorrisonNENA - CNP   100 mg at 08/05/21 1452    0.9 % sodium chloride bolus  500 mL Intravenous Once Santi Apple MD   Stopped at 08/04/21 2258    sodium chloride flush 0.9 % injection 10 mL  10 mL Intravenous PRN Santi Apple MD   10 mL at 08/04/21 1820    allopurinol (ZYLOPRIM) tablet 100 mg  100 mg Oral Daily Deng Jimmy, APRN - CNP        apixaban (ELIQUIS) tablet 5 mg  5 mg Oral BID Deng Marquez, APRN - CNP   5 mg at 08/05/21 3338    azaTHIOprine (IMURAN) tablet 50 mg  50 mg Oral Daily Deng Marquez, APRN - CNP        clopidogrel (PLAVIX) tablet 75 mg  75 mg Oral Daily Deng Marquez APRN - CNP   75 mg at 08/05/21 0839    [Held by provider] bumetanide (BUMEX) tablet 1 mg  1 mg Oral Daily Deng Jimmy APRN - CNP        losartan (COZAAR) tablet 25 mg  25 mg Oral Daily Newton Canlauren, APRN - NP   25 mg at 08/05/21 0839    pantoprazole (PROTONIX) tablet 40 mg  40 mg Oral Daily Deng Marquez APRN - CNP   40 mg at 08/05/21 4733    PARoxetine (PAXIL) tablet 20 mg  20 mg Oral QAM Deng Marquez APRN - CNP   20 mg at 08/05/21 0291    [Held by provider] potassium chloride (KLOR-CON M) extended release tablet 20 mEq  20 mEq Oral Daily Dengtash Marquez APRN - CNP   20 mEq at 08/04/21 2156    pravastatin (PRAVACHOL) tablet 40 mg  40 mg Oral Daily Deng Jimmy, APRN - CNP        ranolazine [facility]) extended release tablet 500 mg  500 mg Oral BID Deng Marquez APRN - CNP   500 mg at 08/05/21 0839    0.9 % sodium chloride infusion   Intravenous Continuous NENA Astorga - CNP 75 mL/hr at 08/04/21 2156 New Bag at 08/04/21 2156    sodium chloride flush 0.9 % injection 5-40 mL  5-40 mL Intravenous 2 times per day NENA Astorga - OUMAR   10 mL at 08/04/21 2156    sodium chloride flush 0.9 % injection 10 mL  10 mL Intravenous PRN Kassidy Ro, APRN - CNP        0.9 % sodium chloride infusion  25 mL Intravenous PRN Kassidy Ro, APRN - CNP        potassium chloride (KLOR-CON M) extended release tablet 40 mEq  40 mEq Oral PRN Kassidy Ro, APRN - CNP        Or    potassium bicarb-citric acid (EFFER-K) effervescent tablet 40 mEq  40 mEq Oral PRN Kassidy Ro, APRN - CNP        Or    potassium chloride 10 mEq/100 mL IVPB (Peripheral Line)  10 mEq Intravenous PRN Kassidy Ro, APRN - CNP        magnesium sulfate 1000 mg in dextrose 5% 100 mL IVPB  1,000 mg Intravenous PRN Kassidy Ro, APRN - CNP        ondansetron (ZOFRAN-ODT) disintegrating tablet 4 mg  4 mg Oral Q8H PRN Kassidy Ro, APRN - CNP        Or    ondansetron [facility]) injection 4 mg  4 mg Intravenous Q6H PRN Kassidy Ro, APRN - CNP        acetaminophen (TYLENOL) tablet 650 mg  650 mg Oral Q6H PRN Kassidy Ro, APRN - CNP        Or    acetaminophen (TYLENOL) suppository 650 mg  650 mg Rectal Q6H PRN Kassidy Ro, APRN - CNP        senna (SENOKOT) tablet 8.6 mg  1 tablet Oral Daily PRN Kassidy Ro, APRN - CNP           Allergies   Allergen Reactions    Lisinopril-Hydrochlorothiazide Anaphylaxis and Hives    Sulfa Antibiotics Anaphylaxis    Penicillins Hives    Polyethylene Glycol Hives    Actifed Cold-Allergy [Chlorpheniramine-Phenylephrine]     Altace [Ramipril]      Chronic cough    Cephalosporins Hives    Coreg [Carvedilol]      bloating    Dml Facial Moisturizer     Elavil [Amitriptyline Hcl]     Entex [Ami-Jose]     Ethylene Glycol     Fentanyl     Hizentra [Immune Globulin (Human)]     Hydralazine     Levofloxacin      ach tendon    Lisinopril-Hydrochlorothiazide     Montelukast Sodium      Heart effects    Morphine Itching    Nifedipine     Norvasc [Amlodipine Besylate]      Stomach pain    Other      IV IG     Phenobarbital Hives    Robaxin [Methocarbamol]     Sinequan [Doxepin Hcl]     Sudafed [Pseudoephedrine Hcl]     Sudafed [Pseudoephedrine Hcl]     Tranquil-[place]     Wellbutrin [Bupropion Hcl]      Hypotension    Actifed Cold-Allergy [Chlorpheniramine-Phenylephrine] Palpitations    Clindamycin/Lincomycin Nausea And Vomiting    Codeine Nausea And Vomiting    Metoprolol Nausea And Vomiting     Reports she is allergic to the preservative polyethylineglycol in this medicine, causing severe emesis and stomach pain     Metoprolol Succinate Nausea And Vomiting    Seldane [Terfenadine] Palpitations       ROS:   Constitutional                  Negative for fever and chills   HEENT                            Negative for ear discharge, ear pain, nosebleed  Eyes                                Negative for photophobia, pain and discharge  Respiratory                      Negative for hemoptysis and sputum  Cardiovascular                Negative for orthopnea, claudication and PND  Gastrointestinal               Negative for abdominal pain, diarrhea, blood in stool  Musculoskeletal               Positive for myalgia  Skin                                 Negative for rash or itching  Endo/heme/allergies       Negative for polydipsia, environmental allergy  Psychiatric                       Negative for suicidal ideation. Patient is not anxious    Vitals:    08/05/21 1549   BP: (!) 173/75   Pulse: (!) 44   Resp: 18   Temp: 97.3 °F (36.3 °C)   SpO2: 98%     Admission weight: 212 lb 11.9 oz (96.5 kg)    Neurological Examination  Constitutional .General exam well groomed   Head/ Ears /Nose/Throat/external ear . Normal exam  Neck and thyroid . Normal size. No bruits  Respiratory . Breathsounds clear bilaterally  Cardiovascular:  Auscultation of heart with regular rate and rhythm   Musculoskeletal. Muscle bulk and tone normal Muscle strength lower extremities iliopsoas and hamstrings bilaterally 4/5 with giveway . Upper extremities 5/5 strength                                                                                No dysmetria or dysdiadokinesis  No tremor   Normal fine motor  Orientation Alert and oriented x 3   Attention and concentration normal  Short term memory normal  Language process and speech normal . No aphasia   Cranial nerve 2 normal acuety and visual fields  Cranial nerve 3, 4 and 6 . Extraocular muscles are intact . Pupils are equal and reactive   Cranial nerve 5 . Intact corneal reflex. Normal facial sensation  Cranial nerve 7 normal exam   Cranial nerve 8. Grossly intact hearing   Cranial nerve 9 and 10. Symmetric palate elevation   Cranial nerve 11 , 5 out of 5 strength   Cranial Nerve 12 midline tongue . No atrophy  Sensation . Decreased pinprick and light touch distal to wrist and mid foot level biaterally   Deep Tendon Reflexes hypoactive   Plantar response equivocal bilaterally    Assessment :    Syncope . Multiple sclerosis . Frequent falling . Fibromyalgia     Plan:    MRI of Head . MRI cervical spine . Cardiac 2 D echo . PT/OT . Cardiology on case . PMR consultation . Will need rehabilitation .  K43 , folic acid  TSH , [id number]

## 2021-08-05 NOTE — PROGRESS NOTES
Pt no longer takes allopurinol. States \"doctor took me off of that. \" Will notify ordering provider.

## 2021-08-05 NOTE — CONSULTS
[facility]    Consult                        Today's Date: 8/5/2021  Patient Name: Munira Frederick  Date of admission: 8/4/2021  4:51 PM  Patient's age: 70 y. o., 1945  Admission Dx: Syncope and collapse [R55]  Bradycardia [R00.1]  Hypoxia [R09.02]    Reason for Consult:  Cardiac evaluation    Requesting Physician: Meryle Lees, DO    CHIEF COMPLAINT:  Bradycardia, syncope     History Obtained From:  patient, electronic medical record    HISTORY OF PRESENT ILLNESS:       62-year-old female patient presents via EMS for evaluation of generalized weakness and syncopal episode. She was bradycardic and hypoxic upon arrival.  She has maintained her blood pressure well during the ED stay. She will benefit from hospitalization. I discussed the case with Maddi Jernigan CNP covering for hospitalist group and she kindly accepted the patient to be admitted on stepdown unit. Admission condition is stable. Pt seen and examined in the room. Pt very drowsy in bed. She denies any CP. She states that she is very sleepy. She does mention that she has had multiple falls at home. She is using a wheeled walker. She has multiple allergies. BP running high, HR in 40's. Past Medical History:   has a past medical history of Abnormal Pap smear, Asthma, Atrial fibrillation ([address] Bloody discharge from nipple, CAD (coronary artery disease), Cataracts, bilateral, Colon polyp, CVA (cerebral vascular accident) ([address] Demyelinating disease ([address] Fibromyalgia, High-risk sexual behavior, History of bone density study, History of migraines, HTN (hypertension), IgG deficiency ([address] Multiple sclerosis ([address] Myoclonic seizures ([address] Optic neuritis, Osteopenia, Pyloric stenosis, Raynaud's disease, and Vasomotor rhinitis. Past Surgical History:   has a past surgical history that includes Dilation & curettage (1982); Breast biopsy (1990); Appendectomy (1955); Septoplasty (1985);  Hammer toe surgery (1989); Sinus endoscopy; sinus surgery; Shoulder arthroscopy (1998); Esophagoscopy (1966, 1969); Colonoscopy (2010); Foreign Body Removal (1962); laparoscopy (7498); Coronary angioplasty with stent (5/2011); Coronary angioplasty with stent (6/2011); Upper gastrointestinal endoscopy; eye surgery; pr colonoscopy w/biopsy single/multiple (N/A, 12/19/2017); pr egd transoral biopsy single/multiple (12/19/2017); Upper gastrointestinal endoscopy (12/19/2017); Upper gastrointestinal endoscopy (5/24/2018); Cardiac surgery; Cardiac catheterization (07/31/2020); Cardioversion (08/14/2020); and Cardioversion (07/31/2020). Home Medications:    Prior to Admission medications    Medication Sig Start Date End Date Taking?  Authorizing Provider   Cyclobenzaprine HCl (FLEXERIL PO) Take by mouth    Historical Provider, MD   metoprolol succinate (TOPROL XL) 25 MG extended release tablet Take 1 tablet by mouth daily  Patient taking differently: Take 50 mg by mouth 2 times daily  8/28/20   NENA Berkowitz CNP   bumetanide (BUMEX) 1 MG tablet Take 1 tablet by mouth daily Advised to take additional 1 pill for weight gain, increased CHF and/swelling 8/27/20 9/26/20  NENA Berkowitz CNP   losartan (COZAAR) 100 MG tablet Take 25 mg by mouth daily HOLD UNTIL SEEN BY CARDIOLOGY 8/27/20   NENA Berkowitz CNP   potassium chloride (KLOR-CON M) 20 MEQ extended release tablet Take 1 tablet by mouth daily 8/27/20   NENA Berkowitz CNP   isosorbide mononitrate (IMDUR) 120 MG extended release tablet Take 0.5 tablets by mouth daily  Patient taking differently: Take 120 mg by mouth daily  8/3/20 9/2/20  Tess Renteria MD   apixaban (ELIQUIS) 5 MG TABS tablet Take by mouth 2 times daily    Historical Provider, MD   acetaminophen (TYLENOL) 500 MG tablet Take 1,000 mg by mouth 2 times daily as needed for Pain    Historical Provider, MD   pantoprazole (PROTONIX) 40 MG tablet Take 20 mg by mouth 2 times daily     Historical Provider, MD   azaTHIOprine (IMURAN) 50 MG tablet Take 50 mg by mouth daily     Historical Provider, MD   ranolazine (RANEXA) 500 MG SR tablet Take 500 mg by mouth 2 times daily. Historical Provider, MD   nitroGLYCERIN (NITROSTAT) 0.4 MG SL tablet Place 0.4 mg under the tongue every 5 minutes. Indications: Chest Pain    Historical Provider, MD   PARoxetine (PAXIL) 20 MG tablet Take 20 mg by mouth every morning. Historical Provider, MD   clopidogrel (PLAVIX) 75 MG tablet Take 75 mg by mouth daily     Historical Provider, MD   pravastatin (PRAVACHOL) 40 MG tablet Take 40 mg by mouth daily. Historical Provider, MD       Allergies:  Lisinopril-hydrochlorothiazide, Sulfa antibiotics, Penicillins, Polyethylene glycol, Actifed cold-allergy [chlorpheniramine-phenylephrine], Altace [ramipril], Cephalosporins, Coreg [carvedilol], Dml facial moisturizer, Elavil [amitriptyline hcl], Entex [ami-margot], Ethylene glycol, Fentanyl, Hizentra [immune globulin (human)], Hydralazine, Levofloxacin, Lisinopril-hydrochlorothiazide, Montelukast sodium, Morphine, Nifedipine, Norvasc [amlodipine besylate], Other, Phenobarbital, Robaxin [methocarbamol], Sinequan [doxepin hcl], Sudafed [pseudoephedrine hcl], Sudafed [pseudoephedrine hcl], Tranquil-cecil, Wellbutrin [bupropion hcl], Actifed cold-allergy [chlorpheniramine-phenylephrine], Clindamycin/lincomycin, Codeine, Metoprolol, Metoprolol succinate, and Seldane [terfenadine]    Social History:   reports that she has never smoked. She has never used smokeless tobacco. She reports that she does not drink alcohol and does not use drugs. Family History: family history includes Breast Cancer in her maternal aunt; Heart Disease in her father and mother; Hypertension in her sister; Other in her father, maternal aunt, mother, and sister. No h/o sudden cardiac death. No for premature CAD    REVIEW OF SYSTEMS:    Constitutional: there has been no unanticipated weight loss.  There's been No change in energy level, No change in activity level. Eyes: No visual changes or diplopia. No scleral icterus. ENT: No Headaches, hearing loss or vertigo. No mouth sores or sore throat. Cardiovascular: see above  Respiratory: see above  Gastrointestinal: No abdominal pain, appetite loss, blood in stools. Genitourinary: No dysuria, trouble voiding, or hematuria. Musculoskeletal:  No gait disturbance, No weakness or joint complaints. Integumentary: No rash or pruritis. Neurological: No headache or diplopia. No tingling  Psychiatric: No anxiety, or depression. Endocrine: No temperature intolerance. Hematologic/Lymphatic: No abnormal bruising or bleeding, blood clots or swollen lymph nodes. Allergic/Immunologic: No nasal congestion or hives. PHYSICAL EXAM:      BP (!) 156/70   Pulse (!) 48   Temp 97.9 °F (36.6 °C) (Oral)   Resp 18   Wt 212 lb 11.9 oz (96.5 kg)   SpO2 100%   BMI 39.55 kg/m²    Constitutional and General Appearance: alert, cooperative, no distress and appears stated age  HEENT: PERRL, no cervical lymphadenopathy. No masses palpable. Normal oral mucosa  Respiratory:  Normal excursion and expansion without use of accessory muscles  Resp Auscultation: Good respiratory effort. No for increased work of breathing. On auscultation: clear to auscultation bilaterally  Cardiovascular:  Heart tones are crisp and normal. regular S1 and S2.  Jugular venous pulsation Normal  The carotid upstroke is normal in amplitude and contour without delay or bruit   Abdomen:   soft  Bowel sounds present  Extremities:   No edema  Neurological:  Alert and oriented. DATA:    Diagnostics:    EKG: SB        Echocardiogram 4/30/2019  Left ventricle is normal in size with normal systolic function. Calculated ejection fraction is 65%. Increased left ventricular wall thickness. Evidence of advance diastolic dysfunction. Left atrium is moderately dilated. Right atrial enlargement.   Increase in right ventricular free wall thickness. Mildly dilated left ventricle with mildly reduced function. Aortic leaflet calcification without stenosis. Mild thickening of the mitral leaflets without stenosis. Mild mitral regurgitation. Trace tricuspid regurgitation. Estimated right ventricular systolic pressure  is 34 mmH        Coronary Angiography 7/31/20:   LMCA: Mild irregularities 10-20%. LAD: Patent mid stent, distal 40% stenosis. Apical vessel had 80% stenosis, given symptoms and apical ischemia on stress   test, balloon angioplasty was performed with reduction of stenosis to 20%. LCx: Mild irregularities 10-20%. Large and dominant with mild disease   OM1 and OM2 are large, patent with mild disease   RCA: Small and non-dominant, patent, mild disease     CARDIOVERSION:  After an adequate level of sedation was achieved, 150J in biphasic synchronized delivery was administered. conversion to normal sinus rhythm. The patient awoke without complications. The patient will continue with the discharge m. There were no complications     Medical Hx     1. Hypertension   2. Coronary  artery disease status post PCI to LAD   3. Multiple sclerosis   4. Gastroparesis  5. Echocardiogram 4/30/2019: Left ventricle is normal in size with normal systolic function. Calculated ejection fraction is 65%. Evidence of advance diastolic dysfunction. Left atrium is moderately dilated. Right atrial enlargement. Increase in right ventricular free wall thickness. Mildly dilated left ventricle with mildly reduced function. Aortic leaflet calcification without stenosis. Mild thickening of the mitral leaflets without stenosis. Mild mitral regurgitation. 6. Nuclear stress test on July 28, 2020 showed reversible perfusion defect in anteroapical and lateral apical wall  7.  PAF and atrial flutter, with admission to [facility] in July 2020 with new onset atrial flutter status post JOVITA cardioversion on July 31, 2020; amiodarone restarted; pt is on Eliquis. 8. Coronary angiography on July 31, 2020 showed patent LAD stent and high-grade stenosis in the apical LAD status post POBA/ PTCA with balloon angioplasty only. Other vessels had mild non-obstructive disease. 9. Repeat cardioversion Aug 2020, with subsequent recurrent atrial flutter 9/28/20   10. Referred to [facility] to consider AFib ablation, had cardiac arrest after 2nd dose of Tikosyn, currently managed with rate control strategy. was deemed not a suitable candidate for ablation due to comorbidities. Plan and Assessment per Dr. Lucina Bartholomew last Progress note 6/2021  1. Chronic/permanent AF with suboptimal rate control, on lower dose metoprolol, with persistent fatigue and mild HERNANDEZ  2. 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 Stable CAD, s/p distal LAD angioplasty 7/31/20  4. Essential HTN with preserved LVEF  5. Chronic anticoagulation with Eliquis  6. Cardiac arrest ([place], [place]) at [facility] where she was admitted for Tikosyn therapy. 7. Complicated multiple sclerosis  8. Epigastric discomfort, scheduled for EGD at [facility]    1. Continue Toprol XL 50 mg b.i.d. Discontinue losartan and start cardizem 120 mg qd for better rate control. 2. Not on any antiarrhythmics at this time due to recent cardiac arrest likely [place], records from [facility] are awaited  3. She does not wish to consider permanent pacing with increase of amiodarone/metoprolol doses. 4. Given her underlying comorbidities, she is intermediate to high risk although not-prohibitive for non-cardiac sx under [place]. This was discussed with her and she understands the risks and benefits. 5. Will continue Plavix and Eliquis.        Labs:     CBC:   Recent Labs     08/04/21  1714 08/05/21  0518   WBC 7.4 8.2   HGB 11.1* 11.0*   HCT 34.7* 34.1*    184     BMP:   Recent Labs     08/04/21  1714 08/05/21  0518    141   K 3.4* 3.7   CO2 23 24   BUN 22 21   CREATININE 0.99* 0.99* LABGLOM 55* 55*   GLUCOSE 113* 98     BNP: No results for input(s): BNP in the last 72 hours. PT/INR: No results for input(s): PROTIME, INR in the last 72 hours. APTT:No results for input(s): APTT in the last 72 hours. CARDIAC ENZYMES:No results for input(s): CKTOTAL, CKMB, CKMBINDEX, TROPONINI in the last 72 hours.   FASTING LIPID PANEL:  Lab Results   Component Value Date    HDL 52 08/26/2020    TRIG 78 08/26/2020     LIVER PROFILE:  Recent Labs     08/04/21  1714   AST 12   ALT 9   LABALBU 4.1         Patient Active Problem List   Diagnosis    Bloody discharge from nipple    Osteopenia    Multiple sclerosis (HCC)    Asthma    SOB (shortness of breath)    Lower leg pain    Noncompliance with medications    Dyslipidemia    GERD (gastroesophageal reflux disease)    Fibromyalgia    Pyloric stenosis    Demyelinating disease (HCC)    H/O: CVA (cerebrovascular accident)    Epigastric abdominal pain    Incontinence in female    Chest pain with high risk for cardiac etiology    Migraine without status migrainosus, not intractable    Hypokalemia    Acute on chronic diastolic congestive heart failure (HCC)    Acute diastolic HF (heart failure) (HCC)    Acute on chronic combined systolic and diastolic CHF (congestive heart failure) (HCC)    CAD (coronary artery disease)    Atrial flutter (HCC)    Chronic bronchiolitis (HCC)    Paroxysmal A-fib (HCC)    Sinus bradycardia    HTN (hypertension)    Chronic renal insufficiency, stage III (moderate) (HCC)    Mild cognitive disorder    Raynaud's disease    Syncope and collapse     [id number] Score for Atrial Fibrillation Stroke Risk   Risk   Factors  Component Value   C CHF Yes 1   H HTN Yes 1   A2 Age >= 75 No,  (80 y.o.) 0   D DM No 0   S2 Prior Stroke/TIA Yes 2   V Vascular Disease No 0   A Age 74-69 Yes,  (80 y.o.) 1   Sc Sex female 1    [id number]  Score  6   Score last updated 8/5/21 2:83 AM EDT    Click here for a link to the UpToDate guideline \"Atrial Fibrillation: Anticoagulation therapy to prevent embolization    Disclaimer: Risk Score calculation is dependent on accuracy of patient problem list and past encounter diagnosis. IMPRESSION & RECOMMENDATIONS:  Bradycardia- 40's Currently. No hydralazine due to allergy. Pt has declined PPM in the past.  Will add theophylline oral.  Will check for chronotropic competency while up. Hx of Afib. Continue eliquis. Multiple falls at home. Briefly discussed safety with falls and eliquis. Pt very drowsy this am.  Will re-address tomorrow . No BB due to allergies   Syncope- Positive orthostatics this am.  Drop from 943 to 025 systolic. Will order compression stockings. Recheck in PM.     Keep K > 4 and Mag > 2   CAD. Stable. [place] flat, likely type II MI. Continue  ARB, statin, ranexa, plavix. Will await ECHO results. Discussed with patient and Nurse. Brandon Avila CNP    Attending Cardiologist Addendum: I have reviewed and performed the history, physical, subjective, objective, assessment, and plan with the resident/fellow/NP and agree with the note. I performed the history and physical personally. I have made changes to the note above as needed. Thank you for allowing me to participate in the care of this patient, please do not hesitate to call if you have any questions. Maynor Spivey, [phone] [facility]  [facility]  [phone]

## 2021-08-05 NOTE — H&P
[facility]  Office: [address], DO, Eliot Sktabs, DO, Marquis Harvinder, DO, Nani Vega Blood, DO, Guera Erickson MD, Evangelina Mcfarland MD, Yoseph Hernandez MD, Ale Pierce MD, Gilmar Davies MD, Chuyita Macario MD, Ankit Longoria MD, Ramirez Ha, DO, Sloan Covington MD, Adolfo Iqbal DO, Gabby Marmolejo MD,  Lena Cortes, DO, Lola Jimenez MD, Franck You MD, Elda Tovar MD, Nadya Zhao MD, Thom Dugan MD, Rachel Wheeler MD, Les Jasmine MD, Rene Germain, [facility], [facility], CNP, Erna Jay, CNP, Gabby Sheridan, CNS, Quang High, CNP, Cassandra Calderon, CNP, Vilma Solano, CNP, Gloria Francisco, CNP, Kimberlyn Denis, CNP, Marcial Andrade PAMICHELE, Sanaz Dill, [facility], Armando Church, CNP, Jerrell Julien, CNP, Jeffery Frye, CNP, Piedad Sexton, CNP, Severiano Spalding, CNP, Sarah Lema, CNP, Jass Toure, CNP, Ed Cristine [id number]    HISTORY AND PHYSICAL EXAMINATION            Date:   8/5/2021  Patient name:  Antony Mcgrath  Date of admission:  8/4/2021  4:51 PM  MRN:   [id number]  Account:  [de-identified]  YOB: 1945  PCP:    Javi Gonzalez MD  Room:   428/602-17  Code Status:    Full Code    Chief Complaint:     Chief Complaint   Patient presents with    Bradycardia     History Obtained From:     patient, electronic medical record    History of Present Illness:     Antony Mcgrath is a 70 y.o. female who presented to [facility] ED for evaluation after a fall and LOC. This fall is happened at home and has been happening more frequently. Patient reports that she has been weak for quite some time now. She does admit to loss of consciousness. She endorses a history of multiple sclerosis,for which she follows with a clinician out of the [facility]. She has been maintained on Imuran for many years, as she states that this is the only thing that works.   She has multiple allergies as noted below. She also notes that she has chronic atrial fibrillation, hypogammaglobinemia, multiple prior cardiac arrest, congestive heart failure, and history of gout. In the emergency department, she was noted to be bradycardic and hypotensive. Orthostatics are noted to be positive. Labs in the ED with a BNP and troponin mildly elevated from baseline. Creatinine WNL. Bilirubin elevated from draw 2 months prior. Normocytic, normochromic anemia present as well. UA unremarkable. CT of the chest was performed to r/o pulmonary embolism which revealed only a right-sided pleural effusion. She will be admitted for further work-up evaluation of bradycardia, syncope, falls. Past Medical History:     Past Medical History:   Diagnosis Date    Abnormal Pap smear 10/2010    paucity or absence of TZx 3 years in a row. 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HCC)     Multiple sclerosis ([address]     progressive    Myoclonic seizures ([address]     Optic neuritis     Mazin Anirudh Pupil    Osteopenia     Pyloric stenosis     Raynaud's disease     Vasomotor rhinitis         Past Surgical History:     Past Surgical History:   Procedure Laterality Date    APPENDECTOMY  1955    sponges left in abdomen, at [address] [place] [address] Way    stereotactic type, right breast, benign    CARDIAC CATHETERIZATION  07/31/2020    POBA lad    CARDIAC SURGERY      CARDIOVERSION  08/14/2020    [facility]     CARDIOVERSION  07/31/2020    COLONOSCOPY  2010    Polyp    CORONARY ANGIOPLASTY WITH STENT PLACEMENT  5/2011    failed attempt to place stent    CORONARY ANGIOPLASTY WITH STENT PLACEMENT  6/2011    successful placement of 2 stent in same artery   [address]    bleeding ulcers found   [address]    surgical sponges left in during appendectomy    HAMMER TOE SURGERY  1989    head resection of phalanx, left foot    LAPAROSCOPY  1983    with D&C    [place] COLONOSCOPY W/BIOPSY SINGLE/MULTIPLE N/A 12/19/2017    COLONOSCOPY WITH BIOPSY performed by Anita Becerra MD at [facility]    [place] EGD TRANSORAL BIOPSY SINGLE/MULTIPLE  12/19/2017    EGD BIOPSY performed by Anita Becerra MD at [address] [place] [address] resection with rt and left ethmoidectomy    SHOULDER ARTHROSCOPY  1998    rotator cuff impingement, right arm    SINUS ENDOSCOPY      SINUS SURGERY      multiple    UPPER GASTROINTESTINAL ENDOSCOPY      2-4 times per year for pyloric stenosis    UPPER GASTROINTESTINAL ENDOSCOPY  12/19/2017    EGD DILATION BALLOON performed by Anita Becerra MD at [facility]    UPPER GASTROINTESTINAL ENDOSCOPY  5/24/2018    EGD DILATION SAVORY performed by Syeda Baker MD at [facility]        Medications Prior to Admission:     Prior to Admission medications    Medication Sig Start Date End Date Taking?  Authorizing Provider   Cyclobenzaprine HCl (FLEXERIL PO) Take by mouth    Historical Provider, MD   metoprolol succinate (TOPROL XL) 25 MG extended release tablet Take 1 tablet by mouth daily  Patient taking differently: Take 50 mg by mouth 2 times daily  8/28/20   NENA Ortega - CNP   bumetanide (BUMEX) 1 MG tablet Take 1 tablet by mouth daily Advised to take additional 1 pill for weight gain, increased CHF and/swelling 8/27/20 9/26/20  NENA Ortega - OUMAR   losartan (COZAAR) 100 MG tablet Take 25 mg by mouth daily HOLD UNTIL SEEN BY CARDIOLOGY 8/27/20   NENA Holland CNP   potassium chloride (KLOR-CON M) 20 MEQ extended release tablet Take 1 tablet by mouth daily 8/27/20   NENA Holland CNP   isosorbide mononitrate (IMDUR) 120 MG extended release tablet Take 0.5 tablets by mouth daily  Patient taking differently: Take 120 mg by mouth daily  8/3/20 9/2/20  Berhane Brown MD   apixaban (ELIQUIS) 5 MG TABS tablet Take by mouth 2 times daily    Historical Provider, MD   acetaminophen (TYLENOL) 500 MG tablet Take 1,000 mg by mouth 2 times daily as needed for Pain    Historical Provider, MD   pantoprazole (PROTONIX) 40 MG tablet Take 20 mg by mouth 2 times daily     Historical Provider, MD   azaTHIOprine (IMURAN) 50 MG tablet Take 50 mg by mouth daily     Historical Provider, MD   ranolazine (RANEXA) 500 MG SR tablet Take 500 mg by mouth 2 times daily. Historical Provider, MD   nitroGLYCERIN (NITROSTAT) 0.4 MG SL tablet Place 0.4 mg under the tongue every 5 minutes. Indications: Chest Pain    Historical Provider, MD   PARoxetine (PAXIL) 20 MG tablet Take 20 mg by mouth every morning. Historical Provider, MD   clopidogrel (PLAVIX) 75 MG tablet Take 75 mg by mouth daily     Historical Provider, MD   pravastatin (PRAVACHOL) 40 MG tablet Take 40 mg by mouth daily.       Historical Provider, MD        Allergies:     Lisinopril-hydrochlorothiazide, Sulfa antibiotics, Penicillins, Polyethylene glycol, Actifed cold-allergy [chlorpheniramine-phenylephrine], Altace [ramipril], Cephalosporins, Coreg [carvedilol], Dml facial moisturizer, Elavil [amitriptyline hcl], Entex [ami-margot], Ethylene glycol, Fentanyl, Hizentra [immune globulin (human)], Hydralazine, Levofloxacin, Lisinopril-hydrochlorothiazide, Montelukast sodium, Morphine, Nifedipine, Norvasc [amlodipine besylate], Other, Phenobarbital, Robaxin [methocarbamol], Sinequan [doxepin hcl], Sudafed [pseudoephedrine hcl], Sudafed [pseudoephedrine hcl], Tranquil-cecil, Wellbutrin [bupropion hcl], Actifed cold-allergy [chlorpheniramine-phenylephrine], Clindamycin/lincomycin, Codeine, Metoprolol, Metoprolol succinate, and Seldane [terfenadine]    Social History:     Tobacco:    reports that she has never smoked. She has never used smokeless tobacco.  Alcohol:      reports no history of alcohol use. Drug Use:  reports no history of drug use. Family History:     Family History   Problem Relation Age of Onset    Other Father         Heart problems    Heart Disease Father     Other Mother         heart problems requiring open heart surgery, HTN    Heart Disease Mother     Other Sister         HTN    Hypertension Sister     Other Maternal Aunt         breast cancer    Breast Cancer Maternal Aunt        Review of Systems:     Positive and Negative as described in HPI.     CONSTITUTIONAL: Reports generalized fatigue and weakness; negative for fevers, chills, sweats, fatigue, weight loss  HEENT:  negative for vision, hearing changes, runny nose, throat pain  RESPIRATORY:  negative for shortness of breath, cough, congestion, wheezing  CARDIOVASCULAR:  negative for chest pain, palpitations  GASTROINTESTINAL:  negative for nausea, vomiting, diarrhea, constipation, change in bowel habits, abdominal pain   GENITOURINARY:  negative for difficulty of urination, burning with urination, frequency   INTEGUMENT:  negative for rash, skin lesions, easy bruising   HEMATOLOGIC/LYMPHATIC:  negative for swelling/edema   ALLERGIC/IMMUNOLOGIC:  negative for urticaria , itching  ENDOCRINE:  negative increase in drinking, increase in urination, hot or cold intolerance  MUSCULOSKELETAL:  negative joint pains, muscle aches, swelling of joints  NEUROLOGICAL: Reports worsening weakness; negative for headaches, numbness, pain, tingling extremities  BEHAVIOR/PSYCH:  negative for depression, anxiety    Physical Exam:   BP (!) 173/75 Pulse (!) 44   Temp 97.3 °F (36.3 °C) (Oral)   Resp 18   Wt 212 lb 11.9 oz (96.5 kg)   SpO2 98%   BMI 39.55 kg/m²   Temp (24hrs), Av.7 °F (36.5 °C), Min:97.3 °F (36.3 °C), Max:97.9 °F (36.6 °C)    No results for input(s): POCGLU in the last 72 hours. Intake/Output Summary (Last 24 hours) at 2021 1933  Last data filed at 2021 1825  Gross per 24 hour   Intake 1512.82 ml   Output 250 ml   Net 1262.82 ml       General Appearance: alert, well appearing, and in no acute distress, obese female sitting up in chair at bedside.   Mental status: oriented to person, place, and time  Head: normocephalic, atraumatic  Eye: no icterus, redness, pupils equal and reactive, extraocular eye movements intact, conjunctiva clear  Ear: normal external ear, no discharge, hearing intact  Nose: no drainage noted  Mouth: mucous membranes moist  Neck: supple, no carotid bruits, thyroid not palpable  Lungs: Bilateral equal air entry, clear to ausculation, no wheezing, rales or rhonchi, normal effort  Cardiovascular: Bradycardic, irregular rhythm no murmur, gallop, rub  Abdomen: Soft, nontender, nondistended, normal bowel sounds, no hepatomegaly or splenomegaly  Neurologic: There are no new focal motor or sensory deficits, normal muscle tone and bulk, no abnormal sensation, normal speech, cranial nerves II through XII grossly intact  Skin: No gross lesions, rashes, bruising or bleeding on exposed skin area  Extremities: peripheral pulses palpable, no pedal edema or calf pain with palpation  Psych: normal affect    Investigations:      Laboratory Testing:  Recent Results (from the past 24 hour(s))   Troponin    Collection Time: 21  9:03 PM   Result Value Ref Range    Troponin, High Sensitivity 35 (H) 0 - 14 ng/L    Troponin T NOT REPORTED <0.03 ng/mL    Troponin Interp NOT REPORTED    Brain natriuretic peptide    Collection Time: 21  9:03 PM   Result Value Ref Range    Pro-BNP 9,700 (H) <300 pg/mL    BNP Interpretation Pro-BNP Reference Range:    Troponin    Collection Time: 08/05/21  1:10 AM   Result Value Ref Range    Troponin, High Sensitivity 36 (H) 0 - 14 ng/L    Troponin T NOT REPORTED <0.03 ng/mL    Troponin Interp NOT REPORTED    Basic Metabolic Panel w/ Reflex to MG    Collection Time: 08/05/21  5:18 AM   Result Value Ref Range    Glucose 98 70 - 99 mg/dL    BUN 21 8 - 23 mg/dL    CREATININE 0.99 (H) 0.50 - 0.90 mg/dL    Bun/Cre Ratio NOT REPORTED 9 - 20    Calcium 9.1 8.6 - 10.4 mg/dL    Sodium 141 135 - 144 mmol/L    Potassium 3.7 3.7 - 5.3 mmol/L    Chloride 107 98 - 107 mmol/L    CO2 24 20 - 31 mmol/L    Anion Gap 10 9 - 17 mmol/L    GFR Non-African American 55 (L) >60 mL/min    GFR African American >60 >60 mL/min    GFR Comment          GFR Staging NOT REPORTED    Magnesium    Collection Time: 08/05/21  5:18 AM   Result Value Ref Range    Magnesium 1.9 1.6 - 2.6 mg/dL   CBC    Collection Time: 08/05/21  5:18 AM   Result Value Ref Range    WBC 8.2 3.5 - 11.0 k/uL    RBC 3.82 (L) 4.0 - 5.2 m/uL    Hemoglobin 11.0 (L) 12.0 - 16.0 g/dL    Hematocrit 34.1 (L) 36 - 46 %    MCV 89.4 80 - 100 fL    MCH 28.7 26 - 34 pg    MCHC 32.2 31 - 37 g/dL    RDW 18.3 (H) 12.5 - 15.4 %    Platelets 717 856 - 429 k/uL    MPV 9.8 6.0 - 12.0 fL    NRBC Automated NOT REPORTED per 100 WBC   TROP/MYOGLOBIN    Collection Time: 08/05/21  8:30 AM   Result Value Ref Range    Troponin, High Sensitivity 35 (H) 0 - 14 ng/L    Troponin T NOT REPORTED <0.03 ng/mL    Troponin Interp NOT REPORTED     Myoglobin 42 25 - 58 ng/mL   TROP/MYOGLOBIN    Collection Time: 08/05/21  2:33 PM   Result Value Ref Range    Troponin, High Sensitivity 31 (H) 0 - 14 ng/L    Troponin T NOT REPORTED <0.03 ng/mL    Troponin Interp NOT REPORTED     Myoglobin 43 25 - 58 ng/mL       Imaging/Diagnostics:  CT HEAD WO CONTRAST    Result Date: 8/4/2021  1. No acute intracranial abnormality.  2. Stable remote lacunar stroke right external capsule and lateral left basal ganglia. 3.  White matter hypoattenuation described is typical of microvascular ischemic disease or as sequela of dysmyelinating/demyelinating processes. 4. Stable senescent changes. CT CHEST PULMONARY EMBOLISM W CONTRAST    Result Date: 8/4/2021  No evidence of pulmonary embolism. No acute infiltrate. Right pleural effusion. Assessment :      Hospital Problems         Last Modified POA    * (Principal) Syncope and collapse 8/5/2021 Yes    Bradycardia 8/5/2021 Yes    Frequent falls 8/5/2021 Yes    Multiple sclerosis ([address] (Chronic) 8/5/2021 Yes    Fibromyalgia 8/5/2021 Yes    Chronic diastolic heart failure ([address] 8/5/2021 Yes    Physical debility 8/5/2021 Yes    Essential hypertension (Chronic) 8/5/2021 Yes    Paroxysmal A-fib ([address] (Chronic) 8/5/2021 Yes          Plan:     Patient status inpatient in the Progressive Unit/Step down    1. Admit to [facility]  2. Syncope - monitor telemetry, check daily orthostatics, avoid AV-qamar blockers  3. Bradycardia - workup ongoing. Hold AV qamar blockers (beta blockers, non-dihydropyridine CCBs, catapres). Cardiology following. May need PPM. She did receive multiple doses of catapres last night. 4. Orthostatic hypotension - agree with toya torres. Do not liberalize her salt intake, as patient has history of CHF. Educate patient on rising slowly from seated position  5. Heart failure with preserved ejection fraction, elevated BNP, concern for volume overload - HL IVF, give 1x dose of IV bumex this evening. Check 2D echo. 6. MS - neuro consult. May need MRI while inpatient to evaluate for worsening disease  7. Paroxysmal atrial fibrillation-continue Eliquis. Holding AV qamar blocking agents at this time. 8. Continue PT/OT  9. Social work assistance regarding \"hoarding\" and unsafe living conditions  10. Med rec ongoing by myself. Will reconcile meds this evening.     Consultations:   IP CONSULT TO SOCIAL WORK  IP CONSULT TO CARDIOLOGY  IP CONSULT TO NEUROLOGY  IP CONSULT TO PHYSICAL MEDICINE REHAB    Patient is admitted as inpatient status because of co-morbidities listed above, severity of signs and symptoms as outlined, requirement for current medical therapies and most importantly because of direct risk to patient if care not provided in a hospital setting. Expected length of stay > 48 hours.     Jeramie Bustamante DO  8/5/2021  7:33 PM    Copy sent to Dr. Nabor Atkins MD

## 2021-08-05 NOTE — PROGRESS NOTES
Physical Therapy    Facility/Department: [facility] MED SURG ICU  Initial Assessment    NAME: Jose L Posada  : 1945  MRN: [id number]    Date of Service: 2021     Chief Complaint   Patient presents with    Bradycardia       Discharge Recommendations:  Patient would benefit from continued therapy after discharge   PT Equipment Recommendations  Equipment Needed: No  Other: Pt states she has a rollator at home and it is recommended that she uses that for all ambulation. Assessment   Body structures, Functions, Activity limitations: Decreased functional mobility ; Decreased safe awareness;Decreased balance;Decreased cognition;Decreased endurance; Increased pain;Decreased strength;Decreased posture  Assessment: Pt ambulated 120' x 1 with rollator and CGA-Min A with 3 standing rest breaks due to fatigue of all extremities. Pt demonstrated decreased awareness of safety and reported 8-10 falls in last three months. At this date, due to decreased safety awareness with functional mobility and decreased balance, endurance, and strength, pt would not be safe to return to prior living arrangements. Pt would benefit from further skilled therapy upon discharge to improve balance, strength, and endurance. Pt would also benefit from available home services for therapy and (I)ADL's. Treatment Diagnosis: dec mobility  Prognosis: Fair  Decision Making: Medium Complexity  PT Education: Goals;PT Role;Plan of Care;Precautions;Gait Training; Adaptive Device Training;General Safety; Functional Mobility Training;Transfer Training  Patient Education: importance of safety with ambulation and sitting if pt feels weak, importance of therapy and mobility  REQUIRES PT FOLLOW UP: Yes  Activity Tolerance  Activity Tolerance: Patient limited by endurance; Patient limited by fatigue  Activity Tolerance: Pt primarily limited extremity weakness and decreased endurance.        Patient Diagnosis(es): The primary encounter diagnosis was Syncope and collapse. Diagnoses of Hypoxia and Bradycardia were also pertinent to this visit. has a past medical history of Abnormal Pap smear, Asthma, Atrial fibrillation (HCC), Bloody discharge from nipple, CAD (coronary artery disease), Cataracts, bilateral, Colon polyp, CVA (cerebral vascular accident) ([address] Demyelinating disease ([address] Fibromyalgia, High-risk sexual behavior, History of bone density study, History of migraines, HTN (hypertension), IgG deficiency ([address] Multiple sclerosis ([address] Myoclonic seizures ([address] Optic neuritis, Osteopenia, Pyloric stenosis, Raynaud's disease, and Vasomotor rhinitis. has a past surgical history that includes Dilation & curettage (1982); Breast biopsy (1990); Appendectomy (1955); Septoplasty (1985); Hammer toe surgery (1989); Sinus endoscopy; sinus surgery; Shoulder arthroscopy (1998); Esophagoscopy (1966, 1969); Colonoscopy (2010); Foreign Body Removal (1962); laparoscopy (1592); Coronary angioplasty with stent (5/2011); Coronary angioplasty with stent (6/2011); Upper gastrointestinal endoscopy; eye surgery; pr colonoscopy w/biopsy single/multiple (N/A, 12/19/2017); pr egd transoral biopsy single/multiple (12/19/2017); Upper gastrointestinal endoscopy (12/19/2017); Upper gastrointestinal endoscopy (5/24/2018); Cardiac surgery; Cardiac catheterization (07/31/2020); Cardioversion (08/14/2020); and Cardioversion (07/31/2020). Restrictions  Restrictions/Precautions  Restrictions/Precautions: General Precautions, Fall Risk  Required Braces or Orthoses?: No  Position Activity Restriction  Other position/activity restrictions:  Up with assistance - 2L O2 via nasal cannula  Vision/Hearing  Vision: Impaired  Vision Exceptions: Wears glasses for reading  Hearing: Within functional limits     Subjective  General  Patient assessed for rehabilitation services?: Yes  Response To Previous Treatment: Not applicable  Family / Caregiver Present: No  Referring Practitioner: [place]  Referral Date : 08/05/21  Diagnosis: Syncope and collapse  Follows Commands: Within Functional Limits  Subjective  Subjective: Pt in bed upon arrival and initially not interested in therapy, stating she did not know what therapy could do for her at this time. . After listening to pt and educating about therapy, pt was agreeable to therapy. Pt became extremely talkative and told numerous stories pertaining to her health and history. Pt reports 8-10 falls in past 3 months.   Pain Screening  Patient Currently in Pain: Yes  Pain Assessment  Pain Assessment:  (Pt reported multiple pain numbers ranging from 2-9.)  Pain Type: Chronic pain  Pain Location: Back;Leg;Hip  Pain Orientation: Lower  Vital Signs  Patient Currently in Pain: Yes  Pre Treatment Pain Screening  Intervention List: Patient able to continue with treatment    Orientation  Orientation  Overall Orientation Status: Within Functional Limits  Social/Functional History  Social/Functional History  Lives With: Alone  Type of Home: Apartment  Home Layout: One level  Home Access: Stairs to enter without rails  Entrance Stairs - Number of Steps: 1  Bathroom Shower/Tub: Tub/Shower unit  Bathroom Toilet: Standard  Bathroom Equipment: Grab bars in shower  Home Equipment: 4 wheeled walker, Electric scooter (pt reports use of 4WW when out of home and that electric scooter is broken; pt reports rollator does not navigate throughout home and won't fit in her bathroom.)  Receives Help From: [employer]  ADL Assistance: Independent  Homemaking Assistance: Needs assistance (Pt reports that she needs to find assistance with cleaning and laundry as she is unable to keep up with it.)  Homemaking Responsibilities: Yes  Meal Prep Responsibility: Primary  Laundry Responsibility: Primary  Cleaning Responsibility: Primary  Shopping Responsibility: Primary  Ambulation Assistance: Independent  Transfer Assistance: Independent  Active : Yes  Mode of Transportation: Car  Occupation: Retired  Type of occupation: Advanced adminstrative assistant  Additional Comments: Pt reports having adjustable bed, however mechanism to raise and lower the bed hasn't worked in long time. Pt reports ~8-10 falls over acosta 3 months d/t BLE weakness and/or syncope. Pt also reports needing increased assistance to perform home responsibilities. Cognition   Cognition  Overall Cognitive Status: Exceptions  Arousal/Alertness: Appropriate responses to stimuli  Following Commands: Follows one step commands with increased time; Follows multistep commands with increased time  Attention Span: Attends with cues to redirect  Memory: Appears intact  Safety Judgement: Decreased awareness of need for assistance;Decreased awareness of need for safety  Problem Solving: Assistance required to generate solutions  Initiation: Requires cues for some  Sequencing: Requires cues for some    Objective     Observation/Palpation  Posture: Fair    AROM RLE (degrees)  RLE AROM: WFL  AROM LLE (degrees)  LLE AROM : WFL  AROM RUE (degrees)  RUE General AROM: Co-evaluation with OT - see OT notes for UE assessment. AROM LUE (degrees)  LUE General AROM: Co-evaluation with OT - see OT notes for UE assessment. Strength RLE  Strength RLE: Exception  R Hip Flexion: 3+/5  R Knee Flexion: 3+/5  R Knee Extension: 3+/5  R Ankle Dorsiflexion: 4-/5  R Ankle Plantar flexion: 3+/5  Strength LLE  Strength LLE: Exception  L Hip Flexion: 3+/5  L Knee Flexion: 3+/5  L Knee Extension: 3+/5  L Ankle Dorsiflexion: 4-/5  L Ankle Plantar Flexion: 3+/5  Strength Other  Other: Grossly 3+/5 bilateral LE. Co-evaluation with OT - see OT notes for UE assessment. Sensation  Overall Sensation Status: Impaired (Pt reports numbness in all four extremities.)  Bed mobility  Supine to Sit: Contact guard assistance  Scooting: Stand by assistance  Comment: Pt in bed upon arrival. Used bilateral handrails to complete supine to sit transfer.  Pt was returned to chair at end of session to eat lunch. Transfers  Sit to Stand: Contact guard assistance  Stand to sit: Contact guard assistance  Ambulation  Ambulation?: Yes  More Ambulation?: No  Ambulation 1  Surface: level tile  Device: Rollator  Other Apparatus: O2 (2 L)  Assistance: Contact guard assistance;Minimal assistance  Quality of Gait: mild unsteadiness with increasing distance, slow jin, decreased step length and height  Gait Deviations: Slow Jin;Decreased step length;Decreased step height  Distance: 120' x 1  Comments: Pt ambulated with CGA but abruptly stopped 3x for 30 sec each stating that her legs and arms felt weak and required Min A to return to room. Stairs/Curb  Stairs?: No     Balance  Posture: Fair  Sitting - Static: Fair  Sitting - Dynamic: Fair;-  Standing - Static: Fair;-  Standing - Dynamic: Poor;+  Comments: standing balance assessed with rollator        Plan   Plan  Times per week: 5-6x/week  Times per day: Daily  Current Treatment Recommendations: Strengthening, Transfer Training, Endurance Training, Patient/Caregiver Education & Training, Pain Management, Balance Training, Gait Training, Home Exercise Program, Positioning, Functional Mobility Training, Stair training  Safety Devices  Type of devices: Gait belt, Call light within reach, Chair alarm in place, Left in chair, Nurse notified  Restraints  Initially in place: No    G-Code       OutComes Score                                                  AM-PAC Score  AM-PAC Inpatient Mobility Raw Score : 17 (08/05/21 1327)  AM-PAC Inpatient T-Scale Score : 42.13 (08/05/21 1327)  Mobility Inpatient CMS 0-100% Score: 50.57 (08/05/21 1327)  Mobility Inpatient CMS G-Code Modifier : CK (08/05/21 1327)          Goals  Short term goals  Time Frame for Short term goals: 14 visits  Short term goal 1: Pt to safely ambulate 300' modified independence with rollator to allow functional mobility in community.   Short term goal 2: Pt to improve standing balance to Fair + to reduce fall risk. Short term goal 3: Pt to negotiate 1 stair with no hand rails independently to allow safe access into apartment upon discharge. Short term goal 4: Pt to demonstrate seated lower extremity HEP to increase lower extremity strength upon discharge.   Patient Goals   Patient goals : Return home with additional services       Therapy Time   Individual Concurrent Group Co-treatment   Time In 1111         Time Out 1221         Minutes 70         Timed Code Treatment Minutes: Maryjane Velarde 149, PT

## 2021-08-05 NOTE — PROGRESS NOTES
Provider Krystian Portillo notified of patient's elevated blood pressures (SBP in 170s). Reviewed list of allergies with provider. Plan is to increase Losartan dose to 50 mg with one dose to be given now, as well as add PRN Catapres 8.6KB TID for systolic blood pressure greater than 160.

## 2021-08-05 NOTE — PROGRESS NOTES
Reported to [facility] NP that BP slightly  Improved    NP ordered another dose of clonidine

## 2021-08-05 NOTE — PROGRESS NOTES
Occupational Therapy   Occupational Therapy Initial Assessment  Date: 2021   Patient Name: Jose L Posada  MRN: [id number]     : 1945    Date of Service: 2021  Chief Complaint   Patient presents with    Bradycardia       Discharge Recommendations:  Patient would benefit from continued therapy after discharge     OT Equipment Recommendations  Equipment Needed: Yes  Mobility Devices: Gary Spore; ADL Assistive Devices  Walker: Rolling  ADL Assistive Devices: Transfer Tub Bench;Hand-held Shower; Toilet Safety Frame;Dressing Stick    Assessment   Performance deficits / Impairments: Decreased functional mobility ; Decreased safe awareness;Decreased balance;Decreased ADL status; Decreased high-level IADLs;Decreased endurance;Decreased strength;Decreased sensation  Assessment: Pt has h/o of multiple sclerosis and increasing falls d/t generalized weakness and syncope episodes. Pt reports home management responsibilities are becoming increasing difficult to perform and occassionally receives assistance from her neighbors. Pt reports ~8-10 falls in the past 3 months. Pt would continue to benefit from skilled OT services during hospitalization and upon discharge to maximize pt's independence and activity tolerance for participation in ADLs/IADLs and functional mobility. Pt would be unsafe to return to prior living arrangement at this time without additional supervision/assistance d/t increasing falls and above stated deficits to safely engage in all functional activities. Prognosis: Fair  Decision Making: Medium Complexity  OT Education: OT Role;Plan of Care;Energy Conservation;Precautions; Equipment;Transfer Training;ADL Adaptive Strategies  Patient Education: benefit of and importance of activity promotion; OOB activity  REQUIRES OT FOLLOW UP: Yes  Activity Tolerance  Activity Tolerance: Patient limited by fatigue  Activity Tolerance: Pt has h/o of bradycardia and multiple sclerosis; during session pt's HR btwn 44-45 in seated increasing to ~50 during functional mobility. Pt required multiple standing rest breaks throughout d/t fatigue and weakness. Safety Devices  Safety Devices in place: Yes  Type of devices: Call light within reach; Chair alarm in place; Left in chair;Nurse notified;Gait belt  Restraints  Initially in place: No           Patient Diagnosis(es): The primary encounter diagnosis was Syncope and collapse. Diagnoses of Hypoxia and Bradycardia were also pertinent to this visit. has a past medical history of Abnormal Pap smear, Asthma, Atrial fibrillation (HCC), Bloody discharge from nipple, CAD (coronary artery disease), Cataracts, bilateral, Colon polyp, CVA (cerebral vascular accident) ([address] Demyelinating disease ([address] Fibromyalgia, High-risk sexual behavior, History of bone density study, History of migraines, HTN (hypertension), IgG deficiency ([address] Multiple sclerosis ([address] Myoclonic seizures ([address] Optic neuritis, Osteopenia, Pyloric stenosis, Raynaud's disease, and Vasomotor rhinitis. has a past surgical history that includes Dilation & curettage (1982); Breast biopsy (1990); Appendectomy (1955); Septoplasty (1985); Hammer toe surgery (1989); Sinus endoscopy; sinus surgery; Shoulder arthroscopy (1998); Esophagoscopy (1966, 1969); Colonoscopy (2010); Foreign Body Removal (1962); laparoscopy (9815); Coronary angioplasty with stent (5/2011); Coronary angioplasty with stent (6/2011); Upper gastrointestinal endoscopy; eye surgery; pr colonoscopy w/biopsy single/multiple (N/A, 12/19/2017); pr egd transoral biopsy single/multiple (12/19/2017); Upper gastrointestinal endoscopy (12/19/2017); Upper gastrointestinal endoscopy (5/24/2018); Cardiac surgery; Cardiac catheterization (07/31/2020); Cardioversion (08/14/2020); and Cardioversion (07/31/2020).            Restrictions  Restrictions/Precautions  Restrictions/Precautions: General Precautions, Fall Risk  Required Braces or Orthoses?: No  Position Activity Restriction  Other position/activity restrictions: Up with assistance - 2L O2 via nasal cannula     Subjective   General  Patient assessed for rehabilitation services?: Yes  Family / Caregiver Present: No (Pt's friend present upon end of session)  General Comment  Comments: RN ok'd for evaluation with OT/PT this date. RN notified of pt's personal compression stockings - pt reports perference of personal stockings - RN ok'd to don this date. Upon arrival, pt reports feeling overwhlemed and \"has so much to figure out\". Pt reports h/o of chronic bradycardia, multiple sclerosis and increasing falls d/t BLE weakness an/or syncope episodes. Furthermore, pt c/o generalized chronic pain. Patient Currently in Pain: Yes  Pain Assessment  Pain Assessment: 0-10 (pt reports pain ranges anywhere from 2-9)  Pain Type: Chronic pain  Pain Location: Hip;Back;Leg  Pain Orientation: Lower; Left (left hip d/t bursitis)  Pain Descriptors: Constant  Pain Frequency: Continuous  Pain Onset: On-going  Clinical Progression: Not changed  Functional Pain Assessment: Prevents or interferes some active activities and ADLs  Non-Pharmaceutical Pain Intervention(s): Ambulation/Increased Activity;Repositioned; Emotional support;Distraction  Response to Pain Intervention: Patient Satisfied    Social/Functional History  Social/Functional History  Lives With: Alone  Type of Home: Apartment  Home Layout: One level  Home Access: Stairs to enter without rails  Entrance Stairs - Number of Steps: 1  Bathroom Shower/Tub: Tub/Shower unit  Bathroom Toilet: Standard  Bathroom Equipment: Grab bars in shower  Home Equipment: 4 wheeled walker, Electric scooter (pt reports use of 4WW when out of home and that electric scooter is broken.)  Receives Help From: [employer]  ADL Assistance: Independent  Homemaking Assistance: Needs assistance (Pt reports that she needs to find assistance with cleaning and laundry.)  Homemaking Responsibilities: Yes  Meal Prep Responsibility: Primary  Laundry Responsibility: Primary  Cleaning Responsibility: Primary  Shopping Responsibility: Primary  Ambulation Assistance: Independent  Transfer Assistance: Independent  Active : Yes  Mode of Transportation: Car  Occupation: Retired  Type of occupation: Advanced adminstrative assistant  Additional Comments: Pt reports having adjustable bed, however mechanism to do hasn't worked in long time. Pt reports ~8-10 falls over acosta 3 months d/t BLE weakness and/or syncope. Pt also reports needing increased assistance to perform home responsibilities. Objective   Vision: Impaired  Vision Exceptions: Wears glasses for reading  Hearing: Within functional limits      Orientation  Overall Orientation Status: Within Functional Limits      Balance  Sitting Balance: Contact guard assistance (seated at EOB to don bilateral socks and gowns, CGA required for dynamic seated balance; seated in recliner to don personal compression hose)  Standing Balance: Contact guard assistance (during functional mobility w/in room and ellington)    Functional Mobility  Functional - Mobility Device: 4-Wheeled Walker  Activity: Other (bed > within ellington > return to recliner in room)  Assist Level: Contact guard assistance  Functional Mobility Comments: No true LOB noted throughout. Pt on 2L O2 via nasal cannula, upon functional mobility pt required 3x standing rest breaks w/4WW ~30 seconds per break with c/o shortness of breath and weakness. Pt required min VCs to initiate safety mechanism on 4WW during standing breaks upon noted UE resting on bilateral handles. ADL  Feeding: Independent  Grooming: Contact guard assistance;Setup  UE Bathing: Increased time to complete;Setup;Contact guard assistance;Minimal assistance  LE Bathing: Setup;Contact guard assistance;Minimal assistance; Increased time to complete  UE Dressing: Setup;Minimal assistance;Contact guard assistance (in seated to don gown)  LE Dressing: Setup;Stand by assistance; Increased time to complete (seated w/figure four technique to don bilateral compression stockings and socks)  Toileting: Contact guard assistance;Minimal assistance    Tone RUE  RUE Tone: Normotonic  Tone LUE  LUE Tone: Normotonic  Coordination  Movements Are Fluid And Coordinated: Yes        Bed mobility  Supine to Sit: Contact guard assistance  Sit to Supine:  (Pt up to recliner following end of session.)  Comment: HOB flat - use of bilateral bed rails throughtout with CGA for progression to upright position at EOB. Transfers  Sit to stand: Stand by assistance  Stand to sit: Stand by assistance  Transfer Comments: Min VCs initially for attention and safety awareness w/hand placement to/from surfaces during functional transfers this date. Cognition  Overall Cognitive Status: Exceptions  Arousal/Alertness: Appropriate responses to stimuli  Following Commands: Follows multistep commands with increased time; Follows multistep commands with repitition  Attention Span: Attends with cues to redirect  Memory: Appears intact  Safety Judgement: Decreased awareness of need for assistance;Decreased awareness of need for safety  Problem Solving: Assistance required to identify errors made;Assistance required to generate solutions  Insights: Fully aware of deficits  Initiation: Requires cues for some  Sequencing: Requires cues for some           Sensation  Overall Sensation Status: Impaired (Pt reports numbness in bilateral hands/feets and LEs d/t multiple sclerosis)       LUE AROM (degrees)  LUE AROM : WFL  RUE AROM (degrees)  RUE AROM : WFL    LUE Strength  Gross LUE Strength: Exceptions to [facility]  L Shoulder Flex: 3+/5  L Shoulder ABduction: 3+/5  L Elbow Flex: 4-/5  L Elbow Ext: 4-/5  L Wrist Flex: 4-/5  L Wrist Ext: 4-/5  L Hand General: 4-/5    RUE Strength  Gross RUE Strength: Exceptions to [facility]  R Shoulder Flex: 3+/5  R Shoulder ABduction: 3+/5  R Elbow Flex: 4-/5  R Elbow Ext: 4-/5  R

## 2021-08-05 NOTE — PROGRESS NOTES
Sent message to [facility] NP- patient's BP is high and HR low in 40s    NP ordered BP recheck in about an hour

## 2021-08-06 ENCOUNTER — APPOINTMENT (OUTPATIENT)
Dept: MRI IMAGING | Age: 76
DRG: 281 | End: 2021-08-06
Payer: MEDICARE

## 2021-08-06 LAB
ABSOLUTE EOS #: 0.1 K/UL (ref 0–0.4)
ABSOLUTE IMMATURE GRANULOCYTE: ABNORMAL K/UL (ref 0–0.3)
ABSOLUTE LYMPH #: 0.6 K/UL (ref 1–4.8)
ABSOLUTE MONO #: 0.9 K/UL (ref 0.1–1.2)
ALBUMIN SERPL-MCNC: 4.3 G/DL (ref 3.5–5.2)
ANION GAP SERPL CALCULATED.3IONS-SCNC: 11 MMOL/L (ref 9–17)
BASOPHILS # BLD: 1 % (ref 0–2)
BASOPHILS ABSOLUTE: 0.1 K/UL (ref 0–0.2)
BUN BLDV-MCNC: 16 MG/DL (ref 8–23)
BUN/CREAT BLD: ABNORMAL (ref 9–20)
CALCIUM SERPL-MCNC: 9.2 MG/DL (ref 8.6–10.4)
CHLORIDE BLD-SCNC: 99 MMOL/L (ref 98–107)
CO2: 29 MMOL/L (ref 20–31)
CREAT SERPL-MCNC: 0.9 MG/DL (ref 0.5–0.9)
DIFFERENTIAL TYPE: ABNORMAL
EKG ATRIAL RATE: 45 BPM
EKG P AXIS: 92 DEGREES
EKG P-R INTERVAL: 146 MS
EKG Q-T INTERVAL: 414 MS
EKG QRS DURATION: 80 MS
EKG QTC CALCULATION (BAZETT): 358 MS
EKG R AXIS: -47 DEGREES
EKG T AXIS: 171 DEGREES
EKG VENTRICULAR RATE: 45 BPM
EOSINOPHILS RELATIVE PERCENT: 2 % (ref 1–4)
ESTIMATED AVERAGE GLUCOSE: 97 MG/DL
GFR AFRICAN AMERICAN: >60 ML/MIN
GFR NON-AFRICAN AMERICAN: >60 ML/MIN
GFR SERPL CREATININE-BSD FRML MDRD: ABNORMAL ML/MIN/{1.73_M2}
GFR SERPL CREATININE-BSD FRML MDRD: ABNORMAL ML/MIN/{1.73_M2}
GLUCOSE BLD-MCNC: 99 MG/DL (ref 70–99)
HBA1C MFR BLD: 5 % (ref 4–6)
HCT VFR BLD CALC: 37.1 % (ref 36–46)
HEMOGLOBIN: 11.9 G/DL (ref 12–16)
IMMATURE GRANULOCYTES: ABNORMAL %
LYMPHOCYTES # BLD: 7 % (ref 24–44)
MCH RBC QN AUTO: 28.7 PG (ref 26–34)
MCHC RBC AUTO-ENTMCNC: 32.2 G/DL (ref 31–37)
MCV RBC AUTO: 89.2 FL (ref 80–100)
MONOCYTES # BLD: 9 % (ref 2–11)
NRBC AUTOMATED: ABNORMAL PER 100 WBC
PDW BLD-RTO: 18 % (ref 12.5–15.4)
PHOSPHORUS: 3.2 MG/DL (ref 2.6–4.5)
PLATELET # BLD: 215 K/UL (ref 140–450)
PLATELET ESTIMATE: ABNORMAL
PMV BLD AUTO: 9.7 FL (ref 6–12)
POTASSIUM SERPL-SCNC: 3.3 MMOL/L (ref 3.7–5.3)
RBC # BLD: 4.16 M/UL (ref 4–5.2)
RBC # BLD: ABNORMAL 10*6/UL
SEG NEUTROPHILS: 81 % (ref 36–66)
SEGMENTED NEUTROPHILS ABSOLUTE COUNT: 7.6 K/UL (ref 1.8–7.7)
SODIUM BLD-SCNC: 139 MMOL/L (ref 135–144)
WBC # BLD: 9.3 K/UL (ref 3.5–11)
WBC # BLD: ABNORMAL 10*3/UL

## 2021-08-06 PROCEDURE — 80069 RENAL FUNCTION PANEL: CPT

## 2021-08-06 PROCEDURE — 2580000003 HC RX 258: Performed by: NURSE PRACTITIONER

## 2021-08-06 PROCEDURE — 6360000002 HC RX W HCPCS: Performed by: INTERNAL MEDICINE

## 2021-08-06 PROCEDURE — 2060000000 HC ICU INTERMEDIATE R&B

## 2021-08-06 PROCEDURE — 99232 SBSQ HOSP IP/OBS MODERATE 35: CPT | Performed by: PSYCHIATRY & NEUROLOGY

## 2021-08-06 PROCEDURE — 36415 COLL VENOUS BLD VENIPUNCTURE: CPT

## 2021-08-06 PROCEDURE — 6370000000 HC RX 637 (ALT 250 FOR IP): Performed by: NURSE PRACTITIONER

## 2021-08-06 PROCEDURE — 70553 MRI BRAIN STEM W/O & W/DYE: CPT

## 2021-08-06 PROCEDURE — 97110 THERAPEUTIC EXERCISES: CPT

## 2021-08-06 PROCEDURE — 6370000000 HC RX 637 (ALT 250 FOR IP): Performed by: INTERNAL MEDICINE

## 2021-08-06 PROCEDURE — 99232 SBSQ HOSP IP/OBS MODERATE 35: CPT | Performed by: INTERNAL MEDICINE

## 2021-08-06 PROCEDURE — A9579 GAD-BASE MR CONTRAST NOS,1ML: HCPCS | Performed by: PSYCHIATRY & NEUROLOGY

## 2021-08-06 PROCEDURE — 85025 COMPLETE CBC W/AUTO DIFF WBC: CPT

## 2021-08-06 PROCEDURE — 6360000004 HC RX CONTRAST MEDICATION: Performed by: PSYCHIATRY & NEUROLOGY

## 2021-08-06 PROCEDURE — 6360000002 HC RX W HCPCS: Performed by: NURSE PRACTITIONER

## 2021-08-06 PROCEDURE — 6360000002 HC RX W HCPCS

## 2021-08-06 PROCEDURE — 97116 GAIT TRAINING THERAPY: CPT

## 2021-08-06 RX ORDER — MIDAZOLAM HYDROCHLORIDE 1 MG/ML
INJECTION INTRAMUSCULAR; INTRAVENOUS
Status: COMPLETED
Start: 2021-08-06 | End: 2021-08-06

## 2021-08-06 RX ORDER — LOSARTAN POTASSIUM 50 MG/1
100 TABLET ORAL DAILY
Status: DISCONTINUED | OUTPATIENT
Start: 2021-08-07 | End: 2021-08-08 | Stop reason: HOSPADM

## 2021-08-06 RX ORDER — MIDAZOLAM HYDROCHLORIDE 2 MG/2ML
1 INJECTION, SOLUTION INTRAMUSCULAR; INTRAVENOUS ONCE
Status: COMPLETED | OUTPATIENT
Start: 2021-08-06 | End: 2021-08-06

## 2021-08-06 RX ORDER — CLONIDINE HYDROCHLORIDE 0.1 MG/1
0.1 TABLET ORAL 2 TIMES DAILY
Status: DISCONTINUED | OUTPATIENT
Start: 2021-08-06 | End: 2021-08-07

## 2021-08-06 RX ORDER — LOSARTAN POTASSIUM 50 MG/1
50 TABLET ORAL ONCE
Status: COMPLETED | OUTPATIENT
Start: 2021-08-06 | End: 2021-08-06

## 2021-08-06 RX ADMIN — MIDAZOLAM HYDROCHLORIDE 1 MG: 1 INJECTION, SOLUTION INTRAMUSCULAR; INTRAVENOUS at 12:15

## 2021-08-06 RX ADMIN — RANOLAZINE 500 MG: 500 TABLET, EXTENDED RELEASE ORAL at 07:51

## 2021-08-06 RX ADMIN — CLONIDINE HYDROCHLORIDE 0.1 MG: 0.1 TABLET ORAL at 20:18

## 2021-08-06 RX ADMIN — MIDAZOLAM HYDROCHLORIDE 1 MG: 2 INJECTION, SOLUTION INTRAMUSCULAR; INTRAVENOUS at 13:15

## 2021-08-06 RX ADMIN — CLOPIDOGREL BISULFATE 75 MG: 75 TABLET ORAL at 07:51

## 2021-08-06 RX ADMIN — AZATHIOPRINE 50 MG: 50 TABLET ORAL at 21:47

## 2021-08-06 RX ADMIN — PAROXETINE HYDROCHLORIDE 20 MG: 20 TABLET, FILM COATED ORAL at 07:51

## 2021-08-06 RX ADMIN — LOSARTAN POTASSIUM 50 MG: 50 TABLET, FILM COATED ORAL at 10:04

## 2021-08-06 RX ADMIN — APIXABAN 5 MG: 5 TABLET, FILM COATED ORAL at 07:51

## 2021-08-06 RX ADMIN — THEOPHYLLINE ANHYDROUS 100 MG: 80 LIQUID ORAL at 14:32

## 2021-08-06 RX ADMIN — PANTOPRAZOLE SODIUM 40 MG: 40 TABLET, DELAYED RELEASE ORAL at 07:51

## 2021-08-06 RX ADMIN — THEOPHYLLINE ANHYDROUS 100 MG: 80 LIQUID ORAL at 21:48

## 2021-08-06 RX ADMIN — ACETAMINOPHEN 650 MG: 325 TABLET ORAL at 21:47

## 2021-08-06 RX ADMIN — APIXABAN 5 MG: 5 TABLET, FILM COATED ORAL at 21:47

## 2021-08-06 RX ADMIN — GADOTERIDOL 20 ML: 279.3 INJECTION, SOLUTION INTRAVENOUS at 12:11

## 2021-08-06 RX ADMIN — MIDAZOLAM HYDROCHLORIDE 1 MG: 1 INJECTION, SOLUTION INTRAMUSCULAR; INTRAVENOUS at 13:15

## 2021-08-06 RX ADMIN — RANOLAZINE 500 MG: 500 TABLET, EXTENDED RELEASE ORAL at 21:47

## 2021-08-06 RX ADMIN — LOSARTAN POTASSIUM 50 MG: 50 TABLET, FILM COATED ORAL at 07:51

## 2021-08-06 RX ADMIN — SODIUM CHLORIDE, PRESERVATIVE FREE 10 ML: 5 INJECTION INTRAVENOUS at 07:53

## 2021-08-06 RX ADMIN — SODIUM CHLORIDE, PRESERVATIVE FREE 10 ML: 5 INJECTION INTRAVENOUS at 21:48

## 2021-08-06 RX ADMIN — THEOPHYLLINE ANHYDROUS 100 MG: 80 LIQUID ORAL at 05:22

## 2021-08-06 RX ADMIN — PANTOPRAZOLE SODIUM 40 MG: 40 TABLET, DELAYED RELEASE ORAL at 17:08

## 2021-08-06 ASSESSMENT — PAIN SCALES - GENERAL
PAINLEVEL_OUTOF10: 8
PAINLEVEL_OUTOF10: 0
PAINLEVEL_OUTOF10: 3
PAINLEVEL_OUTOF10: 0
PAINLEVEL_OUTOF10: 0
PAINLEVEL_OUTOF10: 8
PAINLEVEL_OUTOF10: 0

## 2021-08-06 ASSESSMENT — PAIN DESCRIPTION - LOCATION
LOCATION: GENERALIZED
LOCATION: GENERALIZED;HEAD
LOCATION: GENERALIZED

## 2021-08-06 ASSESSMENT — PAIN DESCRIPTION - PROGRESSION
CLINICAL_PROGRESSION: NOT CHANGED
CLINICAL_PROGRESSION: NOT CHANGED

## 2021-08-06 ASSESSMENT — PAIN DESCRIPTION - FREQUENCY
FREQUENCY: CONTINUOUS
FREQUENCY: CONTINUOUS

## 2021-08-06 ASSESSMENT — PAIN DESCRIPTION - ONSET
ONSET: ON-GOING
ONSET: ON-GOING

## 2021-08-06 ASSESSMENT — PAIN DESCRIPTION - PAIN TYPE
TYPE: CHRONIC PAIN
TYPE: CHRONIC PAIN;ACUTE PAIN
TYPE: CHRONIC PAIN

## 2021-08-06 ASSESSMENT — PAIN DESCRIPTION - DESCRIPTORS: DESCRIPTORS: CONSTANT;DISCOMFORT

## 2021-08-06 ASSESSMENT — PAIN - FUNCTIONAL ASSESSMENT
PAIN_FUNCTIONAL_ASSESSMENT: PREVENTS OR INTERFERES SOME ACTIVE ACTIVITIES AND ADLS
PAIN_FUNCTIONAL_ASSESSMENT: PREVENTS OR INTERFERES SOME ACTIVE ACTIVITIES AND ADLS

## 2021-08-06 NOTE — PROGRESS NOTES
Active problem Syncope . Multiple sclerosis . Frequent falling . Fibromyalgia . The condition is she is walking 215 feet with rollator seated rest breaks between gait bouts in PT . MRI of Head with chronic white matter changes . She retains stable strength lower extremities iliopsoas and hamstrings bilaterally 4/5 with giveway . Upper extremities 5/5 strength. Orthostatic blood pressures today supine 157/60 , sitting 204/63 , standing 171/67 . She has undergone readjustment of blood pressure medications per cardiology with her being hesitant on cardiac pacemaker for bradycardia . She presents to ER at [facility] with syncope along with generalized weakness. She reports that she returned home from gas station feeling hot the next thing she is awakening up after episode of unresponsiveness at kitchen table  . She was found to be bradycardic in 40's . She does have history of chronic atrial fibrillation on eliquis 5 mg po bid along with coronary stenting on plavix 75 mg po qd along with bradycardia having refused cardiac pacemaker placement in the past .There is orthostasis blood pressure supine 188/81 , sitting 158/56 , standing 175/60  She has multiple sclerosis followed by [facility] on imuran 50 mg po qd ambulating with walker with baseline generalized weakness more prominent in lower extremities reporting this to be greater since January with more frequent falling that can be as frequent as once per week . She does have fibromyalgia with generalized body pain along with intermittent urinary incontinence . Head CT with bilateral periventricular attenuation with old right external capsule lacune . CT cervical spine form past month with mild degenerative changes . Significant medications imuran 50 mg po qd, eliquis 5 mg po bid  plavix 75 mg po qd , pravachol 40 mg po qd  . Testing Head CT with bilateral periventricular attenuation with old right external capsule lacune .  CT cervical spine form past month with mild degenerative changes  . MRI of Head with chronic white matter changes . YNJ3C 5, R63 260, folic acid 8.7, ESR 1 , TSH normal      Past Medical History:   Diagnosis Date    Abnormal Pap smear 10/2010    paucity or absence of TZx 3 years in a row. 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[address]     Multiple sclerosis ([address]     progressive    Myoclonic seizures ([address]     Optic neuritis     Mazin Anirudh Pupil    Osteopenia     Pyloric stenosis     Raynaud's disease     Vasomotor rhinitis        Past Surgical History:   Procedure Laterality Date    APPENDECTOMY  1955    sponges left in abdomen, at [address] [place] [address] Way    stereotactic type, right breast, benign    CARDIAC CATHETERIZATION  07/31/2020    POBA lad    CARDIAC SURGERY      [facility]IOVERSION  08/14/2020    [facility]     CARDIOVERSION  07/31/2020    COLONOSCOPY  2010    Polyp    CORONARY ANGIOPLASTY WITH STENT PLACEMENT  5/2011    failed attempt to place stent    CORONARY ANGIOPLASTY WITH STENT PLACEMENT  6/2011    successful placement of 2 stent in same artery   [address]    bleeding ulcers found   [address] [address]    surgical sponges left in during appendectomy    HAMMER TOE SURGERY  1989    head resection of phalanx, left foot   [address]    with D&C    [place] COLONOSCOPY W/BIOPSY SINGLE/MULTIPLE N/A 12/19/2017 (Medical):      Lack of Transportation (Non-Medical):    Physical Activity:     Days of Exercise per Week:     Minutes of Exercise per Session:    Stress:     Feeling of Stress :    Social Connections:     Frequency of Communication with Friends and Family:     Frequency of Social Gatherings with Friends and Family:     Attends Holiness Services:     Active Member of Clubs or Organizations:     Attends Club or Organization Meetings:     Marital Status:    Intimate Partner Violence:     Fear of Current or Ex-Partner:     Emotionally Abused:     Physically Abused:     Sexually Abused:        Current Facility-Administered Medications   Medication Dose Route Frequency Provider Last Rate Last Admin    [START ON 8/7/2021] losartan (COZAAR) tablet 100 mg  100 mg Oral Daily Cristopher , APRN - CNP        cloNIDine (CATAPRES) tablet 0.1 mg  0.1 mg Oral BID Cristopher , APRN - CNP        theophylline 80 MG/15ML oral solution 100 mg  100 mg Oral Q8H Cristopher , APRN - CNP   100 mg at 08/06/21 1432    pantoprazole (PROTONIX) tablet 40 mg  40 mg Oral BID AC Hernando Wallace DO   40 mg at 08/06/21 0751    sodium chloride flush 0.9 % injection 10 mL  10 mL Intravenous PRN Ruddy Saleh MD   10 mL at 08/04/21 1820    allopurinol (ZYLOPRIM) tablet 100 mg  100 mg Oral Daily Shweta Countess, APRN - CNP        apixaban (ELIQUIS) tablet 5 mg  5 mg Oral BID Shweta Countess, APRN - CNP   5 mg at 08/06/21 0751    azaTHIOprine (IMURAN) tablet 50 mg  50 mg Oral Daily Shweta Countess, APRN - CNP   50 mg at 08/05/21 2120    clopidogrel (PLAVIX) tablet 75 mg  75 mg Oral Daily Shweta Countess, APRN - CNP   75 mg at 08/06/21 0751    bumetanide (BUMEX) tablet 1 mg  1 mg Oral Daily Shweta Countess, APRN - CNP        PARoxetine (PAXIL) tablet 20 mg  20 mg Oral QAM Shweta Countess, APRN - CNP   20 mg at 08/06/21 0751    [Held by provider] potassium Penicillins Hives    Polyethylene Glycol Hives    Actifed Cold-Allergy [Chlorpheniramine-Phenylephrine]     Altace [Ramipril]      Chronic cough    Cephalosporins Hives    Coreg [Carvedilol]      bloating    Dml Facial Moisturizer     Elavil [Amitriptyline Hcl]     Entex [Ami-Jose]     Ethylene Glycol     Fentanyl     Hizentra [Immune Globulin (Human)]     Hydralazine     Levofloxacin      ach tendon    Lisinopril-Hydrochlorothiazide     Montelukast Sodium      Heart effects    Morphine Itching    Nifedipine     Norvasc [Amlodipine Besylate]      Stomach pain    Other      IV IG     Phenobarbital Hives    Robaxin [Methocarbamol]     Sinequan [Doxepin Hcl]     Sudafed [Pseudoephedrine Hcl]     Sudafed [Pseudoephedrine Hcl]     Tranquil-London     Wellbutrin [Bupropion Hcl]      Hypotension    Actifed Cold-Allergy [Chlorpheniramine-Phenylephrine] Palpitations    Clindamycin/Lincomycin Nausea And Vomiting    Codeine Nausea And Vomiting    Metoprolol Nausea And Vomiting     Reports she is allergic to the preservative polyethylineglycol in this medicine, causing severe emesis and stomach pain     Metoprolol Succinate Nausea And Vomiting    Seldane [Terfenadine] Palpitations       ROS:   Constitutional                  Negative for fever and chills   HEENT                            Negative for ear discharge, ear pain, nosebleed  Eyes                                Negative for photophobia, pain and discharge  Respiratory                      Negative for hemoptysis and sputum  Cardiovascular                Negative for orthopnea, claudication and PND  Gastrointestinal               Negative for abdominal pain, diarrhea, blood in stool  Musculoskeletal               Positive for myalgia  Skin                                 Negative for rash or itching  Endo/heme/allergies       Negative for polydipsia, environmental allergy  Psychiatric                       Negative for suicidal ideation. Patient is not anxious    Vitals:    08/06/21 1155   BP: (!) 157/60   Pulse: 56   Resp: 18   Temp: 97.5 °F (36.4 °C)   SpO2: 100%     Admission weight: 212 lb 11.9 oz (96.5 kg)    Neurological Examination  Constitutional .General exam well groomed   Head/ Ears /Nose/Throat/external ear . Normal exam  Neck and thyroid . Normal size. No bruits  Respiratory . Breathsounds clear bilaterally  Cardiovascular: Auscultation of heart with regular rate and rhythm   Musculoskeletal. Muscle bulk and tone normal                                                           Muscle strength lower extremities iliopsoas and hamstrings bilaterally 4/5 with giveway . Upper extremities 5/5 strength                                                                                No dysmetria or dysdiadokinesis  No tremor   Normal fine motor  Orientation Alert and oriented x 3   Attention and concentration normal  Short term memory normal  Language process and speech normal . No aphasia   Cranial nerve 2 normal acuety and visual fields  Cranial nerve 3, 4 and 6 . Extraocular muscles are intact . Pupils are equal and reactive   Cranial nerve 5 . Intact corneal reflex. Normal facial sensation  Cranial nerve 7 normal exam   Cranial nerve 8. Grossly intact hearing   Cranial nerve 9 and 10. Symmetric palate elevation   Cranial nerve 11 , 5 out of 5 strength   Cranial Nerve 12 midline tongue . No atrophy  Sensation . Decreased pinprick and light touch distal to wrist and mid foot level biaterally   Deep Tendon Reflexes hypoactive   Plantar response equivocal bilaterally    Assessment :    Syncope . Multiple sclerosis . Frequent falling . Fibromyalgia     Plan:    Long discussion with patient . She needs SNF for rehabilitation with concern of frequent falling with her being on eliquis for atrial fibrillation  .  She is agreeable for rehabilitation

## 2021-08-06 NOTE — CARE COORDINATION
Writer at bedside to speak with patient regarding discharge needs. Writer inquired if patient would consider rehab/ SNF placement. Pt states that she \"isn't sure at this time, I've had a long night, and I am going for testing. \"  Writer inquired about having a safe environment at home, and the resources that she needs. Pt states that she is working with \" a lady in [facility],\" to help her find a cheaper place to live and get her the equipment that she needs at home, example a shower chair. Writer encourages her to work with this lady, and get the resources that she needs. Pt responds to writer. Writer informs patient that if there is anything that we can provide for her or if she decides she would like rehab to inform us and we'd be happy to assist her. Pt responds to education and agrees with statement.

## 2021-08-06 NOTE — PROGRESS NOTES
[facility]  Office: [address], DO, Miguelito China, DO, Eric Zhou, DO, Krunal Palmer Blood, DO, Daniel Joshi MD, Edvin Marquis MD, Valerie Salazar MD, Emma Esquivel MD, Nilton Castro MD, Mary Monsivais MD, Dasha Christy MD, Jennifer Yan, DO, Mik Garcia MD, Shin Bower, DO, Jose Craig MD,  Claudia Maxwell, DO, Sean Angelo MD, Philomena Burr MD, Shazia Garibay MD, Kody Hyde MD, Ronda Rhodes MD, Tori Spaulding MD, Clarice Brantley MD, Betty Grayson CNP, [facility], CNP, Hakeem Hernández, CNP, Milagro Rios, CNS, Ana Cristina Killian, CNP, Ricardo Carreon, CNP, Shameka Mcneil, CNP, Kaiden Mattson, CNP, Teresa Gorman, CNP, Ade Rodgers PA-C, Charmel Osgood, [facility], Anna Grigsby, CNP, Laura Arango, CNP, Kait Delarosa, CNP, Yaz Maxwell, CNP, Tiney Schilder, CNP, Nicole Delvalle, CNP, Marlyne Osgood, CNP, Cristine [address]    Progress Note    8/6/2021    10:51 AM    Name:   Kandy Mcmahon  MRN:     [id number]     [place]:      [de-identified]   Room:   [address] [place] [address] Day:  2  Admit Date:  8/4/2021  4:51 PM    PCP:   Ryder Galicia MD  Code Status:  Full Code    Subjective:     C/C:   Chief Complaint   Patient presents with    Bradycardia     Interval History Status: significantly improved. Patient seen and examined this morning. Sitting up at the edge of bed. No acute events overnight. Patient states that she did not sleep much last night, she was urinating throughout the night. 2.1 L of urine output overnight. Patient does admit that she previously has been having to stop multiple times while walking short distances. States that she is falling multiple times each day. Reports left calf pain. States that she takes Eliquis and Plavix daily. Brief History:     Kandy Mcmahon is a 70 y.o. female who presented to [facility] ED for evaluation after a fall and LOC.   This fall is happened at home and has been happening more frequently. Patient reports that she has been weak for quite some time now. She does admit to loss of consciousness. She endorses a history of multiple sclerosis,for which she follows with a clinician out of the [facility] [place]. She has been maintained on Imuran for many years, as she states that this is the only thing that works. She has multiple allergies as noted below. She also notes that she has chronic atrial fibrillation, hypogammaglobinemia, multiple prior cardiac arrest, congestive heart failure, and history of gout. In the emergency department, she was noted to be bradycardic and hypotensive. Orthostatics are noted to be positive.     Labs in the ED with a BNP and troponin mildly elevated from baseline. Creatinine WNL. Bilirubin elevated from draw 2 months prior. Normocytic, normochromic anemia present as well. UA unremarkable. CT of the chest was performed to r/o pulmonary embolism which revealed only a right-sided pleural effusion. She will be admitted for further work-up evaluation of bradycardia, syncope, falls. Review of Systems:     Constitutional:  negative for chills, fevers, sweats  Respiratory: Reports dyspnea with exertion; negative for cough, wheezing  Cardiovascular:  negative for chest pain, chest pressure/discomfort, lower extremity edema, palpitations  . Reports frequent urination overnight  Gastrointestinal:  negative for abdominal pain, constipation, diarrhea, nausea, vomiting  Neurological:  negative for dizziness, headache  Extremities: Reports left leg/calf pain    Medications: Allergies:     Allergies   Allergen Reactions    Lisinopril-Hydrochlorothiazide Anaphylaxis and Hives    Sulfa Antibiotics Anaphylaxis    Penicillins Hives    Polyethylene Glycol Hives    Actifed Cold-Allergy [Chlorpheniramine-Phenylephrine]     Altace [Ramipril]      Chronic cough    Cephalosporins Hives    Coreg [Carvedilol]      bloating    Dml Facial Moisturizer     Elavil [Amitriptyline Hcl]     Entex [Ami-Jose]     Ethylene Glycol     Fentanyl     Hizentra [Immune Globulin (Human)]     Hydralazine     Levofloxacin      ach tendon    Lisinopril-Hydrochlorothiazide     Montelukast Sodium      Heart effects    Morphine Itching    Nifedipine     Norvasc [Amlodipine Besylate]      Stomach pain    Other      IV IG     Phenobarbital Hives    Robaxin [Methocarbamol]     Sinequan [Doxepin Hcl]     Sudafed [Pseudoephedrine Hcl]     Sudafed [Pseudoephedrine Hcl]     Tranquil-London     Wellbutrin [Bupropion Hcl]      Hypotension    Actifed Cold-Allergy [Chlorpheniramine-Phenylephrine] Palpitations    Clindamycin/Lincomycin Nausea And Vomiting    Codeine Nausea And Vomiting    Metoprolol Nausea And Vomiting     Reports she is allergic to the preservative polyethylineglycol in this medicine, causing severe emesis and stomach pain     Metoprolol Succinate Nausea And Vomiting    Seldane [Terfenadine] Palpitations       Current Meds:   Scheduled Meds:    [START ON 8/7/2021] losartan  100 mg Oral Daily    theophylline  100 mg Oral Q8H    [Held by provider] metoprolol succinate  50 mg Oral BID    pantoprazole  40 mg Oral BID AC    allopurinol  100 mg Oral Daily    apixaban  5 mg Oral BID    azaTHIOprine  50 mg Oral Daily    clopidogrel  75 mg Oral Daily    [Held by provider] bumetanide  1 mg Oral Daily    PARoxetine  20 mg Oral QAM    [Held by provider] potassium chloride  20 mEq Oral Daily    pravastatin  40 mg Oral Daily    ranolazine  500 mg Oral BID    sodium chloride flush  5-40 mL Intravenous 2 times per day     Continuous Infusions:    sodium chloride       PRN Meds: sodium chloride flush, sodium chloride flush, sodium chloride, potassium chloride **OR** potassium alternative oral replacement **OR** potassium chloride, magnesium sulfate, ondansetron **OR** ondansetron, acetaminophen **OR** acetaminophen, senna    Data:     Past Medical History:   has a past medical history of Abnormal Pap smear, Asthma, Atrial fibrillation ([facility] Bloody discharge from nipple, CAD (coronary artery disease), Cataracts, bilateral, Colon polyp, CVA (cerebral vascular accident) ([facility] Demyelinating disease ([facility] Fibromyalgia, High-risk sexual behavior, History of bone density study, History of migraines, HTN (hypertension), IgG deficiency ([facility] Multiple sclerosis ([facility] Myoclonic seizures ([facility] Optic neuritis, Osteopenia, Pyloric stenosis, Raynaud's disease, and Vasomotor rhinitis. Social History:   reports that she has never smoked. She has never used smokeless tobacco. She reports that she does not drink alcohol and does not use drugs. Family History:   Family History   Problem Relation Age of Onset    Other Father         Heart problems    Heart Disease Father     Other Mother         heart problems requiring open heart surgery, HTN    Heart Disease Mother     Other Sister         HTN    Hypertension Sister     Other Maternal Aunt         breast cancer    Breast Cancer Maternal Aunt        Vitals:  BP (!) 167/65   Pulse 54   Temp 98.2 °F (36.8 °C) (Oral)   Resp 18   Wt 212 lb 11.9 oz (96.5 kg)   SpO2 98%   BMI 39.55 kg/m²   Temp (24hrs), Av.7 °F (36.5 °C), Min:97.3 °F (36.3 °C), Max:98.2 °F (36.8 °C)    No results for input(s): POCGLU in the last 72 hours. I/O (24Hr):     Intake/Output Summary (Last 24 hours) at 2021 1051  Last data filed at 2021 0753  Gross per 24 hour   Intake 720.66 ml   Output 2400 ml   Net -1679.34 ml       Labs:  Hematology:  Recent Labs     21  1714 21  0518   WBC 7.4 8.2   RBC 3.89* 3.82*   HGB 11.1* 11.0*   HCT 34.7* 34.1*   MCV 89.2 89.4   MCH 28.6 28.7   MCHC 32.1 32.2   RDW 17.9* 18.3*    184   MPV 10.0 9.8   SEDRATE  --  1     Chemistry:  Recent Labs     21  1714 21  1714 21  2103 21  2103 21  0110 21  0518 21  0830 21  1433   --   --   --   --  141  --   --    K 3.4*  --   --   --   --  3.7  --   --      --   --   --   --  107  --   --    CO2 23  --   --   --   --  24  --   --    GLUCOSE 113*  --   --   --   --  98  --   --    BUN 22  --   --   --   --  21  --   --    CREATININE 0.99*  --   --   --   --  0.99*  --   --    MG  --   --   --   --   --  1.9  --   --    ANIONGAP 14  --   --   --   --  10  --   --    LABGLOM 55*  --   --   --   --  55*  --   --    GFRAA >60  --   --   --   --  >60  --   --    CALCIUM 9.3  --   --   --   --  9.1  --   --    PROBNP 9,720*  --  9,700*  --   --   --   --   --    TROPHS 32*   < > 35*   < > 36*  --  35* 31*   MYOGLOBIN  --   --   --   --   --   --  42 43    < > = values in this interval not displayed. Recent Labs     08/04/21  1714 08/05/21  0518   PROT 5.9*  --    LABALBU 4.1  --    LABA1C  --  5.0   AST 12  --    ALT 9  --    ALKPHOS 76  --    BILITOT 1.50*  --    BILIDIR 0.70*  --      ABG:No results found for: POCPH, PHART, PH, POCPCO2, SAV7JXG, PCO2, POCPO2, PO2ART, PO2, POCHCO3, ATC4GDD, HCO3, NBEA, PBEA, BEART, BE, THGBART, THB, SVW9KMD, WKJU0NPM, J7HNCAGY, O2SAT, FIO2  Lab Results   Component Value Date/Time    SPECIAL 20 ML RIGHT [facility] 03/10/2019 08:42 PM     Lab Results   Component Value Date/Time    CULTURE NO GROWTH 6 DAYS 03/10/2019 08:42 PM       Radiology:  CT HEAD WO CONTRAST    Result Date: 8/4/2021  1. No acute intracranial abnormality. 2. Stable remote lacunar stroke right external capsule and lateral left basal ganglia. 3.  White matter hypoattenuation described is typical of microvascular ischemic disease or as sequela of dysmyelinating/demyelinating processes. 4. Stable senescent changes. CT CHEST PULMONARY EMBOLISM W CONTRAST    Result Date: 8/4/2021  No evidence of pulmonary embolism. No acute infiltrate. Right pleural effusion.        Physical Examination:        General appearance:  alert, cooperative and no distress, elderly, obese  female sitting up in bed  Mental Status:  oriented to person, place and time and normal affect  Lungs:  clear to auscultation bilaterally, normal effort  Heart: Bradycardia with irregular rhythm (sinus on telemetry), no murmur  Abdomen:  soft, nontender, nondistended, normal bowel sounds, no masses, hepatomegaly, splenomegaly  Extremities:  no redness; tenderness to left calf, 1+ bilateral lower extremity pitting edema  Skin:  no gross lesions, rashes, induration    Assessment:        Hospital Problems         Last Modified POA    * (Principal) Syncope and collapse 8/5/2021 Yes    Bradycardia 8/5/2021 Yes    Frequent falls 8/5/2021 Yes    Multiple sclerosis (HCC) (Chronic) 8/5/2021 Yes    Fibromyalgia 8/5/2021 Yes    Chronic diastolic heart failure ([address] 8/5/2021 Yes    Physical debility 8/5/2021 Yes    Obesity (BMI 30-39. 9) 8/5/2021 Yes    Essential hypertension (Chronic) 8/5/2021 Yes    Paroxysmal A-fib ([address] (Chronic) 8/5/2021 Yes          Plan:        1. Syncope - cardiology following. Likely related to Toprol XL, bradycardia, and orthostatic hypotension. Continue to hold Toprol. Continue to monitor telemetry. 2. Bradycardia - improving after Toprol XL has been held. Avoid other AV qamar blockers, including carapres. 3. Orthostatic hypotension - continue to check q shift. [facility] in place. I suspect this may be contributing to her falls  4. Falls - patient notes that she falls 2-3 times per day. Need to have a discussion about risk/benefit of continuing Eliquis at this point. Patient states that she would like to continue Eliquis, per cardiology note. 5. HFpEF - 2 L of urine output overnight after evening bumex was given. Hold bumex today. Check labs. Bumex to resume tomorrow. Check echo. 6. MS - appreciate neurology's input. MRI of the brain and C-spine today. Agree with PM&R eval.  7. Troponin elevation - likely type II in nature. Likely secondary to CHF exacerbation. Cardiology following.   Await 2D echo.  8. Paroxysmal atrial fibrillation - Currently in sinus bradycardia. Continue Eliquis. 9. Home medications have been reconciled.     33 Rayne Ramos DO  8/6/2021  10:51 AM

## 2021-08-06 NOTE — PROGRESS NOTES
Pt MRI screening sheet faxed down. During morning rounds for vitals pt states \"I can't go in that machine without versed, my legs jerk too much. \" Pt states \"I didn't sleep all night because my legs were jerkin so much. \" Informed pt that medications needed for MRI testing would need to be ordered by the rounding provider and that RN would discuss that with them this morning.

## 2021-08-06 NOTE — PROGRESS NOTES
Verified with pt, per request of MRI, that she has no medical devices inside her body outside of the stents which were placed and reported on the MRI screening tool. Notified Dr. Estrella Greene of need for versed for MRI and plan to go to MRI at noon today for scans of head and cervical spine.

## 2021-08-06 NOTE — PROGRESS NOTES
Cardiology contacted regarding patient 1600 assessment vitals, blood pressure is presently 177/61 (100), HR 55. Orders received.

## 2021-08-06 NOTE — PROGRESS NOTES
Occupational Therapy  Facility/Department: [facility] MED SURG ICU  Daily Treatment Note  NAME: Delio De La O  : 1945  MRN: [id number]    Date of Service: 2021    Discharge Recommendations:  Patient would benefit from continued therapy after discharge     OT Equipment Recommendations  Equipment Needed: Yes  Mobility Devices: Hemalatha Melonie: Rolling  ADL Assistive Devices: Transfer Tub Bench; Toilet Safety Frame;Dressing Stick    Assessment   Performance deficits / Impairments: Decreased functional mobility ; Decreased safe awareness;Decreased balance;Decreased ADL status; Decreased high-level IADLs;Decreased endurance;Decreased strength;Decreased sensation  Assessment: Pt has h/o of multiple sclerosis and increasing falls d/t generalized weakness and syncope episodes. Pt reports home management responsibilities are becoming increasing difficult to perform and occassionally receives assistance from her neighbors. Pt reports ~8-10 falls in the past 3 months. Pt would continue to benefit from skilled OT services during hospitalization and upon discharge to maximize pt's independence and activity tolerance for participation in ADLs/IADLs and functional mobility. Pt would be unsafe to return to prior living arrangement at this time without 24/7 supervision d/t progressive MS diagnosis and above stated deficits to safely perform all functional activities. Prognosis: Fair  OT Education: Energy Conservation;Home Exercise Program  Patient Education: handouts and education provided to pt on energy conservation techniques and BUE AROM HEP; pt demo'd participation in exercises seated at EOB this date. REQUIRES OT FOLLOW UP: Yes  Activity Tolerance  Activity Tolerance: Patient limited by fatigue  Activity Tolerance: Pt has h/o of multiple sclerosis - HR remained around mid-50s in sitting at EOB throughout session. Pt reports increased fatigue/lethargy this date.   Safety Devices  Safety Devices in place: Yes  Type of devices: Call light within reach; Left in bed;Nurse notified; Bed alarm in place  Restraints  Initially in place: No         Patient Diagnosis(es): The primary encounter diagnosis was Syncope and collapse. Diagnoses of Hypoxia and Bradycardia were also pertinent to this visit. has a past medical history of Abnormal Pap smear, Asthma, Atrial fibrillation (HCC), Bloody discharge from nipple, CAD (coronary artery disease), Cataracts, bilateral, Colon polyp, CVA (cerebral vascular accident) ([address] Demyelinating disease ([address] Fibromyalgia, High-risk sexual behavior, History of bone density study, History of migraines, HTN (hypertension), IgG deficiency ([address] Multiple sclerosis ([address] Myoclonic seizures ([address] Optic neuritis, Osteopenia, Pyloric stenosis, Raynaud's disease, and Vasomotor rhinitis. has a past surgical history that includes Dilation & curettage (1982); Breast biopsy (1990); Appendectomy (1955); Septoplasty (1985); Hammer toe surgery (1989); Sinus endoscopy; sinus surgery; Shoulder arthroscopy (1998); Esophagoscopy (1966, 1969); Colonoscopy (2010); Foreign Body Removal (1962); laparoscopy (2159); Coronary angioplasty with stent (5/2011); Coronary angioplasty with stent (6/2011); Upper gastrointestinal endoscopy; eye surgery; pr colonoscopy w/biopsy single/multiple (N/A, 12/19/2017); pr egd transoral biopsy single/multiple (12/19/2017); Upper gastrointestinal endoscopy (12/19/2017); Upper gastrointestinal endoscopy (5/24/2018); Cardiac surgery; Cardiac catheterization (07/31/2020); Cardioversion (08/14/2020); and Cardioversion (07/31/2020). Restrictions  Restrictions/Precautions  Restrictions/Precautions: Fall Risk  Required Braces or Orthoses?: No  Position Activity Restriction  Other position/activity restrictions:  Up with assistance - 2L O2 via nasal cannula     Subjective   General  Patient assessed for rehabilitation services?: Yes  Response to previous treatment: Patient reporting fatigue but able to participate  Family / Caregiver Present: No  Referring Practitioner: Luis Salazar  Diagnosis: Syncope and Collapse  General Comment  Comments: RN ok'd for treatment this date. Writer arrival upon PT exiting at this time, pt seated up at EOB and provided handouts and education of BUE AROM HEP and energy conservation techniques. Pt reports feeling \"so tired\" d/t minimal sleep overnight. Pain Assessment  Pain Assessment: 0-10  Pain Level: 8  Pain Type: Chronic pain  Pain Location: Generalized  Pain Frequency: Continuous  Pain Onset: On-going  Clinical Progression: Not changed  Functional Pain Assessment: Prevents or interferes some active activities and ADLs  Non-Pharmaceutical Pain Intervention(s): Ambulation/Increased Activity; Other (Comment); Emotional support (UE exercises)  Response to Pain Intervention: None  Vital Signs  Patient Currently in Pain: Yes     Orientation  Orientation  Overall Orientation Status: Within Functional Limits     Objective    ADL  Additional Comments: Pt reports performing toileting prior to writer arrival and denies additional ADL needs at this time. Balance  Sitting Balance: Supervision (seated at EOB w/unilateral UE rest at bedside and supervision during BUE AROM exercises and education of energy conservation techniques)    Functional Mobility  Functional Mobility Comments: Pt reports feeling \"so tired\" this date and denies additional functional mobility following PT session. Toilet Transfers  Toilet Transfers Comments: Pt reports use prior to writer arrival and no additional needs at this time. Bed mobility  Sit to Supine: Contact guard assistance (for LEs)  Scooting: Stand by assistance  Comment: Pt seated up at EOB with R UE support on bed rail upon writer arrival, PT exiting at this time. Cognition  Overall Cognitive Status: Exceptions  Arousal/Alertness: Appropriate responses to stimuli  Following Commands:  Follows multistep commands with increased time;Follows multistep commands with repitition  Attention Span: Attends with cues to redirect  Memory: Appears intact  Safety Judgement: Decreased awareness of need for assistance;Decreased awareness of need for safety  Problem Solving: Assistance required to identify errors made;Assistance required to generate solutions  Insights: Fully aware of deficits  Initiation: Requires cues for some  Sequencing: Requires cues for some     Type of ROM/Therapeutic Exercise  Type of ROM/Therapeutic Exercise: AROM  Comment: Pt provided handout and education on BUE AROM HEP with sets and rep guidelines to maintain UE rom/strength required for participation in ADL/IADLs. Pt demonstrated unilateral UE exercises, seated at EOB, with reduced repetition at this time secondary to pt reported fatigue/lethargy. Pt educated on progressive repetitions and light resistant upon increased activity tolerance. Exercises  Shoulder Flexion: 1x5 reps  Shoulder ABduction: 1x5 reps  Horizontal ABduction: bilaterally, 1x5 reps  Horizontal ADduction: bilaterally, 1x5 reps  Elbow Flexion: 1x10 reps  Elbow Extension: 1x10 reps  Supination: 1x10 reps  Pronation: 1x10 reps  Other: additional education provided on wrist flexion/extension, ulnar/radial deviation and fine motor exercises - pt verbalized understanding.       Plan   Plan  Times per week: 5-6x/week  Times per day: Daily  Current Treatment Recommendations: Strengthening, Safety Education & Training, Balance Training, Patient/Caregiver Education & Training, Self-Care / ADL, Functional Mobility Training, Endurance Training, Equipment Evaluation, Education, & procurement, Home Management Training, Neuromuscular Re-education, ROM    AM-PAC Score  AM-[facility] Inpatient Daily Activity Raw Score: 19 (08/06/21 1141)  AM-PAC Inpatient ADL T-Scale Score : 40.22 (08/06/21 1141)  ADL Inpatient CMS 0-100% Score: 42.8 (08/06/21 1141)  ADL Inpatient CMS G-Code Modifier : CK (08/06/21 1141)    Goals  Short term goals  Time Frame for Short term goals: 14 visits  Short term goal 1: Pt will demo Supervision, with least restrictive AD, to perform functional mobility/transfers during ADLs/IADLs. Short term goal 2: Pt will demo Mod IND, with DME as needed, to perform ADLs. Short term goal 3: Pt will IND incorporate energy conservation techniques to promote activity tolerance to perform and engage in functional activities. Short term goal 4: Pt will tolerate 10+ minutes of activity tolerance during engagement in functional tasks to maximize independence to perform ADL/IADLs. Short term goal 5: Pt will IND demo participation in bilateral UE progressive resistive HEP to promote strength required during participation in ADLs/IADLs.        Therapy Time   Individual Concurrent Group Co-treatment   Time In 1052         Time Out 1112         Minutes 20         Timed Code Treatment Minutes: 25 Minutes       JUD Rabago/L

## 2021-08-06 NOTE — PROGRESS NOTES
Physical Therapy  Facility/Department: [facility] SURG [facility]  Daily Treatment Note  NAME: Donnie Pires  : 1945  MRN: [id number]    Date of Service: 2021    Discharge Recommendations:  Patient would benefit from continued therapy after discharge   PT Equipment Recommendations  Equipment Needed: No  Other: Pt states she has a rollator at home and it is recommended that she uses that for all ambulation; see OT note for further equipment recommendations. Assessment   Body structures, Functions, Activity limitations: Decreased functional mobility ; Decreased safe awareness;Decreased balance;Decreased cognition;Decreased endurance; Increased pain;Decreased strength;Decreased posture  Assessment: Pt with improved activity tolerance this date with gait 115' and 215' with seated rest break between bouts, 2 L O2 with PO2 at 96-98%. Pt with less SOB but noted fatigue from not sleeping well. Pt exhibits decreased balance, endurance, and strength with functional mobility and would benefit from assistance at home as well as continued therapy upon discharge. Prognosis: Fair  PT Education: Precautions;Gait Training; Adaptive Device Training;General Safety; Functional Mobility Training;Transfer Training  REQUIRES PT FOLLOW UP: Yes  Activity Tolerance  Activity Tolerance: Patient limited by fatigue;Patient limited by endurance  Activity Tolerance: Improved gait tolerance this date. Patient Diagnosis(es): The primary encounter diagnosis was Syncope and collapse. Diagnoses of Hypoxia and Bradycardia were also pertinent to this visit.      has a past medical history of Abnormal Pap smear, Asthma, Atrial fibrillation (HCC), Bloody discharge from nipple, CAD (coronary artery disease), Cataracts, bilateral, Colon polyp, CVA (cerebral vascular accident) ([facility] Demyelinating disease ([facility] Fibromyalgia, High-risk sexual behavior, History of bone density study, History of migraines, HTN (hypertension), IgG deficiency 2: Pt to improve standing balance to Fair + to reduce fall risk. Short term goal 3: Pt to negotiate 1 stair with no hand rails independently to allow safe access into apartment upon discharge. Short term goal 4: Pt to demonstrate seated lower extremity HEP to increase lower extremity strength upon discharge.   Patient Goals   Patient goals : Return home with additional services    Plan    Plan  Times per week: 5-6x/week  Times per day: Daily  Current Treatment Recommendations: Strengthening, Transfer Training, Endurance Training, Patient/Caregiver Education & Training, Pain Management, Balance Training, Gait Training, Home Exercise Program, Positioning, Functional Mobility Training, Stair training  Safety Devices  Type of devices: Call light within reach, Left in bed, Gait belt (Pt left with OT for OT treatment.)  Restraints  Initially in place: No     Therapy Time   Individual Concurrent Group Co-treatment   Time In 1001         Time Out 1052         Minutes 51         Timed Code Treatment Minutes: [address] PT

## 2021-08-06 NOTE — PROGRESS NOTES
alert and cooperative with exam  HEENT: Head: Normocephalic, no lesions, without obvious abnormality. Neck: no JVD, trachea midline, no adenopathy  Lungs: Clear to auscultation  Heart: Regular rate and rhythm, s1/s2 auscultated, no murmurs  Abdomen: soft, non-tender, bowel sounds active  Extremities: no edema  Neurologic: not done    Echocardiogram 4/30/2019  Left ventricle is normal in size with normal systolic function. Calculated ejection fraction is 65%. Increased left ventricular wall thickness. Evidence of advance diastolic dysfunction. Left atrium is moderately dilated. Right atrial enlargement. Increase in right ventricular free wall thickness. Mildly dilated left ventricle with mildly reduced function. Aortic leaflet calcification without stenosis. Mild thickening of the mitral leaflets without stenosis. Mild mitral regurgitation. Trace tricuspid regurgitation. Estimated right ventricular systolic pressure  is 34 mmH        Coronary Angiography 7/31/20:   LMCA: Mild irregularities 10-20%. LAD: Patent mid stent, distal 40% stenosis. Apical vessel had 80% stenosis, given symptoms and apical ischemia on stress   test, balloon angioplasty was performed with reduction of stenosis to 20%. LCx: Mild irregularities 10-20%. Large and dominant with mild disease   OM1 and OM2 are large, patent with mild disease   RCA: Small and non-dominant, patent, mild disease     CARDIOVERSION:  After an adequate level of sedation was achieved, 150J in biphasic synchronized delivery was administered. conversion to normal sinus rhythm. The patient awoke without complications.      The patient will continue with the discharge m. There were no complications      Medical Hx     1. Hypertension   2. Coronary  artery disease status post PCI to LAD   3. Multiple sclerosis   4. Gastroparesis  5. Echocardiogram 4/30/2019: Left ventricle is normal in size with normal systolic function. Calculated ejection fraction is 65%. Evidence of advance diastolic dysfunction. Left atrium is moderately dilated. Right atrial enlargement. Increase in right ventricular free wall thickness. Mildly dilated left ventricle with mildly reduced function. Aortic leaflet calcification without stenosis. Mild thickening of the mitral leaflets without stenosis. Mild mitral regurgitation. 6. Nuclear stress test on July 28, 2020 showed reversible perfusion defect in anteroapical and lateral apical wall  7. PAF and atrial flutter, with admission to [facility] in July 2020 with new onset atrial flutter status post JOVITA cardioversion on July 31, 2020; amiodarone restarted; pt is on Eliquis. 8. Coronary angiography on July 31, 2020 showed patent LAD stent and high-grade stenosis in the apical LAD status post POBA/ PTCA with balloon angioplasty only. Other vessels had mild non-obstructive disease. 9. Repeat cardioversion Aug 2020, with subsequent recurrent atrial flutter 9/28/20   10. Referred to [facility] to consider AFib ablation, had cardiac arrest after 2nd dose of Tikosyn, currently managed with rate control strategy. was deemed not a suitable candidate for ablation due to comorbidities.      Plan and Assessment per Dr. Madison Clifford last Progress note 6/2021  1. Chronic/permanent AF with suboptimal rate control, on lower dose metoprolol, with persistent fatigue and mild HERNANDEZ  2. 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 Stable CAD, s/p distal LAD angioplasty 7/31/20  4. Essential HTN with preserved LVEF  5. Chronic anticoagulation with Eliquis  6. Cardiac arrest ([place]) at [facility] where she was admitted for Tikosyn therapy. 7. Complicated multiple sclerosis  8. Epigastric discomfort, scheduled for EGD at [address] [place] [address]     1. Continue Toprol XL 50 mg b.i.d. Discontinue losartan and start cardizem 120 mg qd for better rate control.   2. Not on any antiarrhythmics at this time due to recent cardiac arrest likely [place], records

## 2021-08-06 NOTE — DISCHARGE INSTR - COC
Continuity of Care Form    Patient Name: Sánchez Brewer   :  1945  MRN:  [id number]    Admit date:  2021  Discharge date:  2021    Code Status Order: Full Code   Advance Directives:      Admitting Physician:  Apurva Anne DO  PCP: Harmeet Gaviria MD    Discharging Nurse: Cory Gonsales[address] Unit/Room#: 746/123-89  Discharging Unit Phone Number: [phone]    Emergency Contact:   Extended Emergency Contact Information  Primary Emergency Contact: Sharee eMndez  Address: [address] Phone: [phone]  Relation: Child   needed?  No    Past Surgical History:  Past Surgical History:   Procedure Laterality Date    APPENDECTOMY      sponges left in abdomen, at [address] [place] [address] Way    stereotactic type, right breast, benign    CARDIAC CATHETERIZATION  2020    POBA lad    CARDIAC SURGERY      C[facility]ERSION  2020    [facility]     CARDIOVERSION  2020    COLONOSCOPY      Polyp    CORONARY ANGIOPLASTY WITH STENT PLACEMENT  2011    failed attempt to place stent    CORONARY ANGIOPLASTY WITH STENT PLACEMENT  2011    successful placement of 2 stent in same artery   [address]    bleeding ulcers found   [address] [facility]    surgical sponges left in during appendectomy    HAMMER TOE SURGERY      head resection of phalanx, left foot   [facility]    with D&C    MI COLONOSCOPY W/BIOPSY SINGLE/MULTIPLE N/A 2017    COLONOSCOPY WITH BIOPSY performed by Brian Judge MD at [facility]    MI EGD TRANSORAL BIOPSY SINGLE/MULTIPLE  2017    EGD BIOPSY performed by Brian Judge MD at [facility] resection with rt and left ethmoidectomy    SHOULDER ARTHROSCOPY  1998    rotator cuff impingement, right arm    SINUS ENDOSCOPY      SINUS SURGERY multiple    UPPER GASTROINTESTINAL ENDOSCOPY      2-4 times per year for pyloric stenosis    UPPER GASTROINTESTINAL ENDOSCOPY  12/19/2017    EGD DILATION BALLOON performed by Dona Meza MD at [address] [place] [address] ENDOSCOPY  5/24/2018    EGD DILATION SAVORY performed by Yuni Larry MD at [facility]       Immunization History: There is no immunization history on file for this patient. Active Problems:  Patient Active Problem List   Diagnosis Code    Bloody discharge from nipple N64.52    Osteopenia M85.80    Multiple sclerosis ([facility] G35    Essential hypertension I10    Asthma J45.909    SOB (shortness of breath) R06.02    Lower leg pain M79.669    Noncompliance with medications Z91.14    Dyslipidemia E78.5    GERD (gastroesophageal reflux disease) K21.9    Fibromyalgia M79.7    Pyloric stenosis K31.1    Demyelinating disease ([facility] G37.9    H/O: CVA (cerebrovascular accident) Z80.78    Epigastric abdominal pain R10.13    Incontinence in female R32    Chest pain with high risk for cardiac etiology R07.9    Migraine without status migrainosus, not intractable G43.909    Hypokalemia E87.6    Chronic diastolic heart failure (HCC) I50.32    Acute diastolic HF (heart failure) ([facility]) I50.31    Acute on chronic combined systolic and diastolic CHF (congestive heart failure) ([facility]) I50.43    CAD (coronary artery disease) I25.10    Atrial flutter ([facility]) I48.92    Chronic bronchiolitis ([facility]) J44.9    Paroxysmal A-fib ([facility]) I48.0    Bradycardia R00.1    HTN (hypertension) I10    Chronic renal insufficiency, stage III (moderate) ([facility]) N18.30    Mild cognitive disorder F09    Raynaud's disease I73.00    Syncope and collapse R55    Frequent falls R29.6    Physical debility R53.81    Chronic atrial fibrillation (HCC) I48.20    Obesity (BMI 30-39. 9) E66.9       Isolation/Infection:   Isolation            No Isolation          Patient Infection Status Infection Onset Added Last Indicated Last Indicated By Review Planned Expiration Resolved Resolved By    None active    Resolved    COVID-19 Rule Out 08/25/20 08/25/20 08/25/20 COVID-19 (Ordered)   08/25/20 Rule-Out Test Resulted    COVID-19 Rule Out 06/27/20 06/27/20 06/27/20 Covid-19 Ambulatory (Ordered)   06/30/20 Rule-Out Test Resulted            Nurse Assessment:  Last Vital Signs: BP (!) 157/60   Pulse 56   Temp 97.5 °F (36.4 °C) (Oral)   Resp 18   Wt 212 lb 11.9 oz (96.5 kg)   SpO2 100%   BMI 39.55 kg/m²     Last documented pain score (0-10 scale): Pain Level: 0  Last Weight:   Wt Readings from Last 1 Encounters:   08/05/21 212 lb 11.9 oz (96.5 kg)     Mental Status:  oriented and alert    IV Access:  - None    Nursing Mobility/ADLs:  Walking   Independent -- Always use walker and cane for ambulation  Transfer  Independent  Bathing  Independent  Dressing  Independent  Toileting  Independent  Feeding  Independent  Med Admin  Independent  Med Delivery   whole    Wound Care Documentation and Therapy:        Elimination:  Continence:   · Bowel: No  · Bladder: Yes  Urinary Catheter: None   Colostomy/Ileostomy/Ileal Conduit: No       Intake/Output Summary (Last 24 hours) at 8/6/2021 1547  Last data filed at 8/6/2021 0753  Gross per 24 hour   Intake 720.66 ml   Output 2400 ml   Net -1679.34 ml     I/O last 3 completed shifts: In: 720.7 [I.V.:720.7]  Out: 2400 [Urine:2400]    Safety Concerns: At Risk for Falls    Impairments/Disabilities:      None    Nutrition Therapy:  Current Nutrition Therapy:   - Oral Diet:  Cardiac    Routes of Feeding: Oral  Liquids: No Restrictions  Daily Fluid Restriction: no  Last Modified Barium Swallow with Video (Video Swallowing Test): not done    Treatments at the Time of Hospital Discharge:   Respiratory Treatments:   Oxygen Therapy:  is not on home oxygen therapy.   Ventilator:    - No ventilator support    Rehab Therapies: Physical Therapy and Occupational Therapy  Weight Bearing Status/Restrictions: No weight bearing restirctions  Other Medical Equipment (for information only, NOT a DME order):  cane, walker and bath bench  Other Treatments:     Patient's personal belongings (please select all that are sent with patient):  Marisol Chambers RN SIGNATURE:  Electronically signed by Laureen Casarez RN on 8/8/21 at 2:17 PM EDT    CASE MANAGEMENT/SOCIAL WORK SECTION    Inpatient Status Date: ***    Readmission Risk Assessment Score:  Readmission Risk              Risk of Unplanned Readmission:  22           Discharging to Facility/ Agency   · Name:   · Address:  · Phone:  · Fax:    Dialysis Facility (if applicable)   · Name:  · Address:  · Dialysis Schedule:  · Phone:  · Fax:    / signature: {Esignature:[id number]}    PHYSICIAN SECTION    Prognosis: Fair    Condition at Discharge: Stable    Rehab Potential (if transferring to Rehab): Fair    Recommended Labs or Other Treatments After Discharge: Needs to follow-up with her cardiologist.  She also needs to make contact with her . Physician Certification: I certify the above information and transfer of Aaron Richards  is necessary for the continuing treatment of the diagnosis listed and that she requires [address] for greater 30 days.      Update Admission H&P: No change in H&P    PHYSICIAN SIGNATURE:  Electronically signed by Nadeem Maldonado DO on 8/8/21 at 12:52 PM EDT

## 2021-08-06 NOTE — CARE COORDINATION
[facility] ICU Quality Flow/Interdisciplinary Rounds Progress Note    Quality Flow Rounds held on August 6, 2021 at [address] Attending:  Bedside Nurse, , , Nursing Unit Leadership, Dietary, Physical Therapy and Occupational Therapy    Anticipated Discharge Date:  Expected Discharge Date: 08/06/21    Anticipated Discharge Disposition:    Readmission Risk              Risk of Unplanned Readmission:  19           Discussed patient goal for the day, patient clinical progression, and barriers to discharge. The following Goal(s) of the Day/Commitment(s) have been identified:  Provided with a SNF list, MRI of brain and neck .       Berlin Rich RN  August 6, 2021

## 2021-08-07 LAB
ABSOLUTE EOS #: 0.2 K/UL (ref 0–0.4)
ABSOLUTE IMMATURE GRANULOCYTE: ABNORMAL K/UL (ref 0–0.3)
ABSOLUTE LYMPH #: 0.9 K/UL (ref 1–4.8)
ABSOLUTE MONO #: 0.9 K/UL (ref 0.1–1.2)
ALBUMIN SERPL-MCNC: 3.5 G/DL (ref 3.5–5.2)
ANION GAP SERPL CALCULATED.3IONS-SCNC: 12 MMOL/L (ref 9–17)
BASOPHILS # BLD: 1 % (ref 0–2)
BASOPHILS ABSOLUTE: 0.1 K/UL (ref 0–0.2)
BUN BLDV-MCNC: 15 MG/DL (ref 8–23)
BUN/CREAT BLD: ABNORMAL (ref 9–20)
CALCIUM SERPL-MCNC: 8.9 MG/DL (ref 8.6–10.4)
CHLORIDE BLD-SCNC: 98 MMOL/L (ref 98–107)
CO2: 29 MMOL/L (ref 20–31)
CREAT SERPL-MCNC: 0.85 MG/DL (ref 0.5–0.9)
DIFFERENTIAL TYPE: ABNORMAL
EOSINOPHILS RELATIVE PERCENT: 3 % (ref 1–4)
GFR AFRICAN AMERICAN: >60 ML/MIN
GFR NON-AFRICAN AMERICAN: >60 ML/MIN
GFR SERPL CREATININE-BSD FRML MDRD: ABNORMAL ML/MIN/{1.73_M2}
GFR SERPL CREATININE-BSD FRML MDRD: ABNORMAL ML/MIN/{1.73_M2}
GLUCOSE BLD-MCNC: 93 MG/DL (ref 70–99)
HCT VFR BLD CALC: 33.7 % (ref 36–46)
HEMOGLOBIN: 11 G/DL (ref 12–16)
IMMATURE GRANULOCYTES: ABNORMAL %
LYMPHOCYTES # BLD: 11 % (ref 24–44)
MCH RBC QN AUTO: 28.9 PG (ref 26–34)
MCHC RBC AUTO-ENTMCNC: 32.7 G/DL (ref 31–37)
MCV RBC AUTO: 88.4 FL (ref 80–100)
MONOCYTES # BLD: 12 % (ref 2–11)
NRBC AUTOMATED: ABNORMAL PER 100 WBC
PDW BLD-RTO: 17.8 % (ref 12.5–15.4)
PHOSPHORUS: 3.6 MG/DL (ref 2.6–4.5)
PLATELET # BLD: 188 K/UL (ref 140–450)
PLATELET ESTIMATE: ABNORMAL
PMV BLD AUTO: 9.6 FL (ref 6–12)
POTASSIUM SERPL-SCNC: 3.6 MMOL/L (ref 3.7–5.3)
RBC # BLD: 3.81 M/UL (ref 4–5.2)
RBC # BLD: ABNORMAL 10*6/UL
SEG NEUTROPHILS: 73 % (ref 36–66)
SEGMENTED NEUTROPHILS ABSOLUTE COUNT: 6 K/UL (ref 1.8–7.7)
SODIUM BLD-SCNC: 139 MMOL/L (ref 135–144)
WBC # BLD: 8.2 K/UL (ref 3.5–11)
WBC # BLD: ABNORMAL 10*3/UL

## 2021-08-07 PROCEDURE — 6370000000 HC RX 637 (ALT 250 FOR IP): Performed by: NURSE PRACTITIONER

## 2021-08-07 PROCEDURE — 99232 SBSQ HOSP IP/OBS MODERATE 35: CPT | Performed by: PSYCHIATRY & NEUROLOGY

## 2021-08-07 PROCEDURE — 6370000000 HC RX 637 (ALT 250 FOR IP): Performed by: INTERNAL MEDICINE

## 2021-08-07 PROCEDURE — 2060000000 HC ICU INTERMEDIATE R&B

## 2021-08-07 PROCEDURE — 36415 COLL VENOUS BLD VENIPUNCTURE: CPT

## 2021-08-07 PROCEDURE — 99232 SBSQ HOSP IP/OBS MODERATE 35: CPT | Performed by: INTERNAL MEDICINE

## 2021-08-07 PROCEDURE — 2580000003 HC RX 258: Performed by: NURSE PRACTITIONER

## 2021-08-07 PROCEDURE — 80069 RENAL FUNCTION PANEL: CPT

## 2021-08-07 PROCEDURE — 6360000002 HC RX W HCPCS: Performed by: NURSE PRACTITIONER

## 2021-08-07 PROCEDURE — 85025 COMPLETE CBC W/AUTO DIFF WBC: CPT

## 2021-08-07 RX ORDER — CLONIDINE HYDROCHLORIDE 0.1 MG/1
0.1 TABLET ORAL
Status: DISCONTINUED | OUTPATIENT
Start: 2021-08-07 | End: 2021-08-07

## 2021-08-07 RX ORDER — POTASSIUM CHLORIDE 20 MEQ/1
40 TABLET, EXTENDED RELEASE ORAL ONCE
Status: COMPLETED | OUTPATIENT
Start: 2021-08-07 | End: 2021-08-07

## 2021-08-07 RX ORDER — NITROGLYCERIN 20 MG/100ML
5-200 INJECTION INTRAVENOUS CONTINUOUS
Status: DISCONTINUED | OUTPATIENT
Start: 2021-08-07 | End: 2021-08-08 | Stop reason: HOSPADM

## 2021-08-07 RX ORDER — CLONIDINE HYDROCHLORIDE 0.1 MG/1
0.1 TABLET ORAL 2 TIMES DAILY
Status: DISCONTINUED | OUTPATIENT
Start: 2021-08-07 | End: 2021-08-08 | Stop reason: HOSPADM

## 2021-08-07 RX ORDER — CLONIDINE HYDROCHLORIDE 0.1 MG/1
0.1 TABLET ORAL 2 TIMES DAILY
Status: DISCONTINUED | OUTPATIENT
Start: 2021-08-07 | End: 2021-08-07

## 2021-08-07 RX ADMIN — POTASSIUM CHLORIDE 40 MEQ: 1500 TABLET, EXTENDED RELEASE ORAL at 16:35

## 2021-08-07 RX ADMIN — ACETAMINOPHEN 650 MG: 325 TABLET ORAL at 23:54

## 2021-08-07 RX ADMIN — SODIUM CHLORIDE, PRESERVATIVE FREE 10 ML: 5 INJECTION INTRAVENOUS at 08:18

## 2021-08-07 RX ADMIN — SODIUM CHLORIDE, PRESERVATIVE FREE 10 ML: 5 INJECTION INTRAVENOUS at 21:27

## 2021-08-07 RX ADMIN — CLONIDINE HYDROCHLORIDE 0.1 MG: 0.1 TABLET ORAL at 08:17

## 2021-08-07 RX ADMIN — RANOLAZINE 500 MG: 500 TABLET, EXTENDED RELEASE ORAL at 21:27

## 2021-08-07 RX ADMIN — PRAVASTATIN SODIUM 40 MG: 20 TABLET ORAL at 21:27

## 2021-08-07 RX ADMIN — CLONIDINE HYDROCHLORIDE 0.1 MG: 0.1 TABLET ORAL at 21:27

## 2021-08-07 RX ADMIN — CLOPIDOGREL BISULFATE 75 MG: 75 TABLET ORAL at 08:17

## 2021-08-07 RX ADMIN — ACETAMINOPHEN 650 MG: 325 TABLET ORAL at 15:27

## 2021-08-07 RX ADMIN — APIXABAN 5 MG: 5 TABLET, FILM COATED ORAL at 08:17

## 2021-08-07 RX ADMIN — APIXABAN 5 MG: 5 TABLET, FILM COATED ORAL at 21:27

## 2021-08-07 RX ADMIN — THEOPHYLLINE ANHYDROUS 100 MG: 80 LIQUID ORAL at 14:02

## 2021-08-07 RX ADMIN — AZATHIOPRINE 50 MG: 50 TABLET ORAL at 21:27

## 2021-08-07 RX ADMIN — RANOLAZINE 500 MG: 500 TABLET, EXTENDED RELEASE ORAL at 08:17

## 2021-08-07 RX ADMIN — ALLOPURINOL 100 MG: 100 TABLET ORAL at 21:27

## 2021-08-07 RX ADMIN — THEOPHYLLINE ANHYDROUS 100 MG: 80 LIQUID ORAL at 06:03

## 2021-08-07 RX ADMIN — ACETAMINOPHEN 650 MG: 325 TABLET ORAL at 08:17

## 2021-08-07 RX ADMIN — LOSARTAN POTASSIUM 100 MG: 50 TABLET, FILM COATED ORAL at 08:17

## 2021-08-07 RX ADMIN — PANTOPRAZOLE SODIUM 40 MG: 40 TABLET, DELAYED RELEASE ORAL at 06:03

## 2021-08-07 RX ADMIN — PANTOPRAZOLE SODIUM 40 MG: 40 TABLET, DELAYED RELEASE ORAL at 15:27

## 2021-08-07 RX ADMIN — THEOPHYLLINE ANHYDROUS 100 MG: 80 LIQUID ORAL at 21:30

## 2021-08-07 RX ADMIN — PAROXETINE HYDROCHLORIDE 20 MG: 20 TABLET, FILM COATED ORAL at 08:17

## 2021-08-07 RX ADMIN — BUMETANIDE 1 MG: 1 TABLET ORAL at 08:17

## 2021-08-07 ASSESSMENT — PAIN DESCRIPTION - LOCATION: LOCATION: GENERALIZED

## 2021-08-07 ASSESSMENT — PAIN SCALES - GENERAL
PAINLEVEL_OUTOF10: 6
PAINLEVEL_OUTOF10: 4
PAINLEVEL_OUTOF10: 8
PAINLEVEL_OUTOF10: 0
PAINLEVEL_OUTOF10: 8

## 2021-08-07 ASSESSMENT — PAIN - FUNCTIONAL ASSESSMENT: PAIN_FUNCTIONAL_ASSESSMENT: ACTIVITIES ARE NOT PREVENTED

## 2021-08-07 ASSESSMENT — PAIN DESCRIPTION - PAIN TYPE: TYPE: CHRONIC PAIN

## 2021-08-07 NOTE — PROGRESS NOTES
Pt demanding RN make arrangements for [place] in [facility]. Pt states that Dr. Betty Cramer told her she is \"too sick to go home. \" She states \"there is no one there to take care of me,\" \"my place needs to be cleaned up,\" \"I'm on my last days. \" Reassured pt that RN would get in touch with  today.

## 2021-08-07 NOTE — PROGRESS NOTES
degenerative changes  . MRI of Head with chronic white matter changes . [id number] 5, C27 117, folic acid 8.7, ESR 1 , TSH normal      Past Medical History:   Diagnosis Date    Abnormal Pap smear 10/2010    paucity or absence of TZx 3 years in a row. 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[address]     Multiple sclerosis ([address]     progressive    Myoclonic seizures ([address]     Optic neuritis     Mazin Anirudh Pupil    Osteopenia     Pyloric stenosis     Raynaud's disease     Vasomotor rhinitis        Past Surgical History:   Procedure Laterality Date    APPENDECTOMY  1955    sponges left in abdomen, at [address] [place] [address] Way    stereotactic type, right breast, benign    CARDIAC CATHETERIZATION  07/31/2020    POBA lad    CARDIAC SURGERY      CARDIOVERSION  08/14/2020    [facility]     CARDIOVERSION  07/31/2020    COLONOSCOPY  2010    Polyp    CORONARY ANGIOPLASTY WITH STENT PLACEMENT  5/2011    failed attempt to place stent    CORONARY ANGIOPLASTY WITH STENT PLACEMENT  6/2011    successful placement of 2 stent in same artery   [address]    bleeding ulcers found   [id number] [address]    surgical sponges left in during appendectomy    HAMMER TOE SURGERY  1989    head resection of phalanx, left foot   [address]    with D&C    ID COLONOSCOPY W/BIOPSY SINGLE/MULTIPLE N/A 12/19/2017    COLONOSCOPY WITH BIOPSY performed by Jennifer R[facility] at [facility]    PA EGD TRANSORAL BIOPSY SINGLE/MULTIPLE  12/19/2017    EGD BIOPSY performed by Jennifer Riddle MD at [address] [place] [address] Avenue resection with rt and left ethmoidectomy    SHOULDER ARTHROSCOPY  1998    rotator cuff impingement, right arm    SINUS ENDOSCOPY      SINUS SURGERY      multiple    UPPER GASTROINTESTINAL ENDOSCOPY      2-4 times per year for pyloric stenosis    UPPER GASTROINTESTINAL ENDOSCOPY  12/19/2017    EGD DILATION BALLOON performed by Jennifer Riddle MD at [facility]    UPPER GASTROINTESTINAL ENDOSCOPY  5/24/2018    EGD DILATION SAVORY performed by Geena Patel MD at [facility]       Family History   Problem Relation Age of Onset    Other Father         Heart problems    Heart Disease Father     Other Mother         heart problems requiring open heart surgery, HTN    Heart Disease Mother     Other Sister         HTN    Hypertension Sister     Other Maternal Aunt         breast cancer    Breast Cancer Maternal Aunt        Social History     Socioeconomic History    Marital status: Single     Spouse name: None    Number of children: None    Years of education: None    Highest education level: None   Occupational History    Occupation: retired   Tobacco Use    Smoking status: Never Smoker    Smokeless tobacco: Never Used   Vaping Use    Vaping Use: Never used   Substance and Sexual Activity    Alcohol use: No    Drug use: No    Sexual activity: Never   Other Topics Concern    None   Social History Narrative    She uses [facility] for primary care with Dr. Rodolfo Steve     Social Determinants of Health     Financial Resource Strain:     Difficulty of Paying Living Expenses:    Food Insecurity:     Worried About [address] in the Last Year:     [address] in the Last Year:    Transportation Needs:     Lack of Transportation (Medical):      Lack mg Oral QAM Lyndel Boudreaux, APRN - CNP   20 mg at 08/07/21 0817    [Held by provider] potassium chloride (KLOR-CON M) extended release tablet 20 mEq  20 mEq Oral Daily Lyndel Boudreaux, APRN - CNP   20 mEq at 08/04/21 2156    pravastatin (PRAVACHOL) tablet 40 mg  40 mg Oral Daily Lyndel Boudreaux, APRN - CNP   40 mg at 08/05/21 2120    ranolazine (RANEXA) extended release tablet 500 mg  500 mg Oral BID Lyndel Boudreaux, APRN - CNP   500 mg at 08/07/21 4355    sodium chloride flush 0.9 % injection 5-40 mL  5-40 mL Intravenous 2 times per day Lyndel Boudreaux, APRN - CNP   10 mL at 08/07/21 0818    sodium chloride flush 0.9 % injection 10 mL  10 mL Intravenous PRN Lyndel Boudreaux, APRN - CNP        0.9 % sodium chloride infusion  25 mL Intravenous PRN Lyndel Boudreaux, APRN - CNP        potassium chloride (KLOR-CON M) extended release tablet 40 mEq  40 mEq Oral PRN Lyndel Boudreaux, APRN - CNP        Or    potassium bicarb-citric acid (EFFER-K) effervescent tablet 40 mEq  40 mEq Oral PRN Lyndel Boudreaux, APRN - CNP        Or    potassium chloride 10 mEq/100 mL IVPB (Peripheral Line)  10 mEq Intravenous PRN Lyndel Boudreaux, APRN - CNP        magnesium sulfate 1000 mg in dextrose 5% 100 mL IVPB  1,000 mg Intravenous PRN Lyndel Boudreaux, APRN - CNP        ondansetron (ZOFRAN-ODT) disintegrating tablet 4 mg  4 mg Oral Q8H PRN Lyndel Boudreaux, APRN - CNP        Or    ondansetron (ZOFRAN) injection 4 mg  4 mg Intravenous Q6H PRN Lyndel Boudreaux, APRN - CNP        acetaminophen (TYLENOL) tablet 650 mg  650 mg Oral Q6H PRN Lyndel Boudreaux, APRN - CNP   650 mg at 08/07/21 8896    Or    acetaminophen (TYLENOL) suppository 650 mg  650 mg Rectal Q6H PRN Lyndel Boudreaux, APRN - CNP        senna (SENOKOT) tablet 8.6 mg  1 tablet Oral Daily PRN Lyndel Boudreaux, APRN - CNP           Allergies   Allergen Reactions  Lisinopril-Hydrochlorothiazide Anaphylaxis and Hives    Sulfa Antibiotics Anaphylaxis    Penicillins Hives    Polyethylene Glycol Hives    Actifed Cold-Allergy [Chlorpheniramine-Phenylephrine]     Altace [Ramipril]      Chronic cough    Cephalosporins Hives    Coreg [Carvedilol]      bloating    Dml Facial Moisturizer     Elavil [Amitriptyline Hcl]     Entex [Ami-Jose]     Ethylene Glycol     Fentanyl     Hizentra [Immune Globulin (Human)]     Hydralazine     Levofloxacin      ach tendon    Lisinopril-Hydrochlorothiazide     Montelukast Sodium      Heart effects    Morphine Itching    Nifedipine     Norvasc [Amlodipine Besylate]      Stomach pain    Other      IV IG     Phenobarbital Hives    Robaxin [Methocarbamol]     Sinequan [Doxepin Hcl]     Sudafed [Pseudoephedrine Hcl]     Sudafed [Pseudoephedrine Hcl]     Tranquil-[place]     Wellbutrin [Bupropion Hcl]      Hypotension    Actifed Cold-Allergy [Chlorpheniramine-Phenylephrine] Palpitations    Clindamycin/Lincomycin Nausea And Vomiting    Codeine Nausea And Vomiting    Metoprolol Nausea And Vomiting     Reports she is allergic to the preservative polyethylineglycol in this medicine, causing severe emesis and stomach pain     Metoprolol Succinate Nausea And Vomiting    Seldane [Terfenadine] Palpitations       ROS:   Constitutional                  Negative for fever and chills   HEENT                            Negative for ear discharge, ear pain, nosebleed  Eyes                                Negative for photophobia, pain and discharge  Respiratory                      Negative for hemoptysis and sputum  Cardiovascular                Negative for orthopnea, claudication and PND  Gastrointestinal               Negative for abdominal pain, diarrhea, blood in stool  Musculoskeletal               Positive for myalgia  Skin                                 Negative for rash or itching  Endo/heme/allergies       Negative for polydipsia, environmental allergy  Psychiatric                       Negative for suicidal ideation. Patient is not anxious    Vitals:    08/07/21 0815   BP: (!) 146/61   Pulse: 67   Resp: 18   Temp: 98.2 °F (36.8 °C)   SpO2:      Admission weight: 212 lb 11.9 oz (96.5 kg)    Neurological Examination  Constitutional .General exam well groomed   Head/ Ears /Nose/Throat/external ear . Normal exam  Neck and thyroid . Normal size. No bruits  Respiratory . Breathsounds clear bilaterally  Cardiovascular: Auscultation of heart with regular rate and rhythm   Musculoskeletal. Muscle bulk and tone normal                                                           Muscle strength lower extremities iliopsoas and hamstrings bilaterally 4/5 with giveway . Upper extremities 5/5 strength                                                                                No dysmetria or dysdiadokinesis  No tremor   Normal fine motor  Orientation Alert and oriented x 3   Attention and concentration normal  Short term memory normal  Language process and speech normal . No aphasia   Cranial nerve 2 normal acuety and visual fields  Cranial nerve 3, 4 and 6 . Extraocular muscles are intact . Pupils are equal and reactive   Cranial nerve 5 . Intact corneal reflex. Normal facial sensation  Cranial nerve 7 normal exam   Cranial nerve 8. Grossly intact hearing   Cranial nerve 9 and 10. Symmetric palate elevation   Cranial nerve 11 , 5 out of 5 strength   Cranial Nerve 12 midline tongue . No atrophy  Sensation . Decreased pinprick and light touch distal to wrist and mid foot level biaterally   Deep Tendon Reflexes hypoactive   Plantar response equivocal bilaterally    Assessment :    Syncope . Multiple sclerosis . Frequent falling . Fibromyalgia     Plan:    Long discussion with patient . She needs SNF for rehabilitation with concern of frequent falling with her being on eliquis for atrial fibrillation  . She is agreeable for rehabilitation .  Pending SNF placement .  PMR has been consulted

## 2021-08-07 NOTE — PROGRESS NOTES
Physical Therapy         Physical Therapy Cancel Note      DATE: 2021    NAME: Jose A Polanco  MRN: [id number]   : 1945      Patient not seen this date for Physical Therapy due to:    Testing: Unable to see pt as Dopplers ordered but not performed yet.        Electronically signed by Gail Larry PT on 2021 at 3:37 PM

## 2021-08-07 NOTE — PROGRESS NOTES
Mami Esparza. CNP, called RN and stated that she spoke with vascular physician. No need to transfer patient. Vascular will see patient out patient per CNP.

## 2021-08-07 NOTE — PLAN OF CARE
Problem: Falls - Risk of:  Goal: Will remain free from falls  Description: Will remain free from falls  8/7/2021 1452 by Nick Murphy RN  Outcome: Ongoing     Problem: Falls - Risk of:  Goal: Absence of physical injury  Description: Absence of physical injury  8/7/2021 1452 by Nick Murphy RN  Outcome: Ongoing    Pt remains free from falls at this time. Bed/chair alarms in place to promote safety. Pt has been educated on how to utilize the call light effectively. Problem: Skin Integrity:  Goal: Will show no infection signs and symptoms  Description: Will show no infection signs and symptoms  8/7/2021 1452 by Nick Murphy RN  Outcome: Ongoing     Problem: Skin Integrity:  Goal: Absence of new skin breakdown  Description: Absence of new skin breakdown  8/7/2021 1452 by Nick Murphy RN  Outcome: Ongoing    Pt skin is intact (see charting). Pt has been encouraged to turn every 2 hours to prevent skin breakdown. RN will continue to monitor. Problem: Pain:  Goal: Pain level will decrease  Description: Pain level will decrease  8/7/2021 1452 by Nick Murphy RN  Outcome: Ongoing     Problem: Pain:  Goal: Control of acute pain  Description: Control of acute pain  8/7/2021 1452 by Nick Murphy RN  Outcome: Ongoing     Problem: Pain:  Goal: Control of chronic pain  Description: Control of chronic pain  8/7/2021 1452 by Nick Murphy RN  Outcome: Ongoing    Pt pain is controlled at this time and evaluated each hour. PRN medications are available and will be administered as needed. Problem: Musculor/Skeletal Functional Status  Goal: Highest potential functional level  8/7/2021 1452 by Nick Murphy RN  Outcome: Ongoing     Problem: Musculor/Skeletal Functional Status  Goal: Absence of falls  8/7/2021 1452 by Nick Murphy RN  Outcome: Ongoing    Pt remains free from falls at this time. Bed/chair alarms in place to promote safety. Pt has been educated on how to utilize the call light effectively.  PT/OT consulted for

## 2021-08-07 NOTE — PROGRESS NOTES
Occupational [address] [employer]  Occupational Therapy Not Seen Note    DATE: 2021  Name: Kulwant Boothe  : 1945  MRN: [id number]    Patient not available for Occupational Therapy due to:      [x] Other: Pt unable to be seen this date secondary to Dopplers ordered of BLEs in place d/t pt reported leg pain. Follow-up with pt upon results.         Carla Bourne OTR/L

## 2021-08-07 NOTE — PROGRESS NOTES
RN spoke to Dr. Maldonado Ocampo regarding patient /68 HR 68. OK to not start the nitro drip. OK to give 0.1mg Catapress if SBP >170.

## 2021-08-07 NOTE — PROGRESS NOTES
[facility]  Office: [address], DO, Corry Casas, DO, Becca Mccollum, DO, Lor Mishel Chowdhury, DO, Bess Ames MD, Sandra Rascon MD, Shai Welch MD, Rj Ron MD, Jaime Miller MD, Heather Rosario MD, Daron Ingram MD, Danuta William, DO, Shira San MD, Jasmina Sky DO, Ellie Roberts MD,  Krystian Dietz, DO, Rubi Guillaume MD, Mariann Reagan MD, Katiuska Mccollum MD, Ale Villarreal MD, Arya Mcdonald MD, Liz Harry MD, Severiano Border, MD, Adria Casper, [facility], [facility], [facility], Awilda Quintana, [facility], Emir Davis, CNS, Monik Gray, CNP, Shankar Thompson, CNP, Anibal Murdock, CNP, Eliezer Jimenez, CNP, Roxie Butler, CNP, ABIMBOLA FarrarC, Derian Chu, [facility], Freda Marcus, CNP, Nithya Schultz, CNP, Keo Ortiz, CNP, Jenny Fernandez, CNP, Lito Sky, CNP, Shell Ibanez, CNP, Stewart Coe, CNP, Cristine Thomas [id number]    Progress Note    8/7/2021    12:18 PM    Name:   Judy Almanzar  MRN:     [id number]     [place]:      [de-identified]   Room:   [address] [place] [address] Day:  3  Admit Date:  8/4/2021  4:51 PM    PCP:   Martín Dick MD  Code Status:  Full Code    Subjective:     C/C:   Chief Complaint   Patient presents with    Bradycardia     Interval History Status: significantly improved. Patient seen and examined this morning. Overnight, patient with significant blood pressure elevation. Blood pressure noted to be unequal in her arms bilaterally. There is noted to be a greater than 30 point difference in systolic blood pressures. Patient did receive Catapres with improvement of her blood pressure. Heart rate has improved overall, but she still is bradycardic at times. She is working well physical therapy, but feels too unsafe to go home at this time. Reports improvement of leg swelling. Placed on 2 L of oxygen via nasal cannula overnight.     Brief History:     Judy Almanzar is a 70 y.o. female who presented to [facility] ED for evaluation after a fall and LOC. This fall is happened at home and has been happening more frequently. Patient reports that she has been weak for quite some time now. She does admit to loss of consciousness. She endorses a history of multiple sclerosis,for which she follows with a clinician out of the [facility]. She has been maintained on Imuran for many years, as she states that this is the only thing that works. She has multiple allergies as noted below. She also notes that she has chronic atrial fibrillation, hypogammaglobinemia, multiple prior cardiac arrest, congestive heart failure, and history of gout. In the emergency department, she was noted to be bradycardic and hypotensive. Orthostatics are noted to be positive.     Labs in the ED with a BNP and troponin mildly elevated from baseline. Creatinine WNL. Bilirubin elevated from draw 2 months prior. Normocytic, normochromic anemia present as well. UA unremarkable. CT of the chest was performed to r/o pulmonary embolism which revealed only a right-sided pleural effusion. She will be admitted for further work-up evaluation of bradycardia, syncope, falls. Review of Systems:     Constitutional: Reports generalized weakness. Negative for chills, fevers, sweats  Respiratory: Reports dyspnea with exertion; negative for cough, wheezing  Cardiovascular:  negative for chest pain, chest pressure/discomfort, lower extremity edema, palpitations  . Reports frequent urination overnight  Gastrointestinal:  negative for abdominal pain, constipation, diarrhea, nausea, vomiting  Neurological:  negative for dizziness, headache  Extremities: Reports left leg/calf pain    Medications: Allergies:     Allergies   Allergen Reactions    Lisinopril-Hydrochlorothiazide Anaphylaxis and Hives    Sulfa Antibiotics Anaphylaxis    Penicillins Hives    Polyethylene Glycol Hives    Actifed Cold-Allergy [Chlorpheniramine-Phenylephrine]     Altace [Ramipril]      Chronic cough    Cephalosporins Hives    Coreg [Carvedilol]      bloating    Dml Facial Moisturizer     Elavil [Amitriptyline Hcl]     Entex [Ami-Jose]     Ethylene Glycol     Fentanyl     Hizentra [Immune Globulin (Human)]     Hydralazine     Levofloxacin      ach tendon    Lisinopril-Hydrochlorothiazide     Montelukast Sodium      Heart effects    Morphine Itching    Nifedipine     Norvasc [Amlodipine Besylate]      Stomach pain    Other      IV IG     Phenobarbital Hives    Robaxin [Methocarbamol]     Sinequan [Doxepin Hcl]     Sudafed [Pseudoephedrine Hcl]     Sudafed [Pseudoephedrine Hcl]     Tranquil-London     Wellbutrin [Bupropion Hcl]      Hypotension    Actifed Cold-Allergy [Chlorpheniramine-Phenylephrine] Palpitations    Clindamycin/Lincomycin Nausea And Vomiting    Codeine Nausea And Vomiting    Metoprolol Nausea And Vomiting     Reports she is allergic to the preservative polyethylineglycol in this medicine, causing severe emesis and stomach pain     Metoprolol Succinate Nausea And Vomiting    Seldane [Terfenadine] Palpitations       Current Meds:   Scheduled Meds:    cloNIDine  0.1 mg Oral BID    losartan  100 mg Oral Daily    theophylline  100 mg Oral Q8H    pantoprazole  40 mg Oral BID AC    allopurinol  100 mg Oral Daily    apixaban  5 mg Oral BID    azaTHIOprine  50 mg Oral Daily    clopidogrel  75 mg Oral Daily    bumetanide  1 mg Oral Daily    PARoxetine  20 mg Oral QAM    [Held by provider] potassium chloride  20 mEq Oral Daily    pravastatin  40 mg Oral Daily    ranolazine  500 mg Oral BID    sodium chloride flush  5-40 mL Intravenous 2 times per day     Continuous Infusions:    nitroGLYCERIN      sodium chloride       PRN Meds: sodium chloride flush, sodium chloride flush, sodium chloride, potassium chloride **OR** potassium alternative oral replacement **OR** potassium chloride, magnesium sulfate, ondansetron **OR** ondansetron, acetaminophen **OR** acetaminophen, senna    Data:     Past Medical History:   has a past medical history of Abnormal Pap smear, Asthma, Atrial fibrillation ([facility] Bloody discharge from nipple, CAD (coronary artery disease), Cataracts, bilateral, Colon polyp, CVA (cerebral vascular accident) ([facility] Demyelinating disease ([facility] Fibromyalgia, High-risk sexual behavior, History of bone density study, History of migraines, HTN (hypertension), IgG deficiency ([facility] Multiple sclerosis ([facility] Myoclonic seizures ([facility] Optic neuritis, Osteopenia, Pyloric stenosis, Raynaud's disease, and Vasomotor rhinitis. Social History:   reports that she has never smoked. She has never used smokeless tobacco. She reports that she does not drink alcohol and does not use drugs. Family History:   Family History   Problem Relation Age of Onset    Other Father         Heart problems    Heart Disease Father     Other Mother         heart problems requiring open heart surgery, HTN    Heart Disease Mother     Other Sister         HTN    Hypertension Sister     Other Maternal Aunt         breast cancer    Breast Cancer Maternal Aunt        Vitals:  BP (!) 146/61   Pulse 67   Temp 98.2 °F (36.8 °C) (Oral)   Resp 18   Wt 206 lb 5.6 oz (93.6 kg)   SpO2 99%   BMI 38.36 kg/m²   Temp (24hrs), Av.9 °F (36.6 °C), Min:97.5 °F (36.4 °C), Max:98.2 °F (36.8 °C)    No results for input(s): POCGLU in the last 72 hours. I/O (24Hr):     Intake/Output Summary (Last 24 hours) at 2021 1218  Last data filed at 2021 0818  Gross per 24 hour   Intake 210 ml   Output 300 ml   Net -90 ml       Labs:  Hematology:  Recent Labs     21  0518 21  1120 21  0531   WBC 8.2 9.3 8.2   RBC 3.82* 4.16 3.81*   HGB 11.0* 11.9* 11.0*   HCT 34.1* 37.1 33.7*   MCV 89.4 89.2 88.4   MCH 28.7 28.7 28.9   MCHC 32.2 32.2 32.7   RDW 18.3* 18.0* 17.8*    215 188   MPV 9.8 9.7 9.6   SEDRATE 1  -- --      Chemistry:  Recent Labs     08/04/21 1714 08/04/21 1714 08/04/21 2103 08/04/21 2103 08/05/21  0110 08/05/21 0518 08/05/21  0830 08/05/21  1433 08/06/21  1120 08/07/21  0531      < >  --   --   --  141  --   --  139 139   K 3.4*   < >  --   --   --  3.7  --   --  3.3* 3.6*      < >  --   --   --  107  --   --  99 98   CO2 23   < >  --   --   --  24  --   --  29 29   GLUCOSE 113*   < >  --   --   --  98  --   --  99 93   BUN 22   < >  --   --   --  21  --   --  16 15   CREATININE 0.99*   < >  --   --   --  0.99*  --   --  0.90 0.85   MG  --   --   --   --   --  1.9  --   --   --   --    ANIONGAP 14   < >  --   --   --  10  --   --  11 12   LABGLOM 55*   < >  --   --   --  55*  --   --  >60 >60   GFRAA >60   < >  --   --   --  >60  --   --  >60 >60   CALCIUM 9.3   < >  --   --   --  9.1  --   --  9.2 8.9   PHOS  --   --   --   --   --   --   --   --  3.2 3.6   PROBNP 9,720*  --  9,700*  --   --   --   --   --   --   --    TROPHS 32*   < > 35*   < > 36*  --  35* 31*  --   --    MYOGLOBIN  --   --   --   --   --   --  42 43  --   --     < > = values in this interval not displayed. Recent Labs     08/04/21 1714 08/05/21 0518 08/06/21 1120 08/07/21  0531   PROT 5.9*  --   --   --    LABALBU 4.1  --  4.3 3.5   LABA1C  --  5.0  --   --    AST 12  --   --   --    ALT 9  --   --   --    ALKPHOS 76  --   --   --    BILITOT 1.50*  --   --   --    BILIDIR 0.70*  --   --   --      ABG:No results found for: POCPH, PHART, PH, POCPCO2, QAM3MAQ, PCO2, POCPO2, PO2ART, PO2, POCHCO3, LOK7TEM, HCO3, NBEA, PBEA, BEART, BE, THGBART, THB, ERN2WRB, WLAQ5PQX, H5LYIYET, O2SAT, FIO2  Lab Results   Component Value Date/Time    [facility] 03/10/2019 08:42 PM     Lab Results   Component Value Date/Time    CULTURE NO GROWTH 6 DAYS 03/10/2019 08:42 PM       Radiology:  CT HEAD WO CONTRAST    Result Date: 8/4/2021  1. No acute intracranial abnormality.  2. Stable remote lacunar stroke right external capsule and lateral left basal ganglia. 3.  White matter hypoattenuation described is typical of microvascular ischemic disease or as sequela of dysmyelinating/demyelinating processes. 4. Stable senescent changes. CT CHEST PULMONARY EMBOLISM W CONTRAST    Result Date: 8/4/2021  No evidence of pulmonary embolism. No acute infiltrate. Right pleural effusion. Physical Examination:        General appearance:  alert, cooperative and no distress, elderly, obese  lying in the left lateral, position, sleeping initially  Mental Status:  oriented to person, place and time and normal affect  Lungs:  clear to auscultation bilaterally, normal effort  Heart: Regular rate, irregular rhythm (sinus rhythm on telemetry), no murmur  Abdomen:  soft, nontender, nondistended, normal bowel sounds, no masses, hepatomegaly, splenomegaly  Extremities:  no redness; tenderness to left calf, trace bilateral lower extremity pitting edema  Skin:  no gross lesions, rashes, induration    Assessment:        Hospital Problems         Last Modified POA    * (Principal) Syncope and collapse 8/5/2021 Yes    Bradycardia 8/5/2021 Yes    Frequent falls 8/5/2021 Yes    Multiple sclerosis (HCC) (Chronic) 8/5/2021 Yes    Fibromyalgia 8/5/2021 Yes    Chronic diastolic heart failure ([address] 8/5/2021 Yes    Physical debility 8/5/2021 Yes    Obesity (BMI 30-39. 9) 8/5/2021 Yes    Essential hypertension (Chronic) 8/5/2021 Yes    Paroxysmal A-fib ([address] (Chronic) 8/5/2021 Yes          Plan:        1. Syncope - cardiology following. Likely related to Toprol XL, bradycardia, and orthostatic hypotension. Discontinue Toprol-XL. Starting Catapres secondary to hypertension and multiple drug allergies. Monitor for bradycardia, as Catapres does cause bradycardia. Continue to monitor telemetry. 2. Bradycardia - improved after Toprol XL has been held. Avoid other AV qamar blockers  3. Orthostatic hypotension - continue to check q shift. [facility] in place.  I suspect this may be contributing to her falls  4. Falls - patient notes that she falls 2-3 times per day. Need to have a discussion about risk/benefit of continuing Eliquis at this point. Patient states that she would like to continue Eliquis, per cardiology note. 5. HFpEF - 2 L of urine output overnight after evening bumex was given. Restart oral Bumex today. Await 2D echo read. 6. MS - appreciate neurology's input. MRI of the brain reviewed. Unable to complete MRI of the C-spine due to poor images. Agree with PM&R eval.  7. Troponin elevation - likely type II in nature. Likely secondary to CHF exacerbation. Cardiology following. Await 2D echo read. 8. Paroxysmal atrial fibrillation - Currently in sinus rhythm. Continue Eliquis. 9. Ultimately, need disposition on discharge planning. Patient appears to be too afraid to go home. Await Dopplers. Await 2D echo read.     33 Rayne Ramos DO  8/7/2021  12:18 PM

## 2021-08-07 NOTE — PROGRESS NOTES
RN spoke to Shonna Allen CNP, regarding RN unable to get a hold of Cardiology (Dr. Fred Barger). CNP messaged Dr. Fred Barger and stated for the Fellow to take over the care for this patient for tonight. RN spoke to Dr. Abraham Cordova (José Miguel Right via phone call and Fellow stated that he does not have privileges at [facility] and he will talk to Dr. Fred Barger and call RN back. Dr. Abraham Cordova (Fellow) called RN back, stating Dr. Fred Barger did not realize this patient was at [facility] and to have the CNP get orders from him. RN relayed this message via perfect serve to Aida Allen CNP. Awaiting response.

## 2021-08-07 NOTE — PLAN OF CARE
Problem: Falls - Risk of:  Goal: Will remain free from falls  Description: Will remain free from falls  Outcome: Ongoing   Pt assessed as a fall risk this shift. Remains free from falls and accidental injury at this time. Fall precautions in place, including falling star sign and fall risk band on pt. Floor free from obstacles, and bed is locked and in lowest position. Adequate lighting provided. Pt encouraged to call before getting OOB for any need. Bed alarm activated. Will continue to monitor needs during hourly rounding, and reinforce education on use of call light. Problem: Skin Integrity:  Goal: Will show no infection signs and symptoms  Description: Will show no infection signs and symptoms  Outcome: Ongoing     Problem: Pain:  Goal: Pain level will decrease  Description: Pain level will decrease  Outcome: Ongoing   PRN pain medication ordered (See Mar).      Problem: Musculor/Skeletal Functional Status  Goal: Highest potential functional level  Outcome: Ongoing

## 2021-08-08 VITALS
BODY MASS INDEX: 37.7 KG/M2 | RESPIRATION RATE: 18 BRPM | TEMPERATURE: 97.7 F | WEIGHT: 202.82 LBS | SYSTOLIC BLOOD PRESSURE: 121 MMHG | HEART RATE: 71 BPM | DIASTOLIC BLOOD PRESSURE: 52 MMHG | OXYGEN SATURATION: 89 %

## 2021-08-08 PROBLEM — R55 SYNCOPE AND COLLAPSE: Status: RESOLVED | Noted: 2021-08-04 | Resolved: 2021-08-08

## 2021-08-08 PROBLEM — R00.1 BRADYCARDIA: Status: RESOLVED | Noted: 2020-08-01 | Resolved: 2021-08-08

## 2021-08-08 LAB
ABSOLUTE EOS #: 0.2 K/UL (ref 0–0.4)
ABSOLUTE IMMATURE GRANULOCYTE: ABNORMAL K/UL (ref 0–0.3)
ABSOLUTE LYMPH #: 0.9 K/UL (ref 1–4.8)
ABSOLUTE MONO #: 1 K/UL (ref 0.1–1.2)
ALBUMIN SERPL-MCNC: 3.5 G/DL (ref 3.5–5.2)
ANION GAP SERPL CALCULATED.3IONS-SCNC: 10 MMOL/L (ref 9–17)
BASOPHILS # BLD: 1 % (ref 0–2)
BASOPHILS ABSOLUTE: 0.1 K/UL (ref 0–0.2)
BUN BLDV-MCNC: 13 MG/DL (ref 8–23)
BUN/CREAT BLD: ABNORMAL (ref 9–20)
CALCIUM SERPL-MCNC: 8.7 MG/DL (ref 8.6–10.4)
CHLORIDE BLD-SCNC: 99 MMOL/L (ref 98–107)
CO2: 28 MMOL/L (ref 20–31)
CREAT SERPL-MCNC: 0.99 MG/DL (ref 0.5–0.9)
DIFFERENTIAL TYPE: ABNORMAL
EOSINOPHILS RELATIVE PERCENT: 3 % (ref 1–4)
GFR AFRICAN AMERICAN: >60 ML/MIN
GFR NON-AFRICAN AMERICAN: 55 ML/MIN
GFR SERPL CREATININE-BSD FRML MDRD: ABNORMAL ML/MIN/{1.73_M2}
GFR SERPL CREATININE-BSD FRML MDRD: ABNORMAL ML/MIN/{1.73_M2}
GLUCOSE BLD-MCNC: 103 MG/DL (ref 70–99)
HCT VFR BLD CALC: 35.1 % (ref 36–46)
HEMOGLOBIN: 11.4 G/DL (ref 12–16)
IMMATURE GRANULOCYTES: ABNORMAL %
LYMPHOCYTES # BLD: 14 % (ref 24–44)
MCH RBC QN AUTO: 28.9 PG (ref 26–34)
MCHC RBC AUTO-ENTMCNC: 32.5 G/DL (ref 31–37)
MCV RBC AUTO: 89.1 FL (ref 80–100)
MONOCYTES # BLD: 14 % (ref 2–11)
NRBC AUTOMATED: ABNORMAL PER 100 WBC
PDW BLD-RTO: 17.9 % (ref 12.5–15.4)
PHOSPHORUS: 3.9 MG/DL (ref 2.6–4.5)
PLATELET # BLD: 207 K/UL (ref 140–450)
PLATELET ESTIMATE: ABNORMAL
PMV BLD AUTO: 9.5 FL (ref 6–12)
POTASSIUM SERPL-SCNC: 3.4 MMOL/L (ref 3.7–5.3)
RBC # BLD: 3.94 M/UL (ref 4–5.2)
RBC # BLD: ABNORMAL 10*6/UL
SEG NEUTROPHILS: 68 % (ref 36–66)
SEGMENTED NEUTROPHILS ABSOLUTE COUNT: 4.8 K/UL (ref 1.8–7.7)
SODIUM BLD-SCNC: 137 MMOL/L (ref 135–144)
WBC # BLD: 7 K/UL (ref 3.5–11)
WBC # BLD: ABNORMAL 10*3/UL

## 2021-08-08 PROCEDURE — 80069 RENAL FUNCTION PANEL: CPT

## 2021-08-08 PROCEDURE — 6370000000 HC RX 637 (ALT 250 FOR IP): Performed by: INTERNAL MEDICINE

## 2021-08-08 PROCEDURE — 6370000000 HC RX 637 (ALT 250 FOR IP): Performed by: NURSE PRACTITIONER

## 2021-08-08 PROCEDURE — 2700000000 HC OXYGEN THERAPY PER DAY

## 2021-08-08 PROCEDURE — 94760 N-INVAS EAR/PLS OXIMETRY 1: CPT

## 2021-08-08 PROCEDURE — 2580000003 HC RX 258: Performed by: NURSE PRACTITIONER

## 2021-08-08 PROCEDURE — 99239 HOSP IP/OBS DSCHRG MGMT >30: CPT | Performed by: INTERNAL MEDICINE

## 2021-08-08 PROCEDURE — 85025 COMPLETE CBC W/AUTO DIFF WBC: CPT

## 2021-08-08 PROCEDURE — 36415 COLL VENOUS BLD VENIPUNCTURE: CPT

## 2021-08-08 RX ORDER — LOSARTAN POTASSIUM 100 MG/1
100 TABLET ORAL DAILY
Qty: 30 TABLET | Refills: 0 | COMMUNITY
Start: 2021-08-08

## 2021-08-08 RX ORDER — CLONIDINE HYDROCHLORIDE 0.1 MG/1
0.1 TABLET ORAL 2 TIMES DAILY
Qty: 60 TABLET | Refills: 0 | Status: SHIPPED | OUTPATIENT
Start: 2021-08-08

## 2021-08-08 RX ADMIN — THEOPHYLLINE ANHYDROUS 100 MG: 80 LIQUID ORAL at 05:57

## 2021-08-08 RX ADMIN — POTASSIUM CHLORIDE 40 MEQ: 1500 TABLET, EXTENDED RELEASE ORAL at 07:56

## 2021-08-08 RX ADMIN — CLONIDINE HYDROCHLORIDE 0.1 MG: 0.1 TABLET ORAL at 07:57

## 2021-08-08 RX ADMIN — PANTOPRAZOLE SODIUM 40 MG: 40 TABLET, DELAYED RELEASE ORAL at 05:57

## 2021-08-08 RX ADMIN — CLOPIDOGREL BISULFATE 75 MG: 75 TABLET ORAL at 07:56

## 2021-08-08 RX ADMIN — BUMETANIDE 1 MG: 1 TABLET ORAL at 07:56

## 2021-08-08 RX ADMIN — PAROXETINE HYDROCHLORIDE 20 MG: 20 TABLET, FILM COATED ORAL at 07:56

## 2021-08-08 RX ADMIN — RANOLAZINE 500 MG: 500 TABLET, EXTENDED RELEASE ORAL at 07:56

## 2021-08-08 RX ADMIN — APIXABAN 5 MG: 5 TABLET, FILM COATED ORAL at 07:56

## 2021-08-08 RX ADMIN — ACETAMINOPHEN 650 MG: 325 TABLET ORAL at 05:57

## 2021-08-08 RX ADMIN — LOSARTAN POTASSIUM 100 MG: 50 TABLET, FILM COATED ORAL at 07:56

## 2021-08-08 RX ADMIN — SODIUM CHLORIDE, PRESERVATIVE FREE 10 ML: 5 INJECTION INTRAVENOUS at 07:56

## 2021-08-08 ASSESSMENT — PAIN SCALES - GENERAL
PAINLEVEL_OUTOF10: 8
PAINLEVEL_OUTOF10: 0
PAINLEVEL_OUTOF10: 0

## 2021-08-08 NOTE — PROGRESS NOTES
[facility]  Office: [address], DO, Zelalemmio Schmid, DO, Anamaria Lopez, DO, Chidi Chowdhury, DO, Betty Gamez MD, José Luis Jose MD, Debbi Whitney MD, Sid Braun MD, Lyla Barbosa MD, Soledad Cameron MD, Ashanti Whitehead MD, Josette Frye, DO, Surya Lopez MD, Vazquez Arteaga DO, Shyam Maurer MD,  Merline Londono DO, Elina Tate MD, Reuben Roberts MD, Diann Connor MD, January Murphy MD, Jatin Ram MD, Adrián Moffett MD, Alen Fletcher MD, Pam Randhawa, [facility], [facility], [facility], Emily Morales, [facility], Alf Dela Cruz, CNS, Bryon Ocampo, CNP, Beckie Andrade, CNP, Suleiman Centeno, CNP, Jayson Thomas, CNP, Mikey Hoang, CNP, Brooklyn Tobin PA-C, Juan Jose Almaraz, [facility], Nubia Villalta, CNP, Aspen Garrett, CNP, Molly Asher, CNP, Donnie Winters, CNP, Marielena Marie, CNP, Karma Marte, CNP, Ruby Godfrey, [facility], Summer Bergman, [address]    Progress Note    8/8/2021    12:48 PM    Name:   Kylah Choi  MRN:     [id number]     [place]:      [de-identified]   Room:   [address] [place] [address] Day:  4  Admit Date:  8/4/2021  4:51 PM    PCP:   Rosanna Montenegro MD  Code Status:  Full Code    Subjective:     C/C:   Chief Complaint   Patient presents with    Bradycardia     Interval History Status: significantly improved. Patient seen and examined this morning. No acute events overnight. Patient rested well overnight. She has been ambulating in the halls the nurses. Blood pressures under significantly better control today. Heart rate is in the 70s to 80s. See below noted documentation regarding social issues. Brief History:     Kylah Choi is a 70 y.o. female who presented to [facility] ED for evaluation after a fall and LOC. This fall is happened at home and has been happening more frequently. Patient reports that she has been weak for quite some time now. She does admit to loss of consciousness.   She endorses a history of multiple sclerosis,for which she follows with a clinician out of the [facility]. She has been maintained on Imuran for many years, as she states that this is the only thing that works. She has multiple allergies as noted below. She also notes that she has chronic atrial fibrillation, hypogammaglobinemia, multiple prior cardiac arrest, congestive heart failure, and history of gout. In the emergency department, she was noted to be bradycardic and hypotensive. Orthostatics are noted to be positive.     Labs in the ED with a BNP and troponin mildly elevated from baseline. Creatinine WNL. Bilirubin elevated from draw 2 months prior. Normocytic, normochromic anemia present as well. UA unremarkable. CT of the chest was performed to r/o pulmonary embolism which revealed only a right-sided pleural effusion. She will be admitted for further work-up evaluation of bradycardia, syncope, falls. Review of Systems:     Constitutional: Reports that she feels as if she is getting stronger. Negative for chills, fevers, sweats  Respiratory: negative for cough, wheezing, shortness of breath   Cardiovascular:  negative for chest pain, chest pressure/discomfort, lower extremity edema, palpitations  . Reports resolution of frequent urination  Gastrointestinal:  negative for abdominal pain, constipation, diarrhea, nausea, vomiting  Neurological:  negative for dizziness, headache  Extremities: Reports left leg/calf pain    Medications: Allergies:     Allergies   Allergen Reactions    Lisinopril-Hydrochlorothiazide Anaphylaxis and Hives    Sulfa Antibiotics Anaphylaxis    Penicillins Hives    Polyethylene Glycol Hives    Actifed Cold-Allergy [Chlorpheniramine-Phenylephrine]     Altace [Ramipril]      Chronic cough    Cephalosporins Hives    Coreg [Carvedilol]      bloating    Dml Facial Moisturizer     Elavil [Amitriptyline Hcl]     Entex [Ami-Jose]     Ethylene Glycol     Fentanyl     Hizentra [Immune Globulin (Human)]     Hydralazine     Levofloxacin      ach tendon    Lisinopril-Hydrochlorothiazide     Montelukast Sodium      Heart effects    Morphine Itching    Nifedipine     Norvasc [Amlodipine Besylate]      Stomach pain    Other      IV IG     Phenobarbital Hives    Robaxin [Methocarbamol]     Sinequan [Doxepin Hcl]     Sudafed [Pseudoephedrine Hcl]     Sudafed [Pseudoephedrine Hcl]     Tranquil-London     Wellbutrin [Bupropion Hcl]      Hypotension    Actifed Cold-Allergy [Chlorpheniramine-Phenylephrine] Palpitations    Clindamycin/Lincomycin Nausea And Vomiting    Codeine Nausea And Vomiting    Metoprolol Nausea And Vomiting     Reports she is allergic to the preservative polyethylineglycol in this medicine, causing severe emesis and stomach pain     Metoprolol Succinate Nausea And Vomiting    Seldane [Terfenadine] Palpitations       Current Meds:   Scheduled Meds:    cloNIDine  0.1 mg Oral BID    losartan  100 mg Oral Daily    theophylline  100 mg Oral Q8H    pantoprazole  40 mg Oral BID AC    allopurinol  100 mg Oral Daily    apixaban  5 mg Oral BID    azaTHIOprine  50 mg Oral Daily    clopidogrel  75 mg Oral Daily    bumetanide  1 mg Oral Daily    PARoxetine  20 mg Oral QAM    [Held by provider] potassium chloride  20 mEq Oral Daily    pravastatin  40 mg Oral Daily    ranolazine  500 mg Oral BID    sodium chloride flush  5-40 mL Intravenous 2 times per day     Continuous Infusions:    nitroGLYCERIN      sodium chloride       PRN Meds: sodium chloride flush, sodium chloride flush, sodium chloride, potassium chloride **OR** potassium alternative oral replacement **OR** potassium chloride, magnesium sulfate, ondansetron **OR** ondansetron, acetaminophen **OR** acetaminophen, senna    Data:     Past Medical History:   has a past medical history of Abnormal Pap smear, Asthma, Atrial fibrillation ([facility] Bloody discharge from nipple, CAD (coronary artery disease), Cataracts, bilateral, Colon polyp, CVA (cerebral vascular accident) ([facility] [facility] Demyelinating disease ([facility] [address] Fibromyalgia, High-risk sexual behavior, History of bone density study, History of migraines, HTN (hypertension), IgG deficiency ([facility] [facility] Multiple sclerosis ([facility] [address]oclonic [facility] Optic neuritis, Osteopenia, Pyloric stenosis, Raynaud's disease, and Vasomotor rhinitis. Social History:   reports that she has never smoked. She has never used smokeless tobacco. She reports that she does not drink alcohol and does not use drugs. Family History:   Family History   Problem Relation Age of Onset    Other Father         Heart problems    Heart Disease Father     Other Mother         heart problems requiring open heart surgery, HTN    Heart Disease Mother     Other Sister         HTN    Hypertension Sister     Other Maternal Aunt         breast cancer    Breast Cancer Maternal Aunt        Vitals:  BP (!) 121/52   Pulse 71   Temp 97.7 °F (36.5 °C) (Oral)   Resp 18   Wt 202 lb 13.2 oz (92 kg)   SpO2 (!) 89%   BMI 37.70 kg/m²   Temp (24hrs), Av.8 °F (36.6 °C), Min:97.5 °F (36.4 °C), Max:98.2 °F (36.8 °C)    No results for input(s): POCGLU in the last 72 hours. I/O (24Hr):     Intake/Output Summary (Last 24 hours) at 2021 1248  Last data filed at 2021 0755  Gross per 24 hour   Intake 5 ml   Output 350 ml   Net -345 ml       Labs:  Hematology:  Recent Labs     21  1120 21  0531 21  0538   WBC 9.3 8.2 7.0   RBC 4.16 3.81* 3.94*   HGB 11.9* 11.0* 11.4*   HCT 37.1 33.7* 35.1*   MCV 89.2 88.4 89.1   MCH 28.7 28.9 28.9   MCHC 32.2 32.7 32.5   RDW 18.0* 17.8* 17.9*    188 207   MPV 9.7 9.6 9.5     Chemistry:  Recent Labs     21  1433 21  1120 21  0531 21  0538   NA  --  139 139 137   K  --  3.3* 3.6* 3.4*   CL  --  99 98 99   CO2  --  29 29 28   GLUCOSE  --  99 93 103*   BUN  --  16 15 13   CREATININE  --  0.90 0.85 0.99*   ANIONGAP  --  11 12 (Chronic) 8/5/2021 Yes    Fibromyalgia 8/5/2021 Yes    Chronic diastolic heart failure ([address] 8/5/2021 Yes    Physical debility 8/5/2021 Yes    Obesity (BMI 30-39. 9) 8/5/2021 Yes    Essential hypertension (Chronic) 8/5/2021 Yes    Paroxysmal A-fib ([address] (Chronic) 8/5/2021 Yes          Plan:        1. Syncope - cardiology following. Likely related to Toprol XL, bradycardia, and orthostatic hypotension. Continue Catapres at discharge. Patient will follow up with her cardiologist for further medication adjustments in the outpatient setting. Appreciate cardiology's input. 2. Bradycardia - improved after Toprol XL has been held. 3. Orthostatic hypotension - continue to check q shift. [facility] in place. 4. Falls - patient notes that she falls 2-3 times per day. Patient states that she would like to continue Eliquis, despite the risk of bleeding with her falls. 5. HFpEF -continue Bumex. 6. MS - appreciate neurology's input. MRI of the brain reviewed. 7. Troponin elevation - likely type II in nature. Likely secondary to CHF exacerbation. Cardiology following. 2D echo pending final read. 8. Paroxysmal atrial fibrillation - Currently in sinus rhythm. Continue Eliquis. 9. Patient is appropriate for discharge today. She is refusing a nursing facility. She is unfortunately too high functioning for skilled nursing facility. She has social issues regarding cost of her apartment and her living situation, but this has been ongoing. She has been unable to contact her , she has been on vacation. Ultimately, she is appropriate for discharge home. She states that she would rather go home then to an extended care facility. She will follow-up with cardiology in the outpatient setting. Further issues regarding her rent and possible placement to an assisted living facility need to occur with her  and PCP in the outpatient setting.   She needs to use a cane in the outpatient setting as well, as she reports that her walker cannot fit into the multiple areas of her house.     33 Rayne Ramos DO  8/8/2021  12:48 PM

## 2021-08-08 NOTE — PROGRESS NOTES
Spoke with Dr. Eric Simms, updated on plans for discharge. Provider does not wish to make any changes to the inpatient course of cardiac medications at this time. Pt should follow-up regarding cardiac medications within one week of discharge.

## 2021-08-08 NOTE — PROGRESS NOTES
Active problem Syncope . Multiple sclerosis . Frequent falling . Fibromyalgia . The condition is she is walking independantly with walker in halls refusing [facility] care facility  complaining of right frontal headache attenuated with tylenol . She is walking 215 feet with rollator seated rest breaks between gait bouts in PT . MRI of Head with chronic white matter changes . She retains stable strength lower extremities iliopsoas and hamstrings bilaterally 4/5 with giveway . Upper extremities 5/5 strength. She has undergone readjustment of blood pressure medications per cardiology with her being hesitant on cardiac pacemaker for bradycardia . She presents to ER at [facility] with syncope along with generalized weakness. She reports that she returned home from gas station feeling hot the next thing she is awakening up after episode of unresponsiveness at kitchen table  . She was found to be bradycardic in 40's . She does have history of chronic atrial fibrillation on eliquis 5 mg po bid along with coronary stenting on plavix 75 mg po qd along with bradycardia having refused cardiac pacemaker placement in the past .There is orthostasis blood pressure supine 188/81 , sitting 158/56 , standing 175/60  She has multiple sclerosis followed by [facility] on imuran 50 mg po qd ambulating with walker with baseline generalized weakness more prominent in lower extremities reporting this to be greater since January with more frequent falling that can be as frequent as once per week . She does have fibromyalgia with generalized body pain along with intermittent urinary incontinence . Head CT with bilateral periventricular attenuation with old right external capsule lacune . CT cervical spine form past month with mild degenerative changes . Significant medications imuran 50 mg po qd, eliquis 5 mg po bid  plavix 75 mg po qd , pravachol 40 mg po qd  .  Testing Head CT with bilateral periventricular attenuation with old right external capsule lacune . CT cervical spine form past month with mild degenerative changes  . MRI of Head with chronic white matter changes . [id number] 5, I39 224, folic acid 8.7, ESR 1 , TSH normal      Past Medical History:   Diagnosis Date    Abnormal Pap smear 10/2010    paucity or absence of TZx 3 years in a row. 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[address]     Multiple sclerosis ([address]     progressive    Myoclonic seizures ([address]     Optic neuritis     Mazin Anirudh Pupil    Osteopenia     Pyloric stenosis     Raynaud's disease     Vasomotor rhinitis        Past Surgical History:   Procedure Laterality Date    APPENDECTOMY  1955    sponges left in abdomen, at [address] [place] [address] Way    stereotactic type, right breast, benign    CARDIAC CATHETERIZATION  07/31/2020    POBA lad    CARDIAC SURGERY      [facility]IOVERSION  08/14/2020    [facility]     CARDIOVERSION  07/31/2020    COLONOSCOPY  2010    Polyp    CORONARY ANGIOPLASTY WITH STENT PLACEMENT  5/2011    failed attempt to place stent    CORONARY ANGIOPLASTY WITH STENT PLACEMENT  6/2011    successful placement of 2 stent in same artery   [address]    bleeding ulcers found   [address] [address]    surgical sponges left in during appendectomy    HAMMER TOE SURGERY  1989    head resection of phalanx, left foot   [address]    with D&C Transportation Needs:     Lack of Transportation (Medical):      Lack of Transportation (Non-Medical):    Physical Activity:     Days of Exercise per Week:     Minutes of Exercise per Session:    Stress:     Feeling of Stress :    Social Connections:     Frequency of Communication with Friends and Family:     Frequency of Social Gatherings with Friends and Family:     Attends Hoahaoism Services:     Active Member of Clubs or Organizations:     Attends Club or Organization Meetings:     Marital Status:    Intimate Partner Violence:     Fear of Current or Ex-Partner:     Emotionally Abused:     Physically Abused:     Sexually Abused:        Current Facility-Administered Medications   Medication Dose Route Frequency Provider Last Rate Last Admin    nitroGLYCERIN 50 mg in dextrose 5% 250 mL infusion  5-200 mcg/min Intravenous Continuous Neomia MD Pedrito        cloNIDine (CATAPRES) tablet 0.1 mg  0.1 mg Oral BID Eugune Ola Hadereckr, DO   0.1 mg at 08/08/21 0757    losartan (COZAAR) tablet 100 mg  100 mg Oral Daily Yvonne Treadwell, APRN - CNP   100 mg at 08/08/21 0756    pantoprazole (PROTONIX) tablet 40 mg  40 mg Oral BID AC Hernando Wallace, DO   40 mg at 08/08/21 0557    sodium chloride flush 0.9 % injection 10 mL  10 mL Intravenous PRN Oral MD Truman   10 mL at 08/04/21 1820    allopurinol (ZYLOPRIM) tablet 100 mg  100 mg Oral Daily Sherine Musty, APRN - CNP   100 mg at 08/07/21 2127    apixaban (ELIQUIS) tablet 5 mg  5 mg Oral BID Sherine Musty, APRN - CNP   5 mg at 08/08/21 0756    azaTHIOprine (IMURAN) tablet 50 mg  50 mg Oral Daily Sherine Musty, APRN - CNP   50 mg at 08/07/21 2127    clopidogrel (PLAVIX) tablet 75 mg  75 mg Oral Daily Sherine Musty, APRN - CNP   75 mg at 08/08/21 0756    bumetanide (BUMEX) tablet 1 mg  1 mg Oral Daily Sherine Musty, APRN - CNP   1 mg at 08/08/21 0756    PARoxetine (PAXIL) tablet 20 mg  20 mg Oral QAM NENA Erickson - CNP   20 mg at 08/08/21 0756    [Held by provider] potassium chloride (KLOR-CON M) extended release tablet 20 mEq  20 mEq Oral Daily NENA Erickson - CNP   20 mEq at 08/04/21 2156    pravastatin (PRAVACHOL) tablet 40 mg  40 mg Oral Daily NENA Erickson - CNP   40 mg at 08/07/21 2127    ranolazine (RANEXA) extended release tablet 500 mg  500 mg Oral BID NENA Erickson - CNP   500 mg at 08/08/21 0756    sodium chloride flush 0.9 % injection 5-40 mL  5-40 mL Intravenous 2 times per day NENA Erickson - CNP   10 mL at 08/08/21 0756    sodium chloride flush 0.9 % injection 10 mL  10 mL Intravenous PRN NENA Erickson CNP        0.9 % sodium chloride infusion  25 mL Intravenous PRN NENA Erickson CNP        potassium chloride (KLOR-CON M) extended release tablet 40 mEq  40 mEq Oral PRN NENA Erickson - CNP   40 mEq at 08/08/21 0756    Or    potassium bicarb-citric acid (EFFER-K) effervescent tablet 40 mEq  40 mEq Oral PRN NENA Erickson CNP        Or    potassium chloride 10 mEq/100 mL IVPB (Peripheral Line)  10 mEq Intravenous PRN NENA Erickson - CNP        magnesium sulfate 1000 mg in dextrose 5% 100 mL IVPB  1,000 mg Intravenous PRN NENA Erickson CNP        ondansetron (ZOFRAN-ODT) disintegrating tablet 4 mg  4 mg Oral Q8H PRN NENA Erickson CNP        Or    ondansetron (ZOFRAN) injection 4 mg  4 mg Intravenous Q6H PRN NENA Erickson CNP        acetaminophen (TYLENOL) tablet 650 mg  650 mg Oral Q6H PRN NENA Erickson - CNP   650 mg at 08/08/21 0557    Or    acetaminophen (TYLENOL) suppository 650 mg  650 mg Rectal Q6H PRN Hurtis Wili, APRN - CNP        senna (SENOKOT) tablet 8.6 mg  1 tablet Oral Daily PRN Hilario Rasheed, APRN - CNP           Allergies Allergen Reactions    Lisinopril-Hydrochlorothiazide Anaphylaxis and Hives    Sulfa Antibiotics Anaphylaxis    Penicillins Hives    Polyethylene Glycol Hives    Actifed Cold-Allergy [Chlorpheniramine-Phenylephrine]     Altace [Ramipril]      Chronic cough    Cephalosporins Hives    Coreg [Carvedilol]      bloating    Dml Facial Moisturizer     Elavil [Amitriptyline Hcl]     Entex [Ami-Jose]     Ethylene Glycol     Fentanyl     Hizentra [Immune Globulin (Human)]     Hydralazine     Levofloxacin      ach tendon    Lisinopril-Hydrochlorothiazide     Montelukast Sodium      Heart effects    Morphine Itching    Nifedipine     Norvasc [Amlodipine Besylate]      Stomach pain    Other      IV IG     Phenobarbital Hives    Robaxin [Methocarbamol]     Sinequan [Doxepin Hcl]     Sudafed [Pseudoephedrine Hcl]     Sudafed [Pseudoephedrine Hcl]     Tranquil-London     Wellbutrin [Bupropion Hcl]      Hypotension    Actifed Cold-Allergy [Chlorpheniramine-Phenylephrine] Palpitations    Clindamycin/Lincomycin Nausea And Vomiting    Codeine Nausea And Vomiting    Metoprolol Nausea And Vomiting     Reports she is allergic to the preservative polyethylineglycol in this medicine, causing severe emesis and stomach pain     Metoprolol Succinate Nausea And Vomiting    Seldane [Terfenadine] Palpitations       ROS:   Constitutional                  Negative for fever and chills   HEENT                            Negative for ear discharge, ear pain, nosebleed  Eyes                                Negative for photophobia, pain and discharge  Respiratory                      Negative for hemoptysis and sputum  Cardiovascular                Negative for orthopnea, claudication and PND  Gastrointestinal               Negative for abdominal pain, diarrhea, blood in stool  Musculoskeletal               Positive for myalgia  Skin                                 Negative for rash or itching  Endo/heme/allergies Negative for polydipsia, environmental allergy  Psychiatric                       Negative for suicidal ideation. Patient is not anxious    Vitals:    08/08/21 1200   BP: (!) 121/52   Pulse: 71   Resp: 18   Temp: 97.7 °F (36.5 °C)   SpO2:      Admission weight: 212 lb 11.9 oz (96.5 kg)    Neurological Examination  Constitutional .General exam well groomed   Head/ Ears /Nose/Throat/external ear . Normal exam  Neck and thyroid . Normal size. No bruits  Respiratory . Breathsounds clear bilaterally  Cardiovascular: Auscultation of heart with regular rate and rhythm   Musculoskeletal. Muscle bulk and tone normal                                                           Muscle strength lower extremities iliopsoas and hamstrings bilaterally 4/5 with giveway . Upper extremities 5/5 strength                                                                                No dysmetria or dysdiadokinesis  No tremor   Normal fine motor  Orientation Alert and oriented x 3   Attention and concentration normal  Short term memory normal  Language process and speech normal . No aphasia   Cranial nerve 2 normal acuety and visual fields  Cranial nerve 3, 4 and 6 . Extraocular muscles are intact . Pupils are equal and reactive   Cranial nerve 5 . Intact corneal reflex. Normal facial sensation  Cranial nerve 7 normal exam   Cranial nerve 8. Grossly intact hearing   Cranial nerve 9 and 10. Symmetric palate elevation   Cranial nerve 11 , 5 out of 5 strength   Cranial Nerve 12 midline tongue . No atrophy  Sensation . Decreased pinprick and light touch distal to wrist and mid foot level biaterally   Deep Tendon Reflexes hypoactive   Plantar response equivocal bilaterally    Assessment :    Syncope . Multiple sclerosis . Frequent falling . Fibromyalgia     Plan:    Long discussion with patient . She needs SNF for rehabilitation with concern of frequent falling with her being on eliquis for atrial fibrillation  .  She is agreeable for rehabilitation . Pending SNF placement .  PMR has been consulted

## 2021-08-08 NOTE — DISCHARGE SUMMARY
[facility]  Office: [address], DO, Mason Jimenez, DO, Doris Stevenson, DO, Alia España [place], DO, Laurie Vizcaino MD, Lencho Yang MD, Brad Krishnan MD, Anabela Mccarthy MD, Bhavin Patton MD, Daina Kendrick MD, Adeel Solano MD, Otis Officer, DO, Mukund Curry MD, Jose Guzman DO, Wendi Wise MD,  Sandra Colemano, DO, Kvng Huizar MD, Genaro Horton MD, Lela Ulrich MD, Mattie Moreno MD, Jose Carlos Pope MD, Alma Valdes MD, Marjorie Campos MD, Nessa Torrez, [facility], [facility], CNP, Jasmina Chavez, CNP, Ammon Padilla, CNS, John Osorio, CNP, Navdeep Borges, CNP, Marcel Lovett, CNP, Antonieta Cabrera, CNP, Vanita Sutton, CNP, Rosangela Cheung PA-C, Marta Prieto, [facility], Octavio Owens, CNP, Leanne Cox, CNP, Stacie Mendez, CNP, Cecilia Disla, CNP, Dex Merrill, CNP, Rose Renteria, CNP, Ernst Perkins, CNP, Donnie Sofia, [facility]    Discharge Summary     Patient ID: Juliana Carlisle  :  1945   MRN: [id number]     ACCOUNT:  [de-identified]   Patient's PCP: Tonio Bello MD  Admit Date: 2021   Discharge Date: 2021  Length of Stay: 4  Code Status:  Full Code  Admitting Physician: Jaycee Fox DO  Discharge Physician: Joseph Terrell DO     Active Discharge Diagnoses:     Hospital Problem Lists:  Principal Problem (Resolved):    Syncope and collapse  Active Problems:    Frequent falls    Multiple sclerosis [facility])    Fibromyalgia    Chronic diastolic heart failure [facility])    Physical debility    Obesity (BMI 30-39. 9)    Essential hypertension    Paroxysmal A-fib (HCC)  Resolved Problems:    Bradycardia      Admission Condition:  fair     Discharged Condition: good    Hospital Stay:     Hospital Course:  Juliana Carlisle is a 70 y.o. female who presented to [facility] ED for evaluation after a fall and LOC on 2021. This fall is happened at home and has been happening more frequently. Patient reports that she has been weak for quite some time now. Stan Villarreal does admit to loss of consciousness.  She endorses a history of multiple sclerosis, for which she follows with a clinician out of the [facility]. She has been maintained on Imuran for many years, as she states that this is the only thing that works. She has multiple allergies to medications. She also notes that she has chronic atrial fibrillation, hypogammaglobinemia, multiple prior cardiac arrest, congestive heart failure, and history of gout.  In the emergency department, she was noted to be bradycardic and hypotensive.  Orthostatics are noted to be positive.     Labs in the ED with a BNP and troponin mildly elevated from baseline. Creatinine WNL. Bilirubin elevated from draw 2 months prior. Normocytic, normochromic anemia present as well. UA unremarkable. CT of the chest was performed to r/o pulmonary embolism which revealed only a right-sided pleural effusion.  Bradycardia, hypertension, and positive orthostatics were noted. She was evaluated by GI and cardiology. Metoprolol XL was held. Catapres was started secondary to multiple drug allergies in the setting of hypertension. Her bradycardia improved with holding metoprolol and starting theophylline. She also improved from a functional standpoint, she was working physical therapy and Occupational Therapy with little difficulty. MRI of the brain was performed which did not reveal any worsening of her MS. She will follow-up with her [address] [place] [address]. Ultimately, she is appropriate for discharge home. There was a slight delay in discharge secondary to patient's fear of returning home. She currently lives in a home which was concerning for possible hoarding. She reports that her family and neighbors were working on cleaning up her house prior to discharge.   She will discuss further options of assisted living versus new apartment with her  in the outpatient setting. Significant therapeutic interventions:   Holding of beta-blocker therapy, initiation of theophylline  Cardiology and neurology evaluations  Supportive care    Significant Diagnostic Studies:   Labs / Micro:  CBC:   Lab Results   Component Value Date    WBC 7.0 08/08/2021    RBC 3.94 08/08/2021    RBC 4.04 04/30/2012    HGB 11.4 08/08/2021    HCT 35.1 08/08/2021    MCV 89.1 08/08/2021    MCH 28.9 08/08/2021    MCHC 32.5 08/08/2021    RDW 17.9 08/08/2021     08/08/2021     04/30/2012     CMP:    Lab Results   Component Value Date    GLUCOSE 103 08/08/2021    GLUCOSE 108 04/30/2012     08/08/2021    K 3.4 08/08/2021    CL 99 08/08/2021    CO2 28 08/08/2021    BUN 13 08/08/2021    CREATININE 0.99 08/08/2021    ANIONGAP 10 08/08/2021    ALKPHOS 76 08/04/2021    ALT 9 08/04/2021    AST 12 08/04/2021    BILITOT 1.50 08/04/2021    LABALBU 3.5 08/08/2021    ALBUMIN 2.3 08/04/2021    LABGLOM 55 08/08/2021    GFRAA >60 08/08/2021    GFR      08/08/2021    GFR NOT REPORTED 08/08/2021    PROT 5.9 08/04/2021    CALCIUM 8.7 08/08/2021       Radiology:  CT HEAD WO CONTRAST    Result Date: 8/4/2021  1. No acute intracranial abnormality. 2. Stable remote lacunar stroke right external capsule and lateral left basal ganglia. 3.  White matter hypoattenuation described is typical of microvascular ischemic disease or as sequela of dysmyelinating/demyelinating processes. 4. Stable senescent changes. CT CHEST PULMONARY EMBOLISM W CONTRAST    Result Date: 8/4/2021  No evidence of pulmonary embolism. No acute infiltrate. Right pleural effusion. MRI BRAIN W WO CONTRAST    Result Date: 8/6/2021  Chronic microvascular disease without acute intracranial abnormality. No abnormal postcontrast enhancement.        Consultations:    Consults:     Final Specialist Recommendations/Findings:   IP CONSULT TO SOCIAL WORK  IP CONSULT TO CARDIOLOGY  IP CONSULT TO NEUROLOGY  IP CONSULT TO PHYSICAL MEDICINE REHAB The patient was seen and examined on day of discharge and this discharge summary is in conjunction with any daily progress note from day of discharge. Discharge plan:     Disposition: Home    Physician Follow Up:     Brissa Rowe-[address]/Fredi King [address] [facility]  [phone]    Schedule an appointment as soon as possible for a visit in 1 week  Follow-up; Discuss outpatient therapy needs    Joe Mendosa MD  [address] [address] [phone]    Schedule an appointment as soon as possible for a visit in 1 week  Follow-up regarding cardiac medications       Requiring Further Evaluation/Follow Up POST HOSPITALIZATION/Incidental Findings: Follow-up with your   Follow-up with your [facility] neurologist  Follow-up with your cardiologist    Diet: cardiac diet    Activity: As tolerated with assistive device. Cane written for. Instructions to Patient: Please follow-up with your PCP and cardiologist in the outpatient setting. Please touch base with your  regarding issues about possible assisted living versus a new apartment.       Discharge Medications:      Medication List      START taking these medications    cloNIDine 0.1 MG tablet  Commonly known as: CATAPRES  Take 1 tablet by mouth 2 times daily        CHANGE how you take these medications    isosorbide mononitrate 120 MG extended release tablet  Commonly known as: IMDUR  Take 0.5 tablets by mouth daily  What changed: how much to take     losartan 100 MG tablet  Commonly known as: COZAAR  What changed:   · how much to take  · additional instructions        CONTINUE taking these medications    allopurinol 100 MG tablet  Commonly known as: ZYLOPRIM     azaTHIOprine 50 MG tablet  Commonly known as: IMURAN     bumetanide 1 MG tablet  Commonly known as: BUMEX  Take 1 tablet by mouth daily Advised to take additional 1 pill for weight gain, increased CHF and/swelling     Eliquis 5 MG Tabs tablet  Generic drug: apixaban     nitroGLYCERIN 0.4 MG SL tablet  Commonly known as: NITROSTAT     pantoprazole 40 MG tablet  Commonly known as: PROTONIX     PARoxetine 20 MG tablet  Commonly known as: PAXIL     Plavix 75 MG tablet  Generic drug: clopidogrel     potassium chloride 20 MEQ extended release tablet  Commonly known as: KLOR-CON M  Take 1 tablet by mouth daily     pravastatin 40 MG tablet  Commonly known as: PRAVACHOL     ranolazine 500 MG extended release tablet  Commonly known as: RANEXA        STOP taking these medications    acetaminophen 500 MG tablet  Commonly known as: TYLENOL     metoprolol succinate 50 MG extended release tablet  Commonly known as: TOPROL XL           Where to Get Your Medications      These medications were sent to [address] [place] [address], [address] [place] [address]  [address], [address] [place] [address]    Phone: [phone]   · cloNIDine 0.1 MG tablet         Discharge Procedure Orders   DME Order for [employer] as OP   Order Comments: You must complete the order parameters below and add the medical necessity documentation for this DME in a separate note. Cane    Diagnosis: Physical debility, multiple sclerosis  Duration: Purchase       Time Spent on discharge is  41 mins in patient examination, evaluation, counseling as well as medication reconciliation, prescriptions for required medications, discharge plan and follow up. Electronically signed by   Ericka Echeverria DO  8/8/2021  12:56 PM      Thank you Dr. Piper Trevino MD for the opportunity to be involved in this patient's care.

## 2021-08-08 NOTE — PLAN OF CARE
Praveen Frederick requires a cane due to impaired ambulation and for increased stability in order to participate in or complete daily living tasks of: toileting, personal cares and ambulating. The patient is able to safely use the cane and the functional mobility deficit can be sufficiently resolved.     Sharon Fowler, [address]

## 2021-08-08 NOTE — DISCHARGE INSTR - DIET
Good nutrition is important when healing from an illness, injury, or surgery. Follow any nutrition recommendations given to you during your hospital stay. If you were given an oral nutrition supplement while in the hospital, continue to take this supplement at home. You can take it with meals, in-between meals, and/or before bedtime. These supplements can be purchased at most local grocery stores, pharmacies, and chain [employer]-stores.    If you have any questions about your diet or nutrition, call the hospital and ask for the dietitian.    - Heart healthy diet

## 2021-08-08 NOTE — PROGRESS NOTES
[facility]   Progress Note                   Date:   8/8/2021  Patient name: Ashely Boykin  Date of admission:  8/4/2021  4:51 PM  MRN:   [id number]  YOB: 1945  PCP: Jeevan Horner MD    Reason for Admission: Syncope and collapse [R55]  Bradycardia [R00.1]  Hypoxia [R09.02]    Subjective:       Clinical Changes / Abnormalities: Pt seen and examined in the room. Feeling better, HR 60-70 at rest, remains in normal sinus rhythm. Denies chest pain or dyspnea at rest   BP better controlled after started on clonidine       Medications:   Scheduled Meds:   cloNIDine  0.1 mg Oral BID    losartan  100 mg Oral Daily    pantoprazole  40 mg Oral BID AC    allopurinol  100 mg Oral Daily    apixaban  5 mg Oral BID    azaTHIOprine  50 mg Oral Daily    clopidogrel  75 mg Oral Daily    bumetanide  1 mg Oral Daily    PARoxetine  20 mg Oral QAM    [Held by provider] potassium chloride  20 mEq Oral Daily    pravastatin  40 mg Oral Daily    ranolazine  500 mg Oral BID    sodium chloride flush  5-40 mL Intravenous 2 times per day     Continuous Infusions:   nitroGLYCERIN      sodium chloride       CBC:   Recent Labs     08/06/21  1120 08/07/21  0531 08/08/21  0538   WBC 9.3 8.2 7.0   HGB 11.9* 11.0* 11.4*    188 207     BMP:    Recent Labs     08/06/21  1120 08/07/21  0531 08/08/21  0538    139 137   K 3.3* 3.6* 3.4*   CL 99 98 99   CO2 29 29 28   BUN 16 15 13   CREATININE 0.90 0.85 0.99*   GLUCOSE 99 93 103*     Hepatic:   No results for input(s): AST, ALT, ALB, BILITOT, ALKPHOS in the last 72 hours. Troponin:   No results for input(s): TROPHS in the last 72 hours. BNP: No results for input(s): BNP in the last 72 hours. Lipids: No results for input(s): CHOL, HDL in the last 72 hours. Invalid input(s): LDLCALCU  INR: No results for input(s): INR in the last 72 hours.     Objective:   Vitals: BP (!) 121/52   Pulse 71   Temp 97.7 °F (36.5 °C) (Oral)   Resp 18   Wt 202 lb 13.2 oz (92 kg)   SpO2 (!) 89%   BMI 37.70 kg/m²      General appearance: alert and cooperative with exam  HEENT: Head: Normocephalic, no lesions, without obvious abnormality. Neck: no JVD, trachea midline, no adenopathy  Lungs: Clear to auscultation, no wheeze or rales   Heart: Regular rate and rhythm, s1/s2 auscultated, no murmurs  Abdomen: soft, non-tender, bowel sounds active  Extremities: no edema  Neurologic: not done    Cardiac data:     ECG 8.4.2021: Sinus bradycardia, LAD, Anterior infarct     Echocardiogram 4/30/2019  Left ventricle is normal in size with normal systolic function. Calculated ejection fraction is 65%. Increased left ventricular wall thickness. Evidence of advance diastolic dysfunction. Left atrium is moderately dilated. Right atrial enlargement. Increase in right ventricular free wall thickness. Mildly dilated left ventricle with mildly reduced function. Aortic leaflet calcification without stenosis. Mild thickening of the mitral leaflets without stenosis. Mild mitral regurgitation. Trace tricuspid regurgitation. Estimated right ventricular systolic pressure  is 34 mmH        Coronary Angiography 7/31/20:   LMCA: Mild irregularities 10-20%. LAD: Patent mid stent, distal 40% stenosis. Apical vessel had 80% stenosis, given symptoms and apical ischemia on stress test, balloon angioplasty was performed with reduction of stenosis to 20%. LCx: Mild irregularities 10-20%. Large and dominant with mild disease   OM1 and OM2 are large, patent with mild disease   RCA: Small and non-dominant, patent, mild disease       CARDIOVERSION:  After an adequate level of sedation was achieved, 150J in biphasic synchronized delivery was administered. conversion to normal sinus rhythm. The patient awoke without complications.      The patient will continue with the discharge m. There were no complications      Medical Hx    1. Hypertension   2.  Coronary  artery disease status post PCI to LAD   3. Multiple sclerosis   4. Gastroparesis  5. Echocardiogram 4/30/2019: Left ventricle is normal in size with normal systolic function. Calculated ejection fraction is 65%. Evidence of advance diastolic dysfunction. Left atrium is moderately dilated. Right atrial enlargement. Increase in right ventricular free wall thickness. Mildly dilated left ventricle with mildly reduced function. Aortic leaflet calcification without stenosis. Mild thickening of the mitral leaflets without stenosis. Mild mitral regurgitation. 6. Nuclear stress test on July 28, 2020 showed reversible perfusion defect in anteroapical and lateral apical wall  7. PAF and atrial flutter, with admission to [facility] in July 2020 with new onset atrial flutter status post JOVITA cardioversion on July 31, 2020; amiodarone restarted; pt is on Eliquis. 8. Coronary angiography on July 31, 2020 showed patent LAD stent and high-grade stenosis in the apical LAD status post POBA/ PTCA with balloon angioplasty only. Other vessels had mild non-obstructive disease. 9. Repeat cardioversion Aug 2020, with subsequent recurrent atrial flutter 9/28/20   10. Referred to [facility] to consider AFib ablation, had cardiac arrest after 2nd dose of Tikosyn, currently managed with rate control strategy. was deemed not a suitable candidate for ablation due to comorbidities.          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   · Complicated multiple sclerosis      Patient Active Problem List:     Bloody discharge from nipple     Osteopenia     Multiple sclerosis (HCC)     Essential hypertension     Asthma     SOB (shortness of breath)     Lower leg pain     Noncompliance with medications     Dyslipidemia     GERD (gastroesophageal reflux disease)     Fibromyalgia     Pyloric stenosis     Demyelinating disease (HCC)     H/O: CVA (cerebrovascular accident)     Epigastric abdominal pain     Incontinence in female     Chest pain with high risk for cardiac etiology     Migraine without status migrainosus, not intractable     Hypokalemia     Chronic diastolic heart failure (HCC)     Acute diastolic HF (heart failure) (HCC)     Acute on chronic combined systolic and diastolic CHF (congestive heart failure) (HCC)     CAD (coronary artery disease)     Atrial flutter (HCC)     Chronic bronchiolitis (HCC)     Paroxysmal A-fib (HCC)     Bradycardia     HTN (hypertension)     Chronic renal insufficiency, stage III (moderate) ([facility])     Mild cognitive disorder     Raynaud's disease     Syncope and collapse     Frequent falls     Physical debility     Chronic atrial fibrillation (HCC)     Obesity (BMI 30-39. 9)      Plan of Treatment:   1. Continue to hold BB. Will resume Amiodarone at low dose in 1-2 weeks if she remains in normal sinus rhythm. 2. D/c theophylline   3. Continue losartan and clonidine for BP control   4. Continue plavix, statin, elqiuis and ranexa    5. Discussed the risks and benefits of [facility], she understands and will continue for now, in case of recurrent falls will recommend switching eliquis to ASA  6. Ok to d/c home from cardiac standpoint with OP follow up in 2 weeks.        Electronically signed by Naveen Shen MD on 8/8/2021 at 3:11 PM  [address]  [phone]

## 2021-08-09 ENCOUNTER — TELEPHONE (OUTPATIENT)
Dept: ONCOLOGY | Age: 76
End: 2021-08-09

## 2021-08-10 ENCOUNTER — TELEPHONE (OUTPATIENT)
Dept: ONCOLOGY | Age: 76
End: 2021-08-10

## 2021-11-28 ENCOUNTER — HOSPITAL ENCOUNTER (EMERGENCY)
Age: 76
Discharge: HOME | End: 2021-11-28
Attending: EMERGENCY MEDICINE
Payer: MEDICARE

## 2021-11-28 ENCOUNTER — APPOINTMENT (OUTPATIENT)
Dept: CT IMAGING | Age: 76
End: 2021-11-28
Payer: MEDICARE

## 2021-11-28 ENCOUNTER — APPOINTMENT (OUTPATIENT)
Dept: GENERAL RADIOLOGY | Age: 76
End: 2021-11-28
Payer: MEDICARE

## 2021-11-28 VITALS
WEIGHT: 196 LBS | SYSTOLIC BLOOD PRESSURE: 181 MMHG | DIASTOLIC BLOOD PRESSURE: 64 MMHG | HEART RATE: 51 BPM | OXYGEN SATURATION: 93 % | TEMPERATURE: 98.4 F | RESPIRATION RATE: 24 BRPM | BODY MASS INDEX: 36.07 KG/M2 | HEIGHT: 62 IN

## 2021-11-28 DIAGNOSIS — N39.0 URINARY TRACT INFECTION WITHOUT HEMATURIA, SITE UNSPECIFIED: ICD-10-CM

## 2021-11-28 DIAGNOSIS — R07.9 CHEST PAIN, UNSPECIFIED TYPE: Primary | ICD-10-CM

## 2021-11-28 DIAGNOSIS — I10 UNCONTROLLED HYPERTENSION: ICD-10-CM

## 2021-11-28 DIAGNOSIS — R10.12 ABDOMINAL PAIN, LEFT UPPER QUADRANT: ICD-10-CM

## 2021-11-28 LAB
-: ABNORMAL
ABSOLUTE EOS #: 0.1 K/UL (ref 0–0.4)
ABSOLUTE IMMATURE GRANULOCYTE: ABNORMAL K/UL (ref 0–0.3)
ABSOLUTE LYMPH #: 0.9 K/UL (ref 1–4.8)
ABSOLUTE MONO #: 0.7 K/UL (ref 0.1–1.2)
AMORPHOUS: ABNORMAL
ANION GAP SERPL CALCULATED.3IONS-SCNC: 10 MMOL/L (ref 9–17)
BACTERIA: ABNORMAL
BASOPHILS # BLD: 1 % (ref 0–2)
BASOPHILS ABSOLUTE: 0.1 K/UL (ref 0–0.2)
BILIRUBIN URINE: NEGATIVE
BNP INTERPRETATION: ABNORMAL
BUN BLDV-MCNC: 22 MG/DL (ref 8–23)
BUN/CREAT BLD: ABNORMAL (ref 9–20)
CALCIUM SERPL-MCNC: 9 MG/DL (ref 8.6–10.4)
CASTS UA: ABNORMAL /LPF
CHLORIDE BLD-SCNC: 104 MMOL/L (ref 98–107)
CO2: 25 MMOL/L (ref 20–31)
COLOR: YELLOW
COMMENT UA: ABNORMAL
CREAT SERPL-MCNC: 0.88 MG/DL (ref 0.5–0.9)
CRYSTALS, UA: ABNORMAL /HPF
DIFFERENTIAL TYPE: ABNORMAL
EOSINOPHILS RELATIVE PERCENT: 1 % (ref 1–4)
EPITHELIAL CELLS UA: ABNORMAL /HPF (ref 0–5)
GFR AFRICAN AMERICAN: >60 ML/MIN
GFR NON-AFRICAN AMERICAN: >60 ML/MIN
GFR SERPL CREATININE-BSD FRML MDRD: ABNORMAL ML/MIN/{1.73_M2}
GFR SERPL CREATININE-BSD FRML MDRD: ABNORMAL ML/MIN/{1.73_M2}
GLUCOSE BLD-MCNC: 107 MG/DL (ref 70–99)
GLUCOSE URINE: NEGATIVE
HCT VFR BLD CALC: 36.1 % (ref 36–46)
HEMOGLOBIN: 12.1 G/DL (ref 12–16)
IMMATURE GRANULOCYTES: ABNORMAL %
KETONES, URINE: NEGATIVE
LEUKOCYTE ESTERASE, URINE: NEGATIVE
LYMPHOCYTES # BLD: 12 % (ref 24–44)
MCH RBC QN AUTO: 30.9 PG (ref 26–34)
MCHC RBC AUTO-ENTMCNC: 33.5 G/DL (ref 31–37)
MCV RBC AUTO: 92.2 FL (ref 80–100)
MONOCYTES # BLD: 10 % (ref 2–11)
MUCUS: ABNORMAL
NITRITE, URINE: POSITIVE
NRBC AUTOMATED: ABNORMAL PER 100 WBC
OTHER OBSERVATIONS UA: ABNORMAL
PDW BLD-RTO: 14.1 % (ref 12.5–15.4)
PH UA: 6 (ref 5–8)
PLATELET # BLD: 196 K/UL (ref 140–450)
PLATELET ESTIMATE: ABNORMAL
PMV BLD AUTO: 10.6 FL (ref 6–12)
POTASSIUM SERPL-SCNC: 4 MMOL/L (ref 3.7–5.3)
PRO-BNP: 4849 PG/ML
PROTEIN UA: NEGATIVE
RBC # BLD: 3.92 M/UL (ref 4–5.2)
RBC # BLD: ABNORMAL 10*6/UL
RBC UA: ABNORMAL /HPF (ref 0–2)
RENAL EPITHELIAL, UA: ABNORMAL /HPF
SEG NEUTROPHILS: 76 % (ref 36–66)
SEGMENTED NEUTROPHILS ABSOLUTE COUNT: 5.6 K/UL (ref 1.8–7.7)
SODIUM BLD-SCNC: 139 MMOL/L (ref 135–144)
SPECIFIC GRAVITY UA: 1.02 (ref 1–1.03)
TRICHOMONAS: ABNORMAL
TROPONIN INTERP: ABNORMAL
TROPONIN INTERP: ABNORMAL
TROPONIN T: ABNORMAL NG/ML
TROPONIN T: ABNORMAL NG/ML
TROPONIN, HIGH SENSITIVITY: 23 NG/L (ref 0–14)
TROPONIN, HIGH SENSITIVITY: 23 NG/L (ref 0–14)
TURBIDITY: ABNORMAL
URINE HGB: ABNORMAL
UROBILINOGEN, URINE: NORMAL
WBC # BLD: 7.4 K/UL (ref 3.5–11)
WBC # BLD: ABNORMAL 10*3/UL
WBC UA: ABNORMAL /HPF (ref 0–5)
YEAST: ABNORMAL

## 2021-11-28 PROCEDURE — 99283 EMERGENCY DEPT VISIT LOW MDM: CPT

## 2021-11-28 PROCEDURE — 93005 ELECTROCARDIOGRAM TRACING: CPT | Performed by: EMERGENCY MEDICINE

## 2021-11-28 PROCEDURE — 6360000004 HC RX CONTRAST MEDICATION: Performed by: EMERGENCY MEDICINE

## 2021-11-28 PROCEDURE — 93005 ELECTROCARDIOGRAM TRACING: CPT | Performed by: SPECIALIST

## 2021-11-28 PROCEDURE — 71045 X-RAY EXAM CHEST 1 VIEW: CPT

## 2021-11-28 PROCEDURE — 80048 BASIC METABOLIC PNL TOTAL CA: CPT

## 2021-11-28 PROCEDURE — 85025 COMPLETE CBC W/AUTO DIFF WBC: CPT

## 2021-11-28 PROCEDURE — 2580000003 HC RX 258: Performed by: EMERGENCY MEDICINE

## 2021-11-28 PROCEDURE — 6370000000 HC RX 637 (ALT 250 FOR IP): Performed by: SPECIALIST

## 2021-11-28 PROCEDURE — 81001 URINALYSIS AUTO W/SCOPE: CPT

## 2021-11-28 PROCEDURE — 74177 CT ABD & PELVIS W/CONTRAST: CPT

## 2021-11-28 PROCEDURE — 84484 ASSAY OF TROPONIN QUANT: CPT

## 2021-11-28 PROCEDURE — 36415 COLL VENOUS BLD VENIPUNCTURE: CPT

## 2021-11-28 PROCEDURE — 83880 ASSAY OF NATRIURETIC PEPTIDE: CPT

## 2021-11-28 RX ORDER — 0.9 % SODIUM CHLORIDE 0.9 %
80 INTRAVENOUS SOLUTION INTRAVENOUS ONCE
Status: COMPLETED | OUTPATIENT
Start: 2021-11-28 | End: 2021-11-28

## 2021-11-28 RX ORDER — NITROFURANTOIN 25; 75 MG/1; MG/1
100 CAPSULE ORAL 2 TIMES DAILY
Qty: 14 CAPSULE | Refills: 0 | Status: SHIPPED | OUTPATIENT
Start: 2021-11-28 | End: 2021-12-05

## 2021-11-28 RX ORDER — NITROGLYCERIN 20 MG/100ML
5-200 INJECTION INTRAVENOUS CONTINUOUS
Status: DISCONTINUED | OUTPATIENT
Start: 2021-11-28 | End: 2021-11-29 | Stop reason: HOSPADM

## 2021-11-28 RX ORDER — NITROFURANTOIN 25; 75 MG/1; MG/1
100 CAPSULE ORAL ONCE
Status: COMPLETED | OUTPATIENT
Start: 2021-11-28 | End: 2021-11-28

## 2021-11-28 RX ORDER — SODIUM CHLORIDE 0.9 % (FLUSH) 0.9 %
10 SYRINGE (ML) INJECTION PRN
Status: DISCONTINUED | OUTPATIENT
Start: 2021-11-28 | End: 2021-11-29 | Stop reason: HOSPADM

## 2021-11-28 RX ORDER — METOPROLOL SUCCINATE 50 MG/1
25 TABLET, EXTENDED RELEASE ORAL NIGHTLY
COMMUNITY
Start: 2021-05-20

## 2021-11-28 RX ORDER — GABAPENTIN 100 MG/1
CAPSULE ORAL
COMMUNITY
Start: 2021-06-01

## 2021-11-28 RX ADMIN — SODIUM CHLORIDE, PRESERVATIVE FREE 10 ML: 5 INJECTION INTRAVENOUS at 19:30

## 2021-11-28 RX ADMIN — IOPAMIDOL 75 ML: 755 INJECTION, SOLUTION INTRAVENOUS at 19:29

## 2021-11-28 RX ADMIN — SODIUM CHLORIDE 80 ML: 9 INJECTION, SOLUTION INTRAVENOUS at 19:30

## 2021-11-28 RX ADMIN — NITROFURANTOIN (MONOHYDRATE/MACROCRYSTALS) 100 MG: 75; 25 CAPSULE ORAL at 22:25

## 2021-11-28 ASSESSMENT — PAIN DESCRIPTION - LOCATION: LOCATION: ABDOMEN;CHEST

## 2021-11-28 ASSESSMENT — ENCOUNTER SYMPTOMS
ABDOMINAL PAIN: 1
NAUSEA: 1
RHINORRHEA: 0
CONSTIPATION: 0
BACK PAIN: 1
PHOTOPHOBIA: 0
DIARRHEA: 0
SHORTNESS OF BREATH: 1
VOMITING: 0
SORE THROAT: 0
COUGH: 0

## 2021-11-28 ASSESSMENT — PAIN DESCRIPTION - PAIN TYPE: TYPE: ACUTE PAIN

## 2021-11-28 ASSESSMENT — PAIN SCALES - GENERAL: PAINLEVEL_OUTOF10: 7

## 2021-11-29 LAB
EKG ATRIAL RATE: 50 BPM
EKG ATRIAL RATE: 52 BPM
EKG P AXIS: 63 DEGREES
EKG P AXIS: 72 DEGREES
EKG P-R INTERVAL: 138 MS
EKG P-R INTERVAL: 146 MS
EKG Q-T INTERVAL: 510 MS
EKG Q-T INTERVAL: 512 MS
EKG QRS DURATION: 82 MS
EKG QRS DURATION: 88 MS
EKG QTC CALCULATION (BAZETT): 464 MS
EKG QTC CALCULATION (BAZETT): 476 MS
EKG R AXIS: -36 DEGREES
EKG R AXIS: -43 DEGREES
EKG T AXIS: 2 DEGREES
EKG T AXIS: 24 DEGREES
EKG VENTRICULAR RATE: 50 BPM
EKG VENTRICULAR RATE: 52 BPM

## 2021-11-29 NOTE — ED PROVIDER NOTES
ADDENDUM:      Care of this patient was assumed from Dr. Lizabeth William. The patient was seen for Hypertension (most recent 205/66), Abdominal Pain (had EGD, states since then pain, htn and no appetite with nausea), and Nausea (no appetite)  . The patient's initial evaluation and plan have been discussed with the prior provider who initially evaluated the patient. Nursing Notes, Past Medical Hx, Past Surgical Hx, Social Hx, Allergies, and Family Hx were all reviewed. Diagnostic Results     EKG   EKG Interpretation #2    Interpreted by emergency department physician    Rhythm: Sinus rhythm, but Bradycardic  Rate: Bradycardic  Axis: left  Conduction: normal  ST Segments: no acute change  T Waves: no acute change  Q Waves: no acute change    Clinical Impression: Sinus Bradycardia. RADIOLOGY:   Non-plain film images such as CT, Ultrasound and MRI are read by the radiologist. Alejandra Bellow radiographic images are visualized and the radiologist interpretations are reviewed as follows:     CT ABDOMEN PELVIS W IV CONTRAST Additional Contrast? None    Result Date: 11/28/2021  EXAMINATION: CT OF THE ABDOMEN AND PELVIS WITH CONTRAST 11/28/2021 7:42 pm TECHNIQUE: CT of the abdomen and pelvis was performed with the administration of intravenous contrast. Multiplanar reformatted images are provided for review. Dose modulation, iterative reconstruction, and/or weight based adjustment of the mA/kV was utilized to reduce the radiation dose to as low as reasonably achievable. COMPARISON: None. HISTORY: ORDERING SYSTEM PROVIDED HISTORY: LUQ pain TECHNOLOGIST PROVIDED HISTORY: LUQ pain Decision Support Exception - unselect if not a suspected or confirmed emergency medical condition->Emergency Medical Condition (MA) Reason for Exam: LUQ abdominal pain EGD done x 5 days ago Acuity: Acute Type of Exam: Initial Relevant Medical/Surgical History: Sx: appendectomy FINDINGS: Lower Chest: Cardiomegaly. Calcific coronary artery disease. Organs: Metallic clips anterior abdominal wall midline. The liver, spleen, pancreas, and adrenals appear normal.  Gallbladder normal. 9 mm simple left renal cortical cyst.  Right kidney normal.  No follow-up required. The bladder appears normal. GI/Bowel: The stomach,small bowel, and colon appear normal. Colonic diverticulosis. Increased stool throughout the colon. Pelvis: Uterus normal. Peritoneum/Retroperitoneum: The abdominal aorta and iliac arteries are normal in caliber. There is no pathologic adenopathy. Calcified plaque along the aorta. Bones/Soft Tissues: Slight anterior subluxation L4-5. Increased stool. No acute disease. XR CHEST PORTABLE    Result Date: 11/28/2021  EXAMINATION: ONE XRAY VIEW OF THE CHEST 11/28/2021 7:33 pm COMPARISON: June 13, 2021 HISTORY: ORDERING SYSTEM PROVIDED HISTORY: chest pain TECHNOLOGIST PROVIDED HISTORY: chest pain Reason for Exam: chest pain Acuity: Acute Type of Exam: Initial FINDINGS: Cardiomegaly. Lungs clear. Bony thorax intact. Cardiomegaly unchanged.      LABS:   Results for orders placed or performed during the hospital encounter of 51/11/30   Basic Metabolic Panel   Result Value Ref Range    Glucose 107 (H) 70 - 99 mg/dL    BUN 22 8 - 23 mg/dL    CREATININE 0.88 0.50 - 0.90 mg/dL    Bun/Cre Ratio NOT REPORTED 9 - 20    Calcium 9.0 8.6 - 10.4 mg/dL    Sodium 139 135 - 144 mmol/L    Potassium 4.0 3.7 - 5.3 mmol/L    Chloride 104 98 - 107 mmol/L    CO2 25 20 - 31 mmol/L    Anion Gap 10 9 - 17 mmol/L    GFR Non-African American >60 >60 mL/min    GFR African American >60 >60 mL/min    GFR Comment          GFR Staging NOT REPORTED    Brain Natriuretic Peptide   Result Value Ref Range    Pro-BNP 4,849 (H) <300 pg/mL    BNP Interpretation NOT REPORTED    CBC Auto Differential   Result Value Ref Range    WBC 7.4 3.5 - 11.0 k/uL    RBC 3.92 (L) 4.0 - 5.2 m/uL    Hemoglobin 12.1 12.0 - 16.0 g/dL    Hematocrit 36.1 36 - 46 %    MCV 92.2 80 - 100 fL    MCH 30.9 26 - 34 pg    MCHC 33.5 31 - 37 g/dL    RDW 14.1 12.5 - 15.4 %    Platelets 182 363 - 109 k/uL    MPV 10.6 6.0 - 12.0 fL    NRBC Automated NOT REPORTED per 100 WBC    Differential Type NOT REPORTED     Seg Neutrophils 76 (H) 36 - 66 %    Lymphocytes 12 (L) 24 - 44 %    Monocytes 10 2 - 11 %    Eosinophils % 1 1 - 4 %    Basophils 1 0 - 2 %    Immature Granulocytes NOT REPORTED 0 %    Segs Absolute 5.60 1.8 - 7.7 k/uL    Absolute Lymph # 0.90 (L) 1.0 - 4.8 k/uL    Absolute Mono # 0.70 0.1 - 1.2 k/uL    Absolute Eos # 0.10 0.0 - 0.4 k/uL    Basophils Absolute 0.10 0.0 - 0.2 k/uL    Absolute Immature Granulocyte NOT REPORTED 0.00 - 0.30 k/uL    WBC Morphology NOT REPORTED     RBC Morphology NOT REPORTED     Platelet Estimate NOT REPORTED    Troponin   Result Value Ref Range    Troponin, High Sensitivity 23 (H) 0 - 14 ng/L    Troponin T NOT REPORTED <0.03 ng/mL    Troponin Interp NOT REPORTED    Urinalysis with Microscopic   Result Value Ref Range    Color, UA Yellow Yellow    Turbidity UA SLIGHTLY CLOUDY (A) Clear    Glucose, Ur NEGATIVE NEGATIVE    Bilirubin Urine NEGATIVE NEGATIVE    Ketones, Urine NEGATIVE NEGATIVE    Specific Gravity, UA 1.020 1.005 - 1.030    Urine Hgb SMALL (A) NEGATIVE    pH, UA 6.0 5.0 - 8.0    Protein, UA NEGATIVE NEGATIVE    Urobilinogen, Urine Normal Normal    Nitrite, Urine POSITIVE (A) NEGATIVE    Leukocyte Esterase, Urine NEGATIVE NEGATIVE    Urinalysis Comments NOT REPORTED     -          WBC, UA 10 TO 20 0 - 5 /HPF    RBC, UA 2 TO 5 0 - 2 /HPF    Casts UA NOT REPORTED /LPF    Crystals, UA NOT REPORTED None /HPF    Epithelial Cells UA 5 TO 10 0 - 5 /HPF    Renal Epithelial, UA NOT REPORTED 0 /HPF    Bacteria, UA MANY (A) None    Mucus, UA NOT REPORTED None    Trichomonas, UA NOT REPORTED None    Amorphous, UA NOT REPORTED None    Other Observations UA NOT REPORTED NOT REQ.     Yeast, UA NOT REPORTED None   Troponin   Result Value Ref Range    Troponin, High Sensitivity 23 (H) 0 - 14 ng/L Troponin T NOT REPORTED <0.03 ng/mL    Troponin Interp NOT REPORTED    EKG 12 Lead   Result Value Ref Range    Ventricular Rate 52 BPM    Atrial Rate 52 BPM    P-R Interval 146 ms    QRS Duration 82 ms    Q-T Interval 512 ms    QTc Calculation (Bazett) 476 ms    P Axis 63 degrees    R Axis -43 degrees    T Axis 24 degrees   EKG 12 Lead   Result Value Ref Range    Ventricular Rate 50 BPM    Atrial Rate 50 BPM    P-R Interval 138 ms    QRS Duration 88 ms    Q-T Interval 510 ms    QTc Calculation (Bazett) 464 ms    P Axis 72 degrees    R Axis -36 degrees    T Axis 2 degrees       RECENT VITALS:  BP: (!) 181/64, Temp: 98.4 °F (36.9 °C), Pulse: 51, Resp: 24     ED Course     The patient was given the following medications:  Orders Placed This Encounter   Medications    DISCONTD: nitroGLYCERIN 50 mg in dextrose 5% 250 mL infusion    iopamidol (ISOVUE-370) 76 % injection 75 mL    DISCONTD: sodium chloride flush 0.9 % injection 10 mL    0.9 % sodium chloride bolus    nitrofurantoin (macrocrystal-monohydrate) (MACROBID) capsule 100 mg    nitrofurantoin, macrocrystal-monohydrate, (MACROBID) 100 MG capsule     Sig: Take 1 capsule by mouth 2 times daily for 7 days     Dispense:  14 capsule     Refill:  0     During the emergency department course, patient was put on the monitor which reveals normal sinus rhythm. IV nitroglycerin was ordered initially for blood pressure control but blood pressure gradually came down to 160/80 prior to discharge by itself    Medical Decision Making      31-year-old female patient presents to the emergency department for evaluation of elevated blood pressure, chest discomfort, abdominal pain and nausea. Upon reevaluation, patient is resting comfortably and denies any chest pain. Her lungs are clear to auscultation, rhythm is regular without murmurs. Abdomen is soft and nontender with active bowel sounds. Extremities without any edema or calf tenderness.   Neurologically no focal deficits. I reviewed all the lab results, EKG, cardiac markers, CAT scan of the abdomen and pelvis and chest x-ray. Patient has urinalysis revealing urinary tract infection. She was offered observation admission for further evaluation of chest pain as well as better blood pressure control as well as treatment for UTI but she prefers to go home. She will follow-up with her PCP for further evaluation as well as cardiologist for chest pain work-up. She was started on Macrobid 100 mg p.o. x1 which he tolerated well and then discharged home on Macrobid for 7 days course. She is advised to return if chest pain recurs or if she develops any new symptoms. The patient presents with chest pain that is not suggesting in nature of pulmonary emnbolus, aortic dissection, cardiac ischemia, aortic dissection, or other serious etiology. Given the extremely low risk of these diagnoses further testing and evaluation for these possibilites are not indicated at is time. The patient has been instructed to return if the symptpoms change or worsen in any way. I have reviewed the disposition diagnosis with the patient and or their family/guardian. I have answered their questions and given discharge instructions. They voiced understanding of these instructions and did not have any further questions or complaints. I estimate there is LOW risk for ACUTE APPENDICITIS, BOWEL OBSTRUCTION, CHOLECYSTITIS, DIVERTICULITIS, INCARCERATED HERNIA, PANCREATITIS, or PERFORATED BOWEL or ULCER, thus I consider the discharge disposition reasonable. Also, there is no evidence or peritonitis, sepsis, or toxicity. The patient and/or family and I have discussed the diagnosis and risks, and we agree with discharging home to follow-up with their primary doctor. We also discussed returning to the Emergency Department immediately if new or worsening symptoms occur.  We have discussed the symptoms which are most concerning (e.g., bloody stool,

## 2021-11-29 NOTE — ED PROVIDER NOTES
[id number] [facility] ED  [address] [place] [address]  Phone: [phone]  Fax: [phone]        Pt Name: Ashely Boykin  MRN: [id number]  [place] 1945  Date of evaluation: 11/28/21      CHIEF COMPLAINT     Chief Complaint   Patient presents with    Hypertension     most recent 205/66    Abdominal Pain     had EGD, states since then pain, htn and no appetite with nausea    Nausea     no appetite         HISTORY OF PRESENT ILLNESS  (Location/Symptom, Timing/Onset, Context/Setting, Quality, Duration, Modifying Factors, Severity.)    Ashely Boykin is a 70 y.o. female who presents with hypertension despite taking all of her medications. Patient also complains of retrosternal chest pressure and shortness of breath. Patient states that her chest pressure is nonradiating. It does go across her entire chest.  It is accompanied by nausea, palpitations, and diaphoresis. No vomiting. Patient also complains of a headache. No new vision changes, but she does have known optic neuritis secondary to her history of multiple sclerosis. Patient reports that she took all of her medications for hypertension, and she is still hypertensive. Patient has a known history of coronary artery disease, hypertension, family history of heart disease, and she is obese. No history of diabetes or smoking. Patient states that she is having right upper quadrant abdominal pain that started after an EGD that she had on Wednesday. The patient reports that she has a known history of gastroparesis and pyloric stenosis. REVIEW OF SYSTEMS    (2-9 systems for level 4, 10 or more for level 5)     Review of Systems   Constitutional: Negative for chills and fever. HENT: Negative for congestion, rhinorrhea and sore throat. Eyes: Negative for photophobia and visual disturbance. Respiratory: Positive for shortness of breath. Negative for cough.     Cardiovascular: Positive for chest pain, palpitations and leg swelling. Gastrointestinal: Positive for abdominal pain and nausea. Negative for constipation, diarrhea and vomiting. Genitourinary: Negative for dysuria, frequency and urgency. Musculoskeletal: Positive for back pain. Negative for neck pain. Skin: Negative for rash and wound. Neurological: Positive for dizziness, light-headedness and headaches. Hematological: Negative for adenopathy. Does not bruise/bleed easily. PAST MEDICAL HISTORY    has a past medical history of Abnormal Pap smear, Asthma, Atrial fibrillation (HCC), Bloody discharge from nipple, CAD (coronary artery disease), Cataracts, bilateral, Colon polyp, CVA (cerebral vascular accident) ([address] Demyelinating disease ([address] Fibromyalgia, High-risk sexual behavior, History of bone density study, History of migraines, HTN (hypertension), IgG deficiency ([address] Multiple sclerosis ([address] Myoclonic seizures ([address] Optic neuritis, Osteopenia, Pyloric stenosis, Raynaud's disease, and Vasomotor rhinitis. SURGICAL HISTORY      has a past surgical history that includes Dilation & curettage (1982); Breast biopsy (1990); Appendectomy (1955); Septoplasty (1985); Hammer toe surgery (1989); Sinus endoscopy; sinus surgery; Shoulder arthroscopy (1998); Esophagoscopy (1966, 1969); Colonoscopy (2010); Foreign Body Removal (1962); laparoscopy (2895); Coronary angioplasty with stent (5/2011); Coronary angioplasty with stent (6/2011); Upper gastrointestinal endoscopy; eye surgery; pr colonoscopy w/biopsy single/multiple (N/A, 12/19/2017); pr egd transoral biopsy single/multiple (12/19/2017); Upper gastrointestinal endoscopy (12/19/2017); Upper gastrointestinal endoscopy (5/24/2018); Cardiac surgery; Cardiac catheterization (07/31/2020); Cardioversion (08/14/2020); and Cardioversion (07/31/2020).     CURRENTMEDICATIONS       Previous Medications    APIXABAN (ELIQUIS) 5 MG TABS TABLET    Take by mouth 2 times daily    AZATHIOPRINE (IMURAN) 50 MG TABLET Take 50 mg by mouth daily     BUMETANIDE (BUMEX) 1 MG TABLET    Take 1 tablet by mouth daily Advised to take additional 1 pill for weight gain, increased CHF and/swelling    CLONIDINE (CATAPRES) 0.1 MG TABLET    Take 1 tablet by mouth 2 times daily    CLOPIDOGREL (PLAVIX) 75 MG TABLET    Take 75 mg by mouth daily     GABAPENTIN (NEURONTIN) 100 MG CAPSULE    gabapentin 100 mg capsule   Take 1 capsule 3 times a day by oral route as needed for 30 days. ISOSORBIDE MONONITRATE (IMDUR) 120 MG EXTENDED RELEASE TABLET    Take 0.5 tablets by mouth daily    LOSARTAN (COZAAR) 100 MG TABLET    Take 1 tablet by mouth daily    METOPROLOL SUCCINATE (TOPROL XL) 50 MG EXTENDED RELEASE TABLET    Toprol XL 50 mg tablet,extended release    NITROGLYCERIN (NITROSTAT) 0.4 MG SL TABLET    Place 0.4 mg under the tongue every 5 minutes. Indications: Chest Pain    PANTOPRAZOLE (PROTONIX) 40 MG TABLET    Take 40 mg by mouth 2 times daily     PAROXETINE (PAXIL) 20 MG TABLET    Take 25 mg by mouth every morning     POTASSIUM CHLORIDE (KLOR-CON M) 20 MEQ EXTENDED RELEASE TABLET    Take 1 tablet by mouth daily    PRAVASTATIN (PRAVACHOL) 40 MG TABLET    Take 40 mg by mouth daily. RANOLAZINE (RANEXA) 500 MG SR TABLET    Take 500 mg by mouth 2 times daily.        ALLERGIES     is allergic to lisinopril-hydrochlorothiazide, sulfa antibiotics, penicillins, polyethylene glycol, actifed cold-allergy [chlorpheniramine-phenylephrine], altace [ramipril], cephalosporins, coreg [carvedilol], dml facial moisturizer, elavil [amitriptyline hcl], entex [ami-margot], ethylene glycol, fentanyl, hizentra [immune globulin (human)], hydralazine, levofloxacin, lisinopril-hydrochlorothiazide, montelukast sodium, morphine, nifedipine, norvasc [amlodipine besylate], other, phenobarbital, robaxin [methocarbamol], sinequan [doxepin hcl], sudafed [pseudoephedrine hcl], sudafed [pseudoephedrine hcl], tranquil-cecil, wellbutrin [bupropion hcl], actifed cold-allergy [chlorpheniramine-phenylephrine], clindamycin/lincomycin, codeine, metoprolol, metoprolol succinate, and seldane [terfenadine]. FAMILY HISTORY     She indicated that the status of her mother is unknown. She indicated that the status of her father is unknown. She indicated that the status of her sister is unknown. She indicated that the status of her maternal aunt is unknown.     family history includes Breast Cancer in her maternal aunt; Heart Disease in her father and mother; Hypertension in her sister; Other in her father, maternal aunt, mother, and sister. SOCIAL HISTORY      reports that she has never smoked. She has never used smokeless tobacco. She reports that she does not drink alcohol and does not use drugs. PHYSICAL EXAM    (up to 7 for level 4, 8 or more for level 5)   INITIAL VITALS:  height is 5' 1.75\" (1.568 m) and weight is 88.9 kg (196 lb). Her oral temperature is 98.4 °F (36.9 °C). Her blood pressure is 222/97 (abnormal) and her pulse is 55. Her respiration is 16 and oxygen saturation is 99%. Physical Exam  Vitals and nursing note reviewed. Constitutional:       Appearance: She is well-developed. She is obese. She is not ill-appearing. HENT:      Head: Normocephalic and atraumatic. Cardiovascular:      Rate and Rhythm: Normal rate and regular rhythm. Heart sounds: Normal heart sounds. No murmur heard. No friction rub. No gallop. Pulmonary:      Effort: Pulmonary effort is normal.      Breath sounds: Rales present. No wheezing or rhonchi. Abdominal:      General: Abdomen is flat. Bowel sounds are normal.      Palpations: Abdomen is soft. Tenderness: There is abdominal tenderness in the right upper quadrant. There is no guarding or rebound. Skin:     General: Skin is warm and dry. Capillary Refill: Capillary refill takes less than 2 seconds. Neurological:      General: No focal deficit present.       Mental Status: She is alert and oriented to person, place, and time. Psychiatric:         Mood and Affect: Mood normal.         Behavior: Behavior normal.         DIFFERENTIAL DIAGNOSIS/ MDM:     72-year-old female with multiple medical problems including coronary artery disease, obesity, chronic kidney disease, and MS presents with elevated blood pressure despite taking all of her blood pressure medications today. Patient also presented with chest pain, abdominal pain, and a headache. Work-up in progress. The patient was turned over to Dr Deloris Lo at shift change. DIAGNOSTIC RESULTS     EKG: All EKG's are interpreted by the Emergency Department Physician who either signs or Co-signs this chart in the absence of a cardiologist.    EKG Interpretation    Interpreted by emergency department physician    Rhythm: sinus bradycardia  Rate: normal  Axis: left  Ectopy: none  Conduction: normal  ST Segments: nonspecific changes  T Waves: inversion in  v2, v3 and III  Q Waves: none    Clinical Impression: non-specific EKG    Claude Beaver, MD      RADIOLOGY:        Interpretation per the Radiologist below, if available at the time of this note:    No results found.       LABS:  Results for orders placed or performed during the hospital encounter of 35/06/06   Basic Metabolic Panel   Result Value Ref Range    Glucose 107 (H) 70 - 99 mg/dL    BUN 22 8 - 23 mg/dL    CREATININE 0.88 0.50 - 0.90 mg/dL    Bun/Cre Ratio NOT REPORTED 9 - 20    Calcium 9.0 8.6 - 10.4 mg/dL    Sodium 139 135 - 144 mmol/L    Potassium 4.0 3.7 - 5.3 mmol/L    Chloride 104 98 - 107 mmol/L    CO2 25 20 - 31 mmol/L    Anion Gap 10 9 - 17 mmol/L    GFR Non-African American >60 >60 mL/min    GFR African American >60 >60 mL/min    GFR Comment          GFR Staging NOT REPORTED    Brain Natriuretic Peptide   Result Value Ref Range    Pro-BNP 4,849 (H) <300 pg/mL    BNP Interpretation NOT REPORTED    CBC Auto Differential   Result Value Ref Range    WBC 7.4 3.5 - 11.0 k/uL    RBC 3.92 (L) 4.0 - 5.2 m/uL Hemoglobin 12.1 12.0 - 16.0 g/dL    Hematocrit 36.1 36 - 46 %    MCV 92.2 80 - 100 fL    MCH 30.9 26 - 34 pg    MCHC 33.5 31 - 37 g/dL    RDW 14.1 12.5 - 15.4 %    Platelets 214 951 - 130 k/uL    MPV 10.6 6.0 - 12.0 fL    NRBC Automated NOT REPORTED per 100 WBC    Differential Type NOT REPORTED     Seg Neutrophils 76 (H) 36 - 66 %    Lymphocytes 12 (L) 24 - 44 %    Monocytes 10 2 - 11 %    Eosinophils % 1 1 - 4 %    Basophils 1 0 - 2 %    Immature Granulocytes NOT REPORTED 0 %    Segs Absolute 5.60 1.8 - 7.7 k/uL    Absolute Lymph # 0.90 (L) 1.0 - 4.8 k/uL    Absolute Mono # 0.70 0.1 - 1.2 k/uL    Absolute Eos # 0.10 0.0 - 0.4 k/uL    Basophils Absolute 0.10 0.0 - 0.2 k/uL    Absolute Immature Granulocyte NOT REPORTED 0.00 - 0.30 k/uL    WBC Morphology NOT REPORTED     RBC Morphology NOT REPORTED     Platelet Estimate NOT REPORTED    Troponin   Result Value Ref Range    Troponin, High Sensitivity 23 (H) 0 - 14 ng/L    Troponin T NOT REPORTED <0.03 ng/mL    Troponin Interp NOT REPORTED        Elevated troponin, elevated BNP    EMERGENCY DEPARTMENT COURSE:   Vitals:    Vitals:    11/28/21 1719   BP: (!) 222/97   Pulse: 55   Resp: 16   Temp: 98.4 °F (36.9 °C)   TempSrc: Oral   SpO2: 99%   Weight: 88.9 kg (196 lb)   Height: 5' 1.75\" (1.568 m)     -------------------------  BP: (!) 222/97, Temp: 98.4 °F (36.9 °C), Pulse: 55, Resp: 16          CONSULTS:  none    PROCEDURES:  None    FINAL IMPRESSION    No diagnosis found. DISPOSITION/PLAN   DISPOSITION        CONDITION ON DISPOSITION:   Stable     PATIENT REFERRED TO:  No follow-up provider specified.     DISCHARGE MEDICATIONS:  New Prescriptions    No medications on file       (Please note that portions of this note were completed with a voicerecognition program.  Efforts were made to edit the dictations but occasionally words are mis-transcribed.)    Gina Miller MD  Attending Emergency Medicine Physician        Gina Miller MD  11/28/21 1911

## 2022-01-07 ENCOUNTER — HOSPITAL ENCOUNTER (OUTPATIENT)
Age: 77
Discharge: HOME | End: 2022-01-09
Payer: MEDICARE

## 2022-01-07 ENCOUNTER — HOSPITAL ENCOUNTER (OUTPATIENT)
Dept: GENERAL RADIOLOGY | Age: 77
Discharge: HOME | End: 2022-01-09
Payer: MEDICARE

## 2022-01-07 DIAGNOSIS — K59.09 OTHER CONSTIPATION: ICD-10-CM

## 2022-01-07 PROCEDURE — 74018 RADEX ABDOMEN 1 VIEW: CPT

## 2022-02-06 ENCOUNTER — HOSPITAL ENCOUNTER (EMERGENCY)
Age: 77
Discharge: HOME | End: 2022-02-06
Attending: EMERGENCY MEDICINE
Payer: MEDICARE

## 2022-02-06 ENCOUNTER — APPOINTMENT (OUTPATIENT)
Dept: GENERAL RADIOLOGY | Age: 77
End: 2022-02-06
Payer: MEDICARE

## 2022-02-06 VITALS
DIASTOLIC BLOOD PRESSURE: 90 MMHG | HEART RATE: 88 BPM | SYSTOLIC BLOOD PRESSURE: 156 MMHG | RESPIRATION RATE: 20 BRPM | OXYGEN SATURATION: 98 % | BODY MASS INDEX: 37.57 KG/M2 | WEIGHT: 199 LBS | HEIGHT: 61 IN | TEMPERATURE: 97.5 F

## 2022-02-06 DIAGNOSIS — R06.89 DYSPNEA AND RESPIRATORY ABNORMALITIES: ICD-10-CM

## 2022-02-06 DIAGNOSIS — R06.00 DYSPNEA AND RESPIRATORY ABNORMALITIES: ICD-10-CM

## 2022-02-06 DIAGNOSIS — R74.8 ELEVATED LIPASE: ICD-10-CM

## 2022-02-06 DIAGNOSIS — R07.9 CHEST PAIN, UNSPECIFIED TYPE: Primary | ICD-10-CM

## 2022-02-06 LAB
ABSOLUTE EOS #: 0.15 K/UL (ref 0–0.4)
ABSOLUTE IMMATURE GRANULOCYTE: ABNORMAL K/UL (ref 0–0.3)
ABSOLUTE LYMPH #: 1.01 K/UL (ref 1–4.8)
ABSOLUTE MONO #: 0.93 K/UL (ref 0.1–1.2)
ALBUMIN SERPL-MCNC: 3.8 G/DL (ref 3.5–5.2)
ALBUMIN/GLOBULIN RATIO: 1.7 (ref 1–2.5)
ALP BLD-CCNC: 77 U/L (ref 35–104)
ALT SERPL-CCNC: 6 U/L (ref 5–33)
ANION GAP SERPL CALCULATED.3IONS-SCNC: 10 MMOL/L (ref 9–17)
AST SERPL-CCNC: 10 U/L
BASOPHILS # BLD: 0 % (ref 0–2)
BASOPHILS ABSOLUTE: 0.02 K/UL (ref 0–0.2)
BILIRUB SERPL-MCNC: 0.51 MG/DL (ref 0.3–1.2)
BNP INTERPRETATION: ABNORMAL
BUN BLDV-MCNC: 22 MG/DL (ref 8–23)
BUN/CREAT BLD: ABNORMAL (ref 9–20)
CALCIUM SERPL-MCNC: 9.3 MG/DL (ref 8.6–10.4)
CHLORIDE BLD-SCNC: 99 MMOL/L (ref 98–107)
CO2: 25 MMOL/L (ref 20–31)
CREAT SERPL-MCNC: 0.84 MG/DL (ref 0.5–0.9)
D-DIMER QUANTITATIVE: <0.19 MG/L FEU
DIFFERENTIAL TYPE: ABNORMAL
EOSINOPHILS RELATIVE PERCENT: 2 % (ref 1–4)
GFR AFRICAN AMERICAN: >60 ML/MIN
GFR NON-AFRICAN AMERICAN: >60 ML/MIN
GFR SERPL CREATININE-BSD FRML MDRD: ABNORMAL ML/MIN/{1.73_M2}
GFR SERPL CREATININE-BSD FRML MDRD: ABNORMAL ML/MIN/{1.73_M2}
GLUCOSE BLD-MCNC: 106 MG/DL (ref 70–99)
HCT VFR BLD CALC: 37.9 % (ref 36–46)
HEMOGLOBIN: 12.2 G/DL (ref 12–16)
IMMATURE GRANULOCYTES: ABNORMAL %
LIPASE: 142 U/L (ref 13–60)
LYMPHOCYTES # BLD: 14 % (ref 24–44)
MCH RBC QN AUTO: 27.9 PG (ref 26–34)
MCHC RBC AUTO-ENTMCNC: 32.2 G/DL (ref 31–37)
MCV RBC AUTO: 86.5 FL (ref 80–100)
MONOCYTES # BLD: 13 % (ref 2–11)
NRBC AUTOMATED: ABNORMAL PER 100 WBC
PDW BLD-RTO: 15.8 % (ref 12.5–15.4)
PLATELET # BLD: 230 K/UL (ref 140–450)
PLATELET ESTIMATE: ABNORMAL
PMV BLD AUTO: 11.4 FL (ref 8–14)
POTASSIUM SERPL-SCNC: 4 MMOL/L (ref 3.7–5.3)
PRO-BNP: 3902 PG/ML
RBC # BLD: 4.38 M/UL (ref 4–5.2)
RBC # BLD: ABNORMAL 10*6/UL
SEG NEUTROPHILS: 71 % (ref 36–66)
SEGMENTED NEUTROPHILS ABSOLUTE COUNT: 4.99 K/UL (ref 1.8–7.7)
SODIUM BLD-SCNC: 134 MMOL/L (ref 135–144)
TOTAL PROTEIN: 6.1 G/DL (ref 6.4–8.3)
TROPONIN INTERP: ABNORMAL
TROPONIN INTERP: ABNORMAL
TROPONIN T: ABNORMAL NG/ML
TROPONIN T: ABNORMAL NG/ML
TROPONIN, HIGH SENSITIVITY: 27 NG/L (ref 0–14)
TROPONIN, HIGH SENSITIVITY: 29 NG/L (ref 0–14)
WBC # BLD: 7.1 K/UL (ref 3.5–11)
WBC # BLD: ABNORMAL 10*3/UL

## 2022-02-06 PROCEDURE — 93005 ELECTROCARDIOGRAM TRACING: CPT | Performed by: PHYSICIAN ASSISTANT

## 2022-02-06 PROCEDURE — 83690 ASSAY OF LIPASE: CPT

## 2022-02-06 PROCEDURE — 99283 EMERGENCY DEPT VISIT LOW MDM: CPT

## 2022-02-06 PROCEDURE — 85025 COMPLETE CBC W/AUTO DIFF WBC: CPT

## 2022-02-06 PROCEDURE — 71045 X-RAY EXAM CHEST 1 VIEW: CPT

## 2022-02-06 PROCEDURE — 85379 FIBRIN DEGRADATION QUANT: CPT

## 2022-02-06 PROCEDURE — 83880 ASSAY OF NATRIURETIC PEPTIDE: CPT

## 2022-02-06 PROCEDURE — 84484 ASSAY OF TROPONIN QUANT: CPT

## 2022-02-06 PROCEDURE — 36415 COLL VENOUS BLD VENIPUNCTURE: CPT

## 2022-02-06 PROCEDURE — 80053 COMPREHEN METABOLIC PANEL: CPT

## 2022-02-06 RX ORDER — ASPIRIN 81 MG/1
324 TABLET, CHEWABLE ORAL ONCE
Status: DISCONTINUED | OUTPATIENT
Start: 2022-02-06 | End: 2022-02-06 | Stop reason: HOSPADM

## 2022-02-06 RX ORDER — DOXAZOSIN MESYLATE 1 MG/1
TABLET ORAL
COMMUNITY

## 2022-02-06 RX ORDER — OXYCODONE HYDROCHLORIDE AND ACETAMINOPHEN 5; 325 MG/1; MG/1
TABLET ORAL
COMMUNITY
End: 2022-02-06

## 2022-02-06 ASSESSMENT — PAIN DESCRIPTION - LOCATION: LOCATION: CHEST

## 2022-02-06 ASSESSMENT — PAIN DESCRIPTION - FREQUENCY: FREQUENCY: CONTINUOUS

## 2022-02-06 ASSESSMENT — PAIN SCALES - GENERAL: PAINLEVEL_OUTOF10: 9

## 2022-02-06 NOTE — ED PROVIDER NOTES
[address] [facility] ED  [address] [facility] [address]  Phone: [phone]  Fax: [address]      Pt Name: Joesph Cash  MRN: [id number]  [place] 1945  Date of evaluation: 2/6/2022  Provider: Bruce Robison PA-C    CHIEF COMPLAINT       Chief Complaint   Patient presents with    Chest Pain    Shortness of Breath       HISTORY OF PRESENT ILLNESS  (Location/Symptom, Timing/Onset, Context/Setting, Quality, Duration, Modifying Factors, Severity.)   Joesph Cash is a 78 y.o. female who presents to the emergency department complaining of chest symptoms. Context/Setting:   Patient here for evaluation of mid sternal chest pain and pressure that is been going on for \"a long time\", which she clarifies a little more which has been off and on for a number of months. Patient has a significant GI history in the form of diagnosis of gastroparesis, gastritis and reportedly some vascular calcifications within the abdomen for which she is seeing a specialist.  She also had a recent EGD for this as well. Patient states she is taken no medicines today for her blood pressure or her nitro for her angina history. Patient states she has pain across her back as well. She denies any fever/chills/new abdominal pain or any urinary symptoms. Patient states she gets most of her care at [facility]. She denies any syncope or near syncope. Patient reports that she has shortness of breath when moving around, but she also clarifies that this has been going on for a \"long time\" as well. Patient states that she took a diuretic pill a number of days ago because she had some leg swelling and she lost reportedly 7 pounds. Patient states that she has not been feeling well since November 2021.     Timing/Onset:   As above  Quality:     Pressure   Duration:   As above  Modifying Factors:   As above  Severity:   Mild/moderate      Heart Score         Score 0 - 3 = 2.5%  MACE over next 6 wks = Discharge home  Score 4 - 6 = 20.3%  MACE over next 6 wks = Obs admit  Score 7 - 10 = 72.7%  MACE over next 6 wks = Early invasive Rx       HEART Risk Score:   History         Highly Suspicious                    2                      Moderately Suspicious            1                      Slight Suspicious                     0  EKG            Significant ST Depression       2                      Nonspecific                              1                      Normal                                     0  Age              > or = 65                                  2                      > 45 to < 65                             1                     < or = 45                                  0  Risk Factors    > or = 3                         2                          1 or 2                            1                          0                                 0  Troponin          > or = 3 times normal limit       2                          > 1 and < 3 times limit             1                          Normal                                     0  Score               0 - 3      Low Risk                           4 - 6      Moderate risk                           7 - 10    High Risk    * Risk Factors include: Diabetes, Tobacco use, Hypertension, Hyperlipidemia, Family History of CAD and Obesity      Nursing Notes were reviewed. REVIEW OF SYSTEMS    (2-9 systems for level 4, 10 or more for level 5)     Constitutional: Denies recent fever, chills. Eyes: No visual changes. Neck: No neck pain. Respiratory: per HPI  Cardiac:  per HPI  GI:  Denies abdominal pain/nausea/vomiting/diarrhea. Musculoskeletal: Denies focal weakness. Neurologic: per HPI  Skin:  No rash. Except as noted above the remainder of the review of systems was reviewed and negative. PAST MEDICAL HISTORY   History reviewed. Diagnosis Date    Abnormal Pap smear 10/2010    paucity or absence of TZx 3 years in a row. 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[address]     Optic neuritis     Nancey Eunice Pupil    Osteopenia     Pyloric stenosis     Raynaud's disease     Vasomotor rhinitis          SURGICAL HISTORY           Procedure Laterality Date    APPENDECTOMY  1955    sponges left in abdomen, at [address] [place] [address] Way    stereotactic type, right breast, benign    CARDIAC CATHETERIZATION  07/31/2020    POBA lad    CARDIAC SURGERY      CARDIOVERSION  08/14/2020    [facility]     CARDIOVERSION  07/31/2020    COLONOSCOPY  2010    Polyp    CORONARY ANGIOPLASTY WITH STENT PLACEMENT  5/2011    failed attempt to place stent    CORONARY ANGIOPLASTY WITH STENT PLACEMENT  6/2011    successful placement of 2 stent in same artery   [address]    bleeding ulcers found   [address] [address]    surgical sponges left in during appendectomy    HAMMER TOE SURGERY  1989    head resection of phalanx, left foot   [facility]    with D&C    VT COLONOSCOPY W/BIOPSY SINGLE/MULTIPLE N/A 12/19/2017    COLONOSCOPY WITH BIOPSY performed by Annia Pandey MD at [facility]    VT EGD TRANSORAL BIOPSY SINGLE/MULTIPLE  12/19/2017    EGD BIOPSY performed by Annia Pandey MD at [address] [place] [address] resection with rt and left ethmoidectomy    SHOULDER ARTHROSCOPY  1998    rotator cuff impingement, right arm    SINUS ENDOSCOPY      SINUS SURGERY      multiple    UPPER GASTROINTESTINAL ENDOSCOPY      2-4 times per year for pyloric stenosis    UPPER GASTROINTESTINAL ENDOSCOPY  12/19/2017    EGD DILATION BALLOON performed by Nayeli Bear MD at [address] [facility] ENDOSCOPY  5/24/2018    EGD DILATION SAVORY performed by Osman Snowden MD at [address] [facility]       Discharge Medication List as of 2/6/2022  4:50 PM      CONTINUE these medications which have NOT CHANGED    Details   gabapentin (NEURONTIN) 100 MG capsule gabapentin 100 mg capsule   Take 1 capsule 3 times a day by oral route as needed for 30 days. Historical Med      metoprolol succinate (TOPROL XL) 50 MG extended release tablet Take 25 mg by mouth at bedtime Historical Med      losartan (COZAAR) 100 MG tablet Take 1 tablet by mouth daily, Disp-30 tablet, R-0Historical Med      bumetanide (BUMEX) 1 MG tablet Take 1 tablet by mouth daily Advised to take additional 1 pill for weight gain, increased CHF and/swelling, Disp-30 tablet, R-1Normal      potassium chloride (KLOR-CON M) 20 MEQ extended release tablet Take 1 tablet by mouth daily, Disp-180 tablet,R-1Normal      apixaban (ELIQUIS) 5 MG TABS tablet Take by mouth 2 times dailyHistorical Med      pantoprazole (PROTONIX) 40 MG tablet Take 40 mg by mouth 2 times daily Historical Med      azaTHIOprine (IMURAN) 50 MG tablet Take 50 mg by mouth daily Historical Med      ranolazine (RANEXA) 500 MG SR tablet Take 500 mg by mouth 2 times daily. nitroGLYCERIN (NITROSTAT) 0.4 MG SL tablet Place 0.4 mg under the tongue every 5 minutes.  Indications: Chest Pain      PARoxetine (PAXIL) 20 MG tablet Take by mouth every morning Historical Med      clopidogrel (PLAVIX) 75 MG tablet Take 75 mg by mouth daily Historical Med      pravastatin (PRAVACHOL) 40 MG tablet Take 40 mg by mouth at bedtime Historical Med      doxazosin (CARDURA) 1 MG tablet doxazosin 1 mg tabletHistorical Med      cloNIDine (CATAPRES) 0.1 MG tablet Take 1 tablet by mouth 2 times daily, Disp-60 tablet, R-0Normal             ALLERGIES     Dofetilide, Lisinopril-hydrochlorothiazide, Sulfa antibiotics, Penicillins, Polyethylene glycol, Actifed cold-allergy [chlorpheniramine-phenylephrine], Altace [ramipril], Cephalosporins, Coreg [carvedilol], Dml facial moisturizer, Elavil [amitriptyline hcl], Entex [ami-margot], Ethylene glycol, Fentanyl, Hizentra [immune globulin (human)], Hydralazine, Levofloxacin, Lisinopril-hydrochlorothiazide, Montelukast sodium, Morphine, Nifedipine, Norvasc [amlodipine besylate], Other, Phenobarbital, Propoxyphene, Robaxin [methocarbamol], Sinequan [doxepin hcl], Sudafed [pseudoephedrine hcl], Sudafed [pseudoephedrine hcl], Tranquil-cecil, Wellbutrin [bupropion hcl], Actifed cold-allergy [chlorpheniramine-phenylephrine], Clindamycin/lincomycin, Codeine, Metoprolol, Metoprolol succinate, and Seldane [terfenadine]    FAMILY HISTORY           Problem Relation Age of Onset    Other Father         Heart problems    Heart Disease Father     Other Mother         heart problems requiring open heart surgery, HTN    Heart Disease Mother     Other Sister         HTN    Hypertension Sister     Other Maternal Aunt         breast cancer    Breast Cancer Maternal Aunt      Family Status   Relation Name Status    Father  (Not Specified)    Mother  (Not Specified)    Sister  (Not Specified)    MAunt  (Not Specified)        SOCIAL HISTORY      reports that she has never smoked. She has never used smokeless tobacco. She reports that she does not drink alcohol and does not use drugs. Lives with others.   PHYSICAL EXAM    (up to 7 for level 4, 8 or more for level 5)     ED Triage Vitals [02/06/22 1302]   BP Temp Temp Source Pulse Resp SpO2 Height Weight   (!) 170/140 97.5 °F (36.4 °C) Oral 103 18 96 % 5' 1\" (1.549 m) 199 lb (90.3 kg)       Constitutional:  Well developed   Eyes:  Pupils equal/round  HENT:  Atraumatic, external ears normal, nose normal, oropharynx moist. Neck- supple   Respiratory:  Clear to auscultation bilaterally with good air exchange, no W/R/R. No chestwall tenderness  Cardiovascular:  RRR with normal S1 and S2. Gastrointestinal/Abdomen:  Soft, NT. BS wnl. Musculoskeletal:  No edema, no tenderness, no deformities. No pitting edema of BLE or calf TTP. Back:  No CVA tenderness. Normal to inspection. Integument:  No rash. Neurologic:  Alert, appropriate mentation/interaction, no focal deficits noted     DIAGNOSTIC RESULTS     EKG: All EKG's are interpreted by the Emergency Department Physician who either signs or Co-signs this chart in the absence of a cardiologist.    Not indicated, or per attending note      RADIOLOGY:   Non-plain film images such as CT, Ultrasound and MRI are read by the radiologist. Plain radiographic images are visualized and preliminarily interpreted by the emergency physician with the below findings:      Interpretation per the Radiologist below, if available at the time of this note:    XR CHEST PORTABLE   Final Result   No acute process. Stable cardiomegaly.                LABS:  Labs Reviewed   CBC WITH AUTO DIFFERENTIAL - Abnormal; Notable for the following components:       Result Value    RDW 15.8 (*)     Seg Neutrophils 71 (*)     Lymphocytes 14 (*)     Monocytes 13 (*)     All other components within normal limits   COMPREHENSIVE METABOLIC PANEL W/ REFLEX TO MG FOR LOW K - Abnormal; Notable for the following components:    Glucose 106 (*)     Sodium 134 (*)     Total Protein 6.1 (*)     All other components within normal limits   LIPASE - Abnormal; Notable for the following components:    Lipase 142 (*)     All other components within normal limits   TROPONIN - Abnormal; Notable for the following components:    Troponin, High Sensitivity 27 (*)     All other components within normal limits   BRAIN NATRIURETIC PEPTIDE - Abnormal; Notable for the following components:    Pro-BNP 3,902 (*)     All other components within normal limits   TROPONIN - Abnormal; Notable for the following components:    Troponin, High Sensitivity 29 (*)     All other components within normal limits   D-DIMER, QUANTITATIVE       All other labs were within normal range or not returned as of this dictation. EMERGENCY DEPARTMENT COURSE and DIFFERENTIAL DIAGNOSIS/MDM:   Vitals:    Vitals:    02/06/22 1302 02/06/22 1500 02/06/22 1615   BP: (!) 170/140 (!) 148/85 (!) 156/90   Pulse: 103 93 88   Resp: 18 21 20   Temp: 97.5 °F (36.4 °C)     TempSrc: Oral     SpO2: 96% 98% 98%   Weight: 90.3 kg (199 lb)     Height: 5' 1\" (1.549 m)       Orders Placed This Encounter   Medications    aspirin chewable tablet 324 mg       1331  Nontoxic patient here for evaluation of recurrent chest pain and shortness of breath sounds as though this has been going on for a number of months. Patient states she has been able to get in to see any of her doctors during this time so she is here for evaluation. Primarily she says is because her blood pressure was up at home that was her motivation to come in today. Patient has had no medicines taken today because she was reportedly trying to get a ride here to emergency department. Lungs clear to auscultation, blood pressure was 150s/90s during my exam.  Patient in no respiratory distress, slightly tachy only. Completing cardiac work-up with a D-dimer. 1643  This patient was seen by the attending physician and they agreed with the assessment and plan. Lab results discussed with attending, no acute findings that warrant admit. Pt has history of elevated Troponin previously. BNP improved, no clinical signs of CHF exacerbation on exam and with CXR. I have reviewed the disposition diagnosis with the patient. .  I have answered their questions and given discharge instructions. They voiced understanding of these instructions and did not have any further questions or complaints. I recommended close f/u with PCP in 2-3 days. She should have another Lipase lab drawn as this was elevated, this was discussed with her as well. Pt has a long history of GI issues for which she sees GI at [facility], as well as Cardiology and other Specialists. The patient presents with chest pain that is not suggestive of in nature of pulmonary embolus, pneumothorax, aortic dissection, cardiac ischemia, aortic dissection, or other serious etiology. Given the extremely low risk of these diagnoses further testing and evaluation are not indicated at this time. The patient has been instructed to follow up with their primary care physician and to return immediately to an ED if the symptoms change or worsen in any way. Patient verbalized understanding. CONSULTS:  None    PROCEDURES:  None    FINAL IMPRESSION      1. Chest pain, unspecified type    2. Dyspnea and respiratory abnormalities    3. Elevated lipase          DISPOSITION/PLAN   DISPOSITION        PATIENT REFERRED TO:  MD Jae  [address] [address]  [phone]    Schedule an appointment as soon as possible for a visit in 2 days  for re-evaluation of your symptoms    [facility] ED  [address] [address]  [phone]  Go to   for worsening of symptoms      DISCHARGE MEDICATIONS:  Discharge Medication List as of 2/6/2022  4:50 PM          (Please note that portions of this note were completed with a voice recognition program.  Efforts were made to edit the dictations but occasionally words are mis-transcribed.)    RASHAAD Chavez PA-C  02/06/22 [address] [facility] PA-C  02/06/22 1805

## 2022-02-06 NOTE — ED PROVIDER NOTES
Emergency Department           COMPLAINT       Chief Complaint   Patient presents with    Chest Pain    Shortness of Breath      PHYSICAL EXAM      ED Triage Vitals [02/06/22 1302]   BP Temp Temp Source Pulse Resp SpO2 Height Weight   (!) 170/140 97.5 °F (36.4 °C) Oral 103 18 96 % 5' 1\" (1.549 m) 199 lb (90.3 kg)         Constitutional: Alert, oriented x3, nontoxic, answering questions appropriately, acting properly for age, in no acute distress   HEENT: Extraocular muscles intact,   Neck: Trachea midline   Cardiovascular: Regular rhythm and rate no murmurs   Respiratory: Diminished to auscultation bilaterally no wheezes, rhonchi, rales, no respiratory distress no tachypnea no retractions no hypoxia  Gastrointestinal: Soft, nontender, nondistended, positive bowel sounds. No rebound, rigidity, or guarding. No abdominal bruit no pulsatile mass  Musculoskeletal: No extremity pain or swelling   Neurologic: Moving all 4 extremities without difficulty there are no gross focal neurologic deficits   Skin: Warm and dry       Physical Exam  DIAGNOSTIC RESULTS     EKG: All EKG's are interpreted by the Emergency Department Physician who either signs or Co-signs this chart in the absence of a cardiologist.    1501 atrial fibrillation rate 92 WI undetectable QRS 78  no acute ST or T wave changes.     Not indicated unless otherwise documented above or in the midlevel documentation    LABS:  Results for orders placed or performed during the hospital encounter of 02/06/22   CBC Auto Differential   Result Value Ref Range    WBC 7.1 3.5 - 11.0 k/uL    RBC 4.38 4.0 - 5.2 m/uL    Hemoglobin 12.2 12.0 - 16.0 g/dL    Hematocrit 37.9 36 - 46 %    MCV 86.5 80 - 100 fL    MCH 27.9 26 - 34 pg    MCHC 32.2 31 - 37 g/dL    RDW 15.8 (H) 12.5 - 15.4 %    Platelets 383 854 - 804 k/uL    MPV 11.4 8.0 - 14.0 fL    NRBC Automated NOT REPORTED per 100 WBC    Differential Type NOT REPORTED     Immature Granulocytes NOT REPORTED 0 %    Absolute Immature Granulocyte NOT REPORTED 0.00 - 0.30 k/uL    WBC Morphology NOT REPORTED     RBC Morphology NOT REPORTED     Platelet Estimate NOT REPORTED     Seg Neutrophils 71 (H) 36 - 66 %    Lymphocytes 14 (L) 24 - 44 %    Monocytes 13 (H) 2 - 11 %    Eosinophils % 2 1 - 4 %    Basophils 0 0 - 2 %    Segs Absolute 4.99 1.8 - 7.7 k/uL    Absolute Lymph # 1.01 1.0 - 4.8 k/uL    Absolute Mono # 0.93 0.1 - 1.2 k/uL    Absolute Eos # 0.15 0.0 - 0.4 k/uL    Basophils Absolute 0.02 0.0 - 0.2 k/uL   Comprehensive Metabolic Panel w/ Reflex to MG   Result Value Ref Range    Glucose 106 (H) 70 - 99 mg/dL    BUN 22 8 - 23 mg/dL    CREATININE 0.84 0.50 - 0.90 mg/dL    Bun/Cre Ratio NOT REPORTED 9 - 20    Calcium 9.3 8.6 - 10.4 mg/dL    Sodium 134 (L) 135 - 144 mmol/L    Potassium 4.0 3.7 - 5.3 mmol/L    Chloride 99 98 - 107 mmol/L    CO2 25 20 - 31 mmol/L    Anion Gap 10 9 - 17 mmol/L    Alkaline Phosphatase 77 35 - 104 U/L    ALT 6 5 - 33 U/L    AST 10 <32 U/L    Total Bilirubin 0.51 0.3 - 1.2 mg/dL    Total Protein 6.1 (L) 6.4 - 8.3 g/dL    Albumin 3.8 3.5 - 5.2 g/dL    Albumin/Globulin Ratio 1.7 1.0 - 2.5    GFR Non-African American >60 >60 mL/min    GFR African American >60 >60 mL/min    GFR Comment          GFR Staging NOT REPORTED    Lipase   Result Value Ref Range    Lipase 142 (H) 13 - 60 U/L   Troponin   Result Value Ref Range    Troponin, High Sensitivity 27 (H) 0 - 14 ng/L    Troponin T NOT REPORTED <0.03 ng/mL    Troponin Interp NOT REPORTED    Brain Natriuretic Peptide   Result Value Ref Range    Pro-BNP 3,902 (H) <300 pg/mL    BNP Interpretation NOT REPORTED    D-Dimer, Quantitative   Result Value Ref Range    D-Dimer, Quant <0.19 mg/L FEU   Troponin   Result Value Ref Range    Troponin, High Sensitivity 29 (H) 0 - 14 ng/L    Troponin T NOT REPORTED <0.03 ng/mL    Troponin Interp NOT REPORTED        Not indicated unless otherwise documented above or in the midlevel documentation    RADIOLOGY:   I reviewedthe radiologist interpretations:  XR CHEST PORTABLE   Final Result   No acute process. Stable cardiomegaly. Not indicated unless otherwise documented above or in the midlevel documentation    EMERGENCY DEPARTMENT COURSE:       PERTINENT ATTENDING PHYSICIAN COMMENTS:    Chronic chest pain and abdominal pain. Chronic fatigue. Patient states her symptoms started November 24 after receiving medicine for her EGD. She has not felt well since. Constant chest pain is her primary complaint today. Labs. 4:25 PM first troponin was 27 repeat was 29. Normal white blood cell count no anemia. Normal electrolytes and kidney function. BNP was elevated but lower than her norm. Chest x-ray no infiltrate. Elevated lipase low suspicion for pancreatitis. No abdominal pain. Normal D-dimer. Will discharge to follow-up with family physician and return if worsening symptoms or any other concerns. Patient states that she has not felt well since mid November. Faculty Attestation    I performed a history and physical examination of the patient and discussed management with the mid level provideer. I reviewed the mid level provider's note and agree with the documented findings and plan of care. Any areas of disagreement are noted on the chart. I was personally present for the key portions of any procedures. I have documented in the chart those procedures where I was not present during the key portions. I have reviewed the emergency nurses triage note. I agree with the chief complaint, past medical history, past surgical history, allergies, medications, social and family history as documented unless otherwise noted below. Documentation of the HPI, Physical Exam and Medical Decision Making performed by medical students or scribes is based on my personal performance of the HPI, PE and MDM.  For Physician Assistant/ Nurse Practitioner cases/documentation I have personally evaluated this patient and have completed at least one if not all key elements of the E/M (history, physical exam, and MDM). Additional findings are as noted.        Mariluz Taylor DO  02/06/22 2461

## 2022-02-07 LAB
EKG ATRIAL RATE: 85 BPM
EKG Q-T INTERVAL: 398 MS
EKG QRS DURATION: 78 MS
EKG QTC CALCULATION (BAZETT): 492 MS
EKG R AXIS: -27 DEGREES
EKG T AXIS: 20 DEGREES
EKG VENTRICULAR RATE: 92 BPM

## 2022-03-03 ENCOUNTER — HOSPITAL ENCOUNTER (INPATIENT)
Age: 77
LOS: 7 days | Discharge: HOME | DRG: 291 | End: 2022-03-10
Attending: EMERGENCY MEDICINE | Admitting: FAMILY MEDICINE
Payer: MEDICARE

## 2022-03-03 ENCOUNTER — APPOINTMENT (OUTPATIENT)
Dept: GENERAL RADIOLOGY | Age: 77
DRG: 291 | End: 2022-03-03
Payer: MEDICARE

## 2022-03-03 DIAGNOSIS — I50.9 DECOMPENSATED HEART FAILURE (HCC): ICD-10-CM

## 2022-03-03 DIAGNOSIS — I50.31 ACUTE DIASTOLIC HF (HEART FAILURE) (HCC): ICD-10-CM

## 2022-03-03 DIAGNOSIS — I50.43 ACUTE ON CHRONIC COMBINED SYSTOLIC AND DIASTOLIC CHF (CONGESTIVE HEART FAILURE) (HCC): ICD-10-CM

## 2022-03-03 DIAGNOSIS — I27.20 PULMONARY HYPERTENSION (HCC): ICD-10-CM

## 2022-03-03 DIAGNOSIS — G37.9 DEMYELINATING DISEASE (HCC): ICD-10-CM

## 2022-03-03 DIAGNOSIS — R07.9 CHEST PAIN, UNSPECIFIED TYPE: Primary | ICD-10-CM

## 2022-03-03 DIAGNOSIS — I50.32 CHRONIC DIASTOLIC HEART FAILURE (HCC): ICD-10-CM

## 2022-03-03 DIAGNOSIS — I50.9 ACUTE ON CHRONIC CONGESTIVE HEART FAILURE, UNSPECIFIED HEART FAILURE TYPE (HCC): ICD-10-CM

## 2022-03-03 LAB
-: ABNORMAL
ABSOLUTE EOS #: 0.1 K/UL (ref 0–0.4)
ABSOLUTE LYMPH #: 1.2 K/UL (ref 1–4.8)
ABSOLUTE MONO #: 0.7 K/UL (ref 0.1–1.2)
ALBUMIN SERPL-MCNC: 4.3 G/DL (ref 3.5–5.2)
ALBUMIN/GLOBULIN RATIO: 2.2 (ref 1–2.5)
ALP BLD-CCNC: 77 U/L (ref 35–104)
ALT SERPL-CCNC: 9 U/L (ref 5–33)
AMORPHOUS: ABNORMAL
ANION GAP SERPL CALCULATED.3IONS-SCNC: 11 MMOL/L (ref 9–17)
AST SERPL-CCNC: 12 U/L
BACTERIA: ABNORMAL
BASOPHILS # BLD: 1 % (ref 0–2)
BASOPHILS ABSOLUTE: 0.1 K/UL (ref 0–0.2)
BILIRUB SERPL-MCNC: 0.75 MG/DL (ref 0.3–1.2)
BILIRUBIN URINE: NEGATIVE
BUN BLDV-MCNC: 23 MG/DL (ref 8–23)
CALCIUM SERPL-MCNC: 9.4 MG/DL (ref 8.6–10.4)
CHLORIDE BLD-SCNC: 97 MMOL/L (ref 98–107)
CO2: 27 MMOL/L (ref 20–31)
COLOR: YELLOW
CREAT SERPL-MCNC: 1.03 MG/DL (ref 0.5–0.9)
D-DIMER QUANTITATIVE: 0.4 MG/L FEU
EOSINOPHILS RELATIVE PERCENT: 1 % (ref 1–4)
EPITHELIAL CELLS UA: ABNORMAL /HPF (ref 0–5)
GFR AFRICAN AMERICAN: >60 ML/MIN
GFR NON-AFRICAN AMERICAN: 52 ML/MIN
GFR SERPL CREATININE-BSD FRML MDRD: ABNORMAL ML/MIN/{1.73_M2}
GLUCOSE BLD-MCNC: 98 MG/DL (ref 70–99)
GLUCOSE URINE: NEGATIVE
HCT VFR BLD CALC: 35.3 % (ref 36–46)
HEMOGLOBIN: 11.7 G/DL (ref 12–16)
KETONES, URINE: NEGATIVE
LEUKOCYTE ESTERASE, URINE: NEGATIVE
LIPASE: 24 U/L (ref 13–60)
LYMPHOCYTES # BLD: 15 % (ref 24–44)
MAGNESIUM: 1.9 MG/DL (ref 1.6–2.6)
MCH RBC QN AUTO: 28.1 PG (ref 26–34)
MCHC RBC AUTO-ENTMCNC: 33.1 G/DL (ref 31–37)
MCV RBC AUTO: 85.1 FL (ref 80–100)
MONOCYTES # BLD: 9 % (ref 2–11)
NITRITE, URINE: NEGATIVE
OTHER OBSERVATIONS UA: ABNORMAL
PDW BLD-RTO: 17.1 % (ref 12.5–15.4)
PH UA: 6 (ref 5–8)
PLATELET # BLD: 210 K/UL (ref 140–450)
PMV BLD AUTO: 10 FL (ref 6–12)
POTASSIUM SERPL-SCNC: 4.4 MMOL/L (ref 3.7–5.3)
PRO-BNP: 7015 PG/ML
PROTEIN UA: NEGATIVE
RBC # BLD: 4.15 M/UL (ref 4–5.2)
RBC UA: ABNORMAL /HPF (ref 0–2)
SARS-COV-2, RAPID: NOT DETECTED
SEG NEUTROPHILS: 74 % (ref 36–66)
SEGMENTED NEUTROPHILS ABSOLUTE COUNT: 5.5 K/UL (ref 1.8–7.7)
SODIUM BLD-SCNC: 135 MMOL/L (ref 135–144)
SPECIFIC GRAVITY UA: 1.01 (ref 1–1.03)
SPECIMEN DESCRIPTION: NORMAL
TOTAL PROTEIN: 6.3 G/DL (ref 6.4–8.3)
TROPONIN, HIGH SENSITIVITY: 23 NG/L (ref 0–14)
TROPONIN, HIGH SENSITIVITY: 23 NG/L (ref 0–14)
TURBIDITY: CLEAR
URINE HGB: ABNORMAL
UROBILINOGEN, URINE: NORMAL
WBC # BLD: 7.5 K/UL (ref 3.5–11)
WBC UA: ABNORMAL /HPF (ref 0–5)

## 2022-03-03 PROCEDURE — 83735 ASSAY OF MAGNESIUM: CPT

## 2022-03-03 PROCEDURE — 84484 ASSAY OF TROPONIN QUANT: CPT

## 2022-03-03 PROCEDURE — 99284 EMERGENCY DEPT VISIT MOD MDM: CPT

## 2022-03-03 PROCEDURE — 36415 COLL VENOUS BLD VENIPUNCTURE: CPT

## 2022-03-03 PROCEDURE — 6360000002 HC RX W HCPCS: Performed by: EMERGENCY MEDICINE

## 2022-03-03 PROCEDURE — 81001 URINALYSIS AUTO W/SCOPE: CPT

## 2022-03-03 PROCEDURE — 2060000000 HC ICU INTERMEDIATE R&B

## 2022-03-03 PROCEDURE — 85379 FIBRIN DEGRADATION QUANT: CPT

## 2022-03-03 PROCEDURE — 85025 COMPLETE CBC W/AUTO DIFF WBC: CPT

## 2022-03-03 PROCEDURE — 71045 X-RAY EXAM CHEST 1 VIEW: CPT

## 2022-03-03 PROCEDURE — 83880 ASSAY OF NATRIURETIC PEPTIDE: CPT

## 2022-03-03 PROCEDURE — 80053 COMPREHEN METABOLIC PANEL: CPT

## 2022-03-03 PROCEDURE — 87635 SARS-COV-2 COVID-19 AMP PRB: CPT

## 2022-03-03 PROCEDURE — 83690 ASSAY OF LIPASE: CPT

## 2022-03-03 PROCEDURE — 93005 ELECTROCARDIOGRAM TRACING: CPT | Performed by: EMERGENCY MEDICINE

## 2022-03-03 RX ORDER — ONDANSETRON 4 MG/1
4 TABLET, ORALLY DISINTEGRATING ORAL EVERY 8 HOURS PRN
Status: DISCONTINUED | OUTPATIENT
Start: 2022-03-03 | End: 2022-03-10 | Stop reason: HOSPADM

## 2022-03-03 RX ORDER — GABAPENTIN 100 MG/1
100 CAPSULE ORAL 3 TIMES DAILY
Status: DISCONTINUED | OUTPATIENT
Start: 2022-03-04 | End: 2022-03-10 | Stop reason: HOSPADM

## 2022-03-03 RX ORDER — CARVEDILOL 3.12 MG/1
3.12 TABLET ORAL 2 TIMES DAILY WITH MEALS
Status: DISCONTINUED | OUTPATIENT
Start: 2022-03-04 | End: 2022-03-09

## 2022-03-03 RX ORDER — PANTOPRAZOLE SODIUM 40 MG/1
40 TABLET, DELAYED RELEASE ORAL 2 TIMES DAILY
Status: DISCONTINUED | OUTPATIENT
Start: 2022-03-04 | End: 2022-03-10 | Stop reason: HOSPADM

## 2022-03-03 RX ORDER — CLONIDINE HYDROCHLORIDE 0.1 MG/1
0.1 TABLET ORAL 2 TIMES DAILY
Status: DISCONTINUED | OUTPATIENT
Start: 2022-03-04 | End: 2022-03-10 | Stop reason: HOSPADM

## 2022-03-03 RX ORDER — ONDANSETRON 2 MG/ML
4 INJECTION INTRAMUSCULAR; INTRAVENOUS EVERY 6 HOURS PRN
Status: DISCONTINUED | OUTPATIENT
Start: 2022-03-03 | End: 2022-03-10 | Stop reason: HOSPADM

## 2022-03-03 RX ORDER — SODIUM CHLORIDE 0.9 % (FLUSH) 0.9 %
5-40 SYRINGE (ML) INJECTION EVERY 12 HOURS SCHEDULED
Status: DISCONTINUED | OUTPATIENT
Start: 2022-03-04 | End: 2022-03-10 | Stop reason: HOSPADM

## 2022-03-03 RX ORDER — SENNA PLUS 8.6 MG/1
1 TABLET ORAL DAILY PRN
Status: DISCONTINUED | OUTPATIENT
Start: 2022-03-03 | End: 2022-03-10 | Stop reason: HOSPADM

## 2022-03-03 RX ORDER — ISOSORBIDE MONONITRATE 60 MG/1
60 TABLET, EXTENDED RELEASE ORAL DAILY
Status: DISCONTINUED | OUTPATIENT
Start: 2022-03-04 | End: 2022-03-10 | Stop reason: HOSPADM

## 2022-03-03 RX ORDER — METOPROLOL SUCCINATE 25 MG/1
25 TABLET, EXTENDED RELEASE ORAL NIGHTLY
Status: DISCONTINUED | OUTPATIENT
Start: 2022-03-04 | End: 2022-03-04

## 2022-03-03 RX ORDER — FUROSEMIDE 10 MG/ML
40 INJECTION INTRAMUSCULAR; INTRAVENOUS ONCE
Status: COMPLETED | OUTPATIENT
Start: 2022-03-03 | End: 2022-03-03

## 2022-03-03 RX ORDER — POTASSIUM CHLORIDE 20 MEQ/1
20 TABLET, EXTENDED RELEASE ORAL DAILY
Status: DISCONTINUED | OUTPATIENT
Start: 2022-03-04 | End: 2022-03-10 | Stop reason: HOSPADM

## 2022-03-03 RX ORDER — FUROSEMIDE 10 MG/ML
40 INJECTION INTRAMUSCULAR; INTRAVENOUS 2 TIMES DAILY
Status: DISCONTINUED | OUTPATIENT
Start: 2022-03-04 | End: 2022-03-05

## 2022-03-03 RX ORDER — PRAVASTATIN SODIUM 20 MG
40 TABLET ORAL NIGHTLY
Status: DISCONTINUED | OUTPATIENT
Start: 2022-03-04 | End: 2022-03-10 | Stop reason: HOSPADM

## 2022-03-03 RX ORDER — ACETAMINOPHEN 325 MG/1
650 TABLET ORAL EVERY 6 HOURS PRN
Status: DISCONTINUED | OUTPATIENT
Start: 2022-03-03 | End: 2022-03-05

## 2022-03-03 RX ORDER — SODIUM CHLORIDE 0.9 % (FLUSH) 0.9 %
10 SYRINGE (ML) INJECTION PRN
Status: DISCONTINUED | OUTPATIENT
Start: 2022-03-03 | End: 2022-03-10 | Stop reason: HOSPADM

## 2022-03-03 RX ORDER — HYDRALAZINE HYDROCHLORIDE 50 MG/1
50 TABLET, FILM COATED ORAL 2 TIMES DAILY
Status: ON HOLD | COMMUNITY
End: 2022-03-07 | Stop reason: HOSPADM

## 2022-03-03 RX ORDER — LOSARTAN POTASSIUM 50 MG/1
100 TABLET ORAL DAILY
Status: DISCONTINUED | OUTPATIENT
Start: 2022-03-04 | End: 2022-03-10 | Stop reason: HOSPADM

## 2022-03-03 RX ORDER — RANOLAZINE 500 MG/1
500 TABLET, EXTENDED RELEASE ORAL 2 TIMES DAILY
Status: DISCONTINUED | OUTPATIENT
Start: 2022-03-04 | End: 2022-03-10 | Stop reason: HOSPADM

## 2022-03-03 RX ORDER — ALLOPURINOL 100 MG/1
100 TABLET ORAL DAILY
Status: DISCONTINUED | OUTPATIENT
Start: 2022-03-04 | End: 2022-03-10 | Stop reason: HOSPADM

## 2022-03-03 RX ORDER — ALLOPURINOL 100 MG/1
100 TABLET ORAL DAILY
COMMUNITY

## 2022-03-03 RX ORDER — SODIUM CHLORIDE 9 MG/ML
25 INJECTION, SOLUTION INTRAVENOUS PRN
Status: DISCONTINUED | OUTPATIENT
Start: 2022-03-03 | End: 2022-03-10 | Stop reason: HOSPADM

## 2022-03-03 RX ORDER — PAROXETINE HYDROCHLORIDE 20 MG/1
20 TABLET, FILM COATED ORAL EVERY MORNING
Status: DISCONTINUED | OUTPATIENT
Start: 2022-03-04 | End: 2022-03-10 | Stop reason: HOSPADM

## 2022-03-03 RX ORDER — DOXAZOSIN 2 MG/1
1 TABLET ORAL DAILY
Status: DISCONTINUED | OUTPATIENT
Start: 2022-03-04 | End: 2022-03-10 | Stop reason: HOSPADM

## 2022-03-03 RX ORDER — HYDRALAZINE HYDROCHLORIDE 50 MG/1
50 TABLET, FILM COATED ORAL 2 TIMES DAILY
Status: DISCONTINUED | OUTPATIENT
Start: 2022-03-04 | End: 2022-03-04

## 2022-03-03 RX ORDER — AZATHIOPRINE 50 MG/1
50 TABLET ORAL DAILY
Status: DISCONTINUED | OUTPATIENT
Start: 2022-03-04 | End: 2022-03-10 | Stop reason: HOSPADM

## 2022-03-03 RX ORDER — CLOPIDOGREL BISULFATE 75 MG/1
75 TABLET ORAL DAILY
Status: DISCONTINUED | OUTPATIENT
Start: 2022-03-04 | End: 2022-03-10 | Stop reason: HOSPADM

## 2022-03-03 RX ORDER — ACETAMINOPHEN 650 MG/1
650 SUPPOSITORY RECTAL EVERY 6 HOURS PRN
Status: DISCONTINUED | OUTPATIENT
Start: 2022-03-03 | End: 2022-03-05

## 2022-03-03 RX ORDER — ISOSORBIDE MONONITRATE 60 MG/1
60 TABLET, EXTENDED RELEASE ORAL DAILY
COMMUNITY

## 2022-03-03 RX ORDER — METOPROLOL TARTRATE 5 MG/5ML
2.5 INJECTION INTRAVENOUS ONCE
Status: DISCONTINUED | OUTPATIENT
Start: 2022-03-03 | End: 2022-03-09

## 2022-03-03 RX ADMIN — FUROSEMIDE 40 MG: 10 INJECTION, SOLUTION INTRAMUSCULAR; INTRAVENOUS at 22:09

## 2022-03-03 ASSESSMENT — PAIN DESCRIPTION - DESCRIPTORS
DESCRIPTORS: ACHING
DESCRIPTORS: ACHING

## 2022-03-03 ASSESSMENT — PAIN SCALES - GENERAL
PAINLEVEL_OUTOF10: 5
PAINLEVEL_OUTOF10: 9
PAINLEVEL_OUTOF10: 8

## 2022-03-03 ASSESSMENT — PAIN DESCRIPTION - FREQUENCY
FREQUENCY: CONTINUOUS
FREQUENCY: CONTINUOUS

## 2022-03-03 ASSESSMENT — PAIN - FUNCTIONAL ASSESSMENT
PAIN_FUNCTIONAL_ASSESSMENT: 0-10
PAIN_FUNCTIONAL_ASSESSMENT: 0-10

## 2022-03-03 ASSESSMENT — PAIN DESCRIPTION - PAIN TYPE
TYPE: ACUTE PAIN
TYPE: CHRONIC PAIN
TYPE: ACUTE PAIN

## 2022-03-03 ASSESSMENT — ENCOUNTER SYMPTOMS
BACK PAIN: 0
VOMITING: 0
COUGH: 0
SHORTNESS OF BREATH: 1
SORE THROAT: 0
ABDOMINAL PAIN: 0
NAUSEA: 0
DIARRHEA: 0
RHINORRHEA: 0
EYE PAIN: 0

## 2022-03-03 ASSESSMENT — PAIN DESCRIPTION - LOCATION
LOCATION: HEAD
LOCATION: GENERALIZED
LOCATION: CHEST

## 2022-03-04 ENCOUNTER — APPOINTMENT (OUTPATIENT)
Dept: GENERAL RADIOLOGY | Age: 77
DRG: 291 | End: 2022-03-04
Payer: MEDICARE

## 2022-03-04 LAB
ABSOLUTE EOS #: 0.1 K/UL (ref 0–0.4)
ABSOLUTE LYMPH #: 1.5 K/UL (ref 1–4.8)
ABSOLUTE MONO #: 1 K/UL (ref 0.1–1.2)
ANION GAP SERPL CALCULATED.3IONS-SCNC: 14 MMOL/L (ref 9–17)
BASOPHILS # BLD: 1 % (ref 0–2)
BASOPHILS ABSOLUTE: 0.1 K/UL (ref 0–0.2)
BUN BLDV-MCNC: 23 MG/DL (ref 8–23)
CALCIUM SERPL-MCNC: 9.5 MG/DL (ref 8.6–10.4)
CHLORIDE BLD-SCNC: 98 MMOL/L (ref 98–107)
CHOLESTEROL/HDL RATIO: 2.5
CHOLESTEROL: 120 MG/DL
CO2: 26 MMOL/L (ref 20–31)
CREAT SERPL-MCNC: 1.11 MG/DL (ref 0.5–0.9)
EKG ATRIAL RATE: 111 BPM
EKG Q-T INTERVAL: 384 MS
EKG QRS DURATION: 82 MS
EKG QTC CALCULATION (BAZETT): 497 MS
EKG R AXIS: -57 DEGREES
EKG T AXIS: 106 DEGREES
EKG VENTRICULAR RATE: 101 BPM
EOSINOPHILS RELATIVE PERCENT: 1 % (ref 1–4)
GFR AFRICAN AMERICAN: 58 ML/MIN
GFR NON-AFRICAN AMERICAN: 48 ML/MIN
GFR SERPL CREATININE-BSD FRML MDRD: ABNORMAL ML/MIN/{1.73_M2}
GLUCOSE BLD-MCNC: 93 MG/DL (ref 70–99)
HCT VFR BLD CALC: 35.4 % (ref 36–46)
HDLC SERPL-MCNC: 48 MG/DL
HEMOGLOBIN: 11.7 G/DL (ref 12–16)
LDL CHOLESTEROL: 57 MG/DL (ref 0–130)
LV EF: 45 %
LVEF MODALITY: NORMAL
LYMPHOCYTES # BLD: 18 % (ref 24–44)
MAGNESIUM: 1.9 MG/DL (ref 1.6–2.6)
MCH RBC QN AUTO: 28 PG (ref 26–34)
MCHC RBC AUTO-ENTMCNC: 32.9 G/DL (ref 31–37)
MCV RBC AUTO: 85 FL (ref 80–100)
MONOCYTES # BLD: 12 % (ref 2–11)
PDW BLD-RTO: 17 % (ref 12.5–15.4)
PLATELET # BLD: 216 K/UL (ref 140–450)
PMV BLD AUTO: 10.4 FL (ref 6–12)
POTASSIUM SERPL-SCNC: 4 MMOL/L (ref 3.7–5.3)
PRO-BNP: 9530 PG/ML
RBC # BLD: 4.17 M/UL (ref 4–5.2)
SEG NEUTROPHILS: 68 % (ref 36–66)
SEGMENTED NEUTROPHILS ABSOLUTE COUNT: 5.7 K/UL (ref 1.8–7.7)
SODIUM BLD-SCNC: 138 MMOL/L (ref 135–144)
TRIGL SERPL-MCNC: 75 MG/DL
TSH SERPL DL<=0.05 MIU/L-ACNC: 3.16 MIU/L (ref 0.3–5)
WBC # BLD: 8.3 K/UL (ref 3.5–11)

## 2022-03-04 PROCEDURE — 83880 ASSAY OF NATRIURETIC PEPTIDE: CPT

## 2022-03-04 PROCEDURE — 6370000000 HC RX 637 (ALT 250 FOR IP): Performed by: NURSE PRACTITIONER

## 2022-03-04 PROCEDURE — 97162 PT EVAL MOD COMPLEX 30 MIN: CPT

## 2022-03-04 PROCEDURE — 83735 ASSAY OF MAGNESIUM: CPT

## 2022-03-04 PROCEDURE — 80048 BASIC METABOLIC PNL TOTAL CA: CPT

## 2022-03-04 PROCEDURE — 36415 COLL VENOUS BLD VENIPUNCTURE: CPT

## 2022-03-04 PROCEDURE — 97166 OT EVAL MOD COMPLEX 45 MIN: CPT

## 2022-03-04 PROCEDURE — 85025 COMPLETE CBC W/AUTO DIFF WBC: CPT

## 2022-03-04 PROCEDURE — 71046 X-RAY EXAM CHEST 2 VIEWS: CPT

## 2022-03-04 PROCEDURE — 6360000002 HC RX W HCPCS: Performed by: FAMILY MEDICINE

## 2022-03-04 PROCEDURE — 84443 ASSAY THYROID STIM HORMONE: CPT

## 2022-03-04 PROCEDURE — 93306 TTE W/DOPPLER COMPLETE: CPT

## 2022-03-04 PROCEDURE — 2060000000 HC ICU INTERMEDIATE R&B

## 2022-03-04 PROCEDURE — 97535 SELF CARE MNGMENT TRAINING: CPT

## 2022-03-04 PROCEDURE — 2580000003 HC RX 258: Performed by: NURSE PRACTITIONER

## 2022-03-04 PROCEDURE — 99223 1ST HOSP IP/OBS HIGH 75: CPT | Performed by: FAMILY MEDICINE

## 2022-03-04 PROCEDURE — 97116 GAIT TRAINING THERAPY: CPT

## 2022-03-04 PROCEDURE — 6360000002 HC RX W HCPCS: Performed by: NURSE PRACTITIONER

## 2022-03-04 PROCEDURE — 80061 LIPID PANEL: CPT

## 2022-03-04 RX ORDER — DIGOXIN 0.25 MG/ML
250 INJECTION INTRAMUSCULAR; INTRAVENOUS EVERY 6 HOURS
Status: COMPLETED | OUTPATIENT
Start: 2022-03-04 | End: 2022-03-05

## 2022-03-04 RX ADMIN — AZATHIOPRINE 50 MG: 50 TABLET ORAL at 15:25

## 2022-03-04 RX ADMIN — ACETAMINOPHEN 650 MG: 325 TABLET ORAL at 21:41

## 2022-03-04 RX ADMIN — PANTOPRAZOLE SODIUM 40 MG: 40 TABLET, DELAYED RELEASE ORAL at 21:32

## 2022-03-04 RX ADMIN — LOSARTAN POTASSIUM 100 MG: 50 TABLET, FILM COATED ORAL at 10:45

## 2022-03-04 RX ADMIN — APIXABAN 5 MG: 5 TABLET, FILM COATED ORAL at 21:32

## 2022-03-04 RX ADMIN — CLONIDINE HYDROCHLORIDE 0.1 MG: 0.1 TABLET ORAL at 21:32

## 2022-03-04 RX ADMIN — FUROSEMIDE 40 MG: 10 INJECTION, SOLUTION INTRAMUSCULAR; INTRAVENOUS at 10:43

## 2022-03-04 RX ADMIN — PANTOPRAZOLE SODIUM 40 MG: 40 TABLET, DELAYED RELEASE ORAL at 10:43

## 2022-03-04 RX ADMIN — CLONIDINE HYDROCHLORIDE 0.1 MG: 0.1 TABLET ORAL at 10:46

## 2022-03-04 RX ADMIN — DIGOXIN 250 MCG: 250 INJECTION, SOLUTION INTRAMUSCULAR; INTRAVENOUS; PARENTERAL at 11:05

## 2022-03-04 RX ADMIN — DOXAZOSIN 1 MG: 2 TABLET ORAL at 10:44

## 2022-03-04 RX ADMIN — DIGOXIN 250 MCG: 250 INJECTION, SOLUTION INTRAMUSCULAR; INTRAVENOUS; PARENTERAL at 18:09

## 2022-03-04 RX ADMIN — SODIUM CHLORIDE, PRESERVATIVE FREE 10 ML: 5 INJECTION INTRAVENOUS at 18:10

## 2022-03-04 RX ADMIN — POTASSIUM CHLORIDE 20 MEQ: 1500 TABLET, EXTENDED RELEASE ORAL at 10:47

## 2022-03-04 RX ADMIN — SODIUM CHLORIDE, PRESERVATIVE FREE 10 ML: 5 INJECTION INTRAVENOUS at 10:47

## 2022-03-04 RX ADMIN — RANOLAZINE 500 MG: 500 TABLET, EXTENDED RELEASE ORAL at 10:43

## 2022-03-04 RX ADMIN — CLOPIDOGREL BISULFATE 75 MG: 75 TABLET ORAL at 10:43

## 2022-03-04 RX ADMIN — RANOLAZINE 500 MG: 500 TABLET, EXTENDED RELEASE ORAL at 21:32

## 2022-03-04 RX ADMIN — PRAVASTATIN SODIUM 40 MG: 20 TABLET ORAL at 21:32

## 2022-03-04 RX ADMIN — PAROXETINE HYDROCHLORIDE 20 MG: 20 TABLET, FILM COATED ORAL at 10:44

## 2022-03-04 RX ADMIN — APIXABAN 5 MG: 5 TABLET, FILM COATED ORAL at 10:46

## 2022-03-04 RX ADMIN — SODIUM CHLORIDE, PRESERVATIVE FREE 10 ML: 5 INJECTION INTRAVENOUS at 21:44

## 2022-03-04 RX ADMIN — FUROSEMIDE 40 MG: 10 INJECTION, SOLUTION INTRAMUSCULAR; INTRAVENOUS at 18:10

## 2022-03-04 RX ADMIN — ALLOPURINOL 100 MG: 100 TABLET ORAL at 10:46

## 2022-03-04 ASSESSMENT — PAIN SCALES - GENERAL
PAINLEVEL_OUTOF10: 8
PAINLEVEL_OUTOF10: 10
PAINLEVEL_OUTOF10: 7
PAINLEVEL_OUTOF10: 0
PAINLEVEL_OUTOF10: 10
PAINLEVEL_OUTOF10: 0

## 2022-03-04 ASSESSMENT — ENCOUNTER SYMPTOMS
CONSTIPATION: 0
STRIDOR: 0
NAUSEA: 0
BLOOD IN STOOL: 0
WHEEZING: 0
ABDOMINAL PAIN: 0
VOMITING: 0
COUGH: 0
SHORTNESS OF BREATH: 1
DIARRHEA: 0
CHEST TIGHTNESS: 1

## 2022-03-04 ASSESSMENT — PAIN DESCRIPTION - PAIN TYPE
TYPE: CHRONIC PAIN

## 2022-03-04 ASSESSMENT — PAIN DESCRIPTION - DESCRIPTORS
DESCRIPTORS: ACHING;DISCOMFORT

## 2022-03-04 ASSESSMENT — PAIN DESCRIPTION - LOCATION
LOCATION: BACK;GENERALIZED
LOCATION: GENERALIZED;BACK;HIP;LEG
LOCATION: GENERALIZED

## 2022-03-04 ASSESSMENT — PAIN DESCRIPTION - ORIENTATION: ORIENTATION: RIGHT;LEFT

## 2022-03-04 NOTE — PROGRESS NOTES
Occupational Therapy   Occupational Therapy Initial Assessment  Date: 3/4/2022   Patient Name: Makayla Johnston  MRN: [id number]     : 1945    Date of Service: 3/4/2022    Chief Complaint   Patient presents with    Chest Pain     onset last night- \"tight band around chest\"     Discharge Recommendations:  Patient would benefit from continued therapy after discharge     OT Equipment Recommendations  Equipment Needed: Yes  Mobility Devices: ADL Assistive Devices; [facility]: Rolling  ADL Assistive Devices: Shower Chair with back    Assessment   Performance deficits / Impairments: Decreased functional mobility ; Decreased ADL status; Decreased safe awareness;Decreased balance;Decreased cognition;Decreased endurance;Decreased high-level IADLs  Assessment: Pt currently limited in performing ADL tasks and functional transfers/functional mobility due to above noted deficits. Pt to benefit from continued therapy services while hospitalized and at discharge to maximize pt's safety and independence in performing functional tasks. Pt unsafe to return to prior living arrangements based on pt's current functional ability and decreased safety awareness; 24hr supervision/assistance and continued therapy recommended at discharge. Prognosis: Good  Decision Making: Medium Complexity  OT Education: OT Role;Plan of Care;Equipment;ADL Adaptive Strategies;Precautions;Transfer Training (Activity Promotion, Safety Awareness/Fall Prevention; poor return)  REQUIRES OT FOLLOW UP: Yes  Activity Tolerance  Activity Tolerance: Patient limited by fatigue  Safety Devices  Safety Devices in place: Yes  Type of devices: Nurse notified;Call light within reach; Chair alarm in place; Left in chair  Restraints  Initially in place: No           Patient Diagnosis(es): The primary encounter diagnosis was Chest pain, unspecified type.  A diagnosis of Acute on chronic congestive heart failure, unspecified heart failure type [facility]) was also pertinent to this visit. has a past medical history of Abnormal Pap smear, Asthma, Atrial fibrillation (HCC), Bloody discharge from nipple, CAD (coronary artery disease), Cataracts, bilateral, Colon polyp, CVA (cerebral vascular accident) ([address] Demyelinating disease ([address] Fibromyalgia, High-risk sexual behavior, History of bone density study, History of migraines, HTN (hypertension), IgG deficiency ([address] Multiple sclerosis ([address] Myoclonic seizures ([address] Optic neuritis, Osteopenia, Pyloric stenosis, Raynaud's disease, and Vasomotor rhinitis. has a past surgical history that includes Dilation & curettage (1982); Breast biopsy (1990); Appendectomy (1955); Septoplasty (1985); Hammer toe surgery (1989); Sinus endoscopy; sinus surgery; Shoulder arthroscopy (1998); Esophagoscopy (1966, 1969); Colonoscopy (2010); Foreign Body Removal (1962); laparoscopy (6305); Coronary angioplasty with stent (5/2011); Coronary angioplasty with stent (6/2011); Upper gastrointestinal endoscopy; eye surgery; pr colonoscopy w/biopsy single/multiple (N/A, 12/19/2017); pr egd transoral biopsy single/multiple (12/19/2017); Upper gastrointestinal endoscopy (12/19/2017); Upper gastrointestinal endoscopy (5/24/2018); Cardiac surgery; Cardiac catheterization (07/31/2020); Cardioversion (08/14/2020); and Cardioversion (07/31/2020). Restrictions  Restrictions/Precautions  Restrictions/Precautions: Fall Risk  Required Braces or Orthoses?: No  Position Activity Restriction  Other position/activity restrictions: Up with assistance      Subjective   General  Patient assessed for rehabilitation services?: Yes  Family / Caregiver Present: No  General Comment  Comments: RN ok'd for therapy this AM. Pt agreeable to participate in session following encouragement, pt cooperative throughout.   Patient Currently in Pain: Yes  Pain Assessment  Pain Assessment: 0-10  Pain Level: 10  Pain Type: Chronic pain  Pain Location: Generalized  Pain Descriptors: Aching;Discomfort  Functional Pain Assessment: Prevents or interferes some active activities and ADLs  Non-Pharmaceutical Pain Intervention(s): Ambulation/Increased Activity; Distraction;Repositioned  Response to Pain Intervention: Patient Satisfied    Social/Functional History  Social/Functional History  Lives With: Alone  Type of Home: Apartment  Home Layout: One level  Home Access: Level entry  Bathroom Shower/Tub: Tub/Shower unit  Bathroom Toilet: Standard  Bathroom Equipment: Grab bars in shower (Pt reports she would like a shower chair)  Home Equipment: Cane,4 wheeled walker,Electric scooter  Receives Help From:  (Not current with home care)  ADL Assistance: Independent  Homemaking Assistance: Independent  Homemaking Responsibilities: Yes  Meal Prep Responsibility: Primary  Laundry Responsibility: Primary  Cleaning Responsibility: Primary  Shopping Responsibility: Primary  Ambulation Assistance: Independent (reports use of rollator out of the home at all times; ambulates with use of furniture or walls in the home)  Transfer Assistance: Independent  Active : Yes  Mode of Transportation: Car  Occupation: Retired  Type of occupation: Advanced  for [address] [place] [address]: Enjoys reading  Additional Comments: Pt reports frequent falls in the restroom. She reports she fell just prior to this admission as well. Pt reports her falls occur by a variety of things- weakness in her legs, etc. Pt reports she as her phone with her all the time to call for help when she falls. Pt reports minimal support from family- has a daughter but she works and has young children.        Objective   Vision: Impaired  Vision Exceptions: Wears glasses for reading  Hearing: Within functional limits    Orientation  Overall Orientation Status: Within Functional Limits        Balance  Sitting Balance: Stand by assistance  Standing Balance: Contact guard assistance  Standing Balance  Time: ~6 minutes total  Activity: functional mobility to/from bathroom, toileting tasks, standing sink side to perform hand hygiene    Functional Mobility  Functional - Mobility Device: No device  Activity: To/from bathroom  Assist Level: Contact guard assistance  Functional Mobility Comments: Min VCs for safety awareness/problem solving and attention to task; increased time/effort to perform; mildly unsteady and reaching for walls/furniture for support despite refusing use of AD    Toilet Transfers  Toilet - Technique: Ambulating  Equipment Used: Standard toilet  Toilet Transfer: Contact guard assistance     ADL  Feeding: Independent  Grooming: Increased time to complete;Contact guard assistance (standing sink side to perform hand hygiene)  UE Bathing: Increased time to complete;Contact guard assistance  LE Bathing: Increased time to complete;Minimal assistance  UE Dressing: Increased time to complete;Contact guard assistance  LE Dressing: Increased time to complete;Minimal assistance (seated EOB to don socks and thread underpants, standing to pull up underpants over bottom)  Toileting: Increased time to complete;Contact guard assistance (for LB clothing mngt, pericare/bottom hygiene, and toilet transfer)     Tone RUE  RUE Tone: Normotonic  Tone LUE  LUE Tone: Normotonic  Coordination  Movements Are Fluid And Coordinated: Yes        Bed mobility  Supine to Sit: Moderate assistance  Sit to Supine:  (pt retired up to chair)  Scooting: Minimal assistance     Transfers  Sit to stand: Contact guard assistance  Stand to sit: Contact guard assistance  Transfer Comments: Min VCs for safety awareness; increased time/effort to perform        Cognition  Overall Cognitive Status: Exceptions  Attention Span: Attends with cues to redirect; Difficulty attending to directions  Safety Judgement: Decreased awareness of need for assistance  Problem Solving: Assistance required to identify errors made;Assistance required to generate solutions;Assistance required to correct errors made  Insights: Decreased awareness of deficits           Sensation  Overall Sensation Status: Impaired  Additional Comments: Pt reports numbness/tingling from her MS- states she has the numbness/tingling all over but sometimes it is worse than others        LUE AROM (degrees)  LUE AROM : WFL  Left Hand AROM (degrees)  Left Hand AROM: WFL  RUE AROM (degrees)  RUE AROM : WFL  Right Hand AROM (degrees)  Right Hand AROM: WFL  LUE Strength  Gross LUE Strength: WFL (grossly 4/5)  L Hand General: 4/5  RUE Strength  Gross RUE Strength: WFL (grossly 4/5)  R Hand General: 4/5        Plan   Plan  Times per week: 5-6x/wk  Current Treatment Recommendations: Balance Training,Functional Mobility Training,Endurance Training,Safety Education & Training,Patient/Caregiver Education & Training,Equipment Evaluation, Education, & procurement,Home Management Training,Self-Care / ADL    AM-PAC Score   AM-PAC Inpatient Daily Activity Raw Score: 19 (03/04/22 1411)  AM-PAC Inpatient ADL T-Scale Score : 40.22 (03/04/22 1411)  ADL Inpatient CMS 0-100% Score: 42.8 (03/04/22 1411)  ADL Inpatient CMS G-Code Modifier : CK (03/04/22 1411)    Goals  Short term goals  Time Frame for Short term goals: 14 visits  Short term goal 1: Pt will perform ADL tasks with mod IND using AE/DME PRN  Short term goal 2: Pt will perform functional transfers/functional mobility with mod IND using least restrictive AD  Short term goal 3: Pt will independentely demo good safety awareness during engagement in all ADLs and functional transfers/functional mobility  Short term goal 4: Pt will demo 10+ minutes standing tolerance with use of least restrictive AD for increased participation in ADL/IADL tasks       Therapy Time   Individual Concurrent Group Co-treatment   Time In 1148         Time Out 1221         Minutes 33         Timed Code Treatment Minutes: [address], OTR/L

## 2022-03-04 NOTE — CARE COORDINATION
Case Management Initial Discharge Plan  Jessie Frederick,             Met with:patient to discuss discharge plans. Information verified: address, contacts, phone number, , insurance Yes  Insurance Provider: Medicare, Carnell Soho    Emergency Contact/Next of Kin name & number: Daughter Kristin Lucero [phone]  Who are involved in patient's support system? noone    PCP: Niki Gutiérrez MD  Date of last visit: last week      Discharge Planning    Living Arrangements:  Alone     Lives in apartment with no steps to entry   Patient able to perform ADL's:Independent    Current Services (outpatient & in home) none  DME equipment: rollator  DME provider:     Is patient receiving oral anticoagulation therapy? Yes  If indicated:   Physician managing anticoagulation treatment: unsure  Where does patient obtain lab work for ATC treatment? n/a      Potential Assistance Needed:  N/A    Patient agreeable to home care: Yes  Freedom of choice provided:  no    Prior SNF/Rehab Placement and Facility: yes - [address] to SNF/Rehab: No  [place] of choice provided: no     Evaluation: no    Expected Discharge date:  22    Patient expects to be discharged to: If home: is the family and/or caregiver wiling & able to provide support at home? no  Who will be providing this support? Follow Up Appointment: Best Day/ Time: Monday AM    Transportation provider: self, drives  Transportation arrangements needed for discharge: No    Readmission Risk              Risk of Unplanned Readmission:  20         Does patient have a readmission risk score greater than 14?: Yes  If yes, follow-up appointment must be made within 7 days of discharge.      Goals of Care: better breathing      Educated patient on transitional options, provided freedom of choice and are agreeable with plan      Discharge Plan: home agreeable to home care - [facility], [facility]. list          Electronically signed by Hortencia Reddy RN on 3/4/22 at 12:59 PM EST

## 2022-03-04 NOTE — ED PROVIDER NOTES
[id number] [facility] ED  [address] [address] [facility] [address]  Phone: [phone]  Fax: Millie Calloway [phone]          Pt Name: Ezequiel Seymour  MRN: [id number]  [place] 1945  Date of evaluation: 3/3/2022      CHIEF COMPLAINT       Chief Complaint   Patient presents with    Chest Pain     onset last night- \"tight band around chest\"       HISTORY OF PRESENT ILLNESS       Ezequiel Seymour is a 78 y.o. female who presents with chest discomfort that began yesterday. Does have a history of atrial fibrillation and sounds like she is chronically in A. fib. She has been having increasing pain and increasing dyspnea on exertion. Sound like there is some heart failure as well. States she can hardly do anything before she becomes out of breath. States she has been taking all her medications as directed. No recent procedures. Denies other symptoms or concerns at this time. Denies any increased swelling in her legs. No infectious respiratory symptoms otherwise. No fevers    REVIEW OF SYSTEMS       Review of Systems   Constitutional: Negative for chills, fatigue and fever. HENT: Negative for rhinorrhea and sore throat. Eyes: Negative for pain. Respiratory: Positive for shortness of breath. Negative for cough. Cardiovascular: Positive for chest pain. Gastrointestinal: Negative for abdominal pain, diarrhea, nausea and vomiting. Genitourinary: Negative for difficulty urinating. Musculoskeletal: Negative for back pain and neck pain. Skin: Negative for rash. Neurological: Negative for weakness and headaches.           PAST MEDICAL HISTORY    has a past medical history of Abnormal Pap smear, Asthma, Atrial fibrillation (HCC), Bloody discharge from nipple, CAD (coronary artery disease), Cataracts, bilateral, Colon polyp, CVA (cerebral vascular accident) ([facility] [address] Demyelinating disease ([facility] [address] Fibromyalgia, High-risk sexual behavior, History of bone density study, History of migraines, HTN (hypertension), IgG deficiency (HCC), Multiple sclerosis ([facility] [address] Myoclonic seizures ([facility] [address] Optic neuritis, Osteopenia, Pyloric stenosis, Raynaud's disease, and Vasomotor rhinitis. SURGICAL HISTORY      has a past surgical history that includes Dilation & curettage (1982); Breast biopsy (1990); Appendectomy (1955); Septoplasty (1985); Hammer toe surgery (1989); Sinus endoscopy; sinus surgery; Shoulder arthroscopy (1998); Esophagoscopy (1966, 1969); Colonoscopy (2010); Foreign Body Removal (1962); laparoscopy (5570); Coronary angioplasty with stent (5/2011); Coronary angioplasty with stent (6/2011); Upper gastrointestinal endoscopy; eye surgery; pr colonoscopy w/biopsy single/multiple (N/A, 12/19/2017); pr egd transoral biopsy single/multiple (12/19/2017); Upper gastrointestinal endoscopy (12/19/2017); Upper gastrointestinal endoscopy (5/24/2018); Cardiac surgery; Cardiac catheterization (07/31/2020); Cardioversion (08/14/2020); and Cardioversion (07/31/2020). CURRENT MEDICATIONS       Previous Medications    ALLOPURINOL (ZYLOPRIM) 100 MG TABLET    Take 100 mg by mouth daily    APIXABAN (ELIQUIS) 5 MG TABS TABLET    Take by mouth 2 times daily    AZATHIOPRINE (IMURAN) 50 MG TABLET    Take 50 mg by mouth daily     BUMETANIDE (BUMEX) 1 MG TABLET    Take 1 tablet by mouth daily Advised to take additional 1 pill for weight gain, increased CHF and/swelling    CLONIDINE (CATAPRES) 0.1 MG TABLET    Take 1 tablet by mouth 2 times daily    CLOPIDOGREL (PLAVIX) 75 MG TABLET    Take 75 mg by mouth daily 100 mg pm    DOXAZOSIN (CARDURA) 1 MG TABLET    doxazosin 1 mg tablet    GABAPENTIN (NEURONTIN) 100 MG CAPSULE    gabapentin 100 mg capsule   Take 1 capsule 3 times a day by oral route as needed for 30 days.     HYDRALAZINE (APRESOLINE) 50 MG TABLET    Take 50 mg by mouth 2 times daily    ISOSORBIDE MONONITRATE (IMDUR) 60 MG EXTENDED RELEASE TABLET    Take 60 mg by mouth daily    LOSARTAN (COZAAR) 100 MG TABLET    Take 1 tablet by mouth daily    METOPROLOL SUCCINATE (TOPROL XL) 50 MG EXTENDED RELEASE TABLET    Take 25 mg by mouth at bedtime     NITROGLYCERIN (NITROSTAT) 0.4 MG SL TABLET    Place 0.4 mg under the tongue every 5 minutes. Indications: Chest Pain    PANTOPRAZOLE (PROTONIX) 40 MG TABLET    Take 40 mg by mouth 2 times daily     PAROXETINE (PAXIL) 20 MG TABLET    Take by mouth every morning     POTASSIUM CHLORIDE (KLOR-CON M) 20 MEQ EXTENDED RELEASE TABLET    Take 1 tablet by mouth daily    PRAVASTATIN (PRAVACHOL) 40 MG TABLET    Take 40 mg by mouth at bedtime     RANOLAZINE (RANEXA) 500 MG SR TABLET    Take 500 mg by mouth 2 times daily. ALLERGIES     is allergic to dofetilide, lisinopril-hydrochlorothiazide, sulfa antibiotics, penicillins, polyethylene glycol, actifed cold-allergy [chlorpheniramine-phenylephrine], altace [ramipril], cephalosporins, coreg [carvedilol], dml facial moisturizer, elavil [amitriptyline hcl], entex [ami-margot], ethylene glycol, fentanyl, hizentra [immune globulin (human)], hydralazine, levofloxacin, lisinopril-hydrochlorothiazide, montelukast sodium, morphine, nifedipine, norvasc [amlodipine besylate], other, phenobarbital, propoxyphene, robaxin [methocarbamol], sinequan [doxepin hcl], sudafed [pseudoephedrine hcl], sudafed [pseudoephedrine hcl], tranquil-cecil, wellbutrin [bupropion hcl], actifed cold-allergy [chlorpheniramine-phenylephrine], clindamycin/lincomycin, codeine, metoprolol, metoprolol succinate, and seldane [terfenadine]. FAMILY HISTORY     She indicated that the status of her mother is unknown. She indicated that the status of her father is unknown. She indicated that the status of her sister is unknown. She indicated that the status of her maternal aunt is unknown.     family history includes Breast Cancer in her maternal aunt; Heart Disease in her father and mother; Hypertension in her sister;  Other in her father, maternal aunt, mother, and acute change  Q Waves: none    Clinical Impression: atrial fibrilation. No acute infarction/ischemia noted. RADIOLOGY:   Interpretation per the Radiologist below, if available at the time of this note:  XR CHEST PORTABLE   Final Result   Stable cardiomegaly. No acute cardiopulmonary findings. XR CHEST PORTABLE    Result Date: 2/6/2022  EXAMINATION: ONE XRAY VIEW OF THE CHEST 2/6/2022 2:46 pm COMPARISON: 11/28/2021 HISTORY: Reason for Exam: chest pain FINDINGS: The lungs are without acute focal process. No effusion or pneumothorax. Stable cardiomegaly and calcification of the aortic knob. The osseous structures are intact without acute process. No acute process. Stable cardiomegaly.        LABS:  Results for orders placed or performed during the hospital encounter of 03/03/22   CBC with Auto Differential   Result Value Ref Range    WBC 7.5 3.5 - 11.0 k/uL    RBC 4.15 4.0 - 5.2 m/uL    Hemoglobin 11.7 (L) 12.0 - 16.0 g/dL    Hematocrit 35.3 (L) 36 - 46 %    MCV 85.1 80 - 100 fL    MCH 28.1 26 - 34 pg    MCHC 33.1 31 - 37 g/dL    RDW 17.1 (H) 12.5 - 15.4 %    Platelets 852 458 - 689 k/uL    MPV 10.0 6.0 - 12.0 fL    Seg Neutrophils 74 (H) 36 - 66 %    Lymphocytes 15 (L) 24 - 44 %    Monocytes 9 2 - 11 %    Eosinophils % 1 1 - 4 %    Basophils 1 0 - 2 %    Segs Absolute 5.50 1.8 - 7.7 k/uL    Absolute Lymph # 1.20 1.0 - 4.8 k/uL    Absolute Mono # 0.70 0.1 - 1.2 k/uL    Absolute Eos # 0.10 0.0 - 0.4 k/uL    Basophils Absolute 0.10 0.0 - 0.2 k/uL   Comprehensive Metabolic Panel   Result Value Ref Range    Glucose 98 70 - 99 mg/dL    BUN 23 8 - 23 mg/dL    CREATININE 1.03 (H) 0.50 - 0.90 mg/dL    Calcium 9.4 8.6 - 10.4 mg/dL    Sodium 135 135 - 144 mmol/L    Potassium 4.4 3.7 - 5.3 mmol/L    Chloride 97 (L) 98 - 107 mmol/L    CO2 27 20 - 31 mmol/L    Anion Gap 11 9 - 17 mmol/L    Alkaline Phosphatase 77 35 - 104 U/L    ALT 9 5 - 33 U/L    AST 12 <32 U/L    Total Bilirubin 0.75 0.3 - 1.2 mg/dL Total Protein 6.3 (L) 6.4 - 8.3 g/dL    Albumin 4.3 3.5 - 5.2 g/dL    Albumin/Globulin Ratio 2.2 1.0 - 2.5    GFR Non-African American 52 (L) >60 mL/min    GFR African American >60 >60 mL/min    GFR Comment         Lipase   Result Value Ref Range    Lipase 24 13 - 60 U/L   Magnesium   Result Value Ref Range    Magnesium 1.9 1.6 - 2.6 mg/dL   D-Dimer, Quantitative   Result Value Ref Range    D-Dimer, Quant 0.40 mg/L FEU   Troponin   Result Value Ref Range    Troponin, High Sensitivity 23 (H) 0 - 14 ng/L   Troponin   Result Value Ref Range    Troponin, High Sensitivity 23 (H) 0 - 14 ng/L   Brain Natriuretic Peptide   Result Value Ref Range    Pro-BNP 7,015 (H) <300 pg/mL   Urinalysis with Reflex to Culture    Specimen: Urine, clean catch   Result Value Ref Range    Color, UA Yellow Yellow    Turbidity UA Clear Clear    Glucose, Ur NEGATIVE NEGATIVE    Bilirubin Urine NEGATIVE NEGATIVE    Ketones, Urine NEGATIVE NEGATIVE    Specific Gravity, UA 1.010 1.005 - 1.030    Urine Hgb TRACE (A) NEGATIVE    pH, UA 6.0 5.0 - 8.0    Protein, UA NEGATIVE NEGATIVE    Urobilinogen, Urine Normal Normal    Nitrite, Urine NEGATIVE NEGATIVE    Leukocyte Esterase, Urine NEGATIVE NEGATIVE   Microscopic Urinalysis   Result Value Ref Range    -          WBC, UA 0 TO 2 0 - 5 /HPF    RBC, UA 2 TO 5 0 - 2 /HPF    Epithelial Cells UA 10 TO 20 0 - 5 /HPF    Bacteria, UA FEW (A) None    Amorphous, UA 1+ (A) None    Other Observations UA (A) NOT REQ. Utilizing a urinalysis as the only screening method to exclude a potential uropathogen can be unreliable in many patient populations. Rapid screening tests are less sensitive than culture and if UTI is a clinical possibility, culture should be considered despite a negative urinalysis.        EMERGENCY DEPARTMENT COURSE:     The patient was given the following medications:  Orders Placed This Encounter   Medications    furosemide (LASIX) injection 40 mg    metoprolol (LOPRESSOR) injection 2.5 mg Vitals:    Vitals:    03/03/22 1912 03/03/22 1920 03/03/22 2123 03/03/22 2213   BP: (!) 179/100   (!) 164/149   Pulse:  109 132 103   Resp:   20    Temp:       TempSrc:       SpO2:  100% 93%    Weight:       Height:         -------------------------  BP: (!) 164/149, Temp: 97.7 °F (36.5 °C), Pulse: 103, Resp: 20      Re-evaluation Notes    Patient is doing better on reevaluation. Heart rate remains in the low 100s. We will give her 2.5 mg of Lopressor. Also given diuretics and she is urinating appropriately. I do feel she is fluid overloaded and acute on chronic congestive heart failure. D-dimer negative. 2 cardiac enzymes are the same and I do not feel this is acute coronary syndrome. Her ECG shows no evidence of infarction or ischemia. I feel admission for further diuresis and monitoring is appropriate. Patient is agreeable with the plan. I spoke with hospitalist who accepted admission of the patient. No further events throughout her stay in the department. CONSULTS:    None    CRITICAL CARE:     None    PROCEDURES:    None    FINAL IMPRESSION      1. Chest pain, unspecified type    2. Acute on chronic congestive heart failure, unspecified heart failure type ([facility] [address]          DISPOSITION/PLAN   DISPOSITION Admitted 03/03/2022 10:06:01 PM      Condition on Disposition    Improved    PATIENT REFERRED TO:  No follow-up provider specified.     DISCHARGE MEDICATIONS:  New Prescriptions    No medications on file       (Please note that portions of this note were completed with a voice recognition program.  Efforts were made to edit the dictations but occasionally words are mis-transcribed.)    Em Flynn DO, DO  Attending Emergency Physician       Em Flynn DO  03/03/22 1353

## 2022-03-04 NOTE — ED NOTES
Pt to take evening meds including 2 tylenol for HA/Ranexa/azatgoprine/pravastatin/toprol- oked by Dr Denisse Stark RN  03[facility]03/22 [facility]  03/03/22 1028

## 2022-03-04 NOTE — PLAN OF CARE
Nutrition Problem #1: Predicted inadequate energy intake  Intervention: Food and/or Nutrient Delivery: Continue Current Diet,Start Oral Nutrition Supplement  Nutritional Goals: Adequate provision of nutrition

## 2022-03-04 NOTE — PROGRESS NOTES
Physical Therapy    Facility/Department: [facility] MED SURG ICU  Initial Assessment    NAME: Victor M Merino  : 1945  MRN: [id number]    Date of Service: 3/4/2022  Chief Complaint   Patient presents with    Chest Pain     onset last night- \"tight band around chest\"       Discharge Recommendations:  Patient would benefit from continued therapy after discharge   PT Equipment Recommendations  Equipment Needed: Yes  Mobility Devices: Zachery Butts: Rolling    Assessment   Body structures, Functions, Activity limitations: Decreased functional mobility ; Decreased strength;Decreased safe awareness;Decreased balance;Decreased endurance  Assessment: Pt with impaired mobility requiring mod A for bed mobility and CGA to ambulate today. Pt with noted bilateral LE weakness, decreased endurance and decreased safety awareness. Pt's lack of safety awareness, decreased balance and reports of frequent falls at home- pt would be unsafe to return to prior living arrangements based on demonstrated mobility and lack of safety awareness. Pt will benefit from further therapy and strict use of RW at discharge. Prognosis: Good  Decision Making: Medium Complexity  PT Education: Goals;PT Role;Plan of Care;Transfer Training;Functional Mobility Training;Gait Training  REQUIRES PT FOLLOW UP: Yes  Activity Tolerance  Activity Tolerance: Patient limited by fatigue;Patient limited by endurance       Patient Diagnosis(es): The primary encounter diagnosis was Chest pain, unspecified type. A diagnosis of Acute on chronic congestive heart failure, unspecified heart failure type [facility]) was also pertinent to this visit.      has a past medical history of Abnormal Pap smear, Asthma, Atrial fibrillation (HCC), Bloody discharge from nipple, CAD (coronary artery disease), Cataracts, bilateral, Colon polyp, CVA (cerebral vascular accident) ([facility] [address] Demyelinating disease ([facility] [address] Fibromyalgia, High-risk sexual behavior, History of bone density study, History of migraines, HTN (hypertension), IgG deficiency (HCC), Multiple sclerosis ([address] Myoclonic seizures ([address] Optic neuritis, Osteopenia, Pyloric stenosis, Raynaud's disease, and Vasomotor rhinitis. has a past surgical history that includes Dilation & curettage (1982); Breast biopsy (1990); Appendectomy (1955); Septoplasty (1985); Hammer toe surgery (1989); Sinus endoscopy; sinus surgery; Shoulder arthroscopy (1998); Esophagoscopy (1966, 1969); Colonoscopy (2010); Foreign Body Removal (1962); laparoscopy (3952); Coronary angioplasty with stent (5/2011); Coronary angioplasty with stent (6/2011); Upper gastrointestinal endoscopy; eye surgery; pr colonoscopy w/biopsy single/multiple (N/A, 12/19/2017); pr egd transoral biopsy single/multiple (12/19/2017); Upper gastrointestinal endoscopy (12/19/2017); Upper gastrointestinal endoscopy (5/24/2018); Cardiac surgery; Cardiac catheterization (07/31/2020); Cardioversion (08/14/2020); and Cardioversion (07/31/2020). Restrictions  Restrictions/Precautions  Restrictions/Precautions: Fall Risk  Required Braces or Orthoses?: No  Position Activity Restriction  Other position/activity restrictions: Up with assistance  Vision/Hearing  Vision: Impaired  Vision Exceptions: Wears glasses for reading  Hearing: Within functional limits     Subjective  General  Patient assessed for rehabilitation services?: Yes  Response To Previous Treatment: Not applicable  Family / Caregiver Present: No  Follows Commands: Within Functional Limits  Subjective  Subjective: Pt supine in bed upon arrival- agreeable to therapy with encouragement. RN agreeable to therapy. Pt reports generalized pain. Pt notes she needs her brief and bed changed as she urinated.   Pain Screening  Patient Currently in Pain: Yes  Pain Assessment  Pain Assessment: 0-10  Pain Level: 10  Pain Type: Chronic pain  Pain Location: Generalized;Back;Hip;Leg  Pain Orientation: Right;Left  Pain Descriptors: Aching;Discomfort  Functional Pain Assessment: Prevents or interferes some active activities and ADLs  Non-Pharmaceutical Pain Intervention(s): Ambulation/Increased Activity; Distraction;Repositioned  Response to Pain Intervention: Patient Satisfied  Vital Signs  Patient Currently in Pain: Yes       Orientation  Orientation  Overall Orientation Status: Within Functional Limits  Social/Functional History  Social/Functional History  Lives With: Alone  Type of Home: Apartment  Home Layout: One level  Home Access: Level entry  Bathroom Shower/Tub: Tub/Shower unit  Bathroom Toilet: Standard  Bathroom Equipment: Grab bars in shower (Pt reports she would like a shower chair)  Home Equipment: Cane,4 wheeled walker,Electric scooter  Receives Help From:  (Not current with home care)  ADL Assistance: Independent  Homemaking Assistance: Independent  Homemaking Responsibilities: Yes  Meal Prep Responsibility: Primary  Laundry Responsibility: Primary  Cleaning Responsibility: Primary  Shopping Responsibility: Primary  Ambulation Assistance: Independent (reports use of rollator out of the home at all times; ambulates with use of furniture or walls in the home)  Transfer Assistance: Independent  Active : Yes  Mode of Transportation: Car  Occupation: Retired  Type of occupation: Advanced  for [address] [place] [address]: Enjoys reading  Additional Comments: Pt reports frequent falls in the restroom. She reports she fell just prior to this admission as well. Pt reports her falls occur by a variety of things- weakness in her legs, etc. Pt reports she as her phone with her all the time to call for help when she falls. Pt reports minimal support from family- has a daughter but she works and has young children. Cognition   Cognition  Overall Cognitive Status: Exceptions  Attention Span: Attends with cues to redirect; Difficulty attending to directions  Safety Judgement: Decreased awareness of need for assistance  Problem Solving: Assistance required to identify errors made;Assistance required to generate solutions;Assistance required to correct errors made  Insights: Decreased awareness of deficits    Objective          AROM RLE (degrees)  RLE AROM: WFL  AROM LLE (degrees)  LLE AROM : WFL  AROM RUE (degrees)  RUE AROM : WFL  AROM LUE (degrees)  LUE AROM : WFL  Strength RLE  Comment: grossly 4/5  Strength LLE  Comment: grossly 4-/5  Strength RUE  Comment: Co evaluation with OT-see OT evaluation for full UE assessment  Strength LUE  Comment: Co evaluation with OT-see OT evaluation for full UE assessment     Sensation  Overall Sensation Status: Impaired  Additional Comments: Pt reports numbness/tingling from her MS- states she has the numbness/tingling all over but sometimes it is worse than others  Bed mobility  Supine to Sit: Moderate assistance (Attempted without use of hand rail as pt states she doesn't have one at home but part of way into mobility adamant she is using the hand rail)  Sit to Supine:  (pt retired up to chair)  Scooting: Minimal assistance  Transfers  Sit to Stand: Contact guard assistance  Stand to sit: Contact guard assistance  Ambulation  Ambulation?: Yes  More Ambulation?: Yes  Ambulation 1  Surface: level tile  Device: No Device  Assistance: Contact guard assistance  Quality of Gait: unsteady and poor safety awareness, decreased endurance, reaching for objects to steady herself  Gait Deviations: Slow Jaycee;Decreased step length;Decreased step height;Shuffles  Distance: 10ft, seated rest break, 6ft  Ambulation 2  Surface - 2: level tile  Device 2: Rolling Walker  Assistance 2: Stand by assistance;Contact guard assistance  Quality of Gait 2: initially steady with  use of RW in straight plane, then with noted decreased walker safety with turning  Gait Deviations: Slow Jaycee;Decreased step length;Decreased step height;Shuffles  Distance: 10ft  Stairs/Curb  Stairs?: No     Balance  Posture: Fair  Sitting - Static: Good  Sitting - Dynamic: Good;-  Standing - Static: Fair  Standing - Dynamic: Fair  Comments: standing balance assessed without device        Plan   Plan  Times per week: 5-6x  Times per day: Daily  Current Treatment Recommendations: Strengthening,Transfer Training,Balance Training,Functional Mobility Training,Endurance Training,Patient/Caregiver Education & Training,Home Exercise Program,Safety Education & Training,Gait Training  Safety Devices  Type of devices: Call light within reach,Left in chair,Nurse notified,Gait belt  Restraints  Initially in place: No      AM-PAC Score  AM-PAC Inpatient Mobility Raw Score : 14 (03/04/22 1437)  AM-PAC Inpatient T-Scale Score : 38.1 (03/04/22 1437)  Mobility Inpatient CMS 0-100% Score: 61.29 (03/04/22 1437)  Mobility Inpatient CMS G-Code Modifier : CL (03/04/22 1437)          Goals  Short term goals  Time Frame for Short term goals: 14 visits  Short term goal 1: Pt to ambulate 150ft modified independently RW  Short term goal 2: Pt to sit <> stand transfer independently  Short term goal 3: Pt to demonstrate independent bed mobility  Short term goal 4: Pt to demonstrate good - standing balance to decrease risk of falls  Short term goal 5: Pt to tolerate 30 minutes of therapy for endurance       Therapy Time   Individual Concurrent Group Co-treatment   Time In 1151         Time Out 1221         Minutes 30         Timed Code Treatment Minutes: [address] [facility],

## 2022-03-04 NOTE — PROGRESS NOTES
Pt remains in Afib 100-120 and occasionally up to 130. Pt stated that she is severely allergic to lopressor in the ED prior to administration. Pt took her toprol XL prior to arrival to floor and that toprol XL is \"ok for her to take but metoprolol has a certain ingredient that she is allergic to. \" Giovana Blandon NP notified. Pt remains asymptomatic. RN asked if pt would be started on a cardizem drip- Per Giovana Blandon NP, monitor pt for now.

## 2022-03-04 NOTE — ED NOTES
Pt arrives to ED via EMS. Pt states she was awakened last night to chest tightness/palpitations- continues today. Pt reports dyspnea with exertion. Pt AOx4. Arrives on 2 L/NC- does not use NC at home- SPO2 wnl on RA upon arrival. Pt on cart- on monitor. Side rails up x2.  Ekg performed      Kenzie Headley RN  03/03/22 7915

## 2022-03-04 NOTE — ED NOTES
Writer/CGNI standby assist with pt to bedside commode- pt stated she urinated- no urine present in commode. Pt placed in dry brief with pad- purewick placed to suction- pt back in bed- on monitor. Siderails up x 2- call light in reach.  Relayed to Dr Eliot Gee pt tachycardia/dyspnea with exertion     Conchita Georges RN  03/03/22 [id number]

## 2022-03-04 NOTE — CONSULTS
[facility]    Consult                        Today's Date: 3/4/2022  Patient Name: Los Frederick  Date of admission: 3/3/2022  7:06 PM  Patient's age: 78 y.o., 1945  Admission Dx: Chest pain, unspecified type [R07.9]  Acute on chronic congestive heart failure, unspecified heart failure type ([address] [I50.9]  Decompensated heart failure ([address] [I50.9]    Reason for Consult:  Cardiac evaluation    Requesting Physician: Chhaya Andersen MD    CHIEF COMPLAINT:  CHF     History Obtained From:  patient, electronic medical record    HISTORY OF PRESENT ILLNESS:      Hugo Box is a 78 y.o. female who presents with chest discomfort that began yesterday. Does have a history of atrial fibrillation and sounds like she is chronically in A. fib. She has been having increasing pain and increasing dyspnea on exertion. Sound like there is some heart failure as well. States she can hardly do anything before she becomes out of breath. States she has been taking all her medications as directed. No recent procedures. Denies other symptoms or concerns at this time. Denies any increased swelling in her legs. No infectious respiratory symptoms otherwise. No fevers    Pt seen and examined in the room. Pt denies any CP. She states that she knows she has hard to control afib. She takes all of her medications as scheduled. She states that she does have some sob at times and pedal edema. She states that she is very tired of being sick. She uses a walker and is unsure if she can stay living at home alone much longer.   Currently Afib, rate better controlled     -1.7 L since admission   Past Medical History:   has a past medical history of Abnormal Pap smear, Asthma, Atrial fibrillation ([address] Bloody discharge from nipple, CAD (coronary artery disease), Cataracts, bilateral, Colon polyp, CVA (cerebral vascular accident) ([address] Demyelinating disease ([address] Fibromyalgia, High-risk sexual behavior, History of bone density study, History of migraines, HTN (hypertension), IgG deficiency (HCC), Multiple sclerosis ([address] Myoclonic seizures ([address] Optic neuritis, Osteopenia, Pyloric stenosis, Raynaud's disease, and Vasomotor rhinitis. Past Surgical History:   has a past surgical history that includes Dilation & curettage (1982); Breast biopsy (1990); Appendectomy (1955); Septoplasty (1985); Hammer toe surgery (1989); Sinus endoscopy; sinus surgery; Shoulder arthroscopy (1998); Esophagoscopy (1966, 1969); Colonoscopy (2010); Foreign Body Removal (1962); laparoscopy (2155); Coronary angioplasty with stent (5/2011); Coronary angioplasty with stent (6/2011); Upper gastrointestinal endoscopy; eye surgery; pr colonoscopy w/biopsy single/multiple (N/A, 12/19/2017); pr egd transoral biopsy single/multiple (12/19/2017); Upper gastrointestinal endoscopy (12/19/2017); Upper gastrointestinal endoscopy (5/24/2018); Cardiac surgery; Cardiac catheterization (07/31/2020); Cardioversion (08/14/2020); and Cardioversion (07/31/2020). Home Medications:    Prior to Admission medications    Medication Sig Start Date End Date Taking?  Authorizing Provider   allopurinol (ZYLOPRIM) 100 MG tablet Take 100 mg by mouth daily   Yes Historical Provider, MD   hydrALAZINE (APRESOLINE) 50 MG tablet Take 50 mg by mouth 2 times daily   Yes Historical Provider, MD   isosorbide mononitrate (IMDUR) 60 MG extended release tablet Take 60 mg by mouth daily   Yes Historical Provider, MD   metoprolol succinate (TOPROL XL) 50 MG extended release tablet Take 25 mg by mouth at bedtime  5/20/21  Yes Historical Provider, MD   bumetanide (BUMEX) 1 MG tablet Take 1 tablet by mouth daily Advised to take additional 1 pill for weight gain, increased CHF and/swelling 8/27/20 3/3/22 Yes NENA Childs - CNP   apixaban (ELIQUIS) 5 MG TABS tablet Take by mouth 2 times daily   Yes Historical Provider, MD   pantoprazole (PROTONIX) 40 MG tablet Take 40 mg by mouth 2 times daily    Yes Historical Provider, MD   azaTHIOprine (IMURAN) 50 MG tablet Take 50 mg by mouth daily    Yes Historical Provider, MD   ranolazine (RANEXA) 500 MG SR tablet Take 500 mg by mouth 2 times daily. Yes Historical Provider, MD   clopidogrel (PLAVIX) 75 MG tablet Take 75 mg by mouth daily 100 mg pm   Yes Historical Provider, MD   pravastatin (PRAVACHOL) 40 MG tablet Take 40 mg by mouth at bedtime    Yes Historical Provider, MD   doxazosin (CARDURA) 1 MG tablet doxazosin 1 mg tablet    Historical Provider, MD   gabapentin (NEURONTIN) 100 MG capsule gabapentin 100 mg capsule   Take 1 capsule 3 times a day by oral route as needed for 30 days. 6/1/21   Historical Provider, MD   cloNIDine (CATAPRES) 0.1 MG tablet Take 1 tablet by mouth 2 times daily 8/8/21   Bonny Wallace DO   losartan (COZAAR) 100 MG tablet Take 1 tablet by mouth daily 8/8/21   Meera Boland DO   potassium chloride (KLOR-CON M) 20 MEQ extended release tablet Take 1 tablet by mouth daily 8/27/20   NENA Don - CNP   nitroGLYCERIN (NITROSTAT) 0.4 MG SL tablet Place 0.4 mg under the tongue every 5 minutes.  Indications: Chest Pain    Historical Provider, MD   PARoxetine (PAXIL) 20 MG tablet Take by mouth every morning     Historical Provider, MD       Allergies:  Dofetilide, Lisinopril-hydrochlorothiazide, Sulfa antibiotics, Penicillins, Polyethylene glycol, Actifed cold-allergy [chlorpheniramine-phenylephrine], Altace [ramipril], Cephalosporins, Coreg [carvedilol], Dml facial moisturizer, Elavil [amitriptyline hcl], Entex [ami-margot], Ethylene glycol, Fentanyl, Hizentra [immune globulin (human)], Hydralazine, Levofloxacin, Lisinopril-hydrochlorothiazide, Montelukast sodium, Morphine, Nifedipine, Norvasc [amlodipine besylate], Other, Phenobarbital, Propoxyphene, Robaxin [methocarbamol], Sinequan [doxepin hcl], Sudafed [pseudoephedrine hcl], Sudafed [pseudoephedrine hcl], Tranquil-cecil, Wellbutrin [bupropion hcl], Actifed cold-allergy [chlorpheniramine-phenylephrine], Clindamycin/lincomycin, Codeine, Metoprolol, Metoprolol succinate, and Seldane [terfenadine]    Social History:   reports that she has never smoked. She has never used smokeless tobacco. She reports that she does not drink alcohol and does not use drugs. Family History: family history includes Breast Cancer in her maternal aunt; Heart Disease in her father and mother; Hypertension in her sister; Other in her father, maternal aunt, mother, and sister. No h/o sudden cardiac death. No for premature CAD    REVIEW OF SYSTEMS:    Constitutional: there has been no unanticipated weight loss. There's been No change in energy level, No change in activity level. Eyes: No visual changes or diplopia. No scleral icterus. ENT: No Headaches, hearing loss or vertigo. No mouth sores or sore throat. Cardiovascular: see above  Respiratory: see above  Gastrointestinal: No abdominal pain, appetite loss, blood in stools. Genitourinary: No dysuria, trouble voiding, or hematuria. Musculoskeletal:  No gait disturbance, No weakness or joint complaints. Integumentary: No rash or pruritis. Neurological: No headache or diplopia. No tingling  Psychiatric: No anxiety, or depression. Endocrine: No temperature intolerance. Hematologic/Lymphatic: No abnormal bruising or bleeding, blood clots or swollen lymph nodes. Allergic/Immunologic: No nasal congestion or hives. PHYSICAL EXAM:      BP (!) 140/76   Pulse 187   Temp 98.4 °F (36.9 °C) (Oral)   Resp 17   Ht 5' 1\" (1.549 m)   Wt 208 lb (94.3 kg)   SpO2 96%   BMI 39.30 kg/m²    Constitutional and General Appearance: alert, cooperative, no distress and appears stated age  HEENT: PERRL, no cervical lymphadenopathy. No masses palpable. Normal oral mucosa  Respiratory:  Normal excursion and expansion without use of accessory muscles  Resp Auscultation: Good respiratory effort. No for increased work of breathing.  On auscultation: clear to auscultation bilaterally  Cardiovascular:  Heart tones are crisp and normal. irregular S1 and S2.  afib   Jugular venous pulsation Normal  The carotid upstroke is normal in amplitude and contour without delay or bruit   Abdomen:   soft  Bowel sounds present  Extremities:   No edema  Neurological:  Alert and oriented. DATA:    Diagnostics:    EKG: normal EKG, normal sinus rhythm, atrial fibrillation, rate 101, unchanged from previous tracings. Echocardiogram 4/30/2019  Left ventricle is normal in size with normal systolic function. Calculated ejection fraction is 65%. Increased left ventricular wall thickness. Evidence of advance diastolic dysfunction. Left atrium is moderately dilated. Right atrial enlargement. Increase in right ventricular free wall thickness. Mildly dilated left ventricle with mildly reduced function. Aortic leaflet calcification without stenosis. Mild thickening of the mitral leaflets without stenosis. Mild mitral regurgitation. Trace tricuspid regurgitation. Estimated right ventricular systolic pressure  is 34 mmH        Coronary Angiography 7/31/20:   LMCA: Mild irregularities 10-20%. LAD: Patent mid stent, distal 40% stenosis. Apical vessel had 80% stenosis, given symptoms and apical ischemia on stress   test, balloon angioplasty was performed with reduction of stenosis to 20%. LCx: Mild irregularities 10-20%. Large and dominant with mild disease   OM1 and OM2 are large, patent with mild disease   RCA: Small and non-dominant, patent, mild disease     CARDIOVERSION:  After an adequate level of sedation was achieved, 150J in biphasic synchronized delivery was administered. conversion to normal sinus rhythm. The patient awoke without complications. The patient will continue with the discharge m. There were no complications      Medical Hx     1. Hypertension   2. Coronary  artery disease status post PCI to LAD   3. Multiple sclerosis   4. Gastroparesis  5. Echocardiogram 4/30/2019: Left ventricle is normal in size with normal systolic function. Calculated ejection fraction is 65%. Evidence of advance diastolic dysfunction. Left atrium is moderately dilated. Right atrial enlargement. Increase in right ventricular free wall thickness. Mildly dilated left ventricle with mildly reduced function. Aortic leaflet calcification without stenosis. Mild thickening of the mitral leaflets without stenosis. Mild mitral regurgitation. 6. Nuclear stress test on July 28, 2020 showed reversible perfusion defect in anteroapical and lateral apical wall  7. PAF and atrial flutter, with admission to [facility] in July 2020 with new onset atrial flutter status post JOVITA cardioversion on July 31, 2020; amiodarone restarted; pt is on Eliquis. 8. Coronary angiography on July 31, 2020 showed patent LAD stent and high-grade stenosis in the apical LAD status post POBA/ PTCA with balloon angioplasty only. Other vessels had mild non-obstructive disease. 9. Repeat cardioversion Aug 2020, with subsequent recurrent atrial flutter 9/28/20   10. Referred to [facility] to consider AFib ablation, had cardiac arrest after 2nd dose of Tikosyn, currently managed with rate control strategy. was deemed not a suitable candidate for ablation due to comorbidities. Labs:     CBC:   Recent Labs     03/03/22 2001 03/04/22  0518   WBC 7.5 8.3   HGB 11.7* 11.7*   HCT 35.3* 35.4*    216     BMP:   Recent Labs     03/03/22 2001 03/04/22  0518    138   K 4.4 4.0   CO2 27 26   BUN 23 23   CREATININE 1.03* 1.11*   LABGLOM 52* 48*   GLUCOSE 98 93     BNP: No results for input(s): BNP in the last 72 hours. PT/INR: No results for input(s): PROTIME, INR in the last 72 hours. APTT:No results for input(s): APTT in the last 72 hours. CARDIAC ENZYMES:No results for input(s): CKTOTAL, CKMB, CKMBINDEX, TROPONINI in the last 72 hours.   FASTING LIPID PANEL:  Lab Results Component Value Date    HDL 52 08/26/2020    TRIG 78 08/26/2020     LIVER PROFILE:  Recent Labs     03/03/22 2001   AST 12   ALT 9   LABALBU 4.3       IMPRESSION:    Patient Active Problem List   Diagnosis    Bloody discharge from nipple    Osteopenia    Multiple sclerosis (HCC)    Essential hypertension    Asthma    SOB (shortness of breath)    Lower leg pain    Noncompliance with medications    Dyslipidemia    GERD (gastroesophageal reflux disease)    Fibromyalgia    Pyloric stenosis    Demyelinating disease (HCC)    H/O: CVA (cerebrovascular accident)    Epigastric abdominal pain    Incontinence in female    Chest pain with high risk for cardiac etiology    Migraine without status migrainosus, not intractable    Hypokalemia    Chronic diastolic heart failure (HCC)    Acute diastolic HF (heart failure) (HCC)    Acute on chronic combined systolic and diastolic CHF (congestive heart failure) (HCC)    CAD (coronary artery disease)    Atrial flutter (HCC)    Chronic bronchiolitis (HCC)    Paroxysmal A-fib (HCC)    HTN (hypertension)    Chronic renal insufficiency, stage III (moderate) (HCC)    Mild cognitive disorder    Raynaud's disease    Frequent falls    Physical debility    Chronic atrial fibrillation (HCC)    Obesity (BMI 30-39. 9)    Decompensated heart failure (HCC)       RECOMMENDATIONS:  AFib with RVR. Contnue Eliuqis. On BB. IV dig today. No tikosyn due to cardiac arrest.   Hold hydralazine as it can increase heart rate,   Acute on chronic diastolic CHF. Continue IV lasix. Coreg and cozaar. Strict I/O  CAD. Stable. Minimally elevated trops. No CP, type II MI secondary to CHF/AFib with rvr. Continue Plavix, BB, ARB, ranexa, imdur and statin. No ASA due to plavix and eliquis   Await ECHO results. Multiple allergies to medications. Discussed with patient and Nurse.   Vipul Carmichael, [address]  Cardiology Consult    Attending Cardiologist Addendum: I have reviewed the history, physical, subjective, objective, assessment, and plan with the CNP and agree with the note. I have made changes to the note above as needed. Thank you for allowing me to participate in the care of this patient, please do not hesitate to call if you have any questions. Phil Zavala, [id number] [facility]  [facility] [employer]  [phone]

## 2022-03-04 NOTE — H&P
[facility]  Office: [address], DO, Reuben Almazan, DO, Cody Francis, DO, Johny Pascal Blood, DO, Carmen Rodriguez MD, Iva Chaudhry MD, Angelica Lawrence MD, Tiera Raymundo MD, Daren Klinefelter, MD, Ketan Chanel MD, Nella Pagan MD, Cher Collet, DO, Ricke Bosworth, DO, Sally Coker MD,  Rosanna Mcdaniel, DO, Bebo Wright MD, Kp Alva MD, Karen Yeh MD, Dorene Villa MD, Marvin Littlejohn MD, Ariana Cox, [facility], [facility], CNP, Delilah Díaz, CNP, Cade Monsivais, CNS, Perla Sawyer, CNP, Eliazar Irby, CNP, Gertrude Cantu, CNP, Byron Arreguin, CNP, Ilan Mead, CNP, Mason Alva PA-C, Patricia Khalil, [facility], Bonnie Tello, [facility], Jen Schmitt, CNP, Lance Souza, CNP, Joseph Tate, CNP, Stephen Penn, CNP, Lobo Carter, CNP, Esther Coy, [facility]         [address] [facility]    HISTORY AND PHYSICAL EXAMINATION            Date:   3/4/2022  Patient name:  Sherrill Sanchez  Date of admission:  3/3/2022  7:06 PM  MRN:   [id number]  Account:  [de-identified]  YOB: 1945  PCP:    Karen New MD  Room:   694/495-86  Code Status:    Full Code    Chief Complaint:     Chief Complaint   Patient presents with    Chest Pain     onset last night- \"tight band around chest\"       History Obtained From:     patient, electronic medical record    History of Present Illness:     Sherrill Sanchez is a 78 y.o. Non- / non  female who presents with Chest Pain (onset last night- \"tight band around chest\")   and is admitted to the hospital for the management of Acute combined systolic and diastolic CHF, NYHA class 2 ([address]   Patient with extensive past medical history including history of stroke, coronary artery disease status post stent placement, diastolic heart failure, multiple sclerosis, chronic A. fib on anticoagulation, pyloric stenosis and GERD, history of hypertension hyperlipidemia presented to ER with progressively worsening shortness of breath over the last 24 hours prior to presentation. Patient is able to feel her fluttering heart, dyspnea on exertion and could not breathe with any ambulation, she could not hardly do anything which prompted her to come to the ER where she was found to have acute decompensated heart failure, her A. fib was in RVR and she was admitted for further management. Patient continues to feel short of breath and uncomfortable when she is in bed afterwards was able to hold a good conversation with me and answered all my questions appropriately    Past Medical History:     Past Medical History:   Diagnosis Date    Abnormal Pap smear 10/2010    paucity or absence of TZx 3 years in a row. 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[address]     Multiple sclerosis ([address]     progressive    Myoclonic seizures ([address]     Optic neuritis     Mazin Anirudh Pupil    Osteopenia     Pyloric stenosis     Raynaud's disease     Vasomotor rhinitis         Past Surgical History:     Past Surgical History:   Procedure Laterality Date    APPENDECTOMY  1955    sponges left in abdomen, at [address] [place] [address] Way    stereotactic type, right breast, benign    CARDIAC CATHETERIZATION  07/31/2020    POBA lad    CARDIAC SURGERY      CARDIOVERSION  08/14/2020    [facility]     CARDIOVERSION  07/31/2020    COLONOSCOPY  2010    Polyp    CORONARY ANGIOPLASTY WITH STENT PLACEMENT  5/2011    failed attempt to place stent    CORONARY ANGIOPLASTY WITH STENT PLACEMENT  6/2011    successful placement of 2 stent in same artery   [address]    bleeding ulcers found   [address] [employer] [address]    surgical sponges left in during appendectomy    HAMMER TOE SURGERY  1989    head resection of phalanx, left foot    LAPAROSCOPY  1983    with D&C    [place] COLONOSCOPY W/BIOPSY SINGLE/MULTIPLE N/A 12/19/2017    COLONOSCOPY WITH BIOPSY performed by Jaskaran Adams MD at [facility]    [place] EGD TRANSORAL BIOPSY SINGLE/MULTIPLE  12/19/2017    EGD BIOPSY performed by Jaskaran Adams MD at [facility] resection with rt and left ethmoidectomy    SHOULDER ARTHROSCOPY  1998    rotator cuff impingement, right arm    SINUS ENDOSCOPY      SINUS SURGERY      multiple    UPPER GASTROINTESTINAL ENDOSCOPY      2-4 times per year for pyloric stenosis    UPPER GASTROINTESTINAL ENDOSCOPY  12/19/2017    EGD DILATION BALLOON performed by Jaskaran Adams MD at [address] [facility] ENDOSCOPY  5/24/2018    EGD DILATION SAVORY performed by Dione Fuentes MD at hospitals Endoscopy        Medications Prior to Admission:     Prior to Admission medications    Medication Sig Start Date End Date Taking?  Authorizing Provider   allopurinol (ZYLOPRIM) 100 MG tablet Take 100 mg by mouth daily   Yes Historical Provider, MD   hydrALAZINE (APRESOLINE) 50 MG tablet Take 50 mg by mouth 2 times daily   Yes Historical Provider, MD   isosorbide mononitrate (IMDUR) 60 MG extended release tablet Take 60 mg by mouth daily   Yes Historical Provider, MD   metoprolol succinate (TOPROL XL) 50 MG extended release tablet Take 25 mg by mouth at bedtime  5/20/21  Yes Historical Provider, MD   bumetanide (BUMEX) 1 MG tablet Take 1 tablet by mouth daily Advised to take additional 1 pill for weight gain, increased CHF and/swelling 8/27/20 3/3/22 Yes NENA Hwang CNP   apixaban (ELIQUIS) 5 MG TABS tablet Take by mouth 2 times daily   Yes Historical Provider, MD   pantoprazole (PROTONIX) 40 MG tablet Take 40 mg by mouth 2 times daily    Yes Historical Provider, MD   azaTHIOprine (IMURAN) 50 MG tablet Take 50 mg by mouth daily    Yes Historical Provider, MD   ranolazine (RANEXA) 500 MG SR tablet Take 500 mg by mouth 2 times daily. Yes Historical Provider, MD   clopidogrel (PLAVIX) 75 MG tablet Take 75 mg by mouth daily 100 mg pm   Yes Historical Provider, MD   pravastatin (PRAVACHOL) 40 MG tablet Take 40 mg by mouth at bedtime    Yes Historical Provider, MD   doxazosin (CARDURA) 1 MG tablet doxazosin 1 mg tablet    Historical Provider, MD   gabapentin (NEURONTIN) 100 MG capsule gabapentin 100 mg capsule   Take 1 capsule 3 times a day by oral route as needed for 30 days. 6/1/21   Historical Provider, MD   cloNIDine (CATAPRES) 0.1 MG tablet Take 1 tablet by mouth 2 times daily 8/8/21   Marija Bapmary Hateddy,    losartan (COZAAR) 100 MG tablet Take 1 tablet by mouth daily 8/8/21   Efren Chung,    potassium chloride (KLOR-CON M) 20 MEQ extended release tablet Take 1 tablet by mouth daily 8/27/20   NENA Hwang CNP   nitroGLYCERIN (NITROSTAT) 0.4 MG SL tablet Place 0.4 mg under the tongue every 5 minutes.  Indications: Chest Pain    Historical Provider, MD   PARoxetine (PAXIL) 20 MG tablet Take by mouth every morning     Historical Provider, MD        Allergies:     Dofetilide, Lisinopril-hydrochlorothiazide, Sulfa antibiotics, Penicillins, Polyethylene glycol, Actifed cold-allergy [chlorpheniramine-phenylephrine], Altace [ramipril], Cephalosporins, Coreg [carvedilol], Dml facial moisturizer, Elavil [amitriptyline hcl], Entex [ami-margot], Ethylene glycol, Fentanyl, Hizentra [immune globulin (human)], Hydralazine, Levofloxacin, Lisinopril-hydrochlorothiazide, Montelukast sodium, Morphine, Nifedipine, Norvasc [amlodipine besylate], Other, Phenobarbital, Propoxyphene, Robaxin [methocarbamol], Sinequan [doxepin hcl], Sudafed [pseudoephedrine hcl], Sudafed [pseudoephedrine hcl], Tranquil-cecil, Wellbutrin [bupropion hcl], Actifed cold-allergy [chlorpheniramine-phenylephrine], Clindamycin/lincomycin, Codeine, Metoprolol, Metoprolol succinate, and Seldane [terfenadine]    Social History:     Tobacco:    reports that she has never smoked. She has never used smokeless tobacco.  Alcohol:      reports no history of alcohol use. Drug Use:  reports no history of drug use. Family History:     Family History   Problem Relation Age of Onset    Other Father         Heart problems    Heart Disease Father     Other Mother         heart problems requiring open heart surgery, HTN    Heart Disease Mother     Other Sister         HTN    Hypertension Sister     Other Maternal Aunt         breast cancer    Breast Cancer Maternal Aunt        Review of Systems:     Positive and Negative as described in HPI. Review of Systems   Constitutional: Positive for activity change and appetite change. Negative for chills, diaphoresis and fever. HENT: Negative for congestion and hearing loss. Respiratory: Positive for chest tightness and shortness of breath. Negative for cough, wheezing and stridor. Cardiovascular: Positive for leg swelling. Negative for chest pain and palpitations. Gastrointestinal: Negative for abdominal pain, blood in stool, constipation, diarrhea, nausea and vomiting. Genitourinary: Negative for dysuria and frequency. Musculoskeletal: Negative for myalgias. Skin: Negative for rash. Neurological: Negative for dizziness, seizures and headaches. Psychiatric/Behavioral: The patient is not nervous/anxious.         Physical Exam:   BP (!) 145/68   Pulse 104   Temp 97.3 °F (36.3 °C) (Oral)   Resp 18   Ht 5' 1\" (1.549 m)   Wt 208 lb (94.3 kg)   SpO2 96%   BMI 39.30 kg/m²   Temp (24hrs), Av.7 °F (36.5 °C), Min:97.3 °F (36.3 °C), Max:98.4 °F (36.9 °C)    No results for input(s): POCGLU in the last 72 hours. Intake/Output Summary (Last 24 hours) at 3/4/2022 1335  Last data filed at 3/4/2022 0428  Gross per 24 hour   Intake 325 ml   Output 2100 ml   Net -1775 ml       Physical Exam  Vitals and nursing note reviewed. Constitutional:       General: She is not in acute distress. Appearance: She is well-developed. She is not diaphoretic. HENT:      Head: Normocephalic and atraumatic. Right Ear: Hearing normal.      Left Ear: Hearing normal.      Nose: Nose normal. No rhinorrhea. Eyes:      General: Lids are normal.      Extraocular Movements:      Right eye: Normal extraocular motion. Left eye: Normal extraocular motion. Conjunctiva/sclera: Conjunctivae normal.      Right eye: Right conjunctiva is not injected. Left eye: Left conjunctiva is not injected. Pupils: Pupils are equal, round, and reactive to light. Pupils are equal.      Right eye: Pupil is reactive. Left eye: Pupil is reactive. Neck:      Thyroid: No thyromegaly. Vascular: No carotid bruit. Trachea: Trachea and phonation normal. No tracheal deviation. Cardiovascular:      Rate and Rhythm: Normal rate. Rhythm irregularly irregular. Pulses: Normal pulses. Heart sounds: Normal heart sounds. No murmur heard. Pulmonary:      Effort: Pulmonary effort is normal. No respiratory distress. Breath sounds: No stridor. Examination of the right-lower field reveals rales. Examination of the left-lower field reveals rales. Rales present. No decreased breath sounds. Abdominal:      General: Bowel sounds are normal. There is no distension. Palpations: Abdomen is soft. There is no mass. Tenderness: There is no abdominal tenderness. There is no guarding. Musculoskeletal:         General: No tenderness. Cervical back: Neck supple. Right lower leg: Edema present.       Left lower leg: Edema present. Skin:     General: Skin is warm and dry. Findings: No erythema, lesion or rash. Neurological:      Mental Status: She is alert and oriented to person, place, and time. She is not disoriented. Cranial Nerves: No cranial nerve deficit. Psychiatric:         Speech: Speech normal.         Behavior: Behavior normal. Behavior is cooperative.          Investigations:      Laboratory Testing:  Recent Results (from the past 24 hour(s))   EKG 12 Lead    Collection Time: 03/03/22  7:34 PM   Result Value Ref Range    Ventricular Rate 101 BPM    Atrial Rate 111 BPM    QRS Duration 82 ms    Q-T Interval 384 ms    QTc Calculation (Bazett) 497 ms    R Axis -57 degrees    T Axis 106 degrees   CBC with Auto Differential    Collection Time: 03/03/22  8:01 PM   Result Value Ref Range    WBC 7.5 3.5 - 11.0 k/uL    RBC 4.15 4.0 - 5.2 m/uL    Hemoglobin 11.7 (L) 12.0 - 16.0 g/dL    Hematocrit 35.3 (L) 36 - 46 %    MCV 85.1 80 - 100 fL    MCH 28.1 26 - 34 pg    MCHC 33.1 31 - 37 g/dL    RDW 17.1 (H) 12.5 - 15.4 %    Platelets 667 968 - 984 k/uL    MPV 10.0 6.0 - 12.0 fL    Seg Neutrophils 74 (H) 36 - 66 %    Lymphocytes 15 (L) 24 - 44 %    Monocytes 9 2 - 11 %    Eosinophils % 1 1 - 4 %    Basophils 1 0 - 2 %    Segs Absolute 5.50 1.8 - 7.7 k/uL    Absolute Lymph # 1.20 1.0 - 4.8 k/uL    Absolute Mono # 0.70 0.1 - 1.2 k/uL    Absolute Eos # 0.10 0.0 - 0.4 k/uL    Basophils Absolute 0.10 0.0 - 0.2 k/uL   Comprehensive Metabolic Panel    Collection Time: 03/03/22  8:01 PM   Result Value Ref Range    Glucose 98 70 - 99 mg/dL    BUN 23 8 - 23 mg/dL    CREATININE 1.03 (H) 0.50 - 0.90 mg/dL    Calcium 9.4 8.6 - 10.4 mg/dL    Sodium 135 135 - 144 mmol/L    Potassium 4.4 3.7 - 5.3 mmol/L    Chloride 97 (L) 98 - 107 mmol/L    CO2 27 20 - 31 mmol/L    Anion Gap 11 9 - 17 mmol/L    Alkaline Phosphatase 77 35 - 104 U/L    ALT 9 5 - 33 U/L    AST 12 <32 U/L    Total Bilirubin 0.75 0.3 - 1.2 mg/dL    Total Protein 6.3 (L) 6.4 - 8.3 g/dL    Albumin 4.3 3.5 - 5.2 g/dL    Albumin/Globulin Ratio 2.2 1.0 - 2.5    GFR Non-African American 52 (L) >60 mL/min    GFR African American >60 >60 mL/min    GFR Comment         Lipase    Collection Time: 03/03/22  8:01 PM   Result Value Ref Range    Lipase 24 13 - 60 U/L   Magnesium    Collection Time: 03/03/22  8:01 PM   Result Value Ref Range    Magnesium 1.9 1.6 - 2.6 mg/dL   D-Dimer, Quantitative    Collection Time: 03/03/22  8:01 PM   Result Value Ref Range    D-Dimer, Quant 0.40 mg/L FEU   Troponin    Collection Time: 03/03/22  8:01 PM   Result Value Ref Range    Troponin, High Sensitivity 23 (H) 0 - 14 ng/L   Brain Natriuretic Peptide    Collection Time: 03/03/22  8:01 PM   Result Value Ref Range    Pro-BNP 7,015 (H) <300 pg/mL   Troponin    Collection Time: 03/03/22  9:25 PM   Result Value Ref Range    Troponin, High Sensitivity 23 (H) 0 - 14 ng/L   Urinalysis with Reflex to Culture    Collection Time: 03/03/22 10:10 PM    Specimen: Urine, clean catch   Result Value Ref Range    Color, UA Yellow Yellow    Turbidity UA Clear Clear    Glucose, Ur NEGATIVE NEGATIVE    Bilirubin Urine NEGATIVE NEGATIVE    Ketones, Urine NEGATIVE NEGATIVE    Specific Gravity, UA 1.010 1.005 - 1.030    Urine Hgb TRACE (A) NEGATIVE    pH, UA 6.0 5.0 - 8.0    Protein, UA NEGATIVE NEGATIVE    Urobilinogen, Urine Normal Normal    Nitrite, Urine NEGATIVE NEGATIVE    Leukocyte Esterase, Urine NEGATIVE NEGATIVE   Microscopic Urinalysis    Collection Time: 03/03/22 10:10 PM   Result Value Ref Range    -          WBC, UA 0 TO 2 0 - 5 /HPF    RBC, UA 2 TO 5 0 - 2 /HPF    Epithelial Cells UA 10 TO 20 0 - 5 /HPF    Bacteria, UA FEW (A) None    Amorphous, UA 1+ (A) None    Other Observations UA (A) NOT REQ. Utilizing a urinalysis as the only screening method to exclude a potential uropathogen can be unreliable in many patient populations.   Rapid screening tests are less sensitive than culture and if UTI is a clinical possibility, culture should be considered despite a negative urinalysis. COVID-19, Rapid    Collection Time: 03/03/22 10:37 PM    Specimen: Nasopharyngeal Swab   Result Value Ref Range    Specimen Description . NASOPHARYNGEAL SWAB     SARS-CoV-2, Rapid Not Detected Not Detected   Magnesium    Collection Time: 03/04/22  5:18 AM   Result Value Ref Range    Magnesium 1.9 1.6 - 2.6 mg/dL   Brain Natriuretic Peptide    Collection Time: 03/04/22  5:18 AM   Result Value Ref Range    Pro-BNP 9,530 (H) <300 pg/mL   Lipid panel - fasting    Collection Time: 03/04/22  5:18 AM   Result Value Ref Range    Cholesterol 120 <200 mg/dL    HDL 48 >40 mg/dL    LDL Cholesterol 57 0 - 130 mg/dL    Chol/HDL Ratio 2.5 <5    Triglycerides 75 <150 mg/dL   TSH without Reflex    Collection Time: 03/04/22  5:18 AM   Result Value Ref Range    TSH 3.16 0.30 - 5.00 mIU/L   Basic Metabolic Panel    Collection Time: 03/04/22  5:18 AM   Result Value Ref Range    Glucose 93 70 - 99 mg/dL    BUN 23 8 - 23 mg/dL    CREATININE 1.11 (H) 0.50 - 0.90 mg/dL    Calcium 9.5 8.6 - 10.4 mg/dL    Sodium 138 135 - 144 mmol/L    Potassium 4.0 3.7 - 5.3 mmol/L    Chloride 98 98 - 107 mmol/L    CO2 26 20 - 31 mmol/L    Anion Gap 14 9 - 17 mmol/L    GFR Non-African American 48 (L) >60 mL/min    GFR  58 (L) >60 mL/min    GFR Comment         CBC with Auto Differential    Collection Time: 03/04/22  5:18 AM   Result Value Ref Range    WBC 8.3 3.5 - 11.0 k/uL    RBC 4.17 4.0 - 5.2 m/uL    Hemoglobin 11.7 (L) 12.0 - 16.0 g/dL    Hematocrit 35.4 (L) 36 - 46 %    MCV 85.0 80 - 100 fL    MCH 28.0 26 - 34 pg    MCHC 32.9 31 - 37 g/dL    RDW 17.0 (H) 12.5 - 15.4 %    Platelets 956 103 - 613 k/uL    MPV 10.4 6.0 - 12.0 fL    Seg Neutrophils 68 (H) 36 - 66 %    Lymphocytes 18 (L) 24 - 44 %    Monocytes 12 (H) 2 - 11 %    Eosinophils % 1 1 - 4 %    Basophils 1 0 - 2 %    Segs Absolute 5.70 1.8 - 7.7 k/uL    Absolute Lymph # 1.50 1.0 - 4.8 k/uL    Absolute Mono # 1. 00 0.1 - 1.2 k/uL    Absolute Eos # 0.10 0.0 - 0.4 k/uL    Basophils Absolute 0.10 0.0 - 0.2 k/uL       Imaging/Diagnostics:    XR CHEST PORTABLE    Result Date: 3/3/2022  Stable cardiomegaly. No acute cardiopulmonary findings. Assessment :      Hospital Problems           Last Modified POA    * (Principal) Acute combined systolic and diastolic CHF, NYHA class 2 ([facility] 3/4/2022 Yes    Physical debility 3/4/2022 Yes    Chronic atrial fibrillation ([facility] 3/4/2022 Yes    Obesity (BMI 30-39.9) 3/4/2022 Yes    Multiple sclerosis ([facility] (Chronic) 3/4/2022 Yes    CAD (coronary artery disease) (Chronic) 3/4/2022 Yes    Chronic renal insufficiency, stage III (moderate) (HCC) (Chronic) 3/4/2022 Yes          Plan:     Patient status inpatient in the Progressive Unit/Step down    Acute decompensated combined systolic and diastolic heart failure: Lasix 40 mg BID IV , strict I's & O's, daily weight, cardiac diet and fluid restriction  Echocardiogram added    Chronic A. fib: Continue chronic anticoagulation, continue Toprol for rate control, patient reports allergy to metoprolol IV and catheter channel blockers, will load with digoxin for better rate control  Had cardiac arrest with Tikosyn    Coronary artery disease status post stent placement: Continue Ranexa, Plavix and statin    Consult cardiology    CKD 3: Continue to monitor creatinine daily and avoid negative schedule    Multiple sclerosis: Continue chronic medications    HTN: continue home medications    HPL: continue home medications    Pyloric stenosis/GERD: Continue PPI. History of CVA: Continue antiplatelets and statin    Obesity is a complicating factor.     DVT prophylaxis      Consultations:   IP CONSULT TO HEART FAILURE NURSE/COORDINATOR  IP CONSULT TO DIETITIAN  IP CONSULT TO CARDIOLOGY  IP CONSULT TO CARDIOLOGY     Patient is admitted as inpatient status because of co-morbidities listed above, severity of signs and symptoms as outlined, requirement for

## 2022-03-05 PROBLEM — I27.20 PULMONARY HYPERTENSION (HCC): Status: ACTIVE | Noted: 2022-03-05

## 2022-03-05 LAB
ABSOLUTE EOS #: 0.15 K/UL (ref 0–0.4)
ABSOLUTE LYMPH #: 1.1 K/UL (ref 1–4.8)
ABSOLUTE MONO #: 0.97 K/UL (ref 0.1–1.2)
ANION GAP SERPL CALCULATED.3IONS-SCNC: 12 MMOL/L (ref 9–17)
BASOPHILS # BLD: 1 % (ref 0–2)
BASOPHILS ABSOLUTE: 0.05 K/UL (ref 0–0.2)
BUN BLDV-MCNC: 26 MG/DL (ref 8–23)
CALCIUM SERPL-MCNC: 9.3 MG/DL (ref 8.6–10.4)
CHLORIDE BLD-SCNC: 94 MMOL/L (ref 98–107)
CO2: 27 MMOL/L (ref 20–31)
CREAT SERPL-MCNC: 1.43 MG/DL (ref 0.5–0.9)
EOSINOPHILS RELATIVE PERCENT: 2 % (ref 1–4)
GFR AFRICAN AMERICAN: 43 ML/MIN
GFR NON-AFRICAN AMERICAN: 36 ML/MIN
GFR SERPL CREATININE-BSD FRML MDRD: ABNORMAL ML/MIN/{1.73_M2}
GLUCOSE BLD-MCNC: 98 MG/DL (ref 70–99)
HCT VFR BLD CALC: 38.5 % (ref 36–46)
HEMOGLOBIN: 12.2 G/DL (ref 12–16)
LYMPHOCYTES # BLD: 18 % (ref 24–44)
MCH RBC QN AUTO: 28 PG (ref 26–34)
MCHC RBC AUTO-ENTMCNC: 31.7 G/DL (ref 31–37)
MCV RBC AUTO: 88.5 FL (ref 80–100)
MONOCYTES # BLD: 15 % (ref 2–11)
PDW BLD-RTO: 16.6 % (ref 12.5–15.4)
PLATELET # BLD: 187 K/UL (ref 140–450)
PMV BLD AUTO: 11.3 FL (ref 8–14)
POTASSIUM SERPL-SCNC: 4.8 MMOL/L (ref 3.7–5.3)
RBC # BLD: 4.35 M/UL (ref 4–5.2)
SEG NEUTROPHILS: 64 % (ref 36–66)
SEGMENTED NEUTROPHILS ABSOLUTE COUNT: 4.02 K/UL (ref 1.8–7.7)
SODIUM BLD-SCNC: 133 MMOL/L (ref 135–144)
WBC # BLD: 6.3 K/UL (ref 3.5–11)

## 2022-03-05 PROCEDURE — 85025 COMPLETE CBC W/AUTO DIFF WBC: CPT

## 2022-03-05 PROCEDURE — 2580000003 HC RX 258: Performed by: NURSE PRACTITIONER

## 2022-03-05 PROCEDURE — 80048 BASIC METABOLIC PNL TOTAL CA: CPT

## 2022-03-05 PROCEDURE — 6370000000 HC RX 637 (ALT 250 FOR IP): Performed by: NURSE PRACTITIONER

## 2022-03-05 PROCEDURE — 6360000002 HC RX W HCPCS: Performed by: FAMILY MEDICINE

## 2022-03-05 PROCEDURE — 36415 COLL VENOUS BLD VENIPUNCTURE: CPT

## 2022-03-05 PROCEDURE — 2060000000 HC ICU INTERMEDIATE R&B

## 2022-03-05 PROCEDURE — 6370000000 HC RX 637 (ALT 250 FOR IP): Performed by: FAMILY MEDICINE

## 2022-03-05 PROCEDURE — 6360000002 HC RX W HCPCS: Performed by: NURSE PRACTITIONER

## 2022-03-05 PROCEDURE — 99232 SBSQ HOSP IP/OBS MODERATE 35: CPT | Performed by: FAMILY MEDICINE

## 2022-03-05 RX ORDER — PREDNISONE 20 MG/1
40 TABLET ORAL DAILY
Status: COMPLETED | OUTPATIENT
Start: 2022-03-05 | End: 2022-03-09

## 2022-03-05 RX ORDER — ACETAMINOPHEN 500 MG
1000 TABLET ORAL EVERY 8 HOURS PRN
Status: DISCONTINUED | OUTPATIENT
Start: 2022-03-05 | End: 2022-03-10 | Stop reason: HOSPADM

## 2022-03-05 RX ORDER — FUROSEMIDE 10 MG/ML
20 INJECTION INTRAMUSCULAR; INTRAVENOUS 2 TIMES DAILY
Status: DISCONTINUED | OUTPATIENT
Start: 2022-03-05 | End: 2022-03-06

## 2022-03-05 RX ADMIN — PANTOPRAZOLE SODIUM 40 MG: 40 TABLET, DELAYED RELEASE ORAL at 20:52

## 2022-03-05 RX ADMIN — ACETAMINOPHEN 650 MG: 325 TABLET ORAL at 05:03

## 2022-03-05 RX ADMIN — RANOLAZINE 500 MG: 500 TABLET, EXTENDED RELEASE ORAL at 20:52

## 2022-03-05 RX ADMIN — DOXAZOSIN 1 MG: 2 TABLET ORAL at 09:44

## 2022-03-05 RX ADMIN — PAROXETINE HYDROCHLORIDE 20 MG: 20 TABLET, FILM COATED ORAL at 09:43

## 2022-03-05 RX ADMIN — ALLOPURINOL 100 MG: 100 TABLET ORAL at 09:42

## 2022-03-05 RX ADMIN — ACETAMINOPHEN 1000 MG: 500 TABLET ORAL at 15:22

## 2022-03-05 RX ADMIN — PREDNISONE 40 MG: 20 TABLET ORAL at 09:43

## 2022-03-05 RX ADMIN — SODIUM CHLORIDE, PRESERVATIVE FREE 5 ML: 5 INJECTION INTRAVENOUS at 20:54

## 2022-03-05 RX ADMIN — CLONIDINE HYDROCHLORIDE 0.1 MG: 0.1 TABLET ORAL at 20:53

## 2022-03-05 RX ADMIN — POTASSIUM CHLORIDE 20 MEQ: 1500 TABLET, EXTENDED RELEASE ORAL at 09:45

## 2022-03-05 RX ADMIN — RANOLAZINE 500 MG: 500 TABLET, EXTENDED RELEASE ORAL at 09:45

## 2022-03-05 RX ADMIN — SODIUM CHLORIDE, PRESERVATIVE FREE 10 ML: 5 INJECTION INTRAVENOUS at 00:14

## 2022-03-05 RX ADMIN — APIXABAN 5 MG: 5 TABLET, FILM COATED ORAL at 20:53

## 2022-03-05 RX ADMIN — DIGOXIN 250 MCG: 250 INJECTION, SOLUTION INTRAMUSCULAR; INTRAVENOUS; PARENTERAL at 00:13

## 2022-03-05 RX ADMIN — PANTOPRAZOLE SODIUM 40 MG: 40 TABLET, DELAYED RELEASE ORAL at 09:44

## 2022-03-05 RX ADMIN — CLOPIDOGREL BISULFATE 75 MG: 75 TABLET ORAL at 09:42

## 2022-03-05 RX ADMIN — FUROSEMIDE 20 MG: 10 INJECTION, SOLUTION INTRAMUSCULAR; INTRAVENOUS at 09:46

## 2022-03-05 RX ADMIN — SODIUM CHLORIDE, PRESERVATIVE FREE 10 ML: 5 INJECTION INTRAVENOUS at 09:47

## 2022-03-05 RX ADMIN — CLONIDINE HYDROCHLORIDE 0.1 MG: 0.1 TABLET ORAL at 09:45

## 2022-03-05 RX ADMIN — AZATHIOPRINE 50 MG: 50 TABLET ORAL at 20:53

## 2022-03-05 RX ADMIN — APIXABAN 5 MG: 5 TABLET, FILM COATED ORAL at 09:45

## 2022-03-05 RX ADMIN — FUROSEMIDE 20 MG: 10 INJECTION, SOLUTION INTRAMUSCULAR; INTRAVENOUS at 18:21

## 2022-03-05 RX ADMIN — LOSARTAN POTASSIUM 100 MG: 50 TABLET, FILM COATED ORAL at 09:43

## 2022-03-05 RX ADMIN — PRAVASTATIN SODIUM 40 MG: 20 TABLET ORAL at 20:53

## 2022-03-05 ASSESSMENT — PAIN DESCRIPTION - FREQUENCY: FREQUENCY: CONTINUOUS

## 2022-03-05 ASSESSMENT — PAIN DESCRIPTION - ORIENTATION
ORIENTATION: RIGHT;LEFT
ORIENTATION: LEFT

## 2022-03-05 ASSESSMENT — ENCOUNTER SYMPTOMS
WHEEZING: 0
DIARRHEA: 0
BLOOD IN STOOL: 0
COUGH: 0
CONSTIPATION: 0
NAUSEA: 0
CHEST TIGHTNESS: 0
VOMITING: 0
SHORTNESS OF BREATH: 1
ABDOMINAL PAIN: 0

## 2022-03-05 ASSESSMENT — PAIN SCALES - GENERAL
PAINLEVEL_OUTOF10: 4
PAINLEVEL_OUTOF10: 8
PAINLEVEL_OUTOF10: 10
PAINLEVEL_OUTOF10: 9
PAINLEVEL_OUTOF10: 6
PAINLEVEL_OUTOF10: 10

## 2022-03-05 ASSESSMENT — PAIN DESCRIPTION - PAIN TYPE
TYPE: CHRONIC PAIN
TYPE: CHRONIC PAIN

## 2022-03-05 ASSESSMENT — PAIN DESCRIPTION - DESCRIPTORS
DESCRIPTORS: ACHING;DISCOMFORT
DESCRIPTORS: ACHING;DISCOMFORT

## 2022-03-05 ASSESSMENT — PAIN DESCRIPTION - LOCATION
LOCATION: BACK;HIP
LOCATION: BACK;GENERALIZED

## 2022-03-05 ASSESSMENT — PAIN - FUNCTIONAL ASSESSMENT: PAIN_FUNCTIONAL_ASSESSMENT: PREVENTS OR INTERFERES SOME ACTIVE ACTIVITIES AND ADLS

## 2022-03-05 NOTE — PROGRESS NOTES
Occupational [address] [employer]  Occupational Therapy Not Seen Note    DATE: 3/5/2022    NAME: Alexander Morrison  MRN: [id number]   : 1945      Patient not seen this date for Occupational Therapy due to:    Patient Declined: Pt declines immediately upon writer's entrance into the room prior to writer even introducing self. Pt states she has been up most of the day and needs to sleep now. She states she isn't able to care for herself in her home- but declines participating with therapy today to assist in regaining her independence or ability to perform self cares. Pt states she knows she needs assisted living or a nursing facility but has had poor experiences in the past. Pt states she would like to speak with her doctors about her overall goals of care/prognosis with health concerns and then work with therapy on Monday. RN notified.     Next Scheduled Treatment: Will check back 3/7/2022    Electronically signed by Randy Barbosa OT on 3/5/2022 at 3:43 PM

## 2022-03-05 NOTE — PROGRESS NOTES
[facility]        Date:   3/5/2022  Patient name: Mike Hand  Date of admission:  3/3/2022  7:06 PM  MRN:   [id number]  YOB: 1945  PCP: Ines Murray MD    Reason for Consult:       Subjective: No new cardiac issues. No overnight events. Chart reviewed. Physical Exam:   Vitals: BP (!) 121/55   Pulse 66   Temp 98.3 °F (36.8 °C) (Oral)   Resp 18   Ht 5' 1\" (1.549 m)   Wt 197 lb 1.5 oz (89.4 kg)   SpO2 95%   BMI 37.24 kg/m²   General appearance: alert and cooperative with exam  HEENT: Head: Normocephalic, no lesions, without obvious abnormality. Neck: no adenopathy, no carotid bruit, no JVD, supple, symmetrical, trachea midline and thyroid not enlarged, symmetric, no tenderness/mass/nodules  Lungs: clear to auscultation bilaterally  Heart: Irregular rate and rhythm, S1, S2 normal, no murmur, click, rub or gallop  Abdomen: soft, non-tender; bowel sounds normal; no masses,  no organomegaly  Extremities: extremities normal, atraumatic, no cyanosis or edema  Neurologic: Mental status: Alert, oriented, thought content appropriate      Lab work, imaging, nursing, and chart reviewed extensively.     Medications:   Scheduled Meds:   furosemide  20 mg IntraVENous BID    predniSONE  40 mg Oral Daily    allopurinol  100 mg Oral Daily    apixaban  5 mg Oral BID    azaTHIOprine  50 mg Oral Daily    cloNIDine  0.1 mg Oral BID    clopidogrel  75 mg Oral Daily    doxazosin  1 mg Oral Daily    gabapentin  100 mg Oral TID    isosorbide mononitrate  60 mg Oral Daily    losartan  100 mg Oral Daily    pantoprazole  40 mg Oral BID    PARoxetine  20 mg Oral QAM    potassium chloride  20 mEq Oral Daily    pravastatin  40 mg Oral Nightly    ranolazine  500 mg Oral BID    sodium chloride flush  5-40 mL IntraVENous 2 times per day    carvedilol  3.125 mg Oral BID WC    metoprolol  2.5 mg IntraVENous Once     Continuous Infusions:   sodium chloride           Labs:     CBC: Recent Labs     03/03/22 2001 03/04/22 0518 03/05/22  0633   WBC 7.5 8.3 6.3   HGB 11.7* 11.7* 12.2    216 187     BMP:    Recent Labs     03/03/22 2001 03/04/22 0518 03/05/22  0633    138 133*   K 4.4 4.0 4.8   CL 97* 98 94*   CO2 27 26 27   BUN 23 23 26*   CREATININE 1.03* 1.11* 1.43*   GLUCOSE 98 93 98     Hepatic:   Recent Labs     03/03/22 2001   AST 12   ALT 9   BILITOT 0.75   ALKPHOS 77     Troponin: No results for input(s): TROPONINI in the last 72 hours. BNP: No results for input(s): BNP in the last 72 hours. Lipids:   Recent Labs     03/04/22 0518   CHOL 120   HDL 48     INR: No results for input(s): INR in the last 72 hours. Other active acute problems:  Patient Active Problem List   Diagnosis    Bloody discharge from nipple    Osteopenia    [facility] sclerosis ([facility]    Essential hypertension    Asthma    SOB (shortness of breath)    Lower leg pain    Noncompliance with medications    Dyslipidemia    GERD (gastroesophageal reflux disease)    Fibromyalgia    Pyloric stenosis    Demyelinating disease ([address]    H/O: CVA (cerebrovascular accident)    Epigastric abdominal pain    Incontinence in female    Chest pain with high risk for cardiac etiology    Migraine without status migrainosus, not intractable    Hypokalemia    Chronic diastolic heart failure (HCC)    Acute diastolic HF (heart failure) ([facility])    Acute on chronic combined systolic and diastolic CHF (congestive heart failure) ([facility])    CAD (coronary artery disease)    Atrial flutter (HCC)    Chronic bronchiolitis (HCC)    Paroxysmal A-fib ([facility])    HTN (hypertension)    Chronic renal insufficiency, stage III (moderate) ([facility])    Mild cognitive disorder    Raynaud's disease    Acute combined systolic and diastolic CHF, NYHA class 2 ([facility])    Frequent falls    Physical debility    Chronic atrial fibrillation (HCC)    Obesity (BMI 30-39. 9)    Decompensated heart failure (HCC)    Pulmonary hypertension ([address]       Echo: 3/4/22  Left ventricle is normal in size and moderate left ventricular hypertrophy. Global left ventricular systolic function is mildly reduced, estimated  ejection fraction is 45%. Evidence of diastolic dysfunction. Bi-atrial enlargement. Bubble study attempted. Unable to rule out intra-atrial shunt, due to  technical limitations of study. Right ventricular dilatation with reduced systolic function. Trivial aortic insufficiency. Trivial to mild mitral regurgitation. Mild to moderate tricuspid regurgitation. Severe pulmonary hypertension with an estimated right ventricular systolic  pressure of 18.3 mmHg. Mild pulmonic insufficiency. IMPRESSION & Recommendations:      Chronic Afib- has been to [facility] and had extensive work up. Was in RVR. Need to get rate better controlled. Acute exacerbation of diastolic CHF- optimize meds  Stable CAD, s/p distal LAD angioplasty 7/31/20  Severe pulmonery HTN- Stable. RVSP of 80. Can be improved with diureses to some degree. Prior echo 6 months ago RVSP was 77  Hx of cardiac arrest ([place], [place]) at [facility] where she was admitted for Tikosyn therapy. Discussed with patient, family, and Nurse. Electronically signed by Mateus Nogueira DO on 3/5/2022 at 11:55 AM    Mateus Nogueira, [id number] [facility]  [facility]. [facility]  [phone]

## 2022-03-05 NOTE — PROGRESS NOTES
[facility]  Office: [address], DO, Gracie Dumas, DO, Lulu Castillo, DO, Analilia Lowery Blood, DO, Ravi Rincon MD, Lina Thompson MD, Lupillo Stephen MD, Felicita Garcia MD, Bonnie Hodgkin, MD, Zee Childress MD, Jet Verduzco MD, Vikki Robles, DO, Eduard Valdes, DO, Morgan Torres MD,  Ozzie Muhammad, DO, Omar Zhang MD, Sulema Varma MD, Tony Joyner MD, Kiera Carlisle MD, Donnie Gillette MD, Rambo Moreno, [facility], [facility] Center[facility]RISTIANO Castro, CNP, Kevin Norton, CNP, Bella Zepeda, CNS, Vanita Chatterjee, CNP, Jamin Portillo, CNP, Michelle Will, CNP, Shanda Hastings, CNP, Param Rivas, CNP, Radonna Aschoff, PA-C, Marley Thakkar, [facility], Akash Franks, [facility], Esperanza Brown, CNP, Katie Chirinos, CNP, Kashmir Farooq, CNP, Ashley Sneed, CNP, Ray Cast, CNP, Cristine Jackson [id number]    Progress Note    3/5/2022    9:49 AM    Name:   Rashel Tristan  MRN:     [id number]     [place]:      [de-identified]   Room:   [address] [place] [address] Day:  2  Admit Date:  3/3/2022  7:06 PM    PCP:   Jennifer Bear MD  Code Status:  Full Code    Subjective:     C/C:   Chief Complaint   Patient presents with    Chest Pain     onset last night- \"tight band around chest\"     Interval History Status: improved. Patient seen and examined at bedside, no acute events overnight. Continue to improve overall with better breathing. Patient denies any chest pain, shortness of breath, chills, fevers, nausea or vomiting. Patient vitals, labs and all providers notes were reviewed,from overnight shift and morning updates were noted and discussed with the nurse    Brief History:     Rashel Tristan is a 78 y.o. Non- / non  female who presents with Chest Pain (onset last night- \"tight band around chest\")   and is admitted to the hospital for the management of Acute combined systolic and diastolic CHF, NYHA class 2 ([facility] [address]   Patient with extensive past medical history including Cephalosporins Hives    Coreg [Carvedilol]      bloating    Dml Facial Moisturizer     Elavil [Amitriptyline Hcl]     Entex [Ami-Jose]     Ethylene Glycol     Fentanyl     Hizentra [Immune Globulin (Human)]     Hydralazine     Levofloxacin      ach tendon    Lisinopril-Hydrochlorothiazide     Montelukast Sodium      Heart effects    Morphine Itching    Nifedipine     Norvasc [Amlodipine Besylate]      Stomach pain    Other      IV IG     Phenobarbital Hives    Propoxyphene     Robaxin [Methocarbamol]     Sinequan [Doxepin Hcl]     Sudafed [Pseudoephedrine Hcl]     Sudafed [Pseudoephedrine Hcl]     Tranquil-London     Wellbutrin [Bupropion Hcl]      Hypotension    Actifed Cold-Allergy [Chlorpheniramine-Phenylephrine] Palpitations    Clindamycin/Lincomycin Nausea And Vomiting    Codeine Nausea And Vomiting    Metoprolol Nausea And Vomiting     Reports she is allergic to the preservative polyethylineglycol in this medicine, causing severe emesis and stomach pain     Metoprolol Succinate Nausea And Vomiting    Seldane [Terfenadine] Palpitations       Current Meds:   Scheduled Meds:    furosemide  20 mg IntraVENous BID    predniSONE  40 mg Oral Daily    allopurinol  100 mg Oral Daily    apixaban  5 mg Oral BID    azaTHIOprine  50 mg Oral Daily    cloNIDine  0.1 mg Oral BID    clopidogrel  75 mg Oral Daily    doxazosin  1 mg Oral Daily    gabapentin  100 mg Oral TID    isosorbide mononitrate  60 mg Oral Daily    losartan  100 mg Oral Daily    pantoprazole  40 mg Oral BID    PARoxetine  20 mg Oral QAM    potassium chloride  20 mEq Oral Daily    pravastatin  40 mg Oral Nightly    ranolazine  500 mg Oral BID    sodium chloride flush  5-40 mL IntraVENous 2 times per day    carvedilol  3.125 mg Oral BID WC    metoprolol  2.5 mg IntraVENous Once     Continuous Infusions:    sodium chloride       PRN Meds: acetaminophen, sodium chloride flush, sodium chloride, ondansetron **OR** ondansetron, senna    Data:     Past Medical History:   has a past medical history of Abnormal Pap smear, Asthma, Atrial fibrillation ([facility] Bloody discharge from nipple, CAD (coronary artery disease), Cataracts, bilateral, Colon polyp, CVA (cerebral vascular accident) ([facility] [facility] Fibromyalgia, High-risk sexual behavior, History of bone density study, History of migraines, HTN (hypertension), IgG deficiency ([facility] [facility] Multiple sclerosis ([facility] Myoclonic seizures ([facility] Optic neuritis, Osteopenia, Pyloric stenosis, Raynaud's disease, and Vasomotor rhinitis. Social History:   reports that she has never smoked. She has never used smokeless tobacco. She reports that she does not drink alcohol and does not use drugs. Family History:   Family History   Problem Relation Age of Onset    Other Father         Heart problems    Heart Disease Father     Other Mother         heart problems requiring open heart surgery, HTN    Heart Disease Mother     Other Sister         HTN    Hypertension Sister     Other Maternal Aunt         breast cancer    Breast Cancer Maternal Aunt        Vitals:  BP (!) 131/59   Pulse 59   Temp 97.9 °F (36.6 °C) (Oral)   Resp 18   Ht 5' 1\" (1.549 m)   Wt 197 lb 1.5 oz (89.4 kg)   SpO2 98%   BMI 37.24 kg/m²   Temp (24hrs), Av.9 °F (36.6 °C), Min:97.3 °F (36.3 °C), Max:98.4 °F (36.9 °C)    No results for input(s): POCGLU in the last 72 hours. I/O (24Hr):     Intake/Output Summary (Last 24 hours) at 3/5/2022 0949  Last data filed at 3/5/2022 0512  Gross per 24 hour   Intake 600 ml   Output 2450 ml   Net -1850 ml       Labs:  Hematology:  Recent Labs     22  0518 22  0633   WBC 7.5 8.3 6.3   RBC 4.15 4.17 4.35   HGB 11.7* 11.7* 12.2   HCT 35.3* 35.4* 38.5   MCV 85.1 85.0 88.5   MCH 28.1 28.0 28.0   MCHC 33.1 32.9 31.7   RDW 17.1* 17.0* 16.6*    216 187   MPV 10.0 10.4 11.3   DDIMER 0.40  --   --      Chemistry:  Recent Labs 03/03/22 2001 03/03/22 2125 03/04/22 0518 03/05/22  0633     --  138 133*   K 4.4  --  4.0 4.8   CL 97*  --  98 94*   CO2 27  --  26 27   GLUCOSE 98  --  93 98   BUN 23  --  23 26*   CREATININE 1.03*  --  1.11* 1.43*   MG 1.9  --  1.9  --    ANIONGAP 11  --  14 12   LABGLOM 52*  --  48* 36*   GFRAA >60  --  58* 43*   CALCIUM 9.4  --  9.5 9.3   PROBNP 7,015*  --  9,530*  --    TROPHS 23* 23*  --   --      Recent Labs     03/03/22 2001 03/04/22 0518   PROT 6.3*  --    LABALBU 4.3  --    TSH  --  3.16   AST 12  --    ALT 9  --    ALKPHOS 77  --    BILITOT 0.75  --    LIPASE 24  --    CHOL  --  120   HDL  --  48   LDLCHOLESTEROL  --  57   CHOLHDLRATIO  --  2.5   TRIG  --  75     ABG:No results found for: POCPH, PHART, PH, POCPCO2, RKV2OZW, PCO2, POCPO2, PO2ART, PO2, POCHCO3, WZX0SIK, HCO3, NBEA, PBEA, BEART, BE, THGBART, THB, KZN7MPC, RYYC0AHN, I9FDOYQO, O2SAT, FIO2  Lab Results   Component Value Date/Time    SPECIAL 20 ML RIGHT [facility] [place] 03/10/2019 08:42 PM     Lab Results   Component Value Date/Time    CULTURE NO GROWTH 6 DAYS 03/10/2019 08:42 PM       Radiology:  XR CHEST (2 VW)    Result Date: 3/4/2022  Stable cardiomegaly. No evidence of pulmonary edema. XR CHEST PORTABLE    Result Date: 3/3/2022  Stable cardiomegaly. No acute cardiopulmonary findings. Physical Examination:     Physical Exam  Vitals and nursing note reviewed. Constitutional:       General: She is not in acute distress. HENT:      Head: Normocephalic and atraumatic. Eyes:      Conjunctiva/sclera: Conjunctivae normal.      Pupils: Pupils are equal, round, and reactive to light. Cardiovascular:      Rate and Rhythm: Normal rate. Rhythm regularly irregular. Heart sounds: No murmur heard. Pulmonary:      Effort: Pulmonary effort is normal. No accessory muscle usage or respiratory distress. Breath sounds: No stridor. Decreased breath sounds present. No wheezing, rhonchi or rales.    Abdominal:      General: Bowel sounds are normal. There is no distension. Palpations: Abdomen is soft. Abdomen is not rigid. Tenderness: There is no abdominal tenderness. There is no guarding. Musculoskeletal:         General: No tenderness. Skin:     General: Skin is warm and dry. Findings: No erythema, lesion or rash. Neurological:      Mental Status: She is alert and oriented to person, place, and time. Cranial Nerves: No cranial nerve deficit. Motor: No seizure activity. Psychiatric:         Speech: Speech normal.         Behavior: Behavior normal. Behavior is cooperative.          Assessment:     Hospital Problems           Last Modified POA    * (Principal) Acute combined systolic and diastolic CHF, NYHA class 2 ([address] 3/4/2022 Yes    Physical debility 3/4/2022 Yes    Chronic atrial fibrillation ([address] 3/4/2022 Yes    Obesity (BMI 30-39.9) 3/4/2022 Yes    Multiple sclerosis ([address] (Chronic) 3/4/2022 Yes    CAD (coronary artery disease) (Chronic) 3/4/2022 Yes    Chronic renal insufficiency, stage III (moderate) (HCC) (Chronic) 3/4/2022 Yes    Pulmonary hypertension ([address] 3/5/2022 Yes          Plan:     Acute decompensated combined systolic and diastolic heart failure:continue diuresis with  Lasix 20 mg BID IV , strict I's & O's, daily weight, cardiac diet and fluid restriction  Echocardiogram back with ef of 45% and severe pulm HTN of 80    Appreciate cardiology input        Chronic A. fib: Continue chronic anticoagulation, continue Toprol for rate control, patient reports allergy to metoprolol IV and calcium channel blockers, I loaded her with digoxin with  better rate control  Had cardiac arrest with Tikosyn   Will leave decision to continue Digoxin vs not to cardiology     Coronary artery disease status post stent placement: Continue Ranexa, Plavix and statin    ISABELLA / CKD 3: decreased diuretic dose, Continue to monitor creatinine daily      Multiple sclerosis: Continue chronic medications     HTN: continue home medications     HPL: continue home medications     Pyloric stenosis? /GERD: Continue PPI.     History of CVA: Continue antiplatelets and statin     Obesity is a complicating factor.     DVT prophylaxis    Dayna Nath MD  3/5/2022  9:49 AM

## 2022-03-05 NOTE — PROGRESS NOTES
Consult with pharmacy with digoxin levels. Okay to give for tonight but need lab if need to be continued.

## 2022-03-05 NOTE — PLAN OF CARE
Problem: Falls - Risk of:  Goal: Will remain free from falls  Description: Will remain free from falls  Outcome: Ongoing  Goal: Absence of physical injury  Description: Absence of physical injury  Outcome: Ongoing     Problem: Skin Integrity:  Goal: Will show no infection signs and symptoms  Description: Will show no infection signs and symptoms  Outcome: Ongoing  Goal: Absence of new skin breakdown  Description: Absence of new skin breakdown  Outcome: Ongoing     Problem: Pain:  Goal: Pain level will decrease  Description: Pain level will decrease  Outcome: Ongoing  Goal: Control of acute pain  Description: Control of acute pain  Outcome: Ongoing  Goal: Control of chronic pain  Description: Control of chronic pain  Outcome: Ongoing     Problem: Nutrition  Goal: Optimal nutrition therapy  Outcome: Ongoing     Problem: Musculor/Skeletal Functional Status  Goal: Highest potential functional level  Outcome: Ongoing

## 2022-03-05 NOTE — PROGRESS NOTES
Physical Therapy        Physical Therapy Cancel Note      DATE: 3/5/2022    NAME: Hugo Box  MRN: [id number]   : 1945      Patient not seen this date for Physical Therapy due to:    Patient Declined: Pt declines immediately upon writer's entrance into the room. Pt states she has been up most of the day and needs to sleep now. She states she isn't able to care for herself in her home- but declines participating with therapy today to assist in her recovery of her mobility. Pt states she knows she needs assisted living or a nursing facility but has had poor experiences in the past. Pt states she would like to speak with her doctors about her overall goals of care/prognosis with health concerns and then work with therapy on Monday. RN notified.        Electronically signed by Jacquelyn Ramsey PT on 3/5/2022 at 3:23 PM

## 2022-03-06 LAB
ABSOLUTE EOS #: 0 K/UL (ref 0–0.4)
ABSOLUTE LYMPH #: 0.6 K/UL (ref 1–4.8)
ABSOLUTE MONO #: 0.8 K/UL (ref 0.1–1.2)
ANION GAP SERPL CALCULATED.3IONS-SCNC: 11 MMOL/L (ref 9–17)
BASOPHILS # BLD: 0 % (ref 0–2)
BASOPHILS ABSOLUTE: 0 K/UL (ref 0–0.2)
BUN BLDV-MCNC: 33 MG/DL (ref 8–23)
CALCIUM SERPL-MCNC: 9 MG/DL (ref 8.6–10.4)
CHLORIDE BLD-SCNC: 93 MMOL/L (ref 98–107)
CO2: 29 MMOL/L (ref 20–31)
CREAT SERPL-MCNC: 1.56 MG/DL (ref 0.5–0.9)
EOSINOPHILS RELATIVE PERCENT: 0 % (ref 1–4)
GFR AFRICAN AMERICAN: 39 ML/MIN
GFR NON-AFRICAN AMERICAN: 32 ML/MIN
GFR SERPL CREATININE-BSD FRML MDRD: ABNORMAL ML/MIN/{1.73_M2}
GLUCOSE BLD-MCNC: 131 MG/DL (ref 70–99)
HCT VFR BLD CALC: 36.5 % (ref 36–46)
HEMOGLOBIN: 11.9 G/DL (ref 12–16)
LYMPHOCYTES # BLD: 6 % (ref 24–44)
MCH RBC QN AUTO: 27.9 PG (ref 26–34)
MCHC RBC AUTO-ENTMCNC: 32.7 G/DL (ref 31–37)
MCV RBC AUTO: 85.2 FL (ref 80–100)
MONOCYTES # BLD: 8 % (ref 2–11)
PDW BLD-RTO: 16.3 % (ref 12.5–15.4)
PLATELET # BLD: 216 K/UL (ref 140–450)
PMV BLD AUTO: 9.7 FL (ref 6–12)
POTASSIUM SERPL-SCNC: 4.3 MMOL/L (ref 3.7–5.3)
RBC # BLD: 4.28 M/UL (ref 4–5.2)
SEG NEUTROPHILS: 86 % (ref 36–66)
SEGMENTED NEUTROPHILS ABSOLUTE COUNT: 9.1 K/UL (ref 1.8–7.7)
SODIUM BLD-SCNC: 133 MMOL/L (ref 135–144)
WBC # BLD: 10.6 K/UL (ref 3.5–11)

## 2022-03-06 PROCEDURE — 6360000002 HC RX W HCPCS: Performed by: NURSE PRACTITIONER

## 2022-03-06 PROCEDURE — 80048 BASIC METABOLIC PNL TOTAL CA: CPT

## 2022-03-06 PROCEDURE — 99232 SBSQ HOSP IP/OBS MODERATE 35: CPT | Performed by: FAMILY MEDICINE

## 2022-03-06 PROCEDURE — 36415 COLL VENOUS BLD VENIPUNCTURE: CPT

## 2022-03-06 PROCEDURE — 85025 COMPLETE CBC W/AUTO DIFF WBC: CPT

## 2022-03-06 PROCEDURE — 6370000000 HC RX 637 (ALT 250 FOR IP): Performed by: NURSE PRACTITIONER

## 2022-03-06 PROCEDURE — 2580000003 HC RX 258: Performed by: NURSE PRACTITIONER

## 2022-03-06 PROCEDURE — 2060000000 HC ICU INTERMEDIATE R&B

## 2022-03-06 PROCEDURE — 6370000000 HC RX 637 (ALT 250 FOR IP): Performed by: FAMILY MEDICINE

## 2022-03-06 RX ADMIN — CLOPIDOGREL BISULFATE 75 MG: 75 TABLET ORAL at 09:23

## 2022-03-06 RX ADMIN — PREDNISONE 40 MG: 20 TABLET ORAL at 09:22

## 2022-03-06 RX ADMIN — POTASSIUM CHLORIDE 20 MEQ: 1500 TABLET, EXTENDED RELEASE ORAL at 09:23

## 2022-03-06 RX ADMIN — CLONIDINE HYDROCHLORIDE 0.1 MG: 0.1 TABLET ORAL at 22:36

## 2022-03-06 RX ADMIN — PAROXETINE HYDROCHLORIDE 20 MG: 20 TABLET, FILM COATED ORAL at 09:23

## 2022-03-06 RX ADMIN — PANTOPRAZOLE SODIUM 40 MG: 40 TABLET, DELAYED RELEASE ORAL at 09:22

## 2022-03-06 RX ADMIN — APIXABAN 5 MG: 5 TABLET, FILM COATED ORAL at 22:36

## 2022-03-06 RX ADMIN — APIXABAN 5 MG: 5 TABLET, FILM COATED ORAL at 09:23

## 2022-03-06 RX ADMIN — SODIUM CHLORIDE, PRESERVATIVE FREE 10 ML: 5 INJECTION INTRAVENOUS at 22:37

## 2022-03-06 RX ADMIN — ACETAMINOPHEN 1000 MG: 500 TABLET ORAL at 09:41

## 2022-03-06 RX ADMIN — SODIUM CHLORIDE, PRESERVATIVE FREE 10 ML: 5 INJECTION INTRAVENOUS at 09:24

## 2022-03-06 RX ADMIN — PANTOPRAZOLE SODIUM 40 MG: 40 TABLET, DELAYED RELEASE ORAL at 22:36

## 2022-03-06 RX ADMIN — RANOLAZINE 500 MG: 500 TABLET, EXTENDED RELEASE ORAL at 22:36

## 2022-03-06 RX ADMIN — LOSARTAN POTASSIUM 100 MG: 50 TABLET, FILM COATED ORAL at 09:23

## 2022-03-06 RX ADMIN — AZATHIOPRINE 50 MG: 50 TABLET ORAL at 09:22

## 2022-03-06 RX ADMIN — CLONIDINE HYDROCHLORIDE 0.1 MG: 0.1 TABLET ORAL at 09:22

## 2022-03-06 RX ADMIN — ALLOPURINOL 100 MG: 100 TABLET ORAL at 09:21

## 2022-03-06 RX ADMIN — DOXAZOSIN 1 MG: 2 TABLET ORAL at 09:21

## 2022-03-06 RX ADMIN — RANOLAZINE 500 MG: 500 TABLET, EXTENDED RELEASE ORAL at 09:23

## 2022-03-06 RX ADMIN — ACETAMINOPHEN 1000 MG: 500 TABLET ORAL at 00:12

## 2022-03-06 RX ADMIN — GABAPENTIN 100 MG: 100 CAPSULE ORAL at 22:36

## 2022-03-06 RX ADMIN — PRAVASTATIN SODIUM 40 MG: 20 TABLET ORAL at 22:36

## 2022-03-06 ASSESSMENT — ENCOUNTER SYMPTOMS
WHEEZING: 0
BLOOD IN STOOL: 0
VOMITING: 0
NAUSEA: 0
CHEST TIGHTNESS: 0
COUGH: 0
CONSTIPATION: 0
DIARRHEA: 0
ABDOMINAL PAIN: 0

## 2022-03-06 ASSESSMENT — PAIN DESCRIPTION - PAIN TYPE: TYPE: CHRONIC PAIN

## 2022-03-06 ASSESSMENT — PAIN SCALES - GENERAL
PAINLEVEL_OUTOF10: 8
PAINLEVEL_OUTOF10: 9
PAINLEVEL_OUTOF10: 6

## 2022-03-06 ASSESSMENT — PAIN DESCRIPTION - LOCATION: LOCATION: GENERALIZED

## 2022-03-06 NOTE — PLAN OF CARE
Problem: Falls - Risk of:  Goal: Will remain free from falls  Description: Will remain free from falls  Outcome: Ongoing     Problem: Skin Integrity:  Goal: Will show no infection signs and symptoms  Description: Will show no infection signs and symptoms  Outcome: Ongoing     Problem: Pain:  Goal: Pain level will decrease  Description: Pain level will decrease  Outcome: Ongoing     Problem: Nutrition  Goal: Optimal nutrition therapy  Outcome: Ongoing     Problem: Musculor/Skeletal Functional Status  Goal: Highest potential functional level  Outcome: Ongoing

## 2022-03-06 NOTE — PLAN OF CARE
Problem: Falls - Risk of:  Goal: Will remain free from falls  Description: Will remain free from falls  3/6/2022 0126 by Dennie Corns, RN  Outcome: Ongoing    Problem: Skin Integrity:  Goal: Absence of new skin breakdown  Description: Absence of new skin breakdown  3/6/2022 0126 by Dennie Corns, RN  Outcome: Ongoing     Problem: Pain:  Goal: Control of chronic pain  Description: Control of chronic pain  3/6/2022 0126 by Dennie Corns, RN  Outcome: Ongoing     Problem: Musculor/Skeletal Functional Status  Goal: Highest potential functional level  3/6/2022 0126 by Dennie Corns, RN  Outcome: Ongoing

## 2022-03-06 NOTE — PROGRESS NOTES
[facility]  Office: [address], DO, Susie Suehamida, DO, Kent Mohs, DO, Aditincryan Jatin Chowdhury, DO, Frankie Chandra MD, Simi Voss MD, Maria R Au MD, Miguel Collado MD, Lisseth Herman MD, Marga Greco MD, Yuko Fox MD, Lillian Powers, DO, Elijah Gaston, DO, Caleb Patel MD,  Fadi Shirley DO, Daron Rhoades MD, Snow Dickson MD, Justyna Mendoza MD, Karey Juan MD, Ronald Arreguin MD, Jade Ba, CNP, [facility] Center[facility]RISTIANO Castro, CNP, Conner Nath, CNP, Henri English, CNS, Cecilia Ledbetter, CNP, Tram Poon, CNP, Leatha Beckham, CNP, Olga Muir, CNP, Chase Argueta, CNP, Esau Power PA-C, Arnulfo Robison, DNP, Eladio Goldberg, [facility], Nikki Randle, CNP, Ashlie Vega, CNP, Keshav Reinoso, CNP, Gunjan Beltran, CNP, Marina Hawk, CNP, Cristine Hickey [id number]    Progress Note    3/6/2022    7:47 AM    Name:   Tej Iqbal  MRN:     [id number]     [place]:      [de-identified]   Room:   [address] [place] [address] Day:  3  Admit Date:  3/3/2022  7:06 PM    PCP:   Juvenal Jeff MD  Code Status:  Full Code    Subjective:     C/C:   Chief Complaint   Patient presents with    Chest Pain     onset last night- \"tight band around chest\"     Interval History Status: improved. Patient seen and examined at bedside, no acute events overnight. Had plenty of questions regarding her health conditions, feels she is too week to stay by herself anymore and not sure what is the next step\Her creatinine is going up, will need to hold diuresis. No plans for cardiac cath per cardiology  Patient vitals, labs and all providers notes were reviewed,from overnight shift and morning updates were noted and discussed with the nurse    Brief History:     Tej Iqbal is a 78 y.o.  Non- / non  female who presents with Chest Pain (onset last night- \"tight band around chest\")   and is admitted to the hospital for the management of Acute combined systolic and diastolic CHF, NYHA class 2 ([facility] [address] Patient with extensive past medical history including history of stroke, coronary artery disease status post stent placement, diastolic heart failure, multiple sclerosis, chronic A. fib on anticoagulation, pyloric stenosis and GERD, history of hypertension hyperlipidemia presented to ER with progressively worsening shortness of breath over the last 24 hours prior to presentation. Patient is able to feel her fluttering heart, dyspnea on exertion and could not breathe with any ambulation, she could not hardly do anything which prompted her to come to the ER where she was found to have acute decompensated heart failure, her A. fib was in RVR and she was admitted for further management. Patient continues to feel short of breath and uncomfortable when she is in bed afterwards was able to hold a good conversation with me and answered all my questions appropriately    Review of Systems:     Review of Systems   Constitutional: Positive for activity change. Negative for chills, diaphoresis and fever. HENT: Negative for congestion. Eyes: Negative for visual disturbance. Respiratory: Negative for cough, chest tightness and wheezing. Cardiovascular: Negative for chest pain, palpitations and leg swelling. Gastrointestinal: Negative for abdominal pain, blood in stool, constipation, diarrhea, nausea and vomiting. Genitourinary: Negative for difficulty urinating. Musculoskeletal: Positive for arthralgias and gait problem. Neurological: Negative for dizziness, light-headedness, numbness and headaches. All other systems reviewed and are negative. Medications: Allergies:     Allergies   Allergen Reactions    Dofetilide Other (See Comments)    Lisinopril-Hydrochlorothiazide Anaphylaxis and Hives    Sulfa Antibiotics Anaphylaxis    Penicillins Hives    Polyethylene Glycol Hives    Actifed Cold-Allergy [Chlorpheniramine-Phenylephrine]     Altace [Ramipril]      Chronic cough    Cephalosporins Hives    Coreg [Carvedilol]      bloating    Dml Facial Moisturizer     Elavil [Amitriptyline Hcl]     Entex [Ami-Jose]     Ethylene Glycol     Fentanyl     Hizentra [Immune Globulin (Human)]     Hydralazine     Levofloxacin      ach tendon    Lisinopril-Hydrochlorothiazide     Montelukast Sodium      Heart effects    Morphine Itching    Nifedipine     Norvasc [Amlodipine Besylate]      Stomach pain    Other      IV IG     Phenobarbital Hives    Propoxyphene     Robaxin [Methocarbamol]     Sinequan [Doxepin Hcl]     Sudafed [Pseudoephedrine Hcl]     Sudafed [Pseudoephedrine Hcl]     Tranquil-London     Wellbutrin [Bupropion Hcl]      Hypotension    Actifed Cold-Allergy [Chlorpheniramine-Phenylephrine] Palpitations    Clindamycin/Lincomycin Nausea And Vomiting    Codeine Nausea And Vomiting    Metoprolol Nausea And Vomiting     Reports she is allergic to the preservative polyethylineglycol in this medicine, causing severe emesis and stomach pain     Metoprolol Succinate Nausea And Vomiting    Seldane [Terfenadine] Palpitations       Current Meds:   Scheduled Meds:    predniSONE  40 mg Oral Daily    allopurinol  100 mg Oral Daily    apixaban  5 mg Oral BID    azaTHIOprine  50 mg Oral Daily    cloNIDine  0.1 mg Oral BID    clopidogrel  75 mg Oral Daily    doxazosin  1 mg Oral Daily    gabapentin  100 mg Oral TID    isosorbide mononitrate  60 mg Oral Daily    losartan  100 mg Oral Daily    pantoprazole  40 mg Oral BID    PARoxetine  20 mg Oral QAM    potassium chloride  20 mEq Oral Daily    pravastatin  40 mg Oral Nightly    ranolazine  500 mg Oral BID    sodium chloride flush  5-40 mL IntraVENous 2 times per day    carvedilol  3.125 mg Oral BID WC    metoprolol  2.5 mg IntraVENous Once     Continuous Infusions:    sodium chloride       PRN Meds: acetaminophen, sodium chloride flush, sodium chloride, ondansetron **OR** ondansetron, senna    Data:     Past Medical History:   has a past medical history of Abnormal Pap smear, Asthma, Atrial fibrillation ([facility] [address] Bloody discharge from nipple, CAD (coronary artery disease), Cataracts, bilateral, Colon polyp, CVA (cerebral vascular accident) ([facility] [address] Demyelinating disease ([facility] [address] Fibromyalgia, High-risk sexual behavior, History of bone density study, History of migraines, HTN (hypertension), IgG deficiency ([facility] [address] Multiple sclerosis ([facility] [address] [facility] Optic neuritis, Osteopenia, Pyloric stenosis, Raynaud's disease, and Vasomotor rhinitis. Social History:   reports that she has never smoked. She has never used smokeless tobacco. She reports that she does not drink alcohol and does not use drugs. Family History:   Family History   Problem Relation Age of Onset    Other Father         Heart problems    Heart Disease Father     Other Mother         heart problems requiring open heart surgery, HTN    Heart Disease Mother     Other Sister         HTN    Hypertension Sister     Other Maternal Aunt         breast cancer    Breast Cancer Maternal Aunt        Vitals:  /63   Pulse 88   Temp 97.5 °F (36.4 °C) (Oral)   Resp 18   Ht 5' 1\" (1.549 m)   Wt 197 lb 1.5 oz (89.4 kg)   SpO2 97%   BMI 37.24 kg/m²   Temp (24hrs), Av.9 °F (36.6 °C), Min:97.4 °F (36.3 °C), Max:98.3 °F (36.8 °C)    No results for input(s): POCGLU in the last 72 hours. I/O (24Hr):     Intake/Output Summary (Last 24 hours) at 3/6/2022 0747  Last data filed at 3/6/2022 5871  Gross per 24 hour   Intake 1255 ml   Output 920 ml   Net 335 ml       Labs:  Hematology:  Recent Labs     22  0518 22  0633 22  0549   WBC 7.5   < > 8.3 6.3 10.6   RBC 4.15   < > 4.17 4.35 4.28   HGB 11.7*   < > 11.7* 12.2 11.9*   HCT 35.3*   < > 35.4* 38.5 36.5   MCV 85.1   < > 85.0 88.5 85.2   MCH 28.1   < > 28.0 28.0 27.9   MCHC 33.1   < > 32.9 31.7 32.7   RDW 17.1*   < > 17.0* 16.6* 16.3*      < > 216 187 216   MPV 10.0   < > 10.4 11.3 9.7   DDIMER 0.40  --   --   --   --     < > = values in this interval not displayed. Chemistry:  Recent Labs     03/03/22 2001 03/03/22 2001 03/03/22  2125 03/04/22  0518 03/05/22  0633 03/06/22  0549      < >  --  138 133* 133*   K 4.4   < >  --  4.0 4.8 4.3   CL 97*   < >  --  98 94* 93*   CO2 27   < >  --  26 27 29   GLUCOSE 98   < >  --  93 98 131*   BUN 23   < >  --  23 26* 33*   CREATININE 1.03*   < >  --  1.11* 1.43* 1.56*   MG 1.9  --   --  1.9  --   --    ANIONGAP 11   < >  --  14 12 11   LABGLOM 52*   < >  --  48* 36* 32*   GFRAA >60   < >  --  58* 43* 39*   CALCIUM 9.4   < >  --  9.5 9.3 9.0   PROBNP 7,015*  --   --  9,530*  --   --    TROPHS 23*  --  23*  --   --   --     < > = values in this interval not displayed. Recent Labs     03/03/22 2001 03/04/22  0518   PROT 6.3*  --    LABALBU 4.3  --    TSH  --  3.16   AST 12  --    ALT 9  --    ALKPHOS 77  --    BILITOT 0.75  --    LIPASE 24  --    CHOL  --  120   HDL  --  48   LDLCHOLESTEROL  --  57   CHOLHDLRATIO  --  2.5   TRIG  --  75     ABG:No results found for: POCPH, PHART, PH, POCPCO2, DAT4VIX, PCO2, POCPO2, PO2ART, PO2, POCHCO3, MOC8BMZ, HCO3, NBEA, PBEA, BEART, BE, THGBART, THB, RGN7WTY, HDBM7PPX, L1NIMFEL, O2SAT, FIO2  Lab Results   Component Value Date/Time    SPECIAL 20 ML RIGHT [facility] 03/10/2019 08:42 PM     Lab Results   Component Value Date/Time    CULTURE NO GROWTH 6 DAYS 03/10/2019 08:42 PM       Radiology:  XR CHEST (2 VW)    Result Date: 3/4/2022  Stable cardiomegaly. No evidence of pulmonary edema. XR CHEST PORTABLE    Result Date: 3/3/2022  Stable cardiomegaly. No acute cardiopulmonary findings. Physical Examination:     Physical Exam  Vitals and nursing note reviewed. Constitutional:       General: She is not in acute distress. HENT:      Head: Normocephalic and atraumatic.    Eyes:      Conjunctiva/sclera: Conjunctivae normal. Pupils: Pupils are equal, round, and reactive to light. Cardiovascular:      Rate and Rhythm: Normal rate. Rhythm regularly irregular. Heart sounds: No murmur heard. Pulmonary:      Effort: Pulmonary effort is normal. No accessory muscle usage or respiratory distress. Breath sounds: No stridor. Decreased breath sounds present. No wheezing, rhonchi or rales. Abdominal:      General: Bowel sounds are normal. There is no distension. Palpations: Abdomen is soft. Abdomen is not rigid. Tenderness: There is no abdominal tenderness. There is no guarding. Musculoskeletal:         General: No tenderness. Skin:     General: Skin is warm and dry. Findings: No erythema, lesion or rash. Neurological:      Mental Status: She is alert and oriented to person, place, and time. Cranial Nerves: No cranial nerve deficit. Motor: No seizure activity. Psychiatric:         Speech: Speech normal.         Behavior: Behavior normal. Behavior is cooperative.          Assessment:     Hospital Problems           Last Modified POA    * (Principal) Acute combined systolic and diastolic CHF, NYHA class 2 ([address] 3/4/2022 Yes    Physical debility 3/4/2022 Yes    Chronic atrial fibrillation ([address] 3/4/2022 Yes    Obesity (BMI 30-39.9) 3/4/2022 Yes    Multiple sclerosis ([address] (Chronic) 3/4/2022 Yes    CAD (coronary artery disease) (Chronic) 3/4/2022 Yes    Chronic renal insufficiency, stage III (moderate) (HCC) (Chronic) 3/4/2022 Yes    Pulmonary hypertension ([address] 3/5/2022 Yes          Plan:     Acute decompensated combined systolic and diastolic heart failure:continue diuresis with  Lasix 20 mg BID IV , strict I's & O's, daily weight, cardiac diet and fluid restriction  Echocardiogram back with ef of 45% and severe pulm HTN of 80   No plans for cardiac cath per cardiology        Chronic A. fib: Continue chronic anticoagulation, continue Toprol for rate control, patient reports allergy to metoprolol IV and calcium channel blockers, I loaded her with digoxin with  better rate control  Had cardiac arrest with Tikosyn   Will leave decision to continue Digoxin vs not to cardiology     Coronary artery disease status post stent placement: Continue Ranexa, Plavix and statin    ISABELLA / CKD 3: will hold on diuresis now and reassess in am    Continue to monitor creatinine daily      Multiple sclerosis: Continue chronic medications     HTN: continue home medications     HPL: continue home medications     Pyloric stenosis? /GERD: Continue PPI.     History of CVA: Continue antiplatelets and statin     Obesity is a complicating factor.     DVT prophylaxis     Will consult palliative care to discuss goals of care with the paitnet and provide more options and choices for her    [address]  3/6/2022  7:47 AM

## 2022-03-07 LAB
ABSOLUTE EOS #: 0 K/UL (ref 0–0.4)
ABSOLUTE LYMPH #: 0.7 K/UL (ref 1–4.8)
ABSOLUTE MONO #: 1 K/UL (ref 0.1–1.2)
ANION GAP SERPL CALCULATED.3IONS-SCNC: 11 MMOL/L (ref 9–17)
BASOPHILS # BLD: 0 % (ref 0–2)
BASOPHILS ABSOLUTE: 0 K/UL (ref 0–0.2)
BUN BLDV-MCNC: 35 MG/DL (ref 8–23)
CALCIUM SERPL-MCNC: 9.1 MG/DL (ref 8.6–10.4)
CHLORIDE BLD-SCNC: 95 MMOL/L (ref 98–107)
CO2: 28 MMOL/L (ref 20–31)
CREAT SERPL-MCNC: 1.32 MG/DL (ref 0.5–0.9)
EOSINOPHILS RELATIVE PERCENT: 0 % (ref 1–4)
GFR AFRICAN AMERICAN: 48 ML/MIN
GFR NON-AFRICAN AMERICAN: 39 ML/MIN
GFR SERPL CREATININE-BSD FRML MDRD: ABNORMAL ML/MIN/{1.73_M2}
GLUCOSE BLD-MCNC: 117 MG/DL (ref 70–99)
HCT VFR BLD CALC: 35.4 % (ref 36–46)
HEMOGLOBIN: 11.6 G/DL (ref 12–16)
LYMPHOCYTES # BLD: 6 % (ref 24–44)
MCH RBC QN AUTO: 28.1 PG (ref 26–34)
MCHC RBC AUTO-ENTMCNC: 32.8 G/DL (ref 31–37)
MCV RBC AUTO: 85.8 FL (ref 80–100)
MONOCYTES # BLD: 8 % (ref 2–11)
PDW BLD-RTO: 16.7 % (ref 12.5–15.4)
PLATELET # BLD: 233 K/UL (ref 140–450)
PMV BLD AUTO: 9.9 FL (ref 6–12)
POTASSIUM SERPL-SCNC: 4.3 MMOL/L (ref 3.7–5.3)
RBC # BLD: 4.13 M/UL (ref 4–5.2)
SEG NEUTROPHILS: 86 % (ref 36–66)
SEGMENTED NEUTROPHILS ABSOLUTE COUNT: 10.1 K/UL (ref 1.8–7.7)
SODIUM BLD-SCNC: 134 MMOL/L (ref 135–144)
WBC # BLD: 11.8 K/UL (ref 3.5–11)

## 2022-03-07 PROCEDURE — 97116 GAIT TRAINING THERAPY: CPT

## 2022-03-07 PROCEDURE — 80048 BASIC METABOLIC PNL TOTAL CA: CPT

## 2022-03-07 PROCEDURE — 2060000000 HC ICU INTERMEDIATE R&B

## 2022-03-07 PROCEDURE — 36415 COLL VENOUS BLD VENIPUNCTURE: CPT

## 2022-03-07 PROCEDURE — 6370000000 HC RX 637 (ALT 250 FOR IP): Performed by: NURSE PRACTITIONER

## 2022-03-07 PROCEDURE — 6360000002 HC RX W HCPCS: Performed by: NURSE PRACTITIONER

## 2022-03-07 PROCEDURE — 6370000000 HC RX 637 (ALT 250 FOR IP): Performed by: FAMILY MEDICINE

## 2022-03-07 PROCEDURE — 97530 THERAPEUTIC ACTIVITIES: CPT

## 2022-03-07 PROCEDURE — 85025 COMPLETE CBC W/AUTO DIFF WBC: CPT

## 2022-03-07 PROCEDURE — 2580000003 HC RX 258: Performed by: NURSE PRACTITIONER

## 2022-03-07 PROCEDURE — 99232 SBSQ HOSP IP/OBS MODERATE 35: CPT | Performed by: FAMILY MEDICINE

## 2022-03-07 RX ORDER — BUMETANIDE 1 MG/1
1 TABLET ORAL DAILY
Qty: 30 TABLET | Refills: 1 | Status: SHIPPED | OUTPATIENT
Start: 2022-03-07 | End: 2022-03-09

## 2022-03-07 RX ORDER — PREDNISONE 20 MG/1
40 TABLET ORAL DAILY
Qty: 4 TABLET | Refills: 0 | Status: SHIPPED | OUTPATIENT
Start: 2022-03-08 | End: 2022-03-09 | Stop reason: HOSPADM

## 2022-03-07 RX ADMIN — ALLOPURINOL 100 MG: 100 TABLET ORAL at 09:32

## 2022-03-07 RX ADMIN — SODIUM CHLORIDE, PRESERVATIVE FREE 10 ML: 5 INJECTION INTRAVENOUS at 09:31

## 2022-03-07 RX ADMIN — APIXABAN 5 MG: 5 TABLET, FILM COATED ORAL at 09:32

## 2022-03-07 RX ADMIN — PANTOPRAZOLE SODIUM 40 MG: 40 TABLET, DELAYED RELEASE ORAL at 21:36

## 2022-03-07 RX ADMIN — DOXAZOSIN 1 MG: 2 TABLET ORAL at 09:32

## 2022-03-07 RX ADMIN — ACETAMINOPHEN 1000 MG: 500 TABLET ORAL at 09:55

## 2022-03-07 RX ADMIN — PRAVASTATIN SODIUM 40 MG: 20 TABLET ORAL at 21:36

## 2022-03-07 RX ADMIN — CLOPIDOGREL BISULFATE 75 MG: 75 TABLET ORAL at 09:33

## 2022-03-07 RX ADMIN — POTASSIUM CHLORIDE 20 MEQ: 1500 TABLET, EXTENDED RELEASE ORAL at 09:33

## 2022-03-07 RX ADMIN — SODIUM CHLORIDE, PRESERVATIVE FREE 5 ML: 5 INJECTION INTRAVENOUS at 21:35

## 2022-03-07 RX ADMIN — PREDNISONE 40 MG: 20 TABLET ORAL at 09:33

## 2022-03-07 RX ADMIN — RANOLAZINE 500 MG: 500 TABLET, EXTENDED RELEASE ORAL at 21:35

## 2022-03-07 RX ADMIN — APIXABAN 5 MG: 5 TABLET, FILM COATED ORAL at 21:35

## 2022-03-07 RX ADMIN — RANOLAZINE 500 MG: 500 TABLET, EXTENDED RELEASE ORAL at 09:31

## 2022-03-07 RX ADMIN — CLONIDINE HYDROCHLORIDE 0.1 MG: 0.1 TABLET ORAL at 09:32

## 2022-03-07 RX ADMIN — PAROXETINE HYDROCHLORIDE 20 MG: 20 TABLET, FILM COATED ORAL at 09:32

## 2022-03-07 RX ADMIN — ACETAMINOPHEN 1000 MG: 500 TABLET ORAL at 21:35

## 2022-03-07 RX ADMIN — PANTOPRAZOLE SODIUM 40 MG: 40 TABLET, DELAYED RELEASE ORAL at 09:33

## 2022-03-07 RX ADMIN — AZATHIOPRINE 50 MG: 50 TABLET ORAL at 09:33

## 2022-03-07 RX ADMIN — CLONIDINE HYDROCHLORIDE 0.1 MG: 0.1 TABLET ORAL at 21:36

## 2022-03-07 RX ADMIN — LOSARTAN POTASSIUM 100 MG: 50 TABLET, FILM COATED ORAL at 09:32

## 2022-03-07 ASSESSMENT — PAIN DESCRIPTION - LOCATION
LOCATION: GENERALIZED
LOCATION: GENERALIZED

## 2022-03-07 ASSESSMENT — ENCOUNTER SYMPTOMS
COUGH: 0
WHEEZING: 0
VOMITING: 0
DIARRHEA: 0
BLOOD IN STOOL: 0
NAUSEA: 0
ABDOMINAL PAIN: 0
CONSTIPATION: 0
CHEST TIGHTNESS: 0

## 2022-03-07 ASSESSMENT — PAIN SCALES - GENERAL
PAINLEVEL_OUTOF10: 8
PAINLEVEL_OUTOF10: 7
PAINLEVEL_OUTOF10: 8
PAINLEVEL_OUTOF10: 7

## 2022-03-07 ASSESSMENT — PAIN DESCRIPTION - PAIN TYPE
TYPE: CHRONIC PAIN
TYPE: CHRONIC PAIN

## 2022-03-07 NOTE — CONSULTS
decisions: yes  Health Care Power of : no  Living Will: no     Personal, Social, and Family History  Marital Status:   Living situation:alone  Melina/Spiritual History: Taoist  Psychological Distress: not apparent  Does patient understand diagnosis/treatment? yes  Does family/caregiver understand diagnosis/treatment? Not sure     Assessment        Palliative Performance Scale:    ___100% Full ambulation; normal activity and work; no evidence of disease; able to do own self care; normal intake; fully conscious  ___90% Full ambulation; normal activity and work; some evidence of disease; able to do own self care; normal intake; fully conscious  ___80% Full ambulation; normal activity with effort; some evidence of disease; able to do own self care; normal or reduced intake; fully conscious  ___70% Ambulation reduced; unable to perform normal job/work; significant disease; able to do own self care; normal or reduced intake; fully conscious  _X__60%  Ambulation reduced; cannot do hobbies/housework; significant disease; occasional assist; intake normal or reduced; fully conscious/some confusion  ___50%  Mainly sit/lie; can't do any work; extensive disease; considerable assist; intake normal or reduced; fully conscious/some confusion  ___40%  Mainly in bed; extensive disease; mainly assist; intake normal or reduced; fully conscious/ some confusion   ___30%  Bed bound; extensive disease; total care; intake reduced; fully conscious/some confusion  ___20%  Bed bound; extensive disease; total care; intake minimal; drowsy/coma  ___10%  Bed bound; extensive disease; total care; mouth care only; drowsy/coma  ___0       Death       Risk Assessments:    [place] Risk Score: [unfilled]    Readmission Risk Score: 14.2 ( )        1 Year Mortality Risk Score: @1YEARMORTALITYRISK@     Plan        Palliative Interaction:Patient is in bed and she is alert and oriented. I introduce my palliative care support role to her.    The patient and I talk at length about her personal life and her long medical history. The patient states\" I have been sick my entire life. \" We talk about her heart disease and that it is progressive and that in time it will get worse, and she states\" I know. \" The patient is worried about going back to her apartment as she states\" I cannot take care of everything on my own. She states that she has a daughter Emily Hunter, and she is an only child, but they are not close and she works full time. She is close to her grand kids. The patient has a sister that lived out of town, a sister in the area that she does not have a relationship with, and a younger brother out of town. We talk about home care and as well I explain Palliative care that can visit her at home and help her with her symptoms from her heart disease. I ask the patient if she has a HCPOA, and she states\" no. \" I tell her that if she has nothing in writing that by [place] her daughter Emily Hunter would be her voice if she could not make her own decisions. She states\" I want to make my own decisions, and I tell her that she can, and this HCPOA is when she cannot be her own voice, then someone whom she knows will honor her wishes will. She states \" my daughter knows that I would not want to live on a machine. \" She states\" I am though close to my nephew Avril Velez and his wife, and they live near [facility]. \" I tell her that all she needs to do is to get 's permission and that they have a conversation and that he understands her wishes and will honor them. \"     I as well explain her full code status classification which includes CPR, shocking and the ventilator, and she states\" I would not want to be a a ventilator. \" I then explain the [facility] and the details of that code status classification. I explain that is a doctor's order and that she needs to make a decision about her code status. She is in understanding.      Marcos Alarcon the  then comes in, and I update her and I ask the patient to call [facility] as Osmar [place] can help her complete her HCPOA/Living Will. I offer her much emotional support and she is very grateful for the information and the support. I call Holly Sommer with an update and the patient will have [address] at home, and Johana Mariscal will add palliative care to the referral.     I leave her my contact information. Will follow for goals of care and support. Education/support to patient  Discharge planning/helping to coordinate care  Communications with primary service  Providing support for coping/adaptation/distress of patient  Discussing meaning/purpose   Continue with current plan of care  Code status clarified: Full Code  Palliative care orders introduced  Validating patient/family distress  Continued communication updates  Patient is alert and oriented and we discuss at length her personal and medical history. The  is here and will assist in the completion of her HCPOA/lLiving Will. She will DC with [facility] care and palliative care. Principle Problem/Diagnosis:  Chest pain, unspecified type [R07.9]  Acute on chronic congestive heart failure, unspecified heart failure type ([address] [I50.9]  Decompensated heart failure ([address] [I50.9]    Goals of care evaluation:  The patient goals of care are live longer   Goals of care discussed with:    [x] Patient independently    [] Patient and Family    [] Family or Healthcare DPOA independently    [] Unable to discuss with patient, family/DPOA not present    Code Status  Full Code    Other recommendations:  Please call with any palliative questions or concerns. Palliative Care Team is available via perfect serve or via phone - [phone]. Palliative Care will continue to follow Ms. Frederick's care as needed. Thank you for allowing Palliative Care to participate in the care of Ms. Frederick .     Electronically signed by   Ruma Mckeon RN  Palliative Care Team  on 3/7/2022 at 3:39 PM    Palliative [facility] office: [phone]

## 2022-03-07 NOTE — PROGRESS NOTES
[facility]   Progress Note                   Date:   3/7/2022  Patient name: Mariano Bojorquez  Date of admission:  3/3/2022  7:06 PM  MRN:   [id number]  YOB: 1945  PCP: Leonila Souza MD    Reason for Admission: Chest pain, unspecified type [R07.9]  Acute on chronic congestive heart failure, unspecified heart failure type ([facility] [I50.9]  Decompensated heart failure ([facility] [address] [I50.9]    Subjective:       Clinical Changes / Abnormalities: Pt seen and examined in the room. Pt denies any CP or SOB. Afib rate controlled. -2.9 L since admission   Medications:   Scheduled Meds:   predniSONE  40 mg Oral Daily    allopurinol  100 mg Oral Daily    apixaban  5 mg Oral BID    azaTHIOprine  50 mg Oral Daily    cloNIDine  0.1 mg Oral BID    clopidogrel  75 mg Oral Daily    doxazosin  1 mg Oral Daily    gabapentin  100 mg Oral TID    isosorbide mononitrate  60 mg Oral Daily    losartan  100 mg Oral Daily    pantoprazole  40 mg Oral BID    PARoxetine  20 mg Oral QAM    potassium chloride  20 mEq Oral Daily    pravastatin  40 mg Oral Nightly    ranolazine  500 mg Oral BID    sodium chloride flush  5-40 mL IntraVENous 2 times per day    carvedilol  3.125 mg Oral BID WC    metoprolol  2.5 mg IntraVENous Once     Continuous Infusions:   sodium chloride       CBC:   Recent Labs     03/05/22  0633 03/06/22  0549 03/07/22  0516   WBC 6.3 10.6 11.8*   HGB 12.2 11.9* 11.6*    216 233     BMP:    Recent Labs     03/05/22  0633 03/06/22  0549 03/07/22  0516   * 133* 134*   K 4.8 4.3 4.3   CL 94* 93* 95*   CO2 27 29 28   BUN 26* 33* 35*   CREATININE 1.43* 1.56* 1.32*   GLUCOSE 98 131* 117*     Hepatic: No results for input(s): AST, ALT, ALB, BILITOT, ALKPHOS in the last 72 hours. Troponin: No results for input(s): TROPHS in the last 72 hours. BNP: No results for input(s): BNP in the last 72 hours. Lipids: No results for input(s): CHOL, HDL in the last 72 hours.     Invalid input(s): LDLCALCU  INR: No results for input(s): INR in the last 72 hours. DATA:    Diagnostics:    EKG: normal EKG, normal sinus rhythm, atrial fibrillation, rate 101, unchanged from previous tracings.     [facility] 3/4/22     Summary  Left ventricle is normal in size and moderate left ventricular hypertrophy. Global left ventricular systolic function is mildly reduced, estimated  ejection fraction is 45%. Evidence of diastolic dysfunction. Bi-atrial enlargement. Bubble study attempted. Unable to rule out intra-atrial shunt, due to  technical limitations of study. Right ventricular dilatation with reduced systolic function. Trivial aortic insufficiency. Trivial to mild mitral regurgitation. Mild to moderate tricuspid regurgitation. Severe pulmonary hypertension with an estimated right ventricular systolic  pressure of 83.2 mmHg. Mild pulmonic insufficiency.     Echocardiogram 4/30/2019  Left ventricle is normal in size with normal systolic function. Calculated ejection fraction is 65%. Increased left ventricular wall thickness. Evidence of advance diastolic dysfunction. Left atrium is moderately dilated. Right atrial enlargement. Increase in right ventricular free wall thickness. Mildly dilated left ventricle with mildly reduced function. Aortic leaflet calcification without stenosis. Mild thickening of the mitral leaflets without stenosis. Mild mitral regurgitation. Trace tricuspid regurgitation. Estimated right ventricular systolic pressure  is 34 mmH        Coronary Angiography 7/31/20:   LMCA: Mild irregularities 10-20%. LAD: Patent mid stent, distal 40% stenosis. Apical vessel had 80% stenosis, given symptoms and apical ischemia on stress   test, balloon angioplasty was performed with reduction of stenosis to 20%. LCx: Mild irregularities 10-20%. Large and dominant with mild disease   OM1 and OM2 are large, patent with mild disease   RCA: Small and non-dominant, patent, mild disease     CARDIOVERSION:  After an adequate level of sedation was achieved, 150J in biphasic synchronized delivery was administered. conversion to normal sinus rhythm. The patient awoke without complications.      The patient will continue with the discharge m. There were no complications      Medical Hx     1. Hypertension   2. Coronary  artery disease status post PCI to LAD   3. Multiple sclerosis   4. Gastroparesis  5. Echocardiogram 4/30/2019: Left ventricle is normal in size with normal systolic function. Calculated ejection fraction is 65%. Evidence of advance diastolic dysfunction. Left atrium is moderately dilated. Right atrial enlargement. Increase in right ventricular free wall thickness. Mildly dilated left ventricle with mildly reduced function. Aortic leaflet calcification without stenosis. Mild thickening of the mitral leaflets without stenosis. Mild mitral regurgitation. 6. Nuclear stress test on July 28, 2020 showed reversible perfusion defect in anteroapical and lateral apical wall  7. PAF and atrial flutter, with admission to [facility] in July 2020 with new onset atrial flutter status post JOVITA cardioversion on July 31, 2020; amiodarone restarted; pt is on Eliquis.   8. Coronary angiography on July 31, 2020 showed patent LAD stent and high-grade stenosis in the apical LAD status post POBA/ PTCA with balloon angioplasty only.  Other vessels had mild non-obstructive disease. 9. Repeat cardioversion Aug 2020, with subsequent recurrent atrial flutter 9/28/20   10. Referred to [facility] to consider AFib ablation, had cardiac arrest after 2nd dose of Tikosyn, currently managed with rate control strategy. was deemed not a suitable candidate for ablation due to comorbidities.    Objective:   Vitals: BP (!) 144/67   Pulse 65   Temp 97.7 °F (36.5 °C) (Temporal)   Resp 17   Ht 5' 1\" (1.549 m)   Wt 199 lb 4.7 oz (90.4 kg)   SpO2 96%   BMI 37.66 kg/m²   General appearance: alert and cooperative with exam  HEENT: Head: Normocephalic, no lesions, without obvious abnormality. Neck: no JVD, trachea midline, no adenopathy  Lungs: Clear to auscultation  Heart: irregular rate and rhythm, s1/s2 auscultated, no murmurs, afib   Abdomen: soft, non-tender, bowel sounds active  Extremities: no edema  Neurologic: not done        Assessment / Acute Cardiac Problems:   1. Afib with RVR   2. Acute on chronic diastolic CHF   3. CAD     Patient Active Problem List:     Bloody discharge from nipple     Osteopenia     Multiple sclerosis (HCC)     Essential hypertension     Asthma     SOB (shortness of breath)     Lower leg pain     Noncompliance with medications     Dyslipidemia     GERD (gastroesophageal reflux disease)     Fibromyalgia     Pyloric stenosis     Demyelinating disease (HCC)     H/O: CVA (cerebrovascular accident)     Epigastric abdominal pain     Incontinence in female     Chest pain with high risk for cardiac etiology     Migraine without status migrainosus, not intractable     Hypokalemia     Chronic diastolic heart failure (HCC)     Acute diastolic HF (heart failure) (HCC)     Acute on chronic combined systolic and diastolic CHF (congestive heart failure) (HCC)     CAD (coronary artery disease)     Atrial flutter (HCC)     Chronic bronchiolitis (HCC)     Paroxysmal A-fib (HCC)     HTN (hypertension)     Chronic renal insufficiency, stage III (moderate) (HCC)     Mild cognitive disorder     Raynaud's disease     Acute combined systolic and diastolic CHF, NYHA class 2 (HCC)     Frequent falls     Physical debility     Chronic atrial fibrillation (HCC)     Obesity (BMI 30-39. 9)     Decompensated heart failure ([address]     Pulmonary hypertension ([address]      Plan of Treatment:   1. Afib with RVR. Stable. Rate 70's. Continue BB and Eliquis   2. Acute on chronic CHF. Stable. Continue BB, arb   3. CAD. Stable. Continue BB, plavic, imdur, ARB, ranexa. 4. No further workup from cards standpoint. Will sign off. Will follow up as oupt.       Electronically signed by NENA Dunn CNP on 3/7/2022 at 9:20 [facility].  [phone]

## 2022-03-07 NOTE — PLAN OF CARE
Problem: Falls - Risk of:  Goal: Will remain free from falls  Description: Will remain free from falls  3/7/2022 1747 by Jose Maria Mitchell RN  Outcome: Ongoing     Problem: Skin Integrity:  Goal: Will show no infection signs and symptoms  Description: Will show no infection signs and symptoms  3/7/2022 1747 by Jose Maria Mitchell RN  Outcome: Ongoing     Problem: Pain:  Goal: Pain level will decrease  Description: Pain level will decrease  3/7/2022 1747 by Jose Maria Mitchell RN  Outcome: Ongoing     Problem: Pain:  Goal: Control of chronic pain  Description: Control of chronic pain  3/7/2022 1747 by Jose Maria Mitchell RN  Outcome: Ongoing     Problem: Nutrition  Goal: Optimal nutrition therapy  3/7/2022 1747 by Jose Maria Mitchell RN  Outcome: Ongoing     Problem: Musculor/Skeletal Functional Status  Goal: Highest potential functional level  3/7/2022 1747 by Jose Maria Mitchell RN  Outcome: Ongoing

## 2022-03-07 NOTE — PLAN OF CARE
Problem: Falls - Risk of:  Goal: Will remain free from falls  Description: Will remain free from falls  3/7/2022 0526 by Shayla Ramirez RN  Outcome: Ongoing     Problem: Skin Integrity:  Goal: Will show no infection signs and symptoms  Description: Will show no infection signs and symptoms  3/7/2022 0526 by Shayla Ramirez RN  Outcome: Ongoing     Problem: Skin Integrity:  Goal: Absence of new skin breakdown  Description: Absence of new skin breakdown  Outcome: Ongoing     Problem: Pain:  Goal: Pain level will decrease  Description: Pain level will decrease  3/7/2022 0526 by Shayla Ramirez RN  Outcome: Ongoing     Problem: Pain:  Goal: Control of acute pain  Description: Control of acute pain  Outcome: Ongoing     Problem: Pain:  Goal: Control of chronic pain  Description: Control of chronic pain  Outcome: Ongoing     Problem: Nutrition  Goal: Optimal nutrition therapy  3/7/2022 0526 by Shayla Ramirez RN  Outcome: Ongoing     Problem: Musculor/Skeletal Functional Status  Goal: Highest potential functional level  3/7/2022 0526 by Shayla Ramirez RN  Outcome: Ongoing

## 2022-03-07 NOTE — PROGRESS NOTES
[facility]  Office: [address], DO, María Cedeno, DO, Craig Donaldson, DO, Tapan Ramirez Blood, DO, Serene Meza MD, Marlene Alvarez MD, Dejah Hoffmann MD, Keely Mccallum MD, Marina Lockett MD, Jabari Yao MD, Luis Loya MD, Randal Hernández, DO, Monster Espinoza, DO, Bruce Levy MD,  Zeinab Greene DO, Mark Rae MD, Dorian Fortune MD, Nola Davila MD, Yaron Roman MD, Dhaval Vizcaino MD, Emerson Anderson, [facility], [facility] Denzel, CNP, Redd Barragan, CNP, Refugio Mayer, CNS, Raegan Styles, CNP, Pamela Galvan, CNP, Suleiman Oxly, CNP, Faviola Crawford, CNP, Corbin Raphael, CNP, Kait Brewer PA-C, Rodolfo Mistry, DNP, Jessie Whittaker, [facility], Gertrude Beltran, CNP, Ayanna Goel, CNP, Karma Cornell, CNP, Beata Kirk, CNP, Claudette David, CNP, Cristine Mojica [id number]    Progress Note    3/7/2022    3:07 PM    Name:   La Hernandes  MRN:     [id number]     [place]:      [de-identified]   Room:   [address] [place] [address] Day:  4  Admit Date:  3/3/2022  7:06 PM    PCP:   Nanda Garcia MD  Code Status:  Full Code    Subjective:     C/C:   Chief Complaint   Patient presents with    Chest Pain     onset last night- \"tight band around chest\"     Interval History Status: improved. Patient seen and examined at bedside, no acute events overnight. HR continue to be stable  Creatinine is improving  Palliative care to see today  Patient vitals, labs and all providers notes were reviewed,from overnight shift and morning updates were noted and discussed with the nurse    Brief History:     La Hernandes is a 78 y.o. Non- / non  female who presents with Chest Pain (onset last night- \"tight band around chest\")   and is admitted to the hospital for the management of Acute combined systolic and diastolic CHF, NYHA class 2 ([address]   Patient with extensive past medical history including history of stroke, coronary artery disease status post stent placement, diastolic heart failure, multiple sclerosis, chronic A. fib on anticoagulation, pyloric stenosis and GERD, history of hypertension hyperlipidemia presented to ER with progressively worsening shortness of breath over the last 24 hours prior to presentation. Patient is able to feel her fluttering heart, dyspnea on exertion and could not breathe with any ambulation, she could not hardly do anything which prompted her to come to the ER where she was found to have acute decompensated heart failure, her A. fib was in RVR and she was admitted for further management. Patient continues to feel short of breath and uncomfortable when she is in bed afterwards was able to hold a good conversation with me and answered all my questions appropriately    Review of Systems:     Review of Systems   Constitutional: Positive for activity change. Negative for chills, diaphoresis and fever. HENT: Negative for congestion. Eyes: Negative for visual disturbance. Respiratory: Negative for cough, chest tightness and wheezing. Cardiovascular: Negative for chest pain, palpitations and leg swelling. Gastrointestinal: Negative for abdominal pain, blood in stool, constipation, diarrhea, nausea and vomiting. Genitourinary: Negative for difficulty urinating. Musculoskeletal: Positive for arthralgias and gait problem. Neurological: Negative for dizziness, light-headedness, numbness and headaches. All other systems reviewed and are negative. Medications: Allergies:     Allergies   Allergen Reactions    Dofetilide Other (See Comments)    Lisinopril-Hydrochlorothiazide Anaphylaxis and Hives    Sulfa Antibiotics Anaphylaxis    Penicillins Hives    Polyethylene Glycol Hives    Actifed Cold-Allergy [Chlorpheniramine-Phenylephrine]     Altace [Ramipril]      Chronic cough    Cephalosporins Hives    Coreg [Carvedilol]      bloating    Dml Facial Moisturizer     Elavil [Amitriptyline Hcl]     Entex [Ami-Jose]  Ethylene Glycol     Fentanyl     Hizentra [Immune Globulin (Human)]     Hydralazine     Levofloxacin      ach tendon    Lisinopril-Hydrochlorothiazide     Montelukast Sodium      Heart effects    Morphine Itching    Nifedipine     Norvasc [Amlodipine Besylate]      Stomach pain    Other      IV IG     Phenobarbital Hives    Propoxyphene     Robaxin [Methocarbamol]     Sinequan [Doxepin Hcl]     Sudafed [Pseudoephedrine Hcl]     Sudafed [Pseudoephedrine Hcl]     Tranquil-[place]     Wellbutrin [Bupropion Hcl]      Hypotension    Actifed Cold-Allergy [Chlorpheniramine-Phenylephrine] Palpitations    Clindamycin/Lincomycin Nausea And Vomiting    Codeine Nausea And Vomiting    Metoprolol Nausea And Vomiting     Reports she is allergic to the preservative polyethylineglycol in this medicine, causing severe emesis and stomach pain     Metoprolol Succinate Nausea And Vomiting    Seldane [Terfenadine] Palpitations       Current Meds:   Scheduled Meds:    predniSONE  40 mg Oral Daily    allopurinol  100 mg Oral Daily    apixaban  5 mg Oral BID    azaTHIOprine  50 mg Oral Daily    cloNIDine  0.1 mg Oral BID    clopidogrel  75 mg Oral Daily    doxazosin  1 mg Oral Daily    gabapentin  100 mg Oral TID    isosorbide mononitrate  60 mg Oral Daily    losartan  100 mg Oral Daily    pantoprazole  40 mg Oral BID    PARoxetine  20 mg Oral QAM    potassium chloride  20 mEq Oral Daily    pravastatin  40 mg Oral Nightly    ranolazine  500 mg Oral BID    sodium chloride flush  5-40 mL IntraVENous 2 times per day    carvedilol  3.125 mg Oral BID WC    metoprolol  2.5 mg IntraVENous Once     Continuous Infusions:    sodium chloride       PRN Meds: acetaminophen, sodium chloride flush, sodium chloride, ondansetron **OR** ondansetron, senna    Data:     Past Medical History:   has a past medical history of Abnormal Pap smear, Asthma, Atrial fibrillation ([facility] [address] Bloody discharge from nipple, CAD (coronary artery disease), Cataracts, bilateral, Colon polyp, CVA (cerebral vascular accident) ([facility] [address] Demyelinating disease ([facility] [address] Fibromyalgia, High-risk sexual behavior, History of bone density study, History of migraines, HTN (hypertension), IgG deficiency ([facility] [address] Multiple sclerosis ([facility] [address] Myoclonic seizures ([facility] Optic neuritis, Osteopenia, Pyloric stenosis, Raynaud's disease, and Vasomotor rhinitis. Social History:   reports that she has never smoked. She has never used smokeless tobacco. She reports that she does not drink alcohol and does not use drugs. Family History:   Family History   Problem Relation Age of Onset    Other Father         Heart problems    Heart Disease Father     Other Mother         heart problems requiring open heart surgery, HTN    Heart Disease Mother     Other Sister         HTN    Hypertension Sister     Other Maternal Aunt         breast cancer    Breast Cancer Maternal Aunt        Vitals:  BP (!) 119/46   Pulse 62   Temp 97.3 °F (36.3 °C) (Axillary)   Resp 16   Ht 5' 1\" (1.549 m)   Wt 199 lb 4.7 oz (90.4 kg)   SpO2 97%   BMI 37.66 kg/m²   Temp (24hrs), Av.6 °F (36.4 °C), Min:97 °F (36.1 °C), Max:98.1 °F (36.7 °C)    No results for input(s): POCGLU in the last 72 hours. I/O (24Hr):     Intake/Output Summary (Last 24 hours) at 3/7/2022 1507  Last data filed at 3/6/2022 1849  Gross per 24 hour   Intake 591 ml   Output 100 ml   Net 491 ml       Labs:  Hematology:  Recent Labs     22  5116 22  0549 22  0516   WBC 6.3 10.6 11.8*   RBC 4.35 4.28 4.13   HGB 12.2 11.9* 11.6*   HCT 38.5 36.5 35.4*   MCV 88.5 85.2 85.8   MCH 28.0 27.9 28.1   MCHC 31.7 32.7 32.8   RDW 16.6* 16.3* 16.7*    216 233   MPV 11.3 9.7 9.9     Chemistry:  Recent Labs     22  0633 22  0549 22  0516   * 133* 134*   K 4.8 4.3 4.3   CL 94* 93* 95*   CO2 27 29 28   GLUCOSE 98 131* 117*   BUN 26* 33* 35*   CREATININE 1.43* 1.56* 1.32*   ANIONGAP 12 11 11   LABGLOM 36* 32* 39*   GFRAA 43* 39* 48*   CALCIUM 9.3 9.0 9.1     No results for input(s): PROT, LABALBU, LABA1C, E7QDBFV, S6VFFVB, FT4, TSH, AST, ALT, LDH, GGT, ALKPHOS, LABGGT, BILITOT, BILIDIR, AMMONIA, AMYLASE, LIPASE, LACTATE, CHOL, HDL, LDLCHOLESTEROL, CHOLHDLRATIO, TRIG, VLDL, BLF84CF, PHENYTOIN, PHENYF, URICACID, POCGLU in the last 72 hours. ABG:No results found for: POCPH, PHART, PH, POCPCO2, SSY4BOF, PCO2, POCPO2, PO2ART, PO2, POCHCO3, LEX9IIL, HCO3, NBEA, PBEA, BEART, BE, THGBART, THB, RTH5AZK, MVAS7QDE, D7PROREL, O2SAT, FIO2  Lab Results   Component Value Date/Time    SPECIAL 20 ML RIGHT [facility] 03/10/2019 08:42 PM     Lab Results   Component Value Date/Time    CULTURE NO GROWTH 6 DAYS 03/10/2019 08:42 PM       Radiology:  XR CHEST (2 VW)    Result Date: 3/4/2022  Stable cardiomegaly. No evidence of pulmonary edema. XR CHEST PORTABLE    Result Date: 3/3/2022  Stable cardiomegaly. No acute cardiopulmonary findings. Physical Examination:     Physical Exam  Vitals and nursing note reviewed. Constitutional:       General: She is not in acute distress. HENT:      Head: Normocephalic and atraumatic. Eyes:      Conjunctiva/sclera: Conjunctivae normal.      Pupils: Pupils are equal, round, and reactive to light. Cardiovascular:      Rate and Rhythm: Normal rate. Rhythm regularly irregular. Heart sounds: No murmur heard. Pulmonary:      Effort: Pulmonary effort is normal. No accessory muscle usage or respiratory distress. Breath sounds: No stridor. Decreased breath sounds present. No wheezing, rhonchi or rales. Abdominal:      General: Bowel sounds are normal. There is no distension. Palpations: Abdomen is soft. Abdomen is not rigid. Tenderness: There is no abdominal tenderness. There is no guarding. Musculoskeletal:         General: No tenderness. Skin:     General: Skin is warm and dry. Findings: No erythema, lesion or rash.    Neurological:      Mental Status: She is alert and oriented to person, place, and time. Cranial Nerves: No cranial nerve deficit. Motor: No seizure activity. Psychiatric:         Speech: Speech normal.         Behavior: Behavior normal. Behavior is cooperative. Assessment:     Hospital Problems           Last Modified POA    * (Principal) Acute combined systolic and diastolic CHF, NYHA class 2 ([facility] 3/4/2022 Yes    Physical debility 3/4/2022 Yes    Chronic atrial fibrillation ([facility] 3/4/2022 Yes    Obesity (BMI 30-39.9) 3/4/2022 Yes    Multiple sclerosis ([facility] (Chronic) 3/4/2022 Yes    CAD (coronary artery disease) (Chronic) 3/4/2022 Yes    Chronic renal insufficiency, stage III (moderate) (HCC) (Chronic) 3/4/2022 Yes    Pulmonary hypertension ([facility] [address] 3/5/2022 Yes          Plan:     Acute decompensated combined systolic and diastolic heart failure:continue diuresis with  Lasix 20 mg BID IV , strict I's & O's, daily weight, cardiac diet and fluid restriction  Echocardiogram back with ef of 45% and severe pulm HTN of 80   No plans for cardiac cath per cardiology        Chronic A. fib: Continue chronic anticoagulation, continue Toprol for rate control, patient reports allergy to metoprolol IV and calcium channel blockers, I loaded her with digoxin with  better rate control  Had cardiac arrest with Tikosyn   Will leave decision to continue Digoxin vs not to cardiology     Coronary artery disease status post stent placement: Continue Ranexa, Plavix and statin    ISABELLA / CKD 3: will hold on diuresis now and reassess in am    Continue to monitor creatinine daily      Multiple sclerosis: Continue chronic medications     HTN: continue home medications     HPL: continue home medications     Pyloric stenosis? /GERD: Continue PPI.     History of CVA: Continue antiplatelets and statin     Obesity is a complicating factor.     DVT prophylaxis     Will consult palliative care to discuss goals of care with the paitnet and provide more options and choices for her    DC planning, plan for palliative care meeting with the patient   Pending decision what the patient wants to do next , still hesitant home vs SNF      Karen Brower MD  3/7/2022  3:07 PM

## 2022-03-07 NOTE — PROGRESS NOTES
Physical Therapy  Facility/Department: [facility] SURG ICU  Daily Treatment Note  NAME: Yris Liu  : 1945  MRN: [id number]    Date of Service: 3/7/2022    Discharge Recommendations:  Patient would benefit from continued therapy after discharge   PT Equipment Recommendations  Equipment Needed: Yes  Mobility Devices: Franklyn Azeb: Rolling    Assessment   Body structures, Functions, Activity limitations: Decreased functional mobility ; Decreased strength;Decreased safe awareness;Decreased balance;Decreased endurance  Assessment: Pt with improving tolerance to gait training- continued to note today improving balance and safety with use of RW vs no device despite pt stating she prefers no device. Pt with decreased safety awareness, endurance, LE weakness, frequent falls at home- pt would be unsafe to ambulate without assistance this date as demonstrated with mobility. Pt would be unsafe to return to prior living arrangements without  physical assistance and will benefit from further therapy at discharge. Prognosis: Good  PT Education: Transfer Training;Functional Mobility Training;Gait Training;Equipment  Patient Education: Extensive amount of time spent by writer educating on importance of use of RW at all times for moblity, the improvement in her balance and assistance with endurance with use of device, importance of therapy at discharge and importance of overall assisted mobility. REQUIRES PT FOLLOW UP: Yes  Activity Tolerance  Activity Tolerance: Patient limited by fatigue;Patient limited by endurance     Patient Diagnosis(es): The primary encounter diagnosis was Chest pain, unspecified type. A diagnosis of Acute on chronic congestive heart failure, unspecified heart failure type [facility]) was also pertinent to this visit.      has a past medical history of Abnormal Pap smear, Asthma, Atrial fibrillation ([address] Bloody discharge from nipple, CAD (coronary artery disease), Cataracts, bilateral, Colon polyp, CVA (cerebral vascular accident) ([address] Demyelinating disease ([address] Fibromyalgia, High-risk sexual behavior, History of bone density study, History of migraines, HTN (hypertension), IgG deficiency ([address] Multiple sclerosis ([address] Myoclonic seizures ([address] Optic neuritis, Osteopenia, Pyloric stenosis, Raynaud's disease, and Vasomotor rhinitis. has a past surgical history that includes Dilation & curettage (1982); Breast biopsy (1990); Appendectomy (1955); Septoplasty (1985); Hammer toe surgery (1989); Sinus endoscopy; sinus surgery; Shoulder arthroscopy (1998); Esophagoscopy (1966, 1969); Colonoscopy (2010); Foreign Body Removal (1962); laparoscopy (1064); Coronary angioplasty with stent (5/2011); Coronary angioplasty with stent (6/2011); Upper gastrointestinal endoscopy; eye surgery; pr colonoscopy w/biopsy single/multiple (N/A, 12/19/2017); pr egd transoral biopsy single/multiple (12/19/2017); Upper gastrointestinal endoscopy (12/19/2017); Upper gastrointestinal endoscopy (5/24/2018); Cardiac surgery; Cardiac catheterization (07/31/2020); Cardioversion (08/14/2020); and Cardioversion (07/31/2020). Restrictions  Restrictions/Precautions  Restrictions/Precautions: Fall Risk  Required Braces or Orthoses?: No  Position Activity Restriction  Other position/activity restrictions: Up with assistance  Subjective   General  Response To Previous Treatment: Patient with no complaints from previous session. Family / Caregiver Present: No  Subjective  Subjective: Pt supine in bed upon arrival- RN agreeable to therapy. Pt without pain complaints during session today. Pain Screening  Patient Currently in Pain: No  Vital Signs  Patient Currently in Pain: No       Cognition   Cognition  Overall Cognitive Status: Exceptions  Attention Span: Attends with cues to redirect; Difficulty attending to directions  Safety Judgement: Decreased awareness of need for assistance  Problem Solving: Assistance required to identify errors made;Assistance required to generate solutions;Assistance required to correct errors made  Insights: Decreased awareness of deficits  Objective   Bed mobility  Supine to Sit: Minimal assistance (HOB raised 45 degrees, use of bed rail)  Sit to Supine:  (retired to chair at end of session)  Scooting: Contact guard assistance  Transfers  Sit to Stand: Minimal Assistance;Contact guard assistance  Stand to sit: Contact guard assistance  Comment: Min A from toilet height, CGA from bed height  Ambulation  Ambulation?: Yes  More Ambulation?: Yes  Ambulation 1  Surface: level tile  Device: No Device  Assistance: Minimal assistance  Quality of Gait: unsteady and poor safety awareness, decreased endurance, reaching for objects to steady herself  Gait Deviations: Slow Jaycee;Decreased step length;Decreased step height;Shuffles  Distance: 10ft, seated rest break, 6ft  Comments: between bouts of gait writer attempted to encourage pt to use RW for mobility- pt pushed walker away and attempted ambulation to sink to wash hands while reaching for objects in attempt to steady herself.   Ambulation 2  Surface - 2: level tile  Device 2: Rolling Walker  Assistance 2: Stand by assistance;Contact guard assistance  Quality of Gait 2: CGA initially for walker negotiation and then able to  ambulate SBA, decreased endurance  Gait Deviations: Slow Jaycee;Decreased step length;Decreased step height;Shuffles  Distance: 30ftx2  by one standing rest break  Stairs/Curb  Stairs?: No     Balance  Posture: Fair  Sitting - Static: Good  Sitting - Dynamic: Good;-  Standing - Static: Fair  Standing - Dynamic: Fair  Comments: standing balance assessed without device; fair + with RW            AM-PAC Score  AM-PAC Inpatient Mobility Raw Score : 16 (03/07/22 1534)  AM-PAC Inpatient T-Scale Score : 40.78 (03/07/22 1534)  Mobility Inpatient CMS 0-100% Score: 54.16 (03/07/22 1534)  Mobility Inpatient CMS G-Code Modifier : CK (03/07/22 1534)          Goals  Short term goals  Time Frame for Short term goals: 14 visits  Short term goal 1: Pt to ambulate 150ft modified independently RW  Short term goal 2: Pt to sit <> stand transfer independently  Short term goal 3: Pt to demonstrate independent bed mobility  Short term goal 4: Pt to demonstrate good - standing balance to decrease risk of falls  Short term goal 5: Pt to tolerate 30 minutes of therapy for endurance    Plan    Plan  Times per week: 5-6x  Times per day: Daily  Current Treatment Recommendations: Strengthening,Transfer Training,Balance Training,Functional Mobility Training,Endurance Training,Patient/Caregiver Education & Training,Home Exercise Program,Safety Education & Training,Gait Training  Safety Devices  Type of devices: Call light within reach,Left in chair,Nurse notified,Gait belt  Restraints  Initially in place: No     Therapy Time   Individual Concurrent Group Co-treatment   Time In 1017         Time Out 1041         Minutes 24         Timed Code Treatment Minutes: JUANA Hale

## 2022-03-07 NOTE — CARE COORDINATION
[facility] ICU Quality Flow/Interdisciplinary Rounds Progress Note    Quality Flow Rounds held on March 7, 2022 at [address] Attending:  Bedside Nurse, , Nursing Unit Leadership, Dietary, Respiratory Therapy, Physical Therapy, Occupational Therapy, Spiritual Care/ and Physician    Anticipated Discharge Date:  Expected Discharge Date: 03/07/22    Anticipated Discharge Disposition: home with home care - accepted with [facility]. Readmission Risk              Risk of Unplanned Readmission:  25           Discussed patient goal for the day, patient clinical progression, and barriers to discharge. The following Goal(s) of the Day/Commitment(s) have been identified:  Activity Progression  PT/OT, Choice - Obtain Post-Acute Preference(s), Discharge - Obtain Order, Home Care - Obtain Order, Referral - [address] and referral to [facility] , has declined referral to SNF. Marleny Kunz RN  March 7, 2022      Notified by nursing that patient has called [employer] to exercise medicare right to appeal discharge. Call to [facility] to ensure correct fax number to send fax notification to unable to speak to someone had to leave message.  Detailed Notice of Discharge given to patient

## 2022-03-07 NOTE — DISCHARGE INSTR - COC
Continuity of Care Form    Patient Name: Say Mehta   :  1945  MRN:  [id number]    Admit date:  3/3/2022  Discharge date:  ***    Code Status Order: Full Code   Advance Directives:      Admitting Physician:  Nica Echevarria MD  PCP: Say Tomlinson MD    Discharging Nurse: [facility] Unit/Room#: 626/278-11  Discharging Unit Phone Number: ***    Emergency Contact:   Extended Emergency Contact Information  Primary Emergency Contact: Sharee Mendez  Address: [address] Phone: [phone]  Relation: Child   needed?  No    Past Surgical History:  Past Surgical History:   Procedure Laterality Date    APPENDECTOMY      sponges left in abdomen, at [address] [place]    stereotactic type, right breast, benign    CARDIAC CATHETERIZATION  2020    [facility] lad    CARDIAC SURGERY      CARDIOVERSION  2020    [facility]     CARDIOVERSION  2020    COLONOSCOPY      Polyp    CORONARY ANGIOPLASTY WITH STENT PLACEMENT  2011    failed attempt to place stent    CORONARY ANGIOPLASTY WITH STENT PLACEMENT  2011    successful placement of 2 stent in same artery    [facility]    bleeding ulcers found    [address]    surgical sponges left in during appendectomy    HAMMER TOE SURGERY      head resection of phalanx, left foot    LAPAROSCOPY      with D&C    CT COLONOSCOPY W/BIOPSY SINGLE/MULTIPLE N/A 2017    COLONOSCOPY WITH BIOPSY performed by Jaskaran Adams MD at [facility] End[facility]    CT EGD TRANSORAL BIOPSY SINGLE/MULTIPLE  2017    EGD BIOPSY performed by Jaskaran Adams MD at [address] resection with rt and left ethmoidectomy    SHOULDER ARTHROSCOPY      rotator cuff impingement, right arm    SINUS ENDOSCOPY      SINUS SURGERY      multiple    UPPER GASTROINTESTINAL ENDOSCOPY      2-4 times per year for pyloric stenosis    UPPER GASTROINTESTINAL ENDOSCOPY  12/19/2017    EGD DILATION BALLOON performed by Daniel Vance MD at [address] ENDOSCOPY  5/24/2018    EGD DILATION SAVORY performed by Remington Montes MD at [facility]       Immunization History: There is no immunization history on file for this patient. Active Problems:  Patient Active Problem List   Diagnosis Code    Bloody discharge from nipple N64.52    Osteopenia M85.80    Multiple sclerosis ([facility]) G35    Essential hypertension I10    Asthma J45.909    SOB (shortness of breath) R06.02    Lower leg pain M79.669    Noncompliance with medications Z91.14    Dyslipidemia E78.5    GERD (gastroesophageal reflux disease) K21.9    Fibromyalgia M79.7    Pyloric stenosis K31.1    Demyelinating disease ([facility]) G37.9    H/O: CVA (cerebrovascular accident) Z86.73    Epigastric abdominal pain R10.13    Incontinence in female R32    Chest pain with high risk for cardiac etiology R07.9    Migraine without status migrainosus, not intractable G43.909    Hypokalemia E87.6    Chronic diastolic heart failure ([facility]) E70.47    Acute diastolic HF (heart failure) ([facility]) I50.31    Acute on chronic combined systolic and diastolic CHF (congestive heart failure) ([facility]) I50.43    CAD (coronary artery disease) I25.10    Atrial flutter ([facility]) I48.92    Chronic bronchiolitis ([facility]) J44.9    Paroxysmal A-fib ([facility]) I48.0    HTN (hypertension) I10    Chronic renal insufficiency, stage III (moderate) ([facility]) N18.30    Mild cognitive disorder F09    Raynaud's disease I73.00    Acute combined systolic and diastolic CHF, NYHA class 2 ([facility]) I50.41    Frequent falls R29.6    Physical debility R53.81    Chronic atrial fibrillation ([facility]) I48.20    Obesity (BMI 30-39. 9) E66.9    Decompensated heart failure ([facility]) I50.9    Pulmonary hypertension ([facility]) I27.20       Isolation/Infection:   Isolation            No Isolation          Patient Infection Status Infection Onset Added Last Indicated Last Indicated By Review Planned Expiration Resolved Resolved By    None active    Resolved    COVID-19 (Rule Out) 08/25/20 08/25/20 08/25/20 COVID-19 (Ordered)   08/25/20 Rule-Out Test Resulted    COVID-19 (Rule Out) 06/27/20 06/27/20 06/27/20 Covid-19 Ambulatory (Ordered)   06/30/20 Rule-Out Test Resulted            Nurse Assessment:  Last Vital Signs: BP (!) 119/46   Pulse 62   Temp 97.3 °F (36.3 °C) (Axillary)   Resp 16   Ht 5' 1\" (1.549 m)   Wt 199 lb 4.7 oz (90.4 kg)   SpO2 97%   BMI 37.66 kg/m²     Last documented pain score (0-10 scale): Pain Level: 8  Last Weight:   Wt Readings from Last 1 Encounters:   03/06/22 199 lb 4.7 oz (90.4 kg)     Mental Status:  {IP PT MENTAL STATUS:10608}    IV Access:  { BINU IV ACCESS:131983154}    Nursing Mobility/ADLs:  Walking   {CHP DME OVFL:354229800}  Transfer  {CHP DME LRNZ:059893875}  Bathing  {CHP DME CRCL:708642505}  Dressing  {CHP DME YNBQ:917675057}  Toileting  {CHP DME DUSS:014387057}  Feeding  {CHP DME FPPS:890908434}  Med Admin  {CHP DME QLDZ:428247279}  Med Delivery   { BINU MED Delivery:621613509}    Wound Care Documentation and Therapy:        Elimination:  Continence: Bowel: {YES / MCCLAIN:79940}  Bladder: {YES / :43681}  Urinary Catheter: {Urinary Catheter:549722864}   Colostomy/Ileostomy/Ileal Conduit: {YES / US:43151}       Date of Last BM: ***    Intake/Output Summary (Last 24 hours) at 3/7/2022 1546  Last data filed at 3/6/2022 1849  Gross per 24 hour   Intake 591 ml   Output 100 ml   Net 491 ml     I/O last 3 completed shifts:   In: 46 [P.O.:591]  Out: 895 [Urine:895]    Safety Concerns:     [address] Concerns:027938446}    Impairments/Disabilities:      [address] Impairments/Disabilities:007643414}    Nutrition Therapy:  Current Nutrition Therapy:   [address] Diet List:793618297}    Routes of Feeding: {CHP DME Other Feedings:269958772}  Liquids: {Slp liquid thickness:03221}  Daily Fluid Restriction: {CHP DME Yes amt example:696039179}  Last Modified Barium Swallow with Video (Video Swallowing Test): {Done Not Done ITZK:698650120}    Treatments at the Time of Hospital Discharge:   Respiratory Treatments: ***  Oxygen Therapy:  {Therapy; copd oxygen:47690}  Ventilator:    {MH CC Vent PNWI:676736559}    Rehab Therapies: {THERAPEUTIC INTERVENTION:1950016741}  Weight Bearing Status/Restrictions: [address] CC Weight Bearin}  Other Medical Equipment (for information only, NOT a DME order):  {EQUIPMENT:840390018}  Other Treatments: ***    Patient's personal belongings (please select all that are sent with patient):  {CHP DME Belongings:708695300}    RN SIGNATURE:  {Esignature:804592250}    CASE MANAGEMENT/SOCIAL WORK SECTION    Inpatient Status Date: ***    Readmission Risk Assessment Score:  Readmission Risk              Risk of Unplanned Readmission:  25           Discharging to Facility/ Agency   Name: [facility]   Address:  Phone: [phone]  Fax:      / signature: Electronically signed by Darrell Smith RN on 3/7/22 at 3:47 PM EST    PHYSICIAN SECTION    Prognosis: Fair    Condition at Discharge: Stable    Rehab Potential (if transferring to Rehab): Fair    Recommended Labs or Other Treatments After Discharge:   PT/OT  F/U with cardiology and nephrology    Physician Certification: I certify the above information and transfer of Victor M Ovens  is necessary for the continuing treatment of the diagnosis listed and that she requires Home Care for greater 30 days.      Update Admission H&P: Changes in H&P as follows - as in DC summary    PHYSICIAN SIGNATURE:  Electronically signed by Argentina Prado MD on 3/7/22 at 5:37 PM EST

## 2022-03-07 NOTE — PROGRESS NOTES
Discharge order placed for pt; pt stated not feeling ready for d/c. Appropriate information and phone number to Jenny Flores given to patient. Patient called number to appeal discharge.

## 2022-03-08 PROBLEM — F41.9 ANXIETY: Status: ACTIVE | Noted: 2022-03-08

## 2022-03-08 PROBLEM — I50.43 ACUTE ON CHRONIC COMBINED SYSTOLIC AND DIASTOLIC CONGESTIVE HEART FAILURE (HCC): Status: ACTIVE | Noted: 2020-08-25

## 2022-03-08 LAB
ABSOLUTE EOS #: 0 K/UL (ref 0–0.4)
ABSOLUTE LYMPH #: 0.8 K/UL (ref 1–4.8)
ABSOLUTE MONO #: 0.7 K/UL (ref 0.1–1.2)
ANION GAP SERPL CALCULATED.3IONS-SCNC: 10 MMOL/L (ref 9–17)
BASOPHILS # BLD: 0 % (ref 0–2)
BASOPHILS ABSOLUTE: 0 K/UL (ref 0–0.2)
BUN BLDV-MCNC: 34 MG/DL (ref 8–23)
CALCIUM SERPL-MCNC: 9.2 MG/DL (ref 8.6–10.4)
CHLORIDE BLD-SCNC: 99 MMOL/L (ref 98–107)
CO2: 27 MMOL/L (ref 20–31)
CREAT SERPL-MCNC: 1.24 MG/DL (ref 0.5–0.9)
EOSINOPHILS RELATIVE PERCENT: 0 % (ref 1–4)
GFR AFRICAN AMERICAN: 51 ML/MIN
GFR NON-AFRICAN AMERICAN: 42 ML/MIN
GFR SERPL CREATININE-BSD FRML MDRD: ABNORMAL ML/MIN/{1.73_M2}
GLUCOSE BLD-MCNC: 131 MG/DL (ref 70–99)
HCT VFR BLD CALC: 34.7 % (ref 36–46)
HEMOGLOBIN: 11.6 G/DL (ref 12–16)
LYMPHOCYTES # BLD: 8 % (ref 24–44)
MCH RBC QN AUTO: 28.2 PG (ref 26–34)
MCHC RBC AUTO-ENTMCNC: 33.3 G/DL (ref 31–37)
MCV RBC AUTO: 84.5 FL (ref 80–100)
MONOCYTES # BLD: 7 % (ref 2–11)
PDW BLD-RTO: 16.9 % (ref 12.5–15.4)
PLATELET # BLD: 227 K/UL (ref 140–450)
PMV BLD AUTO: 9.5 FL (ref 6–12)
POTASSIUM SERPL-SCNC: 4.5 MMOL/L (ref 3.7–5.3)
RBC # BLD: 4.1 M/UL (ref 4–5.2)
SEG NEUTROPHILS: 85 % (ref 36–66)
SEGMENTED NEUTROPHILS ABSOLUTE COUNT: 8.4 K/UL (ref 1.8–7.7)
SODIUM BLD-SCNC: 136 MMOL/L (ref 135–144)
WBC # BLD: 10 K/UL (ref 3.5–11)

## 2022-03-08 PROCEDURE — 97535 SELF CARE MNGMENT TRAINING: CPT

## 2022-03-08 PROCEDURE — 6370000000 HC RX 637 (ALT 250 FOR IP): Performed by: FAMILY MEDICINE

## 2022-03-08 PROCEDURE — 80048 BASIC METABOLIC PNL TOTAL CA: CPT

## 2022-03-08 PROCEDURE — 99232 SBSQ HOSP IP/OBS MODERATE 35: CPT | Performed by: INTERNAL MEDICINE

## 2022-03-08 PROCEDURE — 85025 COMPLETE CBC W/AUTO DIFF WBC: CPT

## 2022-03-08 PROCEDURE — 6360000002 HC RX W HCPCS: Performed by: NURSE PRACTITIONER

## 2022-03-08 PROCEDURE — 6370000000 HC RX 637 (ALT 250 FOR IP): Performed by: NURSE PRACTITIONER

## 2022-03-08 PROCEDURE — 6370000000 HC RX 637 (ALT 250 FOR IP): Performed by: INTERNAL MEDICINE

## 2022-03-08 PROCEDURE — 36415 COLL VENOUS BLD VENIPUNCTURE: CPT

## 2022-03-08 PROCEDURE — 2580000003 HC RX 258: Performed by: NURSE PRACTITIONER

## 2022-03-08 PROCEDURE — 2060000000 HC ICU INTERMEDIATE R&B

## 2022-03-08 RX ORDER — BUMETANIDE 1 MG/1
1 TABLET ORAL DAILY
Status: DISCONTINUED | OUTPATIENT
Start: 2022-03-08 | End: 2022-03-09

## 2022-03-08 RX ADMIN — PANTOPRAZOLE SODIUM 40 MG: 40 TABLET, DELAYED RELEASE ORAL at 21:45

## 2022-03-08 RX ADMIN — BUMETANIDE 1 MG: 1 TABLET ORAL at 10:10

## 2022-03-08 RX ADMIN — PREDNISONE 40 MG: 20 TABLET ORAL at 08:19

## 2022-03-08 RX ADMIN — DOXAZOSIN 1 MG: 2 TABLET ORAL at 08:19

## 2022-03-08 RX ADMIN — ALLOPURINOL 100 MG: 100 TABLET ORAL at 08:20

## 2022-03-08 RX ADMIN — ACETAMINOPHEN 1000 MG: 500 TABLET ORAL at 21:45

## 2022-03-08 RX ADMIN — LOSARTAN POTASSIUM 100 MG: 50 TABLET, FILM COATED ORAL at 08:19

## 2022-03-08 RX ADMIN — ACETAMINOPHEN 1000 MG: 500 TABLET ORAL at 08:25

## 2022-03-08 RX ADMIN — RANOLAZINE 500 MG: 500 TABLET, EXTENDED RELEASE ORAL at 21:45

## 2022-03-08 RX ADMIN — PANTOPRAZOLE SODIUM 40 MG: 40 TABLET, DELAYED RELEASE ORAL at 08:19

## 2022-03-08 RX ADMIN — CLONIDINE HYDROCHLORIDE 0.1 MG: 0.1 TABLET ORAL at 21:45

## 2022-03-08 RX ADMIN — APIXABAN 5 MG: 5 TABLET, FILM COATED ORAL at 21:45

## 2022-03-08 RX ADMIN — PRAVASTATIN SODIUM 40 MG: 20 TABLET ORAL at 21:45

## 2022-03-08 RX ADMIN — PAROXETINE HYDROCHLORIDE 20 MG: 20 TABLET, FILM COATED ORAL at 08:19

## 2022-03-08 RX ADMIN — APIXABAN 5 MG: 5 TABLET, FILM COATED ORAL at 08:18

## 2022-03-08 RX ADMIN — POTASSIUM CHLORIDE 20 MEQ: 1500 TABLET, EXTENDED RELEASE ORAL at 08:20

## 2022-03-08 RX ADMIN — RANOLAZINE 500 MG: 500 TABLET, EXTENDED RELEASE ORAL at 08:20

## 2022-03-08 RX ADMIN — SODIUM CHLORIDE, PRESERVATIVE FREE 10 ML: 5 INJECTION INTRAVENOUS at 08:20

## 2022-03-08 RX ADMIN — CLONIDINE HYDROCHLORIDE 0.1 MG: 0.1 TABLET ORAL at 08:19

## 2022-03-08 RX ADMIN — AZATHIOPRINE 50 MG: 50 TABLET ORAL at 08:25

## 2022-03-08 RX ADMIN — SODIUM CHLORIDE, PRESERVATIVE FREE 10 ML: 5 INJECTION INTRAVENOUS at 21:45

## 2022-03-08 RX ADMIN — CLOPIDOGREL BISULFATE 75 MG: 75 TABLET ORAL at 08:19

## 2022-03-08 ASSESSMENT — PAIN DESCRIPTION - ONSET
ONSET: ON-GOING
ONSET: ON-GOING

## 2022-03-08 ASSESSMENT — PAIN DESCRIPTION - PAIN TYPE
TYPE: CHRONIC PAIN
TYPE: CHRONIC PAIN

## 2022-03-08 ASSESSMENT — PAIN - FUNCTIONAL ASSESSMENT
PAIN_FUNCTIONAL_ASSESSMENT: ACTIVITIES ARE NOT PREVENTED
PAIN_FUNCTIONAL_ASSESSMENT: PREVENTS OR INTERFERES SOME ACTIVE ACTIVITIES AND ADLS

## 2022-03-08 ASSESSMENT — PAIN SCALES - GENERAL
PAINLEVEL_OUTOF10: 5
PAINLEVEL_OUTOF10: 0
PAINLEVEL_OUTOF10: 6
PAINLEVEL_OUTOF10: 0
PAINLEVEL_OUTOF10: 0

## 2022-03-08 ASSESSMENT — PAIN DESCRIPTION - FREQUENCY
FREQUENCY: CONTINUOUS
FREQUENCY: INTERMITTENT

## 2022-03-08 ASSESSMENT — PAIN DESCRIPTION - LOCATION
LOCATION: GENERALIZED
LOCATION: GENERALIZED

## 2022-03-08 ASSESSMENT — PAIN DESCRIPTION - DESCRIPTORS
DESCRIPTORS: ACHING
DESCRIPTORS: ACHING;DISCOMFORT

## 2022-03-08 ASSESSMENT — PAIN DESCRIPTION - ORIENTATION: ORIENTATION: MID

## 2022-03-08 ASSESSMENT — PAIN DESCRIPTION - PROGRESSION
CLINICAL_PROGRESSION: GRADUALLY WORSENING
CLINICAL_PROGRESSION: NOT CHANGED

## 2022-03-08 NOTE — PLAN OF CARE
Problem: Falls - Risk of:  Goal: Will remain free from falls  Description: Will remain free from falls  3/8/2022 0328 by Edmond Layne RN  Outcome: Ongoing     Problem: Skin Integrity:  Goal: Will show no infection signs and symptoms  Description: Will show no infection signs and symptoms  3/8/2022 0328 by Edmond Layne RN  Outcome: Ongoing     Problem: Pain:  Goal: Pain level will decrease  Description: Pain level will decrease  3/8/2022 0328 by Edmond Layne RN  Outcome: Ongoing     Problem: Nutrition  Goal: Optimal nutrition therapy  3/8/2022 0328 by Edmond Layne RN  Outcome: Ongoing     Problem: Musculor/Skeletal Functional Status  Goal: Highest potential functional level  3/8/2022 0328 by Edmond Layne RN  Outcome: Ongoing

## 2022-03-08 NOTE — PROGRESS NOTES
[facility]  Office: [address], DO, Maya Kaminski, DO, Nick Call, DO, Lupillo Warnereleazar Chowdhury, DO, Bernadene Ahumada, MD, Kevin Michael MD, Marvin Hardy MD, Argentina Prado MD, Ashwini Ramos MD, Kristine Atkins MD, Stanford Murdock MD, Beronica Jim, DO, Vinnie Donald, DO, Berny Garcia MD,  Stephanie Birmingham, DO, Ed Strickland MD, Lesvia Alvares MD, Danuta Borges MD, Gen Sneed MD, Thais Rivas MD, Lucius Bolanos, Kori Soto, CNP, Risa Morocho, CNP, Luan Batista, [facility], Heather Lainez, CNP, Jerardo Rushing, CNP, Maria Ines Aparicio, CNP, Sharmila Miller, CNP, Whitley Mendez, CNP, More Allan PA-C, Manuelito Castaneda, DNP, Rob Roman, DNP, Kunal Camarena, CNP, Isabel Atkins, CNP, Marci Hendrickson, CNP, Monae Mancera, CNP, Oscar Cox, CNP, Cristine Resendiz [id number]    Progress Note    3/8/2022    3:18 PM    Name:   Victor M Merino  MRN:     [id number]     [place]:      [de-identified]   Room:   [address] [place] [address] Day:  5  Admit Date:  3/3/2022  7:06 PM    PCP:   Son Brink MD  Code Status:  Full Code    Subjective:     C/C:   Chief Complaint   Patient presents with    Chest Pain     onset last night- \"tight band around chest\"     Interval History Status: not changed. 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 Non- / non  female who presents with Chest Pain (onset last night- \"tight band around chest\")   and is admitted to the hospital for the management of Acute combined systolic and diastolic CHF, NYHA class 2 ([facility] [address]   Patient with extensive past medical history including history of stroke, coronary artery disease status post stent placement, diastolic heart failure, multiple sclerosis, chronic A. fib on anticoagulation, pyloric stenosis and GERD, history of hypertension hyperlipidemia presented to ER with progressively worsening shortness of breath over the last 24 hours prior to presentation. Darius Brambila is able to feel her fluttering heart, dyspnea on exertion and could not breathe with any ambulation, she could not hardly do anything which prompted her to come to the ER where she was found to have acute decompensated heart failure, her A. fib was in RVR and she was admitted for further management. Patient continues to feel short of breath and uncomfortable when she is in bed afterwards was able to hold a good conversation with me and answered all my questions appropriately\"    Review of Systems:     Constitutional:  negative for chills, fevers, sweats  Respiratory:  negative for cough, dyspnea on exertion, shortness of breath, wheezing  Cardiovascular:  negative for chest pain, chest pressure/discomfort, palpitations  Gastrointestinal:  negative for abdominal pain, constipation, diarrhea, nausea, vomiting  Neurological:  negative for dizziness, headache    Medications: Allergies:     Allergies   Allergen Reactions    Dofetilide Other (See Comments)    Lisinopril-Hydrochlorothiazide Anaphylaxis and Hives    Sulfa Antibiotics Anaphylaxis    Penicillins Hives    Polyethylene Glycol Hives    Actifed Cold-Allergy [Chlorpheniramine-Phenylephrine]     Altace [Ramipril]      Chronic cough    Cephalosporins Hives    Coreg [Carvedilol]      bloating    Dml Facial Moisturizer     Elavil [Amitriptyline Hcl]     Entex [Ami-Jose]     Ethylene Glycol     Fentanyl     Hizentra [Immune Globulin (Human)]     Hydralazine     Levofloxacin      ach tendon    Lisinopril-Hydrochlorothiazide     Montelukast Sodium      Heart effects    Morphine Itching    Nifedipine     Norvasc [Amlodipine Besylate]      Stomach pain    Other      IV IG     Phenobarbital Hives    Propoxyphene     Robaxin [Methocarbamol]     Sinequan [Doxepin Hcl]     Sudafed [Pseudoephedrine Hcl]     Sudafed [Pseudoephedrine Hcl]     Tranquil-London     Wellbutrin [Bupropion Hcl]      Hypotension    Actifed Cold-Allergy [Chlorpheniramine-Phenylephrine] Palpitations    Clindamycin/Lincomycin Nausea And Vomiting    Codeine Nausea And Vomiting    Metoprolol Nausea And Vomiting     Reports she is allergic to the preservative polyethylineglycol in this medicine, causing severe emesis and stomach pain     Metoprolol Succinate Nausea And Vomiting    Seldane [Terfenadine] Palpitations       Current Meds:   Scheduled Meds:    bumetanide  1 mg Oral Daily    predniSONE  40 mg Oral Daily    allopurinol  100 mg Oral Daily    apixaban  5 mg Oral BID    azaTHIOprine  50 mg Oral Daily    cloNIDine  0.1 mg Oral BID    clopidogrel  75 mg Oral Daily    doxazosin  1 mg Oral Daily    gabapentin  100 mg Oral TID    isosorbide mononitrate  60 mg Oral Daily    losartan  100 mg Oral Daily    pantoprazole  40 mg Oral BID    PARoxetine  20 mg Oral QAM    potassium chloride  20 mEq Oral Daily    pravastatin  40 mg Oral Nightly    ranolazine  500 mg Oral BID    sodium chloride flush  5-40 mL IntraVENous 2 times per day    carvedilol  3.125 mg Oral BID WC    metoprolol  2.5 mg IntraVENous Once     Continuous Infusions:    sodium chloride       PRN Meds: acetaminophen, sodium chloride flush, sodium chloride, ondansetron **OR** ondansetron, senna    Data:     Past Medical History:   has a past medical history of Abnormal Pap smear, Asthma, Atrial fibrillation (HCC), Bloody discharge from nipple, CAD (coronary artery disease), Cataracts, bilateral, Colon polyp, CVA (cerebral vascular accident) ([facility] [address] Demyelinating disease ([facility] [address] Fibromyalgia, High-risk sexual behavior, History of bone density study, History of migraines, HTN (hypertension), IgG deficiency ([facility] [address] Multiple sclerosis ([facility] [address] [facility] Optic neuritis, Osteopenia, Pyloric stenosis, Raynaud's disease, and Vasomotor rhinitis. Social History:   reports that she has never smoked. She has never used smokeless tobacco. She reports that she does not drink alcohol and does not use drugs. Family History:   Family History   Problem Relation Age of Onset    Other Father         Heart problems    Heart Disease Father     Other Mother         heart problems requiring open heart surgery, HTN    Heart Disease Mother     Other Sister         HTN    Hypertension Sister     Other Maternal Aunt         breast cancer    Breast Cancer Maternal Aunt        Vitals:  BP (!) 169/78   Pulse 63   Temp 97.3 °F (36.3 °C) (Oral)   Resp 20   Ht 5' 1\" (1.549 m)   Wt 199 lb 4.7 oz (90.4 kg)   SpO2 97%   BMI 37.66 kg/m²   Temp (24hrs), Av.4 °F (36.3 °C), Min:97.2 °F (36.2 °C), Max:97.5 °F (36.4 °C)    No results for input(s): POCGLU in the last 72 hours. I/O (24Hr):     Intake/Output Summary (Last 24 hours) at 3/8/2022 1518  Last data filed at 3/8/2022 1512  Gross per 24 hour   Intake 600 ml   Output 1650 ml   Net -1050 ml       Labs:  Hematology:  Recent Labs     22  0549 22  0516 22  0545   WBC 10.6 11.8* 10.0   RBC 4.28 4.13 4.10   HGB 11.9* 11.6* 11.6*   HCT 36.5 35.4* 34.7*   MCV 85.2 85.8 84.5   MCH 27.9 28.1 28.2   MCHC 32.7 32.8 33.3   RDW 16.3* 16.7* 16.9*    233 227   MPV 9.7 9.9 9.5     Chemistry:  Recent Labs     22  0549 22  0516 22  0545   * 134* 136   K 4.3 4.3 4.5   CL 93* 95* 99   CO2 29 28 27   GLUCOSE 131* 117* 131*   BUN 33* 35* 34*   CREATININE 1.56* 1.32* 1.24*   ANIONGAP 11 11 10   LABGLOM 32* 39* 42*   GFRAA 39* 48* 51*   CALCIUM 9.0 9.1 9.2   No results for input(s): PROT, LABALBU, LABA1C, M9EYVWJ, G7DGFYB, FT4, TSH, AST, ALT, LDH, GGT, ALKPHOS, LABGGT, BILITOT, BILIDIR, AMMONIA, AMYLASE, LIPASE, LACTATE, CHOL, HDL, LDLCHOLESTEROL, CHOLHDLRATIO, TRIG, VLDL, QZP91HS, PHENYTOIN, PHENYF, URICACID, POCGLU in the last 72 hours. ABG:No results found for: POCPH, PHART, PH, POCPCO2, OBS3RHG, PCO2, POCPO2, PO2ART, PO2, POCHCO3, ATX9JAK, HCO3, NBEA, PBEA, BEART, BE, THGBART, THB, PIV1PIG, SZLT5YOA, D4GIYFJO, O2SAT, FIO2  Lab Results   Component Value Date/Time    SPECIAL 20 ML RIGHT [facility] 03/10/2019 08:42 PM     Lab Results   Component Value Date/Time    CULTURE NO GROWTH 6 DAYS 03/10/2019 08:42 PM       Radiology:  XR CHEST (2 VW)    Result Date: 3/4/2022  Stable cardiomegaly. No evidence of pulmonary edema. XR CHEST PORTABLE    Result Date: 3/3/2022  Stable cardiomegaly. No acute cardiopulmonary findings.        Physical Examination:       General appearance:  alert, cooperative and no distress  Mental Status:  oriented to person, place and time and normal affect  Lungs:  clear to auscultation bilaterally, normal effort  Heart:  regular rate and irreg irreg rhythm, no murmur  Abdomen:  soft, nontender, nondistended, normal bowel sounds, no masses, hepatomegaly, splenomegaly  Extremities:  no edema, redness, tenderness in the calves  Skin:  no gross lesions, rashes, induration  Very anxious    Assessment:     Hospital Problems           Last Modified POA    * (Principal) Acute on chronic combined systolic and diastolic congestive heart failure ([address] 3/8/2022 Yes    Physical debility 3/4/2022 Yes    Chronic atrial fibrillation ([address] 3/4/2022 Yes    Obesity (BMI 30-39.9) 3/4/2022 Yes    Multiple sclerosis ([address] (Chronic) 3/4/2022 Yes    Essential hypertension (Chronic) 3/8/2022 Yes    Demyelinating disease ([address] 3/8/2022 Yes    Overview Addendum 3/8/2022  3:18 PM by Patricia Chowdhury, DO     ephaptic transmissions due to advanced demyelination, NOT SEIZURES         CAD (coronary artery disease) (Chronic) 3/4/2022 Yes    Chronic renal insufficiency, stage III (moderate) ([facility]) (Chronic) 3/4/2022 Yes    Severe pulmonary hypertension ([facility] [address] 3/8/2022 Yes    Anxiety 3/8/2022 Yes          Plan:     1. Resume home bumex dosing  2.  Patient already discharged by my partner: I agree with discharge, patient has appealed, awaiting response    Barrington Chowdhury, DO  3/8/2022  3:18 PM

## 2022-03-08 NOTE — PLAN OF CARE
Problem: Falls - Risk of:  Goal: Will remain free from falls  3/8/2022 1507 by Alejandra Blankenship RN  Outcome: Ongoing  3/8/2022 0328 by Tamiko Diaz RN  Outcome: Ongoing   Fall assessment preformed. Bed in low locked position with call light and tray table within reach. Education given. Will continue to monitor. Problem: Skin Integrity:  Goal: Will show no infection signs and symptoms  3/8/2022 1507 by Alejandra Blankenship RN  Outcome: Ongoing  3/8/2022 0328 by Tamiko Diaz RN  Outcome: Ongoing   Skin assessment preformed. Pt turned every 2 hours with heals elevated off bed. Waffle mattress in place. Will continue to monitor. Problem: Pain:  Goal: Pain level will decrease  3/8/2022 1507 by Alejandra Blankenship RN  Outcome: Ongoing   Pain scale preformed per protocol and pt treated for pain as documented. Education given.

## 2022-03-08 NOTE — ACP (ADVANCE CARE PLANNING)
Advance Care Planning     Advance Care Planning Inpatient Note  \[facility]\"" Care Department    Today's Date: 3/7/2022  Unit: [facility] MED SURG ICU    Received request from IDT Member. Upon review of chart and communication with care team, patient's decision making abilities are not in question. . Patient was/were present in the room during visit. Goals of ACP Conversation:  Discuss advance care planning documents    Health Care Decision Makers:       Primary Decision Maker: True Perches - Niece/Nephew - [phone]    Secondary Decision Maker: James Scott - Niece/Nephew - [phone]    Summary:  Completed [facility]      Advance Care Planning Documents (Patient Wishes):  Healthcare Power of /Advance Directive Appointment of Health Care Agent     Assessment:  Patient received referral from [phone], Palliative Care Nurse, to assist Patient with completing her advance directives. Patient was speaking with her nephew, Nba Rueda, on the phone. Patient asked Nephew to be her power of  for health. Nephew was agreeable. Patient completed forms with writer. Patient has discussed her specific healthcare wishes for end of life with nephew. Patient draws strength from her kaylene and [employer] practices. Patient asked writer to assist her with contacting the Monsignor at her [employer]. Interventions:  Assisted in the completion of documents according to patient's wishes at this time    Care Preferences Communicated:   Ventilation:   If the patient, in their present state of health, suddenly became very ill and unable to breathe on their own,     the patient would NOT desire the use of a ventilator (breathing machine). If their health worsens and it becomes clear that the change of recovery is unlikely,     the patient would NOT desire the use of a ventilator (breathing machine). Outcomes/Plan:  New advance directive completed.   Returned original document(s) to patient, as well as copies for distribution to appointed agents  Copy of advance directive given to staff to scan into medical record. Routed ACP note to attending provider or other IDT member.     Electronically signed by Maged Catherine, [address] on 3/7/2022 at 7:22 PM

## 2022-03-08 NOTE — PROGRESS NOTES
Occupational Therapy  Facility/Department: [facility] MED SURG ICU  Daily Treatment Note    NAME: Kandy You  : 1945  MRN: [id number]    Date of Service: 3/8/2022    Discharge Recommendations:  Patient would benefit from continued therapy after discharge     OT Equipment Recommendations  Equipment Needed: Yes  Walker: Rolling  ADL Assistive Devices: Shower Chair with back    Assessment   Performance deficits / Impairments: Decreased functional mobility ; Decreased ADL status; Decreased safe awareness;Decreased balance;Decreased cognition;Decreased endurance;Decreased high-level IADLs;Decreased strength  Prognosis: Good  Decision Making: Medium Complexity  OT Education: OT Role;Plan of Care;Equipment;ADL Adaptive Strategies;Precautions;Transfer Training;Energy Conservation  Barriers to Learning: Fair- return by Pt. REQUIRES OT FOLLOW UP: Yes  Activity Tolerance  Activity Tolerance: Patient limited by fatigue;Patient Tolerated treatment well  Safety Devices  Safety Devices in place: Yes  Type of devices: Nurse notified;Call light within reach; Left in chair; All fall risk precautions in place;Gait belt  Restraints  Initially in place: No       Patient Diagnosis(es): The primary encounter diagnosis was Chest pain, unspecified type. Diagnoses of Acute on chronic congestive heart failure, unspecified heart failure type ([address] Pulmonary hypertension ([address] Demyelinating disease ([address] Acute on chronic combined systolic and diastolic CHF (congestive heart failure) ([address] Acute diastolic HF (heart failure) ([address] Chronic diastolic heart failure ([address] and Decompensated heart failure ([address] were also pertinent to this visit.     has a past medical history of Abnormal Pap smear, Asthma, Atrial fibrillation (HCC), Bloody discharge from nipple, CAD (coronary artery disease), Cataracts, bilateral, Colon polyp, CVA (cerebral vascular accident) ([address] Demyelinating disease ([address] Fibromyalgia, High-risk sexual behavior, History of bone density study, History of migraines, HTN (hypertension), IgG deficiency (HCC), Multiple sclerosis ([address] Myoclonic seizures ([address] Optic neuritis, Osteopenia, Pyloric stenosis, Raynaud's disease, and Vasomotor rhinitis. has a past surgical history that includes Dilation & curettage (1982); Breast biopsy (1990); Appendectomy (1955); Septoplasty (1985); Hammer toe surgery (1989); Sinus endoscopy; sinus surgery; Shoulder arthroscopy (1998); Esophagoscopy (1966, 1969); Colonoscopy (2010); Foreign Body Removal (1962); laparoscopy (2698); Coronary angioplasty with stent (5/2011); Coronary angioplasty with stent (6/2011); Upper gastrointestinal endoscopy; eye surgery; pr colonoscopy w/biopsy single/multiple (N/A, 12/19/2017); pr egd transoral biopsy single/multiple (12/19/2017); Upper gastrointestinal endoscopy (12/19/2017); Upper gastrointestinal endoscopy (5/24/2018); Cardiac surgery; Cardiac catheterization (07/31/2020); Cardioversion (08/14/2020); and Cardioversion (07/31/2020). Restrictions  Restrictions/Precautions  Restrictions/Precautions: Fall Risk  Required Braces or Orthoses?: No  Position Activity Restriction  Other position/activity restrictions: Up with assistance     Subjective   General  Patient assessed for rehabilitation services?: Yes  Response to previous treatment: Patient with no complaints from previous session  Family / Caregiver Present: No  Subjective  Subjective: RN approved Pt to be seen for OT treatment session. Pt was agreeable and cooperative. General Comment  Comments: RN ok'd for therapy this AM. Pt agreeable to participate in session following encouragement, pt cooperative throughout. Pain Assessment  Pain Assessment: 0-10  Pain Level: 0  Patient's Stated Pain Goal: No pain  Vital Signs  Patient Currently in Pain: No     Objective    ADL  Grooming: Contact guard assistance; Increased time to complete;Verbal cueing (CGA for grooming in standing)  UE Dressing: Stand by assistance; Increased time to complete;Verbal cueing (to doff / don gown seated)  LE Dressing: Stand by assistance;Verbal cueing; Increased time to complete (SBA to don socks / shoes while sitting at EOB; CGA stdg balance (with RW) for pants / brief over hips)    Toileting: Contact guard assistance; Increased time to complete;Minimal assistance (Min assist toilet hygiene; CGA stdg balance pants and brief over hips)  Additional Comments: Pt required Mod VCs for task initiation / sequencing and safe & accurate use of DME. She c/o feeling SOB and fatigued but was able to participate in all tasks and talked throughout therapy session (demonstrated /  had no observable signs and symptoms of SOB). Pt was educated on [facility] /  pacing and encouraged to integrate frequent rest breaks into everyday tasks and activity. Balance  Sitting Balance: Stand by assistance  Standing Balance: Contact guard assistance  Standing Balance  Time: Several trials during ADLs (~2-3 mins each). Activity: Static and Dynamic Balance during ADLs (toileting hygiene / brief mgmt) and prior to / after Functional Mobility. Comment: With use of RW - required CGA for balance. Min VCs / Rush Espino for accurate use and placement of RW. Functional Mobility  Functional - Mobility Device: Rolling Walker  Assist Level: Contact guard assistance  Functional Mobility Comments: Functional Mobility for in-room negotiation with RW - CGA for balance, No LOB. Mod VCs for accurate use of DME (Rw) and safe negotiation of RW (around furniture). Toilet Transfers  Toilet Transfers Comments: Pt declined to participate in toilet t/f this date. Was assisted with toilet hygiene / brief management (d/t incontinence of bladder with brief). Bed mobility  Supine to Sit: Contact guard assistance  Scooting: Contact guard assistance  Comment: With HOB elevated and use of Handrail (Right). Min VCs for sequencing and accurate use of handrail.     Transfers  Sit to stand: Contact guard assistance  Stand to sit: Contact guard assistance  Transfer Comments: With RW. Mod VCs for safe / accurate use of DME. Cognition  Overall Cognitive Status: Exceptions  Following Commands: Follows one step commands consistently; Follows one step commands with repetition  Safety Judgement: Decreased awareness of need for assistance  Insights: Decreased awareness of deficits  Initiation: Requires cues for some  Sequencing: Requires cues for some     Plan   Plan  Times per week: 5-6x/wk  Current Treatment Recommendations: Balance Training,Functional Mobility Training,Endurance Training,Safety Education & Training,Patient/Caregiver Education & Training,Equipment Evaluation, Education, & procurement,Home Management Training,Self-Care / ADL,Strengthening    AM-PAC Score   AM-PAC Inpatient Daily Activity Raw Score: 19 (03/08/22 1627)  AM-PAC Inpatient ADL T-Scale Score : 40.22 (03/08/22 1627)  ADL Inpatient CMS 0-100% Score: 42.8 (03/08/22 1627)  ADL Inpatient CMS G-Code Modifier : CK (03/08/22 1627)    Goals  Short term goals  Time Frame for Short term goals: 14 visits  Short term goal 1: Pt will perform ADL tasks with mod IND using AE/DME PRN  Short term goal 2: Pt will perform functional transfers/functional mobility with mod IND using least restrictive AD  Short term goal 3: Pt will independentely demo good safety awareness during engagement in all ADLs and functional transfers/functional mobility  Short term goal 4: Pt will demo 10+ minutes standing tolerance with use of least restrictive AD for increased participation in ADL/IADL tasks       Therapy Time   Individual Concurrent Group Co-treatment   Time In 1542         Time Out 1605         Minutes 23         Timed Code Treatment Minutes: 23 Minutes (ADL)       PAMELA Oneal, OTR/L

## 2022-03-09 LAB
ABSOLUTE EOS #: 0.16 K/UL (ref 0–0.4)
ABSOLUTE LYMPH #: 0.31 K/UL (ref 1–4.8)
ABSOLUTE MONO #: 0.93 K/UL (ref 0.1–1.2)
ANION GAP SERPL CALCULATED.3IONS-SCNC: 13 MMOL/L (ref 9–17)
BASOPHILS # BLD: 0 % (ref 0–2)
BASOPHILS ABSOLUTE: 0 K/UL (ref 0–0.2)
BUN BLDV-MCNC: 44 MG/DL (ref 8–23)
CALCIUM SERPL-MCNC: 9.5 MG/DL (ref 8.6–10.4)
CHLORIDE BLD-SCNC: 95 MMOL/L (ref 98–107)
CO2: 30 MMOL/L (ref 20–31)
CREAT SERPL-MCNC: 1.57 MG/DL (ref 0.5–0.9)
EOSINOPHILS RELATIVE PERCENT: 1 % (ref 1–4)
GFR AFRICAN AMERICAN: 39 ML/MIN
GFR NON-AFRICAN AMERICAN: 32 ML/MIN
GFR SERPL CREATININE-BSD FRML MDRD: ABNORMAL ML/MIN/{1.73_M2}
GLUCOSE BLD-MCNC: 102 MG/DL (ref 70–99)
HCT VFR BLD CALC: 41.9 % (ref 36–46)
HEMOGLOBIN: 13.6 G/DL (ref 12–16)
LYMPHOCYTES # BLD: 2 % (ref 24–44)
MCH RBC QN AUTO: 27.5 PG (ref 26–34)
MCHC RBC AUTO-ENTMCNC: 32.4 G/DL (ref 31–37)
MCV RBC AUTO: 84.8 FL (ref 80–100)
MONOCYTES # BLD: 6 % (ref 2–11)
MORPHOLOGY: ABNORMAL
PDW BLD-RTO: 17.1 % (ref 12.5–15.4)
PLATELET # BLD: 233 K/UL (ref 140–450)
PMV BLD AUTO: 9.4 FL (ref 6–12)
POTASSIUM SERPL-SCNC: 4.1 MMOL/L (ref 3.7–5.3)
RBC # BLD: 4.94 M/UL (ref 4–5.2)
SEG NEUTROPHILS: 91 % (ref 36–66)
SEGMENTED NEUTROPHILS ABSOLUTE COUNT: 14.1 K/UL (ref 1.8–7.7)
SODIUM BLD-SCNC: 138 MMOL/L (ref 135–144)
WBC # BLD: 15.5 K/UL (ref 3.5–11)

## 2022-03-09 PROCEDURE — 6370000000 HC RX 637 (ALT 250 FOR IP): Performed by: NURSE PRACTITIONER

## 2022-03-09 PROCEDURE — 36415 COLL VENOUS BLD VENIPUNCTURE: CPT

## 2022-03-09 PROCEDURE — 85025 COMPLETE CBC W/AUTO DIFF WBC: CPT

## 2022-03-09 PROCEDURE — 2580000003 HC RX 258: Performed by: NURSE PRACTITIONER

## 2022-03-09 PROCEDURE — 99232 SBSQ HOSP IP/OBS MODERATE 35: CPT | Performed by: INTERNAL MEDICINE

## 2022-03-09 PROCEDURE — 6370000000 HC RX 637 (ALT 250 FOR IP): Performed by: INTERNAL MEDICINE

## 2022-03-09 PROCEDURE — 80048 BASIC METABOLIC PNL TOTAL CA: CPT

## 2022-03-09 PROCEDURE — 6370000000 HC RX 637 (ALT 250 FOR IP): Performed by: FAMILY MEDICINE

## 2022-03-09 PROCEDURE — 6360000002 HC RX W HCPCS: Performed by: NURSE PRACTITIONER

## 2022-03-09 PROCEDURE — 2060000000 HC ICU INTERMEDIATE R&B

## 2022-03-09 RX ORDER — AMIODARONE HYDROCHLORIDE 200 MG/1
400 TABLET ORAL ONCE
Status: COMPLETED | OUTPATIENT
Start: 2022-03-09 | End: 2022-03-09

## 2022-03-09 RX ORDER — BUMETANIDE 1 MG/1
0.5 TABLET ORAL DAILY
Status: DISCONTINUED | OUTPATIENT
Start: 2022-03-09 | End: 2022-03-10 | Stop reason: HOSPADM

## 2022-03-09 RX ORDER — BUMETANIDE 1 MG/1
0.5 TABLET ORAL DAILY
Qty: 30 TABLET | Refills: 1 | Status: SHIPPED
Start: 2022-03-09 | End: 2022-04-08

## 2022-03-09 RX ORDER — METOPROLOL SUCCINATE 25 MG/1
25 TABLET, EXTENDED RELEASE ORAL DAILY
Status: DISCONTINUED | OUTPATIENT
Start: 2022-03-09 | End: 2022-03-10 | Stop reason: HOSPADM

## 2022-03-09 RX ADMIN — DOXAZOSIN 1 MG: 2 TABLET ORAL at 09:16

## 2022-03-09 RX ADMIN — PRAVASTATIN SODIUM 40 MG: 20 TABLET ORAL at 21:53

## 2022-03-09 RX ADMIN — SODIUM CHLORIDE, PRESERVATIVE FREE 5 ML: 5 INJECTION INTRAVENOUS at 21:53

## 2022-03-09 RX ADMIN — AZATHIOPRINE 50 MG: 50 TABLET ORAL at 09:15

## 2022-03-09 RX ADMIN — BUMETANIDE 0.5 MG: 1 TABLET ORAL at 09:15

## 2022-03-09 RX ADMIN — CLONIDINE HYDROCHLORIDE 0.1 MG: 0.1 TABLET ORAL at 21:53

## 2022-03-09 RX ADMIN — AMIODARONE HYDROCHLORIDE 400 MG: 200 TABLET ORAL at 02:00

## 2022-03-09 RX ADMIN — APIXABAN 5 MG: 5 TABLET, FILM COATED ORAL at 09:17

## 2022-03-09 RX ADMIN — RANOLAZINE 500 MG: 500 TABLET, EXTENDED RELEASE ORAL at 09:16

## 2022-03-09 RX ADMIN — SODIUM CHLORIDE, PRESERVATIVE FREE 10 ML: 5 INJECTION INTRAVENOUS at 09:17

## 2022-03-09 RX ADMIN — ACETAMINOPHEN 1000 MG: 500 TABLET ORAL at 09:24

## 2022-03-09 RX ADMIN — CLONIDINE HYDROCHLORIDE 0.1 MG: 0.1 TABLET ORAL at 09:16

## 2022-03-09 RX ADMIN — METOPROLOL SUCCINATE 25 MG: 25 TABLET, EXTENDED RELEASE ORAL at 09:23

## 2022-03-09 RX ADMIN — POTASSIUM CHLORIDE 20 MEQ: 1500 TABLET, EXTENDED RELEASE ORAL at 09:16

## 2022-03-09 RX ADMIN — PANTOPRAZOLE SODIUM 40 MG: 40 TABLET, DELAYED RELEASE ORAL at 21:53

## 2022-03-09 RX ADMIN — CLOPIDOGREL BISULFATE 75 MG: 75 TABLET ORAL at 09:17

## 2022-03-09 RX ADMIN — ONDANSETRON 4 MG: 4 TABLET, ORALLY DISINTEGRATING ORAL at 02:00

## 2022-03-09 RX ADMIN — ACETAMINOPHEN 1000 MG: 500 TABLET ORAL at 21:53

## 2022-03-09 RX ADMIN — PREDNISONE 40 MG: 20 TABLET ORAL at 09:16

## 2022-03-09 RX ADMIN — LOSARTAN POTASSIUM 100 MG: 50 TABLET, FILM COATED ORAL at 09:15

## 2022-03-09 RX ADMIN — APIXABAN 5 MG: 5 TABLET, FILM COATED ORAL at 21:53

## 2022-03-09 RX ADMIN — ONDANSETRON 4 MG: 4 TABLET, ORALLY DISINTEGRATING ORAL at 11:31

## 2022-03-09 RX ADMIN — PAROXETINE HYDROCHLORIDE 20 MG: 20 TABLET, FILM COATED ORAL at 09:16

## 2022-03-09 RX ADMIN — ALLOPURINOL 100 MG: 100 TABLET ORAL at 09:16

## 2022-03-09 RX ADMIN — PANTOPRAZOLE SODIUM 40 MG: 40 TABLET, DELAYED RELEASE ORAL at 09:17

## 2022-03-09 RX ADMIN — RANOLAZINE 500 MG: 500 TABLET, EXTENDED RELEASE ORAL at 21:53

## 2022-03-09 ASSESSMENT — PAIN SCALES - GENERAL
PAINLEVEL_OUTOF10: 0
PAINLEVEL_OUTOF10: 7
PAINLEVEL_OUTOF10: 7
PAINLEVEL_OUTOF10: 8
PAINLEVEL_OUTOF10: 0

## 2022-03-09 ASSESSMENT — PAIN DESCRIPTION - ONSET: ONSET: ON-GOING

## 2022-03-09 ASSESSMENT — PAIN DESCRIPTION - FREQUENCY: FREQUENCY: CONTINUOUS

## 2022-03-09 ASSESSMENT — PAIN - FUNCTIONAL ASSESSMENT: PAIN_FUNCTIONAL_ASSESSMENT: PREVENTS OR INTERFERES SOME ACTIVE ACTIVITIES AND ADLS

## 2022-03-09 ASSESSMENT — PAIN DESCRIPTION - DESCRIPTORS: DESCRIPTORS: ACHING;CONSTANT;DISCOMFORT

## 2022-03-09 ASSESSMENT — PAIN DESCRIPTION - LOCATION
LOCATION: GENERALIZED
LOCATION: GENERALIZED

## 2022-03-09 ASSESSMENT — PAIN DESCRIPTION - PROGRESSION: CLINICAL_PROGRESSION: GRADUALLY WORSENING

## 2022-03-09 ASSESSMENT — PAIN DESCRIPTION - PAIN TYPE
TYPE: CHRONIC PAIN
TYPE: CHRONIC PAIN

## 2022-03-09 NOTE — DISCHARGE SUMMARY
[facility]  Office: [address], , Maya Kaminski DO, Nick Call DO, Lupillo Chowdhury DO, Bernadene Ahumada, MD, Kevin Michael MD, Marvin Hardy MD, Argentina Prado MD, Ashwini Ramos MD, Kristine Atkins MD, Stanford Murdock MD, Beronica Jim DO, Vinnie Donald DO, Berny Garcia MD,  Stephanie Birmingham DO, Ed Strickland MD, Lesvia Alvares MD, Danuta Borges MD, Gen Sneed MD, Thais Rivas MD, Lucius Bolanos, [facility], [facility], [facility], Risa Morocho, [facility], Luan Batista, [facility], Heather Lainez, CNP, Jerardo Rushing, CNP, Maria Ines Aparicio, CNP, Sharmila Miller, CNP, Whitley Mendez, CNP, More Allan PA-C, Manuelito Castaneda, DNP, Rob Roman, DNP, Kunal Camarena, CNP, Isabel Atkins, CNP, Marci Hendrickson, CNP, Monae Mancera, CNP, Oscar Cox, CNP, Cristine Resendiz 2938    Discharge Summary     Patient ID: Victor M Merino  :  1945   MRN: [id number]     ACCOUNT:  [de-identified]   Patient's PCP: Son Brink MD  Admit Date: 3/3/2022   Discharge Date: 3/10/2022     Length of Stay: 7  Code Status:  Prior  Admitting Physician: Argentina Prado MD  Discharge Physician: Patricia Chowdhury DO     Active Discharge Diagnoses:     Hospital Problem Lists:  Principal Problem:    Acute on chronic combined systolic and diastolic congestive heart failure ([facility] [address]  Active Problems:    Physical debility    Chronic atrial fibrillation (HCC)    Obesity (BMI 30-39. 9)    Multiple sclerosis (HCC)    Essential hypertension    Demyelinating disease (HCC)    CAD (coronary artery disease)    Chronic renal insufficiency, stage III (moderate) (HCC)    Severe pulmonary hypertension (HCC)    Anxiety  Resolved Problems:    * No resolved hospital problems.  *      Admission Condition:  fair     Discharged Condition: stable    Hospital Stay:     Hospital Course:  Victor M Merino is a 78 y.o. female who was admitted for the management of  Acute on chronic combined systolic and diastolic congestive heart failure (HCC) , presented to ER with Chest Pain (onset last night- \"tight band around chest\")    Admitted with chf exac, was diuresed. Also had rapid afib and was seen by cardio and placed on toprol. Also given 1 time dose of amiodarone. She was maintained on eliquis for cva prevention. After sufficient diuresis she was discharged on 3/7 and appealed discharge. Discharge upheld and she left hospital then on 3/10      Significant therapeutic interventions: see above    Significant Diagnostic Studies:   Labs / Micro:  CBC:   Lab Results   Component Value Date    WBC 15.5 03/09/2022    RBC 4.94 03/09/2022    RBC 4.04 04/30/2012    HGB 13.6 03/09/2022    HCT 41.9 03/09/2022    MCV 84.8 03/09/2022    MCH 27.5 03/09/2022    MCHC 32.4 03/09/2022    RDW 17.1 03/09/2022     03/09/2022     04/30/2012     CMP:    Lab Results   Component Value Date    GLUCOSE 102 03/09/2022    GLUCOSE 108 04/30/2012     03/09/2022    K 4.1 03/09/2022    CL 95 03/09/2022    CO2 30 03/09/2022    BUN 44 03/09/2022    CREATININE 1.57 03/09/2022    ANIONGAP 13 03/09/2022    ALKPHOS 77 03/03/2022    ALT 9 03/03/2022    AST 12 03/03/2022    BILITOT 0.75 03/03/2022    LABALBU 4.3 03/03/2022    ALBUMIN 2.2 03/03/2022    LABGLOM 32 03/09/2022    GFRAA 39 03/09/2022    GFR      03/09/2022    PROT 6.3 03/03/2022    CALCIUM 9.5 03/09/2022        Radiology:  XR CHEST (2 VW)    Result Date: 3/4/2022  Stable cardiomegaly. No evidence of pulmonary edema. XR CHEST PORTABLE    Result Date: 3/3/2022  Stable cardiomegaly. No acute cardiopulmonary findings.        Consultations:    Consults:     Final Specialist Recommendations/Findings:   IP CONSULT TO HEART FAILURE NURSE/COORDINATOR  IP CONSULT TO DIETITIAN  IP CONSULT TO CARDIOLOGY  IP CONSULT TO CARDIOLOGY  IP CONSULT TO PALLIATIVE CARE  IP CONSULT TO HOME CARE NEEDS      The patient was seen and examined on day of discharge and this discharge summary is in conjunction with any daily progress note from day of discharge. Discharge plan:     Disposition: Home    Physician Follow Up:      [facility]  [facility] [address]  [phone]  On 3/25/2022  at 1:30 with CNP      [address] [place]  [address] [address]  [address] [place] [address], [address] VAHID/Fredi King Final [address] [facility] [address] [facility]  [phone]    On 3/17/2022  2:50 pm    [facility]  [address] [facility] [place] [facility], [address] [place] [address]    [phone]  Go on 7/6/2022  12:15pm       Requiring Further Evaluation/Follow Up POST HOSPITALIZATION/Incidental Findings: afib, chf, MS-she had numerous symptoms she relates to this while hospitalized    Diet: cardiac diet    Activity: As tolerated    Instructions to Patient: take medications as prescribed  Follow up with neurology    Discharge Medications:      Medication List      CHANGE how you take these medications    bumetanide 1 MG tablet  Commonly known as: BUMEX  Take 0.5 tablets by mouth daily Advised to take additional 1/2 pill for weight gain, increased CHF and/swelling  What changed:   · how much to take  · additional instructions        CONTINUE taking these medications    allopurinol 100 MG tablet  Commonly known as: ZYLOPRIM     azaTHIOprine 50 MG tablet  Commonly known as: IMURAN     cloNIDine 0.1 MG tablet  Commonly known as: CATAPRES  Take 1 tablet by mouth 2 times daily     doxazosin 1 MG tablet  Commonly known as: CARDURA     Eliquis 5 MG Tabs tablet  Generic drug: apixaban     gabapentin 100 MG capsule  Commonly known as: NEURONTIN     isosorbide mononitrate 60 MG extended release tablet  Commonly known as: IMDUR     losartan 100 MG tablet  Commonly known as: COZAAR     nitroGLYCERIN 0.4 MG SL tablet  Commonly known as: NITROSTAT     pantoprazole 40 MG tablet  Commonly known as: PROTONIX     PARoxetine 20 MG tablet  Commonly known as: PAXIL     Plavix 75 MG tablet  Generic drug: clopidogrel     potassium chloride 20 MEQ extended release tablet  Commonly known as: KLOR-CON M  Take 1 tablet by mouth daily     pravastatin 40 MG tablet  Commonly known as: PRAVACHOL     ranolazine 500 MG extended release tablet  Commonly known as: RANEXA     Toprol XL 50 MG extended release tablet  Generic drug: metoprolol succinate        STOP taking these medications    hydrALAZINE 50 MG tablet  Commonly known as: APRESOLINE           Where to Get Your Medications      Information about where to get these medications is not yet available    Ask your nurse or doctor about these medications  · bumetanide 1 MG tablet         Discharge Procedure Orders   External Referral To Palliative Care   Referral Priority: Routine Referral Type: Eval and Treat   Referral Reason: Specialty Services Required Referral Location: [facility]   Requested Specialty: Palliative Medicine   Number of Visits Requested: 1       Time Spent on discharge is  20 mins in patient examination, evaluation, counseling as well as medication reconciliation, prescriptions for required medications, discharge plan and follow up. Electronically signed by   Olive Chowdhury DO  3/10/2022  3:37 PM      Thank you Dr. Karen New MD for the opportunity to be involved in this patient's care.

## 2022-03-09 NOTE — PROGRESS NOTES
[facility]  Office: [address], DO, Maya Kaminski, DO, Nick Call, DO, Lupillo Warnereleazar Chowdhury, DO, Bernadene Ahumada, MD, Kevin Michael MD, Marvin Hardy MD, Argentina Prado MD, Ashwini Ramos MD, Kristine Atkins MD, Stanford Murdock MD, Beronica Jim, DO, Vinnie Donald, DO, Berny Garcia MD,  Stephanie Birmingham, DO, Ed Strickland MD, Lesvia Alvares MD, Danuta Borges MD, Gen Sneed MD, Thasi Rivas MD, Lucius Bolanos, CNP, Ronda Bedoya, CNP, Risa Morocho, CNP, Luan Batista, [facility], Heather Lainez, CNP, Jerardo Rushing, CNP, Maria Ines Aparicio, CNP, Sharmila Miller, CNP, Whitley Mendez, CNP, More Allan PA-C, Manuelito Castaneda, DNP, Rob Roman, DNP, Kunal Camarena, CNP, Isabel Atkins, CNP, Marci Hendrickson, CNP, Monae Mancera, CNP, Oscar Cox, CNP, Cristine Resendiz 4632    Progress Note    3/9/2022    8:42 AM    Name:   Victor M Merino  MRN:     [id number]     Acct:      [de-identified]   Room:   [address] [place] [address] Day:  6  Admit Date:  3/3/2022  7:06 PM    PCP:   Son Brink MD  Code Status:  Full Code    Subjective:     C/C:   Chief Complaint   Patient presents with    Chest Pain     onset last night- \"tight band around chest\"     Interval History Status: not changed. Feels about the same  Does not feel well  States coreg makes her \"bloat\"-she says \"everywhere\" and states it included difficulty breathing    States she was up all night with vomiting and diarrhea    Also states her afib was acting up overnight    She was discharged by my partner 3/7 and she is appealing discharge    Brief History:     Per my partner:  \"Jessie Frederick is a 78 y.o. Non- / non  female who presents with Chest Pain (onset last night- \"tight band around chest\")   and is admitted to the hospital for the management of Acute combined systolic and diastolic CHF, NYHA class 2 [facility]   Patient with extensive past medical history including history of stroke, coronary artery disease status post stent placement, diastolic heart failure, multiple sclerosis, chronic A. fib on anticoagulation, pyloric stenosis and GERD, history of hypertension hyperlipidemia presented to ER with progressively worsening shortness of breath over the last 24 hours prior to presentation. Jenny Yee is able to feel her fluttering heart, dyspnea on exertion and could not breathe with any ambulation, she could not hardly do anything which prompted her to come to the ER where she was found to have acute decompensated heart failure, her A. fib was in RVR and she was admitted for further management. Patient continues to feel short of breath and uncomfortable when she is in bed afterwards was able to hold a good conversation with me and answered all my questions appropriately\"    Review of Systems:     Constitutional:  negative for chills, fevers, sweats  Respiratory:  negative for cough, dyspnea on exertion, shortness of breath, wheezing  Cardiovascular:  negative for chest pain, chest pressure/discomfort, palpitations  Gastrointestinal:  negative for abdominal pain, constipation  Neurological:  negative for dizziness, headache    Medications: Allergies:     Allergies   Allergen Reactions    Dofetilide Other (See Comments)    Lisinopril-Hydrochlorothiazide Anaphylaxis and Hives    Sulfa Antibiotics Anaphylaxis    Penicillins Hives    Polyethylene Glycol Hives    Actifed Cold-Allergy [Chlorpheniramine-Phenylephrine]     Altace [Ramipril]      Chronic cough    Cephalosporins Hives    Coreg [Carvedilol]      bloating    Dml Facial Moisturizer     Elavil [Amitriptyline Hcl]     Entex [Ami-Jose]     Ethylene Glycol     Fentanyl     Hizentra [Immune Globulin (Human)]     Hydralazine     Levofloxacin      ach tendon    Lisinopril-Hydrochlorothiazide     Montelukast Sodium      Heart effects    Morphine Itching    Nifedipine     Norvasc [Amlodipine Besylate]      Stomach pain  Other      IV IG     Phenobarbital Hives    Propoxyphene     Robaxin [Methocarbamol]     Sinequan [Doxepin Hcl]     Sudafed [Pseudoephedrine Hcl]     Sudafed [Pseudoephedrine Hcl]     Tranquil-London     Wellbutrin [Bupropion Hcl]      Hypotension    Actifed Cold-Allergy [Chlorpheniramine-Phenylephrine] Palpitations    Clindamycin/Lincomycin Nausea And Vomiting    Codeine Nausea And Vomiting    Metoprolol Nausea And Vomiting     Reports she is allergic to the preservative polyethylineglycol in this medicine, causing severe emesis and stomach pain     Metoprolol Succinate Nausea And Vomiting    Seldane [Terfenadine] Palpitations       Current Meds:   Scheduled Meds:    [Held by provider] bumetanide  1 mg Oral Daily    predniSONE  40 mg Oral Daily    allopurinol  100 mg Oral Daily    apixaban  5 mg Oral BID    azaTHIOprine  50 mg Oral Daily    cloNIDine  0.1 mg Oral BID    clopidogrel  75 mg Oral Daily    doxazosin  1 mg Oral Daily    gabapentin  100 mg Oral TID    isosorbide mononitrate  60 mg Oral Daily    losartan  100 mg Oral Daily    pantoprazole  40 mg Oral BID    PARoxetine  20 mg Oral QAM    potassium chloride  20 mEq Oral Daily    pravastatin  40 mg Oral Nightly    ranolazine  500 mg Oral BID    sodium chloride flush  5-40 mL IntraVENous 2 times per day     Continuous Infusions:    sodium chloride       PRN Meds: acetaminophen, sodium chloride flush, sodium chloride, ondansetron **OR** ondansetron, senna    Data:     Past Medical History:   has a past medical history of Abnormal Pap smear, Asthma, Atrial fibrillation (HCC), Bloody discharge from nipple, CAD (coronary artery disease), Cataracts, bilateral, Colon polyp, CVA (cerebral vascular accident) ([facility] [address] Demyelinating disease ([facility] [address] Fibromyalgia, High-risk sexual behavior, History of bone density study, History of migraines, HTN (hypertension), IgG deficiency ([facility] [address] Multiple sclerosis ([facility] [address] Myoclonic seizures ([facility] [address] Optic neuritis, Osteopenia, Pyloric stenosis, Raynaud's disease, and Vasomotor rhinitis. Social History:   reports that she has never smoked. She has never used smokeless tobacco. She reports that she does not drink alcohol and does not use drugs. Family History:   Family History   Problem Relation Age of Onset    Other Father         Heart problems    Heart Disease Father     Other Mother         heart problems requiring open heart surgery, HTN    Heart Disease Mother     Other Sister         HTN    Hypertension Sister     Other Maternal Aunt         breast cancer    Breast Cancer Maternal Aunt        Vitals:  /76   Pulse 91   Temp 97.7 °F (36.5 °C) (Oral)   Resp 18   Ht 5' 1\" (1.549 m)   Wt 198 lb 3.1 oz (89.9 kg)   SpO2 99%   BMI 37.45 kg/m²   Temp (24hrs), Av.1 °F (36.7 °C), Min:97.5 °F (36.4 °C), Max:98.6 °F (37 °C)    No results for input(s): POCGLU in the last 72 hours. I/O (24Hr):     Intake/Output Summary (Last 24 hours) at 3/9/2022 0842  Last data filed at 3/8/2022 1730  Gross per 24 hour   Intake 700 ml   Output 2500 ml   Net -1800 ml       Labs:  Hematology:  Recent Labs     22  0516 22  0545 22  0606   WBC 11.8* 10.0 15.5*   RBC 4.13 4.10 4.94   HGB 11.6* 11.6* 13.6   HCT 35.4* 34.7* 41.9   MCV 85.8 84.5 84.8   MCH 28.1 28.2 27.5   MCHC 32.8 33.3 32.4   RDW 16.7* 16.9* 17.1*    227 233   MPV 9.9 9.5 9.4     Chemistry:  Recent Labs     22  0522  0545 22  0606   * 136 138   K 4.3 4.5 4.1   CL 95* 99 95*   CO2 28 27 30   GLUCOSE 117* 131* 102*   BUN 35* 34* 44*   CREATININE 1.32* 1.24* 1.57*   ANIONGAP 11 10 13   LABGLOM 39* 42* 32*   GFRAA 48* 51* 39*   CALCIUM 9.1 9.2 9.5   No results for input(s): PROT, LABALBU, LABA1C, X5SEGER, X4IEJSG, FT4, TSH, AST, ALT, LDH, GGT, ALKPHOS, LABGGT, BILITOT, BILIDIR, AMMONIA, AMYLASE, LIPASE, LACTATE, CHOL, HDL, LDLCHOLESTEROL, CHOLHDLRATIO, TRIG, VLDL, DCT27TB, PHENYTOIN, PHENYF, URICACID, dosing  2. Resume toprol and dc coreg (she refuses coreg due to side effects when previously taken)  3.  Patient already discharged by my partner: I agree with discharge, patient has appealed, awaiting response    Raymon Chowdhury,   3/9/2022  8:42 AM

## 2022-03-09 NOTE — PROGRESS NOTES
Occupational [address]  Occupational Therapy Not Seen Note    DATE: 3/9/2022    NAME: Joseph Perry  MRN: [id number]   : 1945      Patient not seen this date for Occupational Therapy due to:    Patient Refused to participate in all therapies today. OT will re-attempt to see Pt at later date as appropriate.         Electronically signed by PAMELA Rizzo OTR/L on 3/9/2022 at 2:56 PM

## 2022-03-09 NOTE — PROGRESS NOTES
Physical Therapy        Physical Therapy Cancel Note      DATE: 3/9/2022    NAME: Victor M Merino  MRN: [id number]   : 1945      Patient not seen this date for Physical Therapy due to:    Patient Declined: Pt declined participation in any therapy today stating she was up most of the night throwing up and frequent trips to the restroom. Pt declines any mobility at this time stating she is too tired and weak and wants to rest from her long evening. Pt agreeable to therapy checking back tomorrow.        Electronically signed by Sofie Bence, PT on 3/9/2022 at 3:56 PM

## 2022-03-09 NOTE — CARE COORDINATION
[facility] ICU Quality Flow/Interdisciplinary Rounds Progress Note    Quality Flow Rounds held on March 9, 2022 at [address] Attending:  Bedside Nurse, , Nursing Unit Leadership, Dietary, Respiratory Therapy, Physical Therapy, Occupational Therapy, Spiritual Care/ and Physician    Anticipated Discharge Date:  Expected Discharge Date: 03/07/22    Anticipated Discharge Disposition: home with [address]    Readmission Risk              Risk of Unplanned Readmission:  22           Discussed patient goal for the day, patient clinical progression, and barriers to discharge. The following Goal(s) of the Day/Commitment(s) have been identified:  Activity Progression  PT/OT - encourage activity and patient remains stable for discharge, case remains in QIO appeal process await determination.  Home care services secured [facility], RN  March 9, 2022

## 2022-03-09 NOTE — CARE COORDINATION
HINN 12 given to patient and explained letter per patient request, letter signed. Patient stated that \"you can't just send sick people home\". Offered alternative to current plan of home with home care to SNF which was declined. Reaffirmed that secondary reviewer for Medicare has upheld the discharge decision, and explained that discharge is expected by 12N on 3/10 or as per HINN 12. She replies that she doesn't know if she will have a ride to go home, writer informed patient that the hospital will provide a cab ride home if she cannot secure a ride - at this patient becomes angry and says \"I am too sick to get in to a cab - if I was black you would let me stay here for two weeks\". CM left room, made copy of HINN 12, gave original to patient and placed copy in chart.

## 2022-03-09 NOTE — PLAN OF CARE
Problem: Falls - Risk of:  Goal: Will remain free from falls  Description: Will remain free from falls  3/8/2022 2210 by Lidia Balderas RN  Outcome: Ongoing  Goal: Absence of physical injury  Description: Absence of physical injury  3/8/2022 2210 by Lidia Balderas RN  Outcome: Ongoing     Problem: Skin Integrity:  Goal: Will show no infection signs and symptoms  Description: Will show no infection signs and symptoms  3/8/2022 2210 by Lidia Balderas RN  Outcome: Ongoing  Goal: Absence of new skin breakdown  Description: Absence of new skin breakdown  3/8/2022 2210 by Lidia Balderas RN  Outcome: Ongoing     Problem: Pain:  Description: Pain management should include both nonpharmacologic and pharmacologic interventions.   Goal: Pain level will decrease  Description: Pain level will decrease  3/8/2022 2210 by Lidia Balderas RN  Outcome: Ongoing     Problem: Nutrition  Goal: Optimal nutrition therapy  3/8/2022 2210 by Lidia Balderas RN  Outcome: Ongoing     Problem: Musculor/Skeletal Functional Status  Goal: Highest potential functional level  3/8/2022 2210 by Lidia Balderas RN  Outcome: Ongoing

## 2022-03-10 VITALS
RESPIRATION RATE: 16 BRPM | HEIGHT: 61 IN | OXYGEN SATURATION: 98 % | WEIGHT: 193.56 LBS | BODY MASS INDEX: 36.55 KG/M2 | SYSTOLIC BLOOD PRESSURE: 125 MMHG | HEART RATE: 60 BPM | TEMPERATURE: 97.5 F | DIASTOLIC BLOOD PRESSURE: 58 MMHG

## 2022-03-10 PROCEDURE — 6370000000 HC RX 637 (ALT 250 FOR IP): Performed by: NURSE PRACTITIONER

## 2022-03-10 PROCEDURE — 2580000003 HC RX 258: Performed by: NURSE PRACTITIONER

## 2022-03-10 PROCEDURE — 6360000002 HC RX W HCPCS: Performed by: NURSE PRACTITIONER

## 2022-03-10 PROCEDURE — 6370000000 HC RX 637 (ALT 250 FOR IP): Performed by: INTERNAL MEDICINE

## 2022-03-10 RX ADMIN — AZATHIOPRINE 50 MG: 50 TABLET ORAL at 08:29

## 2022-03-10 RX ADMIN — CLONIDINE HYDROCHLORIDE 0.1 MG: 0.1 TABLET ORAL at 09:45

## 2022-03-10 RX ADMIN — SODIUM CHLORIDE, PRESERVATIVE FREE 10 ML: 5 INJECTION INTRAVENOUS at 09:45

## 2022-03-10 RX ADMIN — APIXABAN 5 MG: 5 TABLET, FILM COATED ORAL at 08:30

## 2022-03-10 RX ADMIN — PANTOPRAZOLE SODIUM 40 MG: 40 TABLET, DELAYED RELEASE ORAL at 08:33

## 2022-03-10 RX ADMIN — RANOLAZINE 500 MG: 500 TABLET, EXTENDED RELEASE ORAL at 08:31

## 2022-03-10 RX ADMIN — PAROXETINE HYDROCHLORIDE 20 MG: 20 TABLET, FILM COATED ORAL at 08:31

## 2022-03-10 RX ADMIN — METOPROLOL SUCCINATE 25 MG: 25 TABLET, EXTENDED RELEASE ORAL at 08:29

## 2022-03-10 RX ADMIN — CLOPIDOGREL BISULFATE 75 MG: 75 TABLET ORAL at 08:30

## 2022-03-10 RX ADMIN — DOXAZOSIN 1 MG: 2 TABLET ORAL at 08:29

## 2022-03-10 RX ADMIN — ALLOPURINOL 100 MG: 100 TABLET ORAL at 08:32

## 2022-03-10 RX ADMIN — POTASSIUM CHLORIDE 20 MEQ: 1500 TABLET, EXTENDED RELEASE ORAL at 08:30

## 2022-03-10 RX ADMIN — LOSARTAN POTASSIUM 100 MG: 50 TABLET, FILM COATED ORAL at 08:31

## 2022-03-10 ASSESSMENT — PAIN SCALES - GENERAL
PAINLEVEL_OUTOF10: 0

## 2022-03-10 NOTE — PLAN OF CARE
Problem: Falls - Risk of:  Goal: Will remain free from falls  Description: Will remain free from falls  3/9/2022 2309 by Beni Ocampo RN  Outcome: Ongoing  Goal: Absence of physical injury  Description: Absence of physical injury  Outcome: Ongoing     Problem: Skin Integrity:  Goal: Will show no infection signs and symptoms  Description: Will show no infection signs and symptoms  3/9/2022 2309 by Beni Ocampo RN  Outcome: Ongoing  Goal: Absence of new skin breakdown  Description: Absence of new skin breakdown  Outcome: Ongoing     Problem: Pain:  Description: Pain management should include both nonpharmacologic and pharmacologic interventions.   Goal: Pain level will decrease  Description: Pain level will decrease  3/9/2022 2309 by Beni Ocampo RN  Outcome: Ongoing  Goal: Control of acute pain  Description: Control of acute pain  Outcome: Ongoing  Goal: Control of chronic pain  Description: Control of chronic pain  Outcome: Ongoing     Problem: Nutrition  Goal: Optimal nutrition therapy  3/9/2022 2309 by Beni Ocampo RN  Outcome: Ongoing     Problem: Musculor/Skeletal Functional Status  Goal: Highest potential functional level  3/9/2022 2309 by Beni Ocampo RN  Outcome: Ongoing

## 2022-03-10 NOTE — PLAN OF CARE
Problem: Falls - Risk of:  Goal: Will remain free from falls  Description: Will remain free from falls  3/10/2022 1107 by Ki Lauren RN  Outcome: Completed  3/10/2022 0950 by Ki Lauren RN  Outcome: Ongoing  3/9/2022 2309 by Clyde Gaines RN  Outcome: Ongoing  Goal: Absence of physical injury  Description: Absence of physical injury  3/10/2022 1107 by Ki Lauren RN  Outcome: Completed  3/10/2022 0950 by Ki Lauren RN  Outcome: Ongoing  3/9/2022 2309 by Clyde Gaines RN  Outcome: Ongoing     Problem: Skin Integrity:  Goal: Will show no infection signs and symptoms  Description: Will show no infection signs and symptoms  3/10/2022 1107 by Ki Lauren RN  Outcome: Completed  3/10/2022 0950 by Ki Lauren RN  Outcome: Ongoing  3/9/2022 2309 by Clyde Ganies RN  Outcome: Ongoing  Goal: Absence of new skin breakdown  Description: Absence of new skin breakdown  3/10/2022 1107 by Ki Lauren RN  Outcome: Completed  3/10/2022 0950 by Ki Lauren RN  Outcome: Ongoing  3/9/2022 2309 by Clyde Gaines RN  Outcome: Ongoing     Problem: Pain:  Goal: Pain level will decrease  Description: Pain level will decrease  3/10/2022 1107 by Ki Lauren RN  Outcome: Completed  3/10/2022 0950 by Ki Lauren RN  Outcome: Ongoing  3/9/2022 2309 by Clyde Gaines RN  Outcome: Ongoing  Goal: Control of acute pain  Description: Control of acute pain  3/10/2022 1107 by Ki Lauren RN  Outcome: Completed  3/10/2022 0950 by Ki Lauren RN  Outcome: Ongoing  3/9/2022 2309 by Clyde Gaines RN  Outcome: Ongoing  Goal: Control of chronic pain  Description: Control of chronic pain  3/10/2022 1107 by Ki Lauren RN  Outcome: Completed  3/10/2022 0950 by Ki Lauren RN  Outcome: Ongoing  3/9/2022 2309 by Clyde Gaines RN  Outcome: Ongoing     Problem: Nutrition  Goal: Optimal nutrition therapy  3/10/2022 1107 by Ki Lauren RN  Outcome: Completed  3/10/2022 0950 by Ki Lauren RN  Outcome: Ongoing  3/9/2022 2309 by Jessie Eagle RN  Outcome: Ongoing     Problem: Musculor/Skeletal Functional Status  Goal: Highest potential functional level  3/10/2022 1107 by Ho Cash RN  Outcome: Completed  3/10/2022 0950 by Ho Cash RN  Outcome: Ongoing  3/9/2022 2309 by Jessie Eagle RN  Outcome: Ongoing

## 2022-03-19 NOTE — PROGRESS NOTES
Physician Progress Note      Ari Guzman  [facility] #:                  [id number]  :                       1945  ADMIT DATE:       3/3/2022 7:06 PM  [address] DATE:        3/10/2022 11:02 AM  RESPONDING  PROVIDER #:        Daisy ANGUIANO BLOOD DO          QUERY TEXT:    Patient admitted with Acute on chronic Diastolic CHF. Noted documentation of   type II MI in Cardiology consult note. In order to support the diagnosis of   type II MI, please refer to 4th universal definition of MI below and include   additional clinical indicators in your documentation. Or please document if   the diagnosis of type II MI has been ruled out after study. The medical record reflects the following:  Risk Factors: CAD, CHF, CKD  Clinical Indicators: Troponins HS 23, 23, Type II MI noted in Consult note,   diagnosis dropped in 3/5 and 3/6 Cardiology PN  Treatment: Plavix, Eliquis, 2D Echo, Serial Troponins, monitor for CP and   vital signs. Fourth Universal Definition of Myocardial Infarction: Clearly separates MI   from myocardial injury. Patients with elevated blood troponin levels but   without clinical evidence of ischemia are said to have had a myocardial   injury. To have a myocardial infarction requires both an elevated troponin   blood test along with at least one of the following:  - Symptoms of acute myocardial ischemia (Types 1 - 5 MI)  - Clinical evidence of ischemia, as evidenced in an electrocardiogram (EKG)   showing new ischemic changes (Type 1, Type 2, Type 3, or Type 4a MI)  - Development of pathological Q waves (Types 1 - 5 MI)  - Imaging evidence of new loss of viable myocardium or new regional wall   motion abnormality in a pattern consistent with an ischemic etiology (Types 1   - 5 MI)    Thank you for your time, Ju Galicia MSN, RN. Feel free to call if you have any   questions.  [phone] (7a-3p M-F)  Options provided:  -- MI type II due to CHF and Afib with RVR  -- MI type II ruled out after study and demand ischemia due to CHF and Afib   with RVR confirmed  -- MI type II ruled out after study and non-ischemic myocardial injury due to   CHF and Afib with RVR confirmed  -- MI type II ruled out, Troponin elevation is at baseline for patient  -- Other - I will add my own diagnosis  -- Disagree - Not applicable / Not valid  -- Disagree - Clinically unable to determine / Unknown  -- Refer to Clinical Documentation Reviewer    PROVIDER RESPONSE TEXT:    Type II MI was ruled out after study and demand ischemia due to CHF and Afib   with RVR confirmed.     Query created by: Giles Connor on 3/16/2022 7:06 AM      Electronically signed by:  Janny ZAVALETA DO 3/19/2022 5:01 PM

## 2022-10-06 ENCOUNTER — APPOINTMENT (OUTPATIENT)
Dept: CT IMAGING | Age: 77
End: 2022-10-06
Payer: MEDICARE

## 2022-10-06 ENCOUNTER — HOSPITAL ENCOUNTER (EMERGENCY)
Age: 77
Discharge: HOME | End: 2022-10-06
Attending: EMERGENCY MEDICINE
Payer: MEDICARE

## 2022-10-06 VITALS
WEIGHT: 164 LBS | SYSTOLIC BLOOD PRESSURE: 146 MMHG | HEART RATE: 84 BPM | HEIGHT: 61 IN | DIASTOLIC BLOOD PRESSURE: 51 MMHG | RESPIRATION RATE: 16 BRPM | TEMPERATURE: 97.6 F | BODY MASS INDEX: 30.96 KG/M2 | OXYGEN SATURATION: 97 %

## 2022-10-06 DIAGNOSIS — R53.1 GENERAL WEAKNESS: ICD-10-CM

## 2022-10-06 DIAGNOSIS — R53.83 OTHER FATIGUE: Primary | ICD-10-CM

## 2022-10-06 LAB
ABSOLUTE EOS #: 0.41 K/UL (ref 0–0.4)
ABSOLUTE LYMPH #: 1.34 K/UL (ref 1–4.8)
ABSOLUTE MONO #: 1.24 K/UL (ref 0.1–1.2)
ANION GAP SERPL CALCULATED.3IONS-SCNC: 13 MMOL/L (ref 9–17)
BASOPHILS # BLD: 1 % (ref 0–2)
BASOPHILS ABSOLUTE: 0.1 K/UL (ref 0–0.2)
BUN BLDV-MCNC: 12 MG/DL (ref 8–23)
CALCIUM SERPL-MCNC: 8.8 MG/DL (ref 8.6–10.4)
CHLORIDE BLD-SCNC: 99 MMOL/L (ref 98–107)
CO2: 24 MMOL/L (ref 20–31)
CREAT SERPL-MCNC: 0.97 MG/DL (ref 0.5–0.9)
EOSINOPHILS RELATIVE PERCENT: 4 % (ref 1–4)
GFR SERPL CREATININE-BSD FRML MDRD: >60 ML/MIN/1.73M2
GLUCOSE BLD-MCNC: 99 MG/DL (ref 70–99)
HCT VFR BLD CALC: 28.8 % (ref 36–46)
HEMOGLOBIN: 9.7 G/DL (ref 12–16)
LYMPHOCYTES # BLD: 13 % (ref 24–44)
MAGNESIUM: 1.8 MG/DL (ref 1.6–2.6)
MCH RBC QN AUTO: 31.8 PG (ref 26–34)
MCHC RBC AUTO-ENTMCNC: 33.7 G/DL (ref 31–37)
MCV RBC AUTO: 94.3 FL (ref 80–100)
MONOCYTES # BLD: 12 % (ref 2–11)
MORPHOLOGY: ABNORMAL
PDW BLD-RTO: 22.2 % (ref 12.5–15.4)
PLATELET # BLD: 283 K/UL (ref 140–450)
PMV BLD AUTO: 8.2 FL (ref 6–12)
POTASSIUM SERPL-SCNC: 3.1 MMOL/L (ref 3.7–5.3)
RBC # BLD: 3.05 M/UL (ref 4–5.2)
SARS-COV-2, RAPID: NOT DETECTED
SEG NEUTROPHILS: 70 % (ref 36–66)
SEGMENTED NEUTROPHILS ABSOLUTE COUNT: 7.21 K/UL (ref 1.8–7.7)
SODIUM BLD-SCNC: 136 MMOL/L (ref 135–144)
SPECIMEN DESCRIPTION: NORMAL
WBC # BLD: 10.3 K/UL (ref 3.5–11)

## 2022-10-06 PROCEDURE — 71250 CT THORAX DX C-: CPT

## 2022-10-06 PROCEDURE — 6370000000 HC RX 637 (ALT 250 FOR IP): Performed by: PHYSICIAN ASSISTANT

## 2022-10-06 PROCEDURE — 83735 ASSAY OF MAGNESIUM: CPT

## 2022-10-06 PROCEDURE — 80048 BASIC METABOLIC PNL TOTAL CA: CPT

## 2022-10-06 PROCEDURE — 99284 EMERGENCY DEPT VISIT MOD MDM: CPT

## 2022-10-06 PROCEDURE — 36415 COLL VENOUS BLD VENIPUNCTURE: CPT

## 2022-10-06 PROCEDURE — 85025 COMPLETE CBC W/AUTO DIFF WBC: CPT

## 2022-10-06 PROCEDURE — 87635 SARS-COV-2 COVID-19 AMP PRB: CPT

## 2022-10-06 RX ORDER — ACETAMINOPHEN 500 MG
1000 TABLET ORAL ONCE
Status: COMPLETED | OUTPATIENT
Start: 2022-10-06 | End: 2022-10-06

## 2022-10-06 RX ORDER — KETOROLAC TROMETHAMINE 15 MG/ML
15 INJECTION, SOLUTION INTRAMUSCULAR; INTRAVENOUS ONCE
Status: DISCONTINUED | OUTPATIENT
Start: 2022-10-06 | End: 2022-10-06

## 2022-10-06 RX ADMIN — ACETAMINOPHEN 1000 MG: 500 TABLET ORAL at 20:50

## 2022-10-06 ASSESSMENT — PAIN DESCRIPTION - ORIENTATION: ORIENTATION: RIGHT

## 2022-10-06 ASSESSMENT — PAIN DESCRIPTION - DESCRIPTORS: DESCRIPTORS: HEAVINESS;THROBBING

## 2022-10-06 ASSESSMENT — PAIN DESCRIPTION - LOCATION: LOCATION: HEAD

## 2022-10-06 ASSESSMENT — PAIN SCALES - GENERAL
PAINLEVEL_OUTOF10: 9
PAINLEVEL_OUTOF10: 9

## 2022-10-07 NOTE — DISCHARGE INSTRUCTIONS
Your laboratory evaluation here has been unremarkable and your CT scan shows a a small nodule on your adrenal gland which will need to be evaluated within the next couple of weeks most likely with MRI. You should get a hold of your family doctors to help with these proceedings you also need an evaluation by . We are going to give you the 800-number for [facility] to help you find a new doctor you should also check with the doctors in [place] to see if there is somebody who can cover you for the specialty that you had. Return back to the emergency setting if you are worsening in any way.

## 2022-10-07 NOTE — ED PROVIDER NOTES
[id number] [facility] ED  [id number] [email or web address] [place] [address]  Phone: [phone]  Fax: [phone]      Attending Physician Attestation    I performed a history and physical examination of the patient and discussed management with the mid level provideer. I reviewed the mid level provider's note and agree with the documented findings and plan of care. Any areas of disagreement are noted on the chart. I was personally present for the key portions of any procedures. I have documented in the chart those procedures where I was not present during the key portions. I have reviewed the emergency nurses triage note. I agree with the chief complaint, past medical history, past surgical history, allergies, medications, social and family history as documented unless otherwise noted below. Documentation of the HPI, Physical Exam and Medical Decision Making performed by mid level providers is based on my personal performance of the HPI, PE and MDM. For Physician Assistant/ Nurse Practitioner cases/documentation I have personally evaluated this patient and have completed at least one if not all key elements of the E/M (history, physical exam, and MDM). Additional findings are as noted. CHIEF COMPLAINT       Chief Complaint   Patient presents with    Fatigue     Onset Monday- pt reports loss of appetite and recent weight loss    Headache         HISTORY OF PRESENT ILLNESS    Yudith White is a 78 y.o. female who presents urgency department comes in complaining of cough body aches and some gradual weight loss she says over the last several days. She also complains of a mild headache.       PAST MEDICAL HISTORY    has a past medical history of Abnormal Pap smear, Asthma, Atrial fibrillation (HCC), Bloody discharge from nipple, CAD (coronary artery disease), Cataracts, bilateral, Colon polyp, CVA (cerebral vascular accident) ([address] Demyelinating disease ([facility] [address] Fibromyalgia, High-risk sexual behavior, History of bone density study, History of migraines, HTN (hypertension), IgG deficiency (HCC), Multiple sclerosis ([address] Myoclonic seizures ([address] Optic neuritis, Osteopenia, Pyloric stenosis, Raynaud's disease, and Vasomotor rhinitis. SURGICAL HISTORY      has a past surgical history that includes Dilation & curettage (1982); Breast biopsy (1990); Appendectomy (1955); Septoplasty (1985); Hammer toe surgery (1989); Sinus endoscopy; sinus surgery; Shoulder arthroscopy (1998); Esophagoscopy (1966, 1969); Colonoscopy (2010); Foreign Body Removal (1962); laparoscopy (9493); Coronary angioplasty with stent (5/2011); Coronary angioplasty with stent (6/2011); Upper gastrointestinal endoscopy; eye surgery; pr colonoscopy w/biopsy single/multiple (N/A, 12/19/2017); pr egd transoral biopsy single/multiple (12/19/2017); Upper gastrointestinal endoscopy (12/19/2017); Upper gastrointestinal endoscopy (5/24/2018); Cardiac surgery; Cardiac catheterization (07/31/2020); Cardioversion (08/14/2020); and Cardioversion (07/31/2020). CURRENT MEDICATIONS       Previous Medications    ALLOPURINOL (ZYLOPRIM) 100 MG TABLET    Take 100 mg by mouth daily    APIXABAN (ELIQUIS) 5 MG TABS TABLET    Take by mouth 2 times daily    AZATHIOPRINE (IMURAN) 50 MG TABLET    Take 50 mg by mouth daily     BUMETANIDE (BUMEX) 1 MG TABLET    Take 0.5 tablets by mouth daily Advised to take additional 1/2 pill for weight gain, increased CHF and/swelling    CLONIDINE (CATAPRES) 0.1 MG TABLET    Take 1 tablet by mouth 2 times daily    CLOPIDOGREL (PLAVIX) 75 MG TABLET    Take 75 mg by mouth daily 100 mg pm    DOXAZOSIN (CARDURA) 1 MG TABLET    doxazosin 1 mg tablet    GABAPENTIN (NEURONTIN) 100 MG CAPSULE    gabapentin 100 mg capsule   Take 1 capsule 3 times a day by oral route as needed for 30 days.     ISOSORBIDE MONONITRATE (IMDUR) 60 MG EXTENDED RELEASE TABLET    Take 60 mg by mouth daily    LOSARTAN (COZAAR) 100 MG TABLET    Take 1 tablet by mouth daily METOPROLOL SUCCINATE (TOPROL XL) 50 MG EXTENDED RELEASE TABLET    Take 25 mg by mouth at bedtime     NITROGLYCERIN (NITROSTAT) 0.4 MG SL TABLET    Place 0.4 mg under the tongue every 5 minutes. Indications: Chest Pain    PANTOPRAZOLE (PROTONIX) 40 MG TABLET    Take 40 mg by mouth 2 times daily     PAROXETINE (PAXIL) 20 MG TABLET    Take by mouth every morning     POTASSIUM CHLORIDE (KLOR-CON M) 20 MEQ EXTENDED RELEASE TABLET    Take 1 tablet by mouth daily    PRAVASTATIN (PRAVACHOL) 40 MG TABLET    Take 40 mg by mouth at bedtime     RANOLAZINE (RANEXA) 500 MG SR TABLET    Take 500 mg by mouth 2 times daily. ALLERGIES     is allergic to dofetilide, lisinopril-hydrochlorothiazide, sulfa antibiotics, penicillins, polyethylene glycol, actifed cold-allergy [chlorpheniramine-phenylephrine], altace [ramipril], cephalosporins, coreg [carvedilol], dml facial moisturizer, elavil [amitriptyline hcl], entex [ami-margot], ethylene glycol, fentanyl, hizentra [immune globulin (human)], hydralazine, levofloxacin, lisinopril-hydrochlorothiazide, montelukast sodium, morphine, nifedipine, norvasc [amlodipine besylate], other, phenobarbital, propoxyphene, robaxin [methocarbamol], sinequan [doxepin hcl], sudafed [pseudoephedrine hcl], sudafed [pseudoephedrine hcl], tranquil-cecil, wellbutrin [bupropion hcl], actifed cold-allergy [chlorpheniramine-phenylephrine], clindamycin/lincomycin, codeine, metoprolol, metoprolol succinate, and seldane [terfenadine]. FAMILY HISTORY     She indicated that the status of her mother is unknown. She indicated that the status of her father is unknown. She indicated that the status of her sister is unknown. She indicated that the status of her maternal aunt is unknown.     family history includes Breast Cancer in her maternal aunt; Heart Disease in her father and mother; Hypertension in her sister; Other in her father, maternal aunt, mother, and sister.     SOCIAL HISTORY      reports that she has never smoked. She has never used smokeless tobacco. She reports that she does not drink alcohol and does not use drugs. PHYSICAL EXAM     INITIAL VITALS:  height is 5' 1\" (1.549 m) and weight is 74.4 kg (164 lb). Her oral temperature is 97.6 °F (36.4 °C). Her blood pressure is 156/67 (abnormal) and her pulse is 85. Her respiration is 16 and oxygen saturation is 98%. Constitutional: Alert, oriented x3, nontoxic, afebrile, answering questions appropriately, acting properly for age, in no acute distress  HEENT: Extraocular muscles intact, mucus membranes moist, TMs clear bilaterally, no posterior pharyngeal erythema or exudates, Pupils equal, round, reactive to light,   Neck: Trachea midline, Supple without lymphadenopathy, no posterior midline neck tenderness to palpation  Cardiovascular: Regular rhythm and rate no S3, S4, or murmurs  Respiratory: Clear to auscultation bilaterally no wheezes, rhonchi, rales, no respiratory distress  Gastrointestinal: Soft, nontender, nondistended, positive bowel sounds. No rebound, rigidity, or guarding. Musculoskeletal: No extremity pain or swelling  Neurologic: Moving all 4 extremities without difficulty there are no gross focal neurologic deficits  Skin: Warm and dry      DIAGNOSTIC RESULTS     EKG: All EKG's are interpreted by the Emergency Department Physician who either signs or Co-signs this chart in the absence of a cardiologist.        RADIOLOGY:   Non-plain film images such as CT, Ultrasound and MRI are read by the radiologist. [facility] radiographic images are visualized and the radiologist interpretations are reviewed as follows:         LABS:  Results for orders placed or performed during the hospital encounter of 10/06/22   COVID-19, Rapid    Specimen: Nasopharyngeal Swab   Result Value Ref Range    Specimen Description . NASOPHARYNGEAL SWAB     SARS-CoV-2, Rapid Not Detected Not Detected   CBC with Auto Differential   Result Value Ref Range    WBC 10.3 3.5 - 11.0 k/uL RBC 3.05 (L) 4.0 - 5.2 m/uL    Hemoglobin 9.7 (L) 12.0 - 16.0 g/dL    Hematocrit 28.8 (L) 36 - 46 %    MCV 94.3 80 - 100 fL    MCH 31.8 26 - 34 pg    MCHC 33.7 31 - 37 g/dL    RDW 22.2 (H) 12.5 - 15.4 %    Platelets 858 984 - 624 k/uL    MPV 8.2 6.0 - 12.0 fL    Seg Neutrophils 70 (H) 36 - 66 %    Lymphocytes 13 (L) 24 - 44 %    Monocytes 12 (H) 2 - 11 %    Eosinophils % 4 1 - 4 %    Basophils 1 0 - 2 %    Segs Absolute 7.21 1.8 - 7.7 k/uL    Absolute Lymph # 1.34 1.0 - 4.8 k/uL    Absolute Mono # 1.24 (H) 0.1 - 1.2 k/uL    Absolute Eos # 0.41 (H) 0.0 - 0.4 k/uL    Basophils Absolute 0.10 0.0 - 0.2 k/uL    Morphology ANISOCYTOSIS PRESENT    Basic Metabolic Panel w/ Reflex to MG   Result Value Ref Range    Glucose 99 70 - 99 mg/dL    BUN 12 8 - 23 mg/dL    Creatinine 0.97 (H) 0.50 - 0.90 mg/dL    Est, Glom Filt Rate >60 >60 mL/min/1.73m2    Calcium 8.8 8.6 - 10.4 mg/dL    Sodium 136 135 - 144 mmol/L    Potassium 3.1 (L) 3.7 - 5.3 mmol/L    Chloride 99 98 - 107 mmol/L    CO2 24 20 - 31 mmol/L    Anion Gap 13 9 - 17 mmol/L   Magnesium   Result Value Ref Range    Magnesium 1.8 1.6 - 2.6 mg/dL         EMERGENCY DEPARTMENT COURSE:   Vitals:    Vitals:    10/06/22 2015   BP: (!) 156/67   Pulse: 85   Resp: 16   Temp: 97.6 °F (36.4 °C)   TempSrc: Oral   SpO2: 98%   Weight: 74.4 kg (164 lb)   Height: 5' 1\" (1.549 m)     -------------------------  BP: (!) 156/67, Temp: 97.6 °F (36.4 °C), Heart Rate: 85, Resp: 16      PERTINENT ATTENDING PHYSICIAN COMMENTS:    Patient is got laboratory work-up and x-rays all of which are negative at this point in time most of this patient's problems are social logic in nature she needs to have an evaluation by social work. This can be done with her and her PCP. (Please note that portions of this note were completed with a voice recognition program.  Efforts were made to edit the dictations but occasionally words are mis-transcribed.)    Carlos Vazquez MD,, MD, F.A.C.E.P.   Attending Emergency Medicine Physician        Tyler Mendoza MD  10/06/22 2140

## 2022-10-07 NOTE — ED NOTES
Pt tolerated ambulating back to bed, provided warm blanket, & call light within reach     Maged Espinal RN  10/06/22 1810

## 2022-10-07 NOTE — ED NOTES
Pt up to restroom-writer walked with pt- pt tolerated ambulating well     Mabel Kumari RN  10/06/22 4046

## 2022-10-07 NOTE — ED PROVIDER NOTES
[id number] [facility] ED  [address] [address] [address] [facility] [address]  Phone: [phone]  Fax: [phone]        Pt Name: Vani Thomas  MRN: [id number]  [place] 1945  Date of evaluation: 10/6/22    [address] [place] [address]       Chief Complaint   Patient presents with    Fatigue     Onset Monday- pt reports loss of appetite and recent weight loss    Headache       HISTORY OF PRESENT ILLNESS (Location/Symptom, Timing/Onset, Context/Setting, Quality, Duration, Modifying Factors, Severity)      Vani Thomas is a 78 y.o. female with PMH of MS, Afib with pacer, who presents to the ED via private auto with cough. Patient reports that since Monday she has been experiencing a cough, chills, body aches, and has not had much of an appetite so she is \"losing weight. \"  She reports a gradually onset waxing and waning headache in the right side of her head. She has not taken any medication for her symptoms. Denies any known sick contacts. Denies any exacerbating or alleviating factors. Denies any fever, abdominal pain, chest pain, shortness of breath, lightheadedness, dizziness, syncope, vision changes, nausea, vomiting, diarrhea, or any other concerns at this time. PAST MEDICAL / SURGICAL / SOCIAL / FAMILY HISTORY     PMH:  has a past medical history of Abnormal Pap smear, Asthma, Atrial fibrillation (HCC), Bloody discharge from nipple, CAD (coronary artery disease), Cataracts, bilateral, Colon polyp, CVA (cerebral vascular accident) ([address] Demyelinating disease ([address] Fibromyalgia, High-risk sexual behavior, History of bone density study, History of migraines, HTN (hypertension), IgG deficiency ([address] Multiple sclerosis ([address] Myoclonic seizures ([address] Optic neuritis, Osteopenia, Pyloric stenosis, Raynaud's disease, and Vasomotor rhinitis. Surgical History:  has a past surgical history that includes Dilation & curettage (1982); Breast biopsy (1990); Appendectomy (1955); Septoplasty (1985);  Hammer toe surgery (1989); Sinus endoscopy; sinus surgery; Shoulder arthroscopy (1998); Esophagoscopy (1966, 1969); Colonoscopy (2010); Foreign Body Removal (1962); laparoscopy (3988); Coronary angioplasty with stent (5/2011); Coronary angioplasty with stent (6/2011); Upper gastrointestinal endoscopy; eye surgery; pr colonoscopy w/biopsy single/multiple (N/A, 12/19/2017); pr egd transoral biopsy single/multiple (12/19/2017); Upper gastrointestinal endoscopy (12/19/2017); Upper gastrointestinal endoscopy (5/24/2018); Cardiac surgery; Cardiac catheterization (07/31/2020); Cardioversion (08/14/2020); and Cardioversion (07/31/2020). Social History:  reports that she has never smoked. She has never used smokeless tobacco. She reports that she does not drink alcohol and does not use drugs. Family History: She indicated that the status of her mother is unknown. She indicated that the status of her father is unknown. She indicated that the status of her sister is unknown. She indicated that the status of her maternal aunt is unknown.   family history includes Breast Cancer in her maternal aunt; Heart Disease in her father and mother; Hypertension in her sister; Other in her father, maternal aunt, mother, and sister.   Psychiatric History: None    Allergies: Dofetilide, Lisinopril-hydrochlorothiazide, Sulfa antibiotics, Penicillins, Polyethylene glycol, Actifed cold-allergy [chlorpheniramine-phenylephrine], Altace [ramipril], Cephalosporins, Coreg [carvedilol], Dml facial moisturizer, Elavil [amitriptyline hcl], Entex [ami-margot], Ethylene glycol, Fentanyl, Hizentra [immune globulin (human)], Hydralazine, Levofloxacin, Lisinopril-hydrochlorothiazide, Montelukast sodium, Morphine, Nifedipine, Norvasc [amlodipine besylate], Other, Phenobarbital, Propoxyphene, Robaxin [methocarbamol], Sinequan [doxepin hcl], Sudafed [pseudoephedrine hcl], Sudafed [pseudoephedrine hcl], Tranquil-cecil, Wellbutrin [bupropion hcl], Actifed cold-allergy [chlorpheniramine-phenylephrine], Clindamycin/lincomycin, Codeine, Metoprolol, Metoprolol succinate, and Seldane [terfenadine]    Home Medications:   Prior to Admission medications    Medication Sig Start Date End Date Taking? Authorizing Provider   bumetanide (BUMEX) 1 MG tablet Take 0.5 tablets by mouth daily Advised to take additional 1/2 pill for weight gain, increased CHF and/swelling 3/9/22 4/8/22  Galdino Chowdhury DO   allopurinol (ZYLOPRIM) 100 MG tablet Take 100 mg by mouth daily    Historical Provider, MD   isosorbide mononitrate (IMDUR) 60 MG extended release tablet Take 60 mg by mouth daily    Historical Provider, MD   doxazosin (CARDURA) 1 MG tablet doxazosin 1 mg tablet    Historical Provider, MD   gabapentin (NEURONTIN) 100 MG capsule gabapentin 100 mg capsule   Take 1 capsule 3 times a day by oral route as needed for 30 days. 6/1/21   Historical Provider, MD   metoprolol succinate (TOPROL XL) 50 MG extended release tablet Take 25 mg by mouth at bedtime  5/20/21   Historical Provider, MD   cloNIDine (CATAPRES) 0.1 MG tablet Take 1 tablet by mouth 2 times daily 8/8/21   Monty Munoz DO   losartan (COZAAR) 100 MG tablet Take 1 tablet by mouth daily 8/8/21   Monty Munoz DO   potassium chloride (KLOR-CON M) 20 MEQ extended release tablet Take 1 tablet by mouth daily 8/27/20   Hellen Ken APRN - CNP   apixaban (ELIQUIS) 5 MG TABS tablet Take by mouth 2 times daily    Historical Provider, MD   pantoprazole (PROTONIX) 40 MG tablet Take 40 mg by mouth 2 times daily     Historical Provider, MD   azaTHIOprine (IMURAN) 50 MG tablet Take 50 mg by mouth daily     Historical Provider, MD   ranolazine (RANEXA) 500 MG SR tablet Take 500 mg by mouth 2 times daily. Historical Provider, MD   nitroGLYCERIN (NITROSTAT) 0.4 MG SL tablet Place 0.4 mg under the tongue every 5 minutes.  Indications: Chest Pain    Historical Provider, MD   PARoxetine (PAXIL) 20 MG tablet Take by mouth every morning Historical Provider, MD   clopidogrel (PLAVIX) 75 MG tablet Take 75 mg by mouth daily 100 mg pm    Historical Provider, MD   pravastatin (PRAVACHOL) 40 MG tablet Take 40 mg by mouth at bedtime     Historical Provider, MD       REVIEW OF SYSTEMS  (2-9 systems for level 4, 10 ormore for level 5)      Review of Systems    Constitutional: Denies fever or chills. Eyes: Denies vision changes. HENT: Denies sore throat or neck pain. Respiratory:  See HPI. Cardiovascular: Denies chest pain. GI: Denies vomiting or diarrhea. : Denies painful urination. Musculoskeletal: Denies recent trauma. Skin: Denies new rashes or wounds. Neurologic:  Denies new numbness or weakness. Psychiatric: Denies agitation. Heme: Denies bleeding disorders. All other systems negative except as marked. PHYSICAL EXAM  (up to 7 for level 4, 8 or more for level 5)      INITIAL VITALS:  height is 5' 1\" (1.549 m) and weight is 74.4 kg (164 lb). Her oral temperature is 97.6 °F (36.4 °C). Her blood pressure is 146/51 (abnormal) and her pulse is 84. Her respiration is 16 and oxygen saturation is 97%. Vital signs reviewed. Physical Exam    General:  Alert, cooperative, well-groomed, well-nourished, appears stated age, and is in no acute distress. Head:  Normocephalic, atraumatic, and without obvious abnormality. Eyes:  Sclerae/conjunctivae clear without injection, pallor, or icterus. Corneas clear without opacities. EOM's intact. ENT: Ears and nose are all without obvious masses lesion or deformity. [place] are in proper alignment without lesions, deformities, masses, erythema, or tenderness. No tragal tenderness. Canals are clear bilaterally without swelling, erythema, or discharge, with a small amount of wet cerumen bilaterally. The bilateral TM's are intact, clear, and translucent without scarring. The light reflex and bony landmarks are present without erythema, bulging or retraction. Hearing grossly intact.  Lips and buccal mucosa are pink and moist without lesions. Good dentition. Gingivae is pink and without lesions. Tongue and uvula midline. Symmetric elevation of soft palate upon phonation. No hoarseness or muffled voice. Oropharynx is clear, without erythema. Tonsils are symmetrical, without enlargement or erythema, bilaterally. No exudates or drainage. Neck: Supple and symmetrical. Trachea midline. No adenopathy. No jugular venous distention. Lungs:   No respiratory distress. Clear to auscultation bilaterally. No wheezes, rhonchi, or rales. Heart:  Regular rate. Regular rhythm. No murmurs, rubs, or gallops. Abdomen:   Normoactive bowel sounds. Soft, nontender, nondistended without guarding or rebound. No palpable masses. No CVA tenderness. Extremities: Warm and dry without erythema or edema. Skin: Soft, good turgor, and well-hydrated. No obvious rashes or lesions. Neurologic: GCS is 15 and no focal deficits are appreciated. Normal gait. Grossly normal motor and sensation. Speech clear. Psychiatric: Normal mood and affect. Normal behavior. Coherent thought process. DIFFERENTIAL DIAGNOSIS / MDM     Patient presents emerged part with a complaint as described above. Vital signs are unremarkable. Physical exam demonstrates a well-appearing nontoxic female in no acute distress other than she seems tearful and frustrated. I think she is concerned that she has cancer or that something is wrong with her because she does not have much of an appetite. I low suspicion for anything concerning she probably likely just has a URI but I will obtain some basic labs and a CT scan of the chest to rule out any masses in her lung or pneumonia. Will obtain COVID swab. This patient was seen by the attending physician and they agreed with the assessment and plan.     PLAN (LABS / IMAGING / EKG):  Orders Placed This Encounter   Procedures    COVID-19, Rapid    CT CHEST WO CONTRAST    CBC with Auto Differential    Basic Metabolic Panel w/ Reflex to MG    Magnesium       MEDICATIONS ORDERED:  Orders Placed This Encounter   Medications    DISCONTD: ketorolac (TORADOL) injection 15 mg    acetaminophen (TYLENOL) tablet 1,000 mg       Controlled Substances Monitoring:     DIAGNOSTIC RESULTS     RADIOLOGY: All images are read by the radiologist and their interpretations are reviewed. CT CHEST WO CONTRAST    Result Date: 10/6/2022  EXAMINATION: CT OF THE CHEST WITHOUT CONTRAST 10/6/2022 9:17 pm TECHNIQUE: CT of the chest was performed without the administration of intravenous contrast. Multiplanar reformatted images are provided for review. Automated exposure control, iterative reconstruction, and/or weight based adjustment of the mA/kV was utilized to reduce the radiation dose to as low as reasonably achievable. COMPARISON: 08/04/2021 HISTORY: ORDERING SYSTEM PROVIDED HISTORY: cough, \"losing weight\" TECHNOLOGIST PROVIDED HISTORY: cough, \"losing weight\" Decision Support Exception - unselect if not a suspected or confirmed emergency medical condition->Emergency Medical Condition (MA) Reason for Exam: cough, \"losing weight\" FINDINGS: Mediastinum: Heart is mildly enlarged without pericardial effusion. Coronary artery atherosclerosis. Cardiac pacing leads are noted. The main pulmonary artery is upper limits of normal in size. Thoracic aorta is normal in caliber. Mildly enlarged periaortic lymph node in the lower mediastinum. Lungs/pleura: There is no pneumothorax. There is a small right pleural effusion. There is scattered, subsegmental atelectasis. There are few scattered noncalcified pulmonary nodules measuring less than 4 mm. Upper Abdomen: No acute abnormality in the visualized upper abdomen. 2.5 cm indeterminate right adrenal nodule. Soft Tissues/Bones: Multilevel degenerative change of the thoracic spine. Kyphosis. 1. Small right pleural effusion. 2. No acute pulmonary abnormality.  3. Mildly enlarged lower mediastinal lymph node. 4. 2.5 cm indeterminate right adrenal nodule. Adrenal protocol MRI or CT is recommended for further evaluation. RECOMMENDATIONS: Pathology: Multiple pulmonary nodules. Most severe: 3 mm solid pulmonary nodule. No routine follow-up imaging is recommended per Fleischner Society Guidelines. These guidelines do not apply to immunocompromised patients and patients with cancer. Follow up in patients with significant comorbidities as clinically warranted. For lung cancer screening, adhere to Lung-RADS guidelines. Reference: Radiology. 2017; 284(1):228-43. LABS:  Results for orders placed or performed during the hospital encounter of 10/06/22   COVID-19, Rapid    Specimen: Nasopharyngeal Swab   Result Value Ref Range    Specimen Description . NASOPHARYNGEAL SWAB     SARS-CoV-2, Rapid Not Detected Not Detected   CBC with Auto Differential   Result Value Ref Range    WBC 10.3 3.5 - 11.0 k/uL    RBC 3.05 (L) 4.0 - 5.2 m/uL    Hemoglobin 9.7 (L) 12.0 - 16.0 g/dL    Hematocrit 28.8 (L) 36 - 46 %    MCV 94.3 80 - 100 fL    MCH 31.8 26 - 34 pg    MCHC 33.7 31 - 37 g/dL    RDW 22.2 (H) 12.5 - 15.4 %    Platelets 092 689 - 257 k/uL    MPV 8.2 6.0 - 12.0 fL    Seg Neutrophils 70 (H) 36 - 66 %    Lymphocytes 13 (L) 24 - 44 %    Monocytes 12 (H) 2 - 11 %    Eosinophils % 4 1 - 4 %    Basophils 1 0 - 2 %    Segs Absolute 7.21 1.8 - 7.7 k/uL    Absolute Lymph # 1.34 1.0 - 4.8 k/uL    Absolute Mono # 1.24 (H) 0.1 - 1.2 k/uL    Absolute Eos # 0.41 (H) 0.0 - 0.4 k/uL    Basophils Absolute 0.10 0.0 - 0.2 k/uL    Morphology ANISOCYTOSIS PRESENT    Basic Metabolic Panel w/ Reflex to MG   Result Value Ref Range    Glucose 99 70 - 99 mg/dL    BUN 12 8 - 23 mg/dL    Creatinine 0.97 (H) 0.50 - 0.90 mg/dL    Est, Glom Filt Rate >60 >60 mL/min/1.73m2    Calcium 8.8 8.6 - 10.4 mg/dL    Sodium 136 135 - 144 mmol/L    Potassium 3.1 (L) 3.7 - 5.3 mmol/L    Chloride 99 98 - 107 mmol/L    CO2 24 20 - 31 mmol/L    Anion Gap 13 9 - 17 mmol/L   Magnesium   Result Value Ref Range    Magnesium 1.8 1.6 - 2.6 mg/dL       EMERGENCY DEPARTMENT COURSE           Vitals:    Vitals:    10/06/22 2015 10/06/22 2146   BP: (!) 156/67 (!) 146/51   Pulse: 85 84   Resp: 16    Temp: 97.6 °F (36.4 °C)    TempSrc: Oral    SpO2: 98% 97%   Weight: 74.4 kg (164 lb)    Height: 5' 1\" (1.549 m)      -------------------------  BP: (!) 146/51, Temp: 97.6 °F (36.4 °C), Heart Rate: 84, Resp: 16      RE-EVALUATION:  09:00 PM Attending to complete all remaining care, diagnosis and disposition for this patient. We discussed this patient prior to my departure. My note will be refreshed to reflect these details, though I was not actively involved in this patient's care following the time stamp. CONSULTS:  None    PROCEDURES:  None    FINAL IMPRESSION      1. Other fatigue    2. General weakness          DISPOSITION / PLAN     CONDITION ON DISPOSITION:   Good / Stable for discharge. PATIENT REFERRED TO:  No follow-up provider specified.     DISCHARGE MEDICATIONS:  Discharge Medication List as of 10/6/2022 10:38 PM          Beata Calderon   Emergency Medicine Physician Assistant    (Please note that portions of this note were completed with a voice recognition program.  Efforts were made to edit the dictations but occasionally words aremis-transcribed.)        Karl Awad PA-C  10/15/22 1945

## 2022-11-05 ENCOUNTER — HOSPITAL ENCOUNTER (INPATIENT)
Age: 77
LOS: 2 days | Discharge: NURSING, REHAB OR LONG-TERM CARE | DRG: 178 | End: 2022-11-08
Attending: EMERGENCY MEDICINE | Admitting: INTERNAL MEDICINE
Payer: MEDICARE

## 2022-11-05 ENCOUNTER — APPOINTMENT (OUTPATIENT)
Dept: GENERAL RADIOLOGY | Age: 77
DRG: 178 | End: 2022-11-05
Payer: MEDICARE

## 2022-11-05 DIAGNOSIS — R05.9 COUGH, UNSPECIFIED TYPE: Primary | ICD-10-CM

## 2022-11-05 DIAGNOSIS — U07.1 COVID: ICD-10-CM

## 2022-11-05 LAB
ABSOLUTE EOS #: 0 K/UL (ref 0–0.4)
ABSOLUTE LYMPH #: 0.97 K/UL (ref 1–4.8)
ABSOLUTE MONO #: 0.51 K/UL (ref 0.1–0.8)
ALBUMIN SERPL-MCNC: 4.6 G/DL (ref 3.5–5.2)
ALBUMIN/GLOBULIN RATIO: 2 (ref 1–2.5)
ALP BLD-CCNC: 103 U/L (ref 35–104)
ALT SERPL-CCNC: 8 U/L (ref 5–33)
ANION GAP SERPL CALCULATED.3IONS-SCNC: 14 MMOL/L (ref 9–17)
AST SERPL-CCNC: 17 U/L
BASOPHILS # BLD: 0 % (ref 0–2)
BASOPHILS ABSOLUTE: 0 K/UL (ref 0–0.2)
BILIRUB SERPL-MCNC: 1.1 MG/DL (ref 0.3–1.2)
BUN BLDV-MCNC: 20 MG/DL (ref 8–23)
CALCIUM SERPL-MCNC: 9.5 MG/DL (ref 8.6–10.4)
CHLORIDE BLD-SCNC: 95 MMOL/L (ref 98–107)
CO2: 27 MMOL/L (ref 20–31)
CREAT SERPL-MCNC: 1.04 MG/DL (ref 0.5–0.9)
D-DIMER QUANTITATIVE: 0.39 MG/L FEU
EOSINOPHILS RELATIVE PERCENT: 0 % (ref 1–4)
FLU A ANTIGEN: NEGATIVE
FLU B ANTIGEN: NEGATIVE
GFR SERPL CREATININE-BSD FRML MDRD: 56 ML/MIN/1.73M2
GLUCOSE BLD-MCNC: 94 MG/DL (ref 70–99)
HCT VFR BLD CALC: 34.3 % (ref 36–46)
HEMOGLOBIN: 11.7 G/DL (ref 12–16)
LACTIC ACID: 1.4 MMOL/L (ref 0.5–2.2)
LIPASE: 23 U/L (ref 13–60)
LYMPHOCYTES # BLD: 17 % (ref 24–44)
MCH RBC QN AUTO: 32.2 PG (ref 26–34)
MCHC RBC AUTO-ENTMCNC: 34.1 G/DL (ref 31–37)
MCV RBC AUTO: 94.3 FL (ref 80–100)
MONOCYTES # BLD: 9 % (ref 1–7)
MORPHOLOGY: NORMAL
PDW BLD-RTO: 17.4 % (ref 12.5–15.4)
PLATELET # BLD: 180 K/UL (ref 140–450)
PMV BLD AUTO: 9.1 FL (ref 6–12)
POTASSIUM SERPL-SCNC: 3.6 MMOL/L (ref 3.7–5.3)
RBC # BLD: 3.64 M/UL (ref 4–5.2)
SARS-COV-2, RAPID: DETECTED
SEG NEUTROPHILS: 74 % (ref 36–66)
SEGMENTED NEUTROPHILS ABSOLUTE COUNT: 4.22 K/UL (ref 1.8–7.7)
SODIUM BLD-SCNC: 136 MMOL/L (ref 135–144)
SPECIMEN DESCRIPTION: ABNORMAL
TOTAL PROTEIN: 6.9 G/DL (ref 6.4–8.3)
TROPONIN, HIGH SENSITIVITY: 31 NG/L (ref 0–14)
WBC # BLD: 5.7 K/UL (ref 3.5–11)

## 2022-11-05 PROCEDURE — 99285 EMERGENCY DEPT VISIT HI MDM: CPT

## 2022-11-05 PROCEDURE — 80053 COMPREHEN METABOLIC PANEL: CPT

## 2022-11-05 PROCEDURE — 83605 ASSAY OF LACTIC ACID: CPT

## 2022-11-05 PROCEDURE — 83690 ASSAY OF LIPASE: CPT

## 2022-11-05 PROCEDURE — 71045 X-RAY EXAM CHEST 1 VIEW: CPT

## 2022-11-05 PROCEDURE — 93005 ELECTROCARDIOGRAM TRACING: CPT | Performed by: EMERGENCY MEDICINE

## 2022-11-05 PROCEDURE — 87804 INFLUENZA ASSAY W/OPTIC: CPT

## 2022-11-05 PROCEDURE — 85379 FIBRIN DEGRADATION QUANT: CPT

## 2022-11-05 PROCEDURE — 85025 COMPLETE CBC W/AUTO DIFF WBC: CPT

## 2022-11-05 PROCEDURE — 36415 COLL VENOUS BLD VENIPUNCTURE: CPT

## 2022-11-05 PROCEDURE — 2700000000 HC OXYGEN THERAPY PER DAY

## 2022-11-05 PROCEDURE — 84484 ASSAY OF TROPONIN QUANT: CPT

## 2022-11-05 PROCEDURE — 87635 SARS-COV-2 COVID-19 AMP PRB: CPT

## 2022-11-05 ASSESSMENT — PAIN SCALES - GENERAL: PAINLEVEL_OUTOF10: 4

## 2022-11-05 ASSESSMENT — PAIN DESCRIPTION - LOCATION: LOCATION: CHEST

## 2022-11-05 ASSESSMENT — PAIN - FUNCTIONAL ASSESSMENT: PAIN_FUNCTIONAL_ASSESSMENT: 0-10

## 2022-11-06 PROBLEM — U07.1 COVID: Status: ACTIVE | Noted: 2022-11-06

## 2022-11-06 LAB
BACTERIA: ABNORMAL
BILIRUBIN URINE: NEGATIVE
COLOR: YELLOW
EPITHELIAL CELLS UA: ABNORMAL /HPF (ref 0–5)
GLUCOSE URINE: NEGATIVE
KETONES, URINE: NEGATIVE
LEUKOCYTE ESTERASE, URINE: ABNORMAL
NITRITE, URINE: NEGATIVE
OTHER OBSERVATIONS UA: ABNORMAL
PH UA: 6.5 (ref 5–8)
PROTEIN UA: NEGATIVE
RBC UA: ABNORMAL /HPF (ref 0–2)
SPECIFIC GRAVITY UA: 1.01 (ref 1–1.03)
TROPONIN, HIGH SENSITIVITY: 40 NG/L (ref 0–14)
TURBIDITY: CLEAR
URINE HGB: ABNORMAL
UROBILINOGEN, URINE: NORMAL
WBC UA: ABNORMAL /HPF (ref 0–5)

## 2022-11-06 PROCEDURE — 94761 N-INVAS EAR/PLS OXIMETRY MLT: CPT

## 2022-11-06 PROCEDURE — 6370000000 HC RX 637 (ALT 250 FOR IP): Performed by: STUDENT IN AN ORGANIZED HEALTH CARE EDUCATION/TRAINING PROGRAM

## 2022-11-06 PROCEDURE — 81001 URINALYSIS AUTO W/SCOPE: CPT

## 2022-11-06 PROCEDURE — 1210000000 HC MED SURG R&B

## 2022-11-06 PROCEDURE — 87086 URINE CULTURE/COLONY COUNT: CPT

## 2022-11-06 PROCEDURE — 2700000000 HC OXYGEN THERAPY PER DAY

## 2022-11-06 PROCEDURE — 87186 SC STD MICRODIL/AGAR DIL: CPT

## 2022-11-06 PROCEDURE — 2580000003 HC RX 258: Performed by: NURSE PRACTITIONER

## 2022-11-06 PROCEDURE — 99222 1ST HOSP IP/OBS MODERATE 55: CPT | Performed by: STUDENT IN AN ORGANIZED HEALTH CARE EDUCATION/TRAINING PROGRAM

## 2022-11-06 PROCEDURE — 36415 COLL VENOUS BLD VENIPUNCTURE: CPT

## 2022-11-06 PROCEDURE — 6370000000 HC RX 637 (ALT 250 FOR IP): Performed by: NURSE PRACTITIONER

## 2022-11-06 PROCEDURE — 84484 ASSAY OF TROPONIN QUANT: CPT

## 2022-11-06 PROCEDURE — 87077 CULTURE AEROBIC IDENTIFY: CPT

## 2022-11-06 RX ORDER — NITROGLYCERIN 0.4 MG/1
0.4 TABLET SUBLINGUAL EVERY 5 MIN PRN
Status: DISCONTINUED | OUTPATIENT
Start: 2022-11-06 | End: 2022-11-08 | Stop reason: HOSPADM

## 2022-11-06 RX ORDER — METOPROLOL SUCCINATE 25 MG/1
25 TABLET, EXTENDED RELEASE ORAL NIGHTLY
Status: DISCONTINUED | OUTPATIENT
Start: 2022-11-06 | End: 2022-11-08 | Stop reason: HOSPADM

## 2022-11-06 RX ORDER — SODIUM CHLORIDE 0.9 % (FLUSH) 0.9 %
5-40 SYRINGE (ML) INJECTION PRN
Status: DISCONTINUED | OUTPATIENT
Start: 2022-11-06 | End: 2022-11-08 | Stop reason: HOSPADM

## 2022-11-06 RX ORDER — ONDANSETRON 4 MG/1
4 TABLET, ORALLY DISINTEGRATING ORAL EVERY 8 HOURS PRN
Status: DISCONTINUED | OUTPATIENT
Start: 2022-11-06 | End: 2022-11-08 | Stop reason: HOSPADM

## 2022-11-06 RX ORDER — PRAVASTATIN SODIUM 20 MG
40 TABLET ORAL NIGHTLY
Status: DISCONTINUED | OUTPATIENT
Start: 2022-11-06 | End: 2022-11-08 | Stop reason: HOSPADM

## 2022-11-06 RX ORDER — RANOLAZINE 500 MG/1
500 TABLET, EXTENDED RELEASE ORAL 2 TIMES DAILY
Status: DISCONTINUED | OUTPATIENT
Start: 2022-11-06 | End: 2022-11-08 | Stop reason: HOSPADM

## 2022-11-06 RX ORDER — CLONIDINE HYDROCHLORIDE 0.1 MG/1
0.1 TABLET ORAL 2 TIMES DAILY
Status: DISCONTINUED | OUTPATIENT
Start: 2022-11-06 | End: 2022-11-08 | Stop reason: HOSPADM

## 2022-11-06 RX ORDER — BUMETANIDE 1 MG/1
1 TABLET ORAL DAILY
Status: DISCONTINUED | OUTPATIENT
Start: 2022-11-06 | End: 2022-11-08 | Stop reason: HOSPADM

## 2022-11-06 RX ORDER — ONDANSETRON 2 MG/ML
4 INJECTION INTRAMUSCULAR; INTRAVENOUS EVERY 6 HOURS PRN
Status: DISCONTINUED | OUTPATIENT
Start: 2022-11-06 | End: 2022-11-08 | Stop reason: HOSPADM

## 2022-11-06 RX ORDER — PAROXETINE HYDROCHLORIDE 20 MG/1
20 TABLET, FILM COATED ORAL DAILY
Status: DISCONTINUED | OUTPATIENT
Start: 2022-11-06 | End: 2022-11-08 | Stop reason: HOSPADM

## 2022-11-06 RX ORDER — ACETAMINOPHEN 325 MG/1
650 TABLET ORAL EVERY 6 HOURS PRN
Status: DISCONTINUED | OUTPATIENT
Start: 2022-11-06 | End: 2022-11-06

## 2022-11-06 RX ORDER — POTASSIUM CHLORIDE 20 MEQ/1
20 TABLET, EXTENDED RELEASE ORAL DAILY
Status: DISCONTINUED | OUTPATIENT
Start: 2022-11-06 | End: 2022-11-08 | Stop reason: HOSPADM

## 2022-11-06 RX ORDER — ACETAMINOPHEN 500 MG
1000 TABLET ORAL EVERY 8 HOURS PRN
Status: DISCONTINUED | OUTPATIENT
Start: 2022-11-06 | End: 2022-11-08 | Stop reason: HOSPADM

## 2022-11-06 RX ORDER — DEXAMETHASONE SODIUM PHOSPHATE 4 MG/ML
6 INJECTION, SOLUTION INTRA-ARTICULAR; INTRALESIONAL; INTRAMUSCULAR; INTRAVENOUS; SOFT TISSUE EVERY 24 HOURS
Status: DISCONTINUED | OUTPATIENT
Start: 2022-11-06 | End: 2022-11-08 | Stop reason: HOSPADM

## 2022-11-06 RX ORDER — SODIUM CHLORIDE 9 MG/ML
INJECTION, SOLUTION INTRAVENOUS PRN
Status: DISCONTINUED | OUTPATIENT
Start: 2022-11-06 | End: 2022-11-08 | Stop reason: HOSPADM

## 2022-11-06 RX ORDER — SODIUM CHLORIDE 0.9 % (FLUSH) 0.9 %
5-40 SYRINGE (ML) INJECTION EVERY 12 HOURS SCHEDULED
Status: DISCONTINUED | OUTPATIENT
Start: 2022-11-06 | End: 2022-11-08 | Stop reason: HOSPADM

## 2022-11-06 RX ORDER — ACETAMINOPHEN 650 MG/1
650 SUPPOSITORY RECTAL EVERY 6 HOURS PRN
Status: DISCONTINUED | OUTPATIENT
Start: 2022-11-06 | End: 2022-11-08 | Stop reason: HOSPADM

## 2022-11-06 RX ORDER — LOSARTAN POTASSIUM 50 MG/1
100 TABLET ORAL DAILY
Status: DISCONTINUED | OUTPATIENT
Start: 2022-11-06 | End: 2022-11-08 | Stop reason: HOSPADM

## 2022-11-06 RX ORDER — POLYETHYLENE GLYCOL 3350 17 G/17G
17 POWDER, FOR SOLUTION ORAL DAILY PRN
Status: DISCONTINUED | OUTPATIENT
Start: 2022-11-06 | End: 2022-11-08 | Stop reason: HOSPADM

## 2022-11-06 RX ORDER — GABAPENTIN 100 MG/1
100 CAPSULE ORAL 3 TIMES DAILY PRN
Status: DISCONTINUED | OUTPATIENT
Start: 2022-11-06 | End: 2022-11-08 | Stop reason: HOSPADM

## 2022-11-06 RX ORDER — DEXAMETHASONE 4 MG/1
6 TABLET ORAL DAILY
Status: DISCONTINUED | OUTPATIENT
Start: 2022-11-06 | End: 2022-11-06

## 2022-11-06 RX ORDER — NITROGLYCERIN 0.4 MG/1
0.4 TABLET SUBLINGUAL
Status: DISCONTINUED | OUTPATIENT
Start: 2022-11-06 | End: 2022-11-06

## 2022-11-06 RX ORDER — PANTOPRAZOLE SODIUM 40 MG/1
40 TABLET, DELAYED RELEASE ORAL 2 TIMES DAILY
Status: DISCONTINUED | OUTPATIENT
Start: 2022-11-06 | End: 2022-11-08 | Stop reason: HOSPADM

## 2022-11-06 RX ORDER — CLOPIDOGREL BISULFATE 75 MG/1
75 TABLET ORAL DAILY
Status: DISCONTINUED | OUTPATIENT
Start: 2022-11-06 | End: 2022-11-08 | Stop reason: HOSPADM

## 2022-11-06 RX ADMIN — ACETAMINOPHEN 1000 MG: 500 TABLET ORAL at 09:16

## 2022-11-06 RX ADMIN — CLONIDINE HYDROCHLORIDE 0.1 MG: 0.1 TABLET ORAL at 08:43

## 2022-11-06 RX ADMIN — SODIUM CHLORIDE, PRESERVATIVE FREE 10 ML: 5 INJECTION INTRAVENOUS at 08:43

## 2022-11-06 RX ADMIN — ACETAMINOPHEN 1000 MG: 500 TABLET ORAL at 02:05

## 2022-11-06 RX ADMIN — PRAVASTATIN SODIUM 40 MG: 20 TABLET ORAL at 20:21

## 2022-11-06 RX ADMIN — CLOPIDOGREL BISULFATE 75 MG: 75 TABLET ORAL at 08:43

## 2022-11-06 RX ADMIN — APIXABAN 5 MG: 5 TABLET, FILM COATED ORAL at 20:21

## 2022-11-06 RX ADMIN — SODIUM CHLORIDE, PRESERVATIVE FREE 10 ML: 5 INJECTION INTRAVENOUS at 20:24

## 2022-11-06 RX ADMIN — CLONIDINE HYDROCHLORIDE 0.1 MG: 0.1 TABLET ORAL at 02:05

## 2022-11-06 RX ADMIN — RANOLAZINE 500 MG: 500 TABLET, EXTENDED RELEASE ORAL at 20:21

## 2022-11-06 RX ADMIN — LOSARTAN POTASSIUM 100 MG: 50 TABLET, FILM COATED ORAL at 08:42

## 2022-11-06 RX ADMIN — PAROXETINE HYDROCHLORIDE HEMIHYDRATE 20 MG: 20 TABLET, FILM COATED ORAL at 08:42

## 2022-11-06 RX ADMIN — ACETAMINOPHEN 1000 MG: 500 TABLET ORAL at 21:55

## 2022-11-06 RX ADMIN — PANTOPRAZOLE SODIUM 40 MG: 40 TABLET, DELAYED RELEASE ORAL at 20:21

## 2022-11-06 RX ADMIN — RANOLAZINE 500 MG: 500 TABLET, EXTENDED RELEASE ORAL at 02:05

## 2022-11-06 RX ADMIN — PANTOPRAZOLE SODIUM 40 MG: 40 TABLET, DELAYED RELEASE ORAL at 08:42

## 2022-11-06 RX ADMIN — BUMETANIDE 1 MG: 1 TABLET ORAL at 08:42

## 2022-11-06 RX ADMIN — APIXABAN 5 MG: 5 TABLET, FILM COATED ORAL at 08:43

## 2022-11-06 RX ADMIN — RANOLAZINE 500 MG: 500 TABLET, EXTENDED RELEASE ORAL at 08:42

## 2022-11-06 RX ADMIN — CLONIDINE HYDROCHLORIDE 0.1 MG: 0.1 TABLET ORAL at 20:21

## 2022-11-06 ASSESSMENT — ENCOUNTER SYMPTOMS
RHINORRHEA: 1
EYE PAIN: 0
SORE THROAT: 0
SHORTNESS OF BREATH: 1
WHEEZING: 0
VOMITING: 0
NAUSEA: 0
CHEST TIGHTNESS: 0
ABDOMINAL DISTENTION: 0
COUGH: 1

## 2022-11-06 ASSESSMENT — PAIN DESCRIPTION - LOCATION: LOCATION: BACK;HIP;SHOULDER

## 2022-11-06 ASSESSMENT — PAIN SCALES - GENERAL
PAINLEVEL_OUTOF10: 8
PAINLEVEL_OUTOF10: 0

## 2022-11-06 ASSESSMENT — PAIN DESCRIPTION - ORIENTATION: ORIENTATION: LEFT;RIGHT

## 2022-11-06 NOTE — ED PROVIDER NOTES
[address] [facility]  [address] [address] [address] [address] [address]  Phone: [phone]  Fax: [phone]        Pt Name: Chapm Kern  MRN: [id number]  [place] 1945  Date of evaluation: 11/6/22      CHIEF COMPLAINT     Chief Complaint   Patient presents with    Cough    Shortness of Breath         HISTORY OF PRESENT ILLNESS  (Location/Symptom, Timing/Onset, Context/Setting, Quality, Duration, Modifying Factors, Severity.)    Champ Kern is a 78 y.o. female who presents with shortness of breath and cough. Patient states she is been sick for the past couple days. She denies any sick contacts or recent travels but complains of a sore throat and cough that is nonproductive and dry and that she states what prompted her to come to the emergency department today with shortness of breath. She does not wear oxygen at home and states she cannot walk more than 5 to 10 feet without having to stop and catch her breath. She is on Eliquis and states she has been compliant. She does have a history of which is followed at [facility] and she is on Imuran for it. He states he is vaccinated from [employer]. She is never had COVID before. REVIEW OF SYSTEMS    (2-9 systems for level 4, 10 or more for level 5)     Review of Systems   Constitutional:  Positive for chills and fever. HENT:  Positive for rhinorrhea. Negative for congestion and sore throat. Eyes:  Negative for pain. Respiratory:  Positive for cough and shortness of breath. Negative for chest tightness and wheezing. Cardiovascular:  Positive for chest pain. Gastrointestinal:  Negative for abdominal distention, nausea and vomiting. Genitourinary:  Negative for difficulty urinating and dysuria. Skin:  Negative for rash. Neurological:  Negative for dizziness and headaches.      PAST MEDICAL HISTORY    has a past medical history of Abnormal Pap smear, Asthma, Atrial fibrillation ([address] Bloody discharge from nipple, CAD (coronary artery disease), Cataracts, bilateral, Colon polyp, CVA (cerebral vascular accident) ([facility] [address] Demyelinating disease ([facility] [address] Fibromyalgia, High-risk sexual behavior, History of bone density study, History of migraines, HTN (hypertension), IgG deficiency ([facility] [address] Multiple sclerosis ([facility] [address] Myoclonic seizures ([facility] [address] Optic neuritis, Osteopenia, Pyloric stenosis, Raynaud's disease, and Vasomotor rhinitis. SURGICAL HISTORY      has a past surgical history that includes Dilation & curettage (1982); Breast biopsy (1990); Appendectomy (1955); Septoplasty (1985); Hammer toe surgery (1989); Sinus endoscopy; sinus surgery; Shoulder arthroscopy (1998); Esophagoscopy (1966, 1969); Colonoscopy (2010); Foreign Body Removal (1962); laparoscopy (3841); Coronary angioplasty with stent (5/2011); Coronary angioplasty with stent (6/2011); Upper gastrointestinal endoscopy; eye surgery; pr colonoscopy w/biopsy single/multiple (N/A, 12/19/2017); pr egd transoral biopsy single/multiple (12/19/2017); Upper gastrointestinal endoscopy (12/19/2017); Upper gastrointestinal endoscopy (5/24/2018); Cardiac surgery; Cardiac catheterization (07/31/2020); Cardioversion (08/14/2020); and Cardioversion (07/31/2020).     CURRENTMEDICATIONS       Previous Medications    ALLOPURINOL (ZYLOPRIM) 100 MG TABLET    Take 100 mg by mouth daily    APIXABAN (ELIQUIS) 5 MG TABS TABLET    Take by mouth 2 times daily    AZATHIOPRINE (IMURAN) 50 MG TABLET    Take 50 mg by mouth daily     BUMETANIDE (BUMEX) 1 MG TABLET    Take 0.5 tablets by mouth daily Advised to take additional 1/2 pill for weight gain, increased CHF and/swelling    CLONIDINE (CATAPRES) 0.1 MG TABLET    Take 1 tablet by mouth 2 times daily    CLOPIDOGREL (PLAVIX) 75 MG TABLET    Take 75 mg by mouth daily 100 mg pm    DOXAZOSIN (CARDURA) 1 MG TABLET    doxazosin 1 mg tablet    GABAPENTIN (NEURONTIN) 100 MG CAPSULE    gabapentin 100 mg capsule   Take 1 capsule 3 times a day by oral route as needed for 30 days. ISOSORBIDE MONONITRATE (IMDUR) 60 MG EXTENDED RELEASE TABLET    Take 60 mg by mouth daily    LOSARTAN (COZAAR) 100 MG TABLET    Take 1 tablet by mouth daily    METOPROLOL SUCCINATE (TOPROL XL) 50 MG EXTENDED RELEASE TABLET    Take 25 mg by mouth at bedtime     NITROGLYCERIN (NITROSTAT) 0.4 MG SL TABLET    Place 0.4 mg under the tongue every 5 minutes. Indications: Chest Pain    PANTOPRAZOLE (PROTONIX) 40 MG TABLET    Take 40 mg by mouth 2 times daily     PAROXETINE (PAXIL) 20 MG TABLET    Take by mouth every morning     POTASSIUM CHLORIDE (KLOR-CON M) 20 MEQ EXTENDED RELEASE TABLET    Take 1 tablet by mouth daily    PRAVASTATIN (PRAVACHOL) 40 MG TABLET    Take 40 mg by mouth at bedtime     RANOLAZINE (RANEXA) 500 MG SR TABLET    Take 500 mg by mouth 2 times daily. ALLERGIES     is allergic to dofetilide, lisinopril-hydrochlorothiazide, sulfa antibiotics, penicillins, polyethylene glycol, actifed cold-allergy [chlorpheniramine-phenylephrine], altace [ramipril], cephalosporins, coreg [carvedilol], dml facial moisturizer, elavil [amitriptyline hcl], entex [ami-margot], ethylene glycol, fentanyl, heparin, hizentra [immune globulin (human)], hydralazine, levofloxacin, lisinopril-hydrochlorothiazide, montelukast sodium, morphine, nifedipine, norvasc [amlodipine besylate], other, phenobarbital, propoxyphene, robaxin [methocarbamol], sinequan [doxepin hcl], sudafed [pseudoephedrine hcl], sudafed [pseudoephedrine hcl], tranquil-cecil, wellbutrin [bupropion hcl], actifed cold-allergy [chlorpheniramine-phenylephrine], clindamycin/lincomycin, codeine, metoprolol, metoprolol succinate, and seldane [terfenadine]. FAMILY HISTORY     She indicated that the status of her mother is unknown. She indicated that the status of her father is unknown. She indicated that the status of her sister is unknown.  She indicated that the status of her maternal aunt is unknown.     family history includes Breast Cancer in her maternal aunt; Heart Disease in her father and mother; Hypertension in her sister; Other in her father, maternal aunt, mother, and sister. SOCIAL HISTORY      reports that she has never smoked. She has never used smokeless tobacco. She reports that she does not drink alcohol and does not use drugs. PHYSICAL EXAM    (up to 7 for level 4, 8 or more for level 5)   INITIAL VITALS:  height is 5' 1\" (1.549 m) and weight is 76.7 kg (169 lb). Her oral temperature is 99 °F (37.2 °C). Her blood pressure is 170/81 (abnormal) and her pulse is 85. Her respiration is 18 and oxygen saturation is 96%. Physical Exam  Constitutional:       General: She is not in acute distress. Appearance: She is well-developed. She is not diaphoretic. HENT:      Head: Normocephalic and atraumatic. Eyes:      General: No scleral icterus. Right eye: No discharge. Left eye: No discharge. Conjunctiva/sclera: Conjunctivae normal.      Pupils: Pupils are equal, round, and reactive to light. Cardiovascular:      Rate and Rhythm: Normal rate and regular rhythm. Heart sounds: Normal heart sounds. No murmur heard. No friction rub. No gallop. Pulmonary:      Effort: Pulmonary effort is normal. No respiratory distress. Breath sounds: Normal breath sounds. No wheezing or rales. Abdominal:      General: There is no distension. Palpations: Abdomen is soft. There is no mass. Tenderness: There is no abdominal tenderness. There is no guarding or rebound. Hernia: No hernia is present. Musculoskeletal:         General: Normal range of motion. Cervical back: Normal range of motion. Skin:     General: Skin is warm. Findings: No rash. Neurological:      Mental Status: She is alert and oriented to person, place, and time. Psychiatric:         Behavior: Behavior normal.     Patient in mild respiratory distress at rest.  Is tachypneic.   Was satting in the mid [de-identified] on room air.  DIFFERENTIAL DIAGNOSIS/ MDM:     Patient is positive for COVID here the rest of her work-up is generally unremarkable. She is requiring oxygen to maintain her sats and given her immunosuppressive state and multiple comorbidities I discussed case with Sisi who agreed to admit. Patient stable for admission at this time    DIAGNOSTIC RESULTS     EKG: All EKG's are interpreted by the Emergency Department Physician who either signs or Co-signs this chart in the [address] a cardiologist.  EKG Interpretation    I EKG shows a ventricular paced rhythm. RADIOLOGY:  Non-plain film images such as CT, Ultrasound and MRI are read by the radiologist. [facility] radiographic images are visualized and the radiologist interpretations are reviewed as follows:         Interpretation per the Radiologist below, if available at the time of this note:        LABS:  Results for orders placed or performed during the hospital encounter of 11/05/22   COVID-19, Rapid    Specimen: Nasopharyngeal Swab   Result Value Ref Range    Specimen Description . NASOPHARYNGEAL SWAB     SARS-CoV-2, Rapid DETECTED (A) Not Detected   Rapid Influenza A/B Antigens    Specimen: Nasopharyngeal   Result Value Ref Range    Flu A Antigen NEGATIVE NEGATIVE    Flu B Antigen NEGATIVE NEGATIVE   CBC with Auto Differential   Result Value Ref Range    WBC 5.7 3.5 - 11.0 k/uL    RBC 3.64 (L) 4.0 - 5.2 m/uL    Hemoglobin 11.7 (L) 12.0 - 16.0 g/dL    Hematocrit 34.3 (L) 36 - 46 %    MCV 94.3 80 - 100 fL    MCH 32.2 26 - 34 pg    MCHC 34.1 31 - 37 g/dL    RDW 17.4 (H) 12.5 - 15.4 %    Platelets 510 695 - 854 k/uL    MPV 9.1 6.0 - 12.0 fL    Seg Neutrophils 74 (H) 36 - 66 %    Lymphocytes 17 (L) 24 - 44 %    Monocytes 9 (H) 1 - 7 %    Eosinophils % 0 (L) 1 - 4 %    Basophils 0 0 - 2 %    Segs Absolute 4.22 1.8 - 7.7 k/uL    Absolute Lymph # 0.97 (L) 1.0 - 4.8 k/uL    Absolute Mono # 0.51 0.1 - 0.8 k/uL    Absolute Eos # 0.00 0.0 - 0.4 k/uL    Basophils Absolute 0.00 0.0 - 0.2 k/uL    Morphology Normal    CMP   Result Value Ref Range    Glucose 94 70 - 99 mg/dL    BUN 20 8 - 23 mg/dL    Creatinine 1.04 (H) 0.50 - 0.90 mg/dL    Est, Glom Filt Rate 56 (L) >60 mL/min/1.73m2    Calcium 9.5 8.6 - 10.4 mg/dL    Sodium 136 135 - 144 mmol/L    Potassium 3.6 (L) 3.7 - 5.3 mmol/L    Chloride 95 (L) 98 - 107 mmol/L    CO2 27 20 - 31 mmol/L    Anion Gap 14 9 - 17 mmol/L    Alkaline Phosphatase 103 35 - 104 U/L    ALT 8 5 - 33 U/L    AST 17 <32 U/L    Total Bilirubin 1.1 0.3 - 1.2 mg/dL    Total Protein 6.9 6.4 - 8.3 g/dL    Albumin 4.6 3.5 - 5.2 g/dL    Albumin/Globulin Ratio 2.0 1.0 - 2.5   Lipase   Result Value Ref Range    Lipase 23 13 - 60 U/L   Lactic Acid   Result Value Ref Range    Lactic Acid 1.4 0.5 - 2.2 mmol/L   Troponin   Result Value Ref Range    Troponin, High Sensitivity 31 (H) 0 - 14 ng/L   D-Dimer, Quantitative   Result Value Ref Range    D-Dimer, Quant 0.39 mg/L FEU           EMERGENCY DEPARTMENT COURSE:   Vitals:    Vitals:    11/05/22 2141 11/05/22 2145   BP: (!) 170/81    Pulse: 85    Resp: 18 18   Temp: 99 °F (37.2 °C)    TempSrc: Oral    SpO2: 90% 96%   Weight: 76.7 kg (169 lb)    Height: 5' 1\" (1.549 m)      -------------------------  BP: (!) 170/81, Temp: 99 °F (37.2 °C), Heart Rate: 85, Resp: 18      RE-EVALUATION:      CONSULTS:      PROCEDURES:  None    FINAL IMPRESSION      1. Cough, unspecified type    2. COVID          DISPOSITION/PLAN   DISPOSITION Admitted 11/06/2022 12:07:42 AM      CONDITION ON DISPOSITION:       PATIENT REFERRED TO:  No follow-up provider specified. DISCHARGE MEDICATIONS:  New Prescriptions    No medications on file       (Please note that portions of this note were completed with a voicerecognition program.  Efforts were made to edit the dictations but occasionally words are mis-transcribed. )    Toya Palmer MD, MD, [facility]  Attending Emergency Medicine Physician       Albin Jefferson MD  11/06/22 8311

## 2022-11-06 NOTE — PROGRESS NOTES
Nutrition Note    PNS received. Nutrition assessment 11/7/2022.     Electronically signed by Adiel Castillo RD on 11/6/22 at 11:55 AM EST    SONA Bolton, RDN, LD  Registered [address] [place] [address]  [phone]

## 2022-11-06 NOTE — PLAN OF CARE
Problem: Discharge Planning  Goal: Discharge to home or other facility with appropriate resources  Outcome: Progressing     Problem: Pain  Goal: Verbalizes/displays adequate comfort level or baseline comfort level  Outcome: Progressing     Problem: ABCDS Injury Assessment  Goal: Absence of physical injury  Outcome: Progressing     Problem: Safety - Adult  Goal: Free from fall injury  11/6/2022 1215 by Srini Katz RN  Outcome: Progressing  11/6/2022 0140 by Radha Ulrich RN  Outcome: Progressing  11/6/2022 0140 by Radha Ulrich RN  Outcome: Progressing     Problem: Chronic Conditions and Co-morbidities  Goal: Patient's chronic conditions and co-morbidity symptoms are monitored and maintained or improved  Outcome: Progressing

## 2022-11-06 NOTE — H&P
[facility]  Office: [address], , Kristal Giacomo, DO, Ivanryan Rodríguez, DO, Radha Chowdhruy, DO, Elvin Gutierrez MD, Landry Levy MD, Benjamin Chavez MD, Chrissy Castaneda MD,  Leopoldo Lorenzo MD, July Keane MD, Darnell Rodriguez, DO, Robert Horne MD,  Rosio Martin MD, Brian Roberts MD, Latonia Graham DO, Jessenia Tabor MD, Jaky Cortez MD, Cristina Thomas DO, Rafita Skinner MD, Stephanie Abrams MD, Kerry Charles MD, Alex Lucio MD, Vinay Jackson DO, Bárbara Viramontes MD, Kirt Mclaughlin MD, Khoi Dolan, CNP,  Grecia Girard, CNP, Keon Richmond, CNP, Gina Hsieh, CNP,  Emely Breen, DNP, Ashley Foster, CNP, Dirk Charles, CNP, Marisabel Olivas, CNP, Armando Bhatia, CNP, Carolyn Ha, CNP, Ketty Neri PAAnselmoC, Nathan Pisano, CNS, Humberto Sherman, DNP, Loyd Quach, CNP, Sophie Taylor, CNP, Venkatesh Cramer, CNP         [facility]    HISTORY AND PHYSICAL EXAMINATION            Date:   11/6/2022  Patient name:  Toma Wen  Date of admission:  11/5/2022  9:41 PM  MRN:   [id number]  Account:  [de-identified]  YOB: 1945  PCP:    Sandor Buerger, MD  Room:   [facility]/331-  Code Status:    Full Code    Chief Complaint:     Chief Complaint   Patient presents with    Cough    Shortness of Breath     History Obtained From:     patient    History of Present Illness:     Toma Wen is a 78 y.o. female with a past medical history of HFpEF, paroxsymal atrial fibrillation (on Eliquis), multiple sclerosis, hypertension and hyperlipidemia who presented to the emergency department on 11/5/2022 complaining of shortness of breath and cough. The patient states that her symptoms began several days ago and have progressively worsened. In the ED, the patient was afebrile and nontoxic appearing. She was hypoxic and placed on 2 L NC. COVID-19 test was positive.  The patient is admitted to internal medicine for further management of acute hypoxia secondary to COVID-19 infection. Past Medical History:     Past Medical History:   Diagnosis Date    Abnormal Pap smear 10/2010    paucity or absence of TZx 3 years in a row. 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[address]     Multiple sclerosis ([address]     progressive    Myoclonic seizures ([address]     Optic neuritis     Mazin Anirudh Pupil    Osteopenia     Pyloric stenosis     Raynaud's disease     Vasomotor rhinitis         Past Surgical History:     Past Surgical History:   Procedure Laterality Date    APPENDECTOMY  1955    sponges left in abdomen, at [address] [place] [address] Way    stereotactic type, right breast, benign    CARDIAC CATHETERIZATION  07/31/2020    POBA lad    CARDIAC SURGERY      CARDIOVERSION  08/14/2020    [facility]     CARDIOVERSION  07/31/2020    COLONOSCOPY  2010    Polyp    CORONARY ANGIOPLASTY WITH STENT PLACEMENT  5/2011    failed attempt to place stent    CORONARY ANGIOPLASTY WITH STENT PLACEMENT  6/2011    successful placement of 2 stent in same artery   [address]    bleeding ulcers found   [address]    surgical sponges left in during appendectomy    HAMMER TOE SURGERY  1989    head resection of phalanx, left foot    LAPAROSCOPY  1983    with D&C    NE COLONOSCOPY W/BIOPSY SINGLE/MULTIPLE N/A 12/19/2017    COLONOSCOPY WITH BIOPSY performed by Ester Yates MD at [facility]    MO EGD TRANSORAL BIOPSY SINGLE/MULTIPLE  12/19/2017    EGD BIOPSY performed by Ester Yates MD at [address] [place] [address] resection with rt and left ethmoidectomy    SHOULDER ARTHROSCOPY  1998    rotator cuff impingement, right arm    SINUS ENDOSCOPY      SINUS SURGERY      multiple    UPPER GASTROINTESTINAL ENDOSCOPY      2-4 times per year for pyloric stenosis    UPPER GASTROINTESTINAL ENDOSCOPY  12/19/2017    EGD DILATION BALLOON performed by Ester Yates MD at [facility]    UPPER GASTROINTESTINAL ENDOSCOPY  5/24/2018    EGD DILATION SAVORY performed by Ajith James MD at [facility]        Medications Prior to Admission:     Prior to Admission medications    Medication Sig Start Date End Date Taking? Authorizing Provider   bumetanide (BUMEX) 1 MG tablet Take 0.5 tablets by mouth daily Advised to take additional 1/2 pill for weight gain, increased CHF and/swelling  Patient taking differently: Take 1 mg by mouth daily Advised to take additional 1/2 pill for weight gain, increased CHF and/swelling 3/9/22 4/8/22  Zoraida ANGUIANO Blood, DO   allopurinol (ZYLOPRIM) 100 MG tablet Take 100 mg by mouth daily    Historical Provider, MD   isosorbide mononitrate (IMDUR) 60 MG extended release tablet Take 60 mg by mouth daily  Patient not taking: Reported on 11/6/2022    Historical Provider, MD   doxazosin (CARDURA) 1 MG tablet doxazosin 1 mg tablet  Patient not taking: Reported on 11/6/2022    Historical Provider, MD   gabapentin (NEURONTIN) 100 MG capsule gabapentin 100 mg capsule   Take 1 capsule 3 times a day by oral route as needed for 30 days.   Patient not taking: Reported on 11/6/2022 6/1/21   Historical Provider, MD   metoprolol succinate (TOPROL XL) 50 MG extended release tablet Take 25 mg by mouth at bedtime  5/20/21   Historical Provider, MD   cloNIDine (CATAPRES) 0.1 MG tablet Take 1 tablet by mouth 2 times daily 8/8/21   Adaline Elliot Wallace DO   losartan (COZAAR) 100 MG tablet Take 1 tablet by mouth daily 8/8/21   Ese Paniagua DO   potassium chloride (KLOR-CON M) 20 MEQ extended release tablet Take 1 tablet by mouth daily  Patient not taking: Reported on 11/6/2022 8/27/20   Coy Grimaldo APRN - CNP   apixaban (ELIQUIS) 5 MG TABS tablet Take by mouth 2 times daily    Historical Provider, MD   pantoprazole (PROTONIX) 40 MG tablet Take 40 mg by mouth 2 times daily     Historical Provider, MD   azaTHIOprine (IMURAN) 50 MG tablet Take 50 mg by mouth daily     Historical Provider, MD   ranolazine (RANEXA) 500 MG SR tablet Take 500 mg by mouth 2 times daily. Historical Provider, MD   nitroGLYCERIN (NITROSTAT) 0.4 MG SL tablet Place 0.4 mg under the tongue every 5 minutes.  Indications: Chest Pain    Historical Provider, MD   PARoxetine (PAXIL) 20 MG tablet Take by mouth every morning     Historical Provider, MD   clopidogrel (PLAVIX) 75 MG tablet Take 75 mg by mouth daily 100 mg pm    Historical Provider, MD   pravastatin (PRAVACHOL) 40 MG tablet Take 40 mg by mouth at bedtime     Historical Provider, MD        Allergies:     Dofetilide, Lisinopril-hydrochlorothiazide, Sulfa antibiotics, Penicillins, Polyethylene glycol, Actifed cold-allergy [chlorpheniramine-phenylephrine], Altace [ramipril], Cephalosporins, Coreg [carvedilol], Dml facial moisturizer, Elavil [amitriptyline hcl], Entex [ami-margot], Ethylene glycol, Fentanyl, Heparin, Hizentra [immune globulin (human)], Hydralazine, Levofloxacin, Lisinopril-hydrochlorothiazide, Montelukast sodium, Morphine, Nifedipine, Norvasc [amlodipine besylate], Other, Phenobarbital, Propoxyphene, Robaxin [methocarbamol], Sinequan [doxepin hcl], Sudafed [pseudoephedrine hcl], Sudafed [pseudoephedrine hcl], Tranquil-cecil, Wellbutrin [bupropion hcl], Actifed cold-allergy [chlorpheniramine-phenylephrine], Clindamycin/lincomycin, Codeine, Metoprolol, Metoprolol succinate, and Seldane [terfenadine]    Social History:     Tobacco:    reports that she has never smoked. She has never used smokeless tobacco.  Alcohol:      reports no history of alcohol use. Drug Use:  reports no history of drug use. Family History:     Family History   Problem Relation Age of Onset    Other Father         Heart problems    Heart Disease Father     Other Mother         heart problems requiring open heart surgery, HTN    Heart Disease Mother     Other Sister         HTN    Hypertension Sister     Other Maternal Aunt         breast cancer    Breast Cancer Maternal Aunt        Review of Systems:     Positive and Negative as described in HPI. CONSTITUTIONAL:  negative for fevers, chills, sweats, fatigue, weight loss  HEENT:  negative for vision, hearing changes, runny nose, throat pain  RESPIRATORY: Positive for shortness of breath and cough.    CARDIOVASCULAR:  negative for chest pain, palpitations  GASTROINTESTINAL:  negative for nausea, vomiting, diarrhea, constipation, change in bowel habits, abdominal pain   GENITOURINARY:  negative for difficulty of urination, burning with urination, frequency   INTEGUMENT:  negative for rash, skin lesions, easy bruising   HEMATOLOGIC/LYMPHATIC:  negative for swelling/edema   ALLERGIC/IMMUNOLOGIC:  negative for urticaria , itching  ENDOCRINE:  negative increase in drinking, increase in urination, hot or cold intolerance  MUSCULOSKELETAL:  negative joint pains, muscle aches, swelling of joints  NEUROLOGICAL:  negative for headaches, dizziness, lightheadedness, numbness, pain, tingling extremities  BEHAVIOR/PSYCH:  negative for depression, anxiety    Physical Exam:   /67   Pulse 80   Temp 98 °F (36.7 °C) (Oral)   Resp 18   Ht 5' 1\" (1.549 m)   Wt 169 lb (76.7 kg)   SpO2 100%   BMI 31.93 kg/m²   Temp (24hrs), Av.5 °F (36.9 °C), Min:98 °F (36.7 °C), Max:99 °F (37.2 °C)    No results for input(s): POCGLU in the last 72 hours.    Intake/Output Summary (Last 24 hours) at 11/6/2022 2198  Last data filed at 11/6/2022 0420  Gross per 24 hour   Intake --   Output 400 ml   Net -400 ml       General Appearance:  alert, well appearing, and in no acute distress  Mental status: oriented to person, place, and time  Head:  normocephalic, atraumatic  Eye: no icterus, redness, pupils equal and reactive, extraocular eye movements intact, conjunctiva clear  Ear: normal external ear, no discharge, hearing intact  Nose:  no drainage noted  Mouth: mucous membranes moist  Neck: supple, no carotid bruits, thyroid not palpable  Lungs: Bilateral equal air entry, clear to ausculation, no wheezing, rales or rhonchi, normal effort  Cardiovascular: normal rate, regular rhythm, no murmur, gallop, rub  Abdomen: Soft, nontender, nondistended, normal bowel sounds, no hepatomegaly or splenomegaly  Neurologic: There are no new focal motor or sensory deficits, normal muscle tone and bulk, no abnormal sensation, normal speech, cranial nerves II through XII grossly intact  Skin: No gross lesions, rashes, bruising or bleeding on exposed skin area  Extremities:  peripheral pulses palpable, no pedal edema or calf pain with palpation  Psych: normal affect     Investigations:      Laboratory Testing:  Recent Results (from the past 24 hour(s))   COVID-19, Rapid    Collection Time: 11/05/22 10:09 PM    Specimen: Nasopharyngeal Swab   Result Value Ref Range    Specimen Description . NASOPHARYNGEAL SWAB     SARS-CoV-2, Rapid DETECTED (A) Not Detected   Rapid Influenza A/B Antigens    Collection Time: 11/05/22 10:09 PM    Specimen: Nasopharyngeal   Result Value Ref Range    Flu A Antigen NEGATIVE NEGATIVE    Flu B Antigen NEGATIVE NEGATIVE   CBC with Auto Differential    Collection Time: 11/05/22 10:17 PM   Result Value Ref Range    WBC 5.7 3.5 - 11.0 k/uL    RBC 3.64 (L) 4.0 - 5.2 m/uL    Hemoglobin 11.7 (L) 12.0 - 16.0 g/dL    Hematocrit 34.3 (L) 36 - 46 %    MCV 94.3 80 - 100 fL    MCH 32.2 26 - 34 pg    MCHC 34.1 31 - 37 g/dL    RDW 17.4 (H) 12.5 - 15.4 %    Platelets 291 257 - 285 k/uL    MPV 9.1 6.0 - 12.0 fL    Seg Neutrophils 74 (H) 36 - 66 %    Lymphocytes 17 (L) 24 - 44 %    Monocytes 9 (H) 1 - 7 %    Eosinophils % 0 (L) 1 - 4 %    Basophils 0 0 - 2 %    Segs Absolute 4.22 1.8 - 7.7 k/uL    Absolute Lymph # 0.97 (L) 1.0 - 4.8 k/uL    Absolute Mono # 0.51 0.1 - 0.8 k/uL    Absolute Eos # 0.00 0.0 - 0.4 k/uL    Basophils Absolute 0.00 0.0 - 0.2 k/uL    Morphology Normal    CMP    Collection Time: 11/05/22 10:17 PM   Result Value Ref Range    Glucose 94 70 - 99 mg/dL    BUN 20 8 - 23 mg/dL    Creatinine 1.04 (H) 0.50 - 0.90 mg/dL    Est, Glom Filt Rate 56 (L) >60 mL/min/1.73m2    Calcium 9.5 8.6 - 10.4 mg/dL    Sodium 136 135 - 144 mmol/L    Potassium 3.6 (L) 3.7 - 5.3 mmol/L    Chloride 95 (L) 98 - 107 mmol/L    CO2 27 20 - 31 mmol/L    Anion Gap 14 9 - 17 mmol/L    Alkaline Phosphatase 103 35 - 104 U/L    ALT 8 5 - 33 U/L    AST 17 <32 U/L    Total Bilirubin 1.1 0.3 - 1.2 mg/dL    Total Protein 6.9 6.4 - 8.3 g/dL    Albumin 4.6 3.5 - 5.2 g/dL    Albumin/Globulin Ratio 2.0 1.0 - 2.5   Lipase    Collection Time: 11/05/22 10:17 PM   Result Value Ref Range    Lipase 23 13 - 60 U/L   Lactic Acid    Collection Time: 11/05/22 10:17 PM   Result Value Ref Range    Lactic Acid 1.4 0.5 - 2.2 mmol/L   Troponin    Collection Time: 11/05/22 10:17 PM   Result Value Ref Range    Troponin, High Sensitivity 31 (H) 0 - 14 ng/L   D-Dimer, Quantitative    Collection Time: 11/05/22 10:17 PM   Result Value Ref Range    D-Dimer, Quant 0.39 mg/L FEU   Urinalysis    Collection Time: 11/06/22  2:08 AM   Result Value Ref Range    Color, UA Yellow Yellow    Turbidity UA Clear Clear    Glucose, Ur NEGATIVE NEGATIVE    Bilirubin Urine NEGATIVE NEGATIVE    Ketones, Urine NEGATIVE NEGATIVE    Specific Gravity, UA 1.010 1.005 - 1.030    Urine Hgb SMALL (A) NEGATIVE    pH, UA 6.5 5.0 - 8.0    Protein, UA NEGATIVE NEGATIVE    Urobilinogen, Urine Normal Normal    Nitrite, Urine NEGATIVE NEGATIVE    Leukocyte Esterase, Urine TRACE (A) NEGATIVE   Microscopic Urinalysis    Collection Time: 11/06/22  2:08 AM   Result Value Ref Range    WBC, UA 0 TO 2 0 - 5 /HPF    RBC, UA 0 TO 2 0 - 2 /HPF    Epithelial Cells UA 0 TO 2 0 - 5 /HPF    Bacteria, UA MODERATE (A) None    Other Observations UA (A) NOT REQ. Utilizing a urinalysis as the only screening method to exclude a potential uropathogen can be unreliable in many patient populations. Rapid screening tests are less sensitive than culture and if UTI is a clinical possibility, culture should be considered despite a negative urinalysis. Troponin    Collection Time: 11/06/22  5:43 AM   Result Value Ref Range    Troponin, High Sensitivity 40 (H) 0 - 14 ng/L       Imaging/Diagnostics:  XR CHEST PORTABLE    Result Date: 11/5/2022  Mild cardiomegaly with probable pulmonary edema.        Assessment :      Hospital Problems             Last Modified POA    * (Principal) COVID 11/6/2022 Yes    Obesity (BMI 30-39.9) 11/6/2022 Yes    Essential hypertension (Chronic) 11/6/2022 Yes    Dyslipidemia (Chronic) 11/6/2022 Yes    Chronic diastolic heart failure ([address] 11/6/2022 Yes    Paroxysmal A-fib ([address] (Chronic) 11/6/2022 Yes    Chronic renal insufficiency, stage III (moderate) (HCC) (Chronic) 11/6/2022 Yes       Plan:     Patient status inpatient in the Med/Surge    COVID-19 infection   -Wean supplemental oxygen as tolerated (currently on 2 L NC)   -Start IV dexamethasone 6 mg daily (patient states she would prefer IV steroids due to GI intolerance)     Paroxsymal atrial fibrillation   -Continue home Eliquis   -Continue home metoprolol     CAD s/p stent placement   -Stable; continue home clopidogrel     CKD Stage IIIa   -Stable; creatinine is better than baseline   -Daily BMP     Hypertension   -Continue home clonidine and losartan     Hyperlipidemia   -Continue home pravastatin Consultations:   None    Patient is admitted as inpatient status because of co-morbidities listed above, severity of signs and symptoms as outlined, requirement for current medical therapies and most importantly because of direct risk to patient if care not provided in a hospital setting. Expected length of stay > 48 hours.     Alon Navarro MD  11/6/2022  7:02 AM    Copy sent to Dr. Ella Paulson MD

## 2022-11-06 NOTE — PROGRESS NOTES
Patient is alert and oriented X4. .. Patient states that the flu shot gave her MS in 1983, the day after she received the flu vaccine. For this reason, she does not take vaccines. Patient states that she has been falling at home, and does not know if she would feel safe to return. Patient states her nephew is her decision maker because of his awareness of the medical field secondary to his job as a .

## 2022-11-07 LAB
ABSOLUTE EOS #: 0.04 K/UL (ref 0–0.4)
ABSOLUTE LYMPH #: 0.98 K/UL (ref 1–4.8)
ABSOLUTE MONO #: 0.39 K/UL (ref 0.1–0.8)
ANION GAP SERPL CALCULATED.3IONS-SCNC: 11 MMOL/L (ref 9–17)
BASOPHILS # BLD: 1 % (ref 0–2)
BASOPHILS ABSOLUTE: 0.04 K/UL (ref 0–0.2)
BUN BLDV-MCNC: 22 MG/DL (ref 8–23)
CALCIUM SERPL-MCNC: 8.3 MG/DL (ref 8.6–10.4)
CHLORIDE BLD-SCNC: 93 MMOL/L (ref 98–107)
CO2: 27 MMOL/L (ref 20–31)
CREAT SERPL-MCNC: 1.39 MG/DL (ref 0.5–0.9)
EKG ATRIAL RATE: 81 BPM
EKG Q-T INTERVAL: 484 MS
EKG QRS DURATION: 128 MS
EKG QTC CALCULATION (BAZETT): 582 MS
EKG R AXIS: 3 DEGREES
EKG T AXIS: -135 DEGREES
EKG VENTRICULAR RATE: 87 BPM
EOSINOPHILS RELATIVE PERCENT: 1 % (ref 1–4)
GFR SERPL CREATININE-BSD FRML MDRD: 40 ML/MIN/1.73M2
GLUCOSE BLD-MCNC: 82 MG/DL (ref 70–99)
HCT VFR BLD CALC: 32.2 % (ref 36–46)
HEMOGLOBIN: 10.9 G/DL (ref 12–16)
LYMPHOCYTES # BLD: 25 % (ref 24–44)
MAGNESIUM: 1.8 MG/DL (ref 1.6–2.6)
MCH RBC QN AUTO: 31.9 PG (ref 26–34)
MCHC RBC AUTO-ENTMCNC: 33.8 G/DL (ref 31–37)
MCV RBC AUTO: 94.4 FL (ref 80–100)
MONOCYTES # BLD: 10 % (ref 1–7)
MORPHOLOGY: ABNORMAL
PDW BLD-RTO: 16.5 % (ref 12.5–15.4)
PLATELET # BLD: 143 K/UL (ref 140–450)
PMV BLD AUTO: 9.7 FL (ref 6–12)
POTASSIUM SERPL-SCNC: 3.1 MMOL/L (ref 3.7–5.3)
RBC # BLD: 3.41 M/UL (ref 4–5.2)
SEG NEUTROPHILS: 63 % (ref 36–66)
SEGMENTED NEUTROPHILS ABSOLUTE COUNT: 2.45 K/UL (ref 1.8–7.7)
SODIUM BLD-SCNC: 131 MMOL/L (ref 135–144)
VITAMIN D 25-HYDROXY: 8.5 NG/ML
WBC # BLD: 3.9 K/UL (ref 3.5–11)

## 2022-11-07 PROCEDURE — 80048 BASIC METABOLIC PNL TOTAL CA: CPT

## 2022-11-07 PROCEDURE — 36415 COLL VENOUS BLD VENIPUNCTURE: CPT

## 2022-11-07 PROCEDURE — 6370000000 HC RX 637 (ALT 250 FOR IP): Performed by: NURSE PRACTITIONER

## 2022-11-07 PROCEDURE — 85025 COMPLETE CBC W/AUTO DIFF WBC: CPT

## 2022-11-07 PROCEDURE — 97166 OT EVAL MOD COMPLEX 45 MIN: CPT

## 2022-11-07 PROCEDURE — 97116 GAIT TRAINING THERAPY: CPT

## 2022-11-07 PROCEDURE — 97535 SELF CARE MNGMENT TRAINING: CPT

## 2022-11-07 PROCEDURE — 6370000000 HC RX 637 (ALT 250 FOR IP): Performed by: STUDENT IN AN ORGANIZED HEALTH CARE EDUCATION/TRAINING PROGRAM

## 2022-11-07 PROCEDURE — 2580000003 HC RX 258: Performed by: NURSE PRACTITIONER

## 2022-11-07 PROCEDURE — 99232 SBSQ HOSP IP/OBS MODERATE 35: CPT | Performed by: STUDENT IN AN ORGANIZED HEALTH CARE EDUCATION/TRAINING PROGRAM

## 2022-11-07 PROCEDURE — 82306 VITAMIN D 25 HYDROXY: CPT

## 2022-11-07 PROCEDURE — 1210000000 HC MED SURG R&B

## 2022-11-07 PROCEDURE — 6360000002 HC RX W HCPCS: Performed by: STUDENT IN AN ORGANIZED HEALTH CARE EDUCATION/TRAINING PROGRAM

## 2022-11-07 PROCEDURE — 83735 ASSAY OF MAGNESIUM: CPT

## 2022-11-07 PROCEDURE — 97162 PT EVAL MOD COMPLEX 30 MIN: CPT

## 2022-11-07 RX ORDER — POTASSIUM CHLORIDE 20 MEQ/1
40 TABLET, EXTENDED RELEASE ORAL PRN
Status: DISCONTINUED | OUTPATIENT
Start: 2022-11-07 | End: 2022-11-08 | Stop reason: HOSPADM

## 2022-11-07 RX ORDER — NITROFURANTOIN 25; 75 MG/1; MG/1
100 CAPSULE ORAL EVERY 12 HOURS SCHEDULED
Status: DISCONTINUED | OUTPATIENT
Start: 2022-11-07 | End: 2022-11-08 | Stop reason: HOSPADM

## 2022-11-07 RX ORDER — POTASSIUM CHLORIDE 7.45 MG/ML
10 INJECTION INTRAVENOUS PRN
Status: DISCONTINUED | OUTPATIENT
Start: 2022-11-07 | End: 2022-11-08 | Stop reason: HOSPADM

## 2022-11-07 RX ADMIN — PRAVASTATIN SODIUM 40 MG: 20 TABLET ORAL at 21:43

## 2022-11-07 RX ADMIN — RANOLAZINE 500 MG: 500 TABLET, EXTENDED RELEASE ORAL at 21:44

## 2022-11-07 RX ADMIN — DEXAMETHASONE SODIUM PHOSPHATE 6 MG: 4 INJECTION, SOLUTION INTRAMUSCULAR; INTRAVENOUS at 09:35

## 2022-11-07 RX ADMIN — BUMETANIDE 1 MG: 1 TABLET ORAL at 10:40

## 2022-11-07 RX ADMIN — APIXABAN 5 MG: 5 TABLET, FILM COATED ORAL at 21:44

## 2022-11-07 RX ADMIN — NITROFURANTOIN MONOHYDRATE/MACROCRYSTALS 100 MG: 75; 25 CAPSULE ORAL at 21:43

## 2022-11-07 RX ADMIN — LOSARTAN POTASSIUM 100 MG: 50 TABLET, FILM COATED ORAL at 10:41

## 2022-11-07 RX ADMIN — RANOLAZINE 500 MG: 500 TABLET, EXTENDED RELEASE ORAL at 10:41

## 2022-11-07 RX ADMIN — SODIUM CHLORIDE, PRESERVATIVE FREE 10 ML: 5 INJECTION INTRAVENOUS at 21:46

## 2022-11-07 RX ADMIN — POTASSIUM CHLORIDE 40 MEQ: 1500 TABLET, EXTENDED RELEASE ORAL at 13:23

## 2022-11-07 RX ADMIN — ACETAMINOPHEN 1000 MG: 500 TABLET ORAL at 13:47

## 2022-11-07 RX ADMIN — SODIUM CHLORIDE, PRESERVATIVE FREE 10 ML: 5 INJECTION INTRAVENOUS at 09:38

## 2022-11-07 RX ADMIN — CLONIDINE HYDROCHLORIDE 0.1 MG: 0.1 TABLET ORAL at 21:44

## 2022-11-07 RX ADMIN — PANTOPRAZOLE SODIUM 40 MG: 40 TABLET, DELAYED RELEASE ORAL at 10:40

## 2022-11-07 RX ADMIN — PANTOPRAZOLE SODIUM 40 MG: 40 TABLET, DELAYED RELEASE ORAL at 21:43

## 2022-11-07 RX ADMIN — APIXABAN 5 MG: 5 TABLET, FILM COATED ORAL at 10:41

## 2022-11-07 RX ADMIN — CLONIDINE HYDROCHLORIDE 0.1 MG: 0.1 TABLET ORAL at 10:41

## 2022-11-07 RX ADMIN — POTASSIUM CHLORIDE 20 MEQ: 1500 TABLET, EXTENDED RELEASE ORAL at 13:23

## 2022-11-07 RX ADMIN — PAROXETINE HYDROCHLORIDE HEMIHYDRATE 20 MG: 20 TABLET, FILM COATED ORAL at 10:40

## 2022-11-07 RX ADMIN — ACETAMINOPHEN 1000 MG: 500 TABLET ORAL at 21:43

## 2022-11-07 RX ADMIN — CLOPIDOGREL BISULFATE 75 MG: 75 TABLET ORAL at 10:41

## 2022-11-07 RX ADMIN — NITROFURANTOIN MONOHYDRATE/MACROCRYSTALS 100 MG: 75; 25 CAPSULE ORAL at 10:41

## 2022-11-07 ASSESSMENT — PAIN SCALES - GENERAL
PAINLEVEL_OUTOF10: 5
PAINLEVEL_OUTOF10: 8
PAINLEVEL_OUTOF10: 5
PAINLEVEL_OUTOF10: 3

## 2022-11-07 NOTE — PLAN OF CARE
Nutrition Problem #1: Predicted inadequate energy intake  Intervention: Food and/or Nutrient Delivery: Continue Current Diet, Start Oral Nutrition Supplement  Nutritional Goal: At least 50% PO intake prior to discharge.

## 2022-11-07 NOTE — PROGRESS NOTES
Physical Therapy  Facility/Department: [facility] ICU  Physical Therapy Initial Assessment    Name: Lupillo Barton  : 1945  MRN: [id number]  Date of Service: 2022  Chief Complaint   Patient presents with    Cough    Shortness of Breath       Discharge Recommendations:  Patient would benefit from continued therapy after discharge   PT Equipment Recommendations  Equipment Needed: Yes  Mobility Devices: Sharifa Dilling: Rolling      Patient Diagnosis(es): The primary encounter diagnosis was Cough, unspecified type. A diagnosis of COVID was also pertinent to this visit. Past Medical History:  has a past medical history of Abnormal Pap smear, Asthma, Atrial fibrillation ([address] Bloody discharge from nipple, CAD (coronary artery disease), Cataracts, bilateral, Colon polyp, CVA (cerebral vascular accident) ([address] Demyelinating disease ([address] Fibromyalgia, High-risk sexual behavior, History of bone density study, History of migraines, HTN (hypertension), IgG deficiency ([address] Multiple sclerosis ([address] Myoclonic seizures ([address] Optic neuritis, Osteopenia, Pyloric stenosis, Raynaud's disease, and Vasomotor rhinitis. Past Surgical History:  has a past surgical history that includes Dilation & curettage (); Breast biopsy (); Appendectomy (); Septoplasty (); Hammer toe surgery (); Sinus endoscopy; sinus surgery; Shoulder arthroscopy (); Esophagoscopy (, ); Colonoscopy (); Foreign Body Removal (); laparoscopy (6437); Coronary angioplasty with stent (2011); Coronary angioplasty with stent (2011); Upper gastrointestinal endoscopy; eye surgery; pr colonoscopy w/biopsy single/multiple (N/A, 2017); pr egd transoral biopsy single/multiple (2017); Upper gastrointestinal endoscopy (2017); Upper gastrointestinal endoscopy (2018); Cardiac surgery; Cardiac catheterization (2020);  Cardioversion (2020); and Cardioversion (07/31/2020). Assessment   Body Structures, Functions, Activity Limitations Requiring Skilled Therapeutic Intervention: Decreased functional mobility ; Decreased strength;Decreased safe awareness;Decreased cognition;Decreased endurance;Decreased balance  Assessment: Pt with impaired mobility requiring CGA to ambulate with RW this date with limited endurance today. Pt will benefit from strict use of RW at all times and assistance with mobility based on demonstrated mobility and tolerance to such this date. Pt would benefit from 24/7 assistance with mobility. Therapy Prognosis: Good  Decision Making: Medium Complexity  Requires PT Follow-Up: Yes  Activity Tolerance  Activity Tolerance: Patient limited by endurance; Patient limited by fatigue     Plan   Physcial Therapy Plan  General Plan:  (5-6x)  Current Treatment Recommendations: Strengthening, Balance training, Functional mobility training, Transfer training, Endurance training, Gait training, Patient/Caregiver education & training, Home exercise program, Safety education & training, Therapeutic activities  Safety Devices  Type of Devices: Call light within reach, Chair alarm in place, Left in chair, Nurse notified, Gait belt  Restraints  Restraints Initially in Place: No     Restrictions  Restrictions/Precautions  Restrictions/Precautions: Fall Risk, Isolation  Required Braces or Orthoses?: No  Position Activity Restriction  Other position/activity restrictions: Up with assistance. 2L O2 in place via nasal canula, COVID+     Subjective   General  Patient assessed for rehabilitation services?: Yes  Response To Previous Treatment: Not applicable  Family / Caregiver Present: No  Follows Commands: Within Functional Limits  Subjective  Subjective: Pt supine in bed upon arrival and agreeable to therapy. Pt reports \"all over\" pain but isn't able to quantify when asked. RN agreeable to therapy.          Social/Functional History  Social/Functional History  Lives With: Alone  Type of Home: Apartment  Home Layout: One level  Home Access: Level entry  Bathroom Shower/Tub: Tub/Shower unit  Bathroom Toilet: Standard  Bathroom Equipment: Shower chair, Grab bars in shower  Bathroom Accessibility: Accessible  Home Equipment: Eleonore Fail, 4 wheeled  Has the patient had two or more falls in the past year or any fall with injury in the past year?: Yes  ADL Assistance: [address]:  (Pt reports increasing difficulty performing IADL tasks)  Homemaking Responsibilities: Yes  Meal Prep Responsibility: Primary  Laundry Responsibility: Primary  Cleaning Responsibility: Primary  Shopping Responsibility: Primary  Health Care Management: Primary  Ambulation Assistance: Independent (with use of rollator at all times, pt self reports frequent falls)  Transfer Assistance: Independent  Active : Yes  Mode of Transportation: Car  Occupation: Retired  Type of Occupation: Advanced   Leisure & Hobbies: Prayer  Vision/Hearing  Vision  Vision: Within [address]  Hearing: Within functional limits    Cognition   Cognition  Overall Cognitive Status: Exceptions  Attention Span: Attends with cues to redirect  Safety Judgement: Decreased awareness of need for assistance  Problem Solving: Assistance required to generate solutions;Assistance required to implement solutions;Assistance required to identify errors made  Insights: Decreased awareness of deficits     Objective      Gross Assessment  Sensation: Intact (Pt without complaints of numbness/tingling)     AROM RLE (degrees)  RLE AROM: WFL  AROM LLE (degrees)  LLE AROM : WFL  AROM RUE (degrees)  RUE AROM : WFL  AROM LUE (degrees)  LUE AROM : WFL  Strength RLE  Comment: 4/5 demonstrated via mobility  Strength LLE  Comment: 4/5 demonstrated via mobility  Strength RUE  Comment: Co evaluation with OT-see OT evaluation for full UE assessment  Strength LUE  Comment: Co evaluation with OT-see OT evaluation for full UE assessment        Bed mobility  Supine to Sit: Contact guard assistance (HOB flattened, significant use of bed rail)  Sit to Supine:  (Pt retired up to chair at end of session)  Scooting: Contact guard assistance  Bed Mobility Comments: Increased time/effort to perform  Transfers  Sit to Stand: Contact guard assistance  Stand to Sit: Contact guard assistance  Comment: cues to turn fully to surface with use of RW to ensure proper alignment  Ambulation  Surface: Level tile  Device: Rolling Walker  Other Apparatus: O2  Assistance: Contact guard assistance  Quality of Gait: step to gait pattern, foward flexed, decreased walker safety  Gait Deviations: Slow Jaycee;Decreased step length;Shuffles  Distance: 8ft, seated rest break, 12ft  More Ambulation?: No  Stairs/Curb  Stairs?: No     Balance  Posture: Fair  Sitting - Static: Good;-  Sitting - Dynamic: Fair;+  Standing - Static: Fair  Standing - Dynamic: Fair  Comments: standing balance assessed with RW           AM-PAC Score  AM-PAC Inpatient Mobility Raw Score : 16 (11/07/22 1704)  AM-PAC Inpatient T-Scale Score : 40.78 (11/07/22 1704)  Mobility Inpatient CMS 0-100% Score: 54.16 (11/07/22 1704)  Mobility Inpatient CMS G-Code Modifier : CK (11/07/22 1704)          Goals  Short Term Goals  Time Frame for Short Term Goals: 14 visits  Short Term Goal 1: Pt to ambulate 150ft modified independently with RW  Short Term Goal 2: Pt to sit <> stand transfer independently  Short Term Goal 3: Pt to demonstrate independent bed mobility  Short Term Goal 4: Pt to tolerate 30 minutes of therapy for endurance  Short Term Goal 5: Pt to demosntrate good standing balance to decrease risk of falls       Education  Patient Education  Education Given To: Patient  Education Provided: Role of Therapy;Plan of Care  Education Provided Comments: Importance of mobility  Education Method: Verbal  Barriers to Learning: None  Education Outcome: Verbalized understanding      Therapy Time   Individual Concurrent Group Co-treatment   Time In 1512         Time Out 1544         Minutes 32         Timed Code Treatment Minutes: [address] [facility] Delano Coats, PT

## 2022-11-07 NOTE — PROGRESS NOTES
[facility]  Office: [address], DO, Isaura Flores, DO, Tapan Carroll, DO, Fam Chowdhury, DO, Melodie Cesar MD, Corrie Lewis MD, Taylor Fowler MD, Chyna Ayala MD,  Quita Sy MD, Marie Ortiz MD, Maricruz Hassan DO, Darshan Do MD,  Daysi Mcclendon MD, Renny Portillo MD, June Chase, DO, Milton Cottrell MD, Raudel Zheng MD, [place] DO, Cassie Contreras MD, Perez Cochran MD, Kristin Talamantes MD, Angela Nayak MD, Tika Mitchell DO, Hannah Shah MD, Maria Del Carmen Gtz MD, Jesus Bolanos, Leanne Rojas, CNP, Callie Bergeron, CNP, Shell Rivas, CNP,  Kalyan Solano, DNP, Gretchen Colon, CNP, Heather Denis, CNP, Darnell Davis, CNP, Kylah Swanson, CNP, Christoph Hollins, CNP, Safia Shelby PAAnselmoC, Daniel yLles, CNS, Renetta Gallo, DNP, Carlos Mayers, CNP, Vicki Gan, CNP, Cristine Mosley [address]    Progress Note    11/7/2022    8:31 AM    Name:   Barry Ferris  MRN:     [id number]     Acct:      [de-identified]   Room:   [address] [place] [address] Day:  1  Admit Date:  11/5/2022  9:41 PM    PCP:   Shayne Miramontes MD  Code Status:  Full Code    Subjective:     C/C:   Chief Complaint   Patient presents with    Cough    Shortness of Breath     Interval History Status: not changed. Patient was seen and examined at bedside this AM. She continues to complain of cough and shortness of breath. Plan to continue current management and monitor for improvement. Urine culture is growing gram-negative rods. Will start nitrofurantoin as the patient has a documented allergy to most classes of antibiotics. Brief History:     Barry Ferris is a 78 y.o. female with a past medical history of HFpEF, paroxsymal atrial fibrillation (on Eliquis), multiple sclerosis, hypertension and hyperlipidemia who presented to the emergency department on 11/5/2022 complaining of shortness of breath and cough. The patient states that her symptoms began several days ago and have progressively worsened. In the ED, the patient was afebrile and nontoxic appearing. She was hypoxic and placed on 2 L NC. COVID-19 test was positive. The patient is admitted to internal medicine for further management of acute hypoxia secondary to COVID-19 infection. Review of Systems:     Constitutional:  negative for chills, fevers, sweats  Respiratory:  Positive for shortness of breath. Cardiovascular:  negative for chest pain, chest pressure/discomfort, lower extremity edema, palpitations  Gastrointestinal:  negative for abdominal pain, constipation, diarrhea, nausea, vomiting  Neurological:  negative for dizziness, headache    Medications: Allergies:     Allergies   Allergen Reactions    Dofetilide Other (See Comments)    Lisinopril-Hydrochlorothiazide Anaphylaxis and Hives    Sulfa Antibiotics Anaphylaxis    Penicillins Hives    Polyethylene Glycol Hives    Actifed Cold-Allergy [Chlorpheniramine-Phenylephrine]     Altace [Ramipril]      Chronic cough    Cephalosporins Hives    Coreg [Carvedilol]      bloating    Dml Facial Moisturizer     Elavil [Amitriptyline Hcl]     Entex [Ami-Jose]     Ethylene Glycol     Fentanyl     Heparin     Hizentra [Immune Globulin (Human)]     Hydralazine     Levofloxacin      ach tendon    Lisinopril-Hydrochlorothiazide     Montelukast Sodium      Heart effects    Morphine Itching    Nifedipine     Norvasc [Amlodipine Besylate]      Stomach pain    Other      IV IG     Phenobarbital Hives    Propoxyphene     Robaxin [Methocarbamol]     Sinequan [Doxepin Hcl]     Sudafed [Pseudoephedrine Hcl]     Sudafed [Pseudoephedrine Hcl]     Tranquil-[place]     Wellbutrin [Bupropion Hcl]      Hypotension    Actifed Cold-Allergy [Chlorpheniramine-Phenylephrine] Palpitations    Clindamycin/Lincomycin Nausea And Vomiting    Codeine Nausea And Vomiting    Metoprolol Nausea And Vomiting     Reports she is allergic to the preservative polyethylineglycol in this medicine, causing severe emesis and stomach pain     Metoprolol Succinate Nausea And Vomiting    Seldane [Terfenadine] Palpitations       Current Meds:   Scheduled Meds:    nitrofurantoin (macrocrystal-monohydrate)  100 mg Oral 2 times per day    cloNIDine  0.1 mg Oral BID    potassium chloride  20 mEq Oral Daily    ranolazine  500 mg Oral BID    sodium chloride flush  5-40 mL IntraVENous 2 times per day    apixaban  5 mg Oral BID    bumetanide  1 mg Oral Daily    clopidogrel  75 mg Oral Daily    losartan  100 mg Oral Daily    metoprolol succinate  25 mg Oral Nightly    pantoprazole  40 mg Oral BID    PARoxetine  20 mg Oral Daily    pravastatin  40 mg Oral Nightly    dexamethasone  6 mg IntraVENous Q24H     Continuous Infusions:    sodium chloride       PRN Meds: potassium chloride **OR** potassium alternative oral replacement **OR** potassium chloride, gabapentin, sodium chloride flush, sodium chloride, ondansetron **OR** ondansetron, polyethylene glycol, [DISCONTINUED] acetaminophen **OR** acetaminophen, nitroGLYCERIN, acetaminophen    Data:     Past Medical History:   has a past medical history of Abnormal Pap smear, Asthma, Atrial fibrillation [address] Bloody discharge from nipple, CAD (coronary artery disease), Cataracts, bilateral, Colon polyp, CVA (cerebral vascular accident) ([facility] [address] Demyelinating disease ([facility] Fibromyalgia, High-risk sexual behavior, History of bone density study, History of migraines, HTN (hypertension), IgG deficiency ([facility] [address] Multiple sclerosis ([facility] Myoclonic seizures ([facility] Optic neuritis, Osteopenia, Pyloric stenosis, Raynaud's disease, and Vasomotor rhinitis. Social History:   reports that she has never smoked. She has never used smokeless tobacco. She reports that she does not drink alcohol and does not use drugs.      Family History:   Family History   Problem Relation Age of Onset    Other Father         Heart problems    Heart Disease Father Other Mother         heart problems requiring open heart surgery, HTN    Heart Disease Mother     Other Sister         HTN    Hypertension Sister     Other Maternal Aunt         breast cancer    Breast Cancer Maternal Aunt        Vitals:  /61   Pulse 84   Temp 99 °F (37.2 °C) (Oral)   Resp 20   Ht 5' 1\" (1.549 m)   Wt 169 lb (76.7 kg)   SpO2 98%   BMI 31.93 kg/m²   Temp (24hrs), Av.4 °F (37.4 °C), Min:99 °F (37.2 °C), Max:99.7 °F (37.6 °C)    No results for input(s): POCGLU in the last 72 hours. I/O (24Hr): Intake/Output Summary (Last 24 hours) at 2022 0831  Last data filed at 2022 0704  Gross per 24 hour   Intake --   Output 1400 ml   Net -1400 ml       Labs:  Hematology:  Recent Labs     22   WBC 5.7 3.9   RBC 3.64* 3.41*   HGB 11.7* 10.9*   HCT 34.3* 32.2*   MCV 94.3 94.4   MCH 32.2 31.9   MCHC 34.1 33.8   RDW 17.4* 16.5*    143   MPV 9.1 9.7   DDIMER 0.39  --      Chemistry:  Recent Labs     2243 22     --  131*   K 3.6*  --  3.1*   CL 95*  --  93*   CO2 27  --  27   GLUCOSE 94  --  82   BUN 20  --  22   CREATININE 1.04*  --  1.39*   MG  --   --  1.8   ANIONGAP 14  --  11   LABGLOM 56*  --  40*   CALCIUM 9.5  --  8.3*   TROPHS 31* 40*  --      Recent Labs     22   PROT 6.9   LABALBU 4.6   AST 17   ALT 8   ALKPHOS 103   BILITOT 1.1   LIPASE 23     ABG:No results found for: POCPH, PHART, PH, POCPCO2, LHR4FEB, PCO2, POCPO2, PO2ART, PO2, POCHCO3, LNX7ARQ, HCO3, NBEA, PBEA, BEART, BE, THGBART, THB, PSI8BSM, HCAF7VSP, Z4VOAAVP, O2SAT, FIO2  Lab Results   Component Value Date/Time    [facility]CIAL [facility] 03/10/2019 08:42 PM     Lab Results   Component Value Date/Time    CULTURE GRAM NEGATIVE RODS 10 to 50,000 CFU/ML (A) 2022 02:08 AM       Radiology:  XR CHEST PORTABLE    Result Date: 2022  Mild cardiomegaly with probable pulmonary edema.        Physical Examination:     General appearance:  alert, cooperative and no distress  Mental Status:  oriented to person, place and time and normal affect  Lungs:  clear to auscultation bilaterally, normal effort  Heart:  regular rate and rhythm, no murmur  Abdomen:  soft, nontender, nondistended, normal bowel sounds, no masses, hepatomegaly, splenomegaly  Extremities:  no edema, redness, tenderness in the calves  Skin:  no gross lesions, rashes, induration    Assessment:     Hospital Problems             Last Modified POA    * (Principal) COVID 11/6/2022 Yes    Obesity (BMI 30-39.9) 11/6/2022 Yes    Essential hypertension (Chronic) 11/6/2022 Yes    Dyslipidemia (Chronic) 11/6/2022 Yes    Chronic diastolic heart failure ([address] 11/6/2022 Yes    Paroxysmal A-fib ([facility] (Chronic) 11/6/2022 Yes    Chronic renal insufficiency, stage III (moderate) (HCC) (Chronic) 11/6/2022 Yes       Plan:     COVID-19 infection   -Wean supplemental oxygen as tolerated (currently on 2 L NC)   -Continue IV dexamethasone 6 mg daily (patient states she would prefer IV steroids due to GI intolerance)     Acute cystitis without hematuria   -Urine culture growing gram-negative rods   -Will start nitrofurantoin as the patient has extensive antibiotic allergies      Paroxsymal atrial fibrillation   -Continue home Eliquis   -Continue home metoprolol      CAD s/p stent placement   -Stable; continue home clopidogrel      CKD Stage IIIa   -Stable; creatinine is better than baseline   -Daily BMP      Hypertension   -Continue home clonidine and losartan      Hyperlipidemia   -Continue home pravastatin     Zaida Nayak MD  11/7/2022  8:31 AM

## 2022-11-07 NOTE — PROGRESS NOTES
Comprehensive Nutrition Assessment    Type and Reason for Visit:  Initial    Nutrition Recommendations/Plan:   Continue current diet  Ensure Enlive BID to promote adequate intake: 700 cals and 40 g pro     Malnutrition Assessment:  Malnutrition Status:  No malnutrition (11/07/22 1438)    Context:  Acute Illness     Findings of the 6 clinical characteristics of malnutrition:  Energy Intake:  Mild decrease in energy intake (Comment)  Weight Loss:  No significant weight loss     Body Fat Loss:  No significant body fat loss     Muscle Mass Loss:  No significant muscle mass loss    Fluid Accumulation:  Unable to assess     Strength:  Not Performed    Nutrition Assessment:    Pt admitted for cough and found to be COVID+. PNS recieved for recent poor appetite and weight loss (reported ~10-15 lbs). Per chart no significant weight loss in past year however weight has been trending down. ONS is appropriate to encourage adequate intake. Nutrition Related Findings:    No edema noted. Na 131 (L), K+ 3.1 (L), Ch 93 (L), Cr 1.39 (H), Est GFR 40 (L), Ca 8.3 (L). All other labs/meds reviewed. Wound Type: None       Current Nutrition Intake & Therapies:    Average Meal Intake: Unable to assess  Average Supplements Intake: None Ordered  ADULT DIET; Regular    Anthropometric Measures:  Height: 5' 1\" (154.9 cm)  Ideal Body Weight (IBW): 105 lbs (48 kg)    Current Body Weight: 169 lb 1.5 oz (76.7 kg), 161 % IBW. Current BMI (kg/m2): 32  Weight Adjustment For: No Adjustment  BMI Categories: Obese Class 1 (BMI 30.0-34. 9)    Estimated Daily Nutrient Needs:  Energy Requirements Based On: Formula  Weight Used for Energy Requirements: Current  Energy (kcal/day): 7738-7454 kcals/day (AF 1.3, SF 1-1.1)  Weight Used for Protein Requirements: Ideal  Protein (g/day): 71-95 g/day (1.5-2.0 g/kg IBW)  Method Used for Fluid Requirements: 1 ml/kcal  Fluid (ml/day): 1537-2602 ml fluid    Nutrition Diagnosis:   Predicted inadequate energy intake related to early satiety as evidenced by poor intake prior to admission    Nutrition Interventions:   Food and/or Nutrient Delivery: Continue Current Diet, Start Oral Nutrition Supplement  Nutrition Education/Counseling: No recommendation at this time  Coordination of Nutrition Care: Continue to monitor while inpatient, Interdisciplinary Rounds    Goals:  Goals: PO intake 50% or greater, prior to discharge      Nutrition Monitoring and Evaluation:   Behavioral-Environmental Outcomes: None Identified  Food/Nutrient Intake Outcomes: Food and Nutrient Intake, Supplement Intake  Physical Signs/Symptoms Outcomes: Biochemical Data, Nutrition Focused Physical Findings, Skin, Weight, Fluid Status or Edema    Discharge Planning:     Too soon to determine     MELIZA LynchD, RDN, LD  Registered [address] [place] [address]  [phone]

## 2022-11-07 NOTE — PROGRESS NOTES
Occupational Therapy  Facility/Department: [facility] [place] Joe VELARDE  Occupational Therapy Initial Assessment    Name: Miroslava Gonzalez  : 1945  MRN: [id number]  Date of Service: 2022    Chief Complaint   Patient presents with    Cough    Shortness of Breath     Discharge Recommendations:  Patient would benefit from continued therapy after discharge        Patient Diagnosis(es): The primary encounter diagnosis was Cough, unspecified type. A diagnosis of COVID was also pertinent to this visit. Past Medical History:  has a past medical history of Abnormal Pap smear, Asthma, Atrial fibrillation ([address] Bloody discharge from nipple, CAD (coronary artery disease), Cataracts, bilateral, Colon polyp, CVA (cerebral vascular accident) ([address] Demyelinating disease ([address] Fibromyalgia, High-risk sexual behavior, History of bone density study, History of migraines, HTN (hypertension), IgG deficiency ([address] Multiple sclerosis ([address] Myoclonic seizures ([address] Optic neuritis, Osteopenia, Pyloric stenosis, Raynaud's disease, and Vasomotor rhinitis. Past Surgical History:  has a past surgical history that includes Dilation & curettage (); Breast biopsy (); Appendectomy (); Septoplasty (); Hammer toe surgery (); Sinus endoscopy; sinus surgery; Shoulder arthroscopy (); Esophagoscopy (, ); Colonoscopy (); Foreign Body Removal (); laparoscopy (9632); Coronary angioplasty with stent (2011); Coronary angioplasty with stent (2011); Upper gastrointestinal endoscopy; eye surgery; pr colonoscopy w/biopsy single/multiple (N/A, 2017); pr egd transoral biopsy single/multiple (2017); Upper gastrointestinal endoscopy (2017); Upper gastrointestinal endoscopy (2018); Cardiac surgery; Cardiac catheterization (2020); Cardioversion (2020); and Cardioversion (2020).        Assessment   Performance deficits / Impairments: Decreased functional mobility ;Decreased ADL status; Decreased safe awareness;Decreased balance;Decreased high-level IADLs;Decreased endurance;Decreased cognition  Assessment: Pt currently limited in performing ADL tasks and functional transfers/functional mobility due to above noted deficits, most significantly decreased activity tolerance. Pt to benefit from continued therapy services while hospitalized and at discharge to maximize pt's safety and independence in performing functional tasks. Prognosis: Good  Decision Making: Medium Complexity  REQUIRES OT FOLLOW-UP: Yes      Safety Devices  Type of Devices: Call light within reach; Chair alarm in place; Left in chair;Nurse notified  Restraints  Restraints Initially in Place: No    Plan   Occupational Therapy Plan  Times Per Week: 5-6x/wk  Current Treatment Recommendations: Balance training, Endurance training, Functional mobility training, Equipment evaluation, education, & procurement, Safety education & training, Patient/Caregiver education & training, Self-Care / ADL, Home management training     Restrictions  Restrictions/Precautions  Restrictions/Precautions: Fall Risk, Isolation  Required Braces or Orthoses?: No  Position Activity Restriction  Other position/activity restrictions: Up with assistance. 2L O2 in place via nasal canula    Subjective   General  Patient assessed for rehabilitation services?: Yes  Family / Caregiver Present: No  General Comment  Comments: RN ok'd for therapy this PM. Pt agreeable to participate in session following encouragement. Pt reports \"all over\" pain however does not quantify.        Social/Functional History  Social/Functional History  Lives With: Alone  Type of Home: Apartment  Home Layout: One level  Home Access: Level entry  Bathroom Shower/Tub: Tub/Shower unit  Bathroom Toilet: Standard  Bathroom Equipment: Shower chair, Grab bars in shower  Bathroom Accessibility: Accessible  Home Equipment: Alycia Spivey, 4 wheeled  Has the patient had two or more falls in the past year or any fall with injury in the past year?: Yes  ADL Assistance: [address]:  (Pt reports increasing difficulty performing IADL tasks)  Homemaking Responsibilities: Yes  Meal Prep Responsibility: Primary  Laundry Responsibility: Primary  Cleaning Responsibility: Primary  Shopping Responsibility: Primary  Health Care Management: Primary  Ambulation Assistance: Independent (with use of rollator at all times, pt self reports frequent falls)  Transfer Assistance: Independent  Active : Yes  Mode of Transportation: Car  Occupation: Retired  Type of Occupation: Advanced   Leisure & Hobbies: [address]: Within Functional Limits  Hearing  Hearing: Within functional limits    Balance  Sitting:  (~10 minutes sitting balance at EOB and toilet with SBA, pt required CGA with forward trunk flexion while seated EOB and at toilet to don socks and thread brief)  Standing: CGA (~2 minute bouts of standing/mobility prior to pt reporting fatigue and requesting to return to supine)    Functional Mobility   Overall Level of Assistance: Contact-guard assistance (Pt performed functional mobility to/from bathroom. Pt mildly unsteady throughout however no true LOB. Pt requires increased time/effort as pt with slow pace and noted fatigue following minimal exertion.)  Interventions: Safety awareness training;Verbal cues (for RW mngt/navigation and attention to task)    Toilet Transfers  Toilet - Technique: Ambulating (with use of RW)  Equipment Used: Standard toilet (with use of grab bars)  Toilet Transfer: Contact guard assistance    AROM: Within functional limits (BUE)  Strength:  Within functional limits (BUE)  Coordination: Within functional limits (BUE FMC/GMC)  Sensation:  (Pt with no complaints of numbness/tingling throughout session)    ADL  Grooming: Increased time to complete;Contact guard assistance  UE Bathing: Increased time to complete;Contact guard assistance  LE Bathing: Increased time to complete;Minimal assistance  UE Dressing: Increased time to complete;Contact guard assistance  LE Dressing: Increased time to complete;Contact guard assistance (seated EOB to don socks, significant time/effort to perform as pt fatigues quickly while performing forward trunk flexion to reach feet)  Toileting: Increased time to complete;Contact guard assistance (for LB clothing mngt, pericare/bottom hygiene, and toilet transfer; increased time/effort, use of grab bars)    Bed mobility  Supine to Sit: Contact guard assistance (HOB flattened, significant use of bed rail)  Sit to Supine:  (Pt retired up to chair at end of session)  Scooting: Contact guard assistance  Bed Mobility Comments: Increased time/effort to perform    Transfers  Sit to stand: Contact guard assistance  Stand to sit: Contact guard assistance  Transfer Comments: Min VCs for proper hand placement/safety awareness with use of RW; increased time/effort to perform    Cognition  Overall Cognitive Status: Exceptions  Attention Span: Attends with cues to redirect  Safety Judgement: Decreased awareness of need for assistance  Problem Solving: Assistance required to generate solutions;Assistance required to implement solutions;Assistance required to identify errors made  Insights: Decreased awareness of deficits        Education Given To: Patient  Education Provided: Role of Therapy;Plan of Care;Transfer Training;Equipment;ADL Adaptive Strategies;Precautions; Fall Prevention Strategies  Education Method: Demonstration;Verbal  Education Outcome: Verbalized understanding;Continued education needed       AM-PAC Score   AM-[facility] Inpatient Daily Activity Raw Score: 19 (11/07/22 1610)  AM-PAC Inpatient ADL T-Scale Score : 40.22 (11/07/22 1610)  ADL Inpatient CMS 0-100% Score: 42.8 (11/07/22 1610)  ADL Inpatient CMS G-Code Modifier : CK (11/07/22 1610)      Goals  Short Term Goals  Time Frame for Short Term Goals: 14 visits  Short Term Goal 1: Pt will perform ADL tasks with mod IND using AE/DME PRN  Short Term Goal 2: Pt will perform functional transfers/functional mobility with mod IND using least restrictive AD  Short Term Goal 3: Pt will independently demo good safety awareness during engagement in all ADLs and functional transfers/functional mobility  Short Term Goal 4: Pt will demo 8+ minutes standing tolerance with use of least restrictive AD for increased participation in ADL/IADL tasks       Therapy Time   Individual Concurrent Group Co-treatment   Time In 1513         Time Out 1544         Minutes 31         Timed Code Treatment Minutes: [address], OTR/L

## 2022-11-07 NOTE — CARE COORDINATION
Case Management Assessment  Initial Evaluation    Date/Time of Evaluation: 11/7/2022 12:03 PM  Assessment Completed by: Kvng Perez RN    If patient is discharged prior to next notation, then this note serves as note for discharge by case management. Patient Name: Lupillo Barton                   YOB: 1945  Diagnosis: COVID [U07.1]  Cough, unspecified type [R05.9]                   Date / Time: 11/5/2022  9:41 PM    Patient Admission Status: Inpatient   Readmission Risk (Low < 19, Mod (19-27), High > 27): Readmission Risk Score: 14.4    Current PCP: Liz Yates MD  PCP verified by CM? Yes    Chart Reviewed: Yes      History Provided by: Patient  Patient Orientation: Alert and Oriented    Patient Cognition: Alert    Hospitalization in the last 30 days (Readmission):  No    If yes, Readmission Assessment in CM Navigator will be completed. Advance Directives:      Code Status: Full Code   Patient's Primary Decision Maker is: Named in Scanned ACP Document    Primary Decision Maker: Dell Barton - Niece/Nephew - [phone]    Secondary Decision Maker: Cecilio Whitfield - Niece/Nephew - [phone]    Discharge Planning:    Patient lives with: Alone Type of Home: Apartment  Primary Care Giver: Self  Patient Support Systems include: Children   Current Financial resources: Medicare  Current community resources:    Current services prior to admission: None            Current DME:              Type of Home Care services:  None    ADLS  Prior functional level: Assistance with the following:, Housework, Shopping, Mobility  Current functional level: Assistance with the following:, Bathing, Dressing, Toileting, Feeding, Cooking, Housework, Shopping, Mobility    PT AM-PAC:   /24  OT AM-PAC:   /24    Family can provide assistance at DC: Would you like Case Management to discuss the discharge plan with any other family members/significant others, and if so, who?     Plans to Return to Present Housing: Yes  Other Identified Issues/Barriers to RETURNING to current housing: NA  Potential Assistance needed at discharge: N/A            Potential DME:    Patient expects to discharge to: [address] [address] for transportation at discharge: Family    Financial    Payor: Hung Moreno / Plan: MEDICARE PART A AND B / Product Type: *No Product type* /     Does insurance require precert for SNF: No    Potential assistance Purchasing Medications:    Meds-to-Beds request:        [id number] Meghana Askewole, [facility]  [address]  [address] [place] [address]  Phone: [phone] Fax: [phone]      Notes:    Factors facilitating achievement of predicted outcomes:     Barriers to discharge: Limited family support, Decreased motivation, Unrealistic expectations, and Medical complications    Additional Case Management Notes: spoke with patient, she understands there are limited facilities that accept covid+, agreeable to referral to [address] [facility]      1500 received VM from [address] [place] [address]- they can accept, need therapy notes. Return call to [phone]          The Plan for Transition of Care is related to the following treatment goals of COVID [U07.1]  Cough, unspecified type [W93.9]    IF APPLICABLE: The Patient and/or patient representative Frances Silveira and her family were provided with a choice of provider and agrees with the discharge plan. Freedom of choice list with basic dialogue that supports the patient's individualized plan of care/goals and shares the quality data associated with the providers was provided to: Patient   Patient Representative Name:       The Patient and/or Patient Representative Agree with the Discharge Plan?  Yes    Muriel Strauss RN  Case Management Department  Ph: [phone] Fax: [phone]

## 2022-11-07 NOTE — PLAN OF CARE
Problem: Pain  Goal: Verbalizes/displays adequate comfort level or baseline comfort level  11/7/2022 0015 by Priscilla Godfrey RN  Outcome: Progressing   No new signs/symptoms of pain noted, pain rating < 3 on scale 0-10, pain controlled with medication/ repositioning     Problem: ABCDS Injury Assessment  Goal: Absence of physical injury  11/7/2022 0015 by Priscilla Godfrey RN  Outcome: Progressing   Fall assessment preformed. Bed in low locked position with call light and tray table within reach. Education given.      Problem: Safety - Adult  Goal: Free from fall injury  11/7/2022 0015 by Priscilla Godfrey RN  Outcome: Progressing   No falls/injuries this shift, bed in lowest position, breaks on, bed alarm on, call light within reach, side rails up X2     Problem: Chronic Conditions and Co-morbidities  Goal: Patient's chronic conditions and co-morbidity symptoms are monitored and maintained or improved  11/7/2022 0015 by Priscilla Godfrey RN  Outcome: Progressing

## 2022-11-07 NOTE — PLAN OF CARE
Problem: Pain  Goal: Verbalizes/displays adequate comfort level or baseline comfort level  Outcome: Progressing  Flowsheets  Taken 11/7/2022 1605  Verbalizes/displays adequate comfort level or baseline comfort level:   Encourage patient to monitor pain and request assistance   Assess pain using appropriate pain scale   Administer analgesics based on type and severity of pain and evaluate response  Taken 11/7/2022 1329  Verbalizes/displays adequate comfort level or baseline comfort level:   Encourage patient to monitor pain and request assistance   Assess pain using appropriate pain scale   Administer analgesics based on type and severity of pain and evaluate response  Taken 11/7/2022 5753  Verbalizes/displays adequate comfort level or baseline comfort level:   Encourage patient to monitor pain and request assistance   Assess pain using appropriate pain scale   Administer analgesics based on type and severity of pain and evaluate response     Problem: ABCDS Injury Assessment  Goal: Absence of physical injury  Outcome: Progressing     Problem: Safety - Adult  Goal: Free from fall injury  Outcome: Progressing     Problem: Chronic Conditions and Co-morbidities  Goal: Patient's chronic conditions and co-morbidity symptoms are monitored and maintained or improved  Outcome: Progressing  Flowsheets (Taken 11/7/2022 0800)  Care Plan - Patient's Chronic Conditions and Co-Morbidity Symptoms are Monitored and Maintained or Improved:   Monitor and assess patient's chronic conditions and comorbid symptoms for stability, deterioration, or improvement   Collaborate with multidisciplinary team to address chronic and comorbid conditions and prevent exacerbation or deterioration

## 2022-11-08 VITALS
RESPIRATION RATE: 17 BRPM | SYSTOLIC BLOOD PRESSURE: 124 MMHG | WEIGHT: 161.38 LBS | DIASTOLIC BLOOD PRESSURE: 72 MMHG | OXYGEN SATURATION: 99 % | TEMPERATURE: 97.6 F | HEIGHT: 61 IN | BODY MASS INDEX: 30.47 KG/M2 | HEART RATE: 85 BPM

## 2022-11-08 LAB
ANION GAP SERPL CALCULATED.3IONS-SCNC: 10 MMOL/L (ref 9–17)
BUN BLDV-MCNC: 23 MG/DL (ref 8–23)
CALCIUM SERPL-MCNC: 9.2 MG/DL (ref 8.6–10.4)
CHLORIDE BLD-SCNC: 95 MMOL/L (ref 98–107)
CO2: 28 MMOL/L (ref 20–31)
CREAT SERPL-MCNC: 1.16 MG/DL (ref 0.5–0.9)
CULTURE: ABNORMAL
GFR SERPL CREATININE-BSD FRML MDRD: 49 ML/MIN/1.73M2
GLUCOSE BLD-MCNC: 139 MG/DL (ref 70–99)
POTASSIUM SERPL-SCNC: 3.8 MMOL/L (ref 3.7–5.3)
SODIUM BLD-SCNC: 133 MMOL/L (ref 135–144)
SPECIMEN DESCRIPTION: ABNORMAL

## 2022-11-08 PROCEDURE — 2580000003 HC RX 258: Performed by: NURSE PRACTITIONER

## 2022-11-08 PROCEDURE — 99239 HOSP IP/OBS DSCHRG MGMT >30: CPT | Performed by: HOSPITALIST

## 2022-11-08 PROCEDURE — 6370000000 HC RX 637 (ALT 250 FOR IP): Performed by: NURSE PRACTITIONER

## 2022-11-08 PROCEDURE — 80048 BASIC METABOLIC PNL TOTAL CA: CPT

## 2022-11-08 PROCEDURE — 97110 THERAPEUTIC EXERCISES: CPT

## 2022-11-08 PROCEDURE — 6360000002 HC RX W HCPCS: Performed by: STUDENT IN AN ORGANIZED HEALTH CARE EDUCATION/TRAINING PROGRAM

## 2022-11-08 PROCEDURE — 97530 THERAPEUTIC ACTIVITIES: CPT

## 2022-11-08 PROCEDURE — 6370000000 HC RX 637 (ALT 250 FOR IP): Performed by: STUDENT IN AN ORGANIZED HEALTH CARE EDUCATION/TRAINING PROGRAM

## 2022-11-08 PROCEDURE — 36415 COLL VENOUS BLD VENIPUNCTURE: CPT

## 2022-11-08 RX ORDER — NITROFURANTOIN 25; 75 MG/1; MG/1
100 CAPSULE ORAL EVERY 12 HOURS SCHEDULED
Qty: 14 CAPSULE | Refills: 0
Start: 2022-11-08 | End: 2022-11-09 | Stop reason: SDUPTHER

## 2022-11-08 RX ORDER — DEXAMETHASONE 6 MG/1
6 TABLET ORAL
Qty: 6 TABLET | Refills: 0
Start: 2022-11-08 | End: 2022-11-14

## 2022-11-08 RX ADMIN — PANTOPRAZOLE SODIUM 40 MG: 40 TABLET, DELAYED RELEASE ORAL at 08:31

## 2022-11-08 RX ADMIN — NITROFURANTOIN MONOHYDRATE/MACROCRYSTALS 100 MG: 75; 25 CAPSULE ORAL at 08:32

## 2022-11-08 RX ADMIN — CLOPIDOGREL BISULFATE 75 MG: 75 TABLET ORAL at 08:32

## 2022-11-08 RX ADMIN — BUMETANIDE 1 MG: 1 TABLET ORAL at 08:32

## 2022-11-08 RX ADMIN — RANOLAZINE 500 MG: 500 TABLET, EXTENDED RELEASE ORAL at 08:32

## 2022-11-08 RX ADMIN — SODIUM CHLORIDE, PRESERVATIVE FREE 10 ML: 5 INJECTION INTRAVENOUS at 08:32

## 2022-11-08 RX ADMIN — LOSARTAN POTASSIUM 100 MG: 50 TABLET, FILM COATED ORAL at 08:31

## 2022-11-08 RX ADMIN — ACETAMINOPHEN 1000 MG: 500 TABLET ORAL at 12:12

## 2022-11-08 RX ADMIN — POTASSIUM CHLORIDE 20 MEQ: 1500 TABLET, EXTENDED RELEASE ORAL at 08:32

## 2022-11-08 RX ADMIN — PAROXETINE HYDROCHLORIDE HEMIHYDRATE 20 MG: 20 TABLET, FILM COATED ORAL at 08:31

## 2022-11-08 RX ADMIN — CLONIDINE HYDROCHLORIDE 0.1 MG: 0.1 TABLET ORAL at 08:32

## 2022-11-08 RX ADMIN — APIXABAN 5 MG: 5 TABLET, FILM COATED ORAL at 08:32

## 2022-11-08 RX ADMIN — DEXAMETHASONE SODIUM PHOSPHATE 6 MG: 4 INJECTION, SOLUTION INTRAMUSCULAR; INTRAVENOUS at 08:40

## 2022-11-08 ASSESSMENT — PAIN SCALES - GENERAL: PAINLEVEL_OUTOF10: 7

## 2022-11-08 ASSESSMENT — PAIN DESCRIPTION - LOCATION: LOCATION: GENERALIZED

## 2022-11-08 NOTE — DISCHARGE INSTR - COC
Continuity of Care Form    Patient Name: Tawana Rinne   :  1945  MRN:  [id number]    Admit date:  2022  Discharge date:  22    Code Status Order: Full Code   Advance Directives:     Admitting Physician:  No admitting provider for patient encounter. PCP: Jorge Phelan MD    Discharging Nurse: [facility] Unit/Room#: 331/331-01  Discharging Unit Phone Number: [phone]    Emergency Contact:   Extended Emergency Contact Information  Primary Emergency Contact: Sharee Mendez  Address: [place] [address] Phone: [phone]  Relation: Child   needed?  No  Secondary Emergency Contact: Ethel Avalos Phone: [phone]  Relation: Niece/Nephew    Past Surgical History:  Past Surgical History:   Procedure Laterality Date    APPENDECTOMY      sponges left in abdomen, at [address] [facility] [place]    stereotactic type, right breast, benign    CARDIAC CATHETERIZATION  2020    POBA lad    CARDIAC SURGERY      CARDIOVERSION  2020    [facility]     CARDIOVERSION  2020    COLONOSCOPY  2010    Polyp    CORONARY ANGIOPLASTY WITH STENT PLACEMENT  2011    failed attempt to place stent    CORONARY ANGIOPLASTY WITH STENT PLACEMENT  2011    successful placement of 2 stent in same artery    [facility]    bleeding ulcers found     [address]    surgical sponges left in during appendectomy    HAMMER TOE SURGERY      head resection of phalanx, left foot    LAPAROSCOPY      with D&C    SD COLONOSCOPY W/BIOPSY SINGLE/MULTIPLE N/A 2017    COLONOSCOPY WITH BIOPSY performed by Hoang Grigsby MD at [facility]    SD EGD TRANSORAL BIOPSY SINGLE/MULTIPLE  2017    EGD BIOPSY performed by Hoang Grigsby MD at [address] resection with rt and left ethmoidectomy    SHOULDER ARTHROSCOPY 1998    rotator cuff impingement, right arm    SINUS ENDOSCOPY      SINUS SURGERY      multiple    UPPER GASTROINTESTINAL ENDOSCOPY      2-4 times per year for pyloric stenosis    UPPER GASTROINTESTINAL ENDOSCOPY  12/19/2017    EGD DILATION BALLOON performed by Enrique Messina MD at [address] [facility] ENDOSCOPY  5/24/2018    EGD DILATION SAVORY performed by Amarjit Her MD at [facility]       Immunization History: There is no immunization history on file for this patient. Active Problems:  Patient Active Problem List   Diagnosis Code    Bloody discharge from nipple N64.52    Osteopenia M85.80    Multiple sclerosis (HCC) G35    Essential hypertension I10    Asthma J45.909    SOB (shortness of breath) R06.02    Lower leg pain M79.669    Noncompliance with medications Z91.14    Dyslipidemia E78.5    GERD (gastroesophageal reflux disease) K21.9    Fibromyalgia M79.7    Pyloric stenosis K31.1    Demyelinating disease ([facility]) G37.9    H/O: CVA (cerebrovascular accident) Z86.73    Epigastric abdominal pain R10.13    Incontinence in female R32    Chest pain with high risk for cardiac etiology R07.9    Migraine without status migrainosus, not intractable G43.909    Hypokalemia E87.6    Chronic diastolic heart failure ([facility]) Z39.66    Acute diastolic HF (heart failure) ([facility]) I50.31    Acute on chronic combined systolic and diastolic CHF (congestive heart failure) ([facility]) I50.43    CAD (coronary artery disease) I25.10    Atrial flutter ([facility]) I48.92    Chronic bronchiolitis ([facility]) J44.9    Paroxysmal A-fib ([facility]) I48.0    HTN (hypertension) I10    Chronic renal insufficiency, stage III (moderate) ([facility]) N18.30    Mild cognitive disorder F09    Raynaud's disease I73.00    Acute on chronic combined systolic and diastolic congestive heart failure (HCC) I50.43    Frequent falls R29.6    Physical debility R53.81    Chronic atrial fibrillation (HCC) I48.20    Obesity (BMI 30-39. 9) E66.9 Decompensated heart failure (HCC) I50.9    Severe pulmonary hypertension (HCC) I27.20    Anxiety F41.9    COVID U07.1       Isolation/Infection:   Isolation            Droplet Plus  Droplet Plus          Patient Infection Status       Infection Onset Added Last Indicated Last Indicated By Review Planned Expiration Resolved Resolved By    COVID-19 11/05/22 11/05/22 11/05/22 COVID-19, Rapid 11/15/22 11/19/22      Resolved    COVID-19 (Rule Out) 11/05/22 11/05/22 11/05/22 COVID-19, Rapid (Ordered)   11/05/22 Rule-Out Test Resulted    COVID-19 (Rule Out) 10/06/22 10/06/22 10/06/22 COVID-19, Rapid (Ordered)   10/06/22 Rule-Out Test Resulted    COVID-19 (Rule Out) 08/25/20 08/25/20 08/25/20 COVID-19 (Ordered)   08/25/20 Rule-Out Test Resulted    COVID-19 (Rule Out) 06/27/20 06/27/20 06/27/20 Covid-19 Ambulatory (Ordered)   06/30/20 Rule-Out Test Resulted            Nurse Assessment:  Last Vital Signs: /83   Pulse 85   Temp 97.5 °F (36.4 °C) (Oral)   Resp 16   Ht 5' 1\" (1.549 m)   Wt 161 lb 6 oz (73.2 kg)   SpO2 100%   BMI 30.49 kg/m²     Last documented pain score (0-10 scale): Pain Level: 5  Last Weight:   Wt Readings from Last 1 Encounters:   11/08/22 161 lb 6 oz (73.2 kg)     Mental Status:  oriented and alert    IV Access:  - None    Nursing Mobility/ADLs:  Walking   Assisted  Transfer  Assisted  Bathing  Assisted  Dressing  Assisted  Toileting  Assisted  Feeding  Independent  Med Admin  Assisted  Med Delivery   whole    Wound Care Documentation and Therapy:        Elimination:  Continence: Bowel: Yes  Bladder: Yes  Urinary Catheter: None   Colostomy/Ileostomy/Ileal Conduit: No       Date of Last BM:   11/7/22  Intake/Output Summary (Last 24 hours) at 11/8/2022 1018  Last data filed at 11/7/2022 1329  Gross per 24 hour   Intake 360 ml   Output 800 ml   Net -440 ml     I/O last 3 completed shifts: In: 5 [P.O.:720]  Out: 1200 [Urine:1200]    Safety Concerns:      At Risk for Falls    Impairments/Disabilities:      None    Nutrition Therapy:  Current Nutrition Therapy:   - Oral Diet:  General    Routes of Feeding: Oral  Liquids: No Restrictions  Daily Fluid Restriction: no  Last Modified Barium Swallow with Video (Video Swallowing Test): not done    Treatments at the Time of Hospital Discharge:   Respiratory Treatments: N/A  Oxygen Therapy:  is not on home oxygen therapy. Ventilator:    - No ventilator support    Rehab Therapies: Physical Therapy and Occupational Therapy  Weight Bearing Status/Restrictions: No weight bearing restrictions  Other Medical Equipment (for information only, NOT a DME order):  walker and bath bench  Other Treatments: N/A    Patient's personal belongings (please select all that are sent with patient):  Clothing    RN SIGNATURE:  Electronically signed by Terence Ceja RN on 11/8/22 at 11:01 AM EST    CASE MANAGEMENT/SOCIAL WORK SECTION    Inpatient Status Date: ***    Readmission Risk Assessment Score:  Readmission Risk              Risk of Unplanned Readmission:  20           Discharging to Facility/ Agency   Name:   Address:  Phone:  Fax:    Dialysis Facility (if applicable)   Name:  Address:  Dialysis Schedule:  Phone:  Fax:    / signature: {Esignature:[id number]}    PHYSICIAN SECTION    Prognosis: Good    Condition at Discharge: Stable    Rehab Potential (if transferring to Rehab): Good    Recommended Labs or Other Treatments After Discharge: None    Physician Certification: I certify the above information and transfer of Gerardo Dixon  is necessary for the continuing treatment of the diagnosis listed and that she requires Lennox Cooneyuel for greater 30 days.      Update Admission H&P: No change in H&P    PHYSICIAN SIGNATURE:  Electronically signed by Zachery Hitchcock DO on 11/8/22 at 10:18 AM EST

## 2022-11-08 NOTE — PROGRESS NOTES
Writer attempted to call report to Morgan Bangura [facility]. The phone was unanswered x2 before disconnecting; there was no option to leave a message for return call.

## 2022-11-08 NOTE — CARE COORDINATION
Transitional Planning    Contacted [facility] SNF can accept patient today. Spoke with transport- transport ETA 1830.     1200 updated patient, she is agreeable. [phone], they are agreeable. # for Report [phone]          Discharge [address] [facility]  Clinical Case Management Department  Written by:  Ephraim Combs RN    Patient Name: Gerardo Bensonmike  Attending Provider: Zachery Hitchcock DO  Admit Date: 2022  9:41 PM  MRN: [id number]  Account: [de-identified]                     : 1945  Discharge Date: 22      Disposition: SNF    Ephraim Combs RN

## 2022-11-08 NOTE — PROGRESS NOTES
Physical Therapy  Facility/Department: [facility] SURG ICU  Daily Treatment Note  NAME: Stefania Layne  : 1945  MRN: [id number]    Date of Service: 2022    Discharge Recommendations:  Patient would benefit from continued therapy after discharge   PT Equipment Recommendations  Equipment Needed: Yes  Mobility Devices: Dearl Stalling: Rolling    Patient Diagnosis(es): The primary encounter diagnosis was Cough, unspecified type. A diagnosis of COVID was also pertinent to this visit. Assessment   Activity Tolerance: Patient limited by fatigue  Equipment Needed: Yes  Mobility Devices: Agip U. 91. Therapy Plan  General Plan:  (5-6x/week)  Current Treatment Recommendations: Strengthening;Balance training;Functional mobility training;Transfer training; Endurance training;Gait training;Patient/Caregiver education & training;Home exercise program;Safety education & training; Therapeutic activities     Restrictions  Restrictions/Precautions  Restrictions/Precautions: Fall Risk, Isolation  Required Braces or Orthoses?: No  Position Activity Restriction  Other position/activity restrictions: Up with assistance. 2L O2 in place via nasal canula, COVID+     Subjective    Subjective  Subjective: The patient reports pain all over that is non-specific. Pain: 8/10; pain did not impact therapy and patient able to participate.   Orientation  Overall Orientation Status: Within Normal Limits  Cognition  Attention Span: Attends with cues to redirect  Safety Judgement: Decreased awareness of need for assistance  Problem Solving: Assistance required to generate solutions;Assistance required to implement solutions;Assistance required to identify errors made  Insights: Decreased awareness of deficits     Objective   Vitals     Bed Mobility Training  Bed Mobility Training: Yes  Overall Level of Assistance: Contact-guard assistance  Rolling: Stand-by assistance  Supine to Sit: Contact-guard assistance  Scooting: Contact-guard assistance  Duration: 8  Transfer Training  Transfer Training: Yes  Overall Level of Assistance: Contact-guard assistance  Interventions: Safety awareness training; Tactile cues; Verbal cues  Sit to Stand: Contact-guard assistance  Stand to Sit: Contact-guard assistance  Bed to Chair: Contact-guard assistance  Duration: 8  Gait Training  Gait Training: Yes  Gait  Overall Level of Assistance: Contact-guard assistance  Interventions: Safety awareness training;Verbal cues  Speed/Jaycee: Pace decreased (< 100 feet/min)  Distance (ft): 25 Feet  Assistive Device: Walker, rolling     PT Exercises  Exercise Treatment: Seated AROM while patient in chair x 20 reps each bilaterlally: ankle pumps, LAQs, hip abd/add, marches, shoulder flexion, elbow flexion, and shoulder horizontal abd/add     Safety Devices  Type of Devices: Call light within reach; Chair alarm in place; Left in chair;Nurse notified;Gait belt       Goals  Short Term Goals  Time Frame for Short Term Goals: 14 visits  Short Term Goal 1: Pt to ambulate 150ft modified independently with RW  Short Term Goal 2: Pt to sit <> stand transfer independently  Short Term Goal 3: Pt to demonstrate independent bed mobility  Short Term Goal 4: Pt to tolerate 30 minutes of therapy for endurance  Short Term Goal 5: Pt to demosntrate good standing balance to decrease risk of falls    Education  Patient Education  Education Given To: Patient  Education Provided: Role of Therapy;Plan of Care  Education Provided Comments: Importance of mobility  Education Method: Verbal  Barriers to Learning: None  Education Outcome: Verbalized understanding    Therapy Time   Individual Concurrent Group Co-treatment   Time In 1007         Time Out 1053         Minutes 46         Timed Code Treatment Minutes: [address] PT

## 2022-11-08 NOTE — PLAN OF CARE
Problem: Respiratory - Adult  Goal: Achieves optimal ventilation and oxygenation  Outcome: Progressing  Flowsheets (Taken 11/7/2022 2038)  Achieves optimal ventilation and oxygenation:   Assess for changes in respiratory status   Assess for changes in mentation and behavior   Position to facilitate oxygenation and minimize respiratory effort   Oxygen supplementation based on oxygen saturation or arterial blood gases   Encourage broncho-pulmonary hygiene including cough, deep breathe, incentive spirometry   Assess the need for suctioning and aspirate as needed   Assess and instruct to report shortness of breath or any respiratory difficulty   Respiratory therapy support as indicated

## 2022-11-08 NOTE — PLAN OF CARE
Problem: Pain  Goal: Verbalizes/displays adequate comfort level or baseline comfort level  11/8/2022 0132 by Delfina Crigler, RN  Outcome: Progressing   No new signs/symptoms of pain noted, pain rating < 3 on scale 0-10, pain controlled with medication/ repositioning     Problem: ABCDS Injury Assessment  Goal: Absence of physical injury  11/8/2022 0132 by Delfina Crigler, RN  Outcome: Progressing   Fall assessment preformed. Bed in low locked position with call light and tray table within reach. Education given. Problem: Safety - Adult  Goal: Free from fall injury  11/8/2022 0132 by Delfina Crigler, RN  Outcome: Progressing   No falls/injuries this shift, bed in lowest position, breaks on, bed alarm on, call light within reach, side rails up X2     Problem: Respiratory - Adult  Goal: Achieves optimal ventilation and oxygenation  11/8/2022 0132 by Delfina Crigler, RN  Outcome: Progressing   Oxygen administered as needed. Pulse oximetry levels WNL. No cyanosis noted. Repositioned to encourage proper ventilation.      Problem: Chronic Conditions and Co-morbidities  Goal: Patient's chronic conditions and co-morbidity symptoms are monitored and maintained or improved  11/8/2022 0132 by Delfina Crigler, RN  Outcome: Progressing     Problem: Nutrition Deficit:  Goal: Optimize nutritional status  11/8/2022 0132 by Delfina Crigler, RN  Outcome: Progressing

## 2022-11-08 NOTE — PROGRESS NOTES
[facility]  Office: [address], DO, Ashtyn Jackson, DO, Trina Morales, DO, Esperanza Bustos Blood, DO, Clifton Coates MD, Ghislaine Ibrahim MD, Sheryl Johnson MD, Juvenal Dill MD,  Alma Tran MD, Marty Fonseca MD, Jered Torres, DO, Maxime Miller MD,  Chino Fernandez MD, Subhash Jacobsen MD, Inocente Soulier, DO, Choco Raygoza MD, Miley Hartman MD, Ana Waters DO, Danish Hoang MD, Giovanny Tolentino MD, Kaden Ford MD, Iron Brown MD, Dash Lee, DO, Ksenia Juan MD, Jo-Ann Andres MD, Ke Ornelas, CNP,  Scooby Wilson, CNP, Roberto Nguyễn, CNP, Lyndsay Dunbar, CNP,  Soni Vanegas, [facility], Soumya Elmore, CNP, Balaji Rolle, CNP, David Wall, CNP, Jorge Anthony, CNP, Fuentes Pink, CNP, Octaviano Christianson PA-C, Danay Butt, CNS, Khai Hutchins, DNP, Angel Hough, CNP, Mariano Colon, CNP, Manuela Beck, CNP         [facility]    Progress Note    11/8/2022    1:55 PM    Name:   Filipe Patten  MRN:     [id number]     Acct:      [de-identified]   Room:   [address] [place] [address] Day:  2  Admit Date:  11/5/2022  9:41 PM    PCP:   Sandra Feliciano MD  Code Status:  Full Code    Subjective:     C/C:   Chief Complaint   Patient presents with    Cough    Shortness of Breath     Patient seen in follow-up for weakness, cough, dyspnea secondary to COVID with concurrent Klebsiella pneumonia urinary tract infection. Patient states \"I am weak from my infection\"    Interval History Status: improved. Patient is better overall. She is not requiring any supplemental oxygen. She is multiple complaints about her chronic disease processes. 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 She tells me that at the age of 11 that surgical sponges were left in her body which caused her immune system to become essentially hyperactive which is led to much of her chronic disease process at this point in time.   The patient also discussed many of her GI issues and her follow-up with her gastroenterologist at [facility]. I did voice understanding and asked if she ever followed with an immunologist.  She tells me that she has seen them in the past but IVIG infusions lead to CHF exacerbations. She no longer wishes to follow with them and has not done so for quite some time. Regarding her current issues she denies any questions or concerns regarding her COVID-19 or Klebsiella infection. She is profoundly weak and would benefit from therapy at skilled nursing facility. She denies any questions or concerns regarding this. Brief History: This 68-year-old female presented to the hospital with generalized weakness and has been found to have a Klebsiella urinary tract infection with concurrent COVID 19 infection. She is presently being treated with Macrobid and Decadron. Due to her history of multiple sclerosis and any acute infection recommendations are for placement to skilled nursing facility for rehabilitation. Review of Systems:     Constitutional:  negative for chills, fevers, sweats. Patient complains of weakness and debility  Respiratory:  negative for cough, dyspnea on exertion, shortness of breath, wheezing  Cardiovascular:  negative for chest pain, chest pressure/discomfort, lower extremity edema, palpitations  Gastrointestinal:  negative for abdominal pain, constipation, diarrhea, nausea, vomiting  Neurological:  negative for dizziness, headache    Medications: Allergies:     Allergies   Allergen Reactions    Dofetilide Other (See Comments)    Lisinopril-Hydrochlorothiazide Anaphylaxis and Hives    Sulfa Antibiotics Anaphylaxis    Penicillins Hives    Polyethylene Glycol Hives    Actifed Cold-Allergy [Chlorpheniramine-Phenylephrine]     Altace [Ramipril]      Chronic cough    Cephalosporins Hives    Coreg [Carvedilol]      bloating    Dml Facial Moisturizer     Elavil [Amitriptyline Hcl]     Entex [Ami-Jose]     Ethylene Glycol     Fentanyl     Heparin     Hizentra [Immune Globulin (Human)]     Hydralazine     Levofloxacin      ach tendon    Lisinopril-Hydrochlorothiazide     Montelukast Sodium      Heart effects    Morphine Itching    Nifedipine     Norvasc [Amlodipine Besylate]      Stomach pain    Other      IV IG     Phenobarbital Hives    Propoxyphene     Robaxin [Methocarbamol]     Sinequan [Doxepin Hcl]     Sudafed [Pseudoephedrine Hcl]     Sudafed [Pseudoephedrine Hcl]     Tranquil-[place]     Wellbutrin [Bupropion Hcl]      Hypotension    Actifed Cold-Allergy [Chlorpheniramine-Phenylephrine] Palpitations    Clindamycin/Lincomycin Nausea And Vomiting    Codeine Nausea And Vomiting    Metoprolol Nausea And Vomiting     Reports she is allergic to the preservative polyethylineglycol in this medicine, causing severe emesis and stomach pain     Metoprolol Succinate Nausea And Vomiting    Seldane [Terfenadine] Palpitations       Current Meds:   Scheduled Meds:    nitrofurantoin (macrocrystal-monohydrate)  100 mg Oral 2 times per day    cloNIDine  0.1 mg Oral BID    potassium chloride  20 mEq Oral Daily    ranolazine  500 mg Oral BID    sodium chloride flush  5-40 mL IntraVENous 2 times per day    apixaban  5 mg Oral BID    bumetanide  1 mg Oral Daily    clopidogrel  75 mg Oral Daily    losartan  100 mg Oral Daily    metoprolol succinate  25 mg Oral Nightly    pantoprazole  40 mg Oral BID    PARoxetine  20 mg Oral Daily    pravastatin  40 mg Oral Nightly    dexamethasone  6 mg IntraVENous Q24H     Continuous Infusions:    sodium chloride       PRN Meds: potassium chloride **OR** potassium alternative oral replacement **OR** potassium chloride, gabapentin, sodium chloride flush, sodium chloride, ondansetron **OR** ondansetron, polyethylene glycol, [DISCONTINUED] acetaminophen **OR** acetaminophen, nitroGLYCERIN, acetaminophen    Data:     Past Medical History:   has a past medical history of Abnormal Pap smear, Asthma, Atrial fibrillation ([facility] [address] Bloody discharge from nipple, CAD (coronary artery disease), Cataracts, bilateral, Colon polyp, CVA (cerebral vascular accident) ([facility] [address] Demyelinating disease ([facility] [address] Fibromyalgia, High-risk sexual behavior, History of bone density study, History of migraines, HTN (hypertension), IgG deficiency ([facility] [address] Multiple sclerosis ([facility] [address] Myoclonic seizures ([facility] [address] Optic neuritis, Osteopenia, Pyloric stenosis, Raynaud's disease, and Vasomotor rhinitis. Social History:   reports that she has never smoked. She has never used smokeless tobacco. She reports that she does not drink alcohol and does not use drugs. Family History:   Family History   Problem Relation Age of Onset    Other Father         Heart problems    Heart Disease Father     Other Mother         heart problems requiring open heart surgery, HTN    Heart Disease Mother     Other Sister         HTN    Hypertension Sister     Other Maternal Aunt         breast cancer    Breast Cancer Maternal Aunt        Vitals:  /72   Pulse 85   Temp 97.6 °F (36.4 °C) (Oral)   Resp 17   Ht 5' 1\" (1.549 m)   Wt 161 lb 6 oz (73.2 kg)   SpO2 99%   BMI 30.49 kg/m²   Temp (24hrs), Av.4 °F (36.3 °C), Min:97.2 °F (36.2 °C), Max:97.6 °F (36.4 °C)    No results for input(s): POCGLU in the last 72 hours. I/O (24Hr):   No intake or output data in the 24 hours ending 22 1355    Labs:  Hematology:  Recent Labs     22   WBC 5.7 3.9   RBC 3.64* 3.41*   HGB 11.7* 10.9*   HCT 34.3* 32.2*   MCV 94.3 94.4   MCH 32.2 31.9   MCHC 34.1 33.8   RDW 17.4* 16.5*    143   MPV 9.1 9.7   DDIMER 0.39  --      Chemistry:  Recent Labs     22  0543 22  0457     --  131* 133*   K 3.6*  --  3.1* 3.8   CL 95*  --  93* 95*   CO2 27  --  27 28   GLUCOSE 94  --  82 139*   BUN 20  --  22 23   CREATININE 1.04*  --  1.39* 1.16*   MG  --   --  1.8  --    ANIONGAP 14  --  11 10   LABGLOM 56*  -- 40* 49*   CALCIUM 9.5  --  8.3* 9.2   TROPHS 31* 40*  --   --      Recent Labs     11/05/22  2217   PROT 6.9   LABALBU 4.6   AST 17   ALT 8   ALKPHOS 103   BILITOT 1.1   LIPASE 23     ABG:No results found for: POCPH, PHART, PH, POCPCO2, KPN7UNS, PCO2, POCPO2, PO2ART, PO2, POCHCO3, PGU3ONH, HCO3, NBEA, PBEA, BEART, BE, THGBART, THB, QKM1IPS, EMPX2XPG, N3TRZMAH, O2SAT, FIO2  Lab Results   Component Value Date/Time    [facility] 03/10/2019 08:42 PM     Lab Results   Component Value Date/Time    CULTURE KLEBSIELLA PNEUMONIAE 10 to 50,000 CFU/ML (A) 11/06/2022 02:08 AM       Radiology:  XR CHEST PORTABLE    Result Date: 11/5/2022  Mild cardiomegaly with probable pulmonary edema.        Physical Examination:       General appearance:  alert, cooperative and no distress  Mental Status:  oriented to person, place and time and normal affect  Lungs:  clear to auscultation bilaterally, normal effort  Heart:  regular rate and rhythm, no murmur  Abdomen:  soft, nontender, nondistended, normal bowel sounds, no masses, hepatomegaly, splenomegaly  Extremities:  no edema, redness, tenderness in the calves  Skin:  no gross lesions, rashes, induration    Assessment:     Hospital Problems             Last Modified POA    * (Principal) COVID 11/6/2022 Yes    Obesity (BMI 30-39.9) 11/6/2022 Yes    Essential hypertension (Chronic) 11/6/2022 Yes    Dyslipidemia (Chronic) 11/6/2022 Yes    Chronic diastolic heart failure ([facility] [address] 11/6/2022 Yes    Paroxysmal A-fib (HCC) (Chronic) 11/6/2022 Yes    Chronic renal insufficiency, stage III (moderate) (HCC) (Chronic) 11/6/2022 Yes       Plan:     COVID-19  Continue Decadron, presently not on supplemental oxygen  Klebsiella pneumonia urinary tract infection  Continue Macrobid  Multiple sclerosis/weakness/debility  PT/OT  Essential hypertension  Blood pressure stable, vitals trend reviewed    Medically stable for discharge to skilled nursing facility for rehabilitation    Maylin Romano DO  11/8/2022  1:55 PM

## 2022-11-08 NOTE — PLAN OF CARE
Problem: Discharge Planning  Goal: Discharge to home or other facility with appropriate resources  Outcome: Adequate for Discharge     Problem: Pain  Goal: Verbalizes/displays adequate comfort level or baseline comfort level  Outcome: Adequate for Discharge     Problem: ABCDS Injury Assessment  Goal: Absence of physical injury  Outcome: Adequate for Discharge     Problem: Safety - Adult  Goal: Free from fall injury  Outcome: Adequate for Discharge     Problem: Chronic Conditions and Co-morbidities  Goal: Patient's chronic conditions and co-morbidity symptoms are monitored and maintained or improved  Outcome: Adequate for Discharge     Problem: Nutrition Deficit:  Goal: Optimize nutritional status  Outcome: Adequate for Discharge     Problem: Respiratory - Adult  Goal: Achieves optimal ventilation and oxygenation  Outcome: Adequate for Discharge

## 2022-11-08 NOTE — DISCHARGE SUMMARY
[facility]  Office: [address], DO, Femiandrae Shepard, DO, Maureenwilber Herbert, DO, Tabby Shreeneda Chowdhury, DO, Bucky Gan MD, Estelita Reese MD, Brittany Steve MD, Juan Giles MD,  Naima Padilla MD, Vianey Tapia MD, Hernan Pagan DO, Teri Cross MD,  Kylee Dawson MD, Fabien Raphael MD, Gabriel Goncalves DO, Maikel Tao MD, Denia Roman MD, Sahara Mota DO, Bea Ortez MD, Joann Galloway MD, Rama Yin MD, Georgette Guzmán MD, Sariah Archuleta DO, France Brewster MD, Chelle Cochran MD, Isaac Grigsby, CNP,  Jose Hernandez, CNP, Emmanuelle Burk, CNP, Ben Cochran, CNP,  Mya Espinal, [facility], Luca Atkins, CNP, Raisa Argueta, CNP, Sue Medina, CNP, Amber Barnes, CNP, Parvin العراقي, CNP, Alice Villalobos PA-C, Stella Singer, CNS, Kevin Francois, [facility], Jonatan Espana, CNP, Selene Tomlinson, CNP, Rosa Abdalla, [facility]         [address]    Discharge Summary     Patient ID: Alvin Mata  :  1945   MRN: [id number]     ACCOUNT:  [de-identified]   Patient's PCP: Veronica Cifuentes MD  Admit Date: 2022   Discharge Date: 2022     Length of Stay: 2  Code Status:  Full Code  Admitting Physician: No admitting provider for patient encounter. Discharge Physician: Nico Walter DO     Active Discharge Diagnoses:     Hospital Problem Lists:  Principal Problem:    COVID  Active Problems:    Obesity (BMI 30-39. 9)    Essential hypertension    Dyslipidemia    Chronic diastolic heart failure (HCC)    Paroxysmal A-fib (HCC)    Chronic renal insufficiency, stage III (moderate) (HCC)  Resolved Problems:    * No resolved hospital problems.  *      Admission Condition:  fair     Discharged Condition: good    Hospital Stay:     Hospital Course:  Alvin Mata is a 78 y.o. female who was admitted for the management of  COVID , presented to ER with Cough and Shortness of Breath    This 68-year-old female presented to hospital with cough, shortness of breath, generalized weakness and debility. The patient has been found to have COVID-19 along with concurrent Klebsiella pneumonia urinary tract infection. The patient was treated with steroids along with antibiotics. The patient improved however with her multiple sclerosis and acute infection she was quite weak and recommendations were for placement to skilled nursing facility for rehabilitation. Patient is discharged to skilled nursing facility in stable condition. Significant therapeutic interventions: As above    Significant Diagnostic Studies:   Labs / Micro:  CBC:   Lab Results   Component Value Date/Time    WBC 3.9 11/07/2022 05:22 AM    RBC 3.41 11/07/2022 05:22 AM    RBC 4.04 04/30/2012 05:55 AM    HGB 10.9 11/07/2022 05:22 AM    HCT 32.2 11/07/2022 05:22 AM    MCV 94.4 11/07/2022 05:22 AM    MCH 31.9 11/07/2022 05:22 AM    MCHC 33.8 11/07/2022 05:22 AM    RDW 16.5 11/07/2022 05:22 AM     11/07/2022 05:22 AM     04/30/2012 05:55 AM     BMP:    Lab Results   Component Value Date/Time    GLUCOSE 139 11/08/2022 04:57 AM    GLUCOSE 108 04/30/2012 05:55 AM     11/08/2022 04:57 AM    K 3.8 11/08/2022 04:57 AM    CL 95 11/08/2022 04:57 AM    CO2 28 11/08/2022 04:57 AM    ANIONGAP 10 11/08/2022 04:57 AM    BUN 23 11/08/2022 04:57 AM    CREATININE 1.16 11/08/2022 04:57 AM    BUNCRER NOT REPORTED 02/06/2022 01:47 PM    CALCIUM 9.2 11/08/2022 04:57 AM    LABGLOM 49 11/08/2022 04:57 AM    GFRAA 39 03/09/2022 06:06 AM    GFR      03/09/2022 06:06 AM        Radiology:  XR CHEST PORTABLE    Result Date: 11/5/2022  Mild cardiomegaly with probable pulmonary edema. Consultations:    Consults:     Final Specialist Recommendations/Findings:   None      The patient was seen and examined on day of discharge and this discharge summary is in conjunction with any daily progress note from day of discharge.     Discharge plan:     Disposition: Skilled nursing facility    Physician Follow Up:     Presley uBllard MD  [address] [place] [address] [id number] [phone]    Follow up      [address] [place] [address] of [address] [place] [address] [address]  [phone]           Requiring Further Evaluation/Follow Up POST HOSPITALIZATION/Incidental Findings: None    Diet: regular diet    Activity: As tolerated    Instructions to Patient: None    Discharge Medications:      Medication List        START taking these medications      dexamethasone 6 MG tablet  Commonly known as: DECADRON  Take 1 tablet by mouth daily (with breakfast) for 6 days     nitrofurantoin (macrocrystal-monohydrate) 100 MG capsule  Commonly known as: MACROBID  Take 1 capsule by mouth every 12 hours for 7 days            CHANGE how you take these medications      bumetanide 1 MG tablet  Commonly known as: BUMEX  Take 0.5 tablets by mouth daily Advised to take additional 1/2 pill for weight gain, increased CHF and/swelling  What changed: how much to take            CONTINUE taking these medications      allopurinol 100 MG tablet  Commonly known as: ZYLOPRIM     apixaban 5 MG Tabs tablet  Commonly known as: ELIQUIS     azaTHIOprine 50 MG tablet  Commonly known as: IMURAN     cloNIDine 0.1 MG tablet  Commonly known as: CATAPRES  Take 1 tablet by mouth 2 times daily     losartan 100 MG tablet  Commonly known as: COZAAR     metoprolol succinate 50 MG extended release tablet  Commonly known as: TOPROL XL     nitroGLYCERIN 0.4 MG SL tablet  Commonly known as: NITROSTAT     pantoprazole 40 MG tablet  Commonly known as: PROTONIX     PARoxetine 20 MG tablet  Commonly known as: PAXIL     Plavix 75 MG tablet  Generic drug: clopidogrel     pravastatin 40 MG tablet  Commonly known as: PRAVACHOL     ranolazine 500 MG extended release tablet  Commonly known as: RANEXA            STOP taking these medications      doxazosin 1 MG tablet  Commonly known as: CARDURA     gabapentin 100 MG capsule  Commonly known as: NEURONTIN     isosorbide mononitrate 60 MG extended release tablet  Commonly known as: IMDUR     potassium chloride 20 MEQ extended release tablet  Commonly known as: KLOR-CON M               Where to Get Your Medications        Information about where to get these medications is not yet available    Ask your nurse or doctor about these medications  dexamethasone 6 MG tablet  nitrofurantoin (macrocrystal-monohydrate) 100 MG capsule         No discharge procedures on file. Time Spent on discharge is  37 mins in patient examination, evaluation, counseling as well as medication reconciliation, prescriptions for required medications, discharge plan and follow up. Electronically signed by   Krysten Servin DO  11/8/2022  2:01 PM      Thank you Dr. Aubrie Miranda MD for the opportunity to be involved in this patient's care.

## 2022-11-09 ENCOUNTER — APPOINTMENT (OUTPATIENT)
Dept: GENERAL RADIOLOGY | Age: 77
End: 2022-11-09
Payer: MEDICARE

## 2022-11-09 ENCOUNTER — HOSPITAL ENCOUNTER (EMERGENCY)
Age: 77
Discharge: HOME | End: 2022-11-09
Attending: EMERGENCY MEDICINE
Payer: MEDICARE

## 2022-11-09 VITALS
HEART RATE: 85 BPM | HEIGHT: 61 IN | TEMPERATURE: 97.5 F | WEIGHT: 161 LBS | OXYGEN SATURATION: 100 % | DIASTOLIC BLOOD PRESSURE: 77 MMHG | RESPIRATION RATE: 17 BRPM | SYSTOLIC BLOOD PRESSURE: 128 MMHG | BODY MASS INDEX: 30.4 KG/M2

## 2022-11-09 DIAGNOSIS — U07.1 COVID-19: ICD-10-CM

## 2022-11-09 DIAGNOSIS — B37.0 THRUSH: Primary | ICD-10-CM

## 2022-11-09 PROCEDURE — 71045 X-RAY EXAM CHEST 1 VIEW: CPT

## 2022-11-09 PROCEDURE — 99284 EMERGENCY DEPT VISIT MOD MDM: CPT

## 2022-11-09 PROCEDURE — 6370000000 HC RX 637 (ALT 250 FOR IP): Performed by: PHYSICIAN ASSISTANT

## 2022-11-09 PROCEDURE — 93005 ELECTROCARDIOGRAM TRACING: CPT | Performed by: PHYSICIAN ASSISTANT

## 2022-11-09 RX ORDER — NITROFURANTOIN 25; 75 MG/1; MG/1
100 CAPSULE ORAL EVERY 12 HOURS SCHEDULED
Qty: 14 CAPSULE | Refills: 0 | Status: SHIPPED | OUTPATIENT
Start: 2022-11-09 | End: 2022-11-16

## 2022-11-09 RX ADMIN — NYSTATIN 500000 UNITS: 100000 SUSPENSION ORAL at 14:34

## 2022-11-09 NOTE — ED NOTES
setting up transport for pt discharge home.  ETA 1600     [facility]  11/09/22 0850

## 2022-11-09 NOTE — DISCHARGE INSTRUCTIONS
PLEASE RETURN TO THE EMERGENCY DEPARTMENT IMMEDIATELY if your symptoms worsen in anyway or in 8-12 hours if not improved for re-evaluation. You should immediately return to the ER for symptoms such as increasing pain, bloody stool, fever, a feeling of passing out, light headed, dizziness, chest pain, shortness of breath, persistent nausea and/or vomiting, numbness or weakness to the arms or legs, coolness or color change of the arms or legs. Take your medication as indicated and prescribed. Contact your PCP for follow-up. Please understand that at this time there is no evidence for a more serious underlying process, but that early in the process of an illness or injury, an emergency department workup can be falsely reassuring. You should contact your family doctor within the next 24 hours for a follow up appointment    Dionte Flores!!!    From [facility] ([facility]) and [facility] Emergency Services    On behalf of the Emergency Department staff at [facility]), I would like to thank you for giving us the opportunity to address your health care needs and concerns. We hope that during your visit, our service was delivered in a professional and caring manner. Please keep [facility] ([facility]) in mind as we walk with you down the path to your own personal wellness. Please expect an automated text message or email from us so we can ask a few questions about your health and progress. Based on your answers, a clinician may call you back to offer help and instructions. Please understand that early in the process of an illness or injury, an emergency department workup can be falsely reassuring. If you notice any worsening, changing or persistent symptoms please call your family doctor or return to the ER immediately. Tell us how we did during your visit at [email or web address]   and let us know about your experience

## 2022-11-09 NOTE — CARE COORDINATION
Called down to ED by KLEVER Avila. Pt was upset with her care at [facility]. Writer spoke with patient. Pt requesting to go home. Ambulette requested via [address] [facility]. Pt will need macrobid and nystatin sent to [address] [place] [address] (outpt pharmacy in hospital) so she can receive scripts prior to leaving.      [address] with Ariadna @ [facility]- ambulette ETA 1600

## 2022-11-09 NOTE — ED PROVIDER NOTES
[facility] Emergency Department    [address] [address] [facility] [address] [facility] [address]  Phone: [phone]  Fax: [phone]  Emergency Department  Faculty Attestation    I performed a history and physical examination of the patient and discussed management with the mid level provideer. I reviewed the mid level provider's note and agree with the documented findings and plan of care. Any areas of disagreement are noted on the chart. I was personally present for the key portions of any procedures. I have documented in the chart those procedures where I was not present during the key portions. I have reviewed the emergency nurses triage note. I agree with the chief complaint, past medical history, past surgical history, allergies, medications, social and family history as documented unless otherwise noted below. Documentation of the HPI, Physical Exam and Medical Decision Making performed by medical students or scribes is based on my personal performance of the HPI, PE and MDM. For Physician Assistant/ Nurse Practitioner cases/documentation I have personally evaluated this patient and have completed at least one if not all key elements of the E/M (history, physical exam, and MDM). Additional findings are as noted. Primary Care Physician:  Jua nR Gutierrez MD    CHIEF COMPLAINT       Chief Complaint   Patient presents with    Other     Pt D/C from here yesterday. Sent to [facility] where she became concerned they were not helping. They brought her back here. Wants to be re evaluated to try to go to a rehab in  or home       RECENT VITALS:   Temp: 97.5 °F (36.4 °C),  Heart Rate: 87, Resp: 20, BP: 115/70    LABS:  Labs Reviewed - No data to display       XR CHEST PORTABLE (Final result)  Result time 11/09/22 14:48:03  Final result by Ziyad Armendariz MD (11/09/22 14:48:03)                Impression:    No acute cardiopulmonary disease. Cardiomegaly.              Narrative:    EXAMINATION:   ONE XRAY VIEW OF THE CHEST     11/9/2022 1:48 pm     COMPARISON:   AP chest from 11/05/2022     HISTORY:   ORDERING SYSTEM PROVIDED HISTORY: Covid, cough   TECHNOLOGIST PROVIDED HISTORY:   Covid, cough   Reason for Exam: cough and covid     History of multiple sclerosis, CAD, and acute on chronic CHF. FINDINGS:   Left subclavian ppm with 2 unchanged electrode leads. Overlying ECG monitor   leads and snaps. Enlarged but stable cardiac silhouette. Mediastinal structures midline   unchanged, again with calcification knob and prominence pulmonary arteries. No localized pulmonary opacity or blunting of the costophrenic angles. Bones   appear unchanged. PERTINENT ATTENDING PHYSICIAN COMMENTS:    EKG: Emergency physician interpretation: Paced at 84. Axis -4, , . This has changed since 6 months ago. The patient presents with concern about her COVID symptoms. She has been diagnosed with COVID 23 and was hospitalized recently. She was discharged to an ECF. The patient does not feel she is getting the care she needs. She states that she either wants to be admitted or to go home, rather than back to the ECF. The patient complains that she always has pain in the left side of the chest because she had complications after the pacemaker was placed recently in her chest.  That she had an infection in the pocket and the skin. That has resolved. On exam, the patient is in no distress. She has normal heart and lung sounds and no pain to palpation of the chest wall. The patient's labs and Xray are reassuring. She would like to be discharged home. She has concern for thrush, so we have provided a prescription. She is discharged in good condition. Pinky Infante MD  11/15/22 2176

## 2022-11-10 NOTE — ED PROVIDER NOTES
[facility] Emergency Department  [address] [address] [facility] [address] [place] [address]  Phone: [phone]  Fax: [phone]        Pt Name: Milo Dudley  MRN: [id number]  [place] 1945  Date of evaluation: 11/9/22    [address] [place] [address]       Chief Complaint   Patient presents with    Other     Pt D/C from here yesterday. Sent to [facility] where she became concerned they were not helping. They brought her back here. Wants to be re evaluated to try to go to a rehab in  or home       HISTORY OF PRESENT ILLNESS (Location/Symptom, Timing/Onset, Context/Setting, Quality, Duration, Modifying Factors, Severity)      Milo Dudley is a 78 y.o. female with PMH of MS who presents to the ED via private auto with desire for different ECF or to go home. Patient was recently admitted the hospital for COVID and hypoxia. She is doing well and is no longer on oxygen. She is taking her medications as prescribed. She went to the extended care facility and says they essentially did not come see her for several hours and just left her alone in the room. She does not feel like she is being taken care of there and she said she would to be reevaluated and see if she can go somewhere else or go home. Does still have a cough and also notes that she has some tongue pain and is concerned that she has thrush. She is taking steroids. Denies any exacerbating relieving factors. Denies any chest pain, fever, abdominal pain, shortness of breath different from baseline, new or different weakness, or any other concerns at this time.     PAST MEDICAL / SURGICAL / SOCIAL / FAMILY HISTORY     PMH:  has a past medical history of Abnormal Pap smear, Asthma, Atrial fibrillation (HCC), Bloody discharge from nipple, CAD (coronary artery disease), Cataracts, bilateral, Colon polyp, CVA (cerebral vascular accident) ([address] Demyelinating disease ([address] Fibromyalgia, High-risk sexual behavior, History of bone density study, History of migraines, HTN (hypertension), IgG deficiency (HCC), Multiple sclerosis ([address] Myoclonic seizures ([facility] [address] Optic neuritis, Osteopenia, Pyloric stenosis, Raynaud's disease, and Vasomotor rhinitis. Surgical History:  has a past surgical history that includes Dilation & curettage (1982); Breast biopsy (1990); Appendectomy (1955); Septoplasty (1985); Hammer toe surgery (1989); Sinus endoscopy; sinus surgery; Shoulder arthroscopy (1998); Esophagoscopy (1966, 1969); Colonoscopy (2010); Foreign Body Removal (1962); laparoscopy (2957); Coronary angioplasty with stent (5/2011); Coronary angioplasty with stent (6/2011); Upper gastrointestinal endoscopy; eye surgery; pr colonoscopy w/biopsy single/multiple (N/A, 12/19/2017); pr egd transoral biopsy single/multiple (12/19/2017); Upper gastrointestinal endoscopy (12/19/2017); Upper gastrointestinal endoscopy (5/24/2018); Cardiac surgery; Cardiac catheterization (07/31/2020); Cardioversion (08/14/2020); and Cardioversion (07/31/2020). Social History:  reports that she has never smoked. She has never used smokeless tobacco. She reports that she does not drink alcohol and does not use drugs. Family History: She indicated that the status of her mother is unknown. She indicated that the status of her father is unknown. She indicated that the status of her sister is unknown. She indicated that the status of her maternal aunt is unknown.   family history includes Breast Cancer in her maternal aunt; Heart Disease in her father and mother; Hypertension in her sister; Other in her father, maternal aunt, mother, and sister.   Psychiatric History: None    Allergies: Dofetilide, Lisinopril-hydrochlorothiazide, Sulfa antibiotics, Penicillins, Polyethylene glycol, Actifed cold-allergy [chlorpheniramine-phenylephrine], Altace [ramipril], Cephalosporins, Coreg [carvedilol], Dml facial moisturizer, Elavil [amitriptyline hcl], Entex [ami-margot], Ethylene glycol, Fentanyl, Heparin, Hizentra [immune globulin (human)], Hydralazine, Levofloxacin, Lisinopril-hydrochlorothiazide, Montelukast sodium, Morphine, Nifedipine, Norvasc [amlodipine besylate], Other, Phenobarbital, Propoxyphene, Robaxin [methocarbamol], Sinequan [doxepin hcl], Sudafed [pseudoephedrine hcl], Sudafed [pseudoephedrine hcl], Tranquil-cecil, Wellbutrin [bupropion hcl], Actifed cold-allergy [chlorpheniramine-phenylephrine], Clindamycin/lincomycin, Codeine, Metoprolol, Metoprolol succinate, and Seldane [terfenadine]    Home Medications:   Prior to Admission medications    Medication Sig Start Date End Date Taking?  Authorizing Provider   nystatin (MYCOSTATIN) 310989 UNIT/ML suspension Take 5 mLs by mouth 4 times daily for 10 days Retain in mouth as long as possible 11/9/22 11/19/22 Yes Lena Antunez PA-C   nitrofurantoin, macrocrystal-monohydrate, (MACROBID) 100 MG capsule Take 1 capsule by mouth every 12 hours for 7 days 11/9/22 11/16/22 Yes Lena Antunez PA-C   dexamethasone (DECADRON) 6 MG tablet Take 1 tablet by mouth daily (with breakfast) for 6 days 11/8/22 11/14/22  Kathy Mccall DO   bumetanide (BUMEX) 1 MG tablet Take 0.5 tablets by mouth daily Advised to take additional 1/2 pill for weight gain, increased CHF and/swelling  Patient taking differently: Take 1 mg by mouth daily Advised to take additional 1/2 pill for weight gain, increased CHF and/swelling 3/9/22 4/8/22  Galdino Chowdhury DO   allopurinol (ZYLOPRIM) 100 MG tablet Take 100 mg by mouth daily    Historical Provider, MD   metoprolol succinate (TOPROL XL) 50 MG extended release tablet Take 25 mg by mouth at bedtime  5/20/21   Historical Provider, MD   cloNIDine (CATAPRES) 0.1 MG tablet Take 1 tablet by mouth 2 times daily 8/8/21   Tong Wallace DO   losartan (COZAAR) 100 MG tablet Take 1 tablet by mouth daily 8/8/21   Tong Wallace DO   apixaban (ELIQUIS) 5 MG TABS tablet Take by mouth 2 times daily    Historical Provider, MD   pantoprazole (PROTONIX) 40 MG tablet Take 40 mg by mouth 2 times daily     Historical Provider, MD   azaTHIOprine (IMURAN) 50 MG tablet Take 50 mg by mouth daily     Historical Provider, MD   ranolazine (RANEXA) 500 MG SR tablet Take 500 mg by mouth 2 times daily. Historical Provider, MD   nitroGLYCERIN (NITROSTAT) 0.4 MG SL tablet Place 0.4 mg under the tongue every 5 minutes. Indications: Chest Pain    Historical Provider, MD   PARoxetine (PAXIL) 20 MG tablet Take by mouth every morning     Historical Provider, MD   clopidogrel (PLAVIX) 75 MG tablet Take 75 mg by mouth daily 100 mg pm    Historical Provider, MD   pravastatin (PRAVACHOL) 40 MG tablet Take 40 mg by mouth at bedtime     Historical Provider, MD       REVIEW OF SYSTEMS  (2-9 systems for level 4, 10 ormore for level 5)      Review of Systems    Constitutional: Denies fever or chills. Eyes: Denies vision changes. HENT: Denies sore throat or neck pain. Respiratory: Denies cough or shortness of breath. Cardiovascular: Denies chest pain. GI: Denies vomiting or diarrhea. : Denies painful urination. Musculoskeletal: Denies recent trauma. Skin: Denies new rashes or wounds. Neurologic:  Denies new numbness or weakness. Psychiatric: Denies agitation. Heme: Denies bleeding disorders. All other systems negative except as marked. PHYSICAL EXAM  (up to 7 for level 4, 8 or more for level 5)      INITIAL VITALS:  height is 5' 1\" (1.549 m) and weight is 73 kg (161 lb). Her oral temperature is 97.5 °F (36.4 °C). Her blood pressure is 128/77 and her pulse is 85. Her respiration is 17 and oxygen saturation is 100%. Vital signs reviewed. Physical Exam    General:  Alert, cooperative, well-groomed, well-nourished, appears stated age, and is in no acute distress. Head:  Normocephalic, atraumatic, and without obvious abnormality. Eyes:  Sclerae/conjunctivae clear without injection, pallor, or icterus. Corneas clear without opacities. EOM's intact.    ENT: Ears and nose are all without obvious masses lesion or deformity. Neck: Supple and symmetrical. Trachea midline. No adenopathy. No jugular venous distention. Lungs:   No respiratory distress. Clear to auscultation bilaterally. No wheezes, rhonchi, or rales. Heart:  Regular rate and rhythm. No rubs, or gallops. Abdomen:   Normoactive bowel sounds. Soft, nontender, nondistended without guarding or rebound. No palpable masses. No CVA tenderness. Extremities: Warm and dry without erythema or edema. Skin: Soft, good turgor, and well-hydrated. No obvious rashes or lesions. Neurologic: GCS is 15 and no focal deficits are appreciated. Normal gait. Grossly normal motor and sensation. Speech clear. Psychiatric: Normal mood and affect. Normal behavior. Coherent thought process. DIFFERENTIAL DIAGNOSIS / MDM     Patient presents to the emergency part with a complaint as described above. Vital signs are unremarkable. Physical exam demonstrates a well-appearing nontoxic thin female in no acute distress. Lungs are clear to auscultation. Heart rate and rhythm are regular. We will check a chest x-ray to rule out any changes in her lungs or psych pneumonia. My nurse obtained an EKG prior to my evaluation so we obtained this as well and it is a paced rhythm consistent with her history. This chest x-ray is unremarkable. I gave her nystatin.  came down to chat with the patient and she feels more comfortable at home. She has been able to take care of her self every day prior to this illness and I feel that she can go home as that is what the patient would prefer. We will send her prescription to the pharmacy so she has them.     PLAN (LABS / IMAGING / EKG):  Orders Placed This Encounter   Procedures    XR CHEST PORTABLE    EKG 12 Lead       MEDICATIONS ORDERED:  Orders Placed This Encounter   Medications    nystatin (MYCOSTATIN) 287460 UNIT/ML suspension 500,000 Units    nystatin (MYCOSTATIN) [id number] UNIT/ML suspension     Sig: Take 5 mLs by mouth 4 times daily for 10 days Retain in mouth as long as possible     Dispense:  200 mL     Refill:  0    nitrofurantoin, macrocrystal-monohydrate, (MACROBID) 100 MG capsule     Sig: Take 1 capsule by mouth every 12 hours for 7 days     Dispense:  14 capsule     Refill:  0       Controlled Substances Monitoring:     DIAGNOSTIC RESULTS     EKG: All EKG's are interpreted by the Emergency Department Physician who either signs or Co-signs this chart in the [address] a cardiologist.    See ED attending note. RADIOLOGY: All images are read by the radiologist and their interpretations are reviewed. XR CHEST PORTABLE    Result Date: 11/9/2022  EXAMINATION: ONE XRAY VIEW OF THE CHEST 11/9/2022 1:48 pm COMPARISON: AP chest from 11/05/2022 HISTORY: ORDERING SYSTEM PROVIDED HISTORY: Covid, cough TECHNOLOGIST PROVIDED HISTORY: Covid, cough Reason for Exam: cough and covid History of multiple sclerosis, CAD, and acute on chronic CHF. FINDINGS: Left subclavian ppm with 2 unchanged electrode leads. Overlying ECG monitor leads and snaps. Enlarged but stable cardiac silhouette. Mediastinal structures midline unchanged, again with calcification knob and prominence pulmonary arteries. No localized pulmonary opacity or blunting of the costophrenic angles. Bones appear unchanged. No acute cardiopulmonary disease. Cardiomegaly. XR CHEST PORTABLE    Result Date: 11/5/2022  EXAMINATION: ONE XRAY VIEW OF THE CHEST 11/5/2022 10:01 pm COMPARISON: 03/04/2022 HISTORY: Acute cough and congestion FINDINGS: Mild cardiomegaly. Left chest pacemaker. Perihilar opacities are compatible with pulmonary edema. No significant pleural effusion. No pneumothorax. Mild cardiomegaly with probable pulmonary edema.        LABS:  Results for orders placed or performed during the hospital encounter of 11/09/22   EKG 12 Lead   Result Value Ref Range    Ventricular Rate 84 BPM    Atrial Rate 81 BPM QRS Duration 146 ms    Q-T Interval 490 ms    QTc Calculation (Bazett) 579 ms    R Axis -4 degrees    T Axis -133 degrees       EMERGENCY DEPARTMENT COURSE           Vitals:    Vitals:    11/09/22 1241 11/09/22 1430 11/09/22 1545   BP: 115/70 127/64 128/77   Pulse: 87 86 85   Resp: 20 18 17   Temp: 97.5 °F (36.4 °C)     TempSrc: Oral     SpO2: 98% 100% 100%   Weight: 73 kg (161 lb)     Height: 5' 1\" (1.549 m)       -------------------------  BP: 128/77, Temp: 97.5 °F (36.4 °C), Heart Rate: 85, Resp: 17      RE-EVALUATION:  See ED Course notes above. The patient and/or family and I have discussed the diagnosis and risks, and we agree with discharging home to follow-up with their pertinent providers. The patient appears stable for discharge and has been instructed to return immediately for new concerning symptoms or if the symptoms worsen in any way. The patient understands that at this time there is no evidence for a more malignant underlying process, but the patient also understands that early in the process of an illness or injury, an emergency department workup can be falsely reassuring. Routine discharge counseling was given, and the patient understands that worsening, changing or persistent symptoms should prompt an immediate call or follow up with their primary physician or return to the emergency department. I have reviewed the disposition diagnosis with the patient and or their family/guardian. I have answered their questions and given discharge instructions. They voiced understanding of these instructions and did not have any further questions or complaints. This patient was seen by the attending physician and they agreed with the assessment and plan. CONSULTS:  None    PROCEDURES:  None    FINAL IMPRESSION      1. Thrush    2. COVID-19          DISPOSITION / PLAN     CONDITION ON DISPOSITION:   Good / Stable for discharge.      PATIENT REFERRED TO:  Gabriella Kebede MD  [facility] [place] [address]  [phone]    Call in 1 day  For re-check      DISCHARGE MEDICATIONS:  Discharge Medication List as of 11/9/2022  2:54 PM        START taking these medications    Details   nystatin (MYCOSTATIN) [id number] UNIT/ML suspension Take 5 mLs by mouth 4 times daily for 10 days Retain in mouth as long as possible, Oral, 4 TIMES DAILY Starting Wed 11/9/2022, Until Sat 11/19/2022, For 10 days, Disp-200 mL, R-0, Normal             Juvenal Mccarthy PA-C   Emergency Medicine Physician Assistant    (Please note that portions of this note were completed with a voice recognition program.  Efforts were made to edit the dictations but occasionally words aremis-transcribed.)        Juvenal Mccarthy PA-C  11/14/22 2166

## 2022-11-11 LAB
EKG ATRIAL RATE: 81 BPM
EKG Q-T INTERVAL: 490 MS
EKG QRS DURATION: 146 MS
EKG QTC CALCULATION (BAZETT): 579 MS
EKG R AXIS: -4 DEGREES
EKG T AXIS: -133 DEGREES
EKG VENTRICULAR RATE: 84 BPM

## 2023-02-15 ENCOUNTER — APPOINTMENT (OUTPATIENT)
Dept: CT IMAGING | Age: 78
End: 2023-02-15
Payer: MEDICARE

## 2023-02-15 ENCOUNTER — APPOINTMENT (OUTPATIENT)
Dept: GENERAL RADIOLOGY | Age: 78
End: 2023-02-15
Payer: MEDICARE

## 2023-02-15 ENCOUNTER — HOSPITAL ENCOUNTER (INPATIENT)
Age: 78
LOS: 2 days | Discharge: HOME HEALTH | End: 2023-02-17
Attending: EMERGENCY MEDICINE | Admitting: HOSPITALIST
Payer: MEDICARE

## 2023-02-15 DIAGNOSIS — R05.1 ACUTE COUGH: ICD-10-CM
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
## 2023-02-15 DIAGNOSIS — R53.1 GENERAL WEAKNESS: Primary | ICD-10-CM

## 2023-02-15 PROBLEM — Z78.9 UNABLE TO CARE FOR SELF: Status: ACTIVE | Noted: 2023-02-15

## 2023-02-15 PROBLEM — J20.9 ACUTE BRONCHITIS: Status: ACTIVE | Noted: 2023-02-15

## 2023-02-15 LAB
ABSOLUTE EOS #: 0 K/UL (ref 0–0.4)
ABSOLUTE LYMPH #: 0.7 K/UL (ref 1–4.8)
ABSOLUTE MONO #: 1.4 K/UL (ref 0.1–1.2)
ALBUMIN SERPL-MCNC: 4.2 G/DL (ref 3.5–5.2)
ALBUMIN/GLOBULIN RATIO: 1.9 (ref 1–2.5)
ALP SERPL-CCNC: 122 U/L (ref 35–104)
ALT SERPL-CCNC: 8 U/L (ref 5–33)
ANION GAP SERPL CALCULATED.3IONS-SCNC: 11 MMOL/L (ref 9–17)
AST SERPL-CCNC: 13 U/L
BASOPHILS # BLD: 0 % (ref 0–2)
BASOPHILS ABSOLUTE: 0 K/UL (ref 0–0.2)
BILIRUB SERPL-MCNC: 1.6 MG/DL (ref 0.3–1.2)
BUN SERPL-MCNC: 24 MG/DL (ref 8–23)
CALCIUM SERPL-MCNC: 9.3 MG/DL (ref 8.6–10.4)
CHLORIDE SERPL-SCNC: 100 MMOL/L (ref 98–107)
CO2 SERPL-SCNC: 26 MMOL/L (ref 20–31)
CREAT SERPL-MCNC: 1.01 MG/DL (ref 0.5–0.9)
EOSINOPHILS RELATIVE PERCENT: 0 % (ref 1–4)
GFR SERPL CREATININE-BSD FRML MDRD: 58 ML/MIN/1.73M2
GLUCOSE SERPL-MCNC: 109 MG/DL (ref 70–99)
HCT VFR BLD AUTO: 31.6 % (ref 36–46)
HGB BLD-MCNC: 10.3 G/DL (ref 12–16)
LACTATE PLASV-SCNC: 1 MMOL/L (ref 0.5–2.2)
LIPASE SERPL-CCNC: 18 U/L (ref 13–60)
LYMPHOCYTES # BLD: 7 % (ref 24–44)
MCH RBC QN AUTO: 30.8 PG (ref 26–34)
MCHC RBC AUTO-ENTMCNC: 32.6 G/DL (ref 31–37)
MCV RBC AUTO: 94.4 FL (ref 80–100)
MONOCYTES # BLD: 13 % (ref 2–11)
PDW BLD-RTO: 17.4 % (ref 12.5–15.4)
PLATELET # BLD AUTO: 185 K/UL (ref 140–450)
PMV BLD AUTO: 9 FL (ref 6–12)
POTASSIUM SERPL-SCNC: 3.4 MMOL/L (ref 3.7–5.3)
PROT SERPL-MCNC: 6.4 G/DL (ref 6.4–8.3)
RBC # BLD: 3.35 M/UL (ref 4–5.2)
SARS-COV-2 RDRP RESP QL NAA+PROBE: NOT DETECTED
SEG NEUTROPHILS: 80 % (ref 36–66)
SEGMENTED NEUTROPHILS ABSOLUTE COUNT: 8.4 K/UL (ref 1.8–7.7)
SODIUM SERPL-SCNC: 137 MMOL/L (ref 135–144)
SPECIMEN DESCRIPTION: NORMAL
WBC # BLD AUTO: 10.6 K/UL (ref 3.5–11)

## 2023-02-15 PROCEDURE — 97166 OT EVAL MOD COMPLEX 45 MIN: CPT

## 2023-02-15 PROCEDURE — G0378 HOSPITAL OBSERVATION PER HR: HCPCS

## 2023-02-15 PROCEDURE — 70450 CT HEAD/BRAIN W/O DYE: CPT

## 2023-02-15 PROCEDURE — 94640 AIRWAY INHALATION TREATMENT: CPT

## 2023-02-15 PROCEDURE — 6370000000 HC RX 637 (ALT 250 FOR IP): Performed by: EMERGENCY MEDICINE

## 2023-02-15 PROCEDURE — 87040 BLOOD CULTURE FOR BACTERIA: CPT

## 2023-02-15 PROCEDURE — 97162 PT EVAL MOD COMPLEX 30 MIN: CPT

## 2023-02-15 PROCEDURE — 36415 COLL VENOUS BLD VENIPUNCTURE: CPT

## 2023-02-15 PROCEDURE — 85025 COMPLETE CBC W/AUTO DIFF WBC: CPT

## 2023-02-15 PROCEDURE — 97535 SELF CARE MNGMENT TRAINING: CPT

## 2023-02-15 PROCEDURE — 99222 1ST HOSP IP/OBS MODERATE 55: CPT | Performed by: HOSPITALIST

## 2023-02-15 PROCEDURE — 6370000000 HC RX 637 (ALT 250 FOR IP): Performed by: NURSE PRACTITIONER

## 2023-02-15 PROCEDURE — 94664 DEMO&/EVAL PT USE INHALER: CPT

## 2023-02-15 PROCEDURE — 71046 X-RAY EXAM CHEST 2 VIEWS: CPT

## 2023-02-15 PROCEDURE — 2580000003 HC RX 258: Performed by: EMERGENCY MEDICINE

## 2023-02-15 PROCEDURE — 99285 EMERGENCY DEPT VISIT HI MDM: CPT

## 2023-02-15 PROCEDURE — 83605 ASSAY OF LACTIC ACID: CPT

## 2023-02-15 PROCEDURE — 2580000003 HC RX 258: Performed by: NURSE PRACTITIONER

## 2023-02-15 PROCEDURE — 87635 SARS-COV-2 COVID-19 AMP PRB: CPT

## 2023-02-15 PROCEDURE — 97116 GAIT TRAINING THERAPY: CPT

## 2023-02-15 PROCEDURE — 6370000000 HC RX 637 (ALT 250 FOR IP): Performed by: HOSPITALIST

## 2023-02-15 PROCEDURE — 6360000002 HC RX W HCPCS: Performed by: EMERGENCY MEDICINE

## 2023-02-15 PROCEDURE — 1210000000 HC MED SURG R&B

## 2023-02-15 PROCEDURE — 6360000002 HC RX W HCPCS: Performed by: NURSE PRACTITIONER

## 2023-02-15 PROCEDURE — 80053 COMPREHEN METABOLIC PANEL: CPT

## 2023-02-15 PROCEDURE — 83690 ASSAY OF LIPASE: CPT

## 2023-02-15 RX ORDER — CLOPIDOGREL BISULFATE 75 MG/1
75 TABLET ORAL DAILY
Status: DISCONTINUED | OUTPATIENT
Start: 2023-02-15 | End: 2023-02-17 | Stop reason: HOSPADM

## 2023-02-15 RX ORDER — DOXYCYCLINE HYCLATE 100 MG
100 TABLET ORAL EVERY 12 HOURS SCHEDULED
Status: DISCONTINUED | OUTPATIENT
Start: 2023-02-15 | End: 2023-02-17 | Stop reason: HOSPADM

## 2023-02-15 RX ORDER — POTASSIUM CHLORIDE 7.45 MG/ML
10 INJECTION INTRAVENOUS PRN
Status: DISCONTINUED | OUTPATIENT
Start: 2023-02-15 | End: 2023-02-17 | Stop reason: HOSPADM

## 2023-02-15 RX ORDER — AZATHIOPRINE 50 MG/1
50 TABLET ORAL DAILY
Status: DISCONTINUED | OUTPATIENT
Start: 2023-02-15 | End: 2023-02-17 | Stop reason: HOSPADM

## 2023-02-15 RX ORDER — ALLOPURINOL 100 MG/1
100 TABLET ORAL DAILY
Status: DISCONTINUED | OUTPATIENT
Start: 2023-02-15 | End: 2023-02-17 | Stop reason: HOSPADM

## 2023-02-15 RX ORDER — ACETAMINOPHEN 325 MG/1
650 TABLET ORAL EVERY 6 HOURS PRN
Status: DISCONTINUED | OUTPATIENT
Start: 2023-02-15 | End: 2023-02-17 | Stop reason: HOSPADM

## 2023-02-15 RX ORDER — PAROXETINE HYDROCHLORIDE 20 MG/1
20 TABLET, FILM COATED ORAL EVERY MORNING
Status: DISCONTINUED | OUTPATIENT
Start: 2023-02-15 | End: 2023-02-17 | Stop reason: HOSPADM

## 2023-02-15 RX ORDER — SODIUM CHLORIDE 0.9 % (FLUSH) 0.9 %
10 SYRINGE (ML) INJECTION PRN
Status: DISCONTINUED | OUTPATIENT
Start: 2023-02-15 | End: 2023-02-17 | Stop reason: HOSPADM

## 2023-02-15 RX ORDER — AZITHROMYCIN 250 MG/1
500 TABLET, FILM COATED ORAL ONCE
Status: COMPLETED | OUTPATIENT
Start: 2023-02-15 | End: 2023-02-15

## 2023-02-15 RX ORDER — CLONIDINE HYDROCHLORIDE 0.1 MG/1
0.1 TABLET ORAL 2 TIMES DAILY
Status: DISCONTINUED | OUTPATIENT
Start: 2023-02-15 | End: 2023-02-17 | Stop reason: HOSPADM

## 2023-02-15 RX ORDER — SODIUM CHLORIDE 9 MG/ML
INJECTION, SOLUTION INTRAVENOUS PRN
Status: DISCONTINUED | OUTPATIENT
Start: 2023-02-15 | End: 2023-02-17 | Stop reason: HOSPADM

## 2023-02-15 RX ORDER — SODIUM CHLORIDE 0.9 % (FLUSH) 0.9 %
5-40 SYRINGE (ML) INJECTION EVERY 12 HOURS SCHEDULED
Status: DISCONTINUED | OUTPATIENT
Start: 2023-02-15 | End: 2023-02-17 | Stop reason: HOSPADM

## 2023-02-15 RX ORDER — PANTOPRAZOLE SODIUM 40 MG/1
40 TABLET, DELAYED RELEASE ORAL 2 TIMES DAILY
Status: DISCONTINUED | OUTPATIENT
Start: 2023-02-15 | End: 2023-02-17 | Stop reason: HOSPADM

## 2023-02-15 RX ORDER — ACETAMINOPHEN 500 MG
1000 TABLET ORAL ONCE
Status: COMPLETED | OUTPATIENT
Start: 2023-02-15 | End: 2023-02-15

## 2023-02-15 RX ORDER — ACETAMINOPHEN 500 MG
1000 TABLET ORAL 3 TIMES DAILY
COMMUNITY

## 2023-02-15 RX ORDER — POTASSIUM CHLORIDE 20 MEQ/1
40 TABLET, EXTENDED RELEASE ORAL PRN
Status: DISCONTINUED | OUTPATIENT
Start: 2023-02-15 | End: 2023-02-17 | Stop reason: HOSPADM

## 2023-02-15 RX ORDER — LOSARTAN POTASSIUM 25 MG/1
100 TABLET ORAL DAILY
Status: DISCONTINUED | OUTPATIENT
Start: 2023-02-15 | End: 2023-02-15

## 2023-02-15 RX ORDER — LOSARTAN POTASSIUM 50 MG/1
100 TABLET ORAL NIGHTLY
Status: DISCONTINUED | OUTPATIENT
Start: 2023-02-15 | End: 2023-02-17 | Stop reason: HOSPADM

## 2023-02-15 RX ORDER — 0.9 % SODIUM CHLORIDE 0.9 %
1000 INTRAVENOUS SOLUTION INTRAVENOUS ONCE
Status: COMPLETED | OUTPATIENT
Start: 2023-02-15 | End: 2023-02-15

## 2023-02-15 RX ORDER — ACETAMINOPHEN 650 MG/1
650 SUPPOSITORY RECTAL EVERY 6 HOURS PRN
Status: DISCONTINUED | OUTPATIENT
Start: 2023-02-15 | End: 2023-02-17 | Stop reason: HOSPADM

## 2023-02-15 RX ORDER — ALBUTEROL SULFATE 2.5 MG/3ML
2.5 SOLUTION RESPIRATORY (INHALATION) EVERY 6 HOURS PRN
Status: DISCONTINUED | OUTPATIENT
Start: 2023-02-15 | End: 2023-02-17 | Stop reason: HOSPADM

## 2023-02-15 RX ORDER — ONDANSETRON 4 MG/1
4 TABLET, ORALLY DISINTEGRATING ORAL EVERY 8 HOURS PRN
Status: DISCONTINUED | OUTPATIENT
Start: 2023-02-15 | End: 2023-02-17 | Stop reason: HOSPADM

## 2023-02-15 RX ORDER — ONDANSETRON 2 MG/ML
4 INJECTION INTRAMUSCULAR; INTRAVENOUS EVERY 6 HOURS PRN
Status: DISCONTINUED | OUTPATIENT
Start: 2023-02-15 | End: 2023-02-17 | Stop reason: HOSPADM

## 2023-02-15 RX ORDER — PRAVASTATIN SODIUM 20 MG
40 TABLET ORAL NIGHTLY
Status: DISCONTINUED | OUTPATIENT
Start: 2023-02-15 | End: 2023-02-17 | Stop reason: HOSPADM

## 2023-02-15 RX ORDER — METOPROLOL SUCCINATE 25 MG/1
25 TABLET, EXTENDED RELEASE ORAL NIGHTLY
Status: DISCONTINUED | OUTPATIENT
Start: 2023-02-15 | End: 2023-02-17 | Stop reason: HOSPADM

## 2023-02-15 RX ADMIN — CLONIDINE HYDROCHLORIDE 0.1 MG: 0.1 TABLET ORAL at 09:49

## 2023-02-15 RX ADMIN — CLONIDINE HYDROCHLORIDE 0.1 MG: 0.1 TABLET ORAL at 19:54

## 2023-02-15 RX ADMIN — APIXABAN 5 MG: 5 TABLET, FILM COATED ORAL at 19:54

## 2023-02-15 RX ADMIN — PAROXETINE HYDROCHLORIDE HEMIHYDRATE 20 MG: 20 TABLET, FILM COATED ORAL at 09:50

## 2023-02-15 RX ADMIN — PANTOPRAZOLE SODIUM 40 MG: 40 TABLET, DELAYED RELEASE ORAL at 19:54

## 2023-02-15 RX ADMIN — SODIUM CHLORIDE, PRESERVATIVE FREE 10 ML: 5 INJECTION INTRAVENOUS at 09:50

## 2023-02-15 RX ADMIN — METOPROLOL SUCCINATE 25 MG: 25 TABLET, EXTENDED RELEASE ORAL at 19:54

## 2023-02-15 RX ADMIN — POTASSIUM CHLORIDE 40 MEQ: 1500 TABLET, EXTENDED RELEASE ORAL at 19:02

## 2023-02-15 RX ADMIN — AZITHROMYCIN MONOHYDRATE 500 MG: 250 TABLET ORAL at 04:00

## 2023-02-15 RX ADMIN — DOXYCYCLINE HYCLATE 100 MG: 100 TABLET, COATED ORAL at 19:54

## 2023-02-15 RX ADMIN — ALLOPURINOL 100 MG: 100 TABLET ORAL at 09:49

## 2023-02-15 RX ADMIN — SODIUM CHLORIDE, PRESERVATIVE FREE 10 ML: 5 INJECTION INTRAVENOUS at 19:53

## 2023-02-15 RX ADMIN — LOSARTAN POTASSIUM 100 MG: 50 TABLET, FILM COATED ORAL at 19:54

## 2023-02-15 RX ADMIN — PRAVASTATIN SODIUM 40 MG: 20 TABLET ORAL at 19:54

## 2023-02-15 RX ADMIN — CLOPIDOGREL BISULFATE 75 MG: 75 TABLET ORAL at 09:49

## 2023-02-15 RX ADMIN — ACETAMINOPHEN 650 MG: 325 TABLET ORAL at 19:56

## 2023-02-15 RX ADMIN — ACETAMINOPHEN 650 MG: 325 TABLET ORAL at 10:37

## 2023-02-15 RX ADMIN — PANTOPRAZOLE SODIUM 40 MG: 40 TABLET, DELAYED RELEASE ORAL at 09:49

## 2023-02-15 RX ADMIN — ALBUTEROL SULFATE 2.5 MG: 2.5 SOLUTION RESPIRATORY (INHALATION) at 02:25

## 2023-02-15 RX ADMIN — ACETAMINOPHEN 1000 MG: 500 TABLET ORAL at 02:02

## 2023-02-15 RX ADMIN — DOXYCYCLINE HYCLATE 100 MG: 100 TABLET, COATED ORAL at 09:49

## 2023-02-15 RX ADMIN — SODIUM CHLORIDE 1000 ML: 9 INJECTION, SOLUTION INTRAVENOUS at 01:58

## 2023-02-15 ASSESSMENT — PAIN DESCRIPTION - FREQUENCY: FREQUENCY: CONTINUOUS

## 2023-02-15 ASSESSMENT — PAIN SCALES - WONG BAKER: WONGBAKER_NUMERICALRESPONSE: 0

## 2023-02-15 ASSESSMENT — PAIN DESCRIPTION - LOCATION
LOCATION: GENERALIZED

## 2023-02-15 ASSESSMENT — PAIN DESCRIPTION - DESCRIPTORS
DESCRIPTORS: ACHING;DISCOMFORT
DESCRIPTORS: DISCOMFORT;ACHING

## 2023-02-15 ASSESSMENT — PAIN - FUNCTIONAL ASSESSMENT
PAIN_FUNCTIONAL_ASSESSMENT: ACTIVITIES ARE NOT PREVENTED
PAIN_FUNCTIONAL_ASSESSMENT: 0-10

## 2023-02-15 ASSESSMENT — PAIN SCALES - GENERAL
PAINLEVEL_OUTOF10: 9
PAINLEVEL_OUTOF10: 3
PAINLEVEL_OUTOF10: 0
PAINLEVEL_OUTOF10: 3
PAINLEVEL_OUTOF10: 2
PAINLEVEL_OUTOF10: 9

## 2023-02-15 ASSESSMENT — PAIN DESCRIPTION - ONSET: ONSET: ON-GOING

## 2023-02-15 ASSESSMENT — PAIN DESCRIPTION - PAIN TYPE: TYPE: CHRONIC PAIN

## 2023-02-15 NOTE — ED PROVIDER NOTES
eMERGENCY dEPARTMENT eNCOUnter      Pt Name: Angela Charles  MRN: [id number]  [place] 1945  Date of evaluation: 2/15/2023      CHIEF COMPLAINT       Chief Complaint   Patient presents with    Fall     Report she fell at home about 1800, denies hitting head, no LOC, + blood thinners     Fatigue      Reports feeling weak, reports diagnosed with pneumonia, has not started abx yet           HISTORY OF PRESENT ILLNESS    Angela Charles is a 78 y.o. female who presents to the emergency department for evaluation of fatigue patient states she has been recently diagnosed with pneumonia and was given antibiotics but she has not started them yet. Patient did fall at home about 6:00 today no head injury however she is on blood thinners and will therefore get a CT scan. Patient is afebrile and nontoxic looking at this point in time but she is quite debilitated. Denies chest pain, she has been coughing up some dark looking sputum. REVIEW OF SYSTEMS       Review of Systems   Constitutional:  Positive for fatigue. HENT: Negative. Eyes: Negative. Respiratory:  Positive for cough. Cardiovascular: Negative. Gastrointestinal: Negative. Endocrine: Negative. Genitourinary: Negative. Musculoskeletal:  Positive for myalgias. Skin: Negative. Allergic/Immunologic: Negative. Neurological: Negative. Hematological: Negative. Psychiatric/Behavioral: Negative.          PAST MEDICAL HISTORY    has a past medical history of Abnormal Pap smear, Asthma, Atrial fibrillation (HCC), Bloody discharge from nipple, CAD (coronary artery disease), Cataracts, bilateral, Colon polyp, CVA (cerebral vascular accident) ([address] Demyelinating disease ([address] Fibromyalgia, High-risk sexual behavior, History of bone density study, History of migraines, HTN (hypertension), IgG deficiency ([address] Multiple sclerosis ([address] Myoclonic seizures ([address] Optic neuritis, Osteopenia, Pyloric stenosis, Raynaud's disease, and Vasomotor rhinitis. SURGICAL HISTORY      has a past surgical history that includes Dilation & curettage (1982); Breast biopsy (1990); Appendectomy (1955); Septoplasty (1985); Hammer toe surgery (1989); Sinus endoscopy; sinus surgery; Shoulder arthroscopy (1998); Esophagoscopy (1966, 1969); Colonoscopy (2010); Foreign Body Removal (1962); laparoscopy (4404); Coronary angioplasty with stent (5/2011); Coronary angioplasty with stent (6/2011); Upper gastrointestinal endoscopy; eye surgery; pr colonoscopy w/biopsy single/multiple (N/A, 12/19/2017); pr egd transoral biopsy single/multiple (12/19/2017); Upper gastrointestinal endoscopy (12/19/2017); Upper gastrointestinal endoscopy (5/24/2018); Cardiac surgery; Cardiac catheterization (07/31/2020); Cardioversion (08/14/2020); and Cardioversion (07/31/2020). CURRENT MEDICATIONS       Previous Medications    ACETAMINOPHEN (TYLENOL) 500 MG TABLET    Take 1,000 mg by mouth in the morning, at noon, and at bedtime    ALLOPURINOL (ZYLOPRIM) 100 MG TABLET    Take 100 mg by mouth daily    APIXABAN (ELIQUIS) 5 MG TABS TABLET    Take by mouth 2 times daily    AZATHIOPRINE (IMURAN) 50 MG TABLET    Take 50 mg by mouth daily     BUMETANIDE (BUMEX) 1 MG TABLET    Take 0.5 tablets by mouth daily Advised to take additional 1/2 pill for weight gain, increased CHF and/swelling    CLONIDINE (CATAPRES) 0.1 MG TABLET    Take 1 tablet by mouth 2 times daily    CLOPIDOGREL (PLAVIX) 75 MG TABLET    Take 75 mg by mouth daily 100 mg pm    LOSARTAN (COZAAR) 100 MG TABLET    Take 1 tablet by mouth daily    METOPROLOL SUCCINATE (TOPROL XL) 50 MG EXTENDED RELEASE TABLET    Take 25 mg by mouth at bedtime     NITROGLYCERIN (NITROSTAT) 0.4 MG SL TABLET    Place 0.4 mg under the tongue every 5 minutes.  Indications: Chest Pain    PANTOPRAZOLE (PROTONIX) 40 MG TABLET    Take 40 mg by mouth 2 times daily     PAROXETINE (PAXIL) 20 MG TABLET    Take by mouth every morning     PRAVASTATIN (PRAVACHOL) 40 MG TABLET    Take 40 mg by mouth at bedtime        ALLERGIES     is allergic to dofetilide, lisinopril-hydrochlorothiazide, sulfa antibiotics, penicillins, polyethylene glycol, actifed cold-allergy [chlorpheniramine-phenylephrine], altace [ramipril], cephalosporins, coreg [carvedilol], dml facial moisturizer, elavil [amitriptyline hcl], entex [ami-margot], ethylene glycol, fentanyl, heparin, hizentra [immune globulin (human)], hydralazine, levofloxacin, lisinopril-hydrochlorothiazide, montelukast sodium, morphine, nifedipine, norvasc [amlodipine besylate], other, phenobarbital, propoxyphene, robaxin [methocarbamol], sinequan [doxepin hcl], sudafed [pseudoephedrine hcl], sudafed [pseudoephedrine hcl], tranquil-cecil, wellbutrin [bupropion hcl], actifed cold-allergy [chlorpheniramine-phenylephrine], clindamycin/lincomycin, codeine, metoprolol, metoprolol succinate, and seldane [terfenadine]. FAMILY HISTORY     She indicated that the status of her mother is unknown. She indicated that the status of her father is unknown. She indicated that the status of her sister is unknown. She indicated that the status of her maternal aunt is unknown.     family history includes Breast Cancer in her maternal aunt; Heart Disease in her father and mother; Hypertension in her sister; Other in her father, maternal aunt, mother, and sister. SOCIAL HISTORY      reports that she has never smoked. She has never used smokeless tobacco. She reports that she does not drink alcohol and does not use drugs. PHYSICAL EXAM     INITIAL VITALS:  height is 5' 1.25\" (1.556 m) and weight is 78.5 kg (173 lb). Her oral temperature is 98.3 °F (36.8 °C). Her blood pressure is 153/61 (abnormal) and her pulse is 85. Her respiration is 20 and oxygen saturation is 97%.      Constitutional: Alert, oriented x3, nontoxic, afebrile, answering questions appropriately, acting properly for age, in no acute distress  HEENT: Extraocular muscles intact, mucus membranes moist, TMs clear bilaterally, no posterior pharyngeal erythema or exudates, Pupils equal, round, reactive to light,   Neck: Trachea midline, Supple without lymphadenopathy, no posterior midline neck tenderness to palpation  Cardiovascular: Regular rhythm and rate no S3, S4, or murmurs  Respiratory: Clear to auscultation bilaterally no wheezes, rhonchi, rales, no respiratory distress  Gastrointestinal: Soft, nontender, nondistended, positive bowel sounds. No rebound, rigidity, or guarding. Musculoskeletal: No extremity pain or swelling  Neurologic: Moving all 4 extremities without difficulty there are no gross focal neurologic deficits  Skin: Warm and dry    DIFFERENTIAL DIAGNOSIS/ MDM:     Cough of uncertain etiology falls and weakness uncertain etiology patient will get a work-up and reevaluation.             Differential diagnosis considered: Pneumonia, electrolyte abnormality    Chronic Conditions affecting care (DM,HTN,CA, etc): Multiple sclerosis    Social Determinants of Health affecting care (unable to care for self, lives alone, unemployed, homeless,etc): Lives alone    History source(s) (patient,spouse,parent,family,friend,EMS,etc): Patient    Review of external sources (ECF,Hospital records,EMS report, radiology reports, etc): Hospital    Tests considered but not ordered: not applicaple    Independent interpretation of tests (eg.  X-ray, CAT scan, Doppler studies, EKG): see below    Discussion of x-ray results with radiology: see below    Consults: see below    Consideration for admission/observation (even if discharged): admission and or observation considered based on test results and patient status    Prescription considerations: see below    Sepsis considered: Considered, not in differential    Critical Care note written: see below    DIAGNOSTIC RESULTS     EKG: All EKG's are interpreted by the Emergency Department Physician who either signs or Co-signs this chart in the absence of a cardiologist.        Not indicated unless otherwise documented above    LABS:  Results for orders placed or performed during the hospital encounter of 02/15/23   COVID-19, Rapid    Specimen: Nasopharyngeal Swab   Result Value Ref Range    Specimen Description . NASOPHARYNGEAL SWAB     SARS-CoV-2, Rapid Not Detected Not Detected   CBC with Auto Differential   Result Value Ref Range    WBC 10.6 3.5 - 11.0 k/uL    RBC 3.35 (L) 4.0 - 5.2 m/uL    Hemoglobin 10.3 (L) 12.0 - 16.0 g/dL    Hematocrit 31.6 (L) 36 - 46 %    MCV 94.4 80 - 100 fL    MCH 30.8 26 - 34 pg    MCHC 32.6 31 - 37 g/dL    RDW 17.4 (H) 12.5 - 15.4 %    Platelets 356 763 - 892 k/uL    MPV 9.0 6.0 - 12.0 fL    Seg Neutrophils 80 (H) 36 - 66 %    Lymphocytes 7 (L) 24 - 44 %    Monocytes 13 (H) 2 - 11 %    Eosinophils % 0 (L) 1 - 4 %    Basophils 0 0 - 2 %    Segs Absolute 8.40 (H) 1.8 - 7.7 k/uL    Absolute Lymph # 0.70 (L) 1.0 - 4.8 k/uL    Absolute Mono # 1.40 (H) 0.1 - 1.2 k/uL    Absolute Eos # 0.00 0.0 - 0.4 k/uL    Basophils Absolute 0.00 0.0 - 0.2 k/uL   Comprehensive Metabolic Panel   Result Value Ref Range    Glucose 109 (H) 70 - 99 mg/dL    BUN 24 (H) 8 - 23 mg/dL    Creatinine 1.01 (H) 0.50 - 0.90 mg/dL    Est, Glom Filt Rate 58 (L) >60 mL/min/1.73m2    Calcium 9.3 8.6 - 10.4 mg/dL    Sodium 137 135 - 144 mmol/L    Potassium 3.4 (L) 3.7 - 5.3 mmol/L    Chloride 100 98 - 107 mmol/L    CO2 26 20 - 31 mmol/L    Anion Gap 11 9 - 17 mmol/L    Alkaline Phosphatase 122 (H) 35 - 104 U/L    ALT 8 5 - 33 U/L    AST 13 <32 U/L    Total Bilirubin 1.6 (H) 0.3 - 1.2 mg/dL    Total Protein 6.4 6.4 - 8.3 g/dL    Albumin 4.2 3.5 - 5.2 g/dL    Albumin/Globulin Ratio 1.9 1.0 - 2.5   Lactic Acid   Result Value Ref Range    Lactic Acid 1.0 0.5 - 2.2 mmol/L   Lipase   Result Value Ref Range    Lipase 18 13 - 60 U/L       Not indicated unless otherwise documented above    RADIOLOGY:   I reviewed the radiologist interpretations:  CT HEAD WO CONTRAST   Final Result   No acute intracranial abnormality. XR CHEST (2 VW)   Final Result   No acute findings. Not indicated unless otherwise documented above    EMERGENCY DEPARTMENT COURSE:     The patient was given the following medications:  Orders Placed This Encounter   Medications    0.9 % sodium chloride bolus    acetaminophen (TYLENOL) tablet 1,000 mg    albuterol (PROVENTIL) nebulizer solution 2.5 mg     Order Specific Question:   Initiate RT Bronchodilator Protocol     Answer:   No    azithromycin (ZITHROMAX) tablet 500 mg     Order Specific Question:   Antimicrobial Indications     Answer:   Pneumonia (CAP)        Vitals:    Vitals:    02/15/23 0244 02/15/23 0259 02/15/23 0314 02/15/23 0331   BP:       Pulse:       Resp:       Temp:       TempSrc:       SpO2: 99% 94% 95% 97%   Weight:       Height:         -------------------------  BP (!) 153/61   Pulse 85   Temp 98.3 °F (36.8 °C) (Oral)   Resp 20   Ht 5' 1.25\" (1.556 m)   Wt 78.5 kg (173 lb)   SpO2 97%   BMI 32.42 kg/m²         I have reviewed the disposition diagnosis with the patient and or their family/guardian. I have answered their questions and given discharge instructions. They voiced understanding of these instructions and did not have any further questions or complaints. CRITICAL CARE:    None    CONSULTS:    None    PROCEDURES:    None      OARRS Report if indicated             FINAL IMPRESSION      1. General weakness    2. Fall from bed, initial encounter    3. Acute cough          DISPOSITION/PLAN   DISPOSITION Decision To Admit    I have reviewed the disposition diagnosis with the patient and or their family/guardian. I have answered their questions and given discharge instructions. They voiced understanding of these instructions and did not have any further questions or complaints.         Reevaluation: Patient's laboratory evaluation is fairly unremarkable chest x-ray does not show an obvious pneumonia however the patient is coughing copious and bringing up copious amounts of dark sputum. She also is too weak to even negotiate she says she cannot stand. Per EMS report her house is in complete disarray. Patient has been trying to get help with placement and she is out of money -she states that no one has been able to help her so far. Given the confluence of all these factors, we decided to try to get the patient admitted to the hospital for least for observation and have a  consult placed in the morning. I did talk to Lambert Smith for the admission and the patient will be staying. PATIENT REFERRED TO:  No follow-up provider specified.     DISCHARGE MEDICATIONS:  New Prescriptions    No medications on file       (Please note that portions of this note were completed with a voice recognition program.  Efforts were made to edit the dictations but occasionally words are mis-transcribed.)    Aristeo Villalta MD  Attending Emergency Physician           Aristeo Villalta MD  02/15/23 [id number] Nando Jeevan HARRIS MD  02/18/23 [id number]

## 2023-02-15 NOTE — CARE COORDINATION
Case Management Assessment  Initial Evaluation    Date/Time of Evaluation: 2/15/2023 10:05 AM  Assessment Completed by: Vi Serrato RN    If patient is discharged prior to next notation, then this note serves as note for discharge by case management. Patient Name: Trina Zamarripa                   YOB: 1945  Diagnosis: General weakness [R53.1]  Fall from bed, initial encounter [W06. XXXA]  Unable to care for self [Z78.9]  Acute cough [R05.1]                   Date / Time: 2/15/2023  1:03 AM    Patient Admission Status: Observation   Readmission Risk (Low < 19, Mod (19-27), High > 27): Readmission Risk Score: 15.9    Current PCP: Jose Tellez MD  PCP verified by CM? Chart Reviewed: Yes      History Provided by: Patient  Patient Orientation: Alert and Oriented    Patient Cognition: Alert    Hospitalization in the last 30 days (Readmission):  No    If yes, Readmission Assessment in  Navigator will be completed. Advance Directives:      Code Status: Full Code   Patient's Primary Decision Maker is: Patient Declined (Legal Next of Kin Remains as Decision Maker)    Primary Decision Maker: Hetalterrance Mukund - Niece/Nephew - [phone]    Secondary Decision Maker: [facility] - [phone]    Discharge Planning:    Patient lives with: Alone Type of Home: House  Primary Care Giver: Self  Patient Support Systems include: Children   Current Financial resources:    Current community resources:    Current services prior to admission: None            Current DME:              Type of Home Care services:  None    ADLS  Prior functional level: Independent in ADLs/IADLs  Current functional level: Independent in ADLs/IADLs    PT AM-PAC:   /24  OT AM-PAC:   /24    Family can provide assistance at DC: Yes  Would you like Case Management to discuss the discharge plan with any other family members/significant others, and if so, who?     Plans to Return to Present Housing: Yes  Other Identified Issues/Barriers to RETURNING to current housing:   Potential Assistance needed at discharge: Extended Care Placement, [place]            Potential DME:    Patient expects to discharge to: [facility] [address] [address] for transportation at discharge: Family    Financial    Payor: Siddhartha Bustillos / Plan: Remigio Copas / Product Type: *No Product type* /     Does insurance require precert for SNF: Yes    Potential assistance Purchasing Medications:    Meds-to-Beds request:        [id number]  [address] [place] [id number]  Phone: [phone] Fax: [phone]      Notes:    Factors facilitating achievement of predicted outcomes:     Barriers to discharge: Additional Case Management Notes: spoke with patients about choices, [facility]      [id number] with Yaakov Carter @ [facility], they are reviewing and have business office discuss financials with patient as she will likely need long term care. The Plan for Transition of Care is related to the following treatment goals of General weakness [R53.1]  Fall from bed, initial encounter [W06. XXXA]  Unable to care for self [Z78.9]  Acute cough [F50.6]    IF APPLICABLE: The Patient and/or patient representative Praveen Cuellar and her family were provided with a choice of provider and agrees with the discharge plan. Freedom of choice list with basic dialogue that supports the patient's individualized plan of care/goals and shares the quality data associated with the providers was provided to: Patient   Patient Representative Name:       The Patient and/or Patient Representative Agree with the Discharge Plan?  Yes    Sofy Garcia RN  Case Management Department  Ph: [phone] Fax: [phone]

## 2023-02-15 NOTE — H&P
[facility]  Office: [address], DO, Jennygarfield Nguyễn, DO, Francisco Allen, DO, Ethelmario Chowdhury, DO, Rosalia Dupree MD, Narinder Watson MD, Alvin Díaz MD, Ashwin Chambers MD,  Vic Roldan MD, Maritza Gill MD, López Murcia, DO, Santy Garcia MD,  Iona Call MD, Dustin Breen MD, Desi Mcclellan DO, Tomasa Vanegas MD, Marie Vazquez MD, Kev Jeff DO, Claudia Da Silva MD, Anamaria Tao MD, Daniel Jerry MD, Noemi Lorenzo MD, Mayur Lee DO, Beka Hernandez MD, Lv Webster MD, Adin Shane, [facility],  Molly Chacon, CNP, Oksana Borjas, CNP, Alena Ceja, CNP,  Agusto Carias, [facility], Angelica Muse, CNP, Angelica Moraes, CNP, Jef David, CNP, Dirk Hodges, CNP, Camilla Faulkner, CNP, Monik Baeza PA-C, Horacio Perry, CNS, Javier Crenshaw, CNP, Priscilla Andres, CNP         [facility]    HISTORY AND PHYSICAL EXAMINATION            Date:   2/15/2023  Patient name:  Keith Estevez  Date of admission:  2/15/2023  1:03 AM  MRN:   [id number]  Account:  [de-identified]  YOB: 1945  PCP:    Dora Segura MD  Room:   786/169-27  Code Status:    Full Code    Chief Complaint:     Chief Complaint   Patient presents with    Fall     Report she fell at home about 1800, denies hitting head, no LOC, + blood thinners     Fatigue      Reports feeling weak, reports diagnosed with pneumonia, has not started abx yet      Patient presents to the hospital after mechanical fall, patient states \"I fell down and I could not get up\"    History Obtained From:     patient, electronic medical record    History of Present Illness:     Keith Estevez is a 78 y.o.  Non- / non  female who presents with Fall (Report she fell at home about 1800, denies hitting head, no LOC, + blood thinners ) and Fatigue ( Reports feeling weak, reports diagnosed with pneumonia, has not started abx yet )   and is admitted to the hospital for the management of Unable to care for self. This 53-year-old female was recently diagnosed with a \"pneumonia\". She had not yet started antibiotics. She states that she was at home and tried to get out of a chair when she suffered a mechanical fall. She was weak and unable to get to a phone. She states that she \"scooted\" to the front door and utilized a coat  to flash her porch lights and the hope that someone would notice. Ultimately no one noticed however she did ultimately obtain help and was brought to the hospital.  Chest x-ray is not convincing for pneumonia however the patient is having significant sputum production. Clinically she has an acute bronchitis. She appears to also have a concurrent urinary tract infection. Due to her multiple drug allergies I am starting her on doxycycline. Unfortunately with her acute bronchitis and suspected urinary tract infection this is led to her having increased weakness especially given her known diagnosis of multiple sclerosis. The patient has been admitted for antibiotics, PT/OT along with possible disposition to skilled nursing facility if deemed appropriate. Past Medical History:     Past Medical History:   Diagnosis Date    Abnormal Pap smear 10/2010    paucity or absence of TZx 3 years in a row. 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     History of bone density study 7/12    penia    History of migraines     optic migraines    HTN (hypertension)     IgG deficiency (HCC)     Multiple sclerosis ([address]     progressive    Myoclonic seizures (HCC)     Optic neuritis [facility]    Osteopenia     Pyloric stenosis     Raynaud's disease     Vasomotor rhinitis         Past Surgical History:     Past Surgical History:   Procedure Laterality Date    APPENDECTOMY  1955    sponges left in abdomen, at [address]    stereotactic type, right breast, benign    CARDIAC CATHETERIZATION  07/31/2020    [facility] lad    CARDIAC SURGERY      CARDIOVERSION  08/14/2020    [facility]     CARDIOVERSION  07/31/2020    COLONOSCOPY  2010    Polyp    CORONARY ANGIOPLASTY WITH STENT PLACEMENT  5/2011    failed attempt to place stent    CORONARY ANGIOPLASTY WITH STENT PLACEMENT  6/2011    successful placement of 2 stent in same artery    [facility]    bleeding ulcers found    [address]    surgical sponges left in during appendectomy    HAMMER TOE SURGERY  1989    head resection of phalanx, left foot    LAPAROSCOPY  1983    with D&C    OR COLONOSCOPY W/BIOPSY SINGLE/MULTIPLE N/A 12/19/2017    COLONOSCOPY WITH BIOPSY performed by Margarita Fletcher MD at [facility]    OR EGD TRANSORAL BIOPSY SINGLE/MULTIPLE  12/19/2017    EGD BIOPSY performed by Margarita Fletcher MD at [address] resection with rt and left ethmoidectomy    SHOULDER ARTHROSCOPY  1998    rotator cuff impingement, right arm    SINUS ENDOSCOPY      SINUS SURGERY      multiple    UPPER GASTROINTESTINAL ENDOSCOPY      2-4 times per year for pyloric stenosis    UPPER GASTROINTESTINAL ENDOSCOPY  12/19/2017    EGD DILATION BALLOON performed by Margarita Fletcher MD at [address] [facility] ENDOSCOPY  5/24/2018    EGD DILATION SAVORY performed by Leonidas Barragan MD at [facility]        Medications Prior to Admission:     Prior to Admission medications    Medication Sig Start Date End Date Taking?  Authorizing Provider   acetaminophen (TYLENOL) 500 MG tablet Take 1,000 mg by mouth in the morning, at noon, and at bedtime   Yes Historical Provider, MD   bumetanide (BUMEX) 1 MG tablet Take 0.5 tablets by mouth daily Advised to take additional 1/2 pill for weight gain, increased CHF and/swelling  Patient taking differently: Take 1 mg by mouth daily 3/9/22 4/8/22  Relda Stains P Blood, DO   allopurinol (ZYLOPRIM) 100 MG tablet Take 100 mg by mouth daily    Historical Provider, MD   metoprolol succinate (TOPROL XL) 50 MG extended release tablet Take 25 mg by mouth at bedtime  5/20/21   Historical Provider, MD   cloNIDine (CATAPRES) 0.1 MG tablet Take 1 tablet by mouth 2 times daily 8/8/21   Yandy Macedo DO   losartan (COZAAR) 100 MG tablet Take 1 tablet by mouth daily 8/8/21   Yandy Macedo DO   apixaban (ELIQUIS) 5 MG TABS tablet Take by mouth 2 times daily    Historical Provider, MD   pantoprazole (PROTONIX) 40 MG tablet Take 40 mg by mouth 2 times daily     Historical Provider, MD   azaTHIOprine (IMURAN) 50 MG tablet Take 50 mg by mouth daily     Historical Provider, MD   nitroGLYCERIN (NITROSTAT) 0.4 MG SL tablet Place 0.4 mg under the tongue every 5 minutes.  Indications: Chest Pain    Historical Provider, MD   PARoxetine (PAXIL) 20 MG tablet Take by mouth every morning     Historical Provider, MD   clopidogrel (PLAVIX) 75 MG tablet Take 75 mg by mouth daily 100 mg pm    Historical Provider, MD   pravastatin (PRAVACHOL) 40 MG tablet Take 40 mg by mouth at bedtime     Historical Provider, MD        Allergies:     Dofetilide, Lisinopril-hydrochlorothiazide, Sulfa antibiotics, Penicillins, Polyethylene glycol, Actifed cold-allergy [chlorpheniramine-phenylephrine], Altace [ramipril], Cephalosporins, Coreg [carvedilol], Dml facial moisturizer, Elavil [amitriptyline hcl], Entex [ami-margot], Ethylene glycol, Fentanyl, Heparin, Hizentra [immune globulin (human)], Hydralazine, Levofloxacin, Lisinopril-hydrochlorothiazide, Montelukast sodium, Morphine, Nifedipine, Norvasc [amlodipine besylate], Other, Phenobarbital, Propoxyphene, Robaxin [methocarbamol], Sinequan [doxepin hcl], Sudafed [pseudoephedrine hcl], Sudafed [pseudoephedrine hcl], Tranquil-cecil, Wellbutrin [bupropion hcl], Actifed cold-allergy [chlorpheniramine-phenylephrine], Clindamycin/lincomycin, Codeine, Metoprolol, Metoprolol succinate, and Seldane [terfenadine]    Social History:     Tobacco:    reports that she has never smoked. She has never used smokeless tobacco.  Alcohol:      reports no history of alcohol use. Drug Use:  reports no history of drug use. Family History:     Family History   Problem Relation Age of Onset    Other Father         Heart problems    Heart Disease Father     Other Mother         heart problems requiring open heart surgery, HTN    Heart Disease Mother     Other Sister         HTN    Hypertension Sister     Other Maternal Aunt         breast cancer    Breast Cancer Maternal Aunt        Review of Systems:     Positive and Negative as described in HPI.     CONSTITUTIONAL: Positive for weakness, debility  HEENT:  negative for vision, hearing changes, runny nose, throat pain  RESPIRATORY: Positive for shortness of breath, cough with productive sputum which she describes as greenish/brown  CARDIOVASCULAR:  negative for chest pain, palpitations  GASTROINTESTINAL:  negative for nausea, vomiting, diarrhea, constipation, change in bowel habits, abdominal pain   GENITOURINARY:  negative for difficulty of urination, burning with urination, frequency   INTEGUMENT:  negative for rash, skin lesions, easy bruising   HEMATOLOGIC/LYMPHATIC:  negative for swelling/edema   ALLERGIC/IMMUNOLOGIC:  negative for urticaria , itching  ENDOCRINE:  negative increase in drinking, increase in urination, hot or cold intolerance  MUSCULOSKELETAL:  negative joint pains, muscle aches, swelling of joints  NEUROLOGICAL:  negative for headaches, dizziness, lightheadedness, numbness, pain, tingling extremities  BEHAVIOR/PSYCH:  negative for depression, anxiety    Physical Exam:   BP (!) 108/39   Pulse 85   Temp 98.8 °F (37.1 °C) (Oral)   Resp 20   Ht 5' 1.25\" (1.556 m)   Wt 179 lb 10.8 oz (81.5 kg)   SpO2 96%   BMI 33.67 kg/m²   Temp (24hrs), Av.5 °F (36.9 °C), Min:98.3 °F (36.8 °C), Max:98.8 °F (37.1 °C)    No results for input(s): POCGLU in the last 72 hours. Intake/Output Summary (Last 24 hours) at 2/15/2023 1253  Last data filed at 2/15/2023 1029  Gross per 24 hour   Intake 999 ml   Output 100 ml   Net 899 ml       General Appearance:  alert, well appearing, and in no acute distress  Mental status: oriented to person, place, and time  Head:  normocephalic, atraumatic  Eye: no icterus, redness, pupils equal and reactive, extraocular eye movements intact, conjunctiva clear  Ear: normal external ear, no discharge, hearing intact  Nose:  no drainage noted  Mouth: mucous membranes moist  Neck: supple, no carotid bruits, thyroid not palpable  Lungs: Bilateral equal air entry, clear to ausculation, no wheezing, rales or rhonchi, normal effort  Cardiovascular: Irregular rhythm, rate controlled  Abdomen: Soft, nontender, nondistended, normal bowel sounds, no hepatomegaly or splenomegaly  Neurologic: There are no new focal motor or sensory deficits, normal muscle tone and bulk, no abnormal sensation, normal speech, cranial nerves II through XII grossly intact  Skin: No gross lesions, rashes, bruising or bleeding on exposed skin area  Extremities:  peripheral pulses palpable, no pedal edema or calf pain with palpation  Psych: normal affect     Investigations:      Laboratory Testing:  Recent Results (from the past 24 hour(s))   Culture, Blood 1    Collection Time: 02/15/23  1:50 AM    Specimen: Blood   Result Value Ref Range    Specimen Description . BLOOD     Special Requests 20ml right arm iv start     Culture NO GROWTH <24 HRS    CBC with Auto Differential    Collection Time: 02/15/23  1:55 AM Result Value Ref Range    WBC 10.6 3.5 - 11.0 k/uL    RBC 3.35 (L) 4.0 - 5.2 m/uL    Hemoglobin 10.3 (L) 12.0 - 16.0 g/dL    Hematocrit 31.6 (L) 36 - 46 %    MCV 94.4 80 - 100 fL    MCH 30.8 26 - 34 pg    MCHC 32.6 31 - 37 g/dL    RDW 17.4 (H) 12.5 - 15.4 %    Platelets 897 243 - 144 k/uL    MPV 9.0 6.0 - 12.0 fL    Seg Neutrophils 80 (H) 36 - 66 %    Lymphocytes 7 (L) 24 - 44 %    Monocytes 13 (H) 2 - 11 %    Eosinophils % 0 (L) 1 - 4 %    Basophils 0 0 - 2 %    Segs Absolute 8.40 (H) 1.8 - 7.7 k/uL    Absolute Lymph # 0.70 (L) 1.0 - 4.8 k/uL    Absolute Mono # 1.40 (H) 0.1 - 1.2 k/uL    Absolute Eos # 0.00 0.0 - 0.4 k/uL    Basophils Absolute 0.00 0.0 - 0.2 k/uL   Comprehensive Metabolic Panel    Collection Time: 02/15/23  1:55 AM   Result Value Ref Range    Glucose 109 (H) 70 - 99 mg/dL    BUN 24 (H) 8 - 23 mg/dL    Creatinine 1.01 (H) 0.50 - 0.90 mg/dL    Est, Glom Filt Rate 58 (L) >60 mL/min/1.73m2    Calcium 9.3 8.6 - 10.4 mg/dL    Sodium 137 135 - 144 mmol/L    Potassium 3.4 (L) 3.7 - 5.3 mmol/L    Chloride 100 98 - 107 mmol/L    CO2 26 20 - 31 mmol/L    Anion Gap 11 9 - 17 mmol/L    Alkaline Phosphatase 122 (H) 35 - 104 U/L    ALT 8 5 - 33 U/L    AST 13 <32 U/L    Total Bilirubin 1.6 (H) 0.3 - 1.2 mg/dL    Total Protein 6.4 6.4 - 8.3 g/dL    Albumin 4.2 3.5 - 5.2 g/dL    Albumin/Globulin Ratio 1.9 1.0 - 2.5   Lactic Acid    Collection Time: 02/15/23  1:55 AM   Result Value Ref Range    Lactic Acid 1.0 0.5 - 2.2 mmol/L   Lipase    Collection Time: 02/15/23  1:55 AM   Result Value Ref Range    Lipase 18 13 - 60 U/L   COVID-19, Rapid    Collection Time: 02/15/23  1:56 AM    Specimen: Nasopharyngeal Swab   Result Value Ref Range    Specimen Description . NASOPHARYNGEAL SWAB     SARS-CoV-2, Rapid Not Detected Not Detected   Culture, Blood 1    Collection Time: 02/15/23  2:03 AM    Specimen: Blood   Result Value Ref Range    Specimen Description . BLOOD     Special Requests 20ML LEFT ARM     Culture NO GROWTH <24 HRS        Imaging/Diagnostics:  XR CHEST (2 VW)    Result Date: 2/15/2023  No acute findings. CT HEAD WO CONTRAST    Result Date: 2/15/2023  No acute intracranial abnormality. Assessment :      Hospital Problems             Last Modified POA    * (Principal) Unable to care for self 2/15/2023 Yes       Plan:     Patient status inpatient in the Med/Surge    Acute bronchitis  Initiate doxycycline  Acute cystitis without hematuria  Initiate doxycycline  Weakness/debility/multiple sclerosis  PT/OT   for discharge planning  Chronic atrial fibrillation  Continue Toprol and Eliquis  Chronic diastolic heart failure  Euvolemic, no evidence of heart failure  Avoid fluid overload  Essential hypertension  Continue Toprol, Cozaar, Catapres    Consultations:   IP CONSULT TO SOCIAL WORK    Patient is admitted as inpatient status because of co-morbidities listed above, severity of signs and symptoms as outlined, requirement for current medical therapies and most importantly because of direct risk to patient if care not provided in a hospital setting. Expected length of stay > 48 hours.     Indio Chi DO  2/15/2023  12:53 PM    Copy sent to Dr. Yaron Singh MD

## 2023-02-15 NOTE — PROGRESS NOTES
Physical Therapy  Facility/Department: [facility] ICU  Physical Therapy Initial Assessment    Name: Kristie Griggs  : 1945  MRN: [id number]  Date of Service: 2/15/2023  Chief Complaint   Patient presents with    Fall     Report she fell at home about 1800, denies hitting head, no LOC, + blood thinners     Fatigue      Reports feeling weak, reports diagnosed with pneumonia, has not started abx yet         Discharge Recommendations:  Patient would benefit from continued therapy after discharge   PT Equipment Recommendations  Equipment Needed: No  Other: Pt reports she has a rollator at home. Patient Diagnosis(es): The primary encounter diagnosis was General weakness. Diagnoses of Fall from bed, initial encounter and Acute cough were also pertinent to this visit. Past Medical History:  has a past medical history of Abnormal Pap smear, Asthma, Atrial fibrillation ([address] Bloody discharge from nipple, CAD (coronary artery disease), Cataracts, bilateral, Colon polyp, CVA (cerebral vascular accident) ([address] Demyelinating disease ([address] Fibromyalgia, High-risk sexual behavior, History of bone density study, History of migraines, HTN (hypertension), IgG deficiency ([address] Multiple sclerosis ([address] Myoclonic seizures ([address] Optic neuritis, Osteopenia, Pyloric stenosis, Raynaud's disease, and Vasomotor rhinitis. Past Surgical History:  has a past surgical history that includes Dilation & curettage (); Breast biopsy (); Appendectomy (); Septoplasty (); Hammer toe surgery (); Sinus endoscopy; sinus surgery; Shoulder arthroscopy (); Esophagoscopy (, ); Colonoscopy (); Foreign Body Removal (); laparoscopy (); Coronary angioplasty with stent (2011); Coronary angioplasty with stent (2011); Upper gastrointestinal endoscopy; eye surgery; pr colonoscopy w/biopsy single/multiple (N/A, 2017); pr egd transoral biopsy single/multiple (2017);  Upper gastrointestinal endoscopy (12/19/2017); Upper gastrointestinal endoscopy (5/24/2018); Cardiac surgery; Cardiac catheterization (07/31/2020); Cardioversion (08/14/2020); and Cardioversion (07/31/2020). Assessment   Body Structures, Functions, Activity Limitations Requiring Skilled Therapeutic Intervention: Decreased functional mobility ; Decreased endurance;Decreased safe awareness;Decreased strength;Decreased balance  Assessment: Pt exhibits generalized weakness, impaired mobility and balance deficits with history of falls and difficulty caring for herself. Pt required CGA for transfers and ambulated with RW 15' x 1 Min-CGA; patient fatigues quickly. Pt would be unsafe to return to prior living arrangement based on current mobility status and would require 24/7 assistance. Pt would benefit from further rehab services at discharge. Treatment Diagnosis: decreased mobility  Therapy Prognosis: Good  Decision Making: Medium Complexity  Requires PT Follow-Up: Yes  Activity Tolerance  Activity Tolerance: Patient limited by endurance; Patient limited by fatigue     Plan   Physcial Therapy Plan  General Plan:  (5-6x/week)  Current Treatment Recommendations: Strengthening, Balance training, Functional mobility training, Transfer training, Gait training, Endurance training, Patient/Caregiver education & training, Safety education & training, Therapeutic activities  Safety Devices  Type of Devices: Call light within reach, Chair alarm in place, Nurse notified, Left in chair  Restraints  Restraints Initially in Place: No     Restrictions  Restrictions/Precautions  Restrictions/Precautions: Fall Risk  Required Braces or Orthoses?: No  Position Activity Restriction  Other position/activity restrictions: Up with assistance.      Subjective   General  Patient assessed for rehabilitation services?: Yes  Family / Caregiver Present: No  Referring Practitioner: Shon Khan  Referral Date : 02/14/23  Diagnosis: Unable to care for self  Follows Commands: Within Functional Limits  Subjective  Subjective: Pt supine in bed and agreeable to therapy. Pt reports pain 9/10 generalized and \"all over. \"         Social/Functional History  Social/Functional History  Lives With: Alone  Type of Home: Apartment  Home Layout: One level  Home Access: Stairs to enter without rails  Entrance Stairs - Number of Steps: one small step  Bathroom Shower/Tub: Tub/Shower unit  Bathroom Toilet: Standard  Bathroom Equipment: Shower chair, Grab bars in shower  Bathroom Accessibility: Not accessible  Home Equipment: Theador Corpus, 4 wheeled  Has the patient had two or more falls in the past year or any fall with injury in the past year?: Yes (pt self reports frequent falls \"I just fall constantly\")  Receives Help From:  (Pt reports having a dtr however states the dtr is not able to provide support/assistance to pt)  ADL Assistance: Independent  Homemaking Assistance: Independent  Homemaking Responsibilities: Yes  Meal Prep Responsibility: Primary  Laundry Responsibility: Primary  Cleaning Responsibility: Primary  Shopping Responsibility: Primary  Health Care Management: Primary  Ambulation Assistance: Independent  Transfer Assistance: Independent  Active : Yes  Mode of Transportation: Car  Occupation: Retired  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 Vision/Hearing  Vision  Vision: Impaired  Vision Exceptions: Wears glasses for reading  Hearing  Hearing: Within functional limits    Cognition   Orientation  Overall Orientation Status: Within Functional Limits  Cognition  Overall Cognitive Status: Exceptions  Arousal/Alertness: Appropriate responses to stimuli  Following Commands:  Follows all commands without difficulty  Attention Span: Attends with cues to redirect; Difficulty attending to directions  Safety Judgement: Decreased awareness of need for assistance  Problem Solving: Assistance required to generate solutions;Assistance required to implement solutions;Assistance required to identify errors made  Insights: Decreased awareness of deficits  Initiation: Requires cues for some  Sequencing: Requires cues for some     Objective           Gross Assessment  AROM: Within functional limits  PROM: Within functional limits  Strength: Generally decreased, functional  Coordination: Generally decreased, functional  Tone: Normal  Sensation: Impaired (sensory deficits \"all over\" per patient)     AROM RLE (degrees)  RLE AROM: WFL  AROM LLE (degrees)  LLE AROM : WFL  Strength RLE  Strength RLE: Exception  Comment: others grossly 4/5  R Hip Flexion: 4-/5  Strength LLE  Strength LLE: Exception  Comment: others grossly 4/5  L Hip Flexion: 3+/5          Bed mobility  Supine to Sit: Contact guard assistance  Scooting: Contact guard assistance  Bed Mobility Comments: head of bed elevated ~45 degrees; increased time and effort; min verbal cues for initiation/attention to task  Transfers  Sit to Stand: Contact guard assistance  Stand to Sit: Contact guard assistance  Stand Pivot Transfers: Contact guard assistance  Comment: Transfers with RW; verbal cues for proper hand placement.   Ambulation  Surface: Level tile  Device: Rolling Walker  Assistance: Contact guard assistance;Minimal assistance  Quality of Gait: slow pace with decreased step length and height  Gait Deviations: Slow Jaycee;Decreased step length;Decreased step height  Distance: 15' x 1  Comments: pt fatigues quickly; mild unsteadiness  More Ambulation?: No  Stairs/Curb  Stairs?: No     Balance  Posture: Good  Sitting - Static: Good  Sitting - Dynamic: Fair;+  Standing - Static: Fair  Standing - Dynamic: Fair;-  Comments: standing balance assessed with RW           OutComes Score AM-PAC Score  AM-PAC Inpatient Mobility Raw Score : 16 (02/15/23 1011)  AM-PAC Inpatient T-Scale Score : 40.78 (02/15/23 1011)  Mobility Inpatient CMS 0-100% Score: 54.16 (02/15/23 1011)  Mobility Inpatient CMS G-Code Modifier : CK (02/15/23 1011)          Goals  Short Term Goals  Time Frame for Short Term Goals: 14 visits  Short Term Goal 1: Pt to be Independent with transfers with walker without LOB. Short Term Goal 2: Pt to tolerate 30 minutes of therapy activity to improve strength and endurance for mobility. Short Term Goal 3: Pt to ambulate with ' Modified (I) without LOB. Short Term Goal 4: Improve standing static/dynamic balance to Good with walker to minimize fall risk.   Patient Goals   Patient Goals : Get stronger       Education  Patient Education  Education Given To: Patient  Education Provided: Role of Therapy;Plan of Care;Transfer Training  Education Provided Comments: importance of mobility  Education Method: Verbal  Barriers to Learning: None  Education Outcome: Verbalized understanding;Continued education needed      Therapy Time   Individual Concurrent Group Co-treatment   Time In 0901         Time Out 0934         Minutes 33         Timed Code Treatment Minutes: [address], PT

## 2023-02-15 NOTE — CARE COORDINATION
SW consulted for assistance with living conditions. Patient states she lives alone and her PCP agrees that she cannot care for herself. Patient states she is linked with [facility] Office on Aging and has attempted to apply for LTC through [place] but was denied due to income. Patient reports that she has spent down her money since previous Medicaid application but does not feel she should have to reapply again. Expressed feelings of frustration with system and not receiving help with processes. Patient reports she has several chronic health conditions. Patient discussed [facility] Medicaid program. SW reviewed eligible diagnoses, unfortunately patient does not meet diagnosis criteria. SW discussed that if income has changed patient would need to reapply for Medicaid in order to determine eligibility. Also provided information on legal resources as patient wanted to look into a  regarding process. Notified CM of information.

## 2023-02-15 NOTE — PROGRESS NOTES
Occupational Therapy  Facility/Department: [facility] [address] [place] [address]  Occupational Therapy Initial Assessment    Name: Naseem Valentine  : 1945  MRN: [id number]  Date of Service: 2/15/2023    Chief Complaint   Patient presents with    Fall     Report she fell at home about 1800, denies hitting head, no LOC, + blood thinners     Fatigue      Reports feeling weak, reports diagnosed with pneumonia, has not started abx yet      Discharge Recommendations:  Patient would benefit from continued therapy after discharge    Patient Diagnosis(es): The primary encounter diagnosis was General weakness. Diagnoses of Fall from bed, initial encounter and Acute cough were also pertinent to this visit. Past Medical History:  has a past medical history of Abnormal Pap smear, Asthma, Atrial fibrillation ([address] Bloody discharge from nipple, CAD (coronary artery disease), Cataracts, bilateral, Colon polyp, CVA (cerebral vascular accident) ([address] Demyelinating disease ([address] Fibromyalgia, High-risk sexual behavior, History of bone density study, History of migraines, HTN (hypertension), IgG deficiency ([address] Multiple sclerosis ([address] Myoclonic seizures ([address] Optic neuritis, Osteopenia, Pyloric stenosis, Raynaud's disease, and Vasomotor rhinitis. Past Surgical History:  has a past surgical history that includes Dilation & curettage (); Breast biopsy (); Appendectomy (); Septoplasty (); Hammer toe surgery (); Sinus endoscopy; sinus surgery; Shoulder arthroscopy (); Esophagoscopy (, ); Colonoscopy (); Foreign Body Removal (); laparoscopy (746); Coronary angioplasty with stent (2011); Coronary angioplasty with stent (2011); Upper gastrointestinal endoscopy; eye surgery; pr colonoscopy w/biopsy single/multiple (N/A, 2017); pr egd transoral biopsy single/multiple (2017); Upper gastrointestinal endoscopy (2017); Upper gastrointestinal endoscopy (2018);  Cardiac surgery; Cardiac catheterization (07/31/2020); Cardioversion (08/14/2020); and Cardioversion (07/31/2020). Assessment   Performance deficits / Impairments: Decreased functional mobility ; Decreased ADL status; Decreased endurance;Decreased balance;Decreased strength;Decreased safe awareness;Decreased cognition;Decreased high-level IADLs;Decreased fine motor control;Decreased coordination  Assessment: Pt currently limited in ADL performance due to above noted deficits and will benefit from continued therapy services while hospitalized and at discharge to maximize pt's safety and independence in performing functional tasks. Pt presents as a high fall risk as pt self reports frequent falls and with noted decreased balance/activity tolerance throughout session. Pt unsafe to return to prior living arrangements at this time, strict 24hr supervision/assistance and continued skilled therapy intervention recommended. Prognosis: Good;Fair  Decision Making: Medium Complexity  REQUIRES OT FOLLOW-UP: Yes  Activity Tolerance  Activity Tolerance: Patient limited by fatigue;Patient limited by pain      Safety Devices  Type of Devices: Call light within reach; Chair alarm in place;Nurse notified; Left in chair  Restraints  Restraints Initially in Place: No    Plan   Occupational Therapy Plan  Times Per Week: 5-6x/wk  Current Treatment Recommendations: Strengthening, Balance training, Functional mobility training, Endurance training, Equipment evaluation, education, & procurement, Patient/Caregiver education & training, Safety education & training, Self-Care / ADL, Home management training, Coordination training     Restrictions  Restrictions/Precautions  Restrictions/Precautions: Fall Risk  Required Braces or Orthoses?: No  Position Activity Restriction  Other position/activity restrictions: Up with assistance.     Subjective   General  Patient assessed for rehabilitation services?: Yes  Family / Caregiver Present: No  General Comment  Comments: RN ok'd for therapy this AM. Pt agreeable to participate in session and pleasant/cooperative throughout. Pt reports 9/10 generalized pain which is chronic pain. Social/Functional History  Social/Functional History  Lives With: Alone  Type of Home: Apartment  Home Layout: One level  Home Access: Stairs to enter without rails  Entrance Stairs - Number of Steps: one small step  Bathroom Shower/Tub: Tub/Shower unit  Bathroom Toilet: Standard  Bathroom Equipment: Shower chair, Grab bars in shower  Bathroom Accessibility: Not accessible  Home Equipment: Elnoria Ziyad, 4 wheeled  Has the patient had two or more falls in the past year or any fall with injury in the past year?: Yes (pt self reports frequent falls \"I just fall constantly\")  Receives Help From:  (Pt reports having a dtr however states the dtr is not able to provide support/assistance to pt)  ADL Assistance: Independent  Homemaking Assistance: Independent  Homemaking Responsibilities: Yes  Meal Prep Responsibility: Primary  Laundry Responsibility: Primary  Cleaning Responsibility: Primary  Shopping Responsibility: Primary  Health Care Management: Primary  Ambulation Assistance: Independent  Transfer Assistance: Independent  Active : Yes  Mode of Transportation: Car  Occupation: Retired  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        Objective   Balance  Sitting:  (SBA- static, CGA- dynamic)  Standing:  (CGA- static, min A- dynamic (~2 minute bouts of static/dynamic standing during ADL performance; unsteady; quick to fatigue))    Functional Mobility   Overall Level of Assistance: Contact-guard assistance (Pt performed 1x bout of functional mobility from bed > chair with use of RW; mildly unsteady however no true LOB; increased time/effort to perform as pt with slow/guarded pace and quick to fatigue)  Interventions: Safety awareness training;Verbal cues  Assistive Device: Walker, rolling    AROM: Within functional limits (BUE)  Strength: Generally decreased, functional (BUE grossly 4/5 at shoulders and 3+/5 distal to shoudlers)  Coordination: Generally decreased, functional (BUE FMC/GMC- decreased speed/accuracy)  Sensation: Impaired (Pt reports chronic numbness/tingling to BUE/BLE at baseline)    ADL  Feeding: Increased time to complete; Independent  Grooming: Increased time to complete;Contact guard assistance  UE Bathing: Increased time to complete;Minimal assistance  LE Bathing: Increased time to complete;Minimal assistance  UE Dressing: Increased time to complete;Contact guard assistance  LE Dressing: Minimal assistance; Increased time to complete  Toileting: Increased time to complete;Minimal assistance  Additional Comments: Pt donned socks while seated unsupported at EOB utilizing figure four technique and thread brief; decreased BUE strength/coordination to manipulate socks/brief around toes and  brief to pull up over hips/bottom; significant increased time/effort to perform as pt with slow pace and required rest break throughout.     Bed mobility  Supine to Sit: Contact guard assistance (HOB raised ~45* and use of bed rails)  Scooting: Contact guard assistance  Bed Mobility Comments: Min VCs for initiation/attention to task; increased time/effort to perform    Transfers  Sit to stand: Contact guard assistance  Stand to sit: Contact guard assistance  Transfer Comments: Min VCs for proper hand placement/initiation/sequencing and safety awareness with use of RW; increased time/effort to perform    Vision  Vision: Impaired  Vision Exceptions: Wears glasses for reading  Hearing  Hearing: Within functional limits    Cognition  Overall Cognitive Status: Exceptions  Arousal/Alertness: Appropriate responses to stimuli  Following Commands: Follows all commands without difficulty  Attention Span: Attends with cues to redirect; Difficulty attending to directions  Safety Judgement: Decreased awareness of need for assistance  Problem Solving: Assistance required to generate solutions;Assistance required to implement solutions;Assistance required to identify errors made  Insights: Decreased awareness of deficits  Initiation: Requires cues for some  Sequencing: Requires cues for some  Orientation  Overall Orientation Status: Within Functional Limits        Education Given To: Patient  Education Provided: Role of Therapy;Plan of Care (Activity Promotion, Bed Mobility Techniques, Safety Awareness/Fall Prevention, Safety with Transfers/RW Mngt, ADL Techniques)  Education Method: Demonstration;Verbal  Education Outcome: Verbalized understanding;Continued education needed        Hand Dominance  Hand Dominance: Right    AM-PAC Score   AM-[facility] Inpatient Daily Activity Raw Score: 18 (02/15/23 1015)  AM-PAC Inpatient ADL T-Scale Score : 38.66 (02/15/23 1015)  ADL Inpatient CMS 0-100% Score: 46.65 (02/15/23 1015)  ADL Inpatient CMS G-Code Modifier : CK (02/15/23 1015)    Goals  Short Term Goals  Time Frame for Short Term Goals: 14 visits  Short Term Goal 1: Pt will perform ADL tasks with mod IND using AE/DME PRN  Short Term Goal 2: Pt will perform functional transfers/functional mobility with mod IND using least restrictive AD  Short Term Goal 3: Pt will independently demo good safety awareness during engagement in all ADLs and functional transfers/functional mobility  Short Term Goal 4: Pt will demo 5+ minutes standing tolerance with use of least restrictive AD for increased participation in ADL/IADL tasks       Therapy Time   Individual Concurrent Group Co-treatment   Time In 0901         Time Out 0930         Minutes 29         Timed Code Treatment Minutes: 8 Minutes       JUD Winn/LISA

## 2023-02-16 LAB
ANION GAP SERPL CALCULATED.3IONS-SCNC: 9 MMOL/L (ref 9–17)
BUN SERPL-MCNC: 21 MG/DL (ref 8–23)
CALCIUM SERPL-MCNC: 8.7 MG/DL (ref 8.6–10.4)
CHLORIDE SERPL-SCNC: 104 MMOL/L (ref 98–107)
CO2 SERPL-SCNC: 25 MMOL/L (ref 20–31)
CREAT SERPL-MCNC: 1.04 MG/DL (ref 0.5–0.9)
GFR SERPL CREATININE-BSD FRML MDRD: 56 ML/MIN/1.73M2
GLUCOSE SERPL-MCNC: 98 MG/DL (ref 70–99)
POTASSIUM SERPL-SCNC: 4.1 MMOL/L (ref 3.7–5.3)
SODIUM SERPL-SCNC: 138 MMOL/L (ref 135–144)

## 2023-02-16 PROCEDURE — 97110 THERAPEUTIC EXERCISES: CPT

## 2023-02-16 PROCEDURE — 2580000003 HC RX 258: Performed by: NURSE PRACTITIONER

## 2023-02-16 PROCEDURE — 1210000000 HC MED SURG R&B

## 2023-02-16 PROCEDURE — 97535 SELF CARE MNGMENT TRAINING: CPT

## 2023-02-16 PROCEDURE — 6370000000 HC RX 637 (ALT 250 FOR IP): Performed by: NURSE PRACTITIONER

## 2023-02-16 PROCEDURE — 36415 COLL VENOUS BLD VENIPUNCTURE: CPT

## 2023-02-16 PROCEDURE — 97116 GAIT TRAINING THERAPY: CPT

## 2023-02-16 PROCEDURE — 99232 SBSQ HOSP IP/OBS MODERATE 35: CPT | Performed by: HOSPITALIST

## 2023-02-16 PROCEDURE — 80048 BASIC METABOLIC PNL TOTAL CA: CPT

## 2023-02-16 PROCEDURE — 6360000002 HC RX W HCPCS: Performed by: NURSE PRACTITIONER

## 2023-02-16 PROCEDURE — 6370000000 HC RX 637 (ALT 250 FOR IP): Performed by: HOSPITALIST

## 2023-02-16 RX ORDER — BUMETANIDE 1 MG/1
0.5 TABLET ORAL DAILY
Status: DISCONTINUED | OUTPATIENT
Start: 2023-02-16 | End: 2023-02-17 | Stop reason: HOSPADM

## 2023-02-16 RX ORDER — DOXYCYCLINE HYCLATE 100 MG
100 TABLET ORAL EVERY 12 HOURS SCHEDULED
Qty: 14 TABLET | Refills: 0
Start: 2023-02-16 | End: 2023-02-17 | Stop reason: SDUPTHER

## 2023-02-16 RX ADMIN — APIXABAN 5 MG: 5 TABLET, FILM COATED ORAL at 20:15

## 2023-02-16 RX ADMIN — CLONIDINE HYDROCHLORIDE 0.1 MG: 0.1 TABLET ORAL at 09:43

## 2023-02-16 RX ADMIN — APIXABAN 5 MG: 5 TABLET, FILM COATED ORAL at 09:43

## 2023-02-16 RX ADMIN — ACETAMINOPHEN 650 MG: 325 TABLET ORAL at 09:56

## 2023-02-16 RX ADMIN — SODIUM CHLORIDE, PRESERVATIVE FREE 10 ML: 5 INJECTION INTRAVENOUS at 12:51

## 2023-02-16 RX ADMIN — DOXYCYCLINE HYCLATE 100 MG: 100 TABLET, COATED ORAL at 09:43

## 2023-02-16 RX ADMIN — PANTOPRAZOLE SODIUM 40 MG: 40 TABLET, DELAYED RELEASE ORAL at 20:15

## 2023-02-16 RX ADMIN — ACETAMINOPHEN 650 MG: 325 TABLET ORAL at 20:15

## 2023-02-16 RX ADMIN — PANTOPRAZOLE SODIUM 40 MG: 40 TABLET, DELAYED RELEASE ORAL at 09:43

## 2023-02-16 RX ADMIN — ALLOPURINOL 100 MG: 100 TABLET ORAL at 09:42

## 2023-02-16 RX ADMIN — CLOPIDOGREL BISULFATE 75 MG: 75 TABLET ORAL at 09:43

## 2023-02-16 RX ADMIN — LOSARTAN POTASSIUM 100 MG: 50 TABLET, FILM COATED ORAL at 20:16

## 2023-02-16 RX ADMIN — METOPROLOL SUCCINATE 25 MG: 25 TABLET, EXTENDED RELEASE ORAL at 20:16

## 2023-02-16 RX ADMIN — DOXYCYCLINE HYCLATE 100 MG: 100 TABLET, COATED ORAL at 20:15

## 2023-02-16 RX ADMIN — PAROXETINE HYDROCHLORIDE HEMIHYDRATE 20 MG: 20 TABLET, FILM COATED ORAL at 09:43

## 2023-02-16 RX ADMIN — PRAVASTATIN SODIUM 40 MG: 20 TABLET ORAL at 20:16

## 2023-02-16 RX ADMIN — CLONIDINE HYDROCHLORIDE 0.1 MG: 0.1 TABLET ORAL at 20:14

## 2023-02-16 RX ADMIN — SODIUM CHLORIDE, PRESERVATIVE FREE 10 ML: 5 INJECTION INTRAVENOUS at 20:16

## 2023-02-16 ASSESSMENT — PAIN SCALES - GENERAL
PAINLEVEL_OUTOF10: 0
PAINLEVEL_OUTOF10: 9
PAINLEVEL_OUTOF10: 3
PAINLEVEL_OUTOF10: 3
PAINLEVEL_OUTOF10: 0

## 2023-02-16 ASSESSMENT — PAIN DESCRIPTION - LOCATION
LOCATION: GENERALIZED
LOCATION: GENERALIZED

## 2023-02-16 ASSESSMENT — PAIN DESCRIPTION - ONSET: ONSET: ON-GOING

## 2023-02-16 ASSESSMENT — PAIN SCALES - WONG BAKER: WONGBAKER_NUMERICALRESPONSE: 0

## 2023-02-16 ASSESSMENT — PAIN DESCRIPTION - PAIN TYPE: TYPE: CHRONIC PAIN

## 2023-02-16 ASSESSMENT — PAIN DESCRIPTION - DESCRIPTORS: DESCRIPTORS: ACHING;DISCOMFORT

## 2023-02-16 ASSESSMENT — PAIN - FUNCTIONAL ASSESSMENT: PAIN_FUNCTIONAL_ASSESSMENT: ACTIVITIES ARE NOT PREVENTED

## 2023-02-16 NOTE — DISCHARGE INSTR - COC
Continuity of Care Form    Patient Name: Sunday Arnold   :  1945  MRN:  [id number]    Admit date:  2/15/2023  Discharge date:        Code Status Order: Full Code   Advance Directives:     Admitting Physician:  Kika Tejeda DO  PCP: Zina Scott MD    Discharging Nurse: [facility] Unit/Room#: 972/814-68  Discharging Unit Phone Number: [phone]    Emergency Contact:   Extended Emergency Contact Information  Primary Emergency Contact: [facility]  Mobile Phone: [phone]  Relation: Niece/Nephew  Secondary Emergency Contact: [facility] BLANKA BRUMFIELD  Mobile Phone: [phone]  Relation: Niece/Nephew    Past Surgical History:  Past Surgical History:   Procedure Laterality Date    APPENDECTOMY      sponges left in abdomen, at [facility]    stereotactic type, right breast, benign    CARDIAC CATHETERIZATION  2020    [facility] lad    CARDIAC SURGERY      CARDIOVERSION  2020    [facility]     CARDIOVERSION  2020    COLONOSCOPY      Polyp    CORONARY ANGIOPLASTY WITH STENT PLACEMENT  2011    failed attempt to place stent    CORONARY ANGIOPLASTY WITH STENT PLACEMENT  2011    successful placement of 2 stent in same artery    [address]    bleeding ulcers found     Multicast [address]    surgical sponges left in during appendectomy    HAMMER TOE SURGERY      head resection of phalanx, left foot    LAPAROSCOPY      with D&C    PA COLONOSCOPY W/BIOPSY SINGLE/MULTIPLE N/A 2017    COLONOSCOPY WITH BIOPSY performed by Gene Talamantes MD at [facility] Endoscopy    PA EGD TRANSORAL BIOPSY SINGLE/MULTIPLE  2017    EGD BIOPSY performed by Gene Talamantes MD at [address] resection with rt and left ethmoidectomy    SHOULDER ARTHROSCOPY      rotator cuff impingement, right arm    SINUS ENDOSCOPY      SINUS SURGERY      multiple    UPPER GASTROINTESTINAL ENDOSCOPY      2-4 times per year for pyloric stenosis    UPPER GASTROINTESTINAL ENDOSCOPY  12/19/2017    EGD DILATION BALLOON performed by Marlene Rocha MD at [facility] [facility] ENDOSCOPY  5/24/2018    EGD DILATION SAVORY performed by Fabian Patel MD at [facility] Endoscopy       Immunization History: There is no immunization history on file for this patient. Active Problems:  Patient Active Problem List   Diagnosis Code    Bloody discharge from nipple N64.52    Osteopenia M85.80    Multiple sclerosis (HCC) G35    Essential hypertension I10    Asthma J45.909    SOB (shortness of breath) R06.02    Lower leg pain M79.669    Noncompliance with medications Z91.14    Dyslipidemia E78.5    GERD (gastroesophageal reflux disease) K21.9    Fibromyalgia M79.7    Pyloric stenosis K31.1    Demyelinating disease ([facility]) G37.9    H/O: CVA (cerebrovascular accident) Z86.73    Epigastric abdominal pain R10.13    Incontinence in female R32    Chest pain with high risk for cardiac etiology R07.9    Migraine without status migrainosus, not intractable G43.909    Hypokalemia E87.6    Chronic diastolic heart failure ([facility]) C23.96    Acute diastolic HF (heart failure) ([facility]) I50.31    Acute on chronic combined systolic and diastolic CHF (congestive heart failure) ([facility]) I50.43    CAD (coronary artery disease) I25.10    Atrial flutter ([facility]) I48.92    Chronic bronchiolitis ([facility]) J44.9    Paroxysmal A-fib ([facility]) I48.0    HTN (hypertension) I10    Chronic renal insufficiency, stage III (moderate) ([facility]) N18.30    Mild cognitive disorder F09    Raynaud's disease I73.00    Acute on chronic combined systolic and diastolic congestive heart failure (HCC) I50.43    Frequent falls R29.6    Physical debility R53.81    Chronic atrial fibrillation ([facility]) I48.20    Obesity (BMI 30-39. 9) E66.9    Decompensated heart failure ([facility]) I50.9    Severe pulmonary hypertension ([address] I27.20    Anxiety F41.9    COVID U07.1    Unable to care for self Z78.9    Acute bronchitis J20.9       Isolation/Infection:   Isolation            No Isolation          Patient Infection Status       Infection Onset Added Last Indicated Last Indicated By Review Planned Expiration Resolved Resolved By    None active    Resolved    COVID-19 22 COVID-19, Rapid   22     COVID-19 (Rule Out) 22 COVID-19, Rapid (Ordered)   22 Rule-Out Test Resulted    COVID-19 (Rule Out) 10/06/22 10/06/22 10/06/22 COVID-19, Rapid (Ordered)   10/06/22 Rule-Out Test Resulted    COVID-19 (Rule Out) 20 COVID-19 (Ordered)   20 Rule-Out Test Resulted    COVID-19 (Rule Out) 20 Covid-19 Ambulatory (Ordered)   20 Rule-Out Test Resulted            Nurse Assessment:  Last Vital Signs: /65   Pulse 84   Temp 98.2 °F (36.8 °C) (Oral)   Resp 15   Ht 5' 1.25\" (1.556 m)   Wt 179 lb 10.8 oz (81.5 kg)   SpO2 99%   BMI 33.67 kg/m²     Last documented pain score (0-10 scale): Pain Level: 3  Last Weight:   Wt Readings from Last 1 Encounters:   02/15/23 179 lb 10.8 oz (81.5 kg)     Mental Status:  oriented and alert    IV Access:  - None    Nursing Mobility/ADLs:  Walking   Assisted  Transfer  Assisted  Bathing  Assisted  Dressing  Assisted  Toileting  Assisted  Feeding  Independent  Med Admin  Independent  Med Delivery   whole    Wound Care Documentation and Therapy:        Elimination:  Continence: Bowel: Yes  Bladder: No  Urinary Catheter: None   Colostomy/Ileostomy/Ileal Conduit: No       Date of Last BM:     Intake/Output Summary (Last 24 hours) at 2023 1449  Last data filed at 2023 1415  Gross per 24 hour   Intake 720 ml   Output 500 ml   Net 220 ml     I/O last 3 completed shifts: In: 999 [IV Piggyback:999]  Out: 200 [Urine:200]    Safety Concerns:      At Risk for Falls    Impairments/Disabilities: None    Nutrition Therapy:  Current Nutrition Therapy:   - Oral Diet:  General    Routes of Feeding: Oral  Liquids: No Restrictions  Daily Fluid Restriction: no  Last Modified Barium Swallow with Video (Video Swallowing Test): not done    Treatments at the Time of Hospital Discharge:   Respiratory Treatments: None  Oxygen Therapy:  is not on home oxygen therapy. Ventilator:    - No ventilator support    Rehab Therapies: Physical Therapy and Occupational Therapy  Weight Bearing Status/Restrictions: No weight bearing restrictions  Other Medical Equipment (for information only, NOT a DME order):  walker and hospital bed  Other Treatments: None    Patient's personal belongings (please select all that are sent with patient):  Dereck Nicholson, phone    RN SIGNATURE:  Electronically signed by Jorge L Fay RN on 2/17/23 at 12:20 PM EST    CASE MANAGEMENT/SOCIAL WORK SECTION    Inpatient Status Date: ***    Readmission Risk Assessment Score:  Readmission Risk              Risk of Unplanned Readmission:  22           Discharging to Facility/ Agency   Name: [address] [place] [address]  Address: [facility] [address]  Phone:  [phone]      / signature: Electronically signed by Mica Gonzalez RN on 2/17/23 at 1:32 PM EST    PHYSICIAN SECTION    Prognosis: Good    Condition at Discharge: Stable    Rehab Potential (if transferring to Rehab): Good    Recommended Labs or Other Treatments After Discharge: None    Physician Certification: I certify the above information and transfer of Kristie Griggs  is necessary for the continuing treatment of the diagnosis listed and that she requires Home Care for greater 30 days.      Update Admission H&P: No change in H&P    PHYSICIAN SIGNATURE:  Electronically signed by Kayla Mon DO on 2/16/23 at 2:49 PM EST

## 2023-02-16 NOTE — PROGRESS NOTES
Physical Therapy  Facility/Department: [facility] Lung MED SURG ICU  Physical Therapy Daily Progress Note    Name: Delio De La O  : 1945  MRN: [id number]  Date of Service: 2023    Discharge Recommendations:  Patient would benefit from continued therapy after discharge   PT Equipment Recommendations  Equipment Needed: No  Other: Pt reports she has a rollator at home. Patient Diagnosis(es): The primary encounter diagnosis was General weakness. Diagnoses of Fall from bed, initial encounter and Acute cough were also pertinent to this visit. Past Medical History:  has a past medical history of Abnormal Pap smear, Asthma, Atrial fibrillation ([address] Bloody discharge from nipple, CAD (coronary artery disease), Cataracts, bilateral, Colon polyp, CVA (cerebral vascular accident) ([address] Demyelinating disease ([address] Fibromyalgia, High-risk sexual behavior, History of bone density study, History of migraines, HTN (hypertension), IgG deficiency ([address] Multiple sclerosis ([address] Myoclonic seizures ([address] Optic neuritis, Osteopenia, Pyloric stenosis, Raynaud's disease, and Vasomotor rhinitis. Past Surgical History:  has a past surgical history that includes Dilation & curettage (); Breast biopsy (); Appendectomy (); Septoplasty (); Hammer toe surgery (); Sinus endoscopy; sinus surgery; Shoulder arthroscopy (); Esophagoscopy (, ); Colonoscopy (); Foreign Body Removal (); laparoscopy (); Coronary angioplasty with stent (2011); Coronary angioplasty with stent (2011); Upper gastrointestinal endoscopy; eye surgery; pr colonoscopy w/biopsy single/multiple (N/A, 2017); pr egd transoral biopsy single/multiple (2017); Upper gastrointestinal endoscopy (2017); Upper gastrointestinal endoscopy (2018); Cardiac surgery; Cardiac catheterization (2020); Cardioversion (2020); and Cardioversion (2020).     Assessment   Body Structures, Functions, Activity Limitations Requiring Skilled Therapeutic Intervention: Decreased functional mobility ; Decreased endurance;Decreased safe awareness;Decreased strength;Decreased balance  Assessment: Pt exhibits generalized weakness, impaired mobility and balance deficits with history of falls and difficulty caring for herself. Pt required CGA-SBA for transfers and ambulated with RW 30' and 40' x 1 CGA; patient fatigues quickly. Pt would be unsafe to return to prior living arrangement based on current mobility status and would require 24/7 assistance. Pt would benefit from further rehab services at discharge. Treatment Diagnosis: decreased mobility  Therapy Prognosis: Good  Requires PT Follow-Up: Yes  Activity Tolerance  Activity Tolerance: Patient limited by endurance; Patient limited by fatigue     Plan   Physcial Therapy Plan  General Plan:  (5-6x/week)  Current Treatment Recommendations: Strengthening, Balance training, Functional mobility training, Transfer training, Gait training, Endurance training, Patient/Caregiver education & training, Safety education & training, Therapeutic activities  Safety Devices  Type of Devices: Call light within reach, Chair alarm in place, Nurse notified, Left in chair, Gait belt  Restraints  Restraints Initially in Place: No     Restrictions  Restrictions/Precautions  Restrictions/Precautions: Fall Risk  Required Braces or Orthoses?: No  Position Activity Restriction  Other position/activity restrictions: Up with assistance. Subjective   General  Patient assessed for rehabilitation services?: Yes  Family / Caregiver Present: No  Referring Practitioner: Junior  Diagnosis: Unable to care for self  Follows Commands: Within Functional Limits  Subjective  Subjective: Pt supine in bed and agreeable to therapy. Pt reports pain generalized and \"all over. \"          Cognition   Orientation  Overall Orientation Status: Within Functional Limits  Orientation Level: Oriented X4  Cognition  Overall Cognitive Status: Exceptions  Arousal/Alertness: Appropriate responses to stimuli  Following Commands: Follows all commands without difficulty  Attention Span: Attends with cues to redirect; Difficulty attending to directions  Safety Judgement: Decreased awareness of need for assistance;Decreased awareness of need for safety  Problem Solving: Assistance required to generate solutions;Assistance required to implement solutions;Assistance required to identify errors made  Insights: Decreased awareness of deficits  Initiation: Requires cues for some  Sequencing: Requires cues for some     Objective          Bed mobility  Supine to Sit: Contact guard assistance  Scooting: Contact guard assistance  Bed Mobility Comments: HOB raised to ~45*; pt required min VCs for safety awareness during transfers; pt retired to chair at end of session. Transfers  Sit to Stand: Contact guard assistance;Stand by assistance  Stand to Sit: Contact guard assistance;Stand by assistance  Stand Pivot Transfers: Contact guard assistance  Comment: Transfers with RW; verbal cues for proper hand placement.   Ambulation  Surface: Level tile  Device: Rolling Walker  Assistance: Contact guard assistance  Quality of Gait: slow pace with decreased step height  Gait Deviations: Slow Jaycee;Decreased step height  Distance: 30', 40' x 1 with seated rest break between bouts  Comments: pt fatigues quickly but decreased SOB noted today; mild unsteadiness  More Ambulation?: No  Stairs/Curb  Stairs?: No     Balance  Posture: Good  Sitting - Static: Good  Sitting - Dynamic: Good;-  Standing - Static: Fair  Standing - Dynamic: Fair;-  Comments: standing balance assessed with RW  A/AROM Exercises: Seated (B) LE PRE's 10x each: ankle pumps, LAQ's, hip flexion, pillow squeeze        OutComes Score                                                  AM-PAC Score  AM-PAC Inpatient Mobility Raw Score : 16 (02/15/23 1011)  AM-PAC Inpatient T-Scale Score : 40.78 (02/15/23 1011)  Mobility Inpatient CMS 0-100% Score: 54.16 (02/15/23 1011)  Mobility Inpatient CMS G-Code Modifier : CK (02/15/23 1011)         Goals  Short Term Goals  Time Frame for Short Term Goals: 14 visits  Short Term Goal 1: Pt to be Independent with transfers with walker without LOB. Short Term Goal 2: Pt to tolerate 30 minutes of therapy activity to improve strength and endurance for mobility. Short Term Goal 3: Pt to ambulate with ' Modified (I) without LOB. Short Term Goal 4: Improve standing static/dynamic balance to Good with walker to minimize fall risk.   Patient Goals   Patient Goals : Get stronger       Education  Patient Education  Education Given To: Patient  Education Provided: Plan of Care  Education Provided Comments: BRISSA PRE's  Education Method: Verbal;Demonstration  Barriers to Learning: None  Education Outcome: Verbalized understanding;Demonstrated understanding      Therapy Time   Individual Concurrent Group Co-treatment   Time In 1052         Time Out 1124         Minutes 32         Timed Code Treatment Minutes: 24 Minutes       Miah Dow, PT

## 2023-02-16 NOTE — PLAN OF CARE
Problem: Discharge Planning  Goal: Discharge to home or other facility with appropriate resources  2/15/2023 2237 by Vane Cordon RN  Outcome: Progressing  Flowsheets (Taken 2/15/2023 2237)  Discharge to home or other facility with appropriate resources:   Identify barriers to discharge with patient and caregiver   Identify discharge learning needs (meds, wound care, etc)   Refer to discharge planning if patient needs post-hospital services based on physician order or complex needs related to functional status, cognitive ability or social support system   Arrange for needed discharge resources and transportation as appropriate     Problem: Skin/Tissue Integrity  Goal: Absence of new skin breakdown  Description: 1. Monitor for areas of redness and/or skin breakdown  2. Assess vascular access sites hourly  3. Every 4-6 hours minimum:  Change oxygen saturation probe site  4. Every 4-6 hours:  If on nasal continuous positive airway pressure, respiratory therapy assess nares and determine need for appliance change or resting period.   2/15/2023 2237 by Vane Cordon RN  Outcome: Progressing     Problem: Safety - Adult  Goal: Free from fall injury  2/15/2023 2237 by Vane Cordon RN  Outcome: Progressing     Problem: ABCDS Injury Assessment  Goal: Absence of physical injury  2/15/2023 2237 by Vane Cordon RN  Outcome: Progressing  Flowsheets (Taken 2/15/2023 2237)  Absence of Physical Injury: Implement safety measures based on patient assessment     Problem: Pain  Goal: Verbalizes/displays adequate comfort level or baseline comfort level  Outcome: Progressing  Flowsheets (Taken 2/15/2023 2237)  Verbalizes/displays adequate comfort level or baseline comfort level:   Encourage patient to monitor pain and request assistance   Assess pain using appropriate pain scale   Administer analgesics based on type and severity of pain and evaluate response   Implement non-pharmacological measures as appropriate and evaluate response   Consider cultural and social influences on pain and pain management   Notify Licensed Independent Practitioner if interventions unsuccessful or patient reports new pain

## 2023-02-16 NOTE — PROGRESS NOTES
Occupational Therapy  Facility/Department: [facility] [address] [place] [address]  Occupational Therapy Daily Treatment Note     Name: oMhinder Royal  : 1945  MRN: [id number]  Date of Service: 2023    Discharge Recommendations:  Patient would benefit from continued therapy after discharge    Patient Diagnosis(es): The primary encounter diagnosis was General weakness. Diagnoses of Fall from bed, initial encounter and Acute cough were also pertinent to this visit. Past Medical History:  has a past medical history of Abnormal Pap smear, Asthma, Atrial fibrillation ([address] Bloody discharge from nipple, CAD (coronary artery disease), Cataracts, bilateral, Colon polyp, CVA (cerebral vascular accident) ([address] Demyelinating disease ([address] Fibromyalgia, High-risk sexual behavior, History of bone density study, History of migraines, HTN (hypertension), IgG deficiency ([address] Multiple sclerosis ([address] Myoclonic seizures ([address] Optic neuritis, Osteopenia, Pyloric stenosis, Raynaud's disease, and Vasomotor rhinitis. Past Surgical History:  has a past surgical history that includes Dilation & curettage (); Breast biopsy (); Appendectomy (); Septoplasty (); Hammer toe surgery (); Sinus endoscopy; sinus surgery; Shoulder arthroscopy (); Esophagoscopy (, ); Colonoscopy (); Foreign Body Removal (); laparoscopy (9633); Coronary angioplasty with stent (2011); Coronary angioplasty with stent (2011); Upper gastrointestinal endoscopy; eye surgery; pr colonoscopy w/biopsy single/multiple (N/A, 2017); pr egd transoral biopsy single/multiple (2017); Upper gastrointestinal endoscopy (2017); Upper gastrointestinal endoscopy (2018); Cardiac surgery; Cardiac catheterization (2020); Cardioversion (2020); and Cardioversion (2020). Assessment   Performance deficits / Impairments: Decreased functional mobility ; Decreased ADL status; Decreased endurance;Decreased balance;Decreased strength;Decreased safe awareness;Decreased cognition;Decreased high-level IADLs;Decreased fine motor control;Decreased coordination  Assessment: Pt currently limited in ADL performance due to above noted deficits and will benefit from continued therapy services while hospitalized and at discharge to maximize pt's safety and independence in performing functional tasks. Pt presents as a high fall risk as pt self reports frequent falls and with noted decreased balance/activity tolerance throughout session. Pt unsafe to return to prior living arrangements at this time, strict 24hr supervision/assistance and continued skilled therapy intervention recommended. REQUIRES OT FOLLOW-UP: Yes  Activity Tolerance  Activity Tolerance: Patient limited by fatigue;Patient limited by pain      Safety Devices  Type of Devices: Call light within reach; Chair alarm in place;Nurse notified; Left in chair;Gait belt  Restraints  Restraints Initially in Place: No    Plan   Occupational Therapy Plan  Times Per Week: 5-6x/wk  Current Treatment Recommendations: Strengthening, Balance training, Functional mobility training, Endurance training, Equipment evaluation, education, & procurement, Patient/Caregiver education & training, Safety education & training, Self-Care / ADL, Home management training, Coordination training     Restrictions  Restrictions/Precautions  Restrictions/Precautions: Fall Risk  Required Braces or Orthoses?: No  Position Activity Restriction  Other position/activity restrictions: Up with assistance. Subjective   General  Patient assessed for rehabilitation services?: Yes  Family / Caregiver Present: No  General Comment  Comments: RN ok'd for therapy this AM. Pt agreeable to participate in session and pleasant/cooperative throughout. Pt reports 9/10 generalized pain which is chronic pain.     Objective  Balance  Sitting:  (SBA- static, CGA- dynamic; pt able to perform LB dressing task seated at EOB)  Standing:  (CGA- static; pt able to tolerate standing ~3 minutes at sink to perform hand hygiene at sink)    Functional Mobility  Overall Level of Assistance: Contact-guard assistance (Pt able to perform 3 bouts of functional mobility within hospital room; pt able to complete functional mobility from bed to toilet; pt able to complete functional mobility within hospital room x2 for ~4 minutes each)    Toilet Transfers  Toilet - Technique: Ambulating  Equipment Used: Grab bars  Toilet Transfer: Contact guard assistance    ADL  Grooming: Increased time to complete;Contact guard assistance (Pt performed hand hygiene standing at sink; pt required rest break resting on sink with distal BUE)  LE Dressing: Contact guard assistance; Increased time to complete  LE Dressing Skilled Clinical Factors: Pt donned socks seated at EOB using figure 4 technique; decreased BUE strength/coordination to manipulate socks; increased time and effort required to complete LB dressing task  Toileting: Increased time to complete;Minimal assistance  Toileting Skilled Clinical Factors: Pt able to perform brief management with SBA; use of grab bars to assist with sit <> stand to toilet; able to complete hygiene independently with increased time    Upper extremity exercises:   Bicep curl: bilaterally x10  Shoulder flexion/extension: bilaterally x10  Punches: bilaterally x10  Tricep curl: bilaterally x10  Shoulder abduction/adduction: bilaterally x10    Bed mobility  Supine to Sit: Contact guard assistance  Scooting: Contact guard assistance  Bed Mobility Comments: HOB raised to ~45*; pt required min VCs for safety awareness during transfers    Transfers  Sit to stand: Contact guard assistance  Stand to sit: Contact guard assistance  Transfer Comments: Min VCs for proper hand placement/initiation/sequencing and safety awareness with use of RW; increased time/effort to perform    Cognition  Overall Cognitive Status: Exceptions  Arousal/Alertness: Appropriate responses to stimuli  Following Commands: Follows all commands without difficulty  Attention Span: Attends with cues to redirect; Difficulty attending to directions  Safety Judgement: Decreased awareness of need for assistance;Decreased awareness of need for safety  Problem Solving: Assistance required to generate solutions;Assistance required to implement solutions;Assistance required to identify errors made  Insights: Decreased awareness of deficits  Initiation: Requires cues for some  Sequencing: Requires cues for some  Orientation  Overall Orientation Status: Within Functional Limits  Orientation Level: Oriented X4    Education Given To: Patient  Education Provided: Role of Therapy;Plan of Care (Safety awareness/fall prevention; activity promotion; safety with transfers and RW management)  Education Method: Demonstration;Verbal  Education Outcome: Verbalized understanding;Continued education needed;Demonstrated understanding    AM-PAC Score  AM-Merged with [facility] Inpatient Daily Activity Raw Score: 18 (02/16/23 1259)  AM-PAC Inpatient ADL T-Scale Score : 38.66 (02/16/23 1259)  ADL Inpatient CMS 0-100% Score: 46.65 (02/16/23 1259)  ADL Inpatient CMS G-Code Modifier : CK (02/16/23 1259)    Goals  Short Term Goals  Time Frame for Short Term Goals: 14 visits  Short Term Goal 1: Pt will perform ADL tasks with mod IND using AE/DME PRN  Short Term Goal 2: Pt will perform functional transfers/functional mobility with mod IND using least restrictive AD  Short Term Goal 3: Pt will independently demo good safety awareness during engagement in all ADLs and functional transfers/functional mobility  Short Term Goal 4: Pt will demo 5+ minutes standing tolerance with use of least restrictive AD for increased participation in ADL/IADL tasks       Therapy Time   Individual Concurrent Group Co-treatment   Time In 1050         Time Out 1124         Minutes 34         Time Coded Treatment minutes: Staci Olivarez S/OT

## 2023-02-16 NOTE — PROGRESS NOTES
[facility]  Office: [address], DO, Remasylvia Din, DO, Flavio Acron, DO, Aundrea Araujo Blood, DO, Amarjit Sebastian MD, Jose Manuel Devine MD, Soledad Rosales MD, Jessica Moyer MD,  Orlando Zelaya MD, Po Diego MD, Felipe Dixon, DO, Blanca Chandler MD,  Joseph Woodward MD, Nitin Epperson MD, Benjamin Howe, DO, Jordi Alfred MD, Claudia Faulkner MD, Keyur Zepeda, DO, Marisol Junior MD, Enid Eli MD, Susan Wright MD, Dunia Smith MD, Manolo Lozoya, DO, Anthony Greenberg MD, Retia Bosworth, MD, Jessica Butterfield, CNP,  Adelia Alpers, CNP, Ade Foster, CNP, Teresa Bryant, CNP,  Kassy Watkins, [facility], Art Curtis, CNP, Fiona Porter, CNP, Vanessa Carrera, CNP, Lambert Smith, CNP, Rae Bolden, CNP, Axel Win PA-C, Kristi Wagner, CNS, Walter Dowd, CNP, Allison Palma, CNP         [address] [facility]    Progress Note    2/16/2023    2:50 PM    Name:   Kulwant Boothe  MRN:     [id number]     Acct:      [de-identified]   Room:   [address] [place] [address] Day:  1  Admit Date:  2/15/2023  1:03 AM    PCP:   Kierra Nelson MD  Code Status:  Full Code    Subjective:     C/C:   Chief Complaint   Patient presents with    Fall     Report she fell at home about 1800, denies hitting head, no LOC, + blood thinners     Fatigue      Reports feeling weak, reports diagnosed with pneumonia, has not started abx yet      Patient seen in follow-up for weakness, fall secondary to acute bronchitis/UTI, patient states \"I feel a little better\"    Interval History Status: improved. Patient is doing a little bit better overall. No major issues overnight. Working on disposition to skilled nursing facility. She is maintained on doxycycline for her acute bronchitis/UTI. Her cough has improved. Multiple questions answered, patient denies any concerns at this point in time. Brief History:      This 53-year-old female was recently diagnosed with a \"pneumonia\". She had not yet started antibiotics. She states that she was at home and tried to get out of a chair when she suffered a mechanical fall. She was weak and unable to get to a phone. She states that she \"scooted\" to the front door and utilized a coat  to flash her porch lights and the hope that someone would notice. Ultimately no one noticed however she did ultimately obtain help and was brought to the hospital.  Chest x-ray is not convincing for pneumonia however the patient is having significant sputum production. Clinically she has an acute bronchitis. She appears to also have a concurrent urinary tract infection. Due to her multiple drug allergies I am starting her on doxycycline. Unfortunately with her acute bronchitis and suspected urinary tract infection this is led to her having increased weakness especially given her known diagnosis of multiple sclerosis. The patient has been admitted for antibiotics, PT/OT along with possible disposition to skilled nursing facility if deemed appropriate. Review of Systems:     Constitutional:  negative for chills, fevers, sweats. Positive for weakness, fatigue  Respiratory: Patient continues to have cough with productive sputum but improved overall, patient denies any shortness of breath. Cardiovascular:  negative for chest pain, chest pressure/discomfort, lower extremity edema, palpitations  Gastrointestinal:  negative for abdominal pain, constipation, diarrhea, nausea, vomiting  Neurological:  negative for dizziness, headache    Medications: Allergies:     Allergies   Allergen Reactions    Dofetilide Other (See Comments)    Lisinopril-Hydrochlorothiazide Anaphylaxis and Hives    Sulfa Antibiotics Anaphylaxis    Penicillins Hives    Polyethylene Glycol Hives    Actifed Cold-Allergy [Chlorpheniramine-Phenylephrine]     Altace [Ramipril]      Chronic cough    Cephalosporins Hives    Coreg [Carvedilol]      bloating    Dml Facial Moisturizer     Elavil [Amitriptyline Hcl]     Entex [Ami-Jose]     Ethylene Glycol     Fentanyl     Heparin     Hizentra [Immune Globulin (Human)]     Hydralazine     Levofloxacin      ach tendon    Lisinopril-Hydrochlorothiazide     Montelukast Sodium      Heart effects    Morphine Itching    Nifedipine     Norvasc [Amlodipine Besylate]      Stomach pain    Other      IV IG     Phenobarbital Hives    Propoxyphene     Robaxin [Methocarbamol]     Sinequan [Doxepin Hcl]     Sudafed [Pseudoephedrine Hcl]     Sudafed [Pseudoephedrine Hcl]     Tranquil-[place]     Wellbutrin [Bupropion Hcl]      Hypotension    Actifed Cold-Allergy [Chlorpheniramine-Phenylephrine] Palpitations    Clindamycin/Lincomycin Nausea And Vomiting    Codeine Nausea And Vomiting    Metoprolol Nausea And Vomiting     Reports she is allergic to the preservative polyethylineglycol in this medicine, causing severe emesis and stomach pain     Metoprolol Succinate Nausea And Vomiting    Seldane [Terfenadine] Palpitations       Current Meds:   Scheduled Meds:    allopurinol  100 mg Oral Daily    azaTHIOprine  50 mg Oral Daily    cloNIDine  0.1 mg Oral BID    clopidogrel  75 mg Oral Daily    metoprolol succinate  25 mg Oral Nightly    pantoprazole  40 mg Oral BID    PARoxetine  20 mg Oral QAM    pravastatin  40 mg Oral Nightly    sodium chloride flush  5-40 mL IntraVENous 2 times per day    apixaban  5 mg Oral BID    doxycycline hyclate  100 mg Oral 2 times per day    losartan  100 mg Oral Nightly     Continuous Infusions:    sodium chloride       PRN Meds: albuterol, sodium chloride flush, sodium chloride, potassium chloride **OR** potassium alternative oral replacement **OR** potassium chloride, ondansetron **OR** ondansetron, acetaminophen **OR** acetaminophen    Data:     Past Medical History:   has a past medical history of Abnormal Pap smear, Asthma, Atrial fibrillation ([facility] [address] Bloody discharge from nipple, CAD (coronary artery disease), Cataracts, bilateral, Colon polyp, CVA (cerebral vascular accident) ([facility] [address] Demyelinating disease ([facility] [address] Fibromyalgia, High-risk sexual behavior, History of bone density study, History of migraines, HTN (hypertension), IgG deficiency ([facility] [address] Multiple sclerosis ([facility] [address] Myoclonic seizures ([facility] [address] Optic neuritis, Osteopenia, Pyloric stenosis, Raynaud's disease, and Vasomotor rhinitis. Social History:   reports that she has never smoked. She has never used smokeless tobacco. She reports that she does not drink alcohol and does not use drugs. Family History:   Family History   Problem Relation Age of Onset    Other Father         Heart problems    Heart Disease Father     Other Mother         heart problems requiring open heart surgery, HTN    Heart Disease Mother     Other Sister         HTN    Hypertension Sister     Other Maternal Aunt         breast cancer    Breast Cancer Maternal Aunt        Vitals:  /65   Pulse 84   Temp 98.2 °F (36.8 °C) (Oral)   Resp 15   Ht 5' 1.25\" (1.556 m)   Wt 179 lb 10.8 oz (81.5 kg)   SpO2 99%   BMI 33.67 kg/m²   Temp (24hrs), Av.1 °F (36.7 °C), Min:97.6 °F (36.4 °C), Max:98.6 °F (37 °C)    No results for input(s): POCGLU in the last 72 hours. I/O (24Hr):     Intake/Output Summary (Last 24 hours) at 2023 1450  Last data filed at 2023 1415  Gross per 24 hour   Intake 720 ml   Output 500 ml   Net 220 ml       Labs:  Hematology:  Recent Labs     02/15/23  0155   WBC 10.6   RBC 3.35*   HGB 10.3*   HCT 31.6*   MCV 94.4   MCH 30.8   MCHC 32.6   RDW 17.4*      MPV 9.0     Chemistry:  Recent Labs     02/15/23  0155 02/16/23  0552    138   K 3.4* 4.1    104   CO2 26 25   GLUCOSE 109* 98   BUN 24* 21   CREATININE 1.01* 1.04*   ANIONGAP 11 9   LABGLOM 58* 56*   CALCIUM 9.3 8.7     Recent Labs     02/15/23  0155   PROT 6.4   LABALBU 4.2   AST 13   ALT 8   ALKPHOS 122*   BILITOT 1.6*   LIPASE 18     ABG:No results found for: POCPH, PHART, PH, POCPCO2, VEY5DWH, PCO2, POCPO2, PO2ART, PO2, POCHCO3, IOS0PPS, HCO3, NBEA, PBEA, BEART, BE, THGBART, THB, TDX2ROU, OJBY1AKQ, W0QLHSJX, O2SAT, FIO2  Lab Results   Component Value Date/Time    SPECIAL 20ML LEFT ARM 02/15/2023 02:03 AM     Lab Results   Component Value Date/Time    CULTURE NO GROWTH 1 DAY 02/15/2023 02:03 AM       Radiology:  XR CHEST (2 VW)    Result Date: 2/15/2023  No acute findings. CT HEAD WO CONTRAST    Result Date: 2/15/2023  No acute intracranial abnormality.        Physical Examination:       General appearance:  alert, cooperative and no distress  Mental Status:  oriented to person, place and time and normal affect  Lungs:  clear to auscultation bilaterally, normal effort  Heart: Irregular rhythm, rate controlled  Abdomen:  soft, nontender, nondistended, normal bowel sounds, no masses, hepatomegaly, splenomegaly  Extremities:  no edema, redness, tenderness in the calves  Skin:  no gross lesions, rashes, induration    Assessment:     Hospital Problems             Last Modified POA    * (Principal) Unable to care for self 2/15/2023 Yes    Acute bronchitis 2/15/2023 Yes       Plan:     Acute bronchitis/acute cystitis without hematuria  Continue doxycycline  Weakness/debility/multiple sclerosis  PT/OT  Chronic atrial fibrillation  Continue Toprol/Eliquis  Chronic diastolic heart failure  Euvolemic, resume Bumex  Essential hypertension  Continue Toprol/Cozaar/Catapres    Discharge to skilled nursing facility when arrangements made    Kayla Mon DO  2/16/2023  2:50 PM

## 2023-02-16 NOTE — FLOWSHEET NOTE
[facility]  PROGRESS NOTE    Shift date: 2/16/23  Shift day: Thursday   Shift # 1    Room # 745/888-29   Name: Nava Adamson                Muslim: Christie Tran [address] of Jainism: Camilla Other    Referral: Routine Visit    Admit Date & Time: 2/15/2023  1:03 AM    Assessment:  Nava Adamson is a 78 y.o. female in the hospital. Upon entering the room writer observes Patient was lying in bed. Patient, whom writer met last year, smiled and greeted writer. She told writer she was doing \"terrible. \" She asked about writer's denomination. She spoke of her Mandaen tradition and her strong [address]. She shared stories and moments from her life that illustrated her strong kaylene. At one point, her closest friend stopped by and Pt told him to leave, as she did not want him to get sick. Patient engaged in conversation with the dietary staff who came to her room for her meal orders. Patient openly shared how her kaylene has saved her life. Patient offered words of support and encouragement. Pt  spoke of her devotion to her patron saint and Reva Decree. Patient spoke with Gustabo Fallmio about some hardships in her life and how she learned to trust God. Patient welcomed writer to return tomorrow. Intervention:  Writer introduced self and title as . Writer offered space for Patient  to express feelings, needs, and concerns and provided a ministry presence. Writer offered empathy and care. Writer affirmed Pt's strengths. Writer offered prayer and words of support. Outcome:  Patient thanked writer for the visit. She invited writer to return tomorrow. Plan:  Chaplains will remain available to offer spiritual and emotional support as needed. 02/16/23 1624   Encounter Summary   Service Provided For: Patient   Referral/Consult From: Nurse   Support System Friends/neighbors; Episcopalian/kaylene community   Last Encounter  03/09/22   Complexity of Encounter Moderate   Begin Time 1545   End Time  1620 Total Time Calculated 35 min   Encounter    Type Follow up   Spiritual/Emotional needs   Type Spiritual Support   Assessment/Intervention/Outcome   Assessment Anxious; Powerlessness; Impaired resilience   Intervention Sustaining Presence/Ministry of presence;Prayer (assurance of)/[place]; Discussed illness injury and its impact; Discussed meaning/purpose;Discussed relationship with God;Discussed belief system/Yarsani practices/kaylene; Active listening;Explored/Affirmed feelings, thoughts, concerns;Explored Coping Skills/Resources   Outcome Receptive; Expressed feelings, needs, and concerns;Engaged in conversation;Expressed Gratitude;Expressed regrets   Plan and Referrals   Plan/Referrals Continue Support (comment)       Electronically signed by Mickey Ware on 2/16/2023 at 4:25 PM.  [facility]  [phone]

## 2023-02-16 NOTE — PLAN OF CARE
Problem: Discharge Planning  Goal: Discharge to home or other facility with appropriate resources  Outcome: Progressing  Flowsheets (Taken 2/16/2023 0830)  Discharge to home or other facility with appropriate resources:   Identify barriers to discharge with patient and caregiver   Arrange for needed discharge resources and transportation as appropriate   Identify discharge learning needs (meds, wound care, etc)     Problem: Skin/Tissue Integrity  Goal: Absence of new skin breakdown  Description: 1. Monitor for areas of redness and/or skin breakdown  2. Assess vascular access sites hourly  3. Every 4-6 hours minimum:  Change oxygen saturation probe site  4. Every 4-6 hours:  If on nasal continuous positive airway pressure, respiratory therapy assess nares and determine need for appliance change or resting period. Outcome: Progressing     Problem: Safety - Adult  Goal: Free from fall injury  Outcome: Progressing  Fall risk interventions are in place-bed alarm/chair alarm. Writer in room during ambulation.    Problem: Pain  Goal: Verbalizes/displays adequate comfort level or baseline comfort level  Outcome: Progressing  Flowsheets  Taken 2/16/2023 1241  Verbalizes/displays adequate comfort level or baseline comfort level:   Encourage patient to monitor pain and request assistance   Assess pain using appropriate pain scale   Administer analgesics based on type and severity of pain and evaluate response

## 2023-02-17 VITALS
TEMPERATURE: 97.9 F | SYSTOLIC BLOOD PRESSURE: 133 MMHG | RESPIRATION RATE: 16 BRPM | BODY MASS INDEX: 33.88 KG/M2 | DIASTOLIC BLOOD PRESSURE: 55 MMHG | HEART RATE: 86 BPM | WEIGHT: 179.45 LBS | OXYGEN SATURATION: 99 % | HEIGHT: 61 IN

## 2023-02-17 PROCEDURE — 6370000000 HC RX 637 (ALT 250 FOR IP): Performed by: NURSE PRACTITIONER

## 2023-02-17 PROCEDURE — 99232 SBSQ HOSP IP/OBS MODERATE 35: CPT | Performed by: HOSPITALIST

## 2023-02-17 PROCEDURE — 6370000000 HC RX 637 (ALT 250 FOR IP): Performed by: HOSPITALIST

## 2023-02-17 RX ORDER — DOXYCYCLINE HYCLATE 100 MG
100 TABLET ORAL EVERY 12 HOURS SCHEDULED
Qty: 14 TABLET | Refills: 0 | Status: SHIPPED | OUTPATIENT
Start: 2023-02-17 | End: 2023-02-24

## 2023-02-17 RX ADMIN — DOXYCYCLINE HYCLATE 100 MG: 100 TABLET, COATED ORAL at 09:02

## 2023-02-17 RX ADMIN — PANTOPRAZOLE SODIUM 40 MG: 40 TABLET, DELAYED RELEASE ORAL at 09:02

## 2023-02-17 RX ADMIN — CLONIDINE HYDROCHLORIDE 0.1 MG: 0.1 TABLET ORAL at 09:01

## 2023-02-17 RX ADMIN — CLOPIDOGREL BISULFATE 75 MG: 75 TABLET ORAL at 09:02

## 2023-02-17 RX ADMIN — PAROXETINE HYDROCHLORIDE HEMIHYDRATE 20 MG: 20 TABLET, FILM COATED ORAL at 09:01

## 2023-02-17 RX ADMIN — APIXABAN 5 MG: 5 TABLET, FILM COATED ORAL at 09:02

## 2023-02-17 RX ADMIN — ACETAMINOPHEN 650 MG: 325 TABLET ORAL at 09:09

## 2023-02-17 RX ADMIN — ALLOPURINOL 100 MG: 100 TABLET ORAL at 09:02

## 2023-02-17 RX ADMIN — BUMETANIDE 0.5 MG: 1 TABLET ORAL at 09:01

## 2023-02-17 NOTE — DISCHARGE SUMMARY
[facility]  Office: [address], DO, Shirley Maynard, DO, Yolanda Rodriges, DO, Marcieекатеринаryan Mona Chowdhury, DO, Keisha Fuentes MD, Milad Johnston MD, Armando Goncalves MD, Alka Bradshaw MD,  Naga Lee MD, Keyur Olsen MD, Melanie Shields DO, Christine Snyder MD,  Homar Reyes MD, Zoltan Mak MD, Aleida Todd DO, Poly Saba MD, Lisa Ramachandran MD, Dikr Mcclendon DO, Zane Vera MD, Arabella Hutchison MD, Canelo Garcia MD, Tete Stevens MD, Colette Gong DO, Nancy Arizmendi MD, Chino Gilbert MD, Saleem Keller, CNP,  Ene Barron, CNP, Javed Mejía, CNP, Tonya Anderson, CNP,  Lanny Celeste, [facility], Radhames Corral, CNP, Scooby Maguire, CNP, Delia Prado, CNP, Chanell Morrison, CNP, Dewey Lei, CNP, Annmarie Hart PA-C, Tonia Nurse, CNS, Stephany Gutierrez, CNP, Benjamín Garcia, [facility]         [address] [facility]    Discharge Summary     Patient ID: Padmaja Helton  :  1945   MRN: [id number]     ACCOUNT:  [de-identified]   Patient's PCP: Kali Frank MD  Admit Date: 2/15/2023   Discharge Date: 2023     Length of Stay: 2  Code Status:  Full Code  Admitting Physician: Leah Bautista DO  Discharge Physician: Laeh Bautista DO     Active Discharge Diagnoses:     Hospital Problem Lists:  Principal Problem:    Unable to care for self  Active Problems:    Acute bronchitis  Resolved Problems:    * No resolved hospital problems.  *      Admission Condition:  fair     Discharged Condition: good    Hospital Stay:     Hospital Course:  Padmaaj Helton is a 78 y.o. female who was admitted for the management of  Unable to care for self , presented to ER with Fall (Report she fell at home about 1800, denies hitting head, no LOC, + blood thinners ) and Fatigue ( Reports feeling weak, reports diagnosed with pneumonia, has not started abx yet )    This 70-year-old female presented to the hospital with weakness, fatigue secondary to acute bronchitis and urinary tract infection. The patient was admitted to the hospital with her multiple drug allergies initiated on doxycycline. The patient did improve over the course of the admission and ultimately is discharged home with visiting nurse services. Patient is discharged in stable condition. Significant therapeutic interventions: As above    Significant Diagnostic Studies:   Labs / Micro:  CBC:   Lab Results   Component Value Date/Time    WBC 10.6 02/15/2023 01:55 AM    RBC 3.35 02/15/2023 01:55 AM    RBC 4.04 04/30/2012 05:55 AM    HGB 10.3 02/15/2023 01:55 AM    HCT 31.6 02/15/2023 01:55 AM    MCV 94.4 02/15/2023 01:55 AM    MCH 30.8 02/15/2023 01:55 AM    MCHC 32.6 02/15/2023 01:55 AM    RDW 17.4 02/15/2023 01:55 AM     02/15/2023 01:55 AM     04/30/2012 05:55 AM     BMP:    Lab Results   Component Value Date/Time    GLUCOSE 98 02/16/2023 05:52 AM    GLUCOSE 108 04/30/2012 05:55 AM     02/16/2023 05:52 AM    K 4.1 02/16/2023 05:52 AM     02/16/2023 05:52 AM    CO2 25 02/16/2023 05:52 AM    ANIONGAP 9 02/16/2023 05:52 AM    BUN 21 02/16/2023 05:52 AM    CREATININE 1.04 02/16/2023 05:52 AM    BUNCRER NOT REPORTED 02/06/2022 01:47 PM    CALCIUM 8.7 02/16/2023 05:52 AM    LABGLOM 56 02/16/2023 05:52 AM    GFRAA 39 03/09/2022 06:06 AM    GFR      03/09/2022 06:06 AM        Radiology:  XR CHEST (2 VW)    Result Date: 2/15/2023  No acute findings. CT HEAD WO CONTRAST    Result Date: 2/15/2023  No acute intracranial abnormality. Consultations:    Consults:     Final Specialist Recommendations/Findings:   IP CONSULT TO SOCIAL WORK  IP CONSULT TO HOME CARE NEEDS      The patient was seen and examined on day of discharge and this discharge summary is in conjunction with any daily progress note from day of discharge.     Discharge plan:     Disposition: Home    Physician Follow Up:     MD Nicola [place] [phone]  [phone]    Follow up         Requiring Further Evaluation/Follow Up POST HOSPITALIZATION/Incidental Findings: None    Diet: regular diet    Activity: As tolerated    Instructions to Patient: None    Discharge Medications:      Medication List        START taking these medications      doxycycline hyclate 100 MG tablet  Commonly known as: VIBRA-TABS  Take 1 tablet by mouth every 12 hours for 7 days            CHANGE how you take these medications      bumetanide 1 MG tablet  Commonly known as: BUMEX  Take 0.5 tablets by mouth daily Advised to take additional 1/2 pill for weight gain, increased CHF and/swelling  What changed:   how much to take  additional instructions            CONTINUE taking these medications      acetaminophen 500 MG tablet  Commonly known as: TYLENOL     allopurinol 100 MG tablet  Commonly known as: ZYLOPRIM     apixaban 5 MG Tabs tablet  Commonly known as: ELIQUIS     azaTHIOprine 50 MG tablet  Commonly known as: IMURAN     cloNIDine 0.1 MG tablet  Commonly known as: CATAPRES  Take 1 tablet by mouth 2 times daily     losartan 100 MG tablet  Commonly known as: COZAAR     metoprolol succinate 50 MG extended release tablet  Commonly known as: TOPROL XL     nitroGLYCERIN 0.4 MG SL tablet  Commonly known as: NITROSTAT     pantoprazole 40 MG tablet  Commonly known as: PROTONIX     PARoxetine 20 MG tablet  Commonly known as: PAXIL     Plavix 75 MG tablet  Generic drug: clopidogrel     pravastatin 40 MG tablet  Commonly known as: PRAVACHOL               Where to Get Your Medications        These medications were sent to [address] [place] [address], [address] [place] [address]  [address], [facility]      Phone: [phone]   doxycycline hyclate 100 MG tablet         No discharge procedures on file.     Time Spent on discharge is  20 mins in patient examination, evaluation, counseling as well as medication reconciliation, prescriptions for required medications, discharge plan and follow up. Electronically signed by   Zachariah Merchant DO  2/17/2023  11:17 AM      Thank you Dr. Shireen Deras MD for the opportunity to be involved in this patient's care.

## 2023-02-17 NOTE — PLAN OF CARE
Problem: Discharge Planning  Goal: Discharge to home or other facility with appropriate resources  2/17/2023 1258 by Luz Wheeler RN  Outcome: Adequate for Discharge     Problem: Skin/Tissue Integrity  Goal: Absence of new skin breakdown  Description: 1. Monitor for areas of redness and/or skin breakdown  2. Assess vascular access sites hourly  3. Every 4-6 hours minimum:  Change oxygen saturation probe site  4. Every 4-6 hours:  If on nasal continuous positive airway pressure, respiratory therapy assess nares and determine need for appliance change or resting period.   2/17/2023 1258 by Luz Wheeler RN  Outcome: Adequate for Discharge     Problem: Safety - Adult  Goal: Free from fall injury  2/17/2023 1258 by Luz Wheeler RN  Outcome: Adequate for Discharge     Problem: ABCDS Injury Assessment  Goal: Absence of physical injury  2/17/2023 1258 by Luz Wheeler RN  Outcome: Adequate for Discharge     Problem: Pain  Goal: Verbalizes/displays adequate comfort level or baseline comfort level  2/17/2023 1258 by Luz Wheeler RN  Outcome: Adequate for Discharge

## 2023-02-17 NOTE — PROGRESS NOTES
[facility]  Office: [address], DO, Sarah Martinez, DO, Mis Quintana, DO, Zach Tom Blood, DO, Graciela Rendon MD, Siddhartha Renee MD, Aditi Block MD, Carol Lewis MD,  Sally Ludwig MD, Adan Garay MD, Desi Chung, DO, Gordon Maurer MD,  Debbie Perez MD, Jean Max MD, Alphonse Albarran, DO, Angus Mejia MD, Israel Rod MD, Fred Medina, DO, Isabel Armstrong MD, Radha Gutierrez MD, Hubert Mason MD, Brittani Florence MD, Silvio Moreira, DO, Tiffany Singh MD, Toshia Maldonado MD, Alferd Felty, CNP,  Maria Isabel Pelclaudio, CNP, Ethyl Mercury, CNP, Dasha Speaks, CNP,  Quita Grit, DNP, Keysha Carnes, CNP, Varghese Goodson, CNP, Mauricio Brittle, CNP, Eryn Stevenson, CNP, Mane Alarcon, CNP, ABIMBOLA LyonC, Corinna Sawyer, CNS, Kodak Castro, CNP, Yandel Aleman, CNP         [address]    Progress Note    2/17/2023    10:55 AM    Name:   Amber Joiner  MRN:     [id number]     Acct:      [de-identified]   Room:   [address] [place] [address] Day:  2  Admit Date:  2/15/2023  1:03 AM    PCP:   Malena Johnson MD  Code Status:  Full Code    Subjective:     C/C:   Chief Complaint   Patient presents with    Fall     Report she fell at home about 1800, denies hitting head, no LOC, + blood thinners     Fatigue      Reports feeling weak, reports diagnosed with pneumonia, has not started abx yet      Patient seen in follow-up for weakness, fall secondary to acute bronchitis with concurrent urinary tract infection. Patient states \"I am feeling better\"    Interval History Status: improved. Patient is doing better overall. No major issues overnight. Working on disposition to skilled nursing facility. At this point in time she is medically stable for discharge once arrangements made. Her cough is improving. Brief History: This 21-year-old female was recently diagnosed with a \"pneumonia\".   She had not yet started antibiotics. She states that she was at home and tried to get out of a chair when she suffered a mechanical fall. She was weak and unable to get to a phone. She states that she \"scooted\" to the front door and utilized a coat  to flash her porch lights and the hope that someone would notice. Ultimately no one noticed however she did ultimately obtain help and was brought to the hospital.  Chest x-ray is not convincing for pneumonia however the patient is having significant sputum production. Clinically she has an acute bronchitis. She appears to also have a concurrent urinary tract infection. Due to her multiple drug allergies I am starting her on doxycycline. Unfortunately with her acute bronchitis and suspected urinary tract infection this is led to her having increased weakness especially given her known diagnosis of multiple sclerosis. The patient has been admitted for antibiotics, PT/OT along with possible disposition to skilled nursing facility if deemed appropriate. Review of Systems:     Constitutional:  negative for chills, fevers, sweats. Patient complains of generalized weakness  Respiratory: Patient continues to have some sputum production but overall states that she has improved cough and the sputum is decreased  Cardiovascular:  negative for chest pain, chest pressure/discomfort, lower extremity edema, palpitations  Gastrointestinal:  negative for abdominal pain, constipation, diarrhea, nausea, vomiting  Neurological:  negative for dizziness, headache    Medications: Allergies:     Allergies   Allergen Reactions    Dofetilide Other (See Comments)    Lisinopril-Hydrochlorothiazide Anaphylaxis and Hives    Sulfa Antibiotics Anaphylaxis    Penicillins Hives    Polyethylene Glycol Hives    Actifed Cold-Allergy [Chlorpheniramine-Phenylephrine]     Altace [Ramipril]      Chronic cough    Cephalosporins Hives    Coreg [Carvedilol]      bloating    Dml Facial Moisturizer     Elavil [Amitriptyline Hcl]     Entex [Ami-Jose]     Ethylene Glycol     Fentanyl     Heparin     Hizentra [Immune Globulin (Human)]     Hydralazine     Levofloxacin      ach tendon    Lisinopril-Hydrochlorothiazide     Montelukast Sodium      Heart effects    Morphine Itching    Nifedipine     Norvasc [Amlodipine Besylate]      Stomach pain    Other      IV IG     Phenobarbital Hives    Propoxyphene     Robaxin [Methocarbamol]     Sinequan [Doxepin Hcl]     Sudafed [Pseudoephedrine Hcl]     Sudafed [Pseudoephedrine Hcl]     Tranquil-[place]     Wellbutrin [Bupropion Hcl]      Hypotension    Actifed Cold-Allergy [Chlorpheniramine-Phenylephrine] Palpitations    Clindamycin/Lincomycin Nausea And Vomiting    Codeine Nausea And Vomiting    Metoprolol Nausea And Vomiting     Reports she is allergic to the preservative polyethylineglycol in this medicine, causing severe emesis and stomach pain     Metoprolol Succinate Nausea And Vomiting    Seldane [Terfenadine] Palpitations       Current Meds:   Scheduled Meds:    bumetanide  0.5 mg Oral Daily    allopurinol  100 mg Oral Daily    azaTHIOprine  50 mg Oral Daily    cloNIDine  0.1 mg Oral BID    clopidogrel  75 mg Oral Daily    metoprolol succinate  25 mg Oral Nightly    pantoprazole  40 mg Oral BID    PARoxetine  20 mg Oral QAM    pravastatin  40 mg Oral Nightly    sodium chloride flush  5-40 mL IntraVENous 2 times per day    apixaban  5 mg Oral BID    doxycycline hyclate  100 mg Oral 2 times per day    losartan  100 mg Oral Nightly     Continuous Infusions:    sodium chloride       PRN Meds: albuterol, sodium chloride flush, sodium chloride, potassium chloride **OR** potassium alternative oral replacement **OR** potassium chloride, ondansetron **OR** ondansetron, acetaminophen **OR** acetaminophen    Data:     Past Medical History:   has a past medical history of Abnormal Pap smear, Asthma, Atrial fibrillation ([facility] [address] Bloody discharge from nipple, CAD (coronary artery disease), Cataracts, bilateral, Colon polyp, CVA (cerebral vascular accident) ([facility] [address] Demyelinating disease ([facility] [address] Fibromyalgia, High-risk sexual behavior, History of bone density study, History of migraines, HTN (hypertension), IgG deficiency ([facility] [address] Multiple sclerosis ([facility] [address] Myoclonic seizures ([facility] [address] Optic neuritis, Osteopenia, Pyloric stenosis, Raynaud's disease, and Vasomotor rhinitis. Social History:   reports that she has never smoked. She has never used smokeless tobacco. She reports that she does not drink alcohol and does not use drugs. Family History:   Family History   Problem Relation Age of Onset    Other Father         Heart problems    Heart Disease Father     Other Mother         heart problems requiring open heart surgery, HTN    Heart Disease Mother     Other Sister         HTN    Hypertension Sister     Other Maternal Aunt         breast cancer    Breast Cancer Maternal Aunt        Vitals:  BP (!) 111/53   Pulse 87   Temp 98.4 °F (36.9 °C) (Oral)   Resp 16   Ht 5' 1.25\" (1.556 m)   Wt 179 lb 7.3 oz (81.4 kg)   SpO2 100%   BMI 33.63 kg/m²   Temp (24hrs), Av.3 °F (36.8 °C), Min:98.1 °F (36.7 °C), Max:98.8 °F (37.1 °C)    No results for input(s): POCGLU in the last 72 hours. I/O (24Hr):     Intake/Output Summary (Last 24 hours) at 2023 1055  Last data filed at 2023 1415  Gross per 24 hour   Intake 360 ml   Output 400 ml   Net -40 ml       Labs:  Hematology:  Recent Labs     02/15/23  015   WBC 10.6   RBC 3.35*   HGB 10.3*   HCT 31.6*   MCV 94.4   MCH 30.8   MCHC 32.6   RDW 17.4*      MPV 9.0     Chemistry:  Recent Labs     02/15/23  0155 02/16/23  0552    138   K 3.4* 4.1    104   CO2 26 25   GLUCOSE 109* 98   BUN 24* 21   CREATININE 1.01* 1.04*   ANIONGAP 11 9   LABGLOM 58* 56*   CALCIUM 9.3 8.7     Recent Labs     02/15/23  0155   PROT 6.4   LABALBU 4.2   AST 13   ALT 8   ALKPHOS 122*   BILITOT 1.6*   LIPASE 18     ABG:No results found for: POCPH, PHART, PH, POCPCO2, ROC8JNV, PCO2, POCPO2, PO2ART, PO2, POCHCO3, KYW5JDA, HCO3, NBEA, PBEA, BEART, BE, THGBART, THB, ARN5TBP, TNJT4QST, U1XLLARY, O2SAT, FIO2  Lab Results   Component Value Date/Time    SPECIAL 20ML LEFT ARM 02/15/2023 02:03 AM     Lab Results   Component Value Date/Time    CULTURE NO GROWTH 2 DAYS 02/15/2023 02:03 AM       Radiology:  XR CHEST (2 VW)    Result Date: 2/15/2023  No acute findings. CT HEAD WO CONTRAST    Result Date: 2/15/2023  No acute intracranial abnormality.        Physical Examination:       General appearance:  alert, cooperative and no distress  Mental Status:  oriented to person, place and time and normal affect  Lungs:  clear to auscultation bilaterally, normal effort  Heart: Irregular rhythm, rate controlled  Abdomen:  soft, nontender, nondistended, normal bowel sounds, no masses, hepatomegaly, splenomegaly  Extremities:  no edema, redness, tenderness in the calves  Skin:  no gross lesions, rashes, induration    Assessment:     Hospital Problems             Last Modified POA    * (Principal) Unable to care for self 2/15/2023 Yes    Acute bronchitis 2/15/2023 Yes       Plan:     Acute bronchitis/acute cystitis without hematuria  Continue doxycycline  Clinically improving  Weakness/debility/multiple sclerosis  PT/OT  Discharge planning  Chronic atrial fibrillation  Rate controlled, continue Toprol and Eliquis  Chronic diastolic heart failure  Bumex resumed  Essential hypertension  Continue Toprol/Cozaar/Catapres    Discharge once arrangements made    Daisy Edwards DO  2/17/2023  10:55 AM

## 2023-02-17 NOTE — PLAN OF CARE
Problem: Discharge Planning  Goal: Discharge to home or other facility with appropriate resources  2/16/2023 2110 by Mohan Friedman RN  Outcome: Progressing  Flowsheets (Taken 2/16/2023 2110)  Discharge to home or other facility with appropriate resources:   Identify barriers to discharge with patient and caregiver   Arrange for needed discharge resources and transportation as appropriate   Refer to discharge planning if patient needs post-hospital services based on physician order or complex needs related to functional status, cognitive ability or social support system     Problem: Skin/Tissue Integrity  Goal: Absence of new skin breakdown  Description: 1. Monitor for areas of redness and/or skin breakdown  2. Assess vascular access sites hourly  3. Every 4-6 hours minimum:  Change oxygen saturation probe site  4. Every 4-6 hours:  If on nasal continuous positive airway pressure, respiratory therapy assess nares and determine need for appliance change or resting period.   2/16/2023 2110 by Mohan Friedman RN  Outcome: Progressing     Problem: Safety - Adult  Goal: Free from fall injury  2/16/2023 2110 by Mohan Friedman RN  Outcome: Progressing     Problem: ABCDS Injury Assessment  Goal: Absence of physical injury  2/16/2023 2110 by Mohan Friedman RN  Outcome: Progressing  Flowsheets (Taken 2/16/2023 2110)  Absence of Physical Injury: Implement safety measures based on patient assessment     Problem: Pain  Goal: Verbalizes/displays adequate comfort level or baseline comfort level  2/16/2023 2110 by Mohan Friedman RN  Outcome: Progressing  Flowsheets (Taken 2/16/2023 2110)  Verbalizes/displays adequate comfort level or baseline comfort level:   Encourage patient to monitor pain and request assistance   Assess pain using appropriate pain scale   Administer analgesics based on type and severity of pain and evaluate response   Implement non-pharmacological measures as appropriate and evaluate response   Consider cultural and social influences on pain and pain management   Notify Licensed Independent Practitioner if interventions unsuccessful or patient reports new pain

## 2023-02-17 NOTE — PLAN OF CARE
Problem: Discharge Planning  Goal: Discharge to home or other facility with appropriate resources  Outcome: Adequate for Discharge  Flowsheets (Taken 2/17/2023 0800)  Discharge to home or other facility with appropriate resources:   Identify barriers to discharge with patient and caregiver   Arrange for needed discharge resources and transportation as appropriate   Identify discharge learning needs (meds, wound care, etc)   Refer to discharge planning if patient needs post-hospital services based on physician order or complex needs related to functional status, cognitive ability or social support system     Problem: Skin/Tissue Integrity  Goal: Absence of new skin breakdown  Description: 1. Monitor for areas of redness and/or skin breakdown  2. Assess vascular access sites hourly  3. Every 4-6 hours minimum:  Change oxygen saturation probe site  4. Every 4-6 hours:  If on nasal continuous positive airway pressure, respiratory therapy assess nares and determine need for appliance change or resting period.   Outcome: Adequate for Discharge     Problem: Safety - Adult  Goal: Free from fall injury  Outcome: Adequate for Discharge     Problem: ABCDS Injury Assessment  Goal: Absence of physical injury  Outcome: Adequate for Discharge     Problem: Pain  Goal: Verbalizes/displays adequate comfort level or baseline comfort level  Outcome: Adequate for Discharge

## 2023-02-17 NOTE — PROGRESS NOTES
Pt discharged home with [facility]. Report given to transport team at bedside. Discharge instructions given to patient. Pt expressed concerns about going home by herself and was educated on the possibility of going to a facility but she refused. RN offered to call her family at this time and she refused that as well. Confirmed with the patient that she has decided to go home and she said yes. PIV removed, belongings sent home with pt including keys.

## 2023-02-17 NOTE — CARE COORDINATION
Transitional Planning    Spoke with Liliana at [facility]- they can accept. Updated patient. Pt became upset stating that she has been to [facility] in the past and the \"TVs were so loud because all the old people cannot hear and they were short staffed. \" Pt states it was \"awful and couldn't sleep. \"  Sindhu Morrison acknowledged pt's concerns and expressed that since [facility] many facilities have had staffing issues. Writer also expressed that facilities have older population and likely will have similar experiences at other places with TVs being loud. Writer stated unfortunately writer has not toured every facility so writer cannot say which facilities are the [employer] also explained that pt can only go where [facility] is accepted unless she wants to private pay. Patient states she cannot private pay. Writer and patient spoke about expectations. Patient became frustrated regarding lack of facilities in network with her insurance that she approves of. Writer suggests switching insurance plans during open enrollment period but in the current time these are they options. At this time pt states \"I'll just go home. \" Sindhu Morrison states pt is able to make that decision and offered home care. Pt requests [address]- Referral sent. [facility] transport requested.          Transport confirmed for 1400 via [employer] with Emeli Garcia at [address], they can accept

## 2023-02-18 ASSESSMENT — ENCOUNTER SYMPTOMS
EYES NEGATIVE: 1
ALLERGIC/IMMUNOLOGIC NEGATIVE: 1
GASTROINTESTINAL NEGATIVE: 1
COUGH: 1

## 2023-02-19 LAB
MICROORGANISM SPEC CULT: NORMAL
MICROORGANISM SPEC CULT: NORMAL
SERVICE CMNT-IMP: NORMAL
SERVICE CMNT-IMP: NORMAL
SPECIMEN DESCRIPTION: NORMAL
SPECIMEN DESCRIPTION: NORMAL

## 2023-03-14 ENCOUNTER — HOSPITAL ENCOUNTER (EMERGENCY)
Age: 78
Discharge: HOME | End: 2023-03-14
Attending: EMERGENCY MEDICINE
Payer: MEDICARE

## 2023-03-14 ENCOUNTER — APPOINTMENT (OUTPATIENT)
Dept: CT IMAGING | Age: 78
End: 2023-03-14
Payer: MEDICARE

## 2023-03-14 VITALS
TEMPERATURE: 98.2 F | SYSTOLIC BLOOD PRESSURE: 175 MMHG | BODY MASS INDEX: 31.83 KG/M2 | HEART RATE: 89 BPM | HEIGHT: 62 IN | RESPIRATION RATE: 16 BRPM | OXYGEN SATURATION: 97 % | WEIGHT: 173 LBS | DIASTOLIC BLOOD PRESSURE: 71 MMHG

## 2023-03-14 DIAGNOSIS — R31.9 URINARY TRACT INFECTION WITH HEMATURIA, SITE UNSPECIFIED: Primary | ICD-10-CM

## 2023-03-14 DIAGNOSIS — N39.0 URINARY TRACT INFECTION WITH HEMATURIA, SITE UNSPECIFIED: Primary | ICD-10-CM

## 2023-03-14 LAB
ABSOLUTE EOS #: 0.2 K/UL (ref 0–0.4)
ABSOLUTE LYMPH #: 0.9 K/UL (ref 1–4.8)
ABSOLUTE MONO #: 0.8 K/UL (ref 0.1–1.2)
ALBUMIN SERPL-MCNC: 4.3 G/DL (ref 3.5–5.2)
ALBUMIN/GLOBULIN RATIO: 1.9 (ref 1–2.5)
ALP SERPL-CCNC: 130 U/L (ref 35–104)
ALT SERPL-CCNC: 12 U/L (ref 5–33)
ANION GAP SERPL CALCULATED.3IONS-SCNC: 14 MMOL/L (ref 9–17)
AST SERPL-CCNC: 15 U/L
BACTERIA: ABNORMAL
BASOPHILS # BLD: 1 % (ref 0–2)
BASOPHILS ABSOLUTE: 0 K/UL (ref 0–0.2)
BILIRUB SERPL-MCNC: 0.6 MG/DL (ref 0.3–1.2)
BILIRUBIN URINE: NEGATIVE
BUN SERPL-MCNC: 25 MG/DL (ref 8–23)
CALCIUM SERPL-MCNC: 9.2 MG/DL (ref 8.6–10.4)
CHLORIDE SERPL-SCNC: 101 MMOL/L (ref 98–107)
CO2 SERPL-SCNC: 25 MMOL/L (ref 20–31)
COLOR: YELLOW
CREAT SERPL-MCNC: 0.93 MG/DL (ref 0.5–0.9)
EOSINOPHILS RELATIVE PERCENT: 3 % (ref 1–4)
EPITHELIAL CELLS UA: ABNORMAL /HPF (ref 0–5)
GFR SERPL CREATININE-BSD FRML MDRD: >60 ML/MIN/1.73M2
GLUCOSE SERPL-MCNC: 98 MG/DL (ref 70–99)
GLUCOSE UR STRIP.AUTO-MCNC: NEGATIVE MG/DL
HCT VFR BLD AUTO: 34.6 % (ref 36–46)
HGB BLD-MCNC: 11.2 G/DL (ref 12–16)
INR PPP: 1
KETONES UR STRIP.AUTO-MCNC: NEGATIVE MG/DL
LEUKOCYTE ESTERASE UR QL STRIP.AUTO: ABNORMAL
LIPASE SERPL-CCNC: 52 U/L (ref 13–60)
LYMPHOCYTES # BLD: 14 % (ref 24–44)
MAGNESIUM SERPL-MCNC: 1.8 MG/DL (ref 1.6–2.6)
MCH RBC QN AUTO: 30.4 PG (ref 26–34)
MCHC RBC AUTO-ENTMCNC: 32.3 G/DL (ref 31–37)
MCV RBC AUTO: 94 FL (ref 80–100)
MONOCYTES # BLD: 13 % (ref 2–11)
NITRITE UR QL STRIP.AUTO: POSITIVE
OTHER OBSERVATIONS UA: ABNORMAL
PARTIAL THROMBOPLASTIN TIME: 26.4 SEC (ref 21.3–31.3)
PDW BLD-RTO: 17.3 % (ref 12.5–15.4)
PLATELET # BLD AUTO: 156 K/UL (ref 140–450)
PMV BLD AUTO: 9.4 FL (ref 6–12)
POTASSIUM SERPL-SCNC: 4.2 MMOL/L (ref 3.7–5.3)
PROT SERPL-MCNC: 6.6 G/DL (ref 6.4–8.3)
PROT UR STRIP.AUTO-MCNC: 6 MG/DL (ref 5–8)
PROT UR STRIP.AUTO-MCNC: NEGATIVE MG/DL
PROTHROMBIN TIME: 11.2 SEC (ref 9.4–12.6)
RBC # BLD: 3.68 M/UL (ref 4–5.2)
RBC CLUMPS #/AREA URNS AUTO: ABNORMAL /HPF (ref 0–2)
SEG NEUTROPHILS: 69 % (ref 36–66)
SEGMENTED NEUTROPHILS ABSOLUTE COUNT: 4.7 K/UL (ref 1.8–7.7)
SODIUM SERPL-SCNC: 140 MMOL/L (ref 135–144)
SPECIFIC GRAVITY UA: 1.02 (ref 1–1.03)
TURBIDITY: ABNORMAL
URINE HGB: ABNORMAL
UROBILINOGEN, URINE: NORMAL
WBC # BLD AUTO: 6.6 K/UL (ref 3.5–11)
WBC UA: ABNORMAL /HPF (ref 0–5)

## 2023-03-14 PROCEDURE — 80053 COMPREHEN METABOLIC PANEL: CPT

## 2023-03-14 PROCEDURE — 36415 COLL VENOUS BLD VENIPUNCTURE: CPT

## 2023-03-14 PROCEDURE — 85025 COMPLETE CBC W/AUTO DIFF WBC: CPT

## 2023-03-14 PROCEDURE — 99284 EMERGENCY DEPT VISIT MOD MDM: CPT

## 2023-03-14 PROCEDURE — 87186 SC STD MICRODIL/AGAR DIL: CPT

## 2023-03-14 PROCEDURE — 87077 CULTURE AEROBIC IDENTIFY: CPT

## 2023-03-14 PROCEDURE — 85610 PROTHROMBIN TIME: CPT

## 2023-03-14 PROCEDURE — 6370000000 HC RX 637 (ALT 250 FOR IP): Performed by: EMERGENCY MEDICINE

## 2023-03-14 PROCEDURE — 83690 ASSAY OF LIPASE: CPT

## 2023-03-14 PROCEDURE — 74176 CT ABD & PELVIS W/O CONTRAST: CPT

## 2023-03-14 PROCEDURE — 87086 URINE CULTURE/COLONY COUNT: CPT

## 2023-03-14 PROCEDURE — 85730 THROMBOPLASTIN TIME PARTIAL: CPT

## 2023-03-14 PROCEDURE — 81001 URINALYSIS AUTO W/SCOPE: CPT

## 2023-03-14 PROCEDURE — 83735 ASSAY OF MAGNESIUM: CPT

## 2023-03-14 RX ORDER — DOXYCYCLINE HYCLATE 100 MG
100 TABLET ORAL ONCE
Status: COMPLETED | OUTPATIENT
Start: 2023-03-14 | End: 2023-03-14

## 2023-03-14 RX ADMIN — DOXYCYCLINE HYCLATE 100 MG: 100 TABLET, COATED ORAL at 22:04

## 2023-03-14 ASSESSMENT — PAIN - FUNCTIONAL ASSESSMENT: PAIN_FUNCTIONAL_ASSESSMENT: 0-10

## 2023-03-14 ASSESSMENT — ENCOUNTER SYMPTOMS
ABDOMINAL PAIN: 0
NAUSEA: 0
EYE PAIN: 0
RHINORRHEA: 0
SORE THROAT: 0
BACK PAIN: 0
COUGH: 0
DIARRHEA: 0
VOMITING: 0
SHORTNESS OF BREATH: 0

## 2023-03-14 ASSESSMENT — PAIN DESCRIPTION - ORIENTATION: ORIENTATION: RIGHT

## 2023-03-14 ASSESSMENT — PAIN SCALES - GENERAL: PAINLEVEL_OUTOF10: 9

## 2023-03-14 NOTE — ED PROVIDER NOTES
[address] [facility] Emergency Department  [address] [address] [facility] [facility] [address]  Phone: [phone]  Fax: [phone] [facility]          Pt Name: Russell Hull  MRN: [id number]  [place] 1945  Date of evaluation: 3/14/2023      CHIEF COMPLAINT       Chief Complaint   Patient presents with    Flank Pain     Pt presents with co right flank pain . HISTORY OF PRESENT ILLNESS       Russell Hull is a 78 y.o. female who presents with lower right flank pain radiating to her right lower quadrant. She is concerned about a possible pyelonephritis. Sound like she has had that in the past and symptoms are similar but less severe this time. She try to get into her PCP but they were unable to get her in. They advise she come here. She does have a history of multiple sclerosis. She reports it sometimes causes her MS to act up with shooting nerve pain. No fall or trauma. She reports dysuria after the urine stream has ended. No bowel issues. No fevers but does report some chills. No other symptoms or concerns at this time. She is a history of ureteric stones. She denies any chest pain or dyspnea. REVIEW OF SYSTEMS       Review of Systems   Constitutional:  Negative for chills, fatigue and fever. HENT:  Negative for rhinorrhea and sore throat. Eyes:  Negative for pain. Respiratory:  Negative for cough and shortness of breath. Cardiovascular:  Negative for chest pain. Gastrointestinal:  Negative for abdominal pain, diarrhea, nausea and vomiting. Genitourinary:  Positive for dysuria and flank pain. Negative for difficulty urinating. Musculoskeletal:  Negative for back pain and neck pain. Skin:  Negative for rash. Neurological:  Negative for weakness and headaches.       PAST MEDICAL HISTORY    has a past medical history of Abnormal Pap smear, Asthma, Atrial fibrillation ([address] Bloody discharge from nipple, CAD (coronary artery disease), Cataracts, bilateral, Colon polyp, CVA (cerebral vascular accident) ([facility] Demyelinating disease ([facility] Fibromyalgia, High-risk sexual behavior, History of bone density study, History of migraines, HTN (hypertension), IgG deficiency ([facility] Multiple sclerosis ([facility] Myoclonic seizures ([facility] Optic neuritis, Osteopenia, Pyloric stenosis, Raynaud's disease, and Vasomotor rhinitis. SURGICAL HISTORY      has a past surgical history that includes Dilation & curettage (1982); Breast biopsy (1990); Appendectomy (1955); Septoplasty (1985); Hammer toe surgery (1989); Sinus endoscopy; sinus surgery; Shoulder arthroscopy (1998); Esophagoscopy (1966, 1969); Colonoscopy (2010); Foreign Body Removal (1962); laparoscopy (8825); Coronary angioplasty with stent (5/2011); Coronary angioplasty with stent (6/2011); Upper gastrointestinal endoscopy; eye surgery; pr colonoscopy w/biopsy single/multiple (N/A, 12/19/2017); pr egd transoral biopsy single/multiple (12/19/2017); Upper gastrointestinal endoscopy (12/19/2017); Upper gastrointestinal endoscopy (5/24/2018); Cardiac surgery; Cardiac catheterization (07/31/2020); Cardioversion (08/14/2020); and Cardioversion (07/31/2020).     CURRENT MEDICATIONS       Discharge Medication List as of 3/14/2023 10:08 PM        CONTINUE these medications which have NOT CHANGED    Details   acetaminophen (TYLENOL) 500 MG tablet Take 1,000 mg by mouth in the morning, at noon, and at bedtimeHistorical Med      bumetanide (BUMEX) 1 MG tablet Take 0.5 tablets by mouth daily Advised to take additional 1/2 pill for weight gain, increased CHF and/swelling, Disp-30 tablet, R-1Adjust Sig      allopurinol (ZYLOPRIM) 100 MG tablet Take 100 mg by mouth dailyHistorical Med      metoprolol succinate (TOPROL XL) 50 MG extended release tablet Take 25 mg by mouth at bedtime Historical Med      cloNIDine (CATAPRES) 0.1 MG tablet Take 1 tablet by mouth 2 times daily, Disp-60 tablet, R-0Normal      losartan (COZAAR) 100 MG tablet Take 1 tablet by mouth daily, Disp-30 tablet, R-0Historical Med      apixaban (ELIQUIS) 5 MG TABS tablet Take by mouth 2 times dailyHistorical Med      pantoprazole (PROTONIX) 40 MG tablet Take 40 mg by mouth 2 times daily Historical Med      azaTHIOprine (IMURAN) 50 MG tablet Take 50 mg by mouth daily Historical Med      nitroGLYCERIN (NITROSTAT) 0.4 MG SL tablet Place 0.4 mg under the tongue every 5 minutes. Indications: Chest Pain      PARoxetine (PAXIL) 20 MG tablet Take by mouth every morning Historical Med      clopidogrel (PLAVIX) 75 MG tablet Take 75 mg by mouth daily 100 mg pmHistorical Med      pravastatin (PRAVACHOL) 40 MG tablet Take 40 mg by mouth at bedtime Historical Med             ALLERGIES     is allergic to dofetilide, lisinopril-hydrochlorothiazide, sulfa antibiotics, penicillins, polyethylene glycol, actifed cold-allergy [chlorpheniramine-phenylephrine], altace [ramipril], cephalosporins, coreg [carvedilol], dml facial moisturizer, elavil [amitriptyline hcl], entex [ami-margot], ethylene glycol, fentanyl, heparin, hizentra [immune globulin (human)], hydralazine, levofloxacin, lisinopril-hydrochlorothiazide, montelukast sodium, morphine, nifedipine, norvasc [amlodipine besylate], other, phenobarbital, propoxyphene, robaxin [methocarbamol], sinequan [doxepin hcl], sudafed [pseudoephedrine hcl], sudafed [pseudoephedrine hcl], tranquil-cecil, wellbutrin [bupropion hcl], actifed cold-allergy [chlorpheniramine-phenylephrine], clindamycin/lincomycin, codeine, metoprolol, metoprolol succinate, and seldane [terfenadine]. FAMILY HISTORY     She indicated that the status of her mother is unknown. She indicated that the status of her father is unknown. She indicated that the status of her sister is unknown. She indicated that the status of her maternal aunt is unknown.     family history includes Breast Cancer in her maternal aunt;  Heart Disease in her father and mother; Hypertension in her sister; Other in her father, maternal aunt, mother, and sister. SOCIAL HISTORY      reports that she has never smoked. She has never used smokeless tobacco. She reports that she does not drink alcohol and does not use drugs. PHYSICAL EXAM     INITIAL VITALS:  height is 5' 1.5\" (1.562 m) and weight is 78.5 kg (173 lb). Her oral temperature is 98.2 °F (36.8 °C). Her blood pressure is 175/71 (abnormal) and her pulse is 89. Her respiration is 16 and oxygen saturation is 97%. Physical Exam  Vitals reviewed. Constitutional:       General: She is not in acute distress. Appearance: She is well-developed. She is not ill-appearing or toxic-appearing. HENT:      Head: Normocephalic and atraumatic. Right Ear: External ear normal.      Left Ear: External ear normal.   Eyes:      General: Lids are normal.   Neck:      Trachea: No tracheal deviation. Cardiovascular:      Rate and Rhythm: Normal rate and regular rhythm. Pulmonary:      Effort: Pulmonary effort is normal. No respiratory distress. Abdominal:      Palpations: Abdomen is soft. Tenderness: There is no abdominal tenderness. Comments: No appreciable tenderness to the flank or abdomen. No distention. No peritoneal signs. Skin:     General: Skin is warm and dry. Neurological:      Mental Status: She is alert. GCS: GCS eye subscore is 4. GCS verbal subscore is 5. GCS motor subscore is 6. Psychiatric:         Speech: Speech normal.         DIFFERENTIAL DIAGNOSIS/ MDM:     At this time the plan will be to initiate a further work-up with baseline labs and CT scan in addition to urinalysis. Clinically she does not appear toxic or septic. Differential diagnosis considered: Pyelonephritis versus other intra-abdominal pathologic process such as ureteric stones. Chronic Conditions affecting care (DM,HTN,CA, etc): See past medical history.     Social Determinants of Health affecting care (unable to care for self, lives alone, unemployed, homeless,etc): None    History source(s) (patient,spouse,parent,family,friend,EMS,etc): Patient    Review of external sources (ECF,Hospital records,EMS report, radiology reports, etc): Reviewed    Tests considered but not ordered: Not applicable    Independent interpretation of tests (eg.  X-ray, CAT scan, Doppler studies, EKG): ECG interpreted by myself if completed. Discussion of x-ray results with radiology: See below    Consults: See below    Consideration for admission/observation (even if discharged): Considered    Prescription considerations: Not applicable    Sepsis considered: Considered but unlikely    Critical Care note written: See below    DIAGNOSTIC RESULTS     EKG: All EKG's are interpreted by the Emergency Department Physician who either signs or Co-signs this chart in the absence of a cardiologist.        RADIOLOGY:   Interpretation per the Radiologist below, if available at the time of this note:  CT ABDOMEN PELVIS WO CONTRAST Additional Contrast? None   Final Result   No acute abnormality identified. XR CHEST (2 VW)    Result Date: 2/15/2023  EXAMINATION: TWO XRAY VIEWS OF THE CHEST 2/15/2023 1:48 am COMPARISON: 11/09/2002 HISTORY: ORDERING SYSTEM PROVIDED HISTORY: Cough rule out pneumonia TECHNOLOGIST PROVIDED HISTORY: Cough rule out pneumonia Reason for Exam: cough FINDINGS: Lungs are clear. Cardiomediastinal silhouette is enlarged, unchanged. No pleural effusion. No pneumothorax. Bony structures are unremarkable. Unchanged position of pacemaker wires. No acute findings. CT HEAD WO CONTRAST    Result Date: 2/15/2023  EXAMINATION: CT OF THE HEAD WITHOUT CONTRAST  2/15/2023 1:47 am TECHNIQUE: CT of the head was performed without the administration of intravenous contrast. Automated exposure control, iterative reconstruction, and/or weight based adjustment of the mA/kV was utilized to reduce the radiation dose to as low as reasonably achievable.  COMPARISON: CT head 08/04/2021. HISTORY: ORDERING SYSTEM PROVIDED HISTORY: fall,rule out bleed TECHNOLOGIST PROVIDED HISTORY: fall,rule out bleed Decision Support Exception - unselect if not a suspected or confirmed emergency medical condition->Emergency Medical Condition (MA) Reason for Exam: fall,rule out bleed FINDINGS: BRAIN/VENTRICLES: There is no acute intracranial hemorrhage, mass effect or midline shift.  No abnormal extra-axial fluid collection.  The gray-white differentiation is maintained without evidence of an acute infarct.  There is no evidence of hydrocephalus.  Chronic infarct left basal ganglia, unchanged. Unchanged right occipital lobe encephalomalacia.  Scattered hypoattenuating supratentorial white matter lesions, nonspecific but consistent with mild chronic ischemic white matter changes. ORBITS: The visualized portion of the orbits demonstrate no acute abnormality. SINUSES: Ethmoid, maxillary mucosal thickening.  Mastoid air cells are clear. SOFT TISSUES/SKULL:  No acute abnormality of the visualized skull or soft tissues.     No acute intracranial abnormality.       LABS:  Results for orders placed or performed during the hospital encounter of 03/14/23   Urinalysis with Reflex to Culture    Specimen: Urine, clean catch   Result Value Ref Range    Color, UA Yellow Yellow    Turbidity UA SLIGHTLY CLOUDY (A) Clear    Glucose, Ur NEGATIVE NEGATIVE    Bilirubin Urine NEGATIVE NEGATIVE    Ketones, Urine NEGATIVE NEGATIVE    Specific Gravity, UA 1.020 1.005 - 1.030    Urine Hgb SMALL (A) NEGATIVE    pH, UA 6.0 5.0 - 8.0    Protein, UA NEGATIVE NEGATIVE    Urobilinogen, Urine Normal Normal    Nitrite, Urine POSITIVE (A) NEGATIVE    Leukocyte Esterase, Urine TRACE (A) NEGATIVE   CBC with Auto Differential   Result Value Ref Range    WBC 6.6 3.5 - 11.0 k/uL    RBC 3.68 (L) 4.0 - 5.2 m/uL    Hemoglobin 11.2 (L) 12.0 - 16.0 g/dL    Hematocrit 34.6 (L) 36 - 46 %    MCV 94.0 80 - 100 fL    MCH 30.4 26 - 34 pg    MCHC 32.3  31 - 37 g/dL    RDW 17.3 (H) 12.5 - 15.4 %    Platelets 232 802 - 052 k/uL    MPV 9.4 6.0 - 12.0 fL    Seg Neutrophils 69 (H) 36 - 66 %    Lymphocytes 14 (L) 24 - 44 %    Monocytes 13 (H) 2 - 11 %    Eosinophils % 3 1 - 4 %    Basophils 1 0 - 2 %    Segs Absolute 4.70 1.8 - 7.7 k/uL    Absolute Lymph # 0.90 (L) 1.0 - 4.8 k/uL    Absolute Mono # 0.80 0.1 - 1.2 k/uL    Absolute Eos # 0.20 0.0 - 0.4 k/uL    Basophils Absolute 0.00 0.0 - 0.2 k/uL   Comprehensive Metabolic Panel   Result Value Ref Range    Glucose 98 70 - 99 mg/dL    BUN 25 (H) 8 - 23 mg/dL    Creatinine 0.93 (H) 0.50 - 0.90 mg/dL    Est, Glom Filt Rate >60 >60 mL/min/1.73m2    Calcium 9.2 8.6 - 10.4 mg/dL    Sodium 140 135 - 144 mmol/L    Potassium 4.2 3.7 - 5.3 mmol/L    Chloride 101 98 - 107 mmol/L    CO2 25 20 - 31 mmol/L    Anion Gap 14 9 - 17 mmol/L    Alkaline Phosphatase 130 (H) 35 - 104 U/L    ALT 12 5 - 33 U/L    AST 15 <32 U/L    Total Bilirubin 0.6 0.3 - 1.2 mg/dL    Total Protein 6.6 6.4 - 8.3 g/dL    Albumin 4.3 3.5 - 5.2 g/dL    Albumin/Globulin Ratio 1.9 1.0 - 2.5   Lipase   Result Value Ref Range    Lipase 52 13 - 60 U/L   Magnesium   Result Value Ref Range    Magnesium 1.8 1.6 - 2.6 mg/dL   Protime-INR   Result Value Ref Range    Protime 11.2 9.4 - 12.6 sec    INR 1.0    APTT   Result Value Ref Range    PTT 26.4 21.3 - 31.3 sec   Microscopic Urinalysis   Result Value Ref Range    WBC, UA 10 TO 20 0 - 5 /HPF    RBC, UA 0 TO 2 0 - 2 /HPF    Epithelial Cells UA 2 TO 5 0 - 5 /HPF    Bacteria, UA MANY (A) None    Other Observations UA Culture ordered based on defined criteria. (A) NOT REQ.        EMERGENCY DEPARTMENT COURSE:     The patient was given the following medications:  Orders Placed This Encounter   Medications    doxycycline hyclate (VIBRA-TABS) tablet 100 mg     Order Specific Question:   Antimicrobial Indications     Answer:   Urinary Tract Infection        Vitals:    Vitals:    03/14/23 1925 03/14/23 1938 03/14/23 1942   BP: (!) 175/71     Pulse: 89     Resp: 16     Temp:   98.2 °F (36.8 °C)   TempSrc:   Oral   SpO2: 97%     Weight:  78.5 kg (173 lb)    Height: 5' (1.524 m) 5' 1.5\" (1.562 m)      -------------------------  BP: (!) 175/71, Temp: 98.2 °F (36.8 °C), Heart Rate: 89, Resp: 16      Re-evaluation Notes    Patient doing well reevaluation. No acute change. Does have a UTI and will treat. She has numerous allergies to antibiotics but states when she was here just recently at her last visit doxycycline worked well for both her bronchitis and her UTI. Patient given a dose of doxycycline here and prescribed the rest.  She was advised to follow-up or return right away if worsening or for new or concerning symptoms. She is comfortable with this plan. Rest of work-up and imaging show no acute significant findings. The patient presents with a Urinary Tract Infection. Evaluation of the abdomen and back is benign. No guarding, peritoneal signs, sepsis, toxicity, significant tenderness, life threatening or serious etiology was noted. The patient is tolerating PO intake. The patient appears stable for discharge and has been instructed to return immediately if the symptoms worsen in any way, or in 1-2 days if not improved for re-evaluation. We also discussed returning to the Emergency Department immediately if new or worsening symptoms occur. We have discussed the symptoms which are most concerning (e.g., abdominal or back pain, fever, a feeling of passing out, light headed, dizziness, chest pain, shortness of breath, persistent nausea and/or vomiting, numbness or weakness to the arms or legs, coolness or color change of the arms or legs) that necessitate immediate return. The patient understands that at this time there is no evidence for a more malignant underlying process, but the patient also understands that early in the process of an illness or injury, an emergency department workup can be falsely reassuring.   Routine discharge counseling was given, and the patient understands that worsening, changing or persistent symptoms should prompt an immediate call or follow up with their primary physician or return to the emergency department. The importance of appropriate follow up was also discussed. I have reviewed the disposition diagnosis with the patient and or their family/guardian. I have answered their questions and given discharge instructions. They voiced understanding of these instructions and did not have any further questions or complaints. CONSULTS:    None    CRITICAL CARE:     None    PROCEDURES:    None    FINAL IMPRESSION      1.  Urinary tract infection with hematuria, site unspecified          DISPOSITION/PLAN   DISPOSITION Decision To Discharge 03/14/2023 09:59:21 PM      Condition on Disposition    Improved    PATIENT REFERRED TO:  Lennox Duff MD  [address] [place] [address]  [phone]    Schedule an appointment as soon as possible for a visit in 2 days      DISCHARGE MEDICATIONS:  Discharge Medication List as of 3/14/2023 10:08 PM          (Please note that portions of this note were completed with a voice recognition program.  Efforts were made to edit the dictations but occasionally words are mis-transcribed.)    Dameon Alcantar DO  Attending Emergency Physician       Dameon Alcantar DO  03/15/23 1521

## 2023-03-15 RX ORDER — NITROFURANTOIN 25; 75 MG/1; MG/1
100 CAPSULE ORAL 2 TIMES DAILY
Qty: 10 CAPSULE | Refills: 0 | Status: SHIPPED | OUTPATIENT
Start: 2023-03-15 | End: 2023-03-20

## 2023-03-15 NOTE — DISCHARGE INSTRUCTIONS
PLEASE RETURN TO THE EMERGENCY DEPARTMENT IMMEDIATELY if your symptoms worsen in anyway or in 1-2 days if not improved for re-evaluation. You should immediately return to the ER for symptoms such as abdominal or back pain, fever, a feeling of passing out, light headed, dizziness, chest pain, shortness of breath, persistent nausea and/or vomiting, numbness or weakness to the arms or legs, coolness or color change of the arms or legs. Take your medication as indicated and prescribed. If you are given an antibiotic then, make sure you get the prescription filled and take the antibiotics until finished. You should encourage fluids. Please understand that at this time there is no evidence for a more serious underlying process, but that early in the process of an illness or injury, an emergency department workup can be falsely reassuring. You should contact your family doctor within the next 24 hours for a follow up appointment    Dionte Flores!!!    From [facility] and [facility] Emergency Services    On behalf of the Emergency Department staff at [facility]), I would like to thank you for giving us the opportunity to address your health care needs and concerns. We hope that during your visit, our service was delivered in a professional and caring manner. Please keep [facility]) in mind as we walk with you down the path to your own personal wellness. Please expect an automated text message or email from us so we can ask a few questions about your health and progress. Based on your answers, a clinician may call you back to offer help and instructions. Please understand that early in the process of an illness or injury, an emergency department workup can be falsely reassuring. If you notice any worsening, changing or persistent symptoms please call your family doctor or return to the ER immediately. Tell us how we did during your visit at [email or web address]   and let us know about your experience

## 2023-03-15 NOTE — ED NOTES
prescription called on behalf of Dr. Gabriella Ny to [facility]  Docycline Hyclate 100mg tabs PO 2 times daily López Christopher RN  03/14/23 [id number]

## 2023-03-15 NOTE — PROGRESS NOTES
Patient called in stating the doxycycline was upsetting her stomach and she wished to be placed on a different medication and she has had a UTI in the past and did okay on what ever she had in the past.  It appears that she had Macrobid and will call this into her pharmacy. I did attempt to contact her but she did not answer.     Duke Zamora PA-C

## 2023-03-16 LAB
MICROORGANISM SPEC CULT: ABNORMAL
SPECIMEN DESCRIPTION: ABNORMAL

## 2023-04-13 ENCOUNTER — HOSPITAL ENCOUNTER (EMERGENCY)
Age: 78
Discharge: HOME | End: 2023-04-13
Attending: EMERGENCY MEDICINE
Payer: MEDICARE

## 2023-04-13 VITALS
HEART RATE: 87 BPM | WEIGHT: 173 LBS | HEIGHT: 62 IN | RESPIRATION RATE: 16 BRPM | BODY MASS INDEX: 31.83 KG/M2 | TEMPERATURE: 97.5 F | SYSTOLIC BLOOD PRESSURE: 135 MMHG | OXYGEN SATURATION: 99 % | DIASTOLIC BLOOD PRESSURE: 67 MMHG

## 2023-04-13 DIAGNOSIS — M79.605 LEFT LEG PAIN: Primary | ICD-10-CM

## 2023-04-13 DIAGNOSIS — R58 ECCHYMOSIS: ICD-10-CM

## 2023-04-13 PROCEDURE — 6370000000 HC RX 637 (ALT 250 FOR IP): Performed by: EMERGENCY MEDICINE

## 2023-04-13 PROCEDURE — 99283 EMERGENCY DEPT VISIT LOW MDM: CPT

## 2023-04-13 RX ORDER — ACETAMINOPHEN 325 MG/1
650 TABLET ORAL ONCE
Status: COMPLETED | OUTPATIENT
Start: 2023-04-13 | End: 2023-04-13

## 2023-04-13 RX ADMIN — ACETAMINOPHEN 650 MG: 325 TABLET ORAL at 05:06

## 2023-04-13 ASSESSMENT — PAIN - FUNCTIONAL ASSESSMENT: PAIN_FUNCTIONAL_ASSESSMENT: 0-10

## 2023-04-13 ASSESSMENT — PAIN SCALES - GENERAL
PAINLEVEL_OUTOF10: 8
PAINLEVEL_OUTOF10: 8

## 2023-04-13 ASSESSMENT — ENCOUNTER SYMPTOMS
EYES NEGATIVE: 1
RESPIRATORY NEGATIVE: 1
ALLERGIC/IMMUNOLOGIC NEGATIVE: 1

## 2023-04-13 ASSESSMENT — PAIN DESCRIPTION - PAIN TYPE: TYPE: ACUTE PAIN

## 2023-04-13 ASSESSMENT — PAIN DESCRIPTION - ORIENTATION: ORIENTATION: LEFT

## 2023-04-13 ASSESSMENT — PAIN DESCRIPTION - LOCATION: LOCATION: LEG

## 2023-04-13 NOTE — ED PROVIDER NOTES
eMERGENCY dEPARTMENT eNCOUnter      Pt Name: Britta Blanco  MRN: [id number]  [place] 1945  Date of evaluation: 4/13/2023      CHIEF COMPLAINT       Chief Complaint   Patient presents with    Leg Pain     Pt c/o left leg lump, pain, and bruising that has been ongoing for approx 1 week. Pt unsure if she hit or injured leg. Pt states she is currently on Plavix and Eliquis. Pt rates pain 9/10. Pt took 1000 mg Tylenol at 2200. Pt denies history of DVT or PE. HISTORY OF PRESENT ILLNESS    Britta Blanco is a 78 y.o. female who presents department for evaluation of a bruise that she has on the left lower leg. She has an obvious ecchymosis with a a nodular-like spot and underneath the proximal part of this. But this is very lateral and very superficial.    REVIEW OF SYSTEMS         Review of Systems   Constitutional: Negative. HENT: Negative. Eyes: Negative. Respiratory: Negative. Cardiovascular: Negative. Endocrine: Negative. Genitourinary: Negative. Musculoskeletal:  Positive for myalgias. Skin: Negative. Allergic/Immunologic: Negative. Hematological: Negative. Psychiatric/Behavioral: Negative. PAST MEDICAL HISTORY    has a past medical history of Abnormal Pap smear, Asthma, Atrial fibrillation (HCC), Bloody discharge from nipple, CAD (coronary artery disease), Cataracts, bilateral, Colon polyp, CVA (cerebral vascular accident) ([address] Demyelinating disease ([address] Fibromyalgia, High-risk sexual behavior, History of bone density study, History of migraines, HTN (hypertension), IgG deficiency ([address] Multiple sclerosis ([address] Myoclonic seizures ([address] Optic neuritis, Osteopenia, Pyloric stenosis, Raynaud's disease, and Vasomotor rhinitis. SURGICAL HISTORY      has a past surgical history that includes Dilation & curettage (1982); Breast biopsy (1990); Appendectomy (1955); Septoplasty (1985); Hammer toe surgery (1989); Sinus endoscopy; sinus surgery;  Shoulder

## 2023-04-13 NOTE — DISCHARGE INSTRUCTIONS
Contusion In Adults         WHAT YOU NEED TO KNOW:    A contusion is a bruise that appears on your skin after an injury. A bruise happens when small blood vessels tear but skin does not. When blood vessels tear, blood leaks into nearby tissue, such as soft tissue or muscle. DISCHARGE INSTRUCTIONS:  Return to the emergency department if:  You have new trouble moving your injured area. You have tingling or numbness in or near the injured area. Your hand or foot below the bruise gets cold or turns pale. Contact your healthcare provider if:  You find a new lump in the injured area. Your symptoms do not improve with treatment after 4 to 5 days. You have questions or concerns about your condition or care. Medicines:  NSAIDs help decrease swelling and pain or fever. This medicine is available with or without a doctor's order. NSAIDs can cause stomach bleeding or kidney problems in certain people. If you take blood thinner medicine, always ask your healthcare provider if NSAIDs are safe for you. Always read the medicine label and follow directions. Prescription pain medicine may be given. Do not wait until the pain is severe before you take your medicine. Take your medicine as directed. Contact your healthcare provider if you think your medicine is not helping or if you have side effects. Tell him of her if you are allergic to any medicine. Keep a list of the medicines, vitamins, and herbs you take. Include the amounts, and when and why you take them. Bring the list or the pill bottles to follow-up visits. Carry your medicine list with you in case of an emergency. Follow up with your healthcare provider as directed: You may need to return within a week to check your injury again. Write down your questions so you remember to ask them during your visits. Self-care:  Rest the injured area or use it less than usual. If you bruised your leg or foot, you may need crutches or a cane to help you walk.  This will

## 2023-07-09 ENCOUNTER — HOSPITAL ENCOUNTER (OUTPATIENT)
Age: 78
Setting detail: OBSERVATION
Discharge: NURSING, REHAB OR LONG-TERM CARE | End: 2023-07-14
Attending: EMERGENCY MEDICINE
Payer: MEDICARE

## 2023-07-09 ENCOUNTER — APPOINTMENT (OUTPATIENT)
Dept: CT IMAGING | Age: 78
End: 2023-07-09
Payer: MEDICARE

## 2023-07-09 ENCOUNTER — APPOINTMENT (OUTPATIENT)
Dept: GENERAL RADIOLOGY | Age: 78
End: 2023-07-09
Payer: MEDICARE

## 2023-07-09 DIAGNOSIS — S39.012A BACK STRAIN, INITIAL ENCOUNTER: ICD-10-CM

## 2023-07-09 DIAGNOSIS — E86.0 DEHYDRATION: Primary | ICD-10-CM

## 2023-07-09 LAB
ALBUMIN SERPL-MCNC: 3.9 G/DL (ref 3.5–5.2)
ALBUMIN/GLOB SERPL: 2.3 {RATIO} (ref 1–2.5)
ALP SERPL-CCNC: 104 U/L (ref 35–104)
ALT SERPL-CCNC: 18 U/L (ref 5–33)
ANION GAP SERPL CALCULATED.3IONS-SCNC: 14 MMOL/L (ref 9–17)
AST SERPL-CCNC: 22 U/L
BASOPHILS # BLD: 0.1 K/UL (ref 0–0.2)
BASOPHILS NFR BLD: 1 % (ref 0–2)
BILIRUB SERPL-MCNC: 1.3 MG/DL (ref 0.3–1.2)
BUN SERPL-MCNC: 25 MG/DL (ref 8–23)
CALCIUM SERPL-MCNC: 8.8 MG/DL (ref 8.6–10.4)
CHLORIDE SERPL-SCNC: 104 MMOL/L (ref 98–107)
CO2 SERPL-SCNC: 19 MMOL/L (ref 20–31)
CREAT SERPL-MCNC: 1.2 MG/DL (ref 0.5–0.9)
EOSINOPHIL # BLD: 0.1 K/UL (ref 0–0.4)
EOSINOPHILS RELATIVE PERCENT: 2 % (ref 1–4)
ERYTHROCYTE [DISTWIDTH] IN BLOOD BY AUTOMATED COUNT: 19.4 % (ref 12.5–15.4)
GFR SERPL CREATININE-BSD FRML MDRD: 47 ML/MIN/1.73M2
GLUCOSE SERPL-MCNC: 124 MG/DL (ref 70–99)
HCT VFR BLD AUTO: 30.6 % (ref 36–46)
HGB BLD-MCNC: 9.5 G/DL (ref 12–16)
LIPASE SERPL-CCNC: 29 U/L (ref 13–60)
LYMPHOCYTES # BLD: 18 % (ref 24–44)
LYMPHOCYTES NFR BLD: 1 K/UL (ref 1–4.8)
MCH RBC QN AUTO: 26 PG (ref 26–34)
MCHC RBC AUTO-ENTMCNC: 30.9 G/DL (ref 31–37)
MCV RBC AUTO: 84.1 FL (ref 80–100)
MONOCYTES NFR BLD: 0.6 K/UL (ref 0.1–1.2)
MONOCYTES NFR BLD: 11 % (ref 2–11)
NEUTROPHILS NFR BLD: 68 % (ref 36–66)
NEUTS SEG NFR BLD: 3.8 K/UL (ref 1.8–7.7)
PLATELET # BLD AUTO: 214 K/UL (ref 140–450)
PMV BLD AUTO: 8.7 FL (ref 6–12)
POTASSIUM SERPL-SCNC: 3.7 MMOL/L (ref 3.7–5.3)
PROT SERPL-MCNC: 5.6 G/DL (ref 6.4–8.3)
RBC # BLD AUTO: 3.64 M/UL (ref 4–5.2)
SODIUM SERPL-SCNC: 137 MMOL/L (ref 135–144)
TROPONIN I SERPL HS-MCNC: 29 NG/L (ref 0–14)
WBC OTHER # BLD: 5.6 K/UL (ref 3.5–11)

## 2023-07-09 PROCEDURE — 6370000000 HC RX 637 (ALT 250 FOR IP): Performed by: NURSE PRACTITIONER

## 2023-07-09 PROCEDURE — 72131 CT LUMBAR SPINE W/O DYE: CPT

## 2023-07-09 PROCEDURE — G0378 HOSPITAL OBSERVATION PER HR: HCPCS

## 2023-07-09 PROCEDURE — 70450 CT HEAD/BRAIN W/O DYE: CPT

## 2023-07-09 PROCEDURE — 84484 ASSAY OF TROPONIN QUANT: CPT

## 2023-07-09 PROCEDURE — 83690 ASSAY OF LIPASE: CPT

## 2023-07-09 PROCEDURE — 71045 X-RAY EXAM CHEST 1 VIEW: CPT

## 2023-07-09 PROCEDURE — 72125 CT NECK SPINE W/O DYE: CPT

## 2023-07-09 PROCEDURE — 99285 EMERGENCY DEPT VISIT HI MDM: CPT

## 2023-07-09 PROCEDURE — 85027 COMPLETE CBC AUTOMATED: CPT

## 2023-07-09 PROCEDURE — 36415 COLL VENOUS BLD VENIPUNCTURE: CPT

## 2023-07-09 PROCEDURE — 96361 HYDRATE IV INFUSION ADD-ON: CPT

## 2023-07-09 PROCEDURE — 80053 COMPREHEN METABOLIC PANEL: CPT

## 2023-07-09 PROCEDURE — 96360 HYDRATION IV INFUSION INIT: CPT

## 2023-07-09 PROCEDURE — 2580000003 HC RX 258: Performed by: NURSE PRACTITIONER

## 2023-07-09 PROCEDURE — 93005 ELECTROCARDIOGRAM TRACING: CPT

## 2023-07-09 RX ORDER — POTASSIUM CHLORIDE 20 MEQ/1
40 TABLET, EXTENDED RELEASE ORAL PRN
Status: DISCONTINUED | OUTPATIENT
Start: 2023-07-09 | End: 2023-07-14 | Stop reason: HOSPADM

## 2023-07-09 RX ORDER — ONDANSETRON 2 MG/ML
4 INJECTION INTRAMUSCULAR; INTRAVENOUS EVERY 6 HOURS PRN
Status: DISCONTINUED | OUTPATIENT
Start: 2023-07-09 | End: 2023-07-14 | Stop reason: HOSPADM

## 2023-07-09 RX ORDER — PANTOPRAZOLE SODIUM 40 MG/1
40 TABLET, DELAYED RELEASE ORAL 2 TIMES DAILY
Status: DISCONTINUED | OUTPATIENT
Start: 2023-07-09 | End: 2023-07-14 | Stop reason: HOSPADM

## 2023-07-09 RX ORDER — MAGNESIUM SULFATE 1 G/100ML
1000 INJECTION INTRAVENOUS PRN
Status: DISCONTINUED | OUTPATIENT
Start: 2023-07-09 | End: 2023-07-14 | Stop reason: HOSPADM

## 2023-07-09 RX ORDER — SODIUM CHLORIDE 0.9 % (FLUSH) 0.9 %
5-40 SYRINGE (ML) INJECTION EVERY 12 HOURS SCHEDULED
Status: DISCONTINUED | OUTPATIENT
Start: 2023-07-09 | End: 2023-07-14 | Stop reason: HOSPADM

## 2023-07-09 RX ORDER — ACETAMINOPHEN 650 MG/1
650 SUPPOSITORY RECTAL EVERY 6 HOURS PRN
Status: DISCONTINUED | OUTPATIENT
Start: 2023-07-09 | End: 2023-07-09

## 2023-07-09 RX ORDER — METOPROLOL SUCCINATE 25 MG/1
25 TABLET, EXTENDED RELEASE ORAL NIGHTLY
Status: DISCONTINUED | OUTPATIENT
Start: 2023-07-09 | End: 2023-07-14 | Stop reason: HOSPADM

## 2023-07-09 RX ORDER — SODIUM CHLORIDE 0.9 % (FLUSH) 0.9 %
10 SYRINGE (ML) INJECTION PRN
Status: DISCONTINUED | OUTPATIENT
Start: 2023-07-09 | End: 2023-07-14 | Stop reason: HOSPADM

## 2023-07-09 RX ORDER — CLONIDINE HYDROCHLORIDE 0.1 MG/1
0.1 TABLET ORAL 2 TIMES DAILY
Status: DISCONTINUED | OUTPATIENT
Start: 2023-07-09 | End: 2023-07-14 | Stop reason: HOSPADM

## 2023-07-09 RX ORDER — SENNA PLUS 8.6 MG/1
1 TABLET ORAL DAILY PRN
Status: DISCONTINUED | OUTPATIENT
Start: 2023-07-09 | End: 2023-07-14 | Stop reason: HOSPADM

## 2023-07-09 RX ORDER — ONDANSETRON 4 MG/1
4 TABLET, ORALLY DISINTEGRATING ORAL EVERY 8 HOURS PRN
Status: DISCONTINUED | OUTPATIENT
Start: 2023-07-09 | End: 2023-07-14 | Stop reason: HOSPADM

## 2023-07-09 RX ORDER — PRAVASTATIN SODIUM 20 MG
40 TABLET ORAL NIGHTLY
Status: DISCONTINUED | OUTPATIENT
Start: 2023-07-09 | End: 2023-07-14 | Stop reason: HOSPADM

## 2023-07-09 RX ORDER — POTASSIUM CHLORIDE 7.45 MG/ML
10 INJECTION INTRAVENOUS PRN
Status: DISCONTINUED | OUTPATIENT
Start: 2023-07-09 | End: 2023-07-14 | Stop reason: HOSPADM

## 2023-07-09 RX ORDER — SODIUM CHLORIDE 9 MG/ML
INJECTION, SOLUTION INTRAVENOUS CONTINUOUS
Status: DISCONTINUED | OUTPATIENT
Start: 2023-07-09 | End: 2023-07-11

## 2023-07-09 RX ORDER — ACETAMINOPHEN 500 MG
1000 TABLET ORAL 2 TIMES DAILY
Status: DISCONTINUED | OUTPATIENT
Start: 2023-07-09 | End: 2023-07-14 | Stop reason: HOSPADM

## 2023-07-09 RX ORDER — ALLOPURINOL 100 MG/1
100 TABLET ORAL DAILY
Status: DISCONTINUED | OUTPATIENT
Start: 2023-07-10 | End: 2023-07-14 | Stop reason: HOSPADM

## 2023-07-09 RX ORDER — ACETAMINOPHEN 325 MG/1
650 TABLET ORAL EVERY 6 HOURS PRN
Status: DISCONTINUED | OUTPATIENT
Start: 2023-07-09 | End: 2023-07-09

## 2023-07-09 RX ORDER — SODIUM CHLORIDE 9 MG/ML
INJECTION, SOLUTION INTRAVENOUS PRN
Status: DISCONTINUED | OUTPATIENT
Start: 2023-07-09 | End: 2023-07-14 | Stop reason: HOSPADM

## 2023-07-09 RX ORDER — PAROXETINE HYDROCHLORIDE 20 MG/1
20 TABLET, FILM COATED ORAL EVERY MORNING
Status: DISCONTINUED | OUTPATIENT
Start: 2023-07-10 | End: 2023-07-14 | Stop reason: HOSPADM

## 2023-07-09 RX ORDER — CLOPIDOGREL BISULFATE 75 MG/1
75 TABLET ORAL DAILY
Status: DISCONTINUED | OUTPATIENT
Start: 2023-07-10 | End: 2023-07-14 | Stop reason: HOSPADM

## 2023-07-09 RX ORDER — LOSARTAN POTASSIUM 50 MG/1
100 TABLET ORAL DAILY
Status: DISCONTINUED | OUTPATIENT
Start: 2023-07-10 | End: 2023-07-10

## 2023-07-09 RX ADMIN — SODIUM CHLORIDE: 9 INJECTION, SOLUTION INTRAVENOUS at 22:11

## 2023-07-09 RX ADMIN — CLONIDINE HYDROCHLORIDE 0.1 MG: 0.1 TABLET ORAL at 23:24

## 2023-07-09 RX ADMIN — ACETAMINOPHEN 1000 MG: 500 TABLET ORAL at 23:24

## 2023-07-09 RX ADMIN — PANTOPRAZOLE SODIUM 40 MG: 40 TABLET, DELAYED RELEASE ORAL at 23:24

## 2023-07-09 RX ADMIN — SODIUM CHLORIDE, PRESERVATIVE FREE 10 ML: 5 INJECTION INTRAVENOUS at 23:25

## 2023-07-09 ASSESSMENT — ENCOUNTER SYMPTOMS
RESPIRATORY NEGATIVE: 1
EYES NEGATIVE: 1
NAUSEA: 1
ABDOMINAL PAIN: 1
ABDOMINAL DISTENTION: 1
CONSTIPATION: 1
BACK PAIN: 1

## 2023-07-09 ASSESSMENT — PAIN DESCRIPTION - LOCATION
LOCATION: ABDOMEN;SHOULDER;BACK
LOCATION: BACK;BUTTOCKS

## 2023-07-09 ASSESSMENT — PAIN - FUNCTIONAL ASSESSMENT
PAIN_FUNCTIONAL_ASSESSMENT: 0-10
PAIN_FUNCTIONAL_ASSESSMENT: PREVENTS OR INTERFERES WITH ALL ACTIVE AND SOME PASSIVE ACTIVITIES

## 2023-07-09 ASSESSMENT — PAIN DESCRIPTION - PAIN TYPE
TYPE: ACUTE PAIN;CHRONIC PAIN
TYPE: ACUTE PAIN

## 2023-07-09 ASSESSMENT — LIFESTYLE VARIABLES
HOW MANY STANDARD DRINKS CONTAINING ALCOHOL DO YOU HAVE ON A TYPICAL DAY: PATIENT DOES NOT DRINK
HOW OFTEN DO YOU HAVE A DRINK CONTAINING ALCOHOL: NEVER

## 2023-07-09 ASSESSMENT — PAIN DESCRIPTION - DESCRIPTORS: DESCRIPTORS: DISCOMFORT

## 2023-07-09 ASSESSMENT — PAIN SCALES - GENERAL
PAINLEVEL_OUTOF10: 6
PAINLEVEL_OUTOF10: 8

## 2023-07-09 ASSESSMENT — PAIN DESCRIPTION - ORIENTATION: ORIENTATION: RIGHT

## 2023-07-10 LAB
EKG ATRIAL RATE: 336 BPM
EKG Q-T INTERVAL: 454 MS
EKG QRS DURATION: 134 MS
EKG QTC CALCULATION (BAZETT): 536 MS
EKG R AXIS: 2 DEGREES
EKG T AXIS: -149 DEGREES
EKG VENTRICULAR RATE: 84 BPM
INR PPP: 1.2
PROTHROMBIN TIME: 13.1 SEC (ref 9.4–12.6)

## 2023-07-10 PROCEDURE — 6370000000 HC RX 637 (ALT 250 FOR IP): Performed by: NURSE PRACTITIONER

## 2023-07-10 PROCEDURE — 96361 HYDRATE IV INFUSION ADD-ON: CPT

## 2023-07-10 PROCEDURE — 97535 SELF CARE MNGMENT TRAINING: CPT

## 2023-07-10 PROCEDURE — 6370000000 HC RX 637 (ALT 250 FOR IP): Performed by: HOSPITALIST

## 2023-07-10 PROCEDURE — 85610 PROTHROMBIN TIME: CPT

## 2023-07-10 PROCEDURE — G0378 HOSPITAL OBSERVATION PER HR: HCPCS

## 2023-07-10 PROCEDURE — 97166 OT EVAL MOD COMPLEX 45 MIN: CPT

## 2023-07-10 PROCEDURE — 99222 1ST HOSP IP/OBS MODERATE 55: CPT | Performed by: HOSPITALIST

## 2023-07-10 PROCEDURE — 2580000003 HC RX 258: Performed by: NURSE PRACTITIONER

## 2023-07-10 PROCEDURE — 97116 GAIT TRAINING THERAPY: CPT

## 2023-07-10 PROCEDURE — 97162 PT EVAL MOD COMPLEX 30 MIN: CPT

## 2023-07-10 PROCEDURE — 36415 COLL VENOUS BLD VENIPUNCTURE: CPT

## 2023-07-10 RX ORDER — LOSARTAN POTASSIUM 25 MG/1
25 TABLET ORAL DAILY
Status: DISCONTINUED | OUTPATIENT
Start: 2023-07-10 | End: 2023-07-14 | Stop reason: HOSPADM

## 2023-07-10 RX ADMIN — ACETAMINOPHEN 1000 MG: 500 TABLET ORAL at 19:59

## 2023-07-10 RX ADMIN — ALLOPURINOL 100 MG: 100 TABLET ORAL at 10:02

## 2023-07-10 RX ADMIN — LOSARTAN POTASSIUM 25 MG: 25 TABLET, FILM COATED ORAL at 10:02

## 2023-07-10 RX ADMIN — CLOPIDOGREL BISULFATE 75 MG: 75 TABLET ORAL at 10:02

## 2023-07-10 RX ADMIN — PANTOPRAZOLE SODIUM 40 MG: 40 TABLET, DELAYED RELEASE ORAL at 10:02

## 2023-07-10 RX ADMIN — SODIUM CHLORIDE: 9 INJECTION, SOLUTION INTRAVENOUS at 12:14

## 2023-07-10 RX ADMIN — APIXABAN 5 MG: 5 TABLET, FILM COATED ORAL at 19:59

## 2023-07-10 RX ADMIN — METOPROLOL SUCCINATE 25 MG: 25 TABLET, EXTENDED RELEASE ORAL at 19:59

## 2023-07-10 RX ADMIN — CLONIDINE HYDROCHLORIDE 0.1 MG: 0.1 TABLET ORAL at 10:02

## 2023-07-10 RX ADMIN — APIXABAN 5 MG: 5 TABLET, FILM COATED ORAL at 10:02

## 2023-07-10 RX ADMIN — ACETAMINOPHEN 1000 MG: 500 TABLET ORAL at 10:02

## 2023-07-10 RX ADMIN — PAROXETINE HYDROCHLORIDE 20 MG: 20 TABLET, FILM COATED ORAL at 10:02

## 2023-07-10 RX ADMIN — CLONIDINE HYDROCHLORIDE 0.1 MG: 0.1 TABLET ORAL at 19:59

## 2023-07-10 RX ADMIN — PRAVASTATIN SODIUM 40 MG: 20 TABLET ORAL at 19:59

## 2023-07-10 RX ADMIN — PANTOPRAZOLE SODIUM 40 MG: 40 TABLET, DELAYED RELEASE ORAL at 19:59

## 2023-07-10 ASSESSMENT — PAIN - FUNCTIONAL ASSESSMENT: PAIN_FUNCTIONAL_ASSESSMENT: PREVENTS OR INTERFERES SOME ACTIVE ACTIVITIES AND ADLS

## 2023-07-10 ASSESSMENT — PAIN SCALES - GENERAL
PAINLEVEL_OUTOF10: 10
PAINLEVEL_OUTOF10: 10
PAINLEVEL_OUTOF10: 9

## 2023-07-10 ASSESSMENT — PAIN DESCRIPTION - LOCATION
LOCATION: GENERALIZED
LOCATION: GENERALIZED

## 2023-07-10 ASSESSMENT — PAIN DESCRIPTION - DESCRIPTORS
DESCRIPTORS: ACHING
DESCRIPTORS: ACHING;DISCOMFORT

## 2023-07-10 ASSESSMENT — PAIN DESCRIPTION - PAIN TYPE: TYPE: ACUTE PAIN;CHRONIC PAIN

## 2023-07-10 NOTE — PLAN OF CARE
Problem: Discharge Planning  Goal: Discharge to home or other facility with appropriate resources  Outcome: Progressing  Flowsheets (Taken 7/10/2023 1000)  Discharge to home or other facility with appropriate resources:   Identify barriers to discharge with patient and caregiver   Arrange for needed discharge resources and transportation as appropriate   Identify discharge learning needs (meds, wound care, etc)   Refer to discharge planning if patient needs post-hospital services based on physician order or complex needs related to functional status, cognitive ability or social support system     Problem: Pain  Goal: Verbalizes/displays adequate comfort level or baseline comfort level  Outcome: Progressing   - Medicated for pain as ordered, as needed. Problem: Safety - Adult  Goal: Free from fall injury  Outcome: Progressing  Flowsheets (Taken 7/10/2023 1000)  Free From Fall Injury: Instruct family/caregiver on patient safety   - Safety maintained. Problem: ABCDS Injury Assessment  Goal: Absence of physical injury  Outcome: Progressing  Flowsheets (Taken 7/10/2023 1000)  Absence of Physical Injury: Implement safety measures based on patient assessment     Problem: Skin/Tissue Integrity  Goal: Absence of new skin breakdown  Description: 1. Monitor for areas of redness and/or skin breakdown  2. Assess vascular access sites hourly  3. Every 4-6 hours minimum:  Change oxygen saturation probe site  4. Every 4-6 hours:  If on nasal continuous positive airway pressure, respiratory therapy assess nares and determine need for appliance change or resting period. Outcome: Progressing   - Patient repositioned herself. No new skin breakdown noted.      Problem: Chronic Conditions and Co-morbidities  Goal: Patient's chronic conditions and co-morbidity symptoms are monitored and maintained or improved  Outcome: Progressing  Flowsheets (Taken 7/10/2023 1000)  Care Plan - Patient's Chronic Conditions and Co-Morbidity

## 2023-07-10 NOTE — ACP (ADVANCE CARE PLANNING)
Advance Care Planning     Advance Care Planning Activator (Inpatient)  Conversation Note      Date of ACP Conversation: 7/10/2023     Conversation Conducted with: Patient with Decision Making Capacity    ACP Activator: Pipo Malone RN        Health Care Decision Maker:     Current Designated Health Care Decision Maker:     Primary Decision Maker: Keagan Olson - [phone]    Secondary Decision Maker: Ese Kendrick - [phone]  Click here to complete Healthcare Decision Makers including section of the Healthcare Decision Maker Relationship (ie \"Primary\")  Today we discussed [address] The patient is considering options. Care Preferences    Ventilation: \"If you were in your present state of health and suddenly became very ill and were unable to breathe on your own, what would your preference be about the use of a ventilator (breathing machine) if it were available to you? \"      Would the patient desire the use of ventilator (breathing machine)?: yes    \"If your health worsens and it becomes clear that your chance of recovery is unlikely, what would your preference be about the use of a ventilator (breathing machine) if it were available to you? \"     Would the patient desire the use of ventilator (breathing machine)?: No      Resuscitation  \"CPR works best to restart the heart when there is a sudden event, like a heart attack, in someone who is otherwise healthy. Unfortunately, CPR does not typically restart the heart for people who have serious health conditions or who are very sick. \"    \"In the event your heart stopped as a result of an underlying serious health condition, would you want attempts to be made to restart your heart (answer \"yes\" for attempt to resuscitate) or would you prefer a natural death (answer \"no\" for do not attempt to resuscitate)? \" yes       [] Yes   [] No   Educated Patient / Decision Maker regarding differences between

## 2023-07-10 NOTE — CARE COORDINATION
SW consulted for living conditions. Per chart, EMS had mentioned that state of patient's home was unsafe due to hoarding and being uncleanly. Met with patient who reports MS and other health conditions. States she is unable to safely care for herself at home. Reports frequent falls and calling EMS when picking up her groceries. Patient states she has attempted to contacted [facility] and [facility] but their services \"are a joke\". Reports she attempted to look into an assisted living complex but did not qualify for Medicaid. Patient states she only receives social security. Patient talked about history of past trauma with family and what she feels led to her having limited funds. Plans to go to rehab at SNF but is unsure what she will do long-term. SW will monitor and make APS referral if appropriate. Patient has not neglected to seek care for herself, but may not be utilizing all available resources due to negative history with community providers.

## 2023-07-10 NOTE — H&P
1 mg by mouth daily 3/9/22 4/8/22  Araseli Cruz P Blood, DO   allopurinol (ZYLOPRIM) 100 MG tablet Take 1 tablet by mouth daily    Historical Provider, MD   metoprolol succinate (TOPROL XL) 50 MG extended release tablet Take 25 mg by mouth at bedtime  5/20/21   Historical Provider, MD   cloNIDine (CATAPRES) 0.1 MG tablet Take 1 tablet by mouth 2 times daily 8/8/21   Henri Wallace DO   losartan (COZAAR) 100 MG tablet Take 1 tablet by mouth daily 8/8/21   Henri Wallace DO   apixaban (ELIQUIS) 5 MG TABS tablet Take by mouth 2 times daily Currently on hold for procedure next week 07/12/2023    Historical Provider, MD   pantoprazole (PROTONIX) 40 MG tablet Take 1 tablet by mouth 2 times daily    Historical Provider, MD   nitroGLYCERIN (NITROSTAT) 0.4 MG SL tablet Place 0.4 mg under the tongue every 5 minutes.  Indications: Chest Pain    Historical Provider, MD   PARoxetine (PAXIL) 20 MG tablet Take by mouth every morning     Historical Provider, MD   clopidogrel (PLAVIX) 75 MG tablet Take 1 tablet by mouth daily 100 mg pm, currently on hold for procedure on 07/12/2023    Historical Provider, MD   pravastatin (PRAVACHOL) 40 MG tablet Take 1 tablet by mouth daily    Historical Provider, MD        Allergies:     Dofetilide, Lisinopril-hydrochlorothiazide, Sulfa antibiotics, Penicillins, Polyethylene glycol, Actifed cold-allergy [chlorpheniramine-phenylephrine], Altace [ramipril], Cephalosporins, Coreg [carvedilol], Dml facial moisturizer, Elavil [amitriptyline hcl], Entex [ami-margot], Ethylene glycol, Fentanyl, Heparin, Hizentra [immune globulin (human)], Hydralazine, Levofloxacin, Lisinopril-hydrochlorothiazide, Montelukast sodium, Morphine, Nifedipine, Norvasc [amlodipine besylate], Other, Phenobarbital, Propoxyphene, Robaxin [methocarbamol], Sinequan [doxepin hcl], Sudafed [pseudoephedrine hcl], Sudafed [pseudoephedrine hcl], Tranquil-cecil, Wellbutrin [bupropion hcl], Actifed cold-allergy

## 2023-07-10 NOTE — CARE COORDINATION
Case Management Assessment  Initial Evaluation    Date/Time of Evaluation: 7/10/2023 11:36 AM  Assessment Completed by: Stefani Calderon RN    If patient is discharged prior to next notation, then this note serves as note for discharge by case management. Patient Name: Wilner Sanchez                   YOB: 1945  Diagnosis: Dehydration [E86.0]  Back strain, initial encounter [A61.329T]                   Date / Time: 7/9/2023  7:20 PM    Patient Admission Status: Observation   Readmission Risk (Low < 19, Mod (19-27), High > 27): Readmission Risk Score: 15.8    Current PCP: Abby Marcum MD  PCP verified by CM? Yes    Chart Reviewed: Yes      History Provided by: Patient  Patient Orientation: Alert and Oriented    Patient Cognition: Alert    Hospitalization in the last 30 days (Readmission):  No    If yes, Readmission Assessment in CM Navigator will be completed. Advance Directives:      Code Status: Full Code   Patient's Primary Decision Maker is: Patient Declined (Legal Next of Kin Remains as Decision Maker) (states her nephew is POA.  thinks has in paperwork)    Primary Decision Maker: Mitzy Story - Niece/Nephew - [phone]    Secondary Decision Maker: Mabel Gordy - [phone]    Discharge Planning:    Patient lives with: Alone Type of Home: House  Primary Care Giver: Self  Patient Support Systems include: Children, Family Members   Current Financial resources: Medicaid  Current community resources: Assisted Living, ECF/Home Care  Current services prior to admission: None, Durable Medical Equipment            Current DME: Walker, Shower Chair            Type of Home Care services:  OT, PT, Nursing Services    ADLS  Prior functional level: Assistance with the following:, Independent in ADLs/IADLs  Current functional level: Assistance with the following:, Mobility, Housework, Cooking    PT AM-PAC: 18 /24  OT AM-PAC: 17 /24    Family can provide assistance at DC:

## 2023-07-11 PROCEDURE — G0378 HOSPITAL OBSERVATION PER HR: HCPCS

## 2023-07-11 PROCEDURE — 2580000003 HC RX 258: Performed by: NURSE PRACTITIONER

## 2023-07-11 PROCEDURE — 96361 HYDRATE IV INFUSION ADD-ON: CPT

## 2023-07-11 PROCEDURE — 99232 SBSQ HOSP IP/OBS MODERATE 35: CPT | Performed by: STUDENT IN AN ORGANIZED HEALTH CARE EDUCATION/TRAINING PROGRAM

## 2023-07-11 PROCEDURE — 6370000000 HC RX 637 (ALT 250 FOR IP): Performed by: HOSPITALIST

## 2023-07-11 PROCEDURE — 97116 GAIT TRAINING THERAPY: CPT

## 2023-07-11 PROCEDURE — 6370000000 HC RX 637 (ALT 250 FOR IP): Performed by: NURSE PRACTITIONER

## 2023-07-11 PROCEDURE — 97535 SELF CARE MNGMENT TRAINING: CPT

## 2023-07-11 PROCEDURE — 97110 THERAPEUTIC EXERCISES: CPT

## 2023-07-11 RX ADMIN — PANTOPRAZOLE SODIUM 40 MG: 40 TABLET, DELAYED RELEASE ORAL at 09:09

## 2023-07-11 RX ADMIN — SODIUM CHLORIDE, PRESERVATIVE FREE 10 ML: 5 INJECTION INTRAVENOUS at 09:07

## 2023-07-11 RX ADMIN — CLONIDINE HYDROCHLORIDE 0.1 MG: 0.1 TABLET ORAL at 20:55

## 2023-07-11 RX ADMIN — CLONIDINE HYDROCHLORIDE 0.1 MG: 0.1 TABLET ORAL at 09:08

## 2023-07-11 RX ADMIN — PAROXETINE HYDROCHLORIDE 20 MG: 20 TABLET, FILM COATED ORAL at 09:13

## 2023-07-11 RX ADMIN — LOSARTAN POTASSIUM 25 MG: 25 TABLET, FILM COATED ORAL at 09:08

## 2023-07-11 RX ADMIN — ACETAMINOPHEN 1000 MG: 500 TABLET ORAL at 20:13

## 2023-07-11 RX ADMIN — ACETAMINOPHEN 1000 MG: 500 TABLET ORAL at 09:07

## 2023-07-11 RX ADMIN — APIXABAN 5 MG: 5 TABLET, FILM COATED ORAL at 20:55

## 2023-07-11 RX ADMIN — SODIUM CHLORIDE 75 ML/HR: 9 INJECTION, SOLUTION INTRAVENOUS at 01:51

## 2023-07-11 RX ADMIN — APIXABAN 5 MG: 5 TABLET, FILM COATED ORAL at 09:08

## 2023-07-11 RX ADMIN — CLOPIDOGREL BISULFATE 75 MG: 75 TABLET ORAL at 09:08

## 2023-07-11 RX ADMIN — PANTOPRAZOLE SODIUM 40 MG: 40 TABLET, DELAYED RELEASE ORAL at 20:13

## 2023-07-11 RX ADMIN — PRAVASTATIN SODIUM 40 MG: 20 TABLET ORAL at 20:13

## 2023-07-11 RX ADMIN — ALLOPURINOL 100 MG: 100 TABLET ORAL at 09:08

## 2023-07-11 RX ADMIN — METOPROLOL SUCCINATE 25 MG: 25 TABLET, EXTENDED RELEASE ORAL at 20:55

## 2023-07-11 ASSESSMENT — PAIN SCALES - GENERAL
PAINLEVEL_OUTOF10: 9
PAINLEVEL_OUTOF10: 0
PAINLEVEL_OUTOF10: 9

## 2023-07-11 ASSESSMENT — PAIN DESCRIPTION - LOCATION: LOCATION: GENERALIZED

## 2023-07-11 ASSESSMENT — PAIN DESCRIPTION - DESCRIPTORS: DESCRIPTORS: ACHING

## 2023-07-11 NOTE — CARE COORDINATION
Social work: Spoke to [employer], they did not receive the referral yesterday. Sw resent referral and it is being reviewed at this time. Will need precert. Passar started. Update: also received call from [facility] , they cannot accept patient. Update: Rolan Hyde is reviewing referral,  await decision.

## 2023-07-11 NOTE — DISCHARGE INSTR - COC
Continuity of Care Form    Patient Name: Carlos Villareal   :  1945  MRN:  [id number]    Admit date:  2023  Discharge date:  2023    Code Status Order: Full Code   Advance Directives:     Admitting Physician:  No admitting provider for patient encounter.   PCP: Jody Rey MD    Discharging Nurse: [facility] Unit/Room#: 767/393-14  Discharging Unit Phone Number: [phone]    Emergency Contact:   Extended Emergency Contact Information  Primary Emergency Contact: [address]  Mobile Phone: [phone]  Relation: Niece/Nephew  Secondary Emergency Contact: [facility] BLANKA BRUMFIELD  Mobile Phone: [phone]  Relation: Niece/Nephew    Past Surgical History:  Past Surgical History:   Procedure Laterality Date    APPENDECTOMY      sponges left in abdomen, at [address]    stereotactic type, right breast, benign    CARDIAC CATHETERIZATION  2020    POBA lad    CARDIAC SURGERY      CARDIOVERSION  2020    [facility]     CARDIOVERSION  2020    COLONOSCOPY  2010    [facility]    CORONARY ANGIOPLASTY WITH STENT PLACEMENT  2011    failed attempt to place stent    CORONARY ANGIOPLASTY WITH STENT PLACEMENT  2011    successful placement of 2 stent in same artery    [address]    bleeding ulcers found    [facility]    surgical sponges left in during appendectomy    HAMMER TOE SURGERY      head resection of phalanx, left foot    LAPAROSCOPY      with D&C    SD COLONOSCOPY W/BIOPSY SINGLE/MULTIPLE N/A 2017    COLONOSCOPY WITH BIOPSY performed by Antoinette Teague MD at [facility]    SD EGD TRANSORAL BIOPSY SINGLE/MULTIPLE  2017    EGD BIOPSY performed by Antoinette Teague MD at [address] [place] [address] [place] resection with rt and left ethmoidectomy    SHOULDER ARTHROSCOPY  1998    rotator cuff impingement, right arm    SINUS

## 2023-07-11 NOTE — PLAN OF CARE
Problem: Discharge Planning  Goal: Discharge to home or other facility with appropriate resources  7/11/2023 0113 by Katie Silva RN  Outcome: Not Progressing     Problem: Pain  Goal: Verbalizes/displays adequate comfort level or baseline comfort level  7/11/2023 0113 by Katie Silva RN  Outcome: Not Progressing   Scheduled pain medication ordered (See Mar). Heat pack ordered and applied. Patient satisfied.    Problem: Discharge Planning  Goal: Discharge to home or other facility with appropriate resources  7/11/2023 0113 by Katie Silva RN  Outcome: Not Progressing  7/10/2023 1844 by Rebecca Rudolph RN  Outcome: Progressing  Flowsheets (Taken 7/10/2023 1000)  Discharge to home or other facility with appropriate resources:   Identify barriers to discharge with patient and caregiver   Arrange for needed discharge resources and transportation as appropriate   Identify discharge learning needs (meds, wound care, etc)   Refer to discharge planning if patient needs post-hospital services based on physician order or complex needs related to functional status, cognitive ability or social support system     Problem: Pain  Goal: Verbalizes/displays adequate comfort level or baseline comfort level  7/11/2023 0113 by Katie Silva RN  Outcome: Not Progressing  7/10/2023 1844 by Rebecca Rudolph RN  Outcome: Progressing     Problem: Safety - Adult  Goal: Free from fall injury  7/11/2023 0113 by Katie Silva RN  Outcome: Not Progressing  7/10/2023 1844 by Rebecca Rudolph RN  Outcome: Progressing  Flowsheets (Taken 7/10/2023 1000)  Free From Fall Injury: Instruct family/caregiver on patient safety     Problem: ABCDS Injury Assessment  Goal: Absence of physical injury  7/11/2023 0113 by Katie Silva RN  Outcome: Not Progressing  Flowsheets (Taken 7/10/2023 2312)  Absence of Physical Injury: Implement safety measures based on patient assessment  7/10/2023 1844 by Rebecca Rudolph RN  Outcome:

## 2023-07-11 NOTE — CARE COORDINATION
Social work: [facility] cannot accept. Met with patient for more options, she agreed to referrals to the following facilities: [facility], [facility], [facility], [facility], Donovan Clinton at [facility]. Referrals faxed to all. UPDATE:  [address] [place] [address] Avenue is OON with pt's insurance. Await responses from other referrals. UPDATE 4:41: [facility] will complete onsite visit tomorrow.

## 2023-07-11 NOTE — CARE COORDINATION
7/10/2023 Spoke with patient regarding services and plan upon discharge. Patient is willing to go to skilled facility. After review of available snf's she chose [facility]. Referral made to [facility] and they are unable to accept due to not having a contract with insurance. Relayed this information to Swati and again reviewed skilled facility list.  She chose [facility] and [facility]. Referrals made. Received call at 530 pm that [facility] would not have a bed available until early next week.

## 2023-07-12 PROCEDURE — 97110 THERAPEUTIC EXERCISES: CPT

## 2023-07-12 PROCEDURE — G0378 HOSPITAL OBSERVATION PER HR: HCPCS

## 2023-07-12 PROCEDURE — 6370000000 HC RX 637 (ALT 250 FOR IP): Performed by: NURSE PRACTITIONER

## 2023-07-12 PROCEDURE — 6370000000 HC RX 637 (ALT 250 FOR IP): Performed by: HOSPITALIST

## 2023-07-12 PROCEDURE — 6370000000 HC RX 637 (ALT 250 FOR IP): Performed by: STUDENT IN AN ORGANIZED HEALTH CARE EDUCATION/TRAINING PROGRAM

## 2023-07-12 PROCEDURE — 97530 THERAPEUTIC ACTIVITIES: CPT

## 2023-07-12 PROCEDURE — 99232 SBSQ HOSP IP/OBS MODERATE 35: CPT | Performed by: STUDENT IN AN ORGANIZED HEALTH CARE EDUCATION/TRAINING PROGRAM

## 2023-07-12 PROCEDURE — 2580000003 HC RX 258: Performed by: NURSE PRACTITIONER

## 2023-07-12 RX ORDER — ACETAMINOPHEN 325 MG/1
650 TABLET ORAL EVERY 8 HOURS PRN
Status: DISCONTINUED | OUTPATIENT
Start: 2023-07-12 | End: 2023-07-14 | Stop reason: HOSPADM

## 2023-07-12 RX ADMIN — CLOPIDOGREL BISULFATE 75 MG: 75 TABLET ORAL at 10:02

## 2023-07-12 RX ADMIN — ACETAMINOPHEN 650 MG: 325 TABLET ORAL at 17:27

## 2023-07-12 RX ADMIN — PANTOPRAZOLE SODIUM 40 MG: 40 TABLET, DELAYED RELEASE ORAL at 20:52

## 2023-07-12 RX ADMIN — APIXABAN 5 MG: 5 TABLET, FILM COATED ORAL at 10:04

## 2023-07-12 RX ADMIN — PANTOPRAZOLE SODIUM 40 MG: 40 TABLET, DELAYED RELEASE ORAL at 10:04

## 2023-07-12 RX ADMIN — CLONIDINE HYDROCHLORIDE 0.1 MG: 0.1 TABLET ORAL at 20:52

## 2023-07-12 RX ADMIN — PRAVASTATIN SODIUM 40 MG: 20 TABLET ORAL at 20:52

## 2023-07-12 RX ADMIN — ACETAMINOPHEN 1000 MG: 500 TABLET ORAL at 21:06

## 2023-07-12 RX ADMIN — APIXABAN 5 MG: 5 TABLET, FILM COATED ORAL at 20:52

## 2023-07-12 RX ADMIN — LOSARTAN POTASSIUM 25 MG: 25 TABLET, FILM COATED ORAL at 10:04

## 2023-07-12 RX ADMIN — CLONIDINE HYDROCHLORIDE 0.1 MG: 0.1 TABLET ORAL at 10:03

## 2023-07-12 RX ADMIN — PAROXETINE HYDROCHLORIDE 20 MG: 20 TABLET, FILM COATED ORAL at 10:15

## 2023-07-12 RX ADMIN — METOPROLOL SUCCINATE 25 MG: 25 TABLET, EXTENDED RELEASE ORAL at 20:52

## 2023-07-12 RX ADMIN — SODIUM CHLORIDE, PRESERVATIVE FREE 10 ML: 5 INJECTION INTRAVENOUS at 20:59

## 2023-07-12 RX ADMIN — ACETAMINOPHEN 1000 MG: 500 TABLET ORAL at 10:02

## 2023-07-12 RX ADMIN — SODIUM CHLORIDE, PRESERVATIVE FREE 10 ML: 5 INJECTION INTRAVENOUS at 10:04

## 2023-07-12 RX ADMIN — ALLOPURINOL 100 MG: 100 TABLET ORAL at 10:04

## 2023-07-12 ASSESSMENT — PAIN SCALES - GENERAL: PAINLEVEL_OUTOF10: 8

## 2023-07-12 ASSESSMENT — PAIN DESCRIPTION - DESCRIPTORS
DESCRIPTORS: ACHING
DESCRIPTORS: ACHING

## 2023-07-12 ASSESSMENT — PAIN DESCRIPTION - LOCATION
LOCATION: GENERALIZED
LOCATION: GENERALIZED

## 2023-07-12 NOTE — CARE COORDINATION
Social work: [facility] will onsite today at 12:00. Patient aware. [facility] is full. The [facility] and [facility] are full but they are reviewing for other locations ([address] [place] [address], [facility])  Rito GRULLON they are OON with [facility].

## 2023-07-12 NOTE — CARE COORDINATION
Social work: [facility]. Karen Neumann reviewing. [facility] to onsite today- await outcome of meeting. Additional referrals to [facility] , [facility], [address] [place] [address].

## 2023-07-12 NOTE — PLAN OF CARE
Problem: Discharge Planning  Goal: Discharge to home or other facility with appropriate resources     Problem: Pain  Goal: Verbalizes/displays adequate comfort level or baseline comfort level  7/12/2023 1316 by Clarice Salmeron RN  Outcome: Progressing     Problem: Safety - Adult  Goal: Free from fall injury  7/12/2023 1316 by Clarice Salmeron RN  Outcome: Progressing  7   Problem: ABCDS Injury Assessment  Goal: Absence of physical injury  7/12/2023 1316 by Clarice Salmeron RN  Outcome: Progressing  Flowsheets (Taken 7/11/2023 1955)  Absence of Physical Injury: Implement safety measures based on patient assessment     Problem: Skin/Tissue Integrity  Goal: Absence of new skin breakdown  Description: 1. Monitor for areas of redness and/or skin breakdown  2. Assess vascular access sites hourly  3. Every 4-6 hours minimum:  Change oxygen saturation probe site  4. Every 4-6 hours:  If on nasal continuous positive airway pressure, respiratory therapy assess nares and determine need for appliance change or resting period.   7/12/2023 1316 by Clarice Salmeron RN  Outcome: Progressing  7/12/2023 0306 by Kathie Barrazam: Chronic Conditions and Co-morbidities  Goal: Patient's chronic conditions and co-morbidity symptoms are monitored and maintained or improved  7/12/2023 1316 by Clarice Salmeron RN  Outcome: Progressing

## 2023-07-12 NOTE — PLAN OF CARE
Problem: Discharge Planning  Goal: Discharge to home or other facility with appropriate resources  Outcome: Progressing     Problem: Pain  Goal: Verbalizes/displays adequate comfort level or baseline comfort level  Outcome: Progressing   Scheduled pain medication ordered (See Mar). Problem: Safety - Adult  Goal: Free from fall injury  Outcome: Progressing   Fall assessment performed and appropriate measures implemented. Room freed from clutter. Bed in lowest position with wheels locked. Call light in place. ID band in place. Problem: ABCDS Injury Assessment  Goal: Absence of physical injury  Outcome: Progressing  Flowsheets (Taken 7/11/2023 1955)  Absence of Physical Injury: Implement safety measures based on patient assessment     Problem: Skin/Tissue Integrity  Goal: Absence of new skin breakdown  Description: 1. Monitor for areas of redness and/or skin breakdown  2. Assess vascular access sites hourly  3. Every 4-6 hours minimum:  Change oxygen saturation probe site  4. Every 4-6 hours:  If on nasal continuous positive airway pressure, respiratory therapy assess nares and determine need for appliance change or resting period.   Outcome: Progressing     Problem: Chronic Conditions and Co-morbidities  Goal: Patient's chronic conditions and co-morbidity symptoms are monitored and maintained or improved  Outcome: Progressing

## 2023-07-13 PROCEDURE — 6370000000 HC RX 637 (ALT 250 FOR IP): Performed by: NURSE PRACTITIONER

## 2023-07-13 PROCEDURE — 99231 SBSQ HOSP IP/OBS SF/LOW 25: CPT | Performed by: STUDENT IN AN ORGANIZED HEALTH CARE EDUCATION/TRAINING PROGRAM

## 2023-07-13 PROCEDURE — 6370000000 HC RX 637 (ALT 250 FOR IP): Performed by: HOSPITALIST

## 2023-07-13 PROCEDURE — G0378 HOSPITAL OBSERVATION PER HR: HCPCS

## 2023-07-13 PROCEDURE — 2580000003 HC RX 258: Performed by: NURSE PRACTITIONER

## 2023-07-13 RX ADMIN — SODIUM CHLORIDE, PRESERVATIVE FREE 10 ML: 5 INJECTION INTRAVENOUS at 09:05

## 2023-07-13 RX ADMIN — SODIUM CHLORIDE, PRESERVATIVE FREE 10 ML: 5 INJECTION INTRAVENOUS at 21:02

## 2023-07-13 RX ADMIN — ALLOPURINOL 100 MG: 100 TABLET ORAL at 09:04

## 2023-07-13 RX ADMIN — APIXABAN 5 MG: 5 TABLET, FILM COATED ORAL at 09:04

## 2023-07-13 RX ADMIN — CLONIDINE HYDROCHLORIDE 0.1 MG: 0.1 TABLET ORAL at 09:04

## 2023-07-13 RX ADMIN — PRAVASTATIN SODIUM 40 MG: 20 TABLET ORAL at 21:01

## 2023-07-13 RX ADMIN — CLONIDINE HYDROCHLORIDE 0.1 MG: 0.1 TABLET ORAL at 21:01

## 2023-07-13 RX ADMIN — PAROXETINE HYDROCHLORIDE 20 MG: 20 TABLET, FILM COATED ORAL at 09:03

## 2023-07-13 RX ADMIN — ACETAMINOPHEN 1000 MG: 500 TABLET ORAL at 09:03

## 2023-07-13 RX ADMIN — APIXABAN 5 MG: 5 TABLET, FILM COATED ORAL at 21:01

## 2023-07-13 RX ADMIN — ACETAMINOPHEN 1000 MG: 500 TABLET ORAL at 21:01

## 2023-07-13 RX ADMIN — LOSARTAN POTASSIUM 25 MG: 25 TABLET, FILM COATED ORAL at 09:04

## 2023-07-13 RX ADMIN — CLOPIDOGREL BISULFATE 75 MG: 75 TABLET ORAL at 09:04

## 2023-07-13 RX ADMIN — PANTOPRAZOLE SODIUM 40 MG: 40 TABLET, DELAYED RELEASE ORAL at 21:01

## 2023-07-13 RX ADMIN — METOPROLOL SUCCINATE 25 MG: 25 TABLET, EXTENDED RELEASE ORAL at 21:02

## 2023-07-13 RX ADMIN — PANTOPRAZOLE SODIUM 40 MG: 40 TABLET, DELAYED RELEASE ORAL at 09:04

## 2023-07-13 ASSESSMENT — PAIN SCALES - GENERAL
PAINLEVEL_OUTOF10: 9
PAINLEVEL_OUTOF10: 8

## 2023-07-13 NOTE — CARE COORDINATION
Social work: [facility] is able to accept with precert and requesting Medicaid applcation be submitted here. Spoke to Delio/HELP he spoke with patient on the phone for information and releases signed. Precert submitted to Chely Pimentel. Patient informed and agreeable to plans.

## 2023-07-13 NOTE — PLAN OF CARE
Problem: Discharge Planning  Goal: Discharge to home or other facility with appropriate resources  Outcome: Progressing  Flowsheets (Taken 7/13/2023 0800)  Discharge to home or other facility with appropriate resources:   Identify discharge learning needs (meds, wound care, etc)   Arrange for needed discharge resources and transportation as appropriate   Identify barriers to discharge with patient and caregiver     Problem: Pain  Goal: Verbalizes/displays adequate comfort level or baseline comfort level  Outcome: Progressing     Problem: Safety - Adult  Goal: Free from fall injury  Outcome: Progressing     Problem: ABCDS Injury Assessment  Goal: Absence of physical injury  Outcome: Progressing     Problem: Skin/Tissue Integrity  Goal: Absence of new skin breakdown  Description: 1. Monitor for areas of redness and/or skin breakdown  2. Assess vascular access sites hourly  3. Every 4-6 hours minimum:  Change oxygen saturation probe site  4. Every 4-6 hours:  If on nasal continuous positive airway pressure, respiratory therapy assess nares and determine need for appliance change or resting period.   Outcome: Progressing     Problem: Chronic Conditions and Co-morbidities  Goal: Patient's chronic conditions and co-morbidity symptoms are monitored and maintained or improved  Outcome: Progressing  Flowsheets (Taken 7/13/2023 0800)  Care Plan - Patient's Chronic Conditions and Co-Morbidity Symptoms are Monitored and Maintained or Improved:   Monitor and assess patient's chronic conditions and comorbid symptoms for stability, deterioration, or improvement   Collaborate with multidisciplinary team to address chronic and comorbid conditions and prevent exacerbation or deterioration

## 2023-07-14 VITALS
RESPIRATION RATE: 14 BRPM | BODY MASS INDEX: 39.54 KG/M2 | SYSTOLIC BLOOD PRESSURE: 122 MMHG | WEIGHT: 209.44 LBS | TEMPERATURE: 97.9 F | OXYGEN SATURATION: 100 % | DIASTOLIC BLOOD PRESSURE: 61 MMHG | HEIGHT: 61 IN | HEART RATE: 84 BPM

## 2023-07-14 PROCEDURE — 6370000000 HC RX 637 (ALT 250 FOR IP): Performed by: HOSPITALIST

## 2023-07-14 PROCEDURE — 6370000000 HC RX 637 (ALT 250 FOR IP): Performed by: NURSE PRACTITIONER

## 2023-07-14 PROCEDURE — G0378 HOSPITAL OBSERVATION PER HR: HCPCS

## 2023-07-14 PROCEDURE — 99238 HOSP IP/OBS DSCHRG MGMT 30/<: CPT | Performed by: STUDENT IN AN ORGANIZED HEALTH CARE EDUCATION/TRAINING PROGRAM

## 2023-07-14 PROCEDURE — 2580000003 HC RX 258: Performed by: NURSE PRACTITIONER

## 2023-07-14 RX ADMIN — APIXABAN 5 MG: 5 TABLET, FILM COATED ORAL at 08:57

## 2023-07-14 RX ADMIN — PAROXETINE HYDROCHLORIDE 20 MG: 20 TABLET, FILM COATED ORAL at 08:57

## 2023-07-14 RX ADMIN — PANTOPRAZOLE SODIUM 40 MG: 40 TABLET, DELAYED RELEASE ORAL at 08:57

## 2023-07-14 RX ADMIN — CLOPIDOGREL BISULFATE 75 MG: 75 TABLET ORAL at 08:57

## 2023-07-14 RX ADMIN — ACETAMINOPHEN 1000 MG: 500 TABLET ORAL at 08:57

## 2023-07-14 RX ADMIN — SODIUM CHLORIDE, PRESERVATIVE FREE 10 ML: 5 INJECTION INTRAVENOUS at 08:54

## 2023-07-14 RX ADMIN — CLONIDINE HYDROCHLORIDE 0.1 MG: 0.1 TABLET ORAL at 08:57

## 2023-07-14 RX ADMIN — LOSARTAN POTASSIUM 25 MG: 25 TABLET, FILM COATED ORAL at 08:57

## 2023-07-14 RX ADMIN — ALLOPURINOL 100 MG: 100 TABLET ORAL at 08:57

## 2023-07-14 ASSESSMENT — PAIN DESCRIPTION - DESCRIPTORS: DESCRIPTORS: ACHING

## 2023-07-14 ASSESSMENT — PAIN DESCRIPTION - PAIN TYPE: TYPE: CHRONIC PAIN

## 2023-07-14 ASSESSMENT — PAIN SCALES - GENERAL: PAINLEVEL_OUTOF10: 8

## 2023-07-14 ASSESSMENT — PAIN DESCRIPTION - LOCATION: LOCATION: GENERALIZED

## 2023-07-14 NOTE — PLAN OF CARE
Problem: Discharge Planning  Goal: Discharge to home or other facility with appropriate resources  7/14/2023 0505 by Rosa Isela Hughes RN  Outcome: Progressing     Problem: Pain  Goal: Verbalizes/displays adequate comfort level or baseline comfort level  7/14/2023 0505 by Rosa Isela Hughes RN  Outcome: Progressing     Problem: Safety - Adult  Goal: Free from fall injury  7/14/2023 0505 by Rosa Isela Hughes RN  Outcome: Progressing     Problem: ABCDS Injury Assessment  Goal: Absence of physical injury  7/14/2023 0505 by Rosa Isela Hughes RN  Outcome: Progressing     Problem: Skin/Tissue Integrity  Goal: Absence of new skin breakdown  Description: 1. Monitor for areas of redness and/or skin breakdown  2. Assess vascular access sites hourly  3. Every 4-6 hours minimum:  Change oxygen saturation probe site  4. Every 4-6 hours:  If on nasal continuous positive airway pressure, respiratory therapy assess nares and determine need for appliance change or resting period.   7/14/2023 0505 by Rosa Isela Hughes RN  Outcome: Progressing     Problem: Chronic Conditions and Co-morbidities  Goal: Patient's chronic conditions and co-morbidity symptoms are monitored and maintained or improved  7/14/2023 0505 by Rosa Isela Hughes RN  Outcome: Progressing

## 2023-07-14 NOTE — CARE COORDINATION
Social work: Medicaid application number received from [employer]- [id number]. SW also received precert for short-term rehab stay at [facility]. PASSAR done. Patient to dc to [address]  at 1:00 pm.  # for RN report: [phone], [address]. Completed BINU faxed to [phone]. Informed RN, pt, and facility of dc time, agreeable to plan.

## 2023-07-14 NOTE — PROGRESS NOTES
[facility]  PROGRESS NOTE    Room # 151/487-86   Name: Rito Raygoza            Mandaeism: [address]     Reason for visit: Routine    I visited the patient. Admit Date & Time: 7/9/2023  7:20 PM    Assessment:  Rito Raygoza is a 78 y.o. female in the hospital because \"dehydration\". Upon entering the room patient was sitting up in chair. Patient shared about her Worship background. Patient also shared about various Worship experiences she has had in the past. Patient appears to draw strength from her Worship beliefs and prayer. Patient also shared feelings of disappointment and sadness related to family dynamics in her family of origin. Patient also shared feelings of gratitude for the \"many blessings\" of her life. Intervention:  I introduced myself and my title as  I offered space for patient  to express feelings, needs, and concerns and provided a ministry presence. This writer actively listened to patient and provided words of affirmation. This writer also validated patients feelings. This writer provided emotional support and spiritual support. This writer also offered a brief prayer for patient. Outcome:  Patient expressed gratitude for this  visit. Patient appeared to be comforted and encouraged. Plan:  Chaplains will remain available to offer spiritual and emotional support as needed.     Electronically signed by Maggie Robbins on 7/11/2023 at 12:40 PM.  Mague Low       07/11/23 1235   Encounter Summary   Service Provided For: Patient   Referral/Consult From:  [address] Children;Family members   Last Encounter  07/11/23   Complexity of Encounter Moderate   Begin Time 1115   End Time  1150   Total Time Calculated 35 min   Encounter    Type Initial Screen/Assessment   Spiritual/Emotional needs   Type Spiritual Support   Assessment/Intervention/Outcome   Assessment
[facility]  Office: [address], DO, Sarah Dom, DO, Katalina Millan, DO, Gabriel Hobbs Blood, DO, Laurie Monique MD, Kierra Bolden MD, Mary Dickson MD, Ines Galloway MD,  Darcy Persaud MD, Adamaris Tenorio MD, Sidney Rose, DO, Judd Espitia MD,  Andreas Petty MD, Michaela Wells MD, Ruiz Padilla DO, Cathie Shafer MD,  Maritza Moraes DO, Shahid Kumar MD, Anel Fiore MD, Ewelina Solano MD, Kathleen Greco MD,  Ivy Pendleton MD, Kandis Voss MD, Genna King DO, Christelle Mirza MD,  Deven Vasquez MD, Sally Whaley, CNP,  Mary Harry, CNP, Vy Chaudhry, CNP, Sherine Lara, CNP,  Janki López, [facility], Siobhan Hale, CNP, Huan Hernandez, CNP, Salo Liz, CNP, Claudell Bowler, CNP, Arya Gómez, CNP, Izzy Cruz, PAAnselmoC, Jero John, CNS, Yogesh Mccollum, CNP, Ally Santiago, [address] [place] [address]    Progress Note    7/13/2023    4:05 PM    Name:   Dov Barrera  MRN:     [id number]     Acct:      [de-identified]   Room:   [address] [place] [address] Day:  0  Admit Date:  7/9/2023  7:20 PM    PCP:   Andi Black MD  Code Status:  Full Code    Subjective:     Patient was seen and examined at bedside this AM. She is at baseline. Awaiting placement. Medications: Allergies:     Allergies   Allergen Reactions    Dofetilide Other (See Comments)    Lisinopril-Hydrochlorothiazide Anaphylaxis and Hives    Sulfa Antibiotics Anaphylaxis    Penicillins Hives    Polyethylene Glycol Hives    Actifed Cold-Allergy [Chlorpheniramine-Phenylephrine]     Altace [Ramipril]      Chronic cough    Cephalosporins Hives    Coreg [Carvedilol]      bloating    Dml Facial Moisturizer     Elavil [Amitriptyline Hcl]     Entex [Ami-Jose]     Ethylene Glycol     Fentanyl     Heparin     Hizentra [Immune Globulin (Human)]     Hydralazine     Levofloxacin      ach tendon    Lisinopril-Hydrochlorothiazide     Montelukast Sodium
Occupational Therapy  Facility/Department: [facility]  Occupational Therapy Initial Assessment    Name: Kali Hernandez  : 1945  MRN: [id number]  Date of Service: 7/10/2023    Chief Complaint   Patient presents with    Back Pain     Slip and fall today at home. Landing onto buttocks. Pt complains of low back pains. Discharge Recommendations:  Patient would benefit from continued therapy after discharge        Patient Diagnosis(es): The primary encounter diagnosis was Dehydration. A diagnosis of Back strain, initial encounter was also pertinent to this visit. 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 Past Surgical History:  has a past surgical history that includes Dilation & curettage (); Breast biopsy (); Appendectomy (); Septoplasty (); Hammer toe surgery (); Sinus endoscopy; sinus surgery; Shoulder arthroscopy (); Esophagoscopy (, ); Colonoscopy (); Foreign Body Removal (); laparoscopy (); Coronary angioplasty with stent (2011); Coronary angioplasty with stent (2011); Upper gastrointestinal endoscopy; eye surgery; pr colonoscopy w/biopsy single/multiple (N/A, 2017); pr egd transoral biopsy single/multiple (2017); Upper gastrointestinal endoscopy (2017); Upper gastrointestinal endoscopy (2018); Cardiac surgery; Cardiac catheterization (2020);  Cardioversion (2020); and Cardioversion
Patient wanted writer to call Leonidas Mondragon (Daughter) to notify that she is in the hospital. Leonidas Mondragon made aware  and asked no questions.
Physical Therapy  Facility/Department: [facility] ICU  Treatment Note     Name: Bulmaro Salinas  : 1945  MRN: [id number]  Date of Service: 2023     Discharge Recommendations:  Patient would benefit from continued therapy after discharge       Patient Diagnosis(es): The primary encounter diagnosis was Dehydration. A diagnosis of Back strain, initial encounter was also pertinent to this visit. 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 Past Surgical History:  has a past surgical history that includes Dilation & curettage (); Breast biopsy (); Appendectomy (); Septoplasty (); Hammer toe surgery (); Sinus endoscopy; sinus surgery; Shoulder arthroscopy (); Esophagoscopy (, ); Colonoscopy (); Foreign Body Removal (); laparoscopy (); Coronary angioplasty with stent (2011); Coronary angioplasty with stent (2011); Upper gastrointestinal endoscopy; eye surgery; pr colonoscopy w/biopsy single/multiple (N/A, 2017); pr egd transoral biopsy single/multiple (2017); Upper gastrointestinal endoscopy (2017); Upper gastrointestinal endoscopy (2018); Cardiac surgery; Cardiac catheterization (2020); Cardioversion (2020); and Cardioversion (2020).      Assessment   Body Structures, Functions, Activity Limitations Requiring Skilled Therapeutic Intervention: Decreased safe awareness;Decreased endurance;Decreased functional
Physical Therapy  Facility/Department: [facility] ICU  Physical Therapy Initial Assessment    Name: Dov Barrera  : 1945  MRN: [id number]  Date of Service: 7/10/2023  Chief Complaint   Patient presents with    Back Pain     Slip and fall today at home. Landing onto buttocks. Pt complains of low back pains. Discharge Recommendations:  Patient would benefit from continued therapy after discharge   PT Equipment Recommendations  Equipment Needed: No (Pt has rolling walker at home. Pt states use of rolling walker in apt is not possible due to limited size of walkways in apt)      Patient Diagnosis(es): The primary encounter diagnosis was Dehydration. A diagnosis of Back strain, initial encounter was also pertinent to this visit. 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 Past Surgical History:  has a past surgical history that includes Dilation & curettage (); Breast biopsy (); Appendectomy (); Septoplasty (); Hammer toe surgery (); Sinus endoscopy; sinus surgery; Shoulder arthroscopy (); Esophagoscopy (, ); Colonoscopy (); Foreign Body Removal (); laparoscopy (10); Coronary angioplasty with stent (2011); Coronary angioplasty with stent (2011); Upper gastrointestinal endoscopy; eye surgery; pr colonoscopy w/biopsy single/multiple (N/A, 2017); pr egd transoral biopsy single/multiple (2017);  Upper gastrointestinal endoscopy (2017);
Physical Therapy  Facility/Department: [facility] ICU  Physical Therapy Daily Treatment Note    Name: Abdelrahman Carvajal  : 1945  MRN: [id number]  Date of Service: 2023    Discharge Recommendations:  Patient would benefit from continued therapy after discharge          Patient Diagnosis(es): The primary encounter diagnosis was Dehydration. A diagnosis of Back strain, initial encounter was also pertinent to this visit. 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 Past Surgical History:  has a past surgical history that includes Dilation & curettage (); Breast biopsy (); Appendectomy (); Septoplasty (); Hammer toe surgery (); Sinus endoscopy; sinus surgery; Shoulder arthroscopy (); Esophagoscopy (, ); Colonoscopy (); Foreign Body Removal (); laparoscopy (9744); Coronary angioplasty with stent (2011); Coronary angioplasty with stent (2011); Upper gastrointestinal endoscopy; eye surgery; pr colonoscopy w/biopsy single/multiple (N/A, 2017); pr egd transoral biopsy single/multiple (2017); Upper gastrointestinal endoscopy (2017); Upper gastrointestinal endoscopy (2018); Cardiac surgery; Cardiac catheterization (2020); Cardioversion (2020); and Cardioversion (2020).     Assessment   Body Structures, Functions, Activity Limitations Requiring Skilled Therapeutic Intervention: Decreased safe awareness;Decreased
Pt discharged with all belongings and in stable condition to [address] [place] [address] Drive via EMS. Pt voices no questions or concerns at this time.
Report called to [facility] at [facility]. RN voices no questions or concerns at this time.
[facility]  Office: [address], DO, Onesimo Brown, DO, Clinton Wynn, DO, Hortencia Chowdhury, DO, Sanaz Simms MD, Valente Favre, MD, Romina Howe MD, Shayne Desai MD,  Mateo Nash MD, Isauro Damon MD, Glynn Lewis, DO, Marycruz Gallo MD,  Heydi Krishnamurthy MD, Court Dykes MD, Suze Rai DO, Maximino Blanton MD,  Gris Jaimes DO, Zeke David MD, Yohan Renteria MD, Bard Neville MD, Sharri Mcdonald MD,  Noel High MD, Maura Proctor MD, Jose Guadalupe Frye DO, Daniel Taylor MD,  Karen Means MD, Claudette Means, CNP,  Alicia Orellana, CNP, Rubina Tellez, CNP, Franck Duff, CNP,  Marimar Bray, [facility], Trav Brooks, CNP, Tena Alpers, CNP, Susanne Campo, CNP, Nai Rojas, CNP, Miguel Lobato, CNP, Chance Houston PA-C, Cecilia Bowden, CNS, Amol Sauer, [facility], Jose Moore, [address] [place] [address]    Progress Note    7/12/2023    10:58 AM    Name:   Trinidad Waters  MRN:     [id number]     Acct:      [de-identified]   Room:   [address] [place] [address] Day:  0  Admit Date:  7/9/2023  7:20 PM    PCP:   Capri Marvin MD  Code Status:  Full Code    Subjective:     Patient was seen and examined at bedside this AM. She reports feeling \"not good\" and is very concerned about her ability to care for herself if she has to go home. Awaiting placement. Medications: Allergies:     Allergies   Allergen Reactions    Dofetilide Other (See Comments)    Lisinopril-Hydrochlorothiazide Anaphylaxis and Hives    Sulfa Antibiotics Anaphylaxis    Penicillins Hives    Polyethylene Glycol Hives    Actifed Cold-Allergy [Chlorpheniramine-Phenylephrine]     Altace [Ramipril]      Chronic cough    Cephalosporins Hives    Coreg [Carvedilol]      bloating    Dml Facial Moisturizer     Elavil [Amitriptyline Hcl]     Entex [Ami-Jose]     Ethylene Glycol     Fentanyl     Heparin     Hizentra [Immune Globulin (Human)]     Hydralazine
7/11/2023 0922  Gross per 24 hour   Intake 400 ml   Output --   Net 400 ml       Labs:  Hematology:  Recent Labs     07/09/23  1925 07/10/23  0612   WBC 5.6  --    RBC 3.64*  --    HGB 9.5*  --    HCT 30.6*  --    MCV 84.1  --    MCH 26.0  --    MCHC 30.9*  --    RDW 19.4*  --      --    MPV 8.7  --    INR  --  1.2     Chemistry:  Recent Labs     07/09/23 1925      K 3.7      CO2 19*   GLUCOSE 124*   BUN 25*   CREATININE 1.2*   ANIONGAP 14   LABGLOM 47*   CALCIUM 8.8   TROPHS 29*     Recent Labs     07/09/23 1925   PROT 5.6*   LABALBU 3.9   AST 22   ALT 18   ALKPHOS 104   BILITOT 1.3*   LIPASE 29     ABG:No results found for: POCPH, PHART, PH, POCPCO2, ISX4YBG, PCO2, POCPO2, PO2ART, PO2, POCHCO3, UKH1MRM, HCO3, NBEA, PBEA, BEART, BE, THGBART, THB, VVE3MKW, TOUS0UHH, B8TNTVKS, O2SAT, FIO2  Lab Results   Component Value Date/Time    SPECIAL 20ML LEFT ARM 02/15/2023 02:03 AM     Lab Results   Component Value Date/Time    CULTURE KLEBSIELLA PNEUMONIAE >[id number] CFU/ML (A) 03/14/2023 09:00 PM       Radiology:  CT Head W/O Contrast    Result Date: 7/9/2023  CT HEAD: No CT evidence for acute intracranial abnormality. . CT CERVICAL SPINE: No acute fracture or subluxation of the cervical spine. CT LUMBAR SPINE: No acute fracture. Grade 1 L3-L4 anterolisthesis with mild disc bulge. CT CSpine W/O Contrast    Result Date: 7/9/2023  CT HEAD: No CT evidence for acute intracranial abnormality. . CT CERVICAL SPINE: No acute fracture or subluxation of the cervical spine. CT LUMBAR SPINE: No acute fracture. Grade 1 L3-L4 anterolisthesis with mild disc bulge. CT LUMBAR SPINE WO CONTRAST    Result Date: 7/9/2023  CT HEAD: No CT evidence for acute intracranial abnormality. . CT CERVICAL SPINE: No acute fracture or subluxation of the cervical spine. CT LUMBAR SPINE: No acute fracture. Grade 1 L3-L4 anterolisthesis with mild disc bulge. XR CHEST PORTABLE    Result Date: 7/9/2023  Cardiomegaly unchanged.   No
assistance    Subjective   General  Patient assessed for rehabilitation services?: Yes  Family / Caregiver Present: No  General Comment  Comments: RN ok'd for therapy this PM. Pt agreeable to participate in session and pleasant/cooperative throughout. Pt on toilet with nursing upon arrival. Pt reports pain \"all over\", doesn't report value, assisted pt to reposition/increase activity. Objective   Balance  Sitting:  (SUP- static, SBA- dynamic, pt seated on toilet and EOB for ~7 minutes)  Standing:  (CGA - static, Rika - dynamic. Pt performed functional standing tolerance for ~2-3 minutes during toileting tasks and sink side hand hygiene; unsteady)    Functional Mobility  Overall Level of Assistance: Contact-guard assistance; Additional time (Pt performed functional mobility to/from bathroom with CGA and use of RW; mildly unsteady however no true LOB; increased time/effort to perform; impulsive with RW use)  Interventions: Safety awareness training;Verbal cues (Min VCs for RW mngt/navigation, safety awareness, correct hand placement/positioning)  Assistive Device: Walker, rolling    Toilet Transfers  Toilet - Technique: Ambulating (Pt on toilet with nursing upon arrival)  Equipment Used: Grab bars  Toilet Transfer: Contact guard assistance  Toilet Transfers Comments: vc's for grab bar use       ADL  Grooming: Increased time to complete;Contact guard assistance (to perform hand hygiene while standing at sink)  LE Dressing: Moderate assistance; Increased time to complete (to doff socks due to reported fatigue)  Toileting: Minimal assistance; Increased time to complete (for brief management, to don brief due to unsteadiness while seated on toilet)    Bed mobility  Supine to Sit: Unable to assess (Pt in bathroom upon arrival)  Sit to Supine: Stand by assistance  Scooting: Stand by assistance  Bed Mobility Comments: vc's for initiation    Transfers  Sit to stand: Contact guard assistance (assessed while transfering off

## 2023-07-14 NOTE — PLAN OF CARE
Problem: Discharge Planning  Goal: Discharge to home or other facility with appropriate resources  7/14/2023 1316 by Calin Henriquez  Outcome: Completed  Flowsheets (Taken 7/14/2023 0800)  Discharge to home or other facility with appropriate resources:   Identify barriers to discharge with patient and caregiver   Arrange for needed discharge resources and transportation as appropriate   Identify discharge learning needs (meds, wound care, etc)  7/14/2023 0505 by Lorena Allan RN  Outcome: Progressing     Problem: Pain  Goal: Verbalizes/displays adequate comfort level or baseline comfort level  7/14/2023 1316 by Calin Henriquez  Outcome: Completed  Flowsheets (Taken 7/14/2023 0751)  Verbalizes/displays adequate comfort level or baseline comfort level:   Encourage patient to monitor pain and request assistance   Assess pain using appropriate pain scale   Administer analgesics based on type and severity of pain and evaluate response  7/14/2023 0505 by Lorena Allan RN  Outcome: Progressing     Problem: Safety - Adult  Goal: Free from fall injury  7/14/2023 1316 by Calin Henriquez  Outcome: Completed  7/14/2023 0505 by Lorena Allan RN  Outcome: Progressing     Problem: ABCDS Injury Assessment  Goal: Absence of physical injury  7/14/2023 1316 by Calin Henriquez  Outcome: Completed  7/14/2023 0505 by Lorena Allan RN  Outcome: Progressing     Problem: Skin/Tissue Integrity  Goal: Absence of new skin breakdown  Description: 1. Monitor for areas of redness and/or skin breakdown  2. Assess vascular access sites hourly  3. Every 4-6 hours minimum:  Change oxygen saturation probe site  4. Every 4-6 hours:  If on nasal continuous positive airway pressure, respiratory therapy assess nares and determine need for appliance change or resting period.   7/14/2023 1316 by Calin Henriquez  Outcome: Completed  7/14/2023 0505 by Lorena Allan RN  Outcome: Progressing     Problem: Chronic Conditions and Co-morbidities  Goal: Patient's chronic

## 2023-07-14 NOTE — DISCHARGE SUMMARY
abnormality. . CT CERVICAL SPINE: No acute fracture or subluxation of the cervical spine. CT LUMBAR SPINE: No acute fracture. Grade 1 L3-L4 anterolisthesis with mild disc bulge. CT LUMBAR SPINE WO CONTRAST    Result Date: 7/9/2023  CT HEAD: No CT evidence for acute intracranial abnormality. . CT CERVICAL SPINE: No acute fracture or subluxation of the cervical spine. CT LUMBAR SPINE: No acute fracture. Grade 1 L3-L4 anterolisthesis with mild disc bulge. XR CHEST PORTABLE    Result Date: 7/9/2023  Cardiomegaly unchanged. No focal consolidation. Consultations:    Consults:     Final Specialist Recommendations/Findings:   IP CONSULT TO SOCIAL WORK      The patient was seen and examined on day of discharge and this discharge summary is in conjunction with any daily progress note from day of discharge. Discharge plan:     Disposition: To a non-[facility] facility    Physician Follow Up:   Lauren Mesa MD  [address] [facility]  Edwin Cochran [phone]    Schedule an appointment as soon as possible for a visit in 3 day(s)  Establish care with new PCP    Vernon Almanzar, [address] [place] [address]  [phone]    Schedule an appointment as soon as possible for a visit in 1 week(s)  Establish care with new gastroenterologist.       Requiring Further Evaluation/Follow Up POST HOSPITALIZATION/Incidental Findings: Patient will need to follow-up with gastroenterology regarding her previously scheduled EGD. Diet: regular diet    Activity: As tolerated    Instructions to Patient: Follow-up with your primary care physician and gastroenterology as scheduled.      Discharge Medications:      Medication List        CHANGE how you take these medications      bumetanide 1 MG tablet  Commonly known as: BUMEX  Take 0.5 tablets by mouth daily Advised to take additional 1/2 pill for weight gain, increased CHF and/swelling  What changed:   how much to take  additional instructions

## 2023-07-21 ENCOUNTER — HOSPITAL ENCOUNTER (EMERGENCY)
Age: 78
Discharge: HOME | End: 2023-07-21
Attending: EMERGENCY MEDICINE
Payer: MEDICARE

## 2023-07-21 ENCOUNTER — APPOINTMENT (OUTPATIENT)
Dept: GENERAL RADIOLOGY | Age: 78
End: 2023-07-21
Payer: MEDICARE

## 2023-07-21 ENCOUNTER — APPOINTMENT (OUTPATIENT)
Dept: CT IMAGING | Age: 78
End: 2023-07-21
Payer: MEDICARE

## 2023-07-21 VITALS
DIASTOLIC BLOOD PRESSURE: 74 MMHG | HEIGHT: 61 IN | RESPIRATION RATE: 17 BRPM | WEIGHT: 200 LBS | OXYGEN SATURATION: 98 % | HEART RATE: 85 BPM | BODY MASS INDEX: 37.76 KG/M2 | TEMPERATURE: 97.7 F | SYSTOLIC BLOOD PRESSURE: 137 MMHG

## 2023-07-21 DIAGNOSIS — I50.9 CONGESTIVE HEART FAILURE, UNSPECIFIED HF CHRONICITY, UNSPECIFIED HEART FAILURE TYPE (HCC): Primary | ICD-10-CM

## 2023-07-21 LAB
ALBUMIN SERPL-MCNC: 4.2 G/DL (ref 3.5–5.2)
ALBUMIN/GLOB SERPL: 2.5 {RATIO} (ref 1–2.5)
ALP SERPL-CCNC: 107 U/L (ref 35–104)
ALT SERPL-CCNC: 11 U/L (ref 5–33)
AMYLASE SERPL-CCNC: 36 U/L (ref 28–100)
ANION GAP SERPL CALCULATED.3IONS-SCNC: 12 MMOL/L (ref 9–17)
AST SERPL-CCNC: 14 U/L
BACTERIA URNS QL MICRO: ABNORMAL
BASOPHILS # BLD: 0 K/UL (ref 0–0.2)
BASOPHILS NFR BLD: 1 % (ref 0–2)
BILIRUB SERPL-MCNC: 1 MG/DL (ref 0.3–1.2)
BILIRUB UR QL STRIP: NEGATIVE
BNP SERPL-MCNC: 5684 PG/ML
BUN SERPL-MCNC: 22 MG/DL (ref 8–23)
CALCIUM SERPL-MCNC: 9.3 MG/DL (ref 8.6–10.4)
CHARACTER UR: ABNORMAL
CHLORIDE SERPL-SCNC: 102 MMOL/L (ref 98–107)
CLARITY UR: CLEAR
CO2 SERPL-SCNC: 27 MMOL/L (ref 20–31)
COLOR UR: YELLOW
CREAT SERPL-MCNC: 1.2 MG/DL (ref 0.5–0.9)
D DIMER PPP FEU-MCNC: 0.63 UG/ML FEU
EOSINOPHIL # BLD: 0.2 K/UL (ref 0–0.4)
EOSINOPHILS RELATIVE PERCENT: 4 % (ref 1–4)
EPI CELLS #/AREA URNS HPF: ABNORMAL /HPF (ref 0–5)
ERYTHROCYTE [DISTWIDTH] IN BLOOD BY AUTOMATED COUNT: 19.8 % (ref 12.5–15.4)
GFR SERPL CREATININE-BSD FRML MDRD: 47 ML/MIN/1.73M2
GLUCOSE SERPL-MCNC: 92 MG/DL (ref 70–99)
GLUCOSE UR STRIP-MCNC: NEGATIVE MG/DL
HCT VFR BLD AUTO: 31 % (ref 36–46)
HGB BLD-MCNC: 9.6 G/DL (ref 12–16)
HGB UR QL STRIP.AUTO: NEGATIVE
KETONES UR STRIP-MCNC: NEGATIVE MG/DL
LEUKOCYTE ESTERASE UR QL STRIP: NEGATIVE
LIPASE SERPL-CCNC: 36 U/L (ref 13–60)
LYMPHOCYTES NFR BLD: 1.1 K/UL (ref 1–4.8)
LYMPHOCYTES RELATIVE PERCENT: 21 % (ref 24–44)
MCH RBC QN AUTO: 25.9 PG (ref 26–34)
MCHC RBC AUTO-ENTMCNC: 31.1 G/DL (ref 31–37)
MCV RBC AUTO: 83.5 FL (ref 80–100)
MONOCYTES NFR BLD: 0.6 K/UL (ref 0.1–1.2)
MONOCYTES NFR BLD: 12 % (ref 2–11)
NEUTROPHILS NFR BLD: 62 % (ref 36–66)
NEUTS SEG NFR BLD: 3.1 K/UL (ref 1.8–7.7)
NITRITE UR QL STRIP: POSITIVE
PH UR STRIP: 6.5 [PH] (ref 5–8)
PLATELET # BLD AUTO: 178 K/UL (ref 140–450)
PMV BLD AUTO: 8.5 FL (ref 6–12)
POTASSIUM SERPL-SCNC: 4 MMOL/L (ref 3.7–5.3)
PROT SERPL-MCNC: 5.9 G/DL (ref 6.4–8.3)
PROT UR STRIP-MCNC: NEGATIVE MG/DL
RBC # BLD AUTO: 3.71 M/UL (ref 4–5.2)
RBC #/AREA URNS HPF: ABNORMAL /HPF (ref 0–2)
SODIUM SERPL-SCNC: 141 MMOL/L (ref 135–144)
SP GR UR STRIP: 1.01 (ref 1–1.03)
TROPONIN I SERPL HS-MCNC: 28 NG/L (ref 0–14)
UROBILINOGEN UR STRIP-ACNC: NORMAL EU/DL (ref 0–1)
WBC #/AREA URNS HPF: ABNORMAL /HPF (ref 0–5)
WBC OTHER # BLD: 5.1 K/UL (ref 3.5–11)

## 2023-07-21 PROCEDURE — 80053 COMPREHEN METABOLIC PANEL: CPT

## 2023-07-21 PROCEDURE — 82150 ASSAY OF AMYLASE: CPT

## 2023-07-21 PROCEDURE — 2580000003 HC RX 258: Performed by: EMERGENCY MEDICINE

## 2023-07-21 PROCEDURE — 71045 X-RAY EXAM CHEST 1 VIEW: CPT

## 2023-07-21 PROCEDURE — 84484 ASSAY OF TROPONIN QUANT: CPT

## 2023-07-21 PROCEDURE — 85379 FIBRIN DEGRADATION QUANT: CPT

## 2023-07-21 PROCEDURE — 83880 ASSAY OF NATRIURETIC PEPTIDE: CPT

## 2023-07-21 PROCEDURE — 93005 ELECTROCARDIOGRAM TRACING: CPT | Performed by: EMERGENCY MEDICINE

## 2023-07-21 PROCEDURE — 83690 ASSAY OF LIPASE: CPT

## 2023-07-21 PROCEDURE — 81001 URINALYSIS AUTO W/SCOPE: CPT

## 2023-07-21 PROCEDURE — 6360000002 HC RX W HCPCS: Performed by: EMERGENCY MEDICINE

## 2023-07-21 PROCEDURE — 85027 COMPLETE CBC AUTOMATED: CPT

## 2023-07-21 PROCEDURE — 96375 TX/PRO/DX INJ NEW DRUG ADDON: CPT

## 2023-07-21 PROCEDURE — 96365 THER/PROPH/DIAG IV INF INIT: CPT

## 2023-07-21 PROCEDURE — 99285 EMERGENCY DEPT VISIT HI MDM: CPT

## 2023-07-21 PROCEDURE — 74176 CT ABD & PELVIS W/O CONTRAST: CPT

## 2023-07-21 PROCEDURE — 96366 THER/PROPH/DIAG IV INF ADDON: CPT

## 2023-07-21 RX ORDER — OMEPRAZOLE 20 MG/1
20 CAPSULE, DELAYED RELEASE ORAL 2 TIMES DAILY
COMMUNITY

## 2023-07-21 RX ORDER — ATORVASTATIN CALCIUM 10 MG/1
10 TABLET, FILM COATED ORAL DAILY
COMMUNITY

## 2023-07-21 RX ORDER — SENNOSIDES A AND B 8.6 MG/1
1 TABLET, FILM COATED ORAL DAILY
COMMUNITY

## 2023-07-21 RX ORDER — FUROSEMIDE 10 MG/ML
40 INJECTION INTRAMUSCULAR; INTRAVENOUS ONCE
Status: COMPLETED | OUTPATIENT
Start: 2023-07-21 | End: 2023-07-21

## 2023-07-21 RX ADMIN — VANCOMYCIN HYDROCHLORIDE 1250 MG: 1 INJECTION, POWDER, LYOPHILIZED, FOR SOLUTION INTRAVENOUS at 07:01

## 2023-07-21 RX ADMIN — FUROSEMIDE 40 MG: 10 INJECTION, SOLUTION INTRAMUSCULAR; INTRAVENOUS at 06:47

## 2023-07-21 ASSESSMENT — ENCOUNTER SYMPTOMS
SHORTNESS OF BREATH: 1
GASTROINTESTINAL NEGATIVE: 1

## 2023-07-21 NOTE — ED NOTES
Attempted to call report to [facility]/no answer/phone just kept ringing.       Mario Rodriguez RN  07/21/23 7168

## 2023-07-21 NOTE — DISCHARGE INSTRUCTIONS
INCREASE BUMEX TO 1mg EVERY DAY for 3 DAYS    IV ABX ORDERED. PLEASE HAVE ATTENDING RE-EVAL in 48 hours.

## 2023-07-21 NOTE — ED NOTES
Second attempt to call report to [facility], phone just rings.       Christiane Clay, RN  07/21/23 1928

## 2023-07-21 NOTE — ED PROVIDER NOTES
[facility] Emergency Department  [id number] [facility]. [facility] [address]  Phone: [phone]  Fax: Vinicio Grider          Pt Name: David Camacho  MRN: [id number]  [address] [facility] 1945  Date of evaluation: 7/21/2023      CHIEF COMPLAINT       Chief Complaint   Patient presents with    Shortness of Breath     PT STAYS AT [facility] AND WAS HAVING SOB, SHE SAID NO ONE ANSWERED THE CALL LIGHT SO SHE CALLED HER SISTER AND SHE CALLED 911. PT SPEAKING IN FULL SENTENCES       HISTORY OF PRESENT ILLNESS       David Camacho is a 78 y.o. female who presents with shortness of breath and abdominal pain. Sounds like she has had chronic abdominal pain for many years that started when she was a child and there were surgical sponges left in her abdomen. But she is also had increase in shortness of breath over the last several days. At [facility] and she was having some shortness of breath. She called for the nurses and no one answered so 911 was called. REVIEW OF SYSTEMS     Review of Systems   Constitutional: Negative. HENT: Negative. Respiratory:  Positive for shortness of breath. Cardiovascular: Negative. Gastrointestinal: Negative. Genitourinary: Negative. Musculoskeletal: Negative. Skin:  Positive for rash. Neurological: Negative. Hematological: Negative. PAST MEDICAL HISTORY    has a past medical history of Abnormal Pap smear, Asthma, Atrial fibrillation (HCC), Bloody discharge from nipple, CAD (coronary artery disease), Cataracts, bilateral, Colon polyp, CVA (cerebral vascular accident) ([address]), Demyelinating disease ([address]), Fibromyalgia, High-risk sexual behavior, History of bone density study, History of migraines, HTN (hypertension), IgG deficiency ([address]), Multiple sclerosis ([address]), Myoclonic seizures ([address]), Optic neuritis, Osteopenia, Pyloric stenosis, Raynaud's disease, and Vasomotor rhinitis.     SURGICAL

## 2023-07-22 LAB
EKG ATRIAL RATE: 84 BPM
EKG ATRIAL RATE: 86 BPM
EKG P AXIS: -7 DEGREES
EKG P AXIS: 41 DEGREES
EKG P-R INTERVAL: 378 MS
EKG Q-T INTERVAL: 422 MS
EKG Q-T INTERVAL: 440 MS
EKG QRS DURATION: 132 MS
EKG QRS DURATION: 98 MS
EKG QTC CALCULATION (BAZETT): 498 MS
EKG QTC CALCULATION (BAZETT): 526 MS
EKG R AXIS: -2 DEGREES
EKG R AXIS: -4 DEGREES
EKG T AXIS: -107 DEGREES
EKG T AXIS: -152 DEGREES
EKG VENTRICULAR RATE: 84 BPM
EKG VENTRICULAR RATE: 86 BPM

## 2023-08-08 PROBLEM — E86.0 DEHYDRATION: Status: RESOLVED | Noted: 2023-07-09 | Resolved: 2023-08-08

## 2023-09-01 ENCOUNTER — HOSPITAL ENCOUNTER (OUTPATIENT)
Age: 78
Setting detail: OBSERVATION
Discharge: HOME | End: 2023-09-02
Attending: EMERGENCY MEDICINE | Admitting: HOSPITALIST
Payer: MEDICARE

## 2023-09-01 ENCOUNTER — APPOINTMENT (OUTPATIENT)
Dept: GENERAL RADIOLOGY | Age: 78
End: 2023-09-01
Payer: MEDICARE

## 2023-09-01 DIAGNOSIS — R06.00 DYSPNEA, UNSPECIFIED TYPE: ICD-10-CM

## 2023-09-01 DIAGNOSIS — I50.9 CONGESTIVE HEART FAILURE, UNSPECIFIED HF CHRONICITY, UNSPECIFIED HEART FAILURE TYPE (HCC): Primary | ICD-10-CM

## 2023-09-01 DIAGNOSIS — R77.8 ELEVATED TROPONIN: ICD-10-CM

## 2023-09-01 PROBLEM — I50.41 ACUTE COMBINED SYSTOLIC (CONGESTIVE) AND DIASTOLIC (CONGESTIVE) HEART FAILURE (HCC): Status: ACTIVE | Noted: 2023-09-01

## 2023-09-01 LAB
ALBUMIN SERPL-MCNC: 4.2 G/DL (ref 3.5–5.2)
ALBUMIN/GLOB SERPL: 2.3 {RATIO} (ref 1–2.5)
ALP SERPL-CCNC: 110 U/L (ref 35–104)
ALT SERPL-CCNC: 11 U/L (ref 5–33)
ANION GAP SERPL CALCULATED.3IONS-SCNC: 12 MMOL/L (ref 9–17)
AST SERPL-CCNC: 16 U/L
BASOPHILS # BLD: 0.06 K/UL (ref 0–0.2)
BASOPHILS NFR BLD: 1 % (ref 0–2)
BILIRUB SERPL-MCNC: 1.5 MG/DL (ref 0.3–1.2)
BNP SERPL-MCNC: 9383 PG/ML
BUN SERPL-MCNC: 28 MG/DL (ref 8–23)
CALCIUM SERPL-MCNC: 9.1 MG/DL (ref 8.6–10.4)
CHLORIDE SERPL-SCNC: 104 MMOL/L (ref 98–107)
CO2 SERPL-SCNC: 23 MMOL/L (ref 20–31)
CREAT SERPL-MCNC: 1 MG/DL (ref 0.5–0.9)
EOSINOPHIL # BLD: 0.13 K/UL (ref 0–0.4)
EOSINOPHILS RELATIVE PERCENT: 2 % (ref 1–4)
ERYTHROCYTE [DISTWIDTH] IN BLOOD BY AUTOMATED COUNT: 22.8 % (ref 12.5–15.4)
GFR SERPL CREATININE-BSD FRML MDRD: 59 ML/MIN/1.73M2
GLUCOSE SERPL-MCNC: 94 MG/DL (ref 70–99)
HCT VFR BLD AUTO: 30.2 % (ref 36–46)
HGB BLD-MCNC: 9.4 G/DL (ref 12–16)
LIPASE SERPL-CCNC: 26 U/L (ref 13–60)
LYMPHOCYTES NFR BLD: 1.26 K/UL (ref 1–4.8)
LYMPHOCYTES RELATIVE PERCENT: 20 % (ref 24–44)
MCH RBC QN AUTO: 26.4 PG (ref 26–34)
MCHC RBC AUTO-ENTMCNC: 31.1 G/DL (ref 31–37)
MCV RBC AUTO: 84.8 FL (ref 80–100)
MONOCYTES NFR BLD: 0.88 K/UL (ref 0.1–0.8)
MONOCYTES NFR BLD: 14 % (ref 1–7)
MORPHOLOGY: ABNORMAL
NEUTROPHILS NFR BLD: 63 % (ref 36–66)
NEUTS SEG NFR BLD: 3.97 K/UL (ref 1.8–7.7)
PLATELET # BLD AUTO: 293 K/UL (ref 140–450)
PMV BLD AUTO: 8.6 FL (ref 6–12)
POTASSIUM SERPL-SCNC: 3.6 MMOL/L (ref 3.7–5.3)
PROT SERPL-MCNC: 6 G/DL (ref 6.4–8.3)
RBC # BLD AUTO: 3.56 M/UL (ref 4–5.2)
SARS-COV-2 RDRP RESP QL NAA+PROBE: NOT DETECTED
SODIUM SERPL-SCNC: 139 MMOL/L (ref 135–144)
SPECIMEN DESCRIPTION: NORMAL
TROPONIN I SERPL HS-MCNC: 28 NG/L (ref 0–14)
TROPONIN I SERPL HS-MCNC: 30 NG/L (ref 0–14)
WBC OTHER # BLD: 6.3 K/UL (ref 3.5–11)

## 2023-09-01 PROCEDURE — 83540 ASSAY OF IRON: CPT

## 2023-09-01 PROCEDURE — 71045 X-RAY EXAM CHEST 1 VIEW: CPT

## 2023-09-01 PROCEDURE — G0378 HOSPITAL OBSERVATION PER HR: HCPCS

## 2023-09-01 PROCEDURE — 96374 THER/PROPH/DIAG INJ IV PUSH: CPT

## 2023-09-01 PROCEDURE — 6370000000 HC RX 637 (ALT 250 FOR IP): Performed by: HOSPITALIST

## 2023-09-01 PROCEDURE — 85025 COMPLETE CBC W/AUTO DIFF WBC: CPT

## 2023-09-01 PROCEDURE — 96376 TX/PRO/DX INJ SAME DRUG ADON: CPT

## 2023-09-01 PROCEDURE — 83880 ASSAY OF NATRIURETIC PEPTIDE: CPT

## 2023-09-01 PROCEDURE — 83690 ASSAY OF LIPASE: CPT

## 2023-09-01 PROCEDURE — 36415 COLL VENOUS BLD VENIPUNCTURE: CPT

## 2023-09-01 PROCEDURE — 84484 ASSAY OF TROPONIN QUANT: CPT

## 2023-09-01 PROCEDURE — 80053 COMPREHEN METABOLIC PANEL: CPT

## 2023-09-01 PROCEDURE — 93005 ELECTROCARDIOGRAM TRACING: CPT

## 2023-09-01 PROCEDURE — 6370000000 HC RX 637 (ALT 250 FOR IP)

## 2023-09-01 PROCEDURE — 87635 SARS-COV-2 COVID-19 AMP PRB: CPT

## 2023-09-01 PROCEDURE — 2500000003 HC RX 250 WO HCPCS

## 2023-09-01 PROCEDURE — 2500000003 HC RX 250 WO HCPCS: Performed by: HOSPITALIST

## 2023-09-01 PROCEDURE — 99222 1ST HOSP IP/OBS MODERATE 55: CPT | Performed by: HOSPITALIST

## 2023-09-01 PROCEDURE — 99285 EMERGENCY DEPT VISIT HI MDM: CPT

## 2023-09-01 PROCEDURE — 2580000003 HC RX 258: Performed by: HOSPITALIST

## 2023-09-01 PROCEDURE — 83550 IRON BINDING TEST: CPT

## 2023-09-01 RX ORDER — POTASSIUM CHLORIDE 20 MEQ/1
40 TABLET, EXTENDED RELEASE ORAL ONCE
Status: COMPLETED | OUTPATIENT
Start: 2023-09-01 | End: 2023-09-01

## 2023-09-01 RX ORDER — ONDANSETRON 4 MG/1
4 TABLET, ORALLY DISINTEGRATING ORAL EVERY 8 HOURS PRN
Status: DISCONTINUED | OUTPATIENT
Start: 2023-09-01 | End: 2023-09-01

## 2023-09-01 RX ORDER — ACETAMINOPHEN 650 MG/1
650 SUPPOSITORY RECTAL EVERY 6 HOURS PRN
Status: DISCONTINUED | OUTPATIENT
Start: 2023-09-01 | End: 2023-09-02 | Stop reason: HOSPADM

## 2023-09-01 RX ORDER — POTASSIUM CHLORIDE 7.45 MG/ML
10 INJECTION INTRAVENOUS PRN
Status: DISCONTINUED | OUTPATIENT
Start: 2023-09-01 | End: 2023-09-02 | Stop reason: HOSPADM

## 2023-09-01 RX ORDER — PANTOPRAZOLE SODIUM 40 MG/1
40 TABLET, DELAYED RELEASE ORAL
Status: DISCONTINUED | OUTPATIENT
Start: 2023-09-02 | End: 2023-09-02 | Stop reason: HOSPADM

## 2023-09-01 RX ORDER — SODIUM CHLORIDE 0.9 % (FLUSH) 0.9 %
5-40 SYRINGE (ML) INJECTION EVERY 12 HOURS SCHEDULED
Status: DISCONTINUED | OUTPATIENT
Start: 2023-09-01 | End: 2023-09-02 | Stop reason: HOSPADM

## 2023-09-01 RX ORDER — ACETAMINOPHEN 325 MG/1
650 TABLET ORAL EVERY 6 HOURS PRN
Status: DISCONTINUED | OUTPATIENT
Start: 2023-09-01 | End: 2023-09-02 | Stop reason: HOSPADM

## 2023-09-01 RX ORDER — ONDANSETRON 2 MG/ML
4 INJECTION INTRAMUSCULAR; INTRAVENOUS EVERY 6 HOURS PRN
Status: DISCONTINUED | OUTPATIENT
Start: 2023-09-01 | End: 2023-09-01

## 2023-09-01 RX ORDER — SODIUM CHLORIDE 0.9 % (FLUSH) 0.9 %
5-40 SYRINGE (ML) INJECTION PRN
Status: DISCONTINUED | OUTPATIENT
Start: 2023-09-01 | End: 2023-09-02 | Stop reason: HOSPADM

## 2023-09-01 RX ORDER — SENNOSIDES A AND B 8.6 MG/1
1 TABLET, FILM COATED ORAL DAILY
Status: DISCONTINUED | OUTPATIENT
Start: 2023-09-01 | End: 2023-09-02 | Stop reason: HOSPADM

## 2023-09-01 RX ORDER — ATORVASTATIN CALCIUM 10 MG/1
10 TABLET, FILM COATED ORAL DAILY
Status: DISCONTINUED | OUTPATIENT
Start: 2023-09-01 | End: 2023-09-02 | Stop reason: HOSPADM

## 2023-09-01 RX ORDER — NITROGLYCERIN 0.4 MG/1
0.4 TABLET SUBLINGUAL EVERY 5 MIN PRN
Status: DISCONTINUED | OUTPATIENT
Start: 2023-09-01 | End: 2023-09-02 | Stop reason: HOSPADM

## 2023-09-01 RX ORDER — METOPROLOL SUCCINATE 25 MG/1
25 TABLET, EXTENDED RELEASE ORAL NIGHTLY
Status: DISCONTINUED | OUTPATIENT
Start: 2023-09-01 | End: 2023-09-02 | Stop reason: HOSPADM

## 2023-09-01 RX ORDER — SODIUM CHLORIDE 9 MG/ML
INJECTION, SOLUTION INTRAVENOUS PRN
Status: DISCONTINUED | OUTPATIENT
Start: 2023-09-01 | End: 2023-09-02 | Stop reason: HOSPADM

## 2023-09-01 RX ORDER — BUMETANIDE 0.25 MG/ML
1 INJECTION INTRAMUSCULAR; INTRAVENOUS 2 TIMES DAILY
Status: DISCONTINUED | OUTPATIENT
Start: 2023-09-01 | End: 2023-09-02 | Stop reason: HOSPADM

## 2023-09-01 RX ORDER — LOSARTAN POTASSIUM 50 MG/1
100 TABLET ORAL DAILY
Status: DISCONTINUED | OUTPATIENT
Start: 2023-09-01 | End: 2023-09-02 | Stop reason: HOSPADM

## 2023-09-01 RX ORDER — POLYETHYLENE GLYCOL 3350 17 G/17G
17 POWDER, FOR SOLUTION ORAL DAILY PRN
Status: DISCONTINUED | OUTPATIENT
Start: 2023-09-01 | End: 2023-09-02 | Stop reason: HOSPADM

## 2023-09-01 RX ORDER — BUMETANIDE 0.25 MG/ML
1 INJECTION INTRAMUSCULAR; INTRAVENOUS ONCE
Status: COMPLETED | OUTPATIENT
Start: 2023-09-01 | End: 2023-09-01

## 2023-09-01 RX ORDER — POTASSIUM CHLORIDE 20 MEQ/1
40 TABLET, EXTENDED RELEASE ORAL PRN
Status: DISCONTINUED | OUTPATIENT
Start: 2023-09-01 | End: 2023-09-02 | Stop reason: HOSPADM

## 2023-09-01 RX ORDER — MAGNESIUM SULFATE IN WATER 40 MG/ML
2000 INJECTION, SOLUTION INTRAVENOUS PRN
Status: DISCONTINUED | OUTPATIENT
Start: 2023-09-01 | End: 2023-09-02 | Stop reason: HOSPADM

## 2023-09-01 RX ORDER — CLONIDINE HYDROCHLORIDE 0.1 MG/1
0.1 TABLET ORAL 2 TIMES DAILY
Status: DISCONTINUED | OUTPATIENT
Start: 2023-09-01 | End: 2023-09-02 | Stop reason: HOSPADM

## 2023-09-01 RX ORDER — CLOPIDOGREL BISULFATE 75 MG/1
75 TABLET ORAL DAILY
Status: DISCONTINUED | OUTPATIENT
Start: 2023-09-01 | End: 2023-09-02 | Stop reason: HOSPADM

## 2023-09-01 RX ORDER — PAROXETINE HYDROCHLORIDE 20 MG/1
20 TABLET, FILM COATED ORAL EVERY MORNING
Status: DISCONTINUED | OUTPATIENT
Start: 2023-09-02 | End: 2023-09-02 | Stop reason: HOSPADM

## 2023-09-01 RX ORDER — NITROGLYCERIN 0.4 MG/1
0.4 TABLET SUBLINGUAL
Status: DISCONTINUED | OUTPATIENT
Start: 2023-09-01 | End: 2023-09-01

## 2023-09-01 RX ORDER — ACETAMINOPHEN 500 MG
1000 TABLET ORAL 3 TIMES DAILY
Status: DISCONTINUED | OUTPATIENT
Start: 2023-09-01 | End: 2023-09-02 | Stop reason: HOSPADM

## 2023-09-01 RX ADMIN — METOPROLOL SUCCINATE 25 MG: 25 TABLET, EXTENDED RELEASE ORAL at 21:30

## 2023-09-01 RX ADMIN — BUMETANIDE 1 MG: 0.25 INJECTION INTRAMUSCULAR; INTRAVENOUS at 16:43

## 2023-09-01 RX ADMIN — ACETAMINOPHEN 1000 MG: 500 TABLET ORAL at 21:30

## 2023-09-01 RX ADMIN — APIXABAN 5 MG: 5 TABLET, FILM COATED ORAL at 21:31

## 2023-09-01 RX ADMIN — POTASSIUM CHLORIDE 40 MEQ: 1500 TABLET, EXTENDED RELEASE ORAL at 16:43

## 2023-09-01 RX ADMIN — CLONIDINE HYDROCHLORIDE 0.1 MG: 0.1 TABLET ORAL at 21:31

## 2023-09-01 RX ADMIN — LOSARTAN POTASSIUM 100 MG: 50 TABLET, FILM COATED ORAL at 18:48

## 2023-09-01 RX ADMIN — ATORVASTATIN CALCIUM 10 MG: 10 TABLET, FILM COATED ORAL at 18:48

## 2023-09-01 RX ADMIN — BUMETANIDE 1 MG: 0.25 INJECTION INTRAMUSCULAR; INTRAVENOUS at 21:31

## 2023-09-01 RX ADMIN — SODIUM CHLORIDE, PRESERVATIVE FREE 10 ML: 5 INJECTION INTRAVENOUS at 21:31

## 2023-09-01 ASSESSMENT — ENCOUNTER SYMPTOMS
SHORTNESS OF BREATH: 1
RHINORRHEA: 0
COLOR CHANGE: 0
VOMITING: 0
NAUSEA: 0
SORE THROAT: 0
COUGH: 0
ABDOMINAL PAIN: 0
DIARRHEA: 0

## 2023-09-01 ASSESSMENT — PAIN SCALES - GENERAL
PAINLEVEL_OUTOF10: 7
PAINLEVEL_OUTOF10: 8

## 2023-09-01 ASSESSMENT — PAIN - FUNCTIONAL ASSESSMENT: PAIN_FUNCTIONAL_ASSESSMENT: 0-10

## 2023-09-01 NOTE — H&P
Rapid Not Detected Not Detected       Imaging/Diagnostics:  No results found. Assessment :      Hospital Problems             Last Modified POA    * (Principal) Acute combined systolic (congestive) and diastolic (congestive) heart failure ([address]) 2023 Yes       Plan:     Acute on chronic combined systolic and diastolic congestive heart failure  IV Bumex twice daily  Electrolyte replacement for hypokalemia  Chronic weakness/debility  Multifactorial  Patient has progressive weakness due to decreased mobility complicated by her own multiple sclerosis  Multiple sclerosis  No evidence of acute flare  Essential hypertension  Resume home medications, monitor and titrate  Gastroesophageal reflux disease  Continue PPI  Atrial fibrillation  Chronic, presently in normal sinus rhythm  Continue beta-blocker and Eliquis  Dyslipidemia  Continue pravastatin    Patient is admitted as observation status. Expected length of stay < 48 hours.  Patient is admitted in the Med/Surge    Medical Decision Makin Cy Dalton DO  2023  4:43 PM    Copy sent to Dr. Alba Motley MD

## 2023-09-01 NOTE — ED NOTES
Unable to start line after 2 unsuccessful attempts. Blood returned but unable to flush. L ROD and R AC.      Katie Monk RN  09/01/23 5629

## 2023-09-01 NOTE — ED PROVIDER NOTES
by mouth 2 times daily Currently on hold for procedure next week 07/12/2023      nitroGLYCERIN (NITROSTAT) 0.4 MG SL tablet Place 0.4 mg under the tongue every 5 minutes.  Indications: Chest Pain      PARoxetine (PAXIL) 20 MG tablet Take by mouth every morning       clopidogrel (PLAVIX) 75 MG tablet Take 1 tablet by mouth daily 100 mg pm, currently on hold for procedure on 07/12/2023             ALLERGIES     Dofetilide, Lisinopril-hydrochlorothiazide, Sulfa antibiotics, Penicillins, Polyethylene glycol, Actifed cold-allergy [chlorpheniramine-phenylephrine], Altace [ramipril], Cephalosporins, Coreg [carvedilol], Dml facial moisturizer, Elavil [amitriptyline hcl], Entex [ami-margot], Ethylene glycol, Fentanyl, Heparin, Hizentra [immune globulin (human)], Hydralazine, Levofloxacin, Lisinopril-hydrochlorothiazide, Montelukast sodium, Morphine, Nifedipine, Norvasc [amlodipine besylate], Other, Phenobarbital, Propoxyphene, Robaxin [methocarbamol], Sinequan [doxepin hcl], Sudafed [pseudoephedrine hcl], Sudafed [pseudoephedrine hcl], Tranquil-cecil, Wellbutrin [bupropion hcl], Actifed cold-allergy [chlorpheniramine-phenylephrine], Clindamycin/lincomycin, Codeine, Metoprolol, Metoprolol succinate, and Seldane [terfenadine]    FAMILY HISTORY       Family History   Problem Relation Age of Onset    Other Father         Heart problems    Heart Disease Father     Other Mother         heart problems requiring open heart surgery, HTN    Heart Disease Mother     Other Sister         HTN    Hypertension Sister     Other Maternal Aunt         breast cancer    Breast Cancer Maternal Aunt           SOCIAL HISTORY       Social History     Socioeconomic History    Marital status: Single   Occupational History    Occupation: retired   Tobacco Use    Smoking status: Never    Smokeless tobacco: Never   Vaping Use    Vaping Use: Never used   Substance and Sexual Activity    Alcohol use: No    Drug use: No   Social History Narrative    She uses 98%   Weight: 87.5 kg (193 lb)  86.2 kg (190 lb 0.6 oz)   Height: 5' 1\" (1.549 m)             Medical Decision Making  Differential diagnosis: Pneumonia, pulmonary embolism, CHF, respiratory failure    Patient is having nonhypoxic shortness of breath she has diminished lung sounds without peripheral edema and no wheezing. She has not been sick with any fever or cough. Her PERC is low risk for pulmonary embolism. At rest she is in no respiratory distress however when she gets up and tries to move she develops shortness of breath and tachypnea. Labs were obtained she is mildly anemic 9.4 and 30.2 with blood cell 6.3 BUN and creatinine are 28 and  troponin was mildly elevated at 28, protBNP was 9383 with a potassium of 3.6. She was given IV Bumex 1 mg and oral potassium 40 mill equivalent. Upon further evaluation patient did say she has cut back on her Bumex at home as she was trying to make sure her veins were properly filled for her cardiac cath that she was able to have, I believe this may have caused her CHF exacerbation. Due to the fact that she lives alone and is having problems getting up and around with her shortness of breath she will be admitted to the hospital.    Amount and/or Complexity of Data Reviewed  Labs: ordered. Radiology: ordered. ECG/medicine tests: ordered. Risk  Prescription drug management. Decision regarding hospitalization. REASSESSMENT          CRITICAL CARE TIME   Total Critical Care time was 0 minutes, excluding separately reportable procedures. There was a high probability of clinically significant/life threatening deterioration in the patient's condition which required my urgent intervention. CONSULTS:  None    PROCEDURES:  Unless otherwise noted below, none     Procedures        FINAL IMPRESSION      1. Congestive heart failure, unspecified HF chronicity, unspecified heart failure type (HCC)    2. Elevated troponin    3.  Dyspnea, unspecified type

## 2023-09-02 VITALS
BODY MASS INDEX: 37.54 KG/M2 | WEIGHT: 198.85 LBS | RESPIRATION RATE: 22 BRPM | HEIGHT: 61 IN | HEART RATE: 86 BPM | TEMPERATURE: 97.9 F | SYSTOLIC BLOOD PRESSURE: 128 MMHG | OXYGEN SATURATION: 93 % | DIASTOLIC BLOOD PRESSURE: 75 MMHG

## 2023-09-02 LAB
ANION GAP SERPL CALCULATED.3IONS-SCNC: 13 MMOL/L (ref 9–17)
BUN SERPL-MCNC: 27 MG/DL (ref 8–23)
CALCIUM SERPL-MCNC: 9.3 MG/DL (ref 8.6–10.4)
CHLORIDE SERPL-SCNC: 103 MMOL/L (ref 98–107)
CHOLEST SERPL-MCNC: 106 MG/DL
CHOLESTEROL/HDL RATIO: 2.5
CO2 SERPL-SCNC: 23 MMOL/L (ref 20–31)
CREAT SERPL-MCNC: 1.2 MG/DL (ref 0.5–0.9)
EKG ATRIAL RATE: 89 BPM
EKG Q-T INTERVAL: 420 MS
EKG QRS DURATION: 106 MS
EKG QTC CALCULATION (BAZETT): 496 MS
EKG R AXIS: 1 DEGREES
EKG T AXIS: -140 DEGREES
EKG VENTRICULAR RATE: 84 BPM
GFR SERPL CREATININE-BSD FRML MDRD: 47 ML/MIN/1.73M2
GLUCOSE SERPL-MCNC: 80 MG/DL (ref 70–99)
HDLC SERPL-MCNC: 42 MG/DL
IRON SATN MFR SERPL: 7 % (ref 20–55)
IRON SERPL-MCNC: 32 UG/DL (ref 37–145)
LDLC SERPL CALC-MCNC: 50 MG/DL (ref 0–130)
MAGNESIUM SERPL-MCNC: 1.7 MG/DL (ref 1.6–2.6)
POTASSIUM SERPL-SCNC: 4 MMOL/L (ref 3.7–5.3)
SODIUM SERPL-SCNC: 139 MMOL/L (ref 135–144)
TIBC SERPL-MCNC: 474 UG/DL (ref 250–450)
TRIGL SERPL-MCNC: 68 MG/DL
UNSATURATED IRON BINDING CAPACITY: 442 UG/DL (ref 112–347)

## 2023-09-02 PROCEDURE — G0378 HOSPITAL OBSERVATION PER HR: HCPCS

## 2023-09-02 PROCEDURE — 94761 N-INVAS EAR/PLS OXIMETRY MLT: CPT

## 2023-09-02 PROCEDURE — 6370000000 HC RX 637 (ALT 250 FOR IP): Performed by: HOSPITALIST

## 2023-09-02 PROCEDURE — 36415 COLL VENOUS BLD VENIPUNCTURE: CPT

## 2023-09-02 PROCEDURE — 80048 BASIC METABOLIC PNL TOTAL CA: CPT

## 2023-09-02 PROCEDURE — 96376 TX/PRO/DX INJ SAME DRUG ADON: CPT

## 2023-09-02 PROCEDURE — 83735 ASSAY OF MAGNESIUM: CPT

## 2023-09-02 PROCEDURE — 80061 LIPID PANEL: CPT

## 2023-09-02 PROCEDURE — 2500000003 HC RX 250 WO HCPCS: Performed by: HOSPITALIST

## 2023-09-02 PROCEDURE — 99232 SBSQ HOSP IP/OBS MODERATE 35: CPT | Performed by: HOSPITALIST

## 2023-09-02 PROCEDURE — 2580000003 HC RX 258: Performed by: HOSPITALIST

## 2023-09-02 RX ADMIN — CLOPIDOGREL BISULFATE 75 MG: 75 TABLET ORAL at 08:28

## 2023-09-02 RX ADMIN — APIXABAN 5 MG: 5 TABLET, FILM COATED ORAL at 08:28

## 2023-09-02 RX ADMIN — CLONIDINE HYDROCHLORIDE 0.1 MG: 0.1 TABLET ORAL at 08:28

## 2023-09-02 RX ADMIN — PANTOPRAZOLE SODIUM 40 MG: 40 TABLET, DELAYED RELEASE ORAL at 05:33

## 2023-09-02 RX ADMIN — SODIUM CHLORIDE, PRESERVATIVE FREE 10 ML: 5 INJECTION INTRAVENOUS at 08:29

## 2023-09-02 RX ADMIN — LOSARTAN POTASSIUM 100 MG: 50 TABLET, FILM COATED ORAL at 08:28

## 2023-09-02 RX ADMIN — PAROXETINE HYDROCHLORIDE 20 MG: 20 TABLET, FILM COATED ORAL at 08:28

## 2023-09-02 RX ADMIN — ATORVASTATIN CALCIUM 10 MG: 10 TABLET, FILM COATED ORAL at 08:28

## 2023-09-02 RX ADMIN — ACETAMINOPHEN 1000 MG: 500 TABLET ORAL at 08:32

## 2023-09-02 RX ADMIN — BUMETANIDE 1 MG: 0.25 INJECTION INTRAMUSCULAR; INTRAVENOUS at 08:29

## 2023-09-02 ASSESSMENT — PAIN SCALES - GENERAL: PAINLEVEL_OUTOF10: 9

## 2023-09-02 NOTE — PROGRESS NOTES
[facility]  Office: 7900  1826, DO, Telly Marcano, DO, Rashel Koo, DO, Hal Jorge Blood, DO, Fuentes Pope MD, Noris Enriquez MD, Fiona Lowery MD, Ebbie Meigs, MD,  Radha Andres MD, Aashish Rivas MD, Rigoberto Moreno, DO, Aggie Rosario MD,  Carlos Leal MD, Torin Mon MD, Sherry Alex DO, Kimi Cota MD,  Radha Pendleton, DO, Verner Crandall, MD, Anamaria Rodriguez MD, Marie Pagan MD, Alexis Bright MD,  Kt Eugene MD, Elvira Mir MD, Melany Marie MD, Delia Junior DO, Redd Lema MD,  John Epstein MD, Debbie Kiser, CNP,  Chalino Leonard, CNP,, Spencer Barlow, CNP,  Chika Rose, [facility], Wayne Salazar, CNP, Bailey Nichole, CNP, Karley Childress, CNP, Sharon Saba, CNP, Lisa Dangelo, CNP, Karine Johnson, CNP, Kiara Winters PA-C, Chance Ma, CHERELLE, Mariluz Horton, CNP, Carlotta Ferreira, [address] [place] [address]    Progress Note    9/2/2023    10:15 AM    Name:   Jason Bay  MRN:     [id number]     Acct:      [de-identified]   Room:   [address] [place] [address] Day:  0  Admit Date:  9/1/2023  2:17 PM    PCP:   Baljeet Barrera MD  Code Status:  Full Code    Subjective:     Patient seen in follow-up for dyspnea on exertion, patient states \"I am feeling better\"    Patient is doing better overall. She has been placed back on Bumex which she had not taking for a number of days prior to presentation to the hospital.  As mentioned yesterday she does not have a severe combined diastolic CHF exacerbation. She is able to lay flat and is not requiring any supplemental oxygen. Most of her symptomology is dyspnea with exertion which is multifactorial.  Noncompliance with her Bumex undoubtably has exacerbated this. She does have multiple sclerosis and has some baseline debility to start with. At this point in time she has diuresed well and we can transition her back to her oral regimen.   She can be overt edema.        Physical Examination:     General appearance:  alert, cooperative and no distress  Mental Status:  oriented to person, place and time and normal affect  Lungs:  clear to auscultation bilaterally, normal effort  Heart:  regular rate and rhythm, no murmur  Abdomen:  soft, nontender, nondistended, normal bowel sounds, no masses, hepatomegaly, splenomegaly  Extremities:  no edema, redness, tenderness in the calves  Skin:  no gross lesions, rashes, induration    Assessment:     Hospital Problems             Last Modified POA    * (Principal) Acute combined systolic (congestive) and diastolic (congestive) heart failure ([address]) 9/1/2023 Yes       Plan:     Acute on chronic combined systolic and diastolic congestive heart failure  Secondary to noncompliance with Bumex  Patient is not presently requiring oxygen and is hemodynamically stable  Transition back to oral Bumex on discharge  Chronic weakness and debility  Multifactorial secondary to above with known multiple sclerosis and chronic debility  At baseline  Multiple sclerosis  No evidence of acute flare  Essential hypertension  Continue home medications    Discharge home    Medical Decision Making: Lauren Mancera DO  9/2/2023  10:15 AM

## 2023-09-02 NOTE — DISCHARGE SUMMARY
[facility]  Office: 7900  1826, DO, Carolina [place], DO, Jayden Me, DO, Oralee Britt Blood, DO, Yanique Hussein MD, John Moragn MD, Gilda Cai MD, Luz Elena Akers MD,  Deya Colón MD, Betzaida Shoemaker MD, Kika Marcus, DO, Kylee Bundy MD,  Bruce Lafleur MD, Nelda Cross MD, Bang Robertson DO, Andreina Subramanian MD,  Andrea Saenz DO, Clay Gross MD, Ayanna Casanova MD, Bianca Quigley MD, Luis Shields MD,  Luci Barrientos MD, Keyonna Moncada MD, Joslyn Soto MD, Giovani Vega DO, Herbie Simms MD,  Sharon Taylor MD, Michelle Maguire, CNP,  Christina Griggs, CNP,, Braxton Dudley, CNP,  Stevie Englsih, [facility], Eduard Panchal, CNP, Minnie Todd, CNP, Yasmeen Mcdaniel, CNP, Robb Parish, CNP, Garrett Barbosa, CNP, Rosie Puga, CNP, Casandra Melendez PA-C, Casandra Orellana, CHERELLE, Rhea Luo, CNP, Nancy Beltran, [address] [place] [address]    Discharge Summary     Patient ID: Bertin Bedolla  :  1945   MRN: [id number]     ACCOUNT:  [de-identified]   Patient's PCP: Kiara Chu MD  Admit Date: 2023   Discharge Date: 2023  Length of Stay: 0  Code Status:  Full Code  Admitting Physician: Michele Quigley DO  Discharge Physician: Michele Quigley DO     Active Discharge Diagnoses:     Hospital Problem Lists:  Principal Problem:    Acute combined systolic (congestive) and diastolic (congestive) heart failure [facility])  Resolved Problems:    * No resolved hospital problems. *      Admission Condition:  fair     Discharged Condition: good    Hospital Stay:     Hospital Course:  Bertin Bedolla is a 78 y.o. female who was admitted for the management of Acute combined systolic (congestive) and diastolic (congestive) heart failure ([address]) , presented to ER with Shortness of Breath (PT presents via EMS for evaluation of SOB. PT became SOB when walking to her car at ~10AM. PT called her PCP and a RN called EMS.

## 2023-09-02 NOTE — PLAN OF CARE
Pt given written discharge instructions. Verbalized understanding of material including the importance of taking bumex as prescribed. PIV removed per protocol with no complications. Pt discharged home per Dr. Karl Carias via wheelchair at this time.

## 2023-09-02 NOTE — PLAN OF CARE
Problem: Discharge Planning  Goal: Discharge to home or other facility with appropriate resources  Outcome: Progressing     Problem: Pain  Goal: Verbalizes/displays adequate comfort level or baseline comfort level  Outcome: Progressing   Scheduled PRN pain medication ordered (See Mar). Problem: Safety - Adult  Goal: Free from fall injury  Outcome: Progressing   The patient remained free from falls this shift, call light within reach, bed in locked and lowest position. Side rails up x2. Continue to monitor closely.

## 2023-10-06 ENCOUNTER — DIRECT ADMIT ORDERS (OUTPATIENT)
Dept: INTERNAL MEDICINE CLINIC | Age: 78
End: 2023-10-06

## 2023-10-06 ENCOUNTER — APPOINTMENT (OUTPATIENT)
Dept: CT IMAGING | Age: 78
End: 2023-10-06
Payer: MEDICARE

## 2023-10-06 ENCOUNTER — HOSPITAL ENCOUNTER (EMERGENCY)
Age: 78
Discharge: ANOTHER HOSPITAL | End: 2023-10-07
Attending: STUDENT IN AN ORGANIZED HEALTH CARE EDUCATION/TRAINING PROGRAM
Payer: MEDICARE

## 2023-10-06 DIAGNOSIS — I50.9 ACUTE ON CHRONIC CONGESTIVE HEART FAILURE, UNSPECIFIED HEART FAILURE TYPE (HCC): ICD-10-CM

## 2023-10-06 DIAGNOSIS — R06.09 DYSPNEA ON EXERTION: ICD-10-CM

## 2023-10-06 DIAGNOSIS — I26.99 ACUTE PULMONARY EMBOLISM, UNSPECIFIED PULMONARY EMBOLISM TYPE, UNSPECIFIED WHETHER ACUTE COR PULMONALE PRESENT (HCC): Primary | ICD-10-CM

## 2023-10-06 DIAGNOSIS — I31.39 PERICARDIAL EFFUSION: Primary | ICD-10-CM

## 2023-10-06 LAB
ALBUMIN SERPL-MCNC: 4.3 G/DL (ref 3.5–5.2)
ALBUMIN/GLOB SERPL: 2.4 {RATIO} (ref 1–2.5)
ALP SERPL-CCNC: 116 U/L (ref 35–104)
ALT SERPL-CCNC: 11 U/L (ref 5–33)
ANION GAP SERPL CALCULATED.3IONS-SCNC: 14 MMOL/L (ref 9–17)
AST SERPL-CCNC: 20 U/L
BACTERIA URNS QL MICRO: ABNORMAL
BASOPHILS # BLD: 0.07 K/UL (ref 0–0.2)
BASOPHILS NFR BLD: 1 % (ref 0–2)
BILIRUB SERPL-MCNC: 1.8 MG/DL (ref 0.3–1.2)
BILIRUB UR QL STRIP: NEGATIVE
BNP SERPL-MCNC: 7446 PG/ML
BUN SERPL-MCNC: 42 MG/DL (ref 8–23)
CALCIUM SERPL-MCNC: 9 MG/DL (ref 8.6–10.4)
CHARACTER UR: ABNORMAL
CHLORIDE SERPL-SCNC: 102 MMOL/L (ref 98–107)
CLARITY UR: CLEAR
CO2 SERPL-SCNC: 22 MMOL/L (ref 20–31)
COLOR UR: YELLOW
CREAT SERPL-MCNC: 1.2 MG/DL (ref 0.5–0.9)
EOSINOPHIL # BLD: 0.07 K/UL (ref 0–0.4)
EOSINOPHILS RELATIVE PERCENT: 1 % (ref 1–4)
EPI CELLS #/AREA URNS HPF: ABNORMAL /HPF (ref 0–5)
ERYTHROCYTE [DISTWIDTH] IN BLOOD BY AUTOMATED COUNT: 20.7 % (ref 12.5–15.4)
GFR SERPL CREATININE-BSD FRML MDRD: 47 ML/MIN/1.73M2
GLUCOSE SERPL-MCNC: 107 MG/DL (ref 70–99)
GLUCOSE UR STRIP-MCNC: NEGATIVE MG/DL
HCT VFR BLD AUTO: 27.2 % (ref 36–46)
HGB BLD-MCNC: 8.6 G/DL (ref 12–16)
HGB UR QL STRIP.AUTO: ABNORMAL
INR PPP: 1.7
KETONES UR STRIP-MCNC: NEGATIVE MG/DL
LEUKOCYTE ESTERASE UR QL STRIP: NEGATIVE
LIPASE SERPL-CCNC: 29 U/L (ref 13–60)
LYMPHOCYTES NFR BLD: 1.21 K/UL (ref 1–4.8)
LYMPHOCYTES RELATIVE PERCENT: 18 % (ref 24–44)
MCH RBC QN AUTO: 26.7 PG (ref 26–34)
MCHC RBC AUTO-ENTMCNC: 31.7 G/DL (ref 31–37)
MCV RBC AUTO: 84.4 FL (ref 80–100)
MONOCYTES NFR BLD: 0.87 K/UL (ref 0.1–1.2)
MONOCYTES NFR BLD: 13 % (ref 2–11)
MORPHOLOGY: ABNORMAL
NEUTROPHILS NFR BLD: 67 % (ref 36–66)
NEUTS SEG NFR BLD: 4.48 K/UL (ref 1.8–7.7)
NITRITE UR QL STRIP: NEGATIVE
PARTIAL THROMBOPLASTIN TIME: 33.2 SEC (ref 21.3–31.3)
PH UR STRIP: 6 [PH] (ref 5–8)
PLATELET # BLD AUTO: 274 K/UL (ref 140–450)
PMV BLD AUTO: 8.4 FL (ref 6–12)
POTASSIUM SERPL-SCNC: 3.7 MMOL/L (ref 3.7–5.3)
PROT SERPL-MCNC: 6.1 G/DL (ref 6.4–8.3)
PROT UR STRIP-MCNC: NEGATIVE MG/DL
PROTHROMBIN TIME: 18.4 SEC (ref 9.4–12.6)
RBC # BLD AUTO: 3.23 M/UL (ref 4–5.2)
RBC #/AREA URNS HPF: ABNORMAL /HPF (ref 0–2)
SODIUM SERPL-SCNC: 138 MMOL/L (ref 135–144)
SP GR UR STRIP: 1.01 (ref 1–1.03)
TROPONIN I SERPL HS-MCNC: 32 NG/L (ref 0–14)
TROPONIN I SERPL HS-MCNC: 35 NG/L (ref 0–14)
UROBILINOGEN UR STRIP-ACNC: NORMAL EU/DL (ref 0–1)
WBC #/AREA URNS HPF: ABNORMAL /HPF (ref 0–5)
WBC OTHER # BLD: 6.7 K/UL (ref 3.5–11)

## 2023-10-06 PROCEDURE — 99285 EMERGENCY DEPT VISIT HI MDM: CPT

## 2023-10-06 PROCEDURE — 74177 CT ABD & PELVIS W/CONTRAST: CPT

## 2023-10-06 PROCEDURE — 85025 COMPLETE CBC W/AUTO DIFF WBC: CPT

## 2023-10-06 PROCEDURE — 81001 URINALYSIS AUTO W/SCOPE: CPT

## 2023-10-06 PROCEDURE — 85610 PROTHROMBIN TIME: CPT

## 2023-10-06 PROCEDURE — 2580000003 HC RX 258: Performed by: STUDENT IN AN ORGANIZED HEALTH CARE EDUCATION/TRAINING PROGRAM

## 2023-10-06 PROCEDURE — 83690 ASSAY OF LIPASE: CPT

## 2023-10-06 PROCEDURE — 2500000003 HC RX 250 WO HCPCS: Performed by: STUDENT IN AN ORGANIZED HEALTH CARE EDUCATION/TRAINING PROGRAM

## 2023-10-06 PROCEDURE — 96374 THER/PROPH/DIAG INJ IV PUSH: CPT

## 2023-10-06 PROCEDURE — 83880 ASSAY OF NATRIURETIC PEPTIDE: CPT

## 2023-10-06 PROCEDURE — 6360000002 HC RX W HCPCS: Performed by: STUDENT IN AN ORGANIZED HEALTH CARE EDUCATION/TRAINING PROGRAM

## 2023-10-06 PROCEDURE — 80053 COMPREHEN METABOLIC PANEL: CPT

## 2023-10-06 PROCEDURE — 96375 TX/PRO/DX INJ NEW DRUG ADDON: CPT

## 2023-10-06 PROCEDURE — 71260 CT THORAX DX C+: CPT

## 2023-10-06 PROCEDURE — 6360000004 HC RX CONTRAST MEDICATION: Performed by: STUDENT IN AN ORGANIZED HEALTH CARE EDUCATION/TRAINING PROGRAM

## 2023-10-06 PROCEDURE — 93005 ELECTROCARDIOGRAM TRACING: CPT | Performed by: STUDENT IN AN ORGANIZED HEALTH CARE EDUCATION/TRAINING PROGRAM

## 2023-10-06 PROCEDURE — 36415 COLL VENOUS BLD VENIPUNCTURE: CPT

## 2023-10-06 PROCEDURE — 96372 THER/PROPH/DIAG INJ SC/IM: CPT

## 2023-10-06 PROCEDURE — 84484 ASSAY OF TROPONIN QUANT: CPT

## 2023-10-06 PROCEDURE — 85730 THROMBOPLASTIN TIME PARTIAL: CPT

## 2023-10-06 RX ORDER — 0.9 % SODIUM CHLORIDE 0.9 %
80 INTRAVENOUS SOLUTION INTRAVENOUS ONCE
Status: DISCONTINUED | OUTPATIENT
Start: 2023-10-06 | End: 2023-10-07 | Stop reason: HOSPADM

## 2023-10-06 RX ORDER — ONDANSETRON 2 MG/ML
4 INJECTION INTRAMUSCULAR; INTRAVENOUS EVERY 6 HOURS PRN
Status: CANCELLED | OUTPATIENT
Start: 2023-10-06

## 2023-10-06 RX ORDER — SODIUM CHLORIDE 0.9 % (FLUSH) 0.9 %
10 SYRINGE (ML) INJECTION PRN
Status: CANCELLED | OUTPATIENT
Start: 2023-10-06

## 2023-10-06 RX ORDER — SODIUM CHLORIDE 0.9 % (FLUSH) 0.9 %
10 SYRINGE (ML) INJECTION PRN
Status: DISCONTINUED | OUTPATIENT
Start: 2023-10-06 | End: 2023-10-07 | Stop reason: HOSPADM

## 2023-10-06 RX ORDER — ONDANSETRON 4 MG/1
4 TABLET, ORALLY DISINTEGRATING ORAL EVERY 8 HOURS PRN
Status: CANCELLED | OUTPATIENT
Start: 2023-10-06

## 2023-10-06 RX ORDER — POTASSIUM CHLORIDE 20 MEQ/1
40 TABLET, EXTENDED RELEASE ORAL PRN
Status: CANCELLED | OUTPATIENT
Start: 2023-10-06

## 2023-10-06 RX ORDER — BISACODYL 5 MG/1
5 TABLET, DELAYED RELEASE ORAL DAILY PRN
Status: CANCELLED | OUTPATIENT
Start: 2023-10-06

## 2023-10-06 RX ORDER — ACETAMINOPHEN 650 MG/1
650 SUPPOSITORY RECTAL EVERY 6 HOURS PRN
Status: CANCELLED | OUTPATIENT
Start: 2023-10-06

## 2023-10-06 RX ORDER — BUMETANIDE 0.25 MG/ML
1 INJECTION INTRAMUSCULAR; INTRAVENOUS ONCE
Status: COMPLETED | OUTPATIENT
Start: 2023-10-06 | End: 2023-10-06

## 2023-10-06 RX ORDER — ENOXAPARIN SODIUM 100 MG/ML
1 INJECTION SUBCUTANEOUS DAILY
Status: CANCELLED | OUTPATIENT
Start: 2023-10-07

## 2023-10-06 RX ORDER — ENOXAPARIN SODIUM 100 MG/ML
1 INJECTION SUBCUTANEOUS ONCE
Status: COMPLETED | OUTPATIENT
Start: 2023-10-06 | End: 2023-10-06

## 2023-10-06 RX ORDER — SODIUM CHLORIDE 9 MG/ML
INJECTION, SOLUTION INTRAVENOUS PRN
Status: CANCELLED | OUTPATIENT
Start: 2023-10-06

## 2023-10-06 RX ORDER — ACETAMINOPHEN 325 MG/1
650 TABLET ORAL EVERY 6 HOURS PRN
Status: CANCELLED | OUTPATIENT
Start: 2023-10-06

## 2023-10-06 RX ORDER — POTASSIUM CHLORIDE 7.45 MG/ML
10 INJECTION INTRAVENOUS PRN
Status: CANCELLED | OUTPATIENT
Start: 2023-10-06

## 2023-10-06 RX ORDER — SODIUM CHLORIDE 0.9 % (FLUSH) 0.9 %
5-40 SYRINGE (ML) INJECTION EVERY 12 HOURS SCHEDULED
Status: CANCELLED | OUTPATIENT
Start: 2023-10-06

## 2023-10-06 RX ORDER — ONDANSETRON 2 MG/ML
4 INJECTION INTRAMUSCULAR; INTRAVENOUS ONCE
Status: COMPLETED | OUTPATIENT
Start: 2023-10-06 | End: 2023-10-06

## 2023-10-06 RX ADMIN — IOPAMIDOL 100 ML: 755 INJECTION, SOLUTION INTRAVENOUS at 19:20

## 2023-10-06 RX ADMIN — ENOXAPARIN SODIUM 80 MG: 100 INJECTION SUBCUTANEOUS at 22:19

## 2023-10-06 RX ADMIN — BUMETANIDE 1 MG: 0.25 INJECTION INTRAMUSCULAR; INTRAVENOUS at 20:19

## 2023-10-06 RX ADMIN — Medication 80 ML: at 19:20

## 2023-10-06 RX ADMIN — SODIUM CHLORIDE, PRESERVATIVE FREE 10 ML: 5 INJECTION INTRAVENOUS at 19:21

## 2023-10-06 RX ADMIN — ONDANSETRON 4 MG: 2 INJECTION INTRAMUSCULAR; INTRAVENOUS at 18:30

## 2023-10-06 ASSESSMENT — ENCOUNTER SYMPTOMS
NAUSEA: 1
VOMITING: 0
WHEEZING: 0
BLOOD IN STOOL: 0
DIARRHEA: 0
COUGH: 0
ABDOMINAL PAIN: 0
SORE THROAT: 0
BACK PAIN: 0
COLOR CHANGE: 1
SHORTNESS OF BREATH: 1
FACIAL SWELLING: 0

## 2023-10-06 ASSESSMENT — PAIN - FUNCTIONAL ASSESSMENT: PAIN_FUNCTIONAL_ASSESSMENT: NONE - DENIES PAIN

## 2023-10-06 NOTE — ED NOTES
[facility] ENCOUNTER      Pt Name: Obdulio Ny  MRN: [id number]  [address] [facility] [place] 1945  Date of evaluation: 10/6/2023  Provider: Jae Fonseca       Chief Complaint   Patient presents with    Shortness of Breath    Chest Pain     Pt arrives via ems with co sob and chest pain which was noted today. HISTORY OF PRESENT ILLNESS   (Location/Symptom, Timing/Onset, Context/Setting, Quality, Duration, Modifying Factors, Severity)  Note limiting factors. Obdulio Ny is a 78 y.o. female who presents to the emergency department shortness of breath       Patient is a 78year old female with a history of multiple MIs, HTN, pulmonary hypertension, seizures, HFpEF, multivessel CAD s/p PCI of LAD in September, A-fib on Eliquis s/p pacemaker, CVA, presenting with shortness of breath on exertion. Patient endorses chronic nausea, and chronic substernal chest pain since pacemaker placement, denied any numbness, tingling, changes in vision. Nursing Notes were reviewed. REVIEW OF SYSTEMS    (2-9 systems for level 4, 10 or more for level 5)     Review of Systems   All other systems reviewed and are negative. Except as noted above the remainder of the review of systems was reviewed and negative. PAST MEDICAL HISTORY     Past Medical History:   Diagnosis Date    Abnormal Pap smear 10/2010    paucity or absence of TZx 3 years in a row. 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     History of bone density study 7/12    penia    History of migraines

## 2023-10-07 ENCOUNTER — APPOINTMENT (OUTPATIENT)
Age: 78
End: 2023-10-07
Attending: NURSE PRACTITIONER
Payer: MEDICARE

## 2023-10-07 ENCOUNTER — HOSPITAL ENCOUNTER (INPATIENT)
Age: 78
LOS: 11 days | Discharge: NURSING, REHAB OR LONG-TERM CARE | End: 2023-10-18
Attending: STUDENT IN AN ORGANIZED HEALTH CARE EDUCATION/TRAINING PROGRAM | Admitting: FAMILY MEDICINE
Payer: MEDICARE

## 2023-10-07 VITALS
SYSTOLIC BLOOD PRESSURE: 157 MMHG | OXYGEN SATURATION: 99 % | HEART RATE: 85 BPM | DIASTOLIC BLOOD PRESSURE: 87 MMHG | WEIGHT: 170 LBS | RESPIRATION RATE: 17 BRPM | TEMPERATURE: 97.5 F | BODY MASS INDEX: 32.1 KG/M2 | HEIGHT: 61 IN

## 2023-10-07 DIAGNOSIS — I26.99 PULMONARY EMBOLUS, LEFT (HCC): Primary | ICD-10-CM

## 2023-10-07 DIAGNOSIS — I31.39 PERICARDIAL EFFUSION: ICD-10-CM

## 2023-10-07 PROBLEM — E87.29 HIGH ANION GAP METABOLIC ACIDOSIS: Status: ACTIVE | Noted: 2023-10-07

## 2023-10-07 PROBLEM — R79.1 ELEVATED INR: Status: ACTIVE | Noted: 2023-10-07

## 2023-10-07 LAB
ABO + RH BLD: NORMAL
ALBUMIN SERPL-MCNC: 4 G/DL (ref 3.5–5.2)
ALBUMIN/GLOB SERPL: 2 {RATIO} (ref 1–2.5)
ALP SERPL-CCNC: 107 U/L (ref 35–104)
ALT SERPL-CCNC: 17 U/L (ref 10–35)
ANION GAP SERPL CALCULATED.3IONS-SCNC: 19 MMOL/L (ref 9–16)
APAP SERPL-MCNC: <5 UG/ML (ref 10–30)
ARM BAND NUMBER: NORMAL
AST SERPL-CCNC: 42 U/L (ref 10–35)
BASOPHILS # BLD: 0.09 K/UL (ref 0–0.2)
BASOPHILS NFR BLD: 1 % (ref 0–2)
BILIRUB DIRECT SERPL-MCNC: 1.5 MG/DL
BILIRUB SERPL-MCNC: 2.5 MG/DL (ref 0–1.2)
BLOOD BANK SAMPLE EXPIRATION: NORMAL
BLOOD GROUP ANTIBODIES SERPL: NEGATIVE
BNP SERPL-MCNC: 9917 PG/ML
BUN SERPL-MCNC: 42 MG/DL (ref 8–23)
CALCIUM SERPL-MCNC: 9 MG/DL (ref 8.6–10.4)
CHLORIDE SERPL-SCNC: 102 MMOL/L (ref 98–107)
CK SERPL-CCNC: 154 U/L (ref 26–192)
CO2 SERPL-SCNC: 15 MMOL/L (ref 20–31)
CREAT SERPL-MCNC: 1.3 MG/DL (ref 0.5–0.9)
ECHO BSA: 1.91 M2
ECHO LV EDV A2C: 18 ML
ECHO LV EDV A4C: 22 ML
ECHO LV EDV INDEX A4C: 12 ML/M2
ECHO LV EDV NDEX A2C: 10 ML/M2
ECHO LV EJECTION FRACTION A2C: 69 %
ECHO LV EJECTION FRACTION A4C: 66 %
ECHO LV EJECTION FRACTION BIPLANE: 66 % (ref 55–100)
ECHO LV ESV A2C: 6 ML
ECHO LV ESV A4C: 8 ML
ECHO LV ESV INDEX A2C: 3 ML/M2
ECHO LV ESV INDEX A4C: 4 ML/M2
ECHO PERICARDIUM POSTERIOR DIMENSION: 1.1 CM
EKG ATRIAL RATE: 88 BPM
EKG Q-T INTERVAL: 462 MS
EKG QRS DURATION: 136 MS
EKG QTC CALCULATION (BAZETT): 552 MS
EKG R AXIS: 46 DEGREES
EKG T AXIS: -112 DEGREES
EKG VENTRICULAR RATE: 86 BPM
EOSINOPHIL # BLD: 0.09 K/UL (ref 0–0.44)
EOSINOPHILS RELATIVE PERCENT: 1 % (ref 1–4)
ERYTHROCYTE [DISTWIDTH] IN BLOOD BY AUTOMATED COUNT: 20.4 % (ref 11.8–14.4)
FERRITIN SERPL-MCNC: 26 NG/ML (ref 13–150)
FIBRINOGEN PPP-MCNC: 203 MG/DL (ref 203–521)
FOLATE SERPL-MCNC: 11.3 NG/ML (ref 4.8–24.2)
GFR SERPL CREATININE-BSD FRML MDRD: 43 ML/MIN/1.73M2
GLUCOSE SERPL-MCNC: 77 MG/DL (ref 74–99)
HAPTOGLOB SERPL-MCNC: 74 MG/DL (ref 30–200)
HCT VFR BLD AUTO: 30.6 % (ref 36.3–47.1)
HGB BLD-MCNC: 8.2 G/DL (ref 11.9–15.1)
IMM GRANULOCYTES # BLD AUTO: 0.09 K/UL (ref 0–0.3)
IMM GRANULOCYTES NFR BLD: 1 %
INR PPP: 3
INR PPP: 3.1
IRON SATN MFR SERPL: 6 % (ref 20–55)
IRON SERPL-MCNC: 27 UG/DL (ref 37–145)
LDH SERPL-CCNC: 360 U/L (ref 135–214)
LYMPHOCYTES NFR BLD: 1.75 K/UL (ref 1.1–3.7)
LYMPHOCYTES RELATIVE PERCENT: 19 % (ref 24–43)
MAGNESIUM SERPL-MCNC: 2.2 MG/DL (ref 1.6–2.4)
MCH RBC QN AUTO: 26.1 PG (ref 25.2–33.5)
MCHC RBC AUTO-ENTMCNC: 26.8 G/DL (ref 28.4–34.8)
MCV RBC AUTO: 97.5 FL (ref 82.6–102.9)
MONOCYTES NFR BLD: 1.1 K/UL (ref 0.1–1.2)
MONOCYTES NFR BLD: 12 % (ref 3–12)
MORPHOLOGY: ABNORMAL
MYOGLOBIN SERPL-MCNC: 235 NG/ML (ref 25–58)
NEUTROPHILS NFR BLD: 66 % (ref 36–65)
NEUTS SEG NFR BLD: 6.08 K/UL (ref 1.5–8.1)
NRBC BLD-RTO: 1.7 PER 100 WBC
PLATELET # BLD AUTO: 289 K/UL (ref 138–453)
PMV BLD AUTO: 10.6 FL (ref 8.1–13.5)
POTASSIUM SERPL-SCNC: 3.9 MMOL/L (ref 3.7–5.3)
PROT SERPL-MCNC: 5.8 G/DL (ref 6.6–8.7)
PROTHROMBIN TIME: 30.6 SEC (ref 11.7–14.9)
PROTHROMBIN TIME: 31.3 SEC (ref 11.7–14.9)
RBC # BLD AUTO: 3.14 M/UL (ref 3.95–5.11)
SODIUM SERPL-SCNC: 136 MMOL/L (ref 136–145)
TIBC SERPL-MCNC: 444 UG/DL (ref 250–450)
TROPONIN I SERPL HS-MCNC: 40 NG/L (ref 0–14)
TROPONIN I SERPL HS-MCNC: 41 NG/L (ref 0–14)
TROPONIN I SERPL HS-MCNC: 49 NG/L (ref 0–14)
UNSATURATED IRON BINDING CAPACITY: 417 UG/DL (ref 112–347)
VIT B12 SERPL-MCNC: 794 PG/ML (ref 232–1245)
WBC OTHER # BLD: 9.2 K/UL (ref 3.5–11.3)

## 2023-10-07 PROCEDURE — 2580000003 HC RX 258: Performed by: NURSE PRACTITIONER

## 2023-10-07 PROCEDURE — 99223 1ST HOSP IP/OBS HIGH 75: CPT | Performed by: INTERNAL MEDICINE

## 2023-10-07 PROCEDURE — 82728 ASSAY OF FERRITIN: CPT

## 2023-10-07 PROCEDURE — 81241 F5 GENE: CPT

## 2023-10-07 PROCEDURE — 36415 COLL VENOUS BLD VENIPUNCTURE: CPT

## 2023-10-07 PROCEDURE — 82607 VITAMIN B-12: CPT

## 2023-10-07 PROCEDURE — 83010 ASSAY OF HAPTOGLOBIN QUANT: CPT

## 2023-10-07 PROCEDURE — 86147 CARDIOLIPIN ANTIBODY EA IG: CPT

## 2023-10-07 PROCEDURE — 85730 THROMBOPLASTIN TIME PARTIAL: CPT

## 2023-10-07 PROCEDURE — 93308 TTE F-UP OR LMTD: CPT | Performed by: INTERNAL MEDICINE

## 2023-10-07 PROCEDURE — 6360000002 HC RX W HCPCS: Performed by: INTERNAL MEDICINE

## 2023-10-07 PROCEDURE — 83735 ASSAY OF MAGNESIUM: CPT

## 2023-10-07 PROCEDURE — 6370000000 HC RX 637 (ALT 250 FOR IP): Performed by: NURSE PRACTITIONER

## 2023-10-07 PROCEDURE — 85384 FIBRINOGEN ACTIVITY: CPT

## 2023-10-07 PROCEDURE — 2060000000 HC ICU INTERMEDIATE R&B

## 2023-10-07 PROCEDURE — 86850 RBC ANTIBODY SCREEN: CPT

## 2023-10-07 PROCEDURE — 83874 ASSAY OF MYOGLOBIN: CPT

## 2023-10-07 PROCEDURE — 99222 1ST HOSP IP/OBS MODERATE 55: CPT | Performed by: STUDENT IN AN ORGANIZED HEALTH CARE EDUCATION/TRAINING PROGRAM

## 2023-10-07 PROCEDURE — 86901 BLOOD TYPING SEROLOGIC RH(D): CPT

## 2023-10-07 PROCEDURE — 82746 ASSAY OF FOLIC ACID SERUM: CPT

## 2023-10-07 PROCEDURE — 84484 ASSAY OF TROPONIN QUANT: CPT

## 2023-10-07 PROCEDURE — 2T015 HOSPITALIST 2ND TOUCH: CPT | Performed by: STUDENT IN AN ORGANIZED HEALTH CARE EDUCATION/TRAINING PROGRAM

## 2023-10-07 PROCEDURE — 6370000000 HC RX 637 (ALT 250 FOR IP): Performed by: STUDENT IN AN ORGANIZED HEALTH CARE EDUCATION/TRAINING PROGRAM

## 2023-10-07 PROCEDURE — 94640 AIRWAY INHALATION TREATMENT: CPT

## 2023-10-07 PROCEDURE — 83550 IRON BINDING TEST: CPT

## 2023-10-07 PROCEDURE — 6370000000 HC RX 637 (ALT 250 FOR IP): Performed by: INTERNAL MEDICINE

## 2023-10-07 PROCEDURE — 86146 BETA-2 GLYCOPROTEIN ANTIBODY: CPT

## 2023-10-07 PROCEDURE — 6360000002 HC RX W HCPCS: Performed by: NURSE PRACTITIONER

## 2023-10-07 PROCEDURE — 2580000003 HC RX 258: Performed by: INTERNAL MEDICINE

## 2023-10-07 PROCEDURE — 80053 COMPREHEN METABOLIC PANEL: CPT

## 2023-10-07 PROCEDURE — 83615 LACTATE (LD) (LDH) ENZYME: CPT

## 2023-10-07 PROCEDURE — 85610 PROTHROMBIN TIME: CPT

## 2023-10-07 PROCEDURE — 92610 EVALUATE SWALLOWING FUNCTION: CPT

## 2023-10-07 PROCEDURE — 83880 ASSAY OF NATRIURETIC PEPTIDE: CPT

## 2023-10-07 PROCEDURE — 85613 RUSSELL VIPER VENOM DILUTED: CPT

## 2023-10-07 PROCEDURE — 81240 F2 GENE: CPT

## 2023-10-07 PROCEDURE — 85025 COMPLETE CBC W/AUTO DIFF WBC: CPT

## 2023-10-07 PROCEDURE — 80143 DRUG ASSAY ACETAMINOPHEN: CPT

## 2023-10-07 PROCEDURE — 93308 TTE F-UP OR LMTD: CPT

## 2023-10-07 PROCEDURE — 82550 ASSAY OF CK (CPK): CPT

## 2023-10-07 PROCEDURE — 83540 ASSAY OF IRON: CPT

## 2023-10-07 PROCEDURE — 82248 BILIRUBIN DIRECT: CPT

## 2023-10-07 PROCEDURE — 86900 BLOOD TYPING SEROLOGIC ABO: CPT

## 2023-10-07 PROCEDURE — 85240 CLOT FACTOR VIII AHG 1 STAGE: CPT

## 2023-10-07 RX ORDER — METOPROLOL SUCCINATE 25 MG/1
25 TABLET, EXTENDED RELEASE ORAL NIGHTLY
Status: DISCONTINUED | OUTPATIENT
Start: 2023-10-07 | End: 2023-10-07

## 2023-10-07 RX ORDER — SODIUM CHLORIDE 9 MG/ML
INJECTION, SOLUTION INTRAVENOUS CONTINUOUS
Status: DISCONTINUED | OUTPATIENT
Start: 2023-10-07 | End: 2023-10-07

## 2023-10-07 RX ORDER — ACETAMINOPHEN 650 MG/1
650 SUPPOSITORY RECTAL EVERY 6 HOURS PRN
Status: DISCONTINUED | OUTPATIENT
Start: 2023-10-07 | End: 2023-10-07

## 2023-10-07 RX ORDER — FUROSEMIDE 10 MG/ML
20 INJECTION INTRAMUSCULAR; INTRAVENOUS 2 TIMES DAILY
Status: DISCONTINUED | OUTPATIENT
Start: 2023-10-07 | End: 2023-10-08

## 2023-10-07 RX ORDER — BISACODYL 5 MG/1
5 TABLET, DELAYED RELEASE ORAL DAILY PRN
Status: DISCONTINUED | OUTPATIENT
Start: 2023-10-07 | End: 2023-10-18 | Stop reason: HOSPADM

## 2023-10-07 RX ORDER — SODIUM CHLORIDE 0.9 % (FLUSH) 0.9 %
5-40 SYRINGE (ML) INJECTION EVERY 12 HOURS SCHEDULED
Status: DISCONTINUED | OUTPATIENT
Start: 2023-10-07 | End: 2023-10-18 | Stop reason: HOSPADM

## 2023-10-07 RX ORDER — ONDANSETRON 4 MG/1
4 TABLET, ORALLY DISINTEGRATING ORAL EVERY 8 HOURS PRN
Status: DISCONTINUED | OUTPATIENT
Start: 2023-10-07 | End: 2023-10-18 | Stop reason: HOSPADM

## 2023-10-07 RX ORDER — BUMETANIDE 1 MG/1
1 TABLET ORAL DAILY
Status: DISCONTINUED | OUTPATIENT
Start: 2023-10-07 | End: 2023-10-07

## 2023-10-07 RX ORDER — CLOPIDOGREL BISULFATE 75 MG/1
75 TABLET ORAL DAILY
Status: DISCONTINUED | OUTPATIENT
Start: 2023-10-07 | End: 2023-10-11

## 2023-10-07 RX ORDER — ENOXAPARIN SODIUM 100 MG/ML
1 INJECTION SUBCUTANEOUS 2 TIMES DAILY
Status: DISCONTINUED | OUTPATIENT
Start: 2023-10-07 | End: 2023-10-15

## 2023-10-07 RX ORDER — LOSARTAN POTASSIUM 50 MG/1
100 TABLET ORAL DAILY
Status: DISCONTINUED | OUTPATIENT
Start: 2023-10-07 | End: 2023-10-18 | Stop reason: HOSPADM

## 2023-10-07 RX ORDER — SODIUM CHLORIDE 9 MG/ML
INJECTION, SOLUTION INTRAVENOUS PRN
Status: DISCONTINUED | OUTPATIENT
Start: 2023-10-07 | End: 2023-10-18 | Stop reason: HOSPADM

## 2023-10-07 RX ORDER — NITROGLYCERIN 0.4 MG/1
0.4 TABLET SUBLINGUAL EVERY 5 MIN PRN
Status: DISCONTINUED | OUTPATIENT
Start: 2023-10-07 | End: 2023-10-18 | Stop reason: HOSPADM

## 2023-10-07 RX ORDER — NITROGLYCERIN 0.4 MG/1
0.4 TABLET SUBLINGUAL
Status: DISCONTINUED | OUTPATIENT
Start: 2023-10-07 | End: 2023-10-07

## 2023-10-07 RX ORDER — ATORVASTATIN CALCIUM 80 MG/1
80 TABLET, FILM COATED ORAL NIGHTLY
Status: DISCONTINUED | OUTPATIENT
Start: 2023-10-07 | End: 2023-10-18 | Stop reason: HOSPADM

## 2023-10-07 RX ORDER — ONDANSETRON 2 MG/ML
4 INJECTION INTRAMUSCULAR; INTRAVENOUS EVERY 6 HOURS PRN
Status: DISCONTINUED | OUTPATIENT
Start: 2023-10-07 | End: 2023-10-18 | Stop reason: HOSPADM

## 2023-10-07 RX ORDER — POTASSIUM CHLORIDE 20 MEQ/1
40 TABLET, EXTENDED RELEASE ORAL PRN
Status: DISCONTINUED | OUTPATIENT
Start: 2023-10-07 | End: 2023-10-18 | Stop reason: HOSPADM

## 2023-10-07 RX ORDER — ACETAMINOPHEN 500 MG
1000 TABLET ORAL ONCE
Status: COMPLETED | OUTPATIENT
Start: 2023-10-07 | End: 2023-10-07

## 2023-10-07 RX ORDER — SODIUM CHLORIDE 0.9 % (FLUSH) 0.9 %
10 SYRINGE (ML) INJECTION PRN
Status: DISCONTINUED | OUTPATIENT
Start: 2023-10-07 | End: 2023-10-18 | Stop reason: HOSPADM

## 2023-10-07 RX ORDER — ALBUTEROL SULFATE 2.5 MG/3ML
2.5 SOLUTION RESPIRATORY (INHALATION) EVERY 4 HOURS PRN
Status: DISCONTINUED | OUTPATIENT
Start: 2023-10-07 | End: 2023-10-18 | Stop reason: HOSPADM

## 2023-10-07 RX ORDER — CLONIDINE HYDROCHLORIDE 0.1 MG/1
0.1 TABLET ORAL 2 TIMES DAILY
Status: DISCONTINUED | OUTPATIENT
Start: 2023-10-07 | End: 2023-10-18 | Stop reason: HOSPADM

## 2023-10-07 RX ORDER — ACETAMINOPHEN 325 MG/1
650 TABLET ORAL EVERY 6 HOURS PRN
Status: DISCONTINUED | OUTPATIENT
Start: 2023-10-07 | End: 2023-10-07

## 2023-10-07 RX ORDER — PANTOPRAZOLE SODIUM 40 MG/1
40 TABLET, DELAYED RELEASE ORAL
Status: DISCONTINUED | OUTPATIENT
Start: 2023-10-07 | End: 2023-10-07

## 2023-10-07 RX ORDER — POTASSIUM CHLORIDE 7.45 MG/ML
10 INJECTION INTRAVENOUS PRN
Status: DISCONTINUED | OUTPATIENT
Start: 2023-10-07 | End: 2023-10-18 | Stop reason: HOSPADM

## 2023-10-07 RX ORDER — METOPROLOL SUCCINATE 50 MG/1
50 TABLET, EXTENDED RELEASE ORAL DAILY
Status: DISCONTINUED | OUTPATIENT
Start: 2023-10-07 | End: 2023-10-15

## 2023-10-07 RX ORDER — PANTOPRAZOLE SODIUM 40 MG/1
40 TABLET, DELAYED RELEASE ORAL
Status: DISCONTINUED | OUTPATIENT
Start: 2023-10-07 | End: 2023-10-10

## 2023-10-07 RX ADMIN — PANTOPRAZOLE SODIUM 40 MG: 40 TABLET, DELAYED RELEASE ORAL at 09:03

## 2023-10-07 RX ADMIN — ATORVASTATIN CALCIUM 80 MG: 80 TABLET, FILM COATED ORAL at 22:00

## 2023-10-07 RX ADMIN — CLONIDINE HYDROCHLORIDE 0.1 MG: 0.1 TABLET ORAL at 09:02

## 2023-10-07 RX ADMIN — SODIUM CHLORIDE, PRESERVATIVE FREE 10 ML: 5 INJECTION INTRAVENOUS at 22:01

## 2023-10-07 RX ADMIN — IRON SUCROSE 300 MG: 20 INJECTION, SOLUTION INTRAVENOUS at 23:07

## 2023-10-07 RX ADMIN — FUROSEMIDE 20 MG: 10 INJECTION, SOLUTION INTRAMUSCULAR; INTRAVENOUS at 17:33

## 2023-10-07 RX ADMIN — CLOPIDOGREL BISULFATE 75 MG: 75 TABLET ORAL at 09:02

## 2023-10-07 RX ADMIN — ALBUTEROL SULFATE 2.5 MG: 2.5 SOLUTION RESPIRATORY (INHALATION) at 04:05

## 2023-10-07 RX ADMIN — CLONIDINE HYDROCHLORIDE 0.1 MG: 0.1 TABLET ORAL at 22:00

## 2023-10-07 RX ADMIN — ACETAMINOPHEN 1000 MG: 500 TABLET ORAL at 23:08

## 2023-10-07 RX ADMIN — SODIUM CHLORIDE, PRESERVATIVE FREE 10 ML: 5 INJECTION INTRAVENOUS at 07:25

## 2023-10-07 RX ADMIN — ACETAMINOPHEN 650 MG: 325 TABLET ORAL at 03:18

## 2023-10-07 RX ADMIN — BUMETANIDE 1 MG: 1 TABLET ORAL at 09:03

## 2023-10-07 RX ADMIN — ENOXAPARIN SODIUM 80 MG: 100 INJECTION SUBCUTANEOUS at 09:03

## 2023-10-07 RX ADMIN — PANTOPRAZOLE SODIUM 40 MG: 40 TABLET, DELAYED RELEASE ORAL at 15:25

## 2023-10-07 RX ADMIN — ENOXAPARIN SODIUM 80 MG: 100 INJECTION SUBCUTANEOUS at 23:08

## 2023-10-07 RX ADMIN — LOSARTAN POTASSIUM 100 MG: 50 TABLET, FILM COATED ORAL at 09:02

## 2023-10-07 ASSESSMENT — PAIN DESCRIPTION - DESCRIPTORS
DESCRIPTORS: DISCOMFORT
DESCRIPTORS: DISCOMFORT
DESCRIPTORS: DISCOMFORT;ACHING

## 2023-10-07 ASSESSMENT — ENCOUNTER SYMPTOMS
COUGH: 1
ABDOMINAL PAIN: 0
SHORTNESS OF BREATH: 1
BACK PAIN: 1
NAUSEA: 0
COLOR CHANGE: 0

## 2023-10-07 ASSESSMENT — PAIN DESCRIPTION - LOCATION
LOCATION: BACK
LOCATION: BACK;HIP
LOCATION: BACK;HIP

## 2023-10-07 ASSESSMENT — PAIN SCALES - GENERAL
PAINLEVEL_OUTOF10: 9
PAINLEVEL_OUTOF10: 9
PAINLEVEL_OUTOF10: 10
PAINLEVEL_OUTOF10: 10
PAINLEVEL_OUTOF10: 4

## 2023-10-07 ASSESSMENT — PAIN DESCRIPTION - FREQUENCY: FREQUENCY: CONTINUOUS

## 2023-10-07 ASSESSMENT — PAIN DESCRIPTION - PAIN TYPE: TYPE: CHRONIC PAIN

## 2023-10-07 ASSESSMENT — PAIN DESCRIPTION - ORIENTATION: ORIENTATION: POSTERIOR;MID

## 2023-10-07 NOTE — PROGRESS NOTES
[facility]  Office: [phone]  Vito Iglesias, DO, Aspen Diaz, DO, Shahnaz Lauren, DO, Lillian Chowdhury, DO, Bety Alexis MD, Buddy Amin MD, Katey Bolanos MD, Jaky Senior MD,  Maldonado Feldman MD, Manuela Jerry MD, Darleen Sandhoff, DO, Tonio Butler MD,  Louise Liz DO, Linnea Felipe MD, Kimberly Dickerson MD, Jamie Suero DO, Santy Sosa MD,  Kaylie Castaneda DO, Faith Ron MD, Kendall Figueredo MD, Cuco Mehta MD, Thuan Ibrahim MD,  Brian Emerson MD, Gemma Rios MD, Kathie Walter MD, Frances Guzmán MD, Ketan Ruiz DO, Pia Hastings MD,  Jeremias Collins MD, Memo Mckeon MD, Po West, CNP,  Trina Foster, CNP,, Rosalio Mcfarland, CNP,  Rosaura Gatica, JOSE, Melissa Morales, CNP, Marlyn Coto, CNP, Giorgi Rangel, CNP, Torri Agudelo, CNP, Shai Cristobal, CNP, Dewanda Osgood, CNP, Viridiana Hernandez, CNS, Mary Beth Albarran, CNP, Marie Muhammad, [address]    Second Visit Note  For more detailed information please refer to the progress note of the day      10/7/2023    2:34 PM    Name:   Raymond Giraldo  MRN:     [id number]     Acct:      [de-identified]   Room:   2001/2001-01  IP Day:  0  Admit Date:  10/7/2023  2:49 AM    PCP:   Royce Moore MD  Code Status:  Full Code      Pt vitals were reviewed. Afebrile and hemodynamically stable. Saturating well on room air. New labs were reviewed. High anion gap metabolic acidosis with a bicarb of 15 and anion gap of 19. Elevated BUN and creatinine 42 and 1.3 respectively. Mild elevation in AST from 20-42 but bilirubin has increased from 1.8-2.5? INR 3.1. Troponin peaked at 49. Patient was seen and examined at bedside. Updated plan :     Segmental and subsegmental left lower lobe pulmonary emboli. Vascular surgery consulted. Plan to continue supportive care and pain control but no acute surgical intervention indicated at this time.

## 2023-10-07 NOTE — CARE COORDINATION
Transitional planning. Pt's sister Kali Cuenca, brought in [facility] will paperwork. Placed in pt's blue chart.

## 2023-10-07 NOTE — PLAN OF CARE
Problem: Chronic Conditions and Co-morbidities  Goal: Patient's chronic conditions and co-morbidity symptoms are monitored and maintained or improved  Outcome: Progressing  Flowsheets (Taken 10/7/2023 1705)  Care Plan - Patient's Chronic Conditions and Co-Morbidity Symptoms are Monitored and Maintained or Improved:   Monitor and assess patient's chronic conditions and comorbid symptoms for stability, deterioration, or improvement   Collaborate with multidisciplinary team to address chronic and comorbid conditions and prevent exacerbation or deterioration   Update acute care plan with appropriate goals if chronic or comorbid symptoms are exacerbated and prevent overall improvement and discharge     Problem: Discharge Planning  Goal: Discharge to home or other facility with appropriate resources  10/7/2023 1705 by Jay Alarcon RN  Outcome: Progressing  Flowsheets (Taken 10/7/2023 1705)  Discharge to home or other facility with appropriate resources:   Identify barriers to discharge with patient and caregiver   Arrange for needed discharge resources and transportation as appropriate  10/7/2023 0613 by Hector Weinstein RN  Outcome: Progressing     Problem: Skin/Tissue Integrity  Goal: Absence of new skin breakdown  Description: 1. Monitor for areas of redness and/or skin breakdown  2. Assess vascular access sites hourly  3. Every 4-6 hours minimum:  Change oxygen saturation probe site  4. Every 4-6 hours:  If on nasal continuous positive airway pressure, respiratory therapy assess nares and determine need for appliance change or resting period.   Outcome: Progressing     Problem: Safety - Adult  Goal: Free from fall injury  10/7/2023 1705 by Jay Alarcon RN  Outcome: Progressing  Flowsheets (Taken 10/7/2023 1705)  Free From Fall Injury:   Instruct family/caregiver on patient safety   Based on caregiver fall risk screen, instruct family/caregiver to ask for assistance with transferring infant if

## 2023-10-07 NOTE — CONSENT
Informed Consent for Blood Component Transfusion Note    I have discussed with the patient the rationale for blood component transfusion; its benefits in treating or preventing fatigue, organ damage, or death; and its risk which includes mild transfusion reactions, rare risk of blood borne infection, or more serious but rare reactions. I have discussed the alternatives to transfusion, including the risk and consequences of not receiving transfusion. The patient had an opportunity to ask questions and had agreed to proceed with transfusion of blood components.     Electronically signed by El Salgado MD on 10/7/23 at 8:34 AM EDT

## 2023-10-07 NOTE — ACP (ADVANCE CARE PLANNING)
Advance Care Planning     Advance Care Planning Activator (Inpatient)  Conversation Note      Date of ACP Conversation: 10/7/2023     Conversation Conducted with: Patient with Decision Making Capacity    ACP Activator: Ac Pratt RN        Health Care Decision Maker:     Current Designated Health Care Decision Maker:     Primary Decision Maker: Adiel Chambers - [phone]    Secondary Decision Maker: Dianna Mac - [phone]  Click here to complete Healthcare Decision Makers including section of the Healthcare Decision Maker Relationship (ie \"Primary\")      Care Preferences    Ventilation: \"If you were in your present state of health and suddenly became very ill and were unable to breathe on your own, what would your preference be about the use of a ventilator (breathing machine) if it were available to you? \"      Would the patient desire the use of ventilator (breathing machine)?: no    \"If your health worsens and it becomes clear that your chance of recovery is unlikely, what would your preference be about the use of a ventilator (breathing machine) if it were available to you? \"     Would the patient desire the use of ventilator (breathing machine)?: No      Resuscitation  \"CPR works best to restart the heart when there is a sudden event, like a heart attack, in someone who is otherwise healthy. Unfortunately, CPR does not typically restart the heart for people who have serious health conditions or who are very sick. \"    \"In the event your heart stopped as a result of an underlying serious health condition, would you want attempts to be made to restart your heart (answer \"yes\" for attempt to resuscitate) or would you prefer a natural death (answer \"no\" for do not attempt to resuscitate)? \" no       [] Yes   [] No   Educated Patient / Ronnie Kolb regarding differences between Advance Directives and portable DNR orders.     Length of ACP Conversation in minutes:

## 2023-10-07 NOTE — CONSULTS
Today's Date: 10/7/2023  Patient Name: Luanne Frederick  Date of admission: 10/7/2023  2:49 AM  Patient's age: 78 y. o., 1945  Admission Dx: Pulmonary embolus, left ([address]) [I26.99]    Reason for Consult: PE while on Eliquis and Plavis, allergy to Heparin  Requesting Physician: Diann Murphy DO    CHIEF COMPLAINT:  sob    History Obtained From:  patient, chart review    HISTORY OF PRESENT ILLNESS:      The patient is a 78 y.o. , Female with a history of chronic GERD with pyloric stenosis s/p dilatation, gastroparesis chronic MS, history of CVA, heart failure with preserved ejection fraction, pulmonary hypertension with cor pulmonale, CAD s/p stent 2023 on 9/2023, Persistent a fib  post AV qamar ablation on eliquis 5mg BID,  initially presented to [place] ED with increasing shortness of breath. As per patient she had been having worsening respiratory symptoms since she went to EGD in January. She went for EGD for GERD variant With chronic nonproductive cough. Initial CT imaging at [facility] showed left segmental and subsegmental PE with anasarca and bilateral pleural effusion with evidence of ascites. Patient was transferred here for further management. Patient was admitted under Intermed service for acute on chronic hypoxic respiratory failure, multifactorial likely secondary to decompensated CHF, chronic cor pulmonale and PE. Vascular was consulted, recommended no surgical intervention as this point. Today on my evaluation, patient was lying comfortably on the bed. Denies any complaints at this point. Was on room air.   Patient denies any history of blood clots in the past.    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 Past Surgical History:   has a past surgical history that includes Dilation & curettage (1982); Breast biopsy (1990); Appendectomy (1955); Septoplasty (1985); Hammer toe surgery (1989); Sinus endoscopy; sinus surgery; Shoulder arthroscopy (1998); Esophagoscopy (1966, 1969); Colonoscopy (2010); Foreign Body Removal (1962); laparoscopy (1487); Coronary angioplasty with stent (5/2011); Coronary angioplasty with stent (6/2011); Upper gastrointestinal endoscopy; eye surgery; pr colonoscopy w/biopsy single/multiple (N/A, 12/19/2017); pr egd transoral biopsy single/multiple (12/19/2017); Upper gastrointestinal endoscopy (12/19/2017); Upper gastrointestinal endoscopy (5/24/2018); Cardiac surgery; Cardiac catheterization (07/31/2020); Cardioversion (08/14/2020); and Cardioversion (07/31/2020). Medications:    Reviewed in Epic     Allergies:  Dofetilide, Lisinopril-hydrochlorothiazide, Sulfa antibiotics, Penicillins, Polyethylene glycol, Actifed cold-allergy [chlorpheniramine-phenylephrine], Altace [ramipril], Cephalosporins, Coreg [carvedilol], Dml facial moisturizer, Elavil [amitriptyline hcl], Entex [ami-margot], Ethylene glycol, Fentanyl, Heparin, Hizentra [immune globulin (human)], Hydralazine, Levofloxacin, Lisinopril-hydrochlorothiazide, Montelukast sodium, Morphine, Nifedipine, Norvasc [amlodipine besylate], Other, Phenobarbital, Propoxyphene, Robaxin [methocarbamol], Sinequan [doxepin hcl], Sudafed [pseudoephedrine hcl], Sudafed [pseudoephedrine hcl], Tranquil-cecil, Wellbutrin [bupropion hcl], Actifed cold-allergy [chlorpheniramine-phenylephrine], Clindamycin/lincomycin, Codeine, Metoprolol, Metoprolol succinate, and Seldane [terfenadine]    Social History:   reports that she has never smoked.

## 2023-10-07 NOTE — PROGRESS NOTES
Pharmacy Note     Enoxaparin Dose Adjustment    Abdelrahman Carvajal is a 78 y.o. female. Pharmacist assessment of enoxaparin dose for VTE prophylaxis. Recent Labs     10/06/23  1809   BUN 42*       Recent Labs     10/06/23  1809   CREATININE 1.2*       Estimated Creatinine Clearance: 38 mL/min (A) (based on SCr of 1.2 mg/dL (H)). Estimated CrCl using Ideal Body Weight: 30.57 mL/min (based on IBW 47.8 kg)    Height:   Ht Readings from Last 1 Encounters:   10/06/23 5' 1\" (1.549 m)     Weight:  Wt Readings from Last 1 Encounters:   10/06/23 170 lb (77.1 kg)       The following enoxaparin dose has been adjusted based upon renal function and/or patient weight per P&T Guidelines:             Even using IBW the patients SrCr is above 30 ml/min. Enoxaparin 80 mg subcutaneous daily changed to enoxaparin 80 mg Subcutaneous twice daily.

## 2023-10-07 NOTE — CARE COORDINATION
Case Management Assessment  Initial Evaluation    Date/Time of Evaluation: 10/7/2023 1:11 PM  Assessment Completed by: Idalmis Del Rosario RN    If patient is discharged prior to next notation, then this note serves as note for discharge by case management. Patient Name: Ankit Newton                   YOB: 1945  Diagnosis: Pulmonary embolus, left [facility]) [I26.99]                   Date / Time: 10/7/2023  2:49 AM    Patient Admission Status: Inpatient   Readmission Risk (Low < 19, Mod (19-27), High > 27): Readmission Risk Score: 15.8    Current PCP: Annette Cabrera MD  PCP verified by CM? Yes Annette Cabrera MD)    Chart Reviewed: Yes      History Provided by: Patient  Patient Orientation: Alert and Oriented    Patient Cognition: Alert    Hospitalization in the last 30 days (Readmission):  No    If yes, Readmission Assessment in CM Navigator will be completed. Advance Directives:      Code Status: Full Code   Patient's Primary Decision Maker is:      Primary Decision Maker: Fina Coronadoa - Niece/Nephew - [phone]    Secondary Decision Maker: Ayanna Delgado - [phone]    Discharge Planning:    Patient lives with: Alone Type of Home: Apartment  Primary Care Giver: Self  Patient Support Systems include: Children, Family Members   Current Financial resources: Medicare, Medicaid ([place] [employer])  Current community resources:    Current services prior to admission: Durable Medical Equipment            Current DME: Miranda Sifuentes (Rolling walker)            Type of Home Care services:  PT, OT, Skilled Therapy, Nursing Services, [employer]    ADLS  Prior functional level:  Independent in ADLs/IADLs  Current functional level: Assistance with the following:, Mobility    PT AM-PAC:   /24  OT AM-PAC:   /24    Family can provide assistance at DC: No  Would you like Case Management to discuss the discharge plan with any other family members/significant others, and if so, who?

## 2023-10-07 NOTE — CONSULTS
[facility]   Consult Note                 Date:   10/7/2023  Patient name: Km Peres  Date of admission:  10/7/2023  2:49 AM  MRN:   [id number]  YOB: 1945    Reason for Consult: Cardiac evaluation    CHIEF COMPLAINT: Shortness of breath    History Obtained From:  Patient     HISTORY OF PRESENT ILLNESS:      The patient is a 78 y.o. female admitted to hospital from outlying facility due to worsening shortness of breath. Patient reporting worsening shortness of breath over the past couple weeks associated with nausea and noted to have skin discoloration yellow. CT imaging showed cardiomegaly and signs of pericardial effusion with mild to moderate left pleural effusion as well as stable anasarca, patient also found to have signs of segmental and subsegmental left lower lobe pulmonary embolism. Patient transferred to [facility] for management. Vascular consulted on the case with no plans for surgical intervention at this time. 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 Past Surgical History:   has a past surgical history that includes Dilation & curettage (1982); Breast biopsy (1990); Appendectomy (1955); Septoplasty (1985); Hammer toe surgery (1989); Sinus endoscopy; sinus surgery; Shoulder arthroscopy (1998); Esophagoscopy (1966, 1969); Colonoscopy (2010);  Foreign Body Removal (1962); laparoscopy (6528); morning     Abdulkadir Mccall MD   clopidogrel (PLAVIX) 75 MG tablet Take 1 tablet by mouth daily 100 mg pm, currently on hold for procedure on 07/12/2023    ProviderAbdulkadir MD       Allergies:  Dofetilide, Lisinopril-hydrochlorothiazide, Sulfa antibiotics, Penicillins, Polyethylene glycol, Actifed cold-allergy [chlorpheniramine-phenylephrine], Altace [ramipril], Cephalosporins, Coreg [carvedilol], Dml facial moisturizer, Elavil [amitriptyline hcl], Entex [ami-margot], Ethylene glycol, Fentanyl, Heparin, Hizentra [immune globulin (human)], Hydralazine, Levofloxacin, Lisinopril-hydrochlorothiazide, Montelukast sodium, Morphine, Nifedipine, Norvasc [amlodipine besylate], Other, Phenobarbital, Propoxyphene, Robaxin [methocarbamol], Sinequan [doxepin hcl], Sudafed [pseudoephedrine hcl], Sudafed [pseudoephedrine hcl], Tranquil-cecil, Wellbutrin [bupropion hcl], Actifed cold-allergy [chlorpheniramine-phenylephrine], Clindamycin/lincomycin, Codeine, Metoprolol, Metoprolol succinate, and Seldane [terfenadine]    Social History:   reports that she has never smoked. She has never used smokeless tobacco. She reports that she does not drink alcohol and does not use drugs. Family History: family history includes Breast Cancer in her maternal aunt; Heart Disease in her father and mother; Hypertension in her sister; Other in her father, maternal aunt, mother, and sister. No for premature CAD. No for h/o sudden cardiac death    REVIEW OF SYSTEMS:    Constitutional: there has been no unanticipated weight loss. There's been No change in activity level. Eyes: No visual changes or diplopia. No scleral icterus. ENT: No Headaches, hearing loss or vertigo. Cardiovascular: As HPI  Respiratory: As HPI  Gastrointestinal: No abdominal pain, blood in stools. Genitourinary: No dysuria, trouble voiding, or hematuria. Musculoskeletal:  No gait disturbance, No weakness or joint complaints.   Integumentary: No rash or

## 2023-10-07 NOTE — PROGRESS NOTES
SLP ALL NOTES  Facility/Department: [facility]- STEPDOWN   CLINICAL BEDSIDE SWALLOW EVALUATION    NAME: Jacquie Riley  : 1945  MRN: [id number]    ADMISSION DATE: 10/7/2023  ADMITTING DIAGNOSIS: has Bloody discharge from nipple; Osteopenia; Multiple sclerosis ([address]); Essential hypertension; Asthma; SOB (shortness of breath); Lower leg pain; Noncompliance with medications; Dyslipidemia; GERD (gastroesophageal reflux disease); Fibromyalgia; Pyloric stenosis; Demyelinating disease ([address]); H/O: CVA (cerebrovascular accident); Epigastric abdominal pain; Incontinence in female; Chest pain with high risk for cardiac etiology; Migraine without status migrainosus, not intractable; Hypokalemia; Chronic diastolic heart failure ([address]); Acute diastolic HF (heart failure) ([address]); Acute on chronic combined systolic and diastolic CHF (congestive heart failure) ([address]); CAD (coronary artery disease); Atrial flutter ([address]); Chronic bronchiolitis; Paroxysmal A-fib ([address]); HTN (hypertension); Chronic renal insufficiency, stage III (moderate) ([address]); Mild cognitive disorder; Raynaud's disease; Acute on chronic combined systolic and diastolic congestive heart failure ([address]); Frequent falls; Physical debility; Chronic atrial fibrillation ([address]); Obesity (BMI 30-39.9); Decompensated heart failure ([address]); Severe pulmonary hypertension ([address]); Anxiety; COVID; Unable to care for self; Acute bronchitis; Acute combined systolic (congestive) and diastolic (congestive) heart failure ([address]); and Pulmonary embolus, left ([address]) on their problem list.      Recent Chest Xray/CT of Chest: (CXR Date 10/6/23 )  IMPRESSION:  Segmental and subsegmental left lower lobe pulmonary embolism     Cardiomegaly and pericardial effusion     Mild-to-moderate left pleural effusion and bibasilar atelectasis     Stable anasarca and mild pelvic ascites     No acute intra-abdominal or intrapelvic abnormalities are noted.     Date of Eval: 10/7/2023  Evaluating Therapist: Mena Melendez Recommendation: Thin  Recommended Form of Meds: Whole with water     Therapeutic Interventions: Diet tolerance monitoring;Patient/Family education;Oral care    Compensatory Swallowing Strategies  Compensatory Swallowing Strategies : Alternate solids and liquids;Eat/Feed slowly;Upright as possible for all oral intake;Small bites/sips    Treatment/Goals  Dysphagia Goals: The patient will tolerate recommended diet without observed clinical signs of aspiration    General  Chart Reviewed: Yes  Behavior/Cognition: Pleasant mood; Cooperative; Alert  Temperature Spikes Noted: No  Respiratory Status: O2 via nasual cannula  O2 Device: Nasal cannula  Follows Directions: Complex  Patient Positioning: Upright in bed  Baseline Vocal Quality: Weak  Consistencies Administered: Regular; Soft and Bite-Sized;Pureed; Thin - straw    Vision/Hearing     WFL    Oral Motor Deficits  Labial: No impairment  Lingual: No impairment  Consistencies Administered: Regular; Soft and Bite-Sized;Pureed; Thin - straw    Oral Phase Dysfunction  Oral Phase  Oral Phase: WFL     Indicators of Pharyngeal Phase Dysfunction   Pharyngeal Phase  Pharyngeal Phase: WFL  Pharyngeal Phase   Pharyngeal Phase: WFL    Prognosis  Prognosis: Good  Prognosis Considerations: Age; Motivation  Consulted and agree with results and recommendations: Patient;RN  RN Name: Isabel Webb  Patient Education Response: Verbalizes understanding             Therapy Time  SLP Individual Minutes  Time In: 2151  Time Out: [address]  Minutes: [address] [facility]. CLAUDETTE  [facility]-SL[facility]    10/7/2023 10:44 AM

## 2023-10-07 NOTE — ED NOTES
[facility] at bedside for transport to [facility].      Autumn Cohen RN  10/07/23 3666

## 2023-10-08 ENCOUNTER — APPOINTMENT (OUTPATIENT)
Dept: ULTRASOUND IMAGING | Age: 78
End: 2023-10-08
Attending: STUDENT IN AN ORGANIZED HEALTH CARE EDUCATION/TRAINING PROGRAM
Payer: MEDICARE

## 2023-10-08 LAB
ALBUMIN SERPL-MCNC: 3.7 G/DL (ref 3.5–5.2)
ALBUMIN/GLOB SERPL: 2.3 {RATIO} (ref 1–2.5)
ALP SERPL-CCNC: 105 U/L (ref 35–104)
ALT SERPL-CCNC: 22 U/L (ref 5–33)
ANION GAP SERPL CALCULATED.3IONS-SCNC: 14 MMOL/L (ref 9–17)
AST SERPL-CCNC: 34 U/L
BASOPHILS # BLD: 0 K/UL (ref 0–0.2)
BASOPHILS NFR BLD: 0 % (ref 0–2)
BILIRUB SERPL-MCNC: 1.5 MG/DL (ref 0.3–1.2)
BUN SERPL-MCNC: 43 MG/DL (ref 8–23)
CALCIUM SERPL-MCNC: 8.5 MG/DL (ref 8.6–10.4)
CHLORIDE SERPL-SCNC: 102 MMOL/L (ref 98–107)
CO2 SERPL-SCNC: 22 MMOL/L (ref 20–31)
CREAT SERPL-MCNC: 1.5 MG/DL (ref 0.5–0.9)
EOSINOPHIL # BLD: 0 K/UL (ref 0–0.4)
EOSINOPHILS RELATIVE PERCENT: 0 % (ref 1–4)
ERYTHROCYTE [DISTWIDTH] IN BLOOD BY AUTOMATED COUNT: 20 % (ref 11.8–14.4)
GFR SERPL CREATININE-BSD FRML MDRD: 36 ML/MIN/1.73M2
GLUCOSE SERPL-MCNC: 108 MG/DL (ref 70–99)
HCT VFR BLD AUTO: 25.7 % (ref 36.3–47.1)
HGB BLD-MCNC: 7.4 G/DL (ref 11.9–15.1)
IMM GRANULOCYTES # BLD AUTO: 0.2 K/UL (ref 0–0.3)
IMM GRANULOCYTES NFR BLD: 2 %
LYMPHOCYTES NFR BLD: 1.01 K/UL (ref 1–4.8)
LYMPHOCYTES RELATIVE PERCENT: 10 % (ref 24–44)
MAGNESIUM SERPL-MCNC: 2.1 MG/DL (ref 1.6–2.6)
MCH RBC QN AUTO: 25.8 PG (ref 25.2–33.5)
MCHC RBC AUTO-ENTMCNC: 28.8 G/DL (ref 28.4–34.8)
MCV RBC AUTO: 89.5 FL (ref 82.6–102.9)
MONOCYTES NFR BLD: 1.72 K/UL (ref 0.1–0.8)
MONOCYTES NFR BLD: 17 % (ref 1–7)
MORPHOLOGY: ABNORMAL
NEUTROPHILS NFR BLD: 71 % (ref 36–66)
NEUTS SEG NFR BLD: 7.17 K/UL (ref 1.8–7.7)
NRBC BLD-RTO: 1.2 PER 100 WBC
NUCLEATED RED BLOOD CELLS: 1 PER 100 WBC
PLATELET # BLD AUTO: 255 K/UL (ref 138–453)
PMV BLD AUTO: 10.5 FL (ref 8.1–13.5)
POTASSIUM SERPL-SCNC: 3.3 MMOL/L (ref 3.7–5.3)
PROT SERPL-MCNC: 5.3 G/DL (ref 6.4–8.3)
RBC # BLD AUTO: 2.87 M/UL (ref 3.95–5.11)
SODIUM SERPL-SCNC: 138 MMOL/L (ref 135–144)
WBC OTHER # BLD: 10.1 K/UL (ref 3.5–11.3)

## 2023-10-08 PROCEDURE — 99233 SBSQ HOSP IP/OBS HIGH 50: CPT | Performed by: INTERNAL MEDICINE

## 2023-10-08 PROCEDURE — 6370000000 HC RX 637 (ALT 250 FOR IP): Performed by: STUDENT IN AN ORGANIZED HEALTH CARE EDUCATION/TRAINING PROGRAM

## 2023-10-08 PROCEDURE — 76705 ECHO EXAM OF ABDOMEN: CPT

## 2023-10-08 PROCEDURE — 36415 COLL VENOUS BLD VENIPUNCTURE: CPT

## 2023-10-08 PROCEDURE — 80053 COMPREHEN METABOLIC PANEL: CPT

## 2023-10-08 PROCEDURE — 2580000003 HC RX 258: Performed by: NURSE PRACTITIONER

## 2023-10-08 PROCEDURE — 99232 SBSQ HOSP IP/OBS MODERATE 35: CPT | Performed by: STUDENT IN AN ORGANIZED HEALTH CARE EDUCATION/TRAINING PROGRAM

## 2023-10-08 PROCEDURE — 99233 SBSQ HOSP IP/OBS HIGH 50: CPT | Performed by: NURSE PRACTITIONER

## 2023-10-08 PROCEDURE — 6360000002 HC RX W HCPCS: Performed by: NURSE PRACTITIONER

## 2023-10-08 PROCEDURE — 6370000000 HC RX 637 (ALT 250 FOR IP): Performed by: NURSE PRACTITIONER

## 2023-10-08 PROCEDURE — 83735 ASSAY OF MAGNESIUM: CPT

## 2023-10-08 PROCEDURE — 2580000003 HC RX 258: Performed by: INTERNAL MEDICINE

## 2023-10-08 PROCEDURE — 6370000000 HC RX 637 (ALT 250 FOR IP): Performed by: INTERNAL MEDICINE

## 2023-10-08 PROCEDURE — 6360000002 HC RX W HCPCS: Performed by: INTERNAL MEDICINE

## 2023-10-08 PROCEDURE — 85025 COMPLETE CBC W/AUTO DIFF WBC: CPT

## 2023-10-08 PROCEDURE — 2060000000 HC ICU INTERMEDIATE R&B

## 2023-10-08 RX ORDER — ACETAMINOPHEN 500 MG
500 TABLET ORAL EVERY 8 HOURS PRN
Status: DISCONTINUED | OUTPATIENT
Start: 2023-10-08 | End: 2023-10-10

## 2023-10-08 RX ADMIN — IRON SUCROSE 300 MG: 20 INJECTION, SOLUTION INTRAVENOUS at 21:14

## 2023-10-08 RX ADMIN — CLONIDINE HYDROCHLORIDE 0.1 MG: 0.1 TABLET ORAL at 09:39

## 2023-10-08 RX ADMIN — CLONIDINE HYDROCHLORIDE 0.1 MG: 0.1 TABLET ORAL at 21:10

## 2023-10-08 RX ADMIN — SODIUM CHLORIDE, PRESERVATIVE FREE 10 ML: 5 INJECTION INTRAVENOUS at 21:10

## 2023-10-08 RX ADMIN — ENOXAPARIN SODIUM 80 MG: 100 INJECTION SUBCUTANEOUS at 16:06

## 2023-10-08 RX ADMIN — ATORVASTATIN CALCIUM 80 MG: 80 TABLET, FILM COATED ORAL at 21:09

## 2023-10-08 RX ADMIN — PANTOPRAZOLE SODIUM 40 MG: 40 TABLET, DELAYED RELEASE ORAL at 09:38

## 2023-10-08 RX ADMIN — SODIUM CHLORIDE, PRESERVATIVE FREE 10 ML: 5 INJECTION INTRAVENOUS at 09:40

## 2023-10-08 RX ADMIN — FUROSEMIDE 20 MG: 10 INJECTION, SOLUTION INTRAMUSCULAR; INTRAVENOUS at 09:40

## 2023-10-08 RX ADMIN — POTASSIUM BICARBONATE 40 MEQ: 782 TABLET, EFFERVESCENT ORAL at 11:42

## 2023-10-08 RX ADMIN — ACETAMINOPHEN 500 MG: 500 TABLET ORAL at 11:10

## 2023-10-08 RX ADMIN — PANTOPRAZOLE SODIUM 40 MG: 40 TABLET, DELAYED RELEASE ORAL at 16:11

## 2023-10-08 RX ADMIN — ACETAMINOPHEN 500 MG: 500 TABLET ORAL at 21:09

## 2023-10-08 RX ADMIN — CLOPIDOGREL BISULFATE 75 MG: 75 TABLET ORAL at 09:39

## 2023-10-08 ASSESSMENT — PAIN DESCRIPTION - ORIENTATION
ORIENTATION: POSTERIOR
ORIENTATION: MID;POSTERIOR

## 2023-10-08 ASSESSMENT — PAIN DESCRIPTION - LOCATION
LOCATION: HIP;BACK
LOCATION: BACK;HIP
LOCATION: BACK

## 2023-10-08 ASSESSMENT — PAIN DESCRIPTION - FREQUENCY: FREQUENCY: CONTINUOUS

## 2023-10-08 ASSESSMENT — PAIN SCALES - GENERAL
PAINLEVEL_OUTOF10: 9
PAINLEVEL_OUTOF10: 6

## 2023-10-08 ASSESSMENT — PAIN DESCRIPTION - DESCRIPTORS
DESCRIPTORS: ACHING;DISCOMFORT
DESCRIPTORS: ACHING

## 2023-10-08 ASSESSMENT — PAIN DESCRIPTION - PAIN TYPE: TYPE: CHRONIC PAIN

## 2023-10-08 NOTE — PROGRESS NOTES
[facility]  Office: [id number]  1826, DO, Mohamud Reeves, DO, Jef Burris, DO, Ramón Gaby Blood, DO, Anu Alicea MD, Jordan Arellano MD, Mabel Pulido MD, Awa Fang MD,  Tae Luna MD, Ya Moy MD, Janett Hoang, DO, Naveen Hodgson MD,  Morgan Mccarty MD, Eddie Hunter MD, Darrel Loco DO, Felipe Bowman MD,  Cindy Lopez DO, Ramakrishna Jackson MD, Rosaura Guerrero MD, Roe Corea MD, Allen Presley MD,  Paul Eldridge MD, Augusto Silverio MD, Ran Dinero MD, Patric Acevedo MD, Isatu Jolley DO, Lily Joseph MD,  Flaco Zurita MD, Summer Barragan MD, Tanya Choudhary, OUMAR,  Carlie Garcia, CNP,, Lynda Gottron, CNP,  C[facility] Eng, [facility], Ashley Boo, CNP, Kamila Gardner, CNP, Georgiana Willett, CNP, Bladimir Humphrey, CNP, Carlos Okeefe, CNP, Claudina Scheuermann, CNP, Rodolfo Martinez, [facility], Jacey Leal, [facility], Gunjan Mohs, [address]    Progress Note    10/8/2023    8:07 AM    Name:   Km Peres  MRN:     [id number]     Acct:      [de-identified]   Room:   2001/2001-01  IP Day:  1  Admit Date:  10/7/2023  2:49 AM    PCP:   Dora Murray MD  Code Status:  Full Code    Subjective:     C/C: Shortness of breath. Interval History Status: improved. Vitals reviewed, afebrile and hemodynamically stable. Saturating well on room air. Labs reviewed, hypokalemia 3.3, elevated BUN and creatinine 43 and 1.5 but creatinine clearance is 32, bilirubin is decreased to 1.5 and LFTs stable. Hemoglobin dropped 8.2-7.4 however. We will get fecal occult blood test.  Liver US negative? INR possibly due to malnourishment and vitamin k deficiency? Overnight patient had no significant events. On examination patient resting comfortably in chair at bedside. States she feels better but just tired. Continues on Lovenox but will have to monitor kidney function closely.   Appears on the PROT 6.1* 5.8*  --  5.3*   LABALBU 4.3 4.0  --  3.7   AST 20 42*  --  34*   ALT 11 17  --  22   LDH  --  360*  --   --    ALKPHOS 116* 107*  --  105*   BILITOT 1.8* 2.5*  --  1.5*   BILIDIR  --   --  1.5*  --    LIPASE 29  --   --   --      ABG:No results found for: \"POCPH\", \"PHART\", \"PH\", \"POCPCO2\", \"ZIT5AHM\", \"PCO2\", \"POCPO2\", \"PO2ART\", \"PO2\", \"POCHCO3\", \"TJL5III\", \"HCO3\", \"NBEA\", \"PBEA\", \"BEART\", \"BE\", \"THGBART\", \"THB\", \"VXT5COM\", \"MSGQ0CZZ\", \"I1IQDBUE\", \"O2SAT\", \"FIO2\"  Lab Results   Component Value Date/Time    SPECIAL 20ML LEFT ARM 02/15/2023 02:03 AM     Lab Results   Component Value Date/Time    CULTURE KLEBSIELLA PNEUMONIAE >[id number] CFU/ML (A) 03/14/2023 09:00 PM       Radiology:  CT CHEST PULMONARY EMBOLISM W CONTRAST    Result Date: 10/6/2023  Segmental and subsegmental left lower lobe pulmonary embolism Cardiomegaly and pericardial effusion Mild-to-moderate left pleural effusion and bibasilar atelectasis Stable anasarca and mild pelvic ascites No acute intra-abdominal or intrapelvic abnormalities are noted. Critical results were called by Dr. Juana Layne MD to Dr. Chino Gonzalez on 10/6/2023 at 20:59. Based on MIPS measure 405, incidental abd lesions in this patient population do not warrant F/U W/O a documented medical reason. CT ABDOMEN PELVIS W IV CONTRAST Additional Contrast? None    Result Date: 10/6/2023  Segmental and subsegmental left lower lobe pulmonary embolism Cardiomegaly and pericardial effusion Mild-to-moderate left pleural effusion and bibasilar atelectasis Stable anasarca and mild pelvic ascites No acute intra-abdominal or intrapelvic abnormalities are noted. Critical results were called by Dr. Juana Layne MD to Dr. Chino Gonzalez on 10/6/2023 at 20:59. Based on MIPS measure 405, incidental abd lesions in this patient population do not warrant F/U W/O a documented medical reason.        Physical Examination:        General appearance:  alert, cooperative and no distress  Mental

## 2023-10-08 NOTE — PROGRESS NOTES
Today's Date: 10/8/2023  Patient Name: Angela Frederick  Date of admission: 10/7/2023  2:49 AM  Patient's age: 78 y. o., 1945  Admission Dx: Pulmonary embolus, left ([address]) [I26.99]    Reason for Consult: PE while on Eliquis and Plavis, allergy to Heparin  Requesting Physician: Vero Benavides DO    CHIEF COMPLAINT:  sob    History Obtained From:  patient, chart review            Interval history:    Patient seen and examined  Labs and vitals reviewed  Patient resting comfortably. Saturating well on room air  Complains of being very tired. LFTs stable  Hemoglobin further down to 7.4. No obvious bleeding noted  Ultrasound liver shows normal appearance of the liver. Fibrinogen low normal.  Antiphospholipid antibodies pending  Factor VIII activity pending          HISTORY OF PRESENT ILLNESS:      The patient is a 78 y.o. , Female with a history of chronic GERD with pyloric stenosis s/p dilatation, gastroparesis chronic MS, history of CVA, heart failure with preserved ejection fraction, pulmonary hypertension with cor pulmonale, CAD s/p stent 2023 on 9/2023, Persistent a fib  post AV qamar ablation on eliquis 5mg BID,  initially presented to [place] ED with increasing shortness of breath. As per patient she had been having worsening respiratory symptoms since she went to EGD in January. She went for EGD for GERD variant With chronic nonproductive cough. Initial CT imaging at [place] showed left segmental and subsegmental PE with anasarca and bilateral pleural effusion with evidence of ascites. Patient was transferred here for further management. Patient was admitted under Intermed service for acute on chronic hypoxic respiratory failure, multifactorial likely secondary to decompensated CHF, chronic cor pulmonale and PE. Vascular was consulted, recommended no surgical intervention as this point. Today on my evaluation, patient was lying comfortably on the bed. Denies any complaints at this point.

## 2023-10-08 NOTE — PROGRESS NOTES
[place] [id number] [place] [address]  PROGRESS NOTE    Shift date: 10/08/23  Shift day: Saturday   Shift # 2    Room # 2001/2001-01   Name: Grace Frederick                Scientology:    Place of Yazidi:     Referral: Routine Visit    Admit Date & Time: 10/7/2023  2:49 AM    Assessment:  Kali Hernandez is a 78 y.o. female in the hospital     Intervention:  Writer introduced self and title as . Patient did not appear to mind  presence and engaged in conversation. Patient appeared peaceful and calm when discussing her past health and what led to her hospital visit. Patient discussed many memories with  about her health and its impact. Patient is [employer]al and belongs to [facility]. Patient shared her strong kaylene and discussed memories of pilgrimages to holy sites.  provided a supportive presence through active listening and words of affirmation.  offered prayer which was accepted by patient. Outcome:  Patient appeared comforted by  visit and engaged in conversation. Plan:  Chaplains will remain available to offer spiritual and emotional support as needed.       Electronically signed by Vaughn Frazier on 10/8/2023 at 12:03 AM.  [address] [place] [address]  [phone]

## 2023-10-08 NOTE — PLAN OF CARE
Problem: Chronic Conditions and Co-morbidities  Goal: Patient's chronic conditions and co-morbidity symptoms are monitored and maintained or improved  10/7/2023 1705 by Yony Robins RN  Outcome: Progressing  Flowsheets (Taken 10/7/2023 1705)  Care Plan - Patient's Chronic Conditions and Co-Morbidity Symptoms are Monitored and Maintained or Improved:   Monitor and assess patient's chronic conditions and comorbid symptoms for stability, deterioration, or improvement   Collaborate with multidisciplinary team to address chronic and comorbid conditions and prevent exacerbation or deterioration   Update acute care plan with appropriate goals if chronic or comorbid symptoms are exacerbated and prevent overall improvement and discharge     Problem: Discharge Planning  Goal: Discharge to home or other facility with appropriate resources  10/8/2023 0536 by Yudy Kerns RN  Outcome: Progressing  10/8/2023 0111 by Yudy Kerns RN  Outcome: Progressing  10/7/2023 1705 by Yony Robins RN  Outcome: Progressing  Flowsheets (Taken 10/7/2023 1705)  Discharge to home or other facility with appropriate resources:   Identify barriers to discharge with patient and caregiver   Arrange for needed discharge resources and transportation as appropriate     Problem: Skin/Tissue Integrity  Goal: Absence of new skin breakdown  Description: 1. Monitor for areas of redness and/or skin breakdown  2. Assess vascular access sites hourly  3. Every 4-6 hours minimum:  Change oxygen saturation probe site  4. Every 4-6 hours:  If on nasal continuous positive airway pressure, respiratory therapy assess nares and determine need for appliance change or resting period.   10/7/2023 1705 by Yony Robins RN  Outcome: Progressing     Problem: Safety - Adult  Goal: Free from fall injury  10/8/2023 0536 by Yudy Kerns RN  Outcome: Progressing  10/8/2023 0111 by Yudy Kerns RN  Outcome: Progressing  10/7/2023

## 2023-10-08 NOTE — PLAN OF CARE
Problem: Chronic Conditions and Co-morbidities  Goal: Patient's chronic conditions and co-morbidity symptoms are monitored and maintained or improved  10/7/2023 1705 by Rosanna Gallardo RN  Outcome: Progressing  Flowsheets (Taken 10/7/2023 1705)  Care Plan - Patient's Chronic Conditions and Co-Morbidity Symptoms are Monitored and Maintained or Improved:   Monitor and assess patient's chronic conditions and comorbid symptoms for stability, deterioration, or improvement   Collaborate with multidisciplinary team to address chronic and comorbid conditions and prevent exacerbation or deterioration   Update acute care plan with appropriate goals if chronic or comorbid symptoms are exacerbated and prevent overall improvement and discharge     Problem: Discharge Planning  Goal: Discharge to home or other facility with appropriate resources  10/8/2023 0111 by Fleipe Levy RN  Outcome: Progressing  10/7/2023 1705 by Rosanna Gallardo RN  Outcome: Progressing  Flowsheets (Taken 10/7/2023 1705)  Discharge to home or other facility with appropriate resources:   Identify barriers to discharge with patient and caregiver   Arrange for needed discharge resources and transportation as appropriate     Problem: Skin/Tissue Integrity  Goal: Absence of new skin breakdown  Description: 1. Monitor for areas of redness and/or skin breakdown  2. Assess vascular access sites hourly  3. Every 4-6 hours minimum:  Change oxygen saturation probe site  4. Every 4-6 hours:  If on nasal continuous positive airway pressure, respiratory therapy assess nares and determine need for appliance change or resting period.   10/7/2023 1705 by Rosanna Gallardo RN  Outcome: Progressing     Problem: Safety - Adult  Goal: Free from fall injury  10/8/2023 0111 by Felipe Levy RN  Outcome: Progressing  10/7/2023 1705 by Rosanna Gallardo RN  Outcome: Progressing  Flowsheets (Taken 10/7/2023 1705)  Free From Fall Injury:   [place] Babe family/caregiver on patient safety   Based on caregiver fall risk screen, instruct family/caregiver to ask for assistance with transferring infant if caregiver noted to have fall risk factors     Problem: ABCDS Injury Assessment  Goal: Absence of physical injury  10/7/2023 1705 by Garnetta Spatz, RN  Outcome: Progressing  Flowsheets (Taken 10/7/2023 1705)  Absence of Physical Injury: Implement safety measures based on patient assessment     Problem: Pain  Goal: Verbalizes/displays adequate comfort level or baseline comfort level  10/8/2023 0111 by James Magana RN  Outcome: Progressing  10/7/2023 1705 by Garnetta Spatz, RN  Outcome: Progressing  Flowsheets (Taken 10/7/2023 1705)  Verbalizes/displays adequate comfort level or baseline comfort level:   Encourage patient to monitor pain and request assistance   Assess pain using appropriate pain scale   Administer analgesics based on type and severity of pain and evaluate response

## 2023-10-09 ENCOUNTER — APPOINTMENT (OUTPATIENT)
Dept: VASCULAR LAB | Age: 78
End: 2023-10-09
Attending: INTERNAL MEDICINE
Payer: MEDICARE

## 2023-10-09 LAB
ALBUMIN SERPL-MCNC: 3.4 G/DL (ref 3.5–5.2)
ALBUMIN/GLOB SERPL: 2.4 {RATIO} (ref 1–2.5)
ALP SERPL-CCNC: 101 U/L (ref 35–104)
ALT SERPL-CCNC: 20 U/L (ref 5–33)
ANION GAP SERPL CALCULATED.3IONS-SCNC: 9 MMOL/L (ref 9–17)
AST SERPL-CCNC: 25 U/L
B2 GLYCOPROT1 IGA SERPL IA-ACNC: <0.3 ELISA U/ML (ref 0–7)
B2 GLYCOPROT1 IGG SERPL IA-ACNC: 2.7 ELISA U/ML (ref 0–7)
B2 GLYCOPROT1 IGM SERPL IA-ACNC: <0.9 ELISA U/ML (ref 0–7)
BASOPHILS # BLD: 0.08 K/UL (ref 0–0.2)
BASOPHILS NFR BLD: 1 % (ref 0–2)
BILIRUB SERPL-MCNC: 1.1 MG/DL (ref 0.3–1.2)
BUN SERPL-MCNC: 36 MG/DL (ref 8–23)
CALCIUM SERPL-MCNC: 8.2 MG/DL (ref 8.6–10.4)
CHLORIDE SERPL-SCNC: 102 MMOL/L (ref 98–107)
CO2 SERPL-SCNC: 27 MMOL/L (ref 20–31)
CREAT SERPL-MCNC: 1.3 MG/DL (ref 0.5–0.9)
DATE, STOOL #1: ABNORMAL
ECHO BSA: 1.91 M2
EOSINOPHIL # BLD: 0.39 K/UL (ref 0–0.44)
EOSINOPHILS RELATIVE PERCENT: 5 % (ref 1–4)
ERYTHROCYTE [DISTWIDTH] IN BLOOD BY AUTOMATED COUNT: 20.5 % (ref 11.8–14.4)
GFR SERPL CREATININE-BSD FRML MDRD: 43 ML/MIN/1.73M2
GLUCOSE SERPL-MCNC: 96 MG/DL (ref 70–99)
HCT VFR BLD AUTO: 26.1 % (ref 36.3–47.1)
HEMOCCULT SP1 STL QL: POSITIVE
HGB BLD-MCNC: 7.6 G/DL (ref 11.9–15.1)
IMM GRANULOCYTES # BLD AUTO: 0.16 K/UL (ref 0–0.3)
IMM GRANULOCYTES NFR BLD: 2 %
INR PPP: 1.5
LYMPHOCYTES NFR BLD: 1.4 K/UL (ref 1.1–3.7)
LYMPHOCYTES RELATIVE PERCENT: 18 % (ref 24–43)
MAGNESIUM SERPL-MCNC: 1.9 MG/DL (ref 1.6–2.6)
MCH RBC QN AUTO: 26 PG (ref 25.2–33.5)
MCHC RBC AUTO-ENTMCNC: 29.1 G/DL (ref 28.4–34.8)
MCV RBC AUTO: 89.4 FL (ref 82.6–102.9)
MONOCYTES NFR BLD: 1.09 K/UL (ref 0.1–1.2)
MONOCYTES NFR BLD: 14 % (ref 3–12)
MORPHOLOGY: ABNORMAL
NEUTROPHILS NFR BLD: 60 % (ref 36–65)
NEUTS SEG NFR BLD: 4.68 K/UL (ref 1.5–8.1)
NRBC BLD-RTO: 1.3 PER 100 WBC
PLATELET # BLD AUTO: 219 K/UL (ref 138–453)
PMV BLD AUTO: 9.8 FL (ref 8.1–13.5)
POTASSIUM SERPL-SCNC: 3.3 MMOL/L (ref 3.7–5.3)
PROT SERPL-MCNC: 4.8 G/DL (ref 6.4–8.3)
PROTHROMBIN TIME: 17.7 SEC (ref 11.7–14.9)
RBC # BLD AUTO: 2.92 M/UL (ref 3.95–5.11)
SODIUM SERPL-SCNC: 138 MMOL/L (ref 135–144)
TIME, STOOL #1: 1529
WBC OTHER # BLD: 7.8 K/UL (ref 3.5–11.3)

## 2023-10-09 PROCEDURE — 6360000002 HC RX W HCPCS: Performed by: NURSE PRACTITIONER

## 2023-10-09 PROCEDURE — 6370000000 HC RX 637 (ALT 250 FOR IP): Performed by: NURSE PRACTITIONER

## 2023-10-09 PROCEDURE — 6370000000 HC RX 637 (ALT 250 FOR IP): Performed by: STUDENT IN AN ORGANIZED HEALTH CARE EDUCATION/TRAINING PROGRAM

## 2023-10-09 PROCEDURE — 83735 ASSAY OF MAGNESIUM: CPT

## 2023-10-09 PROCEDURE — 85610 PROTHROMBIN TIME: CPT

## 2023-10-09 PROCEDURE — 99232 SBSQ HOSP IP/OBS MODERATE 35: CPT | Performed by: INTERNAL MEDICINE

## 2023-10-09 PROCEDURE — 94761 N-INVAS EAR/PLS OXIMETRY MLT: CPT

## 2023-10-09 PROCEDURE — 2580000003 HC RX 258: Performed by: NURSE PRACTITIONER

## 2023-10-09 PROCEDURE — 97530 THERAPEUTIC ACTIVITIES: CPT

## 2023-10-09 PROCEDURE — 99233 SBSQ HOSP IP/OBS HIGH 50: CPT

## 2023-10-09 PROCEDURE — 93970 EXTREMITY STUDY: CPT

## 2023-10-09 PROCEDURE — 85025 COMPLETE CBC W/AUTO DIFF WBC: CPT

## 2023-10-09 PROCEDURE — 93970 EXTREMITY STUDY: CPT | Performed by: SURGERY

## 2023-10-09 PROCEDURE — 97166 OT EVAL MOD COMPLEX 45 MIN: CPT

## 2023-10-09 PROCEDURE — 2060000000 HC ICU INTERMEDIATE R&B

## 2023-10-09 PROCEDURE — 6370000000 HC RX 637 (ALT 250 FOR IP): Performed by: INTERNAL MEDICINE

## 2023-10-09 PROCEDURE — 36415 COLL VENOUS BLD VENIPUNCTURE: CPT

## 2023-10-09 PROCEDURE — 99232 SBSQ HOSP IP/OBS MODERATE 35: CPT | Performed by: STUDENT IN AN ORGANIZED HEALTH CARE EDUCATION/TRAINING PROGRAM

## 2023-10-09 PROCEDURE — 80053 COMPREHEN METABOLIC PANEL: CPT

## 2023-10-09 PROCEDURE — 97535 SELF CARE MNGMENT TRAINING: CPT

## 2023-10-09 PROCEDURE — 82270 OCCULT BLOOD FECES: CPT

## 2023-10-09 PROCEDURE — 97162 PT EVAL MOD COMPLEX 30 MIN: CPT

## 2023-10-09 RX ADMIN — PANTOPRAZOLE SODIUM 40 MG: 40 TABLET, DELAYED RELEASE ORAL at 16:11

## 2023-10-09 RX ADMIN — CLOPIDOGREL BISULFATE 75 MG: 75 TABLET ORAL at 10:30

## 2023-10-09 RX ADMIN — ACETAMINOPHEN 500 MG: 500 TABLET ORAL at 19:03

## 2023-10-09 RX ADMIN — ENOXAPARIN SODIUM 80 MG: 100 INJECTION SUBCUTANEOUS at 10:30

## 2023-10-09 RX ADMIN — POTASSIUM BICARBONATE 40 MEQ: 782 TABLET, EFFERVESCENT ORAL at 11:14

## 2023-10-09 RX ADMIN — ENOXAPARIN SODIUM 80 MG: 100 INJECTION SUBCUTANEOUS at 21:42

## 2023-10-09 RX ADMIN — CLONIDINE HYDROCHLORIDE 0.1 MG: 0.1 TABLET ORAL at 10:30

## 2023-10-09 RX ADMIN — ACETAMINOPHEN 500 MG: 500 TABLET ORAL at 10:40

## 2023-10-09 RX ADMIN — SODIUM CHLORIDE, PRESERVATIVE FREE 10 ML: 5 INJECTION INTRAVENOUS at 21:42

## 2023-10-09 RX ADMIN — ATORVASTATIN CALCIUM 80 MG: 80 TABLET, FILM COATED ORAL at 21:42

## 2023-10-09 RX ADMIN — SODIUM CHLORIDE, PRESERVATIVE FREE 10 ML: 5 INJECTION INTRAVENOUS at 10:30

## 2023-10-09 RX ADMIN — PANTOPRAZOLE SODIUM 40 MG: 40 TABLET, DELAYED RELEASE ORAL at 10:30

## 2023-10-09 RX ADMIN — CLONIDINE HYDROCHLORIDE 0.1 MG: 0.1 TABLET ORAL at 21:42

## 2023-10-09 ASSESSMENT — PAIN SCALES - GENERAL
PAINLEVEL_OUTOF10: 5
PAINLEVEL_OUTOF10: 9

## 2023-10-09 ASSESSMENT — PAIN DESCRIPTION - LOCATION: LOCATION: BACK

## 2023-10-09 ASSESSMENT — PAIN DESCRIPTION - ORIENTATION: ORIENTATION: MID

## 2023-10-09 ASSESSMENT — PAIN DESCRIPTION - PAIN TYPE: TYPE: CHRONIC PAIN

## 2023-10-09 NOTE — PROGRESS NOTES
[facility] [address] [place] [address]  PROGRESS NOTE    Shift date: 10/9/2023  Shift day: Monday   Shift # 1    Room # 2001/2001-01   Name: Ileene Curling                Christianity: [id number] of Adventism: Antonio Wallace    Referral:  by nurse for spiritual care consultation    Admit Date & Time: 10/7/2023  2:49 AM    Assessment:  Ileene Curling is a 78 y.o. female in the hospital because of pulmonary embolus. Upon entering the room writer observes patient sitting up in a chair near the bed. Intervention:  Writer introduced self and title as  . Writer offered space for patient  to express feelings, needs, and concerns and provided a ministry presence. Provided active listening; affirmation and encouragement; promise to call her Nondenominational as requested to relay the message that she is in the hospital.    Outcome:  Patient welcomed the 's visit and readily engaged in conversation. She expressed relief and gratitude in response to the 's promise to call her Orthodox to relay her message. Plan:  Chaplains will remain available to offer spiritual and emotional support as needed. Electronically signed by Brittani Montez on 10/9/2023 at [facility]  [phone]      10/09/23 2257   Encounter Summary   Encounter Overview/Reason  Spiritual/Emotional Needs   Service Provided For: Patient   Referral/Consult From: Nurse   Support System Family members   Last Encounter  10/09/23   Complexity of Encounter Low   Begin Time 1200   End Time  1220   Total Time Calculated 20 min   Spiritual/Emotional needs   Type Spiritual Support   Assessment/Intervention/Outcome   Assessment Calm;Coping; Other (Comment)  (Patient requested that  call Antonio Wallace and let the  know that she is in the hospital.)   Intervention Active listening;Discussed belief system/Baptism practices/kaylene; Explored/Affirmed feelings, thoughts, concerns;Sustaining Presence/Ministry of presence;Prayer (assurance of)/[place]; Other (Comment)  (Advocacy as a liaison communicator to the patient's Church of membership.)   Outcome Concerns relieved;Engaged in conversation;Expressed feelings, needs, and concerns;Expressed Gratitude

## 2023-10-09 NOTE — PROGRESS NOTES
Physical Therapy  Facility/Department: [facility] CAR 2- STEPDOWN  Physical Therapy Initial Assessment    Name: Km Peres  : 1945  MRN: [id number]  Date of Service: 10/9/2023  No chief complaint on file. Discharge Recommendations:    Further therapy recommended at discharge. PT Equipment Recommendations  Equipment Needed: No      Patient Diagnosis(es): The primary encounter diagnosis was Pulmonary embolus, left ([address]). A diagnosis of Pericardial effusion was also pertinent to this visit. 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 Past Surgical History:  has a past surgical history that includes Dilation & curettage (); Breast biopsy (); Appendectomy (); Septoplasty (); Hammer toe surgery (); Sinus endoscopy; sinus surgery; Shoulder arthroscopy (); Esophagoscopy (1966, ); Colonoscopy (); Foreign Body Removal (); laparoscopy (8250); Coronary angioplasty with stent (2011); Coronary angioplasty with stent (2011); Upper gastrointestinal endoscopy; eye surgery; pr colonoscopy w/biopsy single/multiple (N/A, 2017); pr egd transoral biopsy single/multiple (2017); Upper gastrointestinal endoscopy (2017); Upper gastrointestinal endoscopy (2018); Cardiac surgery; Cardiac catheterization (2020); Cardioversion (2020); and Cardioversion (2020).     Assessment   Body Structures, Functions, Activity Limitations Requiring Skilled Therapeutic Grossly 4/5  Strength LLE  Strength LLE: WFL  Comment: Grossly 4-/5  Strength RUE  Comment: See OT  Strength LUE  Comment: See OT           Bed mobility  Supine to Sit: Stand by assistance  Bed Mobility Comments: HOB elevated ~30 degrees. Pt in bed upon arrival, retired in chair upon exit. Transfers  Sit to Stand: Contact guard assistance  Stand to Sit: Contact guard assistance  Comment: Performed from bed and toilet 1x ea w/ RW. Ambulation  Surface: Level tile  Device: Rolling Walker  Assistance: Contact guard assistance  Quality of Gait: Pt amb w/ mild anterior trunk lean, moderate reduction in gait speed, no LOB, symmetric step length. Distance: 13' + 15'  Comments: Standing rest break required between ambulation. More Ambulation?: Yes  Ambulation 2  Surface - 2: level tile  Device 2: [address] 2: Contact guard assistance  Quality of Gait 2: Same as above  Distance: 8'  Comments: Toilet to chair.      Balance  Posture: Fair  Sitting - Static: Fair;+  Sitting - Dynamic: Fair;+  Standing - Static: Fair  Standing - Dynamic: Fair  Comments: Standing balance assessed w/ RW.           AM-PAC Score  AM-PAC Inpatient Mobility Raw Score : 17 (10/09/23 1221)  AM-PAC Inpatient T-Scale Score : 42.13 (10/09/23 1221)  Mobility Inpatient CMS 0-100% Score: 50.57 (10/09/23 1221)  Mobility Inpatient CMS G-Code Modifier : CK (10/09/23 1221)        Goals  Short Term Goals  Time Frame for Short Term Goals: 14 visits  Short Term Goal 1: Pt will be [place] in all bed mobility tasks  Short Term Goal 2: Pt will be [place] in transfers  Short Term Goal 3: Pt will amb 150' [place] w/ RW or least restrictive device  Short Term Goal 4: Pt will negotiate 1 step [place] w/ unilateral UE assist  Additional Goals?: No       Education  Patient Education  Education Given To: Patient  Education Provided: Role of Therapy;Plan of Care  Education Method: Demonstration;Verbal  Barriers to Learning: Cognition  Education Outcome: Continued

## 2023-10-09 NOTE — PROGRESS NOTES
qamar ablation, on Eliquis 5 mg twice daily  Multiple Sclerosis, follows up at [address] [place] [address] [facility]  History of [facility]    RECOMMENDATIONS:  I personally reviewed results of lab work-up imaging studies and other relevant clinical data. I had a detailed discussion with the patient. Personally went over results of lab work-up and other relevant clinical data  Patient currently on Lovenox. Iron studies consistent with iron deficiency  Patient will need work-up for iron deficiency anemia. Rule out GI etiology  DOAC can cause modest increase in INR however patient INR was 3.1 at presentation. Ultrasound liver does not suggest cirrhosis or liver disease. Factor VIII pending. Fibrinogen low normal.  Follow-up on peripheral smear  Follow-up on results of lower extremity Doppler. Due to concern regarding Eliquis failure consider medication with alternative mode of action such as vitamin K antagonist or edoxaban or Pradaxa. Patient will need bridging for these agents. Discussed differential diagnosis of failure of DOAC. Hypercoagulable state from malignancies, antiphospholipid antibody syndrome, drug interaction, malabsorption and noncompliance    Patient records show allergy to heparin, unclear details but has been tolerating Lovenox.         We will follow                 Electronically signed by   Rebecca Pham MD    on 10/9/2023 at 6:13 PM

## 2023-10-09 NOTE — DISCHARGE INSTR - COC
Continuity of Care Form    Patient Name: Raymond Giraldo   :  1945  MRN:  [id number]    Admit date:  10/7/2023  Discharge date:  10/17/2023    Code Status Order: Full Code   Advance Directives:     Admitting Physician:  Kaylie Castaneda DO  PCP: Royce Moore MD    Discharging Nurse: [address] Unit/Room#:   Discharging Unit Phone Number: [phone]    Emergency Contact:   Extended Emergency Contact Information  Primary Emergency Contact: [address]  Mobile Phone: [phone]  Relation: Niece/Nephew  Secondary Emergency Contact: [facility] BLANKA BRUMFIELD  Mobile Phone: [phone]  Relation: Niece/Nephew    Past Surgical History:  Past Surgical History:   Procedure Laterality Date    APPENDECTOMY      sponges left in abdomen, at [address]    stereotactic type, right breast, benign    CARDIAC CATHETERIZATION  2020    POBA lad    CARDIAC SURGERY      CARDIOVERSION  2020    [facility]     CARDIOVERSION  2020    COLONOSCOPY  2010    Polyp    CORONARY ANGIOPLASTY WITH STENT PLACEMENT  2011    failed attempt to place stent    CORONARY ANGIOPLASTY WITH STENT PLACEMENT  2011    successful placement of 2 stent in same artery    [address]    bleeding ulcers found    [address] [facility]    surgical sponges left in during appendectomy    HAMMER TOE SURGERY      head resection of phalanx, left foot    LAPAROSCOPY      with D&C    [place] COLONOSCOPY W/BIOPSY SINGLE/MULTIPLE N/A 2017    COLONOSCOPY WITH BIOPSY performed by Beni Bowman MD at [facility]    [place] EGD TRANSORAL BIOPSY SINGLE/MULTIPLE  2017    EGD BIOPSY performed by Beni Bowman MD at [address] [place] [address] [place] resection with rt and left ethmoidectomy    SHOULDER ARTHROSCOPY      rotator cuff impingement, right arm    SINUS ENDOSCOPY

## 2023-10-09 NOTE — PROGRESS NOTES
[facility]  Office: [phone]  Yoana Easton, DO, Ra Jaimes, DO, Lily Harris, DO, Khai Hernandez Blood, DO, Lupe Rivas MD, Abelardo Cabrera MD, Raisa Garvey MD, Arcelia Libman, MD,  Rachel Sadler MD, Jeanie Nur MD, Corrie Hansen, DO, Maria Isabel Chowdhury MD,  Annia Beard MD, Prema Burr MD, Jesus De La O DO, Gail Serrato MD,  Qing Zamorano DO, Shailesh Alejandro MD, Christine Curtis MD, Zo Urbina MD, Sanjana Hector MD,  Sacha Evans MD, Trina Siu MD, Delaney Argueta MD, Dale Victoria MD, Sameera Palomares DO, Rae Guzmán MD,  Jeanmarie Mathew MD, James Tuttle MD, Kishan Helton, CNP,  Ruthy Vilchis, CNP,, Maritza Jordan, CNP,  Madison Paris, DNP, Yenifer Clark, CNP, Sherrill Hendrix, CNP, Coco Brantley, CNP, Nicolette Fontanez, CNP, Ryan Randhawa, CNP, Mary Ann Coulter, CNP, Alanna Whitney, CNS, Annalee Garcia, CNP, Ryan Calderon, [address]    Progress Note    10/9/2023    7:56 AM    Name:   Marixa Mathews  MRN:     [id number]     Acct:      [de-identified]   Room:   2001/2001-01  IP Day:  2  Admit Date:  10/7/2023  2:49 AM    PCP:   Romana Hailey, MD  Code Status:  Full Code    Subjective:     C/C: Shortness of breath. Interval History Status: improved. Vitals reviewed, afebrile and hemodynamically stable. Saturating well on room air. Labs reviewed, hypokalemia 3.3, elevated BUN and creatinine 36 and 1.3. LFT normal.  Will check PT and INR today. Overnight patient had no significant events. On examination patient resting comfortably in bed. Continues to complain of pain but appears rather comfortable. Currently on Tylenol 500 mg as needed but requesting 1 g as needed however due to elevated direct bilirubin and INR uncomfortable increasing Tylenol dosing until confirmation on why these are elevated.   GI evaluated patient yesterday with plan to closely monitor LFTs and Patient admits to compliance with Eliquis but states she was off of it for 3 days for previous cardiac catheterization in September. Heparin allergy unspecified. Currently tolerating Lovenox. Acute on chronic anemia with melena. Patient admits to dark stool. Will obtain occult blood stool sample. Evaluated by hematology. Recommendations made for IV iron infusion. Low suspicion for hemolysis. Hyperbilirubinemia with elevated INR. Elevated bilirubin last month but climbing. INR 1.7 on admission and 3.1 on repeat and downtrending to 2.7 today? Tylenol less than 5 and LFT stable. Liver ultrasound normal. Hematology following. GI was consulted but due to normal liver ultrasound and LFTs recommended continued close monitoring of LFTs and if elevation occurs patient will likely need liver biopsy per IR. High anion gap metabolic acidosis. Resolved. NSTEMI. Cardiology following. Echocardiogram demonstrated left ventricular systolic ejection fraction 50 to 55%. Continues on full dose Lovenox 80 mg twice daily, Plavix 75 mg daily, Lipitor 80 mg nightly. Toprol-XL currently held due to allergy in our formulation. Pericardial effusion. Echocardiogram demonstrated small pericardial effusion posteriorly but no tamponade. Stable from cardiology standpoint. CAD status post PCI 9/19/2023. Continues on full dose Lovenox 80 mg twice daily, Plavix 75 mg daily, Lipitor 80 mg nightly. Toprol-XL currently held due to allergy in our formulation. Urine hemoglobin but no red blood cells? CK myoglobin mildly elevated. No concern for rhabdomyolysis. Severe pulmonary hypertension with cor pulmonale. Cardiology following. Chronic GERD with pyloric stenosis and gastroparesis. Continue Protonix 40 mg twice daily. Chronic MS. Stable. Paroxysmal atrial fibrillation. Continue full dose Lovenox. Beta-blocker currently held due to allergy with inpatient formulation. ISABELLA on CKD.

## 2023-10-09 NOTE — PROGRESS NOTES
Occupational Therapy  Facility/Department: [facility] CAR 2- STEPDOWN  Occupational Therapy Initial Assessment    Name: Tracy Johnson  : 1945  MRN: [id number]  Date of Service: 10/9/2023    Discharge Recommendations:  Patient would benefit from continued therapy after discharge  OT Equipment Recommendations  Equipment Needed: Yes  Mobility Devices: ADL Assistive Devices  ADL Assistive Devices: Toileting - Raised Toilet Seat with arms; Reacher;Long-handled Shoe Horn;Long-handled Sponge;Sock-Aid Hard       Patient Diagnosis(es): The primary encounter diagnosis was Pulmonary embolus, left ([address]). A diagnosis of Pericardial effusion was also pertinent to this visit. 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 Past Surgical History:  has a past surgical history that includes Dilation & curettage (); Breast biopsy (); Appendectomy (); Septoplasty (); Hammer toe surgery (); Sinus endoscopy; sinus surgery; Shoulder arthroscopy (); Esophagoscopy (, ); Colonoscopy (); Foreign Body Removal (); laparoscopy (); Coronary angioplasty with stent (2011); Coronary angioplasty with stent (2011); Upper gastrointestinal endoscopy; eye surgery; pr colonoscopy w/biopsy single/multiple (N/A, 2017); pr egd transoral biopsy single/multiple (2017); Upper gastrointestinal endoscopy (2017); Upper gastrointestinal endoscopy (2018);  Cardiac surgery; Cardiac catheterization No  General Comment  Comments: RN ok'd for OT/PT eval this AM. Pt agreeable to session, pleasent/cooperative throughout. Pt reports 10/10 pain. Asssited to reposition for comfort. Pt very tearful intermittently throughout. Assisted for comfort throughout. Social/Functional History  Social/Functional History  Lives With: Alone  Type of Home: Apartment  Home Layout: One level  Home Access: Level entry  Bathroom Shower/Tub: Tub/Shower unit  Bathroom Equipment: Shower chair, Hand-held shower  Home Equipment: Rollator, Electric scooter (pt uses rollator outdoors, \"furniture surfs\"in home. Dead batteries for electric scooter, tires in poor condition.)  Has the patient had two or more falls in the past year or any fall with injury in the past year?: Yes (Pt reports ~4-5 falls this year.)  ADL Assistance: [address] Independent  Homemaking Responsibilities: Yes  Ambulation Assistance: Independent  Transfer Assistance: Independent  Active : Yes  Mode of Transportation: Car  Occupation: Retired  Type of Occupation:   Leisure & Hobbies: Watching the [employer]. Sewing. Going to [employer]. Additional Comments: Pt reports having helpful neighbors. Objective        Safety Devices  Type of Devices: Patient at risk for falls;Call light within reach; Left in bed;Gait belt;Nurse notified  Restraints  Restraints Initially in Place: No    Balance  Sitting: Intact (SUP - static / SBA- dynamic ~20 minutes total. No LOB exhibited.)  Standing: With support (Stood static 2x brief bouts during mobility for restbreak (~30 seconds , ~ 15 seconds) with CGA. Use of RW. Declined further ADLs standing d/t fatigue and pt reporting \"gasping for air\". Pt did not exhibit gasping and SPO2 >95%.)    Gait  Overall Level of Assistance: Contact-guard assistance; Additional time (Pt completed functional mobility to/from bathroom and short distance in [place] to simulate household/community distances.  Pt reports

## 2023-10-09 NOTE — CARE COORDINATION
Met with patient to discuss transitional planning. She would like to go to [facility] for rehab. When she goes home, she would like [address] [place] [address] for home care. Also discussed referral to [address] [place] [address] for palliative services. Patient is agreeable. [facility]. Referral is in review. Referral sent to [address] [place] [address] for palliative    21  spoke to Susannah from [facility]. They are able to accept pending stable hgb. Spoke to [employer] from [address] [place] [address].  They are able to follow for palliative care needs

## 2023-10-10 LAB
ANION GAP SERPL CALCULATED.3IONS-SCNC: 15 MMOL/L (ref 9–16)
BUN SERPL-MCNC: 29 MG/DL (ref 8–23)
CALCIUM SERPL-MCNC: 8.5 MG/DL (ref 8.6–10.4)
CHLORIDE SERPL-SCNC: 103 MMOL/L (ref 98–107)
CO2 SERPL-SCNC: 22 MMOL/L (ref 20–31)
CREAT SERPL-MCNC: 1.4 MG/DL (ref 0.5–0.9)
GFR SERPL CREATININE-BSD FRML MDRD: 41 ML/MIN/1.73M2
GLUCOSE SERPL-MCNC: 113 MG/DL (ref 74–99)
INR PPP: 1.2
POTASSIUM SERPL-SCNC: 3.9 MMOL/L (ref 3.7–5.3)
PROTHROMBIN TIME: 15.4 SEC (ref 11.7–14.9)
SODIUM SERPL-SCNC: 140 MMOL/L (ref 136–145)

## 2023-10-10 PROCEDURE — 36415 COLL VENOUS BLD VENIPUNCTURE: CPT

## 2023-10-10 PROCEDURE — 2580000003 HC RX 258: Performed by: NURSE PRACTITIONER

## 2023-10-10 PROCEDURE — 6370000000 HC RX 637 (ALT 250 FOR IP): Performed by: NURSE PRACTITIONER

## 2023-10-10 PROCEDURE — 80048 BASIC METABOLIC PNL TOTAL CA: CPT

## 2023-10-10 PROCEDURE — 6370000000 HC RX 637 (ALT 250 FOR IP): Performed by: INTERNAL MEDICINE

## 2023-10-10 PROCEDURE — C9113 INJ PANTOPRAZOLE SODIUM, VIA: HCPCS | Performed by: NURSE PRACTITIONER

## 2023-10-10 PROCEDURE — 2060000000 HC ICU INTERMEDIATE R&B

## 2023-10-10 PROCEDURE — 99233 SBSQ HOSP IP/OBS HIGH 50: CPT

## 2023-10-10 PROCEDURE — 99232 SBSQ HOSP IP/OBS MODERATE 35: CPT | Performed by: FAMILY MEDICINE

## 2023-10-10 PROCEDURE — A4216 STERILE WATER/SALINE, 10 ML: HCPCS | Performed by: NURSE PRACTITIONER

## 2023-10-10 PROCEDURE — 6360000002 HC RX W HCPCS: Performed by: NURSE PRACTITIONER

## 2023-10-10 PROCEDURE — 6370000000 HC RX 637 (ALT 250 FOR IP): Performed by: STUDENT IN AN ORGANIZED HEALTH CARE EDUCATION/TRAINING PROGRAM

## 2023-10-10 PROCEDURE — 85610 PROTHROMBIN TIME: CPT

## 2023-10-10 PROCEDURE — 99232 SBSQ HOSP IP/OBS MODERATE 35: CPT | Performed by: INTERNAL MEDICINE

## 2023-10-10 RX ORDER — ACETAMINOPHEN 500 MG
1000 TABLET ORAL 2 TIMES DAILY PRN
Status: DISCONTINUED | OUTPATIENT
Start: 2023-10-10 | End: 2023-10-10

## 2023-10-10 RX ORDER — ACETAMINOPHEN 325 MG/1
650 TABLET ORAL EVERY 4 HOURS PRN
Status: DISCONTINUED | OUTPATIENT
Start: 2023-10-10 | End: 2023-10-18 | Stop reason: HOSPADM

## 2023-10-10 RX ADMIN — PANTOPRAZOLE SODIUM 40 MG: 40 TABLET, DELAYED RELEASE ORAL at 09:26

## 2023-10-10 RX ADMIN — SODIUM CHLORIDE 80 MG: 9 INJECTION INTRAMUSCULAR; INTRAVENOUS; SUBCUTANEOUS at 18:33

## 2023-10-10 RX ADMIN — ACETAMINOPHEN 650 MG: 325 TABLET ORAL at 21:00

## 2023-10-10 RX ADMIN — PANTOPRAZOLE SODIUM 8 MG/HR: 40 INJECTION, POWDER, FOR SOLUTION INTRAVENOUS at 18:34

## 2023-10-10 RX ADMIN — CLONIDINE HYDROCHLORIDE 0.1 MG: 0.1 TABLET ORAL at 21:00

## 2023-10-10 RX ADMIN — ENOXAPARIN SODIUM 80 MG: 100 INJECTION SUBCUTANEOUS at 09:27

## 2023-10-10 RX ADMIN — SODIUM CHLORIDE, PRESERVATIVE FREE 10 ML: 5 INJECTION INTRAVENOUS at 09:27

## 2023-10-10 RX ADMIN — PANTOPRAZOLE SODIUM 40 MG: 40 TABLET, DELAYED RELEASE ORAL at 17:02

## 2023-10-10 RX ADMIN — SODIUM CHLORIDE, PRESERVATIVE FREE 10 ML: 5 INJECTION INTRAVENOUS at 21:01

## 2023-10-10 RX ADMIN — ATORVASTATIN CALCIUM 80 MG: 80 TABLET, FILM COATED ORAL at 21:01

## 2023-10-10 RX ADMIN — CLOPIDOGREL BISULFATE 75 MG: 75 TABLET ORAL at 09:27

## 2023-10-10 ASSESSMENT — PAIN DESCRIPTION - DESCRIPTORS
DESCRIPTORS: ACHING
DESCRIPTORS: ACHING

## 2023-10-10 ASSESSMENT — PAIN DESCRIPTION - PAIN TYPE: TYPE: CHRONIC PAIN

## 2023-10-10 ASSESSMENT — ENCOUNTER SYMPTOMS
ABDOMINAL PAIN: 0
DIARRHEA: 0
SHORTNESS OF BREATH: 0
VOMITING: 0
BLOOD IN STOOL: 0
CHEST TIGHTNESS: 0
NAUSEA: 0
COUGH: 0
CONSTIPATION: 0
WHEEZING: 0

## 2023-10-10 ASSESSMENT — PAIN - FUNCTIONAL ASSESSMENT: PAIN_FUNCTIONAL_ASSESSMENT: ACTIVITIES ARE NOT PREVENTED

## 2023-10-10 ASSESSMENT — PAIN DESCRIPTION - LOCATION: LOCATION: GENERALIZED

## 2023-10-10 ASSESSMENT — PAIN SCALES - GENERAL: PAINLEVEL_OUTOF10: 10

## 2023-10-10 NOTE — PROGRESS NOTES
[facility]  Progress Note                   Date:   10/10/2023  Patient name: Lula Holliday  Date of admission:  10/7/2023  2:49 AM  MRN:   [id number]  YOB: 1945  PCP: Deloris Lyon MD    Reason for Admission: Pulmonary embolus, left [facility]) [I26.99]    Subjective:       Clinical Changes /Abnormalities:  Patient seen and examined in room with RN present. Denies CP or SOB> Vitals, labs, and tele reviewed. Patient has concerns in regard to d/c planning. She states she cannot go home where she lives alone but cannot afford to go to rehab. Case management follow for discharge planning.        -3L since Admission   Review of Systems    Medications:   Scheduled Meds:   sodium chloride flush  5-40 mL IntraVENous 2 times per day    enoxaparin  1 mg/kg SubCUTAneous BID    [Held by provider] losartan  100 mg Oral Daily    clopidogrel  75 mg Oral Daily    cloNIDine  0.1 mg Oral BID    atorvastatin  80 mg Oral Nightly    [Held by provider] apixaban  5 mg Oral BID    [Held by provider] metoprolol succinate  50 mg Oral Daily    pantoprazole  40 mg Oral BID AC     Continuous Infusions:   sodium chloride       CBC:   Recent Labs     10/07/23  1025 10/08/23  0528 10/09/23  0624   WBC 9.2 10.1 7.8   HGB 8.2* 7.4* 7.6*    255 219       BMP:    Recent Labs     10/07/23  1025 10/08/23  0528 10/09/23  0624    138 138   K 3.9 3.3* 3.3*    102 102   CO2 15* 22 27   BUN 42* 43* 36*   CREATININE 1.3* 1.5* 1.3*   GLUCOSE 77 108* 96       Hepatic:  Recent Labs     10/07/23  1025 10/08/23  0528 10/09/23  0624   AST 42* 34* 25   ALT 17 22 20   BILITOT 2.5* 1.5* 1.1   ALKPHOS 107* 105* 101       Troponin:   Recent Labs     10/07/23  1025   TROPHS 49*       BNP: No results for input(s): \"BNP\" in the last 72 hours. Lipids: No results for input(s): \"CHOL\", \"HDL\" in the last 72 hours.     Invalid input(s): \"LDLCALCU\"  INR:   Recent Labs     10/07/23  1850 10/09/23  1409 10/10/23  0504   INR 2.7 1.5

## 2023-10-10 NOTE — PROGRESS NOTES
Today's Date: 10/10/2023  Patient Name: Edouard Frederick  Date of admission: 10/7/2023  2:49 AM  Patient's age: 78 y. o., 1945  Admission Dx: Pulmonary embolus, left ([address]) [I26.99]    Reason for Consult: PE while on Eliquis and Plavis, allergy to Heparin  Requesting Physician: Ace Valentine DO    CHIEF COMPLAINT:  sob    History Obtained From:  patient, chart review            Interval history:    Patient seen and examined  Labs and vitals reviewed  Patient scheduled for endoscopy tomorrow  Lovenox on hold          HISTORY OF PRESENT ILLNESS:      The patient is a 78 y.o. , Female with a history of chronic GERD with pyloric stenosis s/p dilatation, gastroparesis chronic MS, history of CVA, heart failure with preserved ejection fraction, pulmonary hypertension with cor pulmonale, CAD s/p stent 2023 on 9/2023, Persistent a fib  post AV qamar ablation on eliquis 5mg BID,  initially presented to [facility] ED with increasing shortness of breath. As per patient she had been having worsening respiratory symptoms since she went to EGD in January. She went for EGD for GERD variant With chronic nonproductive cough. Initial CT imaging at [facility] showed left segmental and subsegmental PE with anasarca and bilateral pleural effusion with evidence of ascites. Patient was transferred here for further management. Patient was admitted under Intermed service for acute on chronic hypoxic respiratory failure, multifactorial likely secondary to decompensated CHF, chronic cor pulmonale and PE. Vascular was consulted, recommended no surgical intervention as this point. Today on my evaluation, patient was lying comfortably on the bed. Denies any complaints at this point. Was on room air.   Patient denies any history of blood clots in the past.    Past Medical History:   has a past medical history of Abnormal Pap smear, Asthma, Atrial fibrillation ([address]), Bloody discharge from nipple, CAD (coronary artery disease), Recent Labs     10/09/23  0624 10/10/23  1334    140   K 3.3* 3.9   CO2 27 22   BUN 36* 29*   CREATININE 1.3* 1.4*   LABGLOM 43* 41*   GLUCOSE 96 113*     PT/INR:   Recent Labs     10/09/23  1409 10/10/23  0504   PROTIME 17.7* 15.4*   INR 1.5 1.2       IMAGING DATA:  Vascular duplex lower extremity venous bilateral   Final Result      US LIVER   Final Result   No findings of cholelithiasis or evidence of cholecystitis. Common bile duct which is within normal limits. Normal appearance of the liver. Stable indeterminate right adrenal lesion. RECOMMENDATIONS:             Primary Problem  Pulmonary embolus, left [facility])    Active Hospital Problems    Diagnosis Date Noted    Obesity (BMI 30-39. 9) [E66.9] 08/05/2021     Priority: Low    Pulmonary embolus, left (HCC) [I26.99] 10/07/2023    High anion gap metabolic acidosis [T31.96] 10/07/2023    Elevated INR [R79.1] 10/07/2023    Pericardial effusion [I31.39] 10/07/2023    Severe pulmonary hypertension ([address]) [I27.20] 03/05/2022    Paroxysmal A-fib ([address]) [I48.0] 08/01/2020    CAD (coronary artery disease) [I25.10] 05/19/2019    Acute on chronic combined systolic and diastolic CHF (congestive heart failure) ([address]) [I50.43] 05/18/2019    Chronic renal insufficiency, stage III (moderate) ([address]) [N18.30] 10/12/2018    Migraine without status migrainosus, not intractable [G43.909]     H/O: CVA (cerebrovascular accident) [Z86.73] 05/24/2015    Fibromyalgia [M79.7]     Pyloric stenosis [K31.1]     Multiple sclerosis ([address]) Zarina Magaña 03/25/2013         IMPRESSION:   Left subsegmental/segmental PE, new diagnosis  Acute on chronic exacerbation of heart failure.  HFpEF  Heparin Allergy  Mild to moderate left pleural effusion  Pericardial effusion, cardiology following  CAD post stent placement 9/2023, on Plavix  Persistent atrial fibrillation post AV qamar ablation, on Eliquis 5 mg twice daily  Multiple Sclerosis, follows up at [address]

## 2023-10-10 NOTE — PROGRESS NOTES
[facility]  Office: [phone]  Gayla Avalos Blood, DO, Brian Patel MD, Stanislaw Santoro MD, Linda Chairez MD, Sara Ruelas MD,  Maldonado Calderon MD, Alphonse Knight MD, Shahida Martines DO, Kenny Hilario MD,  Artur Rios MD, Jayme Zavala MD, Leatha Corcoran DO, Garry Lopez MD,  Greer Magdaleno DO, Mukesh Noble MD, Fior Jarquin MD, Marlene Saba MD, Margarita Platt MD,  Erika Underwood MD, Conner Griffin MD, Joseph Breen MD, Buffy Khalil MD, Chioma Echeverria DO, Lali Ortega MD,  Patrick Noguera MD, Vero Campos MD, Epifanio Huerta, CNP,  Doreen Gonzales, CNP,, Deja Bui, CNP,  Dwayne Pardo, [facility], Tawana Rojas, CNP, Ema Strong, CNP, Blade Santana, CNP, Blossom Wright, CNP, Kevin Fernandez, CNP, Vernon Tabor, CNP, Angelica Rod, CNS, Bernard Ward, [facility], Seema Moreno, [address]    Progress Note    10/10/2023    5:27 PM    Name:   Fracisco Galloway  MRN:     [id number]     [address]:      <[facility]>   Room:   2001/2001-01   Day:  3  Admit Date:  10/7/2023  2:49 AM    PCP:   Kayleigh Chairez MD  Code Status:  Full Code    Subjective:     C/C:  SOB  Interval History Status: not changed. Patient seen and examined at bedside, had dark stool yesterday, fobt was positive  Had iron infusion course earlier  Has H/O ulcers  Does not wish for resuscitation or intubation even prefer to avoid biopsies and transfusion as possible   Patient vitals, labs and all providers notes were reviewed,from overnight shift and morning updates were noted and discussed with the nurse    Brief History:     Per chart  This is a 53-year-old female who initially presented to the emergency department with a chief complaint of shortness of breath and was found to have a pulmonary emboli despite long-term anticoagulation with Eliquis.   She was evaluated and formulation. ISABELLA on CKD. Baseline creatinine 1.2. Stable currently near baseline. DVT prophylaxis: Full dose Lovenox. GI prophylaxis: Protonix.        Jignesh West MD  10/10/2023  5:27 PM

## 2023-10-11 ENCOUNTER — ANESTHESIA EVENT (OUTPATIENT)
Dept: OPERATING ROOM | Age: 78
End: 2023-10-11
Payer: MEDICARE

## 2023-10-11 ENCOUNTER — ANESTHESIA (OUTPATIENT)
Dept: OPERATING ROOM | Age: 78
End: 2023-10-11
Payer: MEDICARE

## 2023-10-11 LAB
ABO + RH BLD: NORMAL
ANION GAP SERPL CALCULATED.3IONS-SCNC: 9 MMOL/L (ref 9–17)
ARM BAND NUMBER: NORMAL
BASOPHILS # BLD: 0.06 K/UL (ref 0–0.2)
BASOPHILS NFR BLD: 1 % (ref 0–2)
BLOOD BANK SAMPLE EXPIRATION: NORMAL
BLOOD GROUP ANTIBODIES SERPL: NEGATIVE
BUN SERPL-MCNC: 23 MG/DL (ref 8–23)
CALCIUM SERPL-MCNC: 8.6 MG/DL (ref 8.6–10.4)
CARDIOLIPIN IGA SER IA-ACNC: 0.8 APL (ref 0–14)
CARDIOLIPIN IGG SER IA-ACNC: 1.4 GPL (ref 0–10)
CARDIOLIPIN IGM SER IA-ACNC: 1.3 MPL (ref 0–10)
CHLORIDE SERPL-SCNC: 99 MMOL/L (ref 98–107)
CO2 SERPL-SCNC: 27 MMOL/L (ref 20–31)
CREAT SERPL-MCNC: 1.1 MG/DL (ref 0.5–0.9)
DILUTE RUSSELL VIPER VENOM TIME: ABNORMAL
EOSINOPHIL # BLD: 0.19 K/UL (ref 0–0.44)
EOSINOPHILS RELATIVE PERCENT: 3 % (ref 1–4)
ERYTHROCYTE [DISTWIDTH] IN BLOOD BY AUTOMATED COUNT: 21.7 % (ref 11.8–14.4)
F5 P.R506Q BLD/T QL: NEGATIVE
FACTOR VIII ACTIVITY: 109 % (ref 50–150)
GFR SERPL CREATININE-BSD FRML MDRD: 52 ML/MIN/1.73M2
GLUCOSE BLD-MCNC: 63 MG/DL (ref 65–105)
GLUCOSE SERPL-MCNC: 92 MG/DL (ref 70–99)
HCT VFR BLD AUTO: 24.4 % (ref 36.3–47.1)
HCT VFR BLD AUTO: 26.6 % (ref 36.3–47.1)
HGB BLD-MCNC: 6.9 G/DL (ref 11.9–15.1)
HGB BLD-MCNC: 7.7 G/DL (ref 11.9–15.1)
IMM GRANULOCYTES # BLD AUTO: 0.06 K/UL (ref 0–0.3)
IMM GRANULOCYTES NFR BLD: 1 %
INR PPP: 1.2
INR PPP: 2.7
LYMPHOCYTES NFR BLD: 1.36 K/UL (ref 1.1–3.7)
LYMPHOCYTES RELATIVE PERCENT: 22 % (ref 24–43)
MCH RBC QN AUTO: 26.7 PG (ref 25.2–33.5)
MCHC RBC AUTO-ENTMCNC: 28.3 G/DL (ref 28.4–34.8)
MCV RBC AUTO: 94.6 FL (ref 82.6–102.9)
MONOCYTES NFR BLD: 0.87 K/UL (ref 0.1–1.2)
MONOCYTES NFR BLD: 14 % (ref 3–12)
MORPHOLOGY: ABNORMAL
NEUTROPHILS NFR BLD: 59 % (ref 36–65)
NEUTS SEG NFR BLD: 3.66 K/UL (ref 1.5–8.1)
NRBC BLD-RTO: 0.6 PER 100 WBC
PARTIAL THROMBOPLASTIN TIME: 47.2 SEC (ref 23–36.5)
PLATELET # BLD AUTO: 189 K/UL (ref 138–453)
PMV BLD AUTO: 10.7 FL (ref 8.1–13.5)
POTASSIUM SERPL-SCNC: 3.9 MMOL/L (ref 3.7–5.3)
PROTHROMBIN TIME: 14.8 SEC (ref 11.7–14.9)
PROTHROMBIN TIME: 28.6 SEC (ref 11.7–14.9)
RBC # BLD AUTO: 2.58 M/UL (ref 3.95–5.11)
SODIUM SERPL-SCNC: 135 MMOL/L (ref 135–144)
SPECIMEN SOURCE: NORMAL
WBC OTHER # BLD: 6.2 K/UL (ref 3.5–11.3)

## 2023-10-11 PROCEDURE — 86900 BLOOD TYPING SEROLOGIC ABO: CPT

## 2023-10-11 PROCEDURE — 6360000002 HC RX W HCPCS: Performed by: NURSE PRACTITIONER

## 2023-10-11 PROCEDURE — 86850 RBC ANTIBODY SCREEN: CPT

## 2023-10-11 PROCEDURE — 85018 HEMOGLOBIN: CPT

## 2023-10-11 PROCEDURE — 2580000003 HC RX 258: Performed by: NURSE PRACTITIONER

## 2023-10-11 PROCEDURE — 6370000000 HC RX 637 (ALT 250 FOR IP): Performed by: INTERNAL MEDICINE

## 2023-10-11 PROCEDURE — 99232 SBSQ HOSP IP/OBS MODERATE 35: CPT | Performed by: INTERNAL MEDICINE

## 2023-10-11 PROCEDURE — 2060000000 HC ICU INTERMEDIATE R&B

## 2023-10-11 PROCEDURE — C9113 INJ PANTOPRAZOLE SODIUM, VIA: HCPCS | Performed by: INTERNAL MEDICINE

## 2023-10-11 PROCEDURE — 85025 COMPLETE CBC W/AUTO DIFF WBC: CPT

## 2023-10-11 PROCEDURE — 2580000003 HC RX 258: Performed by: INTERNAL MEDICINE

## 2023-10-11 PROCEDURE — 3700000001 HC ADD 15 MINUTES (ANESTHESIA): Performed by: INTERNAL MEDICINE

## 2023-10-11 PROCEDURE — 7100000011 HC PHASE II RECOVERY - ADDTL 15 MIN: Performed by: INTERNAL MEDICINE

## 2023-10-11 PROCEDURE — 0DJD8ZZ INSPECTION OF LOWER INTESTINAL TRACT, VIA NATURAL OR ARTIFICIAL OPENING ENDOSCOPIC: ICD-10-PCS | Performed by: INTERNAL MEDICINE

## 2023-10-11 PROCEDURE — 85014 HEMATOCRIT: CPT

## 2023-10-11 PROCEDURE — 82947 ASSAY GLUCOSE BLOOD QUANT: CPT

## 2023-10-11 PROCEDURE — 99232 SBSQ HOSP IP/OBS MODERATE 35: CPT | Performed by: FAMILY MEDICINE

## 2023-10-11 PROCEDURE — 7100000010 HC PHASE II RECOVERY - FIRST 15 MIN: Performed by: INTERNAL MEDICINE

## 2023-10-11 PROCEDURE — 2580000003 HC RX 258: Performed by: NURSE ANESTHETIST, CERTIFIED REGISTERED

## 2023-10-11 PROCEDURE — C9113 INJ PANTOPRAZOLE SODIUM, VIA: HCPCS | Performed by: NURSE PRACTITIONER

## 2023-10-11 PROCEDURE — 94761 N-INVAS EAR/PLS OXIMETRY MLT: CPT

## 2023-10-11 PROCEDURE — 85610 PROTHROMBIN TIME: CPT

## 2023-10-11 PROCEDURE — 86901 BLOOD TYPING SEROLOGIC RH(D): CPT

## 2023-10-11 PROCEDURE — 43235 EGD DIAGNOSTIC BRUSH WASH: CPT | Performed by: INTERNAL MEDICINE

## 2023-10-11 PROCEDURE — 6360000002 HC RX W HCPCS: Performed by: NURSE ANESTHETIST, CERTIFIED REGISTERED

## 2023-10-11 PROCEDURE — 36415 COLL VENOUS BLD VENIPUNCTURE: CPT

## 2023-10-11 PROCEDURE — 80048 BASIC METABOLIC PNL TOTAL CA: CPT

## 2023-10-11 PROCEDURE — 2500000003 HC RX 250 WO HCPCS: Performed by: NURSE ANESTHETIST, CERTIFIED REGISTERED

## 2023-10-11 PROCEDURE — 3609017100 HC EGD: Performed by: INTERNAL MEDICINE

## 2023-10-11 PROCEDURE — 6360000002 HC RX W HCPCS: Performed by: INTERNAL MEDICINE

## 2023-10-11 PROCEDURE — 6370000000 HC RX 637 (ALT 250 FOR IP): Performed by: STUDENT IN AN ORGANIZED HEALTH CARE EDUCATION/TRAINING PROGRAM

## 2023-10-11 PROCEDURE — 6370000000 HC RX 637 (ALT 250 FOR IP): Performed by: NURSE PRACTITIONER

## 2023-10-11 PROCEDURE — 3700000000 HC ANESTHESIA ATTENDED CARE: Performed by: INTERNAL MEDICINE

## 2023-10-11 RX ORDER — ONDANSETRON 2 MG/ML
4 INJECTION INTRAMUSCULAR; INTRAVENOUS
Status: CANCELLED | OUTPATIENT
Start: 2023-10-11 | End: 2023-10-12

## 2023-10-11 RX ORDER — SODIUM CHLORIDE 9 MG/ML
INJECTION, SOLUTION INTRAVENOUS PRN
Status: CANCELLED | OUTPATIENT
Start: 2023-10-11

## 2023-10-11 RX ORDER — SODIUM CHLORIDE, SODIUM LACTATE, POTASSIUM CHLORIDE, CALCIUM CHLORIDE 600; 310; 30; 20 MG/100ML; MG/100ML; MG/100ML; MG/100ML
INJECTION, SOLUTION INTRAVENOUS CONTINUOUS PRN
Status: DISCONTINUED | OUTPATIENT
Start: 2023-10-11 | End: 2023-10-11 | Stop reason: SDUPTHER

## 2023-10-11 RX ORDER — SODIUM CHLORIDE 0.9 % (FLUSH) 0.9 %
5-40 SYRINGE (ML) INJECTION EVERY 12 HOURS SCHEDULED
Status: CANCELLED | OUTPATIENT
Start: 2023-10-11

## 2023-10-11 RX ORDER — ETOMIDATE 2 MG/ML
INJECTION INTRAVENOUS PRN
Status: DISCONTINUED | OUTPATIENT
Start: 2023-10-11 | End: 2023-10-11 | Stop reason: SDUPTHER

## 2023-10-11 RX ORDER — CLOPIDOGREL BISULFATE 75 MG/1
75 TABLET ORAL DAILY
Status: DISCONTINUED | OUTPATIENT
Start: 2023-10-12 | End: 2023-10-18 | Stop reason: HOSPADM

## 2023-10-11 RX ORDER — SODIUM CHLORIDE 0.9 % (FLUSH) 0.9 %
5-40 SYRINGE (ML) INJECTION PRN
Status: CANCELLED | OUTPATIENT
Start: 2023-10-11

## 2023-10-11 RX ORDER — MIDAZOLAM HYDROCHLORIDE 1 MG/ML
INJECTION INTRAMUSCULAR; INTRAVENOUS PRN
Status: DISCONTINUED | OUTPATIENT
Start: 2023-10-11 | End: 2023-10-11 | Stop reason: SDUPTHER

## 2023-10-11 RX ADMIN — PANTOPRAZOLE SODIUM 8 MG/HR: 40 INJECTION, POWDER, FOR SOLUTION INTRAVENOUS at 16:35

## 2023-10-11 RX ADMIN — SODIUM CHLORIDE, POTASSIUM CHLORIDE, SODIUM LACTATE AND CALCIUM CHLORIDE: 600; 310; 30; 20 INJECTION, SOLUTION INTRAVENOUS at 11:04

## 2023-10-11 RX ADMIN — ATORVASTATIN CALCIUM 80 MG: 80 TABLET, FILM COATED ORAL at 19:52

## 2023-10-11 RX ADMIN — CLONIDINE HYDROCHLORIDE 0.1 MG: 0.1 TABLET ORAL at 19:52

## 2023-10-11 RX ADMIN — ACETAMINOPHEN 650 MG: 325 TABLET ORAL at 07:47

## 2023-10-11 RX ADMIN — MIDAZOLAM 1 MG: 1 INJECTION INTRAMUSCULAR; INTRAVENOUS at 11:12

## 2023-10-11 RX ADMIN — ACETAMINOPHEN 650 MG: 325 TABLET ORAL at 21:16

## 2023-10-11 RX ADMIN — PANTOPRAZOLE SODIUM 8 MG/HR: 40 INJECTION, POWDER, FOR SOLUTION INTRAVENOUS at 05:23

## 2023-10-11 RX ADMIN — ETOMIDATE 8 MG: 2 INJECTION, SOLUTION INTRAVENOUS at 11:12

## 2023-10-11 RX ADMIN — SODIUM CHLORIDE, PRESERVATIVE FREE 10 ML: 5 INJECTION INTRAVENOUS at 19:52

## 2023-10-11 RX ADMIN — CLONIDINE HYDROCHLORIDE 0.1 MG: 0.1 TABLET ORAL at 07:47

## 2023-10-11 ASSESSMENT — ENCOUNTER SYMPTOMS
NAUSEA: 0
CHEST TIGHTNESS: 0
DIARRHEA: 0
WHEEZING: 0
STRIDOR: 0
BLOOD IN STOOL: 0
ABDOMINAL PAIN: 0
COUGH: 0
SHORTNESS OF BREATH: 0
SHORTNESS OF BREATH: 1
VOMITING: 0
CONSTIPATION: 0

## 2023-10-11 ASSESSMENT — PAIN SCALES - WONG BAKER: WONGBAKER_NUMERICALRESPONSE: 0

## 2023-10-11 ASSESSMENT — PAIN SCALES - GENERAL
PAINLEVEL_OUTOF10: 6
PAINLEVEL_OUTOF10: 10
PAINLEVEL_OUTOF10: 4
PAINLEVEL_OUTOF10: 0

## 2023-10-11 ASSESSMENT — PAIN DESCRIPTION - DESCRIPTORS: DESCRIPTORS: ACHING

## 2023-10-11 ASSESSMENT — PAIN - FUNCTIONAL ASSESSMENT: PAIN_FUNCTIONAL_ASSESSMENT: 0-10

## 2023-10-11 NOTE — ED PROVIDER NOTES
hcl], Tranquil-cecil, Wellbutrin [bupropion hcl], Actifed cold-allergy [chlorpheniramine-phenylephrine], Clindamycin/lincomycin, Codeine, Metoprolol, Metoprolol succinate, and Seldane [terfenadine]     FAMILY HISTORY        Family History         Family History   Problem Relation Age of Onset    Other Father           Heart problems    Heart Disease Father      Other Mother           heart problems requiring open heart surgery, HTN    Heart Disease Mother      Other Sister           HTN    Hypertension Sister      Other Maternal Aunt           breast cancer    Breast Cancer Maternal Aunt                 SOCIAL HISTORY        Social History   Social History            Socioeconomic History    Marital status: Single       Spouse name: None    Number of children: None    Years of education: None    Highest education level: None   Occupational History    Occupation: retired   Tobacco Use    Smoking status: Never    Smokeless tobacco: Never   Vaping Use    Vaping Use: Never used   Substance and Sexual Activity    Alcohol use: No    Drug use: No   Social History Narrative     She uses [facility] for primary care with Dr. Wilfrid Olivo Opening: Spontaneous  Best Verbal Response: Oriented  Best Motor Response: Obeys commands  Flores Coma Scale Score: 15                   CIWA Assessment  BP: 120/64  Pulse: 87                    PHYSICAL EXAM    (up to 7 for level 4, 8 or more for level 5)                ED Triage Vitals   BP Temp Temp src Pulse Resp SpO2 Height Weight   -- -- -- -- -- -- -- --         Physical Exam  Constitutional:       General: She is not in acute distress. Appearance: She is well-developed. She is not ill-appearing, toxic-appearing or diaphoretic. HENT:      Head: Normocephalic and atraumatic. Cardiovascular:      Rate and Rhythm: Normal rate and regular rhythm. Pulses: Normal pulses. Heart sounds: Normal heart sounds.  No murmur heard.     No friction rub. No gallop. Pulmonary:      Effort: Pulmonary effort is normal.      Breath sounds: Normal breath sounds. No wheezing, rhonchi or rales. Abdominal:      General: Bowel sounds are normal.      Palpations: Abdomen is soft. Tenderness: There is no abdominal tenderness. There is no guarding. Skin:     Capillary Refill: Capillary refill takes less than 2 seconds. Coloration: Skin is jaundiced. Skin is not cyanotic or pale. Comments: Extermities were cool   Neurological:      Mental Status: She is alert. Psychiatric:         Mood and Affect: Mood is anxious and depressed. Behavior: Behavior is cooperative. DIAGNOSTIC RESULTS      EKG: All EKG's are interpreted by the Emergency Department Physician who either signs or Co-signs this chart in the absence of a cardiologist.     NSR with Nonspecific ST changes in inferior leads      RADIOLOGY:   Non-plain film images such as CT, Ultrasound and MRI are read by the radiologist. Plain radiographic images are visualized and preliminarily interpreted by the emergency physician with the below findings:        Interpretation per the Radiologist below, if available at the time of this note:     CT ABDOMEN PELVIS W IV CONTRAST Additional Contrast? None   Final Result   Segmental and subsegmental left lower lobe pulmonary embolism       Cardiomegaly and pericardial effusion       Mild-to-moderate left pleural effusion and bibasilar atelectasis       Stable anasarca and mild pelvic ascites       No acute intra-abdominal or intrapelvic abnormalities are noted. Critical results were called by Dr. Sulaiman Fletcher MD to Dr. Yodit Beasley on   10/6/2023 at 20:59. Based on MIPS measure 405, incidental abd lesions in this patient population   do not warrant F/U W/O a documented medical reason.            CT CHEST PULMONARY EMBOLISM W CONTRAST   Final Result   Segmental and subsegmental left lower lobe pulmonary recognition program.  Efforts were made to edit the dictations but occasionally words are mis-transcribed.)     Kendra Koroma DO (electronically signed)  PGY-1 FM Resident Emergency Physician       Kendra Koroma DO  Resident  10/10/23 2123

## 2023-10-11 NOTE — PROGRESS NOTES
RN reviewed with Dr. Effie Butler that pts hgb was 6.9 with morning labs. Pt had new type and screen drawn in pre-op.

## 2023-10-11 NOTE — PROGRESS NOTES
Today's Date: 10/11/2023  Patient Name: Brielle Frederick  Date of admission: 10/7/2023  2:49 AM  Patient's age: 78 y. o., 1945  Admission Dx: Pulmonary embolus, left ([address]) [I26.99]    Reason for Consult: PE while on Eliquis and Plavis, allergy to Heparin  Requesting Physician: Darryle Brakeman, MD    CHIEF COMPLAINT:  sob    History Obtained From:  patient, chart review            Interval history:    Patient seen and examined  Labs and vitals reviewed  Underwent EGD. No obvious source of bleeding noted. Hemoglobin 7.7        HISTORY OF PRESENT ILLNESS:      The patient is a 78 y.o. , Female with a history of chronic GERD with pyloric stenosis s/p dilatation, gastroparesis chronic MS, history of CVA, heart failure with preserved ejection fraction, pulmonary hypertension with cor pulmonale, CAD s/p stent 2023 on 9/2023, Persistent a fib  post AV qamar ablation on eliquis 5mg BID,  initially presented to [facility] ED with increasing shortness of breath. As per patient she had been having worsening respiratory symptoms since she went to EGD in January. She went for EGD for GERD variant With chronic nonproductive cough. Initial CT imaging at [facility] showed left segmental and subsegmental PE with anasarca and bilateral pleural effusion with evidence of ascites. Patient was transferred here for further management. Patient was admitted under Intermed service for acute on chronic hypoxic respiratory failure, multifactorial likely secondary to decompensated CHF, chronic cor pulmonale and PE. Vascular was consulted, recommended no surgical intervention as this point. Today on my evaluation, patient was lying comfortably on the bed. Denies any complaints at this point. Was on room air.   Patient denies any history of blood clots in the past.    Past Medical History:   has a past medical history of Abnormal Pap smear, Asthma, Atrial fibrillation ([address]), Bloody discharge from nipple, CAD (coronary artery 24.4* 26.6*    189  --      BMP:   Recent Labs     10/10/23  1334 10/11/23  1439    135   K 3.9 3.9   CO2 22 27   BUN 29* 23   CREATININE 1.4* 1.1*   LABGLOM 41* 52*   GLUCOSE 113* 92     PT/INR:   Recent Labs     10/10/23  0504 10/11/23  0626   PROTIME 15.4* 14.8   INR 1.2 1.2       IMAGING DATA:  Vascular duplex lower extremity venous bilateral   Final Result      US LIVER   Final Result   No findings of cholelithiasis or evidence of cholecystitis. Common bile duct which is within normal limits. Normal appearance of the liver. Stable indeterminate right adrenal lesion. RECOMMENDATIONS:             Primary Problem  Pulmonary embolus, left [facility])    Active Hospital Problems    Diagnosis Date Noted    Obesity (BMI 30-39. 9) [E66.9] 08/05/2021     Priority: Low    Pulmonary embolus, left (HCC) [I26.99] 10/07/2023    High anion gap metabolic acidosis [O10.17] 10/07/2023    Elevated INR [R79.1] 10/07/2023    Pericardial effusion [I31.39] 10/07/2023    Severe pulmonary hypertension ([address]) [I27.20] 03/05/2022    Paroxysmal A-fib ([address]) [I48.0] 08/01/2020    CAD (coronary artery disease) [I25.10] 05/19/2019    Acute on chronic combined systolic and diastolic CHF (congestive heart failure) ([address]) [I50.43] 05/18/2019    Chronic renal insufficiency, stage III (moderate) ([address]) [N18.30] 10/12/2018    Migraine without status migrainosus, not intractable [G43.909]     H/O: CVA (cerebrovascular accident) [Z86.73] 05/24/2015    Fibromyalgia [M79.7]     Pyloric stenosis [K31.1]     Multiple sclerosis ([address]) [place] 03/25/2013         IMPRESSION:   Left subsegmental/segmental PE, new diagnosis  Acute on chronic exacerbation of heart failure.  HFpEF  Heparin Allergy  Mild to moderate left pleural effusion  Pericardial effusion, cardiology following  CAD post stent placement 9/2023, on Plavix  Persistent atrial fibrillation post AV qamar ablation, on Eliquis 5 mg twice daily  Multiple Sclerosis, follows up at

## 2023-10-11 NOTE — PROGRESS NOTES
[facility]  Office: [phone]  Zay Collins DO, Lillian Loving Blood DO, Brian Patel MD, Stanislaw Santoro MD, Linda Chairez MD, Sara Ruelas MD,  Maldonado Calderon MD, Alphonse Knight MD, Shahida Martines DO, Kenny Hilario MD,  Artur Rios MD, Jayme Zavala MD, Leatha Corcoran DO, Garry Lopez MD,  Greer Magdaleno DO, Mukesh Noble MD, Fior Jarquin MD, Marlene Saba MD, Margarita Platt MD,  Erika Underwood MD, Conner Griffin MD, Joseph Breen MD, Buffy Khalil MD, Chioma Echeverria DO, Lali Ortega MD,  Patrick Noguera MD, Vero Campos MD, Epifanio Huerta, CNP,  Doreen Merloss, CNP,, Deja Bui, CNP,  Dwayne Pardo, [facility], Tawana Rojas, CNP, Ema Strong, CNP, Blade Santana, CNP, Blossom Wright, CNP, Kevin Fernandez, CNP, Vernon Tabor, CNP, Angelica Rod, CNS, Bernard Ward, [facility], Seema Baileyer, [facility]    Progress Note    10/11/2023    8:19 AM    Name:   Fracisco Galloway  MRN:     [id number]     [address]:      [de-identified]   Room:   2001/2001-01   Day:  4  Admit Date:  10/7/2023  2:49 AM    PCP:   Kayleigh Chairez MD  Code Status:  DNR-CCA    Subjective:     C/C:  SOB  Interval History Status: not changed. Patient seen and examined at bedside, NPO, plan for EGD  Patient vitals, labs and all providers notes were reviewed,from overnight shift and morning updates were noted and discussed with the nurse    Brief History:     Per chart  This is a 53-year-old female who initially presented to the emergency department with a chief complaint of shortness of breath and was found to have a pulmonary emboli despite long-term anticoagulation with Eliquis. She was evaluated and found to have hyperbilirubinemia mainly direct with elevated INR climbing to 3.1. Hematology and GI were consulted.   Initial concern for Tylenol toxicity given patient's frequent use

## 2023-10-11 NOTE — PROGRESS NOTES
Physical Therapy        Physical Therapy Cancel Note      DATE: 10/11/2023    NAME: Kelsi Ramos  MRN: [id number]   : 1945      Patient not seen this date for Physical Therapy due to: Other: HGB 6.9. Will continue to pursue as able.        Electronically signed by Cherrie Green PTA on 10/11/2023 at 3:28 PM

## 2023-10-11 NOTE — ANESTHESIA PRE PROCEDURE
Department of Anesthesiology  Preprocedure Note       Name:  Obdulia Pablo   Age:  78 y.o.  :  1945                                          MRN:  [id number]         Date:  10/11/2023      Surgeon: Deng Bergeron):  Curtis Angelucci, MD    Procedure:   EGD ESOPHAGOGASTRODUODENOSCOPY    Medications prior to admission:   Prior to Admission medications    Medication Sig Start Date End Date Taking?  Authorizing Provider   omeprazole (PRILOSEC) 20 MG delayed release capsule Take 1 capsule by mouth 2 times daily    Abdulkadir Mccall MD   acetaminophen (TYLENOL) 500 MG tablet Take 2 tablets by mouth in the morning, at noon, and at bedtime    Abdulkadir Mccall MD   bumetanide (BUMEX) 1 MG tablet Take 0.5 tablets by mouth daily Advised to take additional 1/2 pill for weight gain, increased CHF and/swelling  Patient taking differently: Take 1 mg by mouth daily 3/9/22 4/8/22  [place], [place] ANGUIANO,    allopurinol (ZYLOPRIM) 100 MG tablet Take 1 tablet by mouth daily    Abdulkadir Mccall MD   metoprolol succinate (TOPROL XL) 50 MG extended release tablet Take 0.5 tablets by mouth at bedtime 21   Abdulkadir Mccall MD   cloNIDine (CATAPRES) 0.1 MG tablet Take 1 tablet by mouth 2 times daily 21   Alesia Angry M, DO   losartan (COZAAR) 100 MG tablet Take 1 tablet by mouth daily 21   Alesia Angry M, DO   apixaban (ELIQUIS) 5 MG TABS tablet Take by mouth 2 times daily Currently on hold for procedure next week 2023    Abdulkadir Mccall MD   nitroGLYCERIN (NITROSTAT) 0.4 MG SL tablet Place 1 tablet under the tongue every 5 minutes Indications: Chest Pain    Abdulkadir Mccall MD   PARoxetine (PAXIL) 20 MG tablet Take by mouth every morning     Abdulkadir Mccall MD   clopidogrel (PLAVIX) 75 MG tablet Take 1 tablet by mouth daily 100 mg pm, currently on hold for procedure on 2023    Abdulkadir Mccall MD       Current medications:    No current facility-administered

## 2023-10-11 NOTE — OP NOTE
Operative Note      Patient: Bulmaro Salinas  YOB: 1945  MRN: [id number]    Date of Procedure: 10/11/2023    Pre-Op Diagnosis Codes:     * Melena [K92.1]    Post-Op Diagnosis: Same         Procedure(s):  EGD ESOPHAGOGASTRODUODENOSCOPY    Surgeon(s):  Troy Davies MD    Assistant:   * No surgical staff found *    Anesthesia: Monitor Anesthesia Care    Estimated Blood Loss (mL): Minimal    Complications: None    Specimens:   * No specimens in log *    Implants:  * No implants in log *      Drains:   External Urinary Catheter (Active)   Site Assessment Clean,dry & intact 10/11/23 0800   Placement Replaced 10/11/23 0500   Securement Method Other (Comment) 10/11/23 0500   Catheter Care Catheter/Wick replaced 10/11/23 0500   Perineal Care Yes 10/11/23 0500   Suction 40 mmgHg continuous 10/11/23 0800   Urine Color Ayah 10/11/23 0800   Urine Appearance Cloudy 10/11/23 0800   Urine Odor Malodorous 10/11/23 0800   Output (mL) 300 mL 10/11/23 0500       Findings:   Mild esophagitis at the GE junction. Otherwise normal esophagus. Mild gastritis at the pylorus. Otherwise normal stomach on forward and retroflexed views. Mild duodenitis in the bulb. Otherwise normal examined duodenum. There is no evidence of bleeding or stigmata of recent bleeding found on today's exam.      Recommendations:  Continue high-dose oral PPI daily. Patient will likely need a colonoscopy. However due to tenuous status with recent cardiac and pulmonary events will need to discuss with primary and cardiology teams to see if colonoscopy needs to be performed urgently versus can be waited out for at least 2 weeks. Discussed with anesthesiologist.    Nila Going for clear liquid diet. Okay to continue anticoagulation and antiplatelet therapy. Monitor CBC daily. If hemoglobin continues to trend downwards, will plan for colonoscopy before the end of the week if okay with cardiology.       Detailed Description of Procedure:   Informed

## 2023-10-12 LAB
BASOPHILS # BLD: 0.06 K/UL (ref 0–0.2)
BASOPHILS NFR BLD: 1 % (ref 0–2)
EOSINOPHIL # BLD: 0.19 K/UL (ref 0–0.4)
EOSINOPHILS RELATIVE PERCENT: 3 % (ref 1–4)
ERYTHROCYTE [DISTWIDTH] IN BLOOD BY AUTOMATED COUNT: 22.9 % (ref 11.8–14.4)
GLUCOSE BLD-MCNC: 102 MG/DL (ref 65–105)
HCT VFR BLD AUTO: 27.3 % (ref 36.3–47.1)
HGB BLD-MCNC: 7.7 G/DL (ref 11.9–15.1)
IMM GRANULOCYTES # BLD AUTO: 0.06 K/UL (ref 0–0.3)
IMM GRANULOCYTES NFR BLD: 1 %
LYMPHOCYTES NFR BLD: 0.7 K/UL (ref 1–4.8)
LYMPHOCYTES RELATIVE PERCENT: 11 % (ref 24–44)
MCH RBC QN AUTO: 26.6 PG (ref 25.2–33.5)
MCHC RBC AUTO-ENTMCNC: 28.2 G/DL (ref 28.4–34.8)
MCV RBC AUTO: 94.1 FL (ref 82.6–102.9)
MONOCYTES NFR BLD: 0.96 K/UL (ref 0.1–0.8)
MONOCYTES NFR BLD: 15 % (ref 1–7)
MORPHOLOGY: ABNORMAL
NEUTROPHILS NFR BLD: 69 % (ref 36–66)
NEUTS SEG NFR BLD: 4.43 K/UL (ref 1.8–7.7)
NRBC BLD-RTO: 0.3 PER 100 WBC
PLATELET # BLD AUTO: 178 K/UL (ref 138–453)
PMV BLD AUTO: 11.2 FL (ref 8.1–13.5)
RBC # BLD AUTO: 2.9 M/UL (ref 3.95–5.11)
WBC OTHER # BLD: 6.4 K/UL (ref 3.5–11.3)

## 2023-10-12 PROCEDURE — 2580000003 HC RX 258: Performed by: INTERNAL MEDICINE

## 2023-10-12 PROCEDURE — 97110 THERAPEUTIC EXERCISES: CPT

## 2023-10-12 PROCEDURE — 6360000002 HC RX W HCPCS: Performed by: INTERNAL MEDICINE

## 2023-10-12 PROCEDURE — 97530 THERAPEUTIC ACTIVITIES: CPT

## 2023-10-12 PROCEDURE — 99232 SBSQ HOSP IP/OBS MODERATE 35: CPT | Performed by: INTERNAL MEDICINE

## 2023-10-12 PROCEDURE — 6370000000 HC RX 637 (ALT 250 FOR IP): Performed by: INTERNAL MEDICINE

## 2023-10-12 PROCEDURE — 6370000000 HC RX 637 (ALT 250 FOR IP): Performed by: NURSE PRACTITIONER

## 2023-10-12 PROCEDURE — 2060000000 HC ICU INTERMEDIATE R&B

## 2023-10-12 PROCEDURE — 85025 COMPLETE CBC W/AUTO DIFF WBC: CPT

## 2023-10-12 PROCEDURE — 82947 ASSAY GLUCOSE BLOOD QUANT: CPT

## 2023-10-12 PROCEDURE — 36415 COLL VENOUS BLD VENIPUNCTURE: CPT

## 2023-10-12 PROCEDURE — C9113 INJ PANTOPRAZOLE SODIUM, VIA: HCPCS | Performed by: INTERNAL MEDICINE

## 2023-10-12 PROCEDURE — 99232 SBSQ HOSP IP/OBS MODERATE 35: CPT | Performed by: FAMILY MEDICINE

## 2023-10-12 PROCEDURE — 99233 SBSQ HOSP IP/OBS HIGH 50: CPT | Performed by: NURSE PRACTITIONER

## 2023-10-12 PROCEDURE — 97535 SELF CARE MNGMENT TRAINING: CPT

## 2023-10-12 RX ORDER — PANTOPRAZOLE SODIUM 40 MG/1
40 TABLET, DELAYED RELEASE ORAL
Status: DISCONTINUED | OUTPATIENT
Start: 2023-10-13 | End: 2023-10-18 | Stop reason: HOSPADM

## 2023-10-12 RX ORDER — BISACODYL 5 MG/1
20 TABLET, DELAYED RELEASE ORAL ONCE
Status: COMPLETED | OUTPATIENT
Start: 2023-10-12 | End: 2023-10-12

## 2023-10-12 RX ADMIN — PANTOPRAZOLE SODIUM 8 MG/HR: 40 INJECTION, POWDER, FOR SOLUTION INTRAVENOUS at 00:51

## 2023-10-12 RX ADMIN — CLOPIDOGREL BISULFATE 75 MG: 75 TABLET ORAL at 09:19

## 2023-10-12 RX ADMIN — ACETAMINOPHEN 650 MG: 325 TABLET ORAL at 03:42

## 2023-10-12 RX ADMIN — CLONIDINE HYDROCHLORIDE 0.1 MG: 0.1 TABLET ORAL at 19:55

## 2023-10-12 RX ADMIN — BISACODYL 20 MG: 5 TABLET, COATED ORAL at 16:30

## 2023-10-12 RX ADMIN — SODIUM CHLORIDE, PRESERVATIVE FREE 10 ML: 5 INJECTION INTRAVENOUS at 19:55

## 2023-10-12 RX ADMIN — CLONIDINE HYDROCHLORIDE 0.1 MG: 0.1 TABLET ORAL at 09:19

## 2023-10-12 RX ADMIN — ACETAMINOPHEN 650 MG: 325 TABLET ORAL at 09:32

## 2023-10-12 RX ADMIN — ATORVASTATIN CALCIUM 80 MG: 80 TABLET, FILM COATED ORAL at 19:55

## 2023-10-12 RX ADMIN — ACETAMINOPHEN 650 MG: 325 TABLET ORAL at 23:55

## 2023-10-12 RX ADMIN — MAGNESIUM CITRATE 296 ML: 1.75 LIQUID ORAL at 16:45

## 2023-10-12 ASSESSMENT — ENCOUNTER SYMPTOMS
WHEEZING: 0
CONSTIPATION: 0
DIARRHEA: 0
BLOOD IN STOOL: 0
SHORTNESS OF BREATH: 0
VOMITING: 0
NAUSEA: 0
ABDOMINAL PAIN: 0
CHEST TIGHTNESS: 0
COUGH: 0

## 2023-10-12 ASSESSMENT — PAIN DESCRIPTION - ORIENTATION: ORIENTATION: MID;LEFT

## 2023-10-12 ASSESSMENT — PAIN SCALES - GENERAL
PAINLEVEL_OUTOF10: 3
PAINLEVEL_OUTOF10: 3
PAINLEVEL_OUTOF10: 10

## 2023-10-12 ASSESSMENT — PAIN DESCRIPTION - DESCRIPTORS: DESCRIPTORS: DISCOMFORT;ACHING

## 2023-10-12 ASSESSMENT — PAIN DESCRIPTION - LOCATION
LOCATION: BACK;HIP
LOCATION: HEAD

## 2023-10-12 NOTE — PROGRESS NOTES
Today's Date: 10/12/2023  Patient Name: Kirk Frederick  Date of admission: 10/7/2023  2:49 AM  Patient's age: 78 y. o., 1945  Admission Dx: Pulmonary embolus, left ([address]) [I26.99]    Reason for Consult: PE while on Eliquis and Plavis, allergy to Heparin  Requesting Physician: Ben Avalos MD    CHIEF COMPLAINT:  sob    History Obtained From:  patient, chart review            Interval history:    Patient seen and examined  Labs and vitals reviewed  Hemoglobin not available from today. Anticoagulation remains on hold          HISTORY OF PRESENT ILLNESS:      The patient is a 78 y.o. , Female with a history of chronic GERD with pyloric stenosis s/p dilatation, gastroparesis chronic MS, history of CVA, heart failure with preserved ejection fraction, pulmonary hypertension with cor pulmonale, CAD s/p stent 2023 on 9/2023, Persistent a fib  post AV qamar ablation on eliquis 5mg BID,  initially presented to [facility] ED with increasing shortness of breath. As per patient she had been having worsening respiratory symptoms since she went to EGD in January. She went for EGD for GERD variant With chronic nonproductive cough. Initial CT imaging at [facility] showed left segmental and subsegmental PE with anasarca and bilateral pleural effusion with evidence of ascites. Patient was transferred here for further management. Patient was admitted under Intermed service for acute on chronic hypoxic respiratory failure, multifactorial likely secondary to decompensated CHF, chronic cor pulmonale and PE. Vascular was consulted, recommended no surgical intervention as this point. Today on my evaluation, patient was lying comfortably on the bed. Denies any complaints at this point. Was on room air.   Patient denies any history of blood clots in the past.    Past Medical History:   has a past medical history of Abnormal Pap smear, Asthma, Atrial fibrillation ([address]), Bloody discharge from nipple, CAD (coronary artery pulmonale  History of CVA    RECOMMENDATIONS:  I personally reviewed results of lab work-up imaging studies and other relevant clinical data. I had a detailed discussion with the patient. Personally went over results of lab work-up and other relevant clinical data  Lovenox currently on hold  For possible colonoscopy tomorrow  Patient does not want to be on Coumadin. Once no intervention planned and okay with GI start Pradaxa with Lovenox bridging. Due to concern regarding Eliquis failure consider medication with alternative mode of action such as vitamin K antagonist or edoxaban or Pradaxa. Patient does not want to take Coumadin patient will need bridging for these agents. Discussed differential diagnosis of failure of DOAC. Hypercoagulable state from malignancies, antiphospholipid antibody syndrome, drug interaction, malabsorption and noncompliance    Patient records show allergy to heparin, unclear details but has been tolerating Lovenox.     Transfuse to keep hemoglobin above 7           Electronically signed by   Catrachito Eldridge MD    on 10/12/2023 at 12:02 PM

## 2023-10-12 NOTE — PROGRESS NOTES
Occupational Therapy  Facility/Department: [facility] CAR 2- STEPDOWN  Occupational Therapy Initial Assessment    Name: Obdulio Ny  : 1945  MRN: [id number]  Date of Service: 10/12/2023    Discharge Recommendations:  Patient would benefit from continued therapy after discharge  OT Equipment Recommendations  Equipment Needed: Yes  Mobility Devices: ADL Assistive Devices  ADL Assistive Devices: Toileting - Raised Toilet Seat with arms; Reacher;Long-handled Shoe Horn;Long-handled Sponge;Sock-Aid Hard       Patient Diagnosis(es): The primary encounter diagnosis was Pulmonary embolus, left ([address]). A diagnosis of Pericardial effusion was also pertinent to this visit. 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 Past Surgical History:  has a past surgical history that includes Dilation & curettage (); Breast biopsy (); Appendectomy (); Septoplasty (); Hammer toe surgery (); Sinus endoscopy; sinus surgery; Shoulder arthroscopy (); Esophagoscopy (, ); Colonoscopy (); Foreign Body Removal (); laparoscopy (); Coronary angioplasty with stent (2011); Coronary angioplasty with stent (2011); Upper gastrointestinal endoscopy; eye surgery; pr colonoscopy w/biopsy single/multiple (N/A, 2017); pr egd transoral biopsy single/multiple (2017); Upper gastrointestinal endoscopy (2017); Upper gastrointestinal endoscopy (2018);  Cardiac surgery; Cardiac catheterization arrival/exit of session pt supine in bed laying on side. Pt completed bed mobility and sat EOB, declined functional transfer d/t having just been in the chair and felt very weak at this time. Pt completed ADLs while sitting EOB. At end of session pt had all needs met, call light within reach, RN notified of pt's session and thanked pt for participation. Objective   SpO2: 95 %  O2 Device: None (Room air)             Safety Devices  Type of Devices: Patient at risk for falls;Call light within reach; Left in bed;Gait belt;Nurse notified  Restraints  Restraints Initially in Place: No  Bed Mobility Training  Bed Mobility Training: Yes  Rolling: Stand-by assistance  Supine to Sit: Contact-guard assistance; Additional time  Sit to Supine: Additional time;Contact-guard assistance  Scooting: Stand-by assistance  Balance  Sitting: Without support (Pt sat EOB ~20 mins, SBA->SUP, sat on EOB and completed static and dynamic sitting, No LOB, pt reported some increase in pain in LE. Pt able to adjust trunk and hips for comfort while sitting up)  Standing:  (ADDISON; pt declined d/t increased pain and weakness in LE from sitting)  Gait  Overall Level of Assistance:  (ADDISON)        ADL  Grooming: Modified independent ; Increased time to complete  Grooming Skilled Clinical Factors: Pt completed washing face, washing hands, brushing hair and completed oral care. UE Bathing: Stand by assistance;Setup; Increased time to complete  UE Bathing Skilled Clinical Factors: Pt completed washing B UE and under B UEs, assisted with washing back  LE Bathing: Setup;Verbal cueing; Increased time to complete; Moderate assistance  LE Bathing Skilled Clinical Factors: Pt completed washing B LE above knees and Mod A to complete washing B LE below knees. UE Dressing: Stand by assistance;Setup; Increased time to complete  UE Dressing Skilled Clinical Factors: Pt doffed/donned gown, was able to thread arms into gown, assisted with tying back of gown  LE

## 2023-10-12 NOTE — PLAN OF CARE
Plan of care: Will plan for colonoscopy tomorrow  CLD, NPO MN  Prep ordered.    OK to cont Plavix    Will follow    Naomy Patel CNP

## 2023-10-12 NOTE — PROGRESS NOTES
Limitations Requiring Skilled Therapeutic Intervention: Decreased functional mobility ; Decreased strength;Decreased endurance;Decreased balance; Increased pain;Decreased sensation;Decreased cognition  Assessment: Pt required SBA for bed mobility. Pt sat EOB for 20 mins with CGA with no LOB noted. Pt refusing transfers/ OOB activities due to c/o increased weakness in BLE. Pt required alot of encouragement to participate in PT along with frequent redirection to stay on task. Pt is currently unsafe to return to prior living arrangements and would benefit from continued PT following D/C to address deficits. Therapy Prognosis: Good  Decision Making: Medium Complexity  Requires PT Follow-Up: Yes  Activity Tolerance  Activity Tolerance: Patient limited by fatigue;Patient limited by endurance     Plan   Physcial Therapy Plan  General Plan:  (5-6x/wk)  Current Treatment Recommendations: Strengthening, Balance training, Functional mobility training, Transfer training, Endurance training, Gait training, Stair training, Neuromuscular re-education, Home exercise program, Safety education & training, Patient/Caregiver education & training, Equipment evaluation, education, & procurement, Therapeutic activities  Safety Devices  Type of Devices: Patient at risk for falls, Call light within reach, Left in bed, Gait belt, Nurse notified  Restraints  Restraints Initially in Place: No     Restrictions  Restrictions/Precautions  Restrictions/Precautions: General Precautions  Required Braces or Orthoses?: No  Position Activity Restriction  Other position/activity restrictions: Up w/ assist.     Subjective   General  Chart Reviewed: No  Patient assessed for rehabilitation services?: Yes  Response To Previous Treatment: Patient with no complaints from previous session.   Family / Caregiver Present: No  Follows Commands: Within Functional Limits  General Comment  Comments: RN and pt agreeable to PT. pt alert in bed upon will amb 150' [place] w/ RW or least restrictive device  Short Term Goal 4: Pt will negotiate 1 step [place] w/ unilateral UE assist  Additional Goals?: No       Education  Patient Education  Education Given To: Patient  Education Provided: Role of Therapy;Plan of Care  Education Method: Demonstration;Verbal  Barriers to Learning: Cognition  Education Outcome: Continued education needed;Verbalized understanding      Therapy Time   Individual Concurrent Group Co-treatment   Time In 3534         Time Out 1697         Minutes 40         Timed Code Treatment Minutes: [place], PTA well nourished

## 2023-10-12 NOTE — PROGRESS NOTES
(Chronic) 10/7/2023 Yes    Fibromyalgia 10/7/2023 Yes    Pyloric stenosis 10/7/2023 Yes    H/O: CVA (cerebrovascular accident) (Chronic) 10/7/2023 Yes    Migraine without status migrainosus, not intractable 10/7/2023 Yes    Acute on chronic combined systolic and diastolic CHF (congestive heart failure) ([address]) 10/7/2023 Yes    Paroxysmal A-fib ([address]) (Chronic) 10/7/2023 Yes    Severe pulmonary hypertension ([address]) 10/7/2023 Yes    High anion gap metabolic acidosis 85/2/9308 Yes    Elevated INR 10/7/2023 Yes    Pericardial effusion 10/7/2023 Yes     Plan:        Segmental and subsegmental left lower lobe pulmonary emboli. Vascular surgery consulted. Plan to continue supportive care and pain control but no acute surgical intervention indicated at this time. Hematology/oncology consulted as patient was on Eliquis and developed pulmonary emboli regardless. Patient admits to compliance with Eliquis but states she was off of it for 3 days for previous cardiac catheterization in September. Heparin allergy unspecified. Currently tolerating Lovenox. Acute on chronic anemia with melena. fobt +ve, had h/o ulcers, GI re consulted, S/P  EGD 10/11 as above, no significant bleed. Continue with holding AC , On PPI drip. Plan for colonoscopy in a.m. Hyperbilirubinemia with elevated INR. Elevated bilirubin last month but climbing. INR 1.7 on admission and 3.1 on repeat and downtrending to 2.7 today? Tylenol less than 5 and LFT stable. Liver ultrasound normal. Hematology following. GI was consulted but due to normal liver ultrasound and LFTs recommended continued close monitoring of LFTs and if elevation occurs patient will likely need liver biopsy per IR. High anion gap metabolic acidosis. Resolved. NSTEMI. Cardiology following. Echocardiogram demonstrated left ventricular systolic ejection fraction 50 to 55%. Continues on full dose Lovenox 80 mg twice daily, Plavix 75 mg daily, Lipitor 80 mg nightly. Toprol-XL currently held due to allergy in our formulation. Pericardial effusion. Echocardiogram demonstrated small pericardial effusion posteriorly but no tamponade. Stable from cardiology standpoint. CAD status post PCI 9/19/2023. Continues on full dose Lovenox 80 mg twice daily, Plavix 75 mg daily, Lipitor 80 mg nightly. Toprol-XL currently held due to allergy in our formulation. Severe pulmonary hypertension with cor pulmonale. Cardiology following. Chronic GERD with pyloric stenosis and gastroparesis. Continue Protonix 40 mg twice daily. Chronic MS. Stable. Paroxysmal atrial fibrillation. Continue full dose Lovenox. Beta-blocker currently held due to allergy with inpatient formulation. ISABELLA on CKD. Baseline creatinine 1.2. Stable currently near baseline. DVT prophylaxis: held for possible bleed, to be resumed after colonoscopy    GI prophylaxis: Protonix.      Initiate discharge planning to SNF, discussed with the patient and case management      Katya Daigle MD  10/12/2023  2:35 PM

## 2023-10-12 NOTE — CARE COORDINATION
Transitional planning    Spoke to patient about plan for discharge. She is in agreement to go to [facility]. [facility] to follow for palliative care needs. Ok for CM to call her sister Shruthi Brock who is also her HCPOA. CM called Shruthi Brock [phone]. She is in agreement with this plan. [address]  [phone] and left  requesting call back. Also requested that pre cert be started    2355 returned phone call to Susannah at [facility]. She relates she needs updated notes from PT/OT. She also said patient's hgb has to be 8 before she will start pre cert. Hgb from 10/11 was 7.7.     [phone] Left messages with PT/OT requesting that they work with patient today.

## 2023-10-13 ENCOUNTER — ANESTHESIA EVENT (OUTPATIENT)
Dept: OPERATING ROOM | Age: 78
End: 2023-10-13
Payer: MEDICARE

## 2023-10-13 ENCOUNTER — ANESTHESIA (OUTPATIENT)
Dept: OPERATING ROOM | Age: 78
End: 2023-10-13
Payer: MEDICARE

## 2023-10-13 PROCEDURE — 0DJ08ZZ INSPECTION OF UPPER INTESTINAL TRACT, VIA NATURAL OR ARTIFICIAL OPENING ENDOSCOPIC: ICD-10-PCS | Performed by: INTERNAL MEDICINE

## 2023-10-13 PROCEDURE — 6370000000 HC RX 637 (ALT 250 FOR IP): Performed by: NURSE PRACTITIONER

## 2023-10-13 PROCEDURE — 6370000000 HC RX 637 (ALT 250 FOR IP): Performed by: FAMILY MEDICINE

## 2023-10-13 PROCEDURE — 3609027000 HC COLONOSCOPY: Performed by: INTERNAL MEDICINE

## 2023-10-13 PROCEDURE — 45378 DIAGNOSTIC COLONOSCOPY: CPT | Performed by: INTERNAL MEDICINE

## 2023-10-13 PROCEDURE — 6370000000 HC RX 637 (ALT 250 FOR IP): Performed by: INTERNAL MEDICINE

## 2023-10-13 PROCEDURE — 99232 SBSQ HOSP IP/OBS MODERATE 35: CPT | Performed by: FAMILY MEDICINE

## 2023-10-13 PROCEDURE — 2060000000 HC ICU INTERMEDIATE R&B

## 2023-10-13 PROCEDURE — 2580000003 HC RX 258: Performed by: ANESTHESIOLOGY

## 2023-10-13 PROCEDURE — 99232 SBSQ HOSP IP/OBS MODERATE 35: CPT | Performed by: INTERNAL MEDICINE

## 2023-10-13 PROCEDURE — 2580000003 HC RX 258: Performed by: STUDENT IN AN ORGANIZED HEALTH CARE EDUCATION/TRAINING PROGRAM

## 2023-10-13 PROCEDURE — 7100000000 HC PACU RECOVERY - FIRST 15 MIN: Performed by: INTERNAL MEDICINE

## 2023-10-13 PROCEDURE — 3700000000 HC ANESTHESIA ATTENDED CARE: Performed by: INTERNAL MEDICINE

## 2023-10-13 PROCEDURE — 2500000003 HC RX 250 WO HCPCS: Performed by: STUDENT IN AN ORGANIZED HEALTH CARE EDUCATION/TRAINING PROGRAM

## 2023-10-13 PROCEDURE — 7100000001 HC PACU RECOVERY - ADDTL 15 MIN: Performed by: INTERNAL MEDICINE

## 2023-10-13 PROCEDURE — 2580000003 HC RX 258: Performed by: INTERNAL MEDICINE

## 2023-10-13 PROCEDURE — 3700000001 HC ADD 15 MINUTES (ANESTHESIA): Performed by: INTERNAL MEDICINE

## 2023-10-13 PROCEDURE — 6360000002 HC RX W HCPCS: Performed by: STUDENT IN AN ORGANIZED HEALTH CARE EDUCATION/TRAINING PROGRAM

## 2023-10-13 RX ORDER — FENTANYL CITRATE 50 UG/ML
INJECTION, SOLUTION INTRAMUSCULAR; INTRAVENOUS PRN
Status: DISCONTINUED | OUTPATIENT
Start: 2023-10-13 | End: 2023-10-13 | Stop reason: SDUPTHER

## 2023-10-13 RX ORDER — PROPOFOL 10 MG/ML
INJECTION, EMULSION INTRAVENOUS CONTINUOUS PRN
Status: DISCONTINUED | OUTPATIENT
Start: 2023-10-13 | End: 2023-10-13 | Stop reason: SDUPTHER

## 2023-10-13 RX ORDER — LIDOCAINE HYDROCHLORIDE 10 MG/ML
INJECTION, SOLUTION INFILTRATION; PERINEURAL PRN
Status: DISCONTINUED | OUTPATIENT
Start: 2023-10-13 | End: 2023-10-13 | Stop reason: SDUPTHER

## 2023-10-13 RX ORDER — SODIUM CHLORIDE, SODIUM LACTATE, POTASSIUM CHLORIDE, CALCIUM CHLORIDE 600; 310; 30; 20 MG/100ML; MG/100ML; MG/100ML; MG/100ML
INJECTION, SOLUTION INTRAVENOUS CONTINUOUS
Status: DISCONTINUED | OUTPATIENT
Start: 2023-10-13 | End: 2023-10-13 | Stop reason: HOSPADM

## 2023-10-13 RX ORDER — DABIGATRAN ETEXILATE 150 MG/1
150 CAPSULE ORAL 2 TIMES DAILY
Status: DISCONTINUED | OUTPATIENT
Start: 2023-10-13 | End: 2023-10-18 | Stop reason: HOSPADM

## 2023-10-13 RX ORDER — SODIUM CHLORIDE, SODIUM LACTATE, POTASSIUM CHLORIDE, CALCIUM CHLORIDE 600; 310; 30; 20 MG/100ML; MG/100ML; MG/100ML; MG/100ML
INJECTION, SOLUTION INTRAVENOUS CONTINUOUS PRN
Status: DISCONTINUED | OUTPATIENT
Start: 2023-10-13 | End: 2023-10-13 | Stop reason: SDUPTHER

## 2023-10-13 RX ADMIN — ATORVASTATIN CALCIUM 80 MG: 80 TABLET, FILM COATED ORAL at 21:23

## 2023-10-13 RX ADMIN — SODIUM CHLORIDE, PRESERVATIVE FREE 10 ML: 5 INJECTION INTRAVENOUS at 08:12

## 2023-10-13 RX ADMIN — PROPOFOL 20 MG: 10 INJECTION, EMULSION INTRAVENOUS at 12:16

## 2023-10-13 RX ADMIN — DABIGATRAN ETEXILATE MESYLATE 150 MG: 150 CAPSULE ORAL at 21:23

## 2023-10-13 RX ADMIN — LIDOCAINE HYDROCHLORIDE 50 MG: 10 INJECTION, SOLUTION INFILTRATION; PERINEURAL at 12:02

## 2023-10-13 RX ADMIN — PROPOFOL 100 MCG/KG/MIN: 10 INJECTION, EMULSION INTRAVENOUS at 12:04

## 2023-10-13 RX ADMIN — ACETAMINOPHEN 650 MG: 325 TABLET ORAL at 21:23

## 2023-10-13 RX ADMIN — FENTANYL CITRATE 50 MCG: 50 INJECTION, SOLUTION INTRAMUSCULAR; INTRAVENOUS at 12:16

## 2023-10-13 RX ADMIN — SODIUM CHLORIDE, POTASSIUM CHLORIDE, SODIUM LACTATE AND CALCIUM CHLORIDE: 600; 310; 30; 20 INJECTION, SOLUTION INTRAVENOUS at 10:11

## 2023-10-13 RX ADMIN — CLONIDINE HYDROCHLORIDE 0.1 MG: 0.1 TABLET ORAL at 21:22

## 2023-10-13 RX ADMIN — CLOPIDOGREL BISULFATE 75 MG: 75 TABLET ORAL at 08:11

## 2023-10-13 RX ADMIN — SODIUM CHLORIDE, PRESERVATIVE FREE 10 ML: 5 INJECTION INTRAVENOUS at 21:23

## 2023-10-13 RX ADMIN — PANTOPRAZOLE SODIUM 40 MG: 40 TABLET, DELAYED RELEASE ORAL at 08:11

## 2023-10-13 RX ADMIN — PROPOFOL 17 MG: 10 INJECTION, EMULSION INTRAVENOUS at 12:14

## 2023-10-13 RX ADMIN — SODIUM CHLORIDE, POTASSIUM CHLORIDE, SODIUM LACTATE AND CALCIUM CHLORIDE: 600; 310; 30; 20 INJECTION, SOLUTION INTRAVENOUS at 12:00

## 2023-10-13 RX ADMIN — ACETAMINOPHEN 650 MG: 325 TABLET ORAL at 08:11

## 2023-10-13 ASSESSMENT — ENCOUNTER SYMPTOMS
STRIDOR: 0
ABDOMINAL PAIN: 0
DIARRHEA: 0
BLOOD IN STOOL: 0
COUGH: 0
WHEEZING: 0
NAUSEA: 0
SHORTNESS OF BREATH: 0
CONSTIPATION: 0
SHORTNESS OF BREATH: 1
VOMITING: 0
CHEST TIGHTNESS: 0

## 2023-10-13 ASSESSMENT — PAIN - FUNCTIONAL ASSESSMENT: PAIN_FUNCTIONAL_ASSESSMENT: 0-10

## 2023-10-13 ASSESSMENT — PAIN SCALES - GENERAL
PAINLEVEL_OUTOF10: 4
PAINLEVEL_OUTOF10: 0
PAINLEVEL_OUTOF10: 0

## 2023-10-13 NOTE — ANESTHESIA PRE PROCEDURE
Department of Anesthesiology  Preprocedure Note       Name:  Levon Cameron   Age:  78 y.o.  :  1945                                          MRN:  [id number]         Date:  10/13/2023      Surgeon: Sugar Nino):  Annabelle Siddiqui MD    Procedure:   EGD ESOPHAGOGASTRODUODENOSCOPY    Medications prior to admission:   Prior to Admission medications    Medication Sig Start Date End Date Taking?  Authorizing Provider   omeprazole (PRILOSEC) 20 MG delayed release capsule Take 1 capsule by mouth 2 times daily    Abdulkadir Mccall MD   acetaminophen (TYLENOL) 500 MG tablet Take 2 tablets by mouth in the morning, at noon, and at bedtime    Abdulkadir Mccall MD   bumetanide (BUMEX) 1 MG tablet Take 0.5 tablets by mouth daily Advised to take additional 1/2 pill for weight gain, increased CHF and/swelling  Patient taking differently: Take 1 mg by mouth daily 3/9/22 4/8/22  Trenton, Henri ANGUIANO DO   allopurinol (ZYLOPRIM) 100 MG tablet Take 1 tablet by mouth daily    Abdulkadir Mccall MD   metoprolol succinate (TOPROL XL) 50 MG extended release tablet Take 0.5 tablets by mouth at bedtime 21   Abdulkadir Mccall MD   cloNIDine (CATAPRES) 0.1 MG tablet Take 1 tablet by mouth 2 times daily 21   Andrew CORDOBA DO   losartan (COZAAR) 100 MG tablet Take 1 tablet by mouth daily 21   Andrew CORDOBA DO   apixaban (ELIQUIS) 5 MG TABS tablet Take by mouth 2 times daily Currently on hold for procedure next week 2023    Abdulkadir Mccall MD   nitroGLYCERIN (NITROSTAT) 0.4 MG SL tablet Place 1 tablet under the tongue every 5 minutes Indications: Chest Pain    Abdulkadir Mccall MD   PARoxetine (PAXIL) 20 MG tablet Take by mouth every morning     Abdulkadir Mccall MD   clopidogrel (PLAVIX) 75 MG tablet Take 1 tablet by mouth daily 100 mg pm, currently on hold for procedure on 2023    Abdulkadir Mccall MD       Current medications:    No current facility-administered

## 2023-10-13 NOTE — PROGRESS NOTES
Today's Date: 10/13/2023  Patient Name: Tamra Frederick  Date of admission: 10/7/2023  2:49 AM  Patient's age: 78 y. o., 1945  Admission Dx: Pulmonary embolus, left ([address]) [I26.99]    Reason for Consult: PE while on Eliquis and Plavis, allergy to Heparin  Requesting Physician: Jacquie Grubbs MD    CHIEF COMPLAINT:  sob    History Obtained From:  patient, chart review            Interval history:    Patient seen and examined  Labs and vitals reviewed  Hemoglobin stable  Anticoagulation on hold  Complains of being tired. Colonoscopy did not show any obvious source of bleeding. HISTORY OF PRESENT ILLNESS:      The patient is a 78 y.o. , Female with a history of chronic GERD with pyloric stenosis s/p dilatation, gastroparesis chronic MS, history of CVA, heart failure with preserved ejection fraction, pulmonary hypertension with cor pulmonale, CAD s/p stent 2023 on 9/2023, Persistent a fib  post AV qamar ablation on eliquis 5mg BID,  initially presented to [place] ED with increasing shortness of breath. As per patient she had been having worsening respiratory symptoms since she went to EGD in January. She went for EGD for GERD variant With chronic nonproductive cough. Initial CT imaging at [place] showed left segmental and subsegmental PE with anasarca and bilateral pleural effusion with evidence of ascites. Patient was transferred here for further management. Patient was admitted under Intermed service for acute on chronic hypoxic respiratory failure, multifactorial likely secondary to decompensated CHF, chronic cor pulmonale and PE. Vascular was consulted, recommended no surgical intervention as this point. Today on my evaluation, patient was lying comfortably on the bed. Denies any complaints at this point. Was on room air.   Patient denies any history of blood clots in the past.    Past Medical History:   has a past medical history of Abnormal Pap smear, Asthma, Atrial fibrillation ([address]), Labs     10/11/23  0626 10/11/23  1439 10/12/23  1229   WBC 6.2  --  6.4   HGB 6.9* 7.7* 7.7*   HCT 24.4* 26.6* 27.3*     --  178     BMP:   Recent Labs     10/11/23  1439      K 3.9   CO2 27   BUN 23   CREATININE 1.1*   LABGLOM 52*   GLUCOSE 92     PT/INR:   Recent Labs     10/11/23  0626   PROTIME 14.8   INR 1.2       IMAGING DATA:  Vascular duplex lower extremity venous bilateral   Final Result      US LIVER   Final Result   No findings of cholelithiasis or evidence of cholecystitis. Common bile duct which is within normal limits. Normal appearance of the liver. Stable indeterminate right adrenal lesion. RECOMMENDATIONS:             Primary Problem  Pulmonary embolus, left [facility])    Active Hospital Problems    Diagnosis Date Noted    Obesity (BMI 30-39. 9) [E66.9] 08/05/2021     Priority: Low    Pulmonary embolus, left ([facility]) [I26.99] 10/07/2023    High anion gap metabolic acidosis [Y58.04] 10/07/2023    Elevated INR [R79.1] 10/07/2023    Pericardial effusion [I31.39] 10/07/2023    Severe pulmonary hypertension ([address]) [I27.20] 03/05/2022    Paroxysmal A-fib ([address]) [I48.0] 08/01/2020    CAD (coronary artery disease) [I25.10] 05/19/2019    Acute on chronic combined systolic and diastolic CHF (congestive heart failure) ([address]) [I50.43] 05/18/2019    Chronic renal insufficiency, stage III (moderate) ([address]) [N18.30] 10/12/2018    Migraine without status migrainosus, not intractable [G43.909]     H/O: CVA (cerebrovascular accident) [Z86.73] 05/24/2015    Fibromyalgia [M79.7]     Pyloric stenosis [K31.1]     Multiple sclerosis ([address]) Opal Olsen 03/25/2013         IMPRESSION:   Left subsegmental/segmental PE, new diagnosis  Acute on chronic exacerbation of heart failure.  HFpEF  Heparin Allergy  Mild to moderate left pleural effusion  Pericardial effusion, cardiology following  CAD post stent placement 9/2023, on Plavix  Persistent atrial fibrillation post AV qamar ablation, on Eliquis 5 mg twice

## 2023-10-13 NOTE — PROGRESS NOTES
Physical Therapy        Physical Therapy Cancel Note      DATE: 10/13/2023    NAME: Levon Cameron  MRN: [id number]   : 1945      Patient not seen this date for Physical Therapy due to:    Surgery/Procedure:       Pt off the floor for colonoscopy.   Electronically signed by Citlalli Ren PT on 10/13/2023 at 10:57 AM

## 2023-10-13 NOTE — OP NOTE
Operative Note      Patient: Rito Raygoza  YOB: 1945  MRN: [id number]    Date of Procedure: 10/13/2023    Pre-Op Diagnosis Codes:     * Rectal bleeding [K62.5]    Post-Op Diagnosis: Same  Sigmoid diverticulosis  Grade 1 internal hemorrhoids  Small to medium size nonthrombosed external hemorrhoids  No evidence of bleeding found in the colon or the terminal ileum  Patient's anemia likely chronic disease related. Procedure(s):  COLONOSCOPY DIAGNOSTIC    Surgeon(s):  Celia Jim MD    Assistant:   First Assistant: Alice Velez RN    Anesthesia: Monitor Anesthesia Care    Estimated Blood Loss (mL): None    Complications: None    Specimens:   * No specimens in log *    Implants:  * No implants in log *      Drains:   External Urinary Catheter (Active)   Site Assessment Clean,dry & intact 10/13/23 0800   Placement Replaced 10/13/23 0800   Securement Method Other (Comment) 10/11/23 0500   Catheter Care Catheter/Wick replaced 10/13/23 0800   Perineal Care Yes 10/13/23 0800   Suction 40 mmgHg continuous 10/13/23 0800   Urine Color Ayah 10/13/23 0800   Urine Appearance Cloudy 10/13/23 0800   Urine Odor Malodorous 10/13/23 0800   Output (mL) 600 mL 10/12/23 1658       Findings: The sigmoid colon was significantly tortuous and redundant. The colonoscope was unable to pass this was exchanged with a pediatric colonoscope and was successfully passed across the sigmoid colon. Extensive small mouth diverticulosis were found in the entire sigmoid colon. The colon mucosa otherwise appeared normal.  There was no evidence of bleeding or stigmata of recent bleeding found on today's exam.  Grade 1 internal hemorrhoids were noted in the distal rectum and anal verge. Few nonthrombosed external hemorrhoids were also noted. The terminal ileum appeared normal.  Clear bilious fluid was found in the entire examined terminal ileum.     Recommendations  Okay to resume diet per primary team.  Continue antiplatelet therapy and anticoagulation. No further routine colonoscopies indicated for this patient  Patient to follow-up as outpatient in the GI clinic. He has had hematology consult for management of anemia. GI will sign off. Please call for any questions or concerns. Detailed Description of Procedure:   Informed consent was obtained from the patient after explanation of the procedure including indications, description of the procedure,  benefits and possible risks and complications of the procedure, and alternatives. Questions were answered. The patient's history was reviewed and a directed physical examination was performed prior to the procedure. Patient was monitored throughout the procedure with pulse oximetry and periodic assessment of vital signs. Patient was sedated as noted above. With the patient initially in the left lateral decubitus position, a digital rectal examination was performed and revealed external hemorrhoids noted. The Olympus video colonoscope was placed in the patient's rectum and advanced without difficulty  to the cecum, which was identified by the ileocecal valve and appendiceal orifice. The prep was adequate. Examination of the mucosa was performed during both introduction and withdrawal of the colonoscope. Retroflexed view of the rectum was performed. The patient  was taken to the recovery area in good condition.        Electronically signed by Emir Marcano MD on 10/13/2023 at 12:54 PM

## 2023-10-13 NOTE — PROGRESS NOTES
[facility]  Office: [phone]  Alma Bailey, DO, Cleo Perry DO, Sarabjit Turpin, DO, Mateoanitra Funk Blood, DO, Bianca Dudley MD, Papo Salinas MD, Luis Majano MD, Kristina Berman MD,  Dajuan Vasquez MD, Jacquie Jones MD, Ashwini Horton, DO, Berry Garcia MD,  Catrina Mohr MD, Kenia Junior MD, Ese Bailey DO, Cody Kraus MD,  Giacomo Zamorano DO, Jordan Canada MD, Tyrel Santiago MD, Radhames Gaston MD, Nya Greene MD,  Sanjiv Jay MD, Duane Campanile, MD, Jean-Pierre Bailey MD, Warren Pollard MD, Milan Sandy, DO, Lucero Veloz MD,  Concepcion Brantley MD, Megan Alegria MD, Chester Chery, CNP,  Justice Ramon, CNP,, Danielito Nicolas, CNP,  Isela Feldman, DNP, Jose Antonio Weldon, CNP, Jose A Ludwig, CNP, Sweetie Emerson, CNP, Kyra Carnes, CNP, Josiah Thompson, CNP, Neil Foley, CNP, Mane Beckman, [facility], Ricky Pablo, CNP, Abiodun Pierce, [address]    Progress Note    10/13/2023    2:27 PM    Name:   Eliazar Neumann  MRN:     [id number]     Acct:      [de-identified]   Room:   [facility] RM/NONE  IP Day:  6  Admit Date:  10/7/2023  2:49 AM    PCP:   Yareli Ramos MD  Code Status:  DNR-CCA    Subjective:     C/C:  SOB  Interval History Status: improved    Patient seen and examined at bedside, no acute events overnight  Soft BP  Plan for colonoscopy today  Hematology eval still pending regarding choice of anticoagulation  Patient vitals, labs and all providers notes were reviewed,from overnight shift and morning updates were noted and discussed with the nurse    Brief History:     Per chart  This is a 68-year-old female who initially presented to the emergency department with a chief complaint of shortness of breath and was found to have a pulmonary emboli despite long-term anticoagulation with Eliquis.   She was evaluated and found to have hyperbilirubinemia mainly direct with elevated INR with primary and cardiology teams to see if colonoscopy needs to be performed urgently versus can be waited out for at least 2 weeks. Discussed with anesthesiologist.    Asa Click for clear liquid diet. Okay to continue anticoagulation and antiplatelet therapy. Monitor CBC daily. If hemoglobin continues to trend downwards, will plan for colonoscopy before the end of the week if okay with cardiology. Physical Examination:        Physical Exam  Vitals and nursing note reviewed. Constitutional:       General: She is not in acute distress. HENT:      Head: Normocephalic and atraumatic. Eyes:      Conjunctiva/sclera: Conjunctivae normal.      Pupils: Pupils are equal, round, and reactive to light. Cardiovascular:      Rate and Rhythm: Normal rate and regular rhythm. Heart sounds: No murmur heard. Pulmonary:      Effort: Pulmonary effort is normal. No accessory muscle usage or respiratory distress. Breath sounds: No stridor. No decreased breath sounds, wheezing, rhonchi or rales. Abdominal:      General: Bowel sounds are normal. There is no distension. Palpations: Abdomen is soft. Abdomen is not rigid. Tenderness: There is no abdominal tenderness. There is no guarding. Musculoskeletal:         General: No tenderness. Skin:     General: Skin is warm and dry. Findings: No erythema, lesion or rash. Neurological:      Mental Status: She is alert and oriented to person, place, and time. Cranial Nerves: No cranial nerve deficit. Motor: No seizure activity. Psychiatric:         Speech: Speech normal.         Behavior: Behavior normal. Behavior is cooperative.          Assessment:        Hospital Problems             Last Modified POA    * (Principal) Pulmonary embolus, left ([address]) 10/7/2023 Yes    Obesity (BMI 30-39.9) 10/7/2023 Yes    Multiple sclerosis ([address]) (Chronic) 10/7/2023 Yes    CAD (coronary artery disease) (Chronic) 10/7/2023 Yes    Chronic renal insufficiency,

## 2023-10-13 NOTE — CARE COORDINATION
Transitional planning    Per am report colonoscopy today. Per oncology note. Plan for anticoagulation is pradaxa bridged by lovenox-when Gi says ok.

## 2023-10-13 NOTE — PROGRESS NOTES
Occupational [address]  Occupational Therapy Not Seen Note    DATE: 10/13/2023    NAME: Irvin Ochoa  MRN: [id number]   : 1945      Patient not seen this date for Occupational Therapy due to:    Surgery/Procedure: colonoscopy    Next Scheduled Treatment: 10/14/2023    Electronically signed by DAVID Manzano on 10/13/2023 at 10:07 AM

## 2023-10-14 LAB
F2 C.20210G>A GENO BLD/T: NORMAL
HCT VFR BLD AUTO: 28.1 % (ref 36.3–47.1)
HGB BLD-MCNC: 8 G/DL (ref 11.9–15.1)
SPECIMEN SOURCE: NORMAL

## 2023-10-14 PROCEDURE — 97530 THERAPEUTIC ACTIVITIES: CPT

## 2023-10-14 PROCEDURE — 2060000000 HC ICU INTERMEDIATE R&B

## 2023-10-14 PROCEDURE — 6370000000 HC RX 637 (ALT 250 FOR IP): Performed by: INTERNAL MEDICINE

## 2023-10-14 PROCEDURE — 2580000003 HC RX 258: Performed by: INTERNAL MEDICINE

## 2023-10-14 PROCEDURE — 36415 COLL VENOUS BLD VENIPUNCTURE: CPT

## 2023-10-14 PROCEDURE — 85014 HEMATOCRIT: CPT

## 2023-10-14 PROCEDURE — 99232 SBSQ HOSP IP/OBS MODERATE 35: CPT | Performed by: FAMILY MEDICINE

## 2023-10-14 PROCEDURE — 85018 HEMOGLOBIN: CPT

## 2023-10-14 PROCEDURE — 97535 SELF CARE MNGMENT TRAINING: CPT

## 2023-10-14 PROCEDURE — 6370000000 HC RX 637 (ALT 250 FOR IP): Performed by: FAMILY MEDICINE

## 2023-10-14 RX ADMIN — CLONIDINE HYDROCHLORIDE 0.1 MG: 0.1 TABLET ORAL at 12:24

## 2023-10-14 RX ADMIN — DABIGATRAN ETEXILATE MESYLATE 150 MG: 150 CAPSULE ORAL at 07:57

## 2023-10-14 RX ADMIN — DABIGATRAN ETEXILATE MESYLATE 150 MG: 150 CAPSULE ORAL at 20:50

## 2023-10-14 RX ADMIN — SODIUM CHLORIDE, PRESERVATIVE FREE 10 ML: 5 INJECTION INTRAVENOUS at 09:00

## 2023-10-14 RX ADMIN — CLONIDINE HYDROCHLORIDE 0.1 MG: 0.1 TABLET ORAL at 20:49

## 2023-10-14 RX ADMIN — ATORVASTATIN CALCIUM 80 MG: 80 TABLET, FILM COATED ORAL at 20:50

## 2023-10-14 RX ADMIN — CLOPIDOGREL BISULFATE 75 MG: 75 TABLET ORAL at 07:57

## 2023-10-14 RX ADMIN — ACETAMINOPHEN 650 MG: 325 TABLET ORAL at 20:50

## 2023-10-14 RX ADMIN — PANTOPRAZOLE SODIUM 40 MG: 40 TABLET, DELAYED RELEASE ORAL at 06:26

## 2023-10-14 RX ADMIN — SODIUM CHLORIDE, PRESERVATIVE FREE 10 ML: 5 INJECTION INTRAVENOUS at 20:51

## 2023-10-14 ASSESSMENT — PAIN SCALES - GENERAL: PAINLEVEL_OUTOF10: 3

## 2023-10-14 ASSESSMENT — ENCOUNTER SYMPTOMS
BLOOD IN STOOL: 0
WHEEZING: 0
VOMITING: 0
ABDOMINAL PAIN: 0
SHORTNESS OF BREATH: 0
COUGH: 0
CHEST TIGHTNESS: 0
DIARRHEA: 0
CONSTIPATION: 0
NAUSEA: 0

## 2023-10-14 NOTE — PLAN OF CARE
Problem: Chronic Conditions and Co-morbidities  Goal: Patient's chronic conditions and co-morbidity symptoms are monitored and maintained or improved  10/14/2023 0927 by Varun Fox RN  Outcome: Progressing  Flowsheets (Taken 10/14/2023 0800)  Care Plan - Patient's Chronic Conditions and Co-Morbidity Symptoms are Monitored and Maintained or Improved: Collaborate with multidisciplinary team to address chronic and comorbid conditions and prevent exacerbation or deterioration  10/13/2023 2248 by Tyrese Mir RN  Outcome: Progressing     Problem: Discharge Planning  Goal: Discharge to home or other facility with appropriate resources  10/14/2023 0927 by Varun Fox RN  Outcome: Progressing  Flowsheets (Taken 10/14/2023 0800)  Discharge to home or other facility with appropriate resources: Identify barriers to discharge with patient and caregiver  10/13/2023 2248 by Tyrese Mir RN  Outcome: Progressing     Problem: Skin/Tissue Integrity  Goal: Absence of new skin breakdown  Description: 1. Monitor for areas of redness and/or skin breakdown  2. Assess vascular access sites hourly  3. Every 4-6 hours minimum:  Change oxygen saturation probe site  4. Every 4-6 hours:  If on nasal continuous positive airway pressure, respiratory therapy assess nares and determine need for appliance change or resting period.   10/14/2023 0927 by Varun Fox RN  Outcome: Progressing  10/13/2023 2248 by Tyrese Mir RN  Outcome: Progressing     Problem: Safety - Adult  Goal: Free from fall injury  10/14/2023 0927 by Varun Fox RN  Outcome: Progressing  10/13/2023 2248 by Tyrese Mir RN  Outcome: Progressing     Problem: ABCDS Injury Assessment  Goal: Absence of physical injury  10/14/2023 0927 by Varun Fox RN  Outcome: Progressing  10/13/2023 2248 by Tyrese Mir RN  Outcome: Progressing     Problem: Pain  Goal: Verbalizes/displays adequate comfort level or baseline comfort

## 2023-10-14 NOTE — PROGRESS NOTES
Comprehensive Nutrition Assessment    Type and Reason for Visit:  RD Nutrition Re-Screen/LOS    Nutrition Recommendations/Plan:   Liberalize diet to 2g Na  Encourage/monitor Po intake      Malnutrition Assessment:  Malnutrition Status: At risk for malnutrition (Comment) (10/14/23 1141)    Context:  Chronic Illness     Findings of the 6 clinical characteristics of malnutrition:  Energy Intake:  75% or less estimated energy requirements for 1 month or longer  Weight Loss:  No significant weight loss     Body Fat Loss:  Unable to assess     Muscle Mass Loss:  Unable to assess    Fluid Accumulation:  No significant fluid accumulation     Strength:  Not Performed    Nutrition Assessment:    Pt seen for LOS. 79 yo F adm pulmonary embolus - Left. PMH significant for GERD, gastroparesis, HFpEF, CAD. Pt endorses poor appetite, which is common for her. Pt typically eats very small meals 2/2 gastroparesis. Encouraged pt to continue small meals here by saving items from trays, which she has been doing. Encouraged pt to try to eat more protein foods as she can tolerate. Pt declined all ONS as she does note like and lactoes bothers her. LBM 10/13. +1 BLE edema noted. Historical wt fluctuations noted per chart review, likely d/t fluid changes. No significant wt loss noted. Nutrition Related Findings:    labs/meds reviewed Wound Type:  (areas of ecchymosis noted)       Current Nutrition Intake & Therapies:    Average Meal Intake: 26-50%, 1-25%  Average Supplements Intake: None Ordered  ADULT DIET; Regular; Low Sodium (2 gm)    Anthropometric Measures:  Height: 5' 1\" (154.9 cm)  Ideal Body Weight (IBW): 105 lbs (48 kg)       Current Body Weight: 193 lb 9 oz (87.8 kg) (10/14), 184.3 % IBW.     Current BMI (kg/m2): 36.6  Usual Body Weight: 179 lb 11 oz (81.5 kg) (2/15/23)  % Weight Change (Calculated): 7.7                    BMI Categories: Obese Class 2 (BMI 35.0 -39.9)    Estimated Daily Nutrient Needs:  Energy Requirements Based On: Formula  Weight Used for Energy Requirements: Current  Energy (kcal/day): 1700 to 1800 kcal/day  Weight Used for Protein Requirements: Ideal  Protein (g/day): 50 to 60 g/day     Fluid (ml/day): per MD    Nutrition Diagnosis:   Inadequate oral intake related to altered GI function as evidenced by intake 26-50%, poor intake prior to admission    Nutrition Interventions:   Food and/or Nutrient Delivery: Continue Current Diet  Nutrition Education/Counseling: No recommendation at this time  Coordination of Nutrition Care: Continue to monitor while inpatient       Goals:     Goals: Meet at least 75% of estimated needs, prior to discharge       Nutrition Monitoring and Evaluation:   Behavioral-Environmental Outcomes: None Identified  Food/Nutrient Intake Outcomes: Food and Nutrient Intake  Physical Signs/Symptoms Outcomes: Biochemical Data, Weight    Discharge Planning:     Too soon to determine     Caryl Mathews, [id number] [facility] TONNY Evans, LD  Contact:   [facility] Mobile: [phone]  Desk Phone: [phone]  RD Weekend Mobile: [phone]

## 2023-10-14 NOTE — PROGRESS NOTES
No  General Comment  Comments: RN ok'd pt for OT this morning. Pt agreeable, cooperative and pleasant throughout. Pt sitting EOB eating breakfast upon arrival and retired to recliner at end of session. Pt reported 9.5/10 pain \"all over\" and stated \"it has always been this way\". Pt repositioned in recliner at end of session for comfort w/ all needs met. Objective   SpO2: 96 %  O2 Device: None (Room air)     Safety Devices  Type of Devices: Patient at risk for falls;Call light within reach;Gait belt;Nurse notified; Left in chair;Chair alarm in place  Restraints  Restraints Initially in Place: No  Balance  Sitting: With support (Pt sitting unsupported EOB SUP and on edge of recliner. Completed static/dynamic sitting tasks and demo'd good postural control throughout. Pt able to engage in B lateral lean and bring self back to center. Pt sitting ~60 mins total.)  Standing: With support (Pt stood x3 from EOB w/ RW CGA-->SBA to engaged in static/dynamic standing task. Increased time/effort required for initiating stand d/t BLE pain. Pt stood ~5 mins total and required 2 rest breaks EOB ~30 seconds.)  Transfer Training  Transfer Training: Yes  Overall Level of Assistance: Contact-guard assistance; Additional time; Adaptive equipment (Pt transferred from EOB to recliner w/ RW CGA.)  Interventions: Safety awareness training  Sit to Stand: Contact-guard assistance; Additional time; Adaptive equipment (Completed x3 from EOB w/ RW CGA. Pt used LUE on bed rail for support)  Stand to Sit: Contact-guard assistance; Additional time; Adaptive equipment (Completed x3 (2 to EOB, 1 to recliner) w/ RW CGA.)  Gait  Overall Level of Assistance: Contact-guard assistance; Additional time; Adaptive equipment (Pt completed functional mobility from EOB to recliner w/ RW CGA to simulate household distances.)  Interventions: Safety awareness training  Speed/Jaycee: Slow;Shuffled  Assistive Device: Gait belt;Walker, rolling        ADL  Feeding: (10/14/23 1033)  ADL Inpatient CMS 0-100% Score: 42.8 (10/14/23 1033)  ADL Inpatient CMS G-Code Modifier : CK (10/14/23 1033)      Goals  Short Term Goals  Time Frame for Short Term Goals: By discharge, pt will:  Short Term Goal 1: Demo functional sit<>stand transfers and functional mobility with SUP and LRAD PRN  Short Term Goal 2: Demo 7 minutes of dyanmic standing balance with SBA and LRAD to promote incrased engagement in ADLs  Short Term Goal 3: Demo UB ADLs with Mod IND  Short Term Goal 4: Demo LB ADLs/toileting with SBA and use of DME PRN  Short Term Goal 5: Participate in 10 minutes of BUE strenghening to all joint planes for promotion of inceased functional use throughout ADLs       Therapy Time   Individual Concurrent Group Co-treatment   Time In 0851         Time Out 0957         Minutes 66         Timed Code Treatment Minutes: 77 Minutes       SAMARA Gloria/LISA

## 2023-10-15 LAB
ERYTHROCYTE [DISTWIDTH] IN BLOOD BY AUTOMATED COUNT: 26 % (ref 11.8–14.4)
HCT VFR BLD AUTO: 27.3 % (ref 36.3–47.1)
HGB BLD-MCNC: 7.8 G/DL (ref 11.9–15.1)
MCH RBC QN AUTO: 27.3 PG (ref 25.2–33.5)
MCHC RBC AUTO-ENTMCNC: 28.6 G/DL (ref 28.4–34.8)
MCV RBC AUTO: 95.5 FL (ref 82.6–102.9)
NRBC BLD-RTO: 0 PER 100 WBC
PLATELET # BLD AUTO: 136 K/UL (ref 138–453)
PMV BLD AUTO: 11.1 FL (ref 8.1–13.5)
RBC # BLD AUTO: 2.86 M/UL (ref 3.95–5.11)
WBC OTHER # BLD: 7.8 K/UL (ref 3.5–11.3)

## 2023-10-15 PROCEDURE — 6370000000 HC RX 637 (ALT 250 FOR IP): Performed by: INTERNAL MEDICINE

## 2023-10-15 PROCEDURE — 6370000000 HC RX 637 (ALT 250 FOR IP): Performed by: FAMILY MEDICINE

## 2023-10-15 PROCEDURE — 85027 COMPLETE CBC AUTOMATED: CPT

## 2023-10-15 PROCEDURE — 97110 THERAPEUTIC EXERCISES: CPT

## 2023-10-15 PROCEDURE — 36415 COLL VENOUS BLD VENIPUNCTURE: CPT

## 2023-10-15 PROCEDURE — 97530 THERAPEUTIC ACTIVITIES: CPT

## 2023-10-15 PROCEDURE — 99232 SBSQ HOSP IP/OBS MODERATE 35: CPT | Performed by: INTERNAL MEDICINE

## 2023-10-15 PROCEDURE — 2580000003 HC RX 258: Performed by: INTERNAL MEDICINE

## 2023-10-15 PROCEDURE — 99232 SBSQ HOSP IP/OBS MODERATE 35: CPT | Performed by: FAMILY MEDICINE

## 2023-10-15 PROCEDURE — 2060000000 HC ICU INTERMEDIATE R&B

## 2023-10-15 RX ORDER — METOPROLOL SUCCINATE 25 MG/1
25 TABLET, EXTENDED RELEASE ORAL DAILY
Status: DISCONTINUED | OUTPATIENT
Start: 2023-10-15 | End: 2023-10-18 | Stop reason: HOSPADM

## 2023-10-15 RX ADMIN — DABIGATRAN ETEXILATE MESYLATE 150 MG: 150 CAPSULE ORAL at 21:05

## 2023-10-15 RX ADMIN — PANTOPRAZOLE SODIUM 40 MG: 40 TABLET, DELAYED RELEASE ORAL at 06:38

## 2023-10-15 RX ADMIN — ATORVASTATIN CALCIUM 80 MG: 80 TABLET, FILM COATED ORAL at 21:05

## 2023-10-15 RX ADMIN — DABIGATRAN ETEXILATE MESYLATE 150 MG: 150 CAPSULE ORAL at 08:43

## 2023-10-15 RX ADMIN — CLOPIDOGREL BISULFATE 75 MG: 75 TABLET ORAL at 08:43

## 2023-10-15 RX ADMIN — SODIUM CHLORIDE, PRESERVATIVE FREE 10 ML: 5 INJECTION INTRAVENOUS at 08:43

## 2023-10-15 RX ADMIN — CLONIDINE HYDROCHLORIDE 0.1 MG: 0.1 TABLET ORAL at 08:43

## 2023-10-15 RX ADMIN — CLONIDINE HYDROCHLORIDE 0.1 MG: 0.1 TABLET ORAL at 21:05

## 2023-10-15 RX ADMIN — SODIUM CHLORIDE, PRESERVATIVE FREE 10 ML: 5 INJECTION INTRAVENOUS at 21:05

## 2023-10-15 RX ADMIN — METOPROLOL SUCCINATE 25 MG: 25 TABLET, FILM COATED, EXTENDED RELEASE ORAL at 14:53

## 2023-10-15 ASSESSMENT — ENCOUNTER SYMPTOMS
ABDOMINAL PAIN: 0
CONSTIPATION: 0
SHORTNESS OF BREATH: 0
WHEEZING: 0
NAUSEA: 0
VOMITING: 0
BLOOD IN STOOL: 0
CHEST TIGHTNESS: 0
COUGH: 0
DIARRHEA: 0

## 2023-10-15 ASSESSMENT — PAIN SCALES - GENERAL
PAINLEVEL_OUTOF10: 0

## 2023-10-15 NOTE — PROGRESS NOTES
[place] [id number] [place] [address]  PROGRESS NOTE    Shift date: 10/15/2023   Shift day: Sunday   Shift # 1    Room # 2001/2001-01   Name: Jason Bay                Orthodox: [address] [facility] [facility] [address] [facility] [address] of Gnosticist: Melissa Bailon    Referral:  Request from patient     Admit Date & Time: 10/7/2023  2:49 AM    Assessment:  Jason Bay is a 78 y.o. female. Upon entering the room writer observes pt awake and alert. Patient expressed that was having a very bad day. Pt shared an extensive life review sharing about relationships and multiple health issues. She also discussed her [address] and how God has been present in her life. Pt stated that she has no one who truly supports her. Her relationship with her daughter is not good. She said her sister is her POA but is typically unkind to her. Pt was tearful at times. She did speak with johanne about her three grandchildren. Intervention:  Writer introduced self and title as  Writer offered space for patient  to express feelings, needs, and concerns and provided a ministry presence.  prayed with patient. Outcome:  Pt was receptive to support and expressed gratitude for visit. Plan:  Chaplains will remain available to offer spiritual and emotional support as needed.       Electronically signed by Niki Rodriguez on 10/15/2023 at 11:29 AM.  [address] [place] [address]  [phone]

## 2023-10-15 NOTE — CARE COORDINATION
Discussed hgb with Dr Joy Tyson. He relates that even if patient receives a unit of blood, patient's hgb will go up and will come back down. Discussed with patient that [facility] may not be able to accept. She would like [facility]. Referral sent. She relates that they were unable to accept her in the past d/t her insurance. List printed from [facility] and given to patient. Also emailed the list to her sister, Martin Jiménez, per patient's request. Discussed with Martin Jiménez via telephone.  She will review list and meet with patient tomorrow

## 2023-10-15 NOTE — PROGRESS NOTES
Today's Date: 10/15/2023  Patient Name: Jacqui Frederick  Date of admission: 10/7/2023  2:49 AM  Patient's age: 78 y. o., 1945  Admission Dx: Pulmonary embolus, left ([address]) [I26.99]    Reason for Consult: PE while on Eliquis and Plavis, allergy to Heparin  Requesting Physician: Caro Yin MD    CHIEF COMPLAINT:  sob    History Obtained From:  patient, chart review            Interval history:        Patient seen and examined  Labs and vitals reviewed  Patient on Pradaxa. Hemoglobin 7.8 today. Has been stable over the last few days. Awaiting placement. Complains of being very tired. HISTORY OF PRESENT ILLNESS:      The patient is a 78 y.o. , Female with a history of chronic GERD with pyloric stenosis s/p dilatation, gastroparesis chronic MS, history of CVA, heart failure with preserved ejection fraction, pulmonary hypertension with cor pulmonale, CAD s/p stent 2023 on 9/2023, Persistent a fib  post AV qamar ablation on eliquis 5mg BID,  initially presented to [facility] ED with increasing shortness of breath. As per patient she had been having worsening respiratory symptoms since she went to EGD in January. She went for EGD for GERD variant With chronic nonproductive cough. Initial CT imaging at [facility] showed left segmental and subsegmental PE with anasarca and bilateral pleural effusion with evidence of ascites. Patient was transferred here for further management. Patient was admitted under Intermed service for acute on chronic hypoxic respiratory failure, multifactorial likely secondary to decompensated CHF, chronic cor pulmonale and PE. Vascular was consulted, recommended no surgical intervention as this point. Today on my evaluation, patient was lying comfortably on the bed. Denies any complaints at this point. Was on room air.   Patient denies any history of blood clots in the past.    Past Medical History:   has a past medical history of Abnormal Pap smear, Asthma, Atrial daily  Multiple Sclerosis, follows up at [address] [place] [address] GROUP  Cor pulmonale  History of CVA    RECOMMENDATIONS:  I personally reviewed results of lab work-up imaging studies and other relevant clinical data. I had a detailed discussion with the patient. Personally went over results of lab work-up and other relevant clinical data  Anticoagulation restarted Pradaxa. Patient was on Lovenox prior to starting Pradaxa  EGD colonoscopy did not show any obvious source of bleeding. Concern regarding Eliquis failure. Patient developed pulmonary embolism while on Eliquis. Underwent on being compliant with Eliquis. Did have a small break from Eliquis last month in preparation for heart cath. Patient does not want to be on Coumadin. Transfuse to keep hemoglobin above 7. Discussed differential diagnosis of failure of DOAC.   Hypercoagulable state from malignancies, antiphospholipid antibody syndrome, drug interaction, malabsorption and noncompliance    Transfuse to keep hemoglobin above 7           Electronically signed by   Madeline Aase, MD    on 10/15/2023 at 12:56 PM

## 2023-10-15 NOTE — PROGRESS NOTES
Skilled Therapeutic Intervention: Decreased functional mobility ; Decreased strength;Decreased endurance;Decreased balance; Increased pain;Decreased sensation;Decreased cognition  Assessment: Pt required SBA for bed mobility. Pt sat EOB for 20 mins with SBA with no LOB noted. Ambulates 20' x 2 RW CGA. Min to mod vc to redirect and stay on task. Pt is currently unsafe to return to prior living arrangements and would benefit from continued PT following D/C to address deficits. Therapy Prognosis: Good  Activity Tolerance  Activity Tolerance: Patient limited by fatigue;Patient limited by endurance     Plan   Physcial Therapy Plan  General Plan:  (5-6 x week)  Current Treatment Recommendations: Strengthening, Balance training, Functional mobility training, Transfer training, Endurance training, Gait training, Stair training, Neuromuscular re-education, Home exercise program, Safety education & training, Patient/Caregiver education & training, Equipment evaluation, education, & procurement, Therapeutic activities  Safety Devices  Type of Devices: Patient at risk for falls, Call light within reach, Gait belt, Nurse notified, Left in chair  Restraints  Restraints Initially in Place: No     Restrictions  Restrictions/Precautions  Restrictions/Precautions: General Precautions  Required Braces or Orthoses?: No  Position Activity Restriction  Other position/activity restrictions: Up w/ assist.     Subjective   General  Patient assessed for rehabilitation services?: Yes  Response To Previous Treatment: Patient with no complaints from previous session. Family / Caregiver Present: No  Follows Commands: Within Functional Limits  General Comment  Comments: pt just woke from nap when writer entered room. pt  states that she would like to eat her lunch then work with therapy. Upon returning, pt sitting up in bed.  pt able to perform seated ex. pt performed seated activities such as brushing hair/teeth per request of pt while working

## 2023-10-15 NOTE — PROGRESS NOTES
Continues on full dose Lovenox 80 mg twice daily, Plavix 75 mg daily, Lipitor 80 mg nightly. Toprol-XL currently held due to allergy in our formulation. Pericardial effusion. Echocardiogram demonstrated small pericardial effusion posteriorly but no tamponade. Stable from cardiology standpoint. CAD status post PCI 9/19/2023. Continues on full dose Lovenox 80 mg twice daily, Plavix 75 mg daily, Lipitor 80 mg nightly. Toprol-XL currently held due to allergy in our formulation. Severe pulmonary hypertension with cor pulmonale. Cardiology following. Chronic GERD with pyloric stenosis and gastroparesis. Continue Protonix 40 mg twice daily. Chronic MS. Stable. Paroxysmal atrial fibrillation. Continue full dose Lovenox. Beta-blocker currently held due to allergy with inpatient formulation. ISABELLA on CKD. Baseline creatinine 1.2. Stable currently near baseline. DVT prophylaxis: held for possible bleed, to be resumed after colonoscopy    GI prophylaxis: Protonix.      Initiate discharge planning to SNF, apparently SNF won't start precert till Hb is 8 which it was yesterday, today Hb still pending  Discussed with the patient , RN and case management    Patient is interested to discuss options with palliative/hospice, will admit of care consultation      Moi Coreas MD  10/15/2023  11:42 AM

## 2023-10-16 PROBLEM — Z71.89 GOALS OF CARE, COUNSELING/DISCUSSION: Status: ACTIVE | Noted: 2023-10-16

## 2023-10-16 PROBLEM — R52 PAIN: Status: ACTIVE | Noted: 2023-10-16

## 2023-10-16 PROBLEM — Z51.5 ENCOUNTER FOR PALLIATIVE CARE: Status: ACTIVE | Noted: 2023-10-16

## 2023-10-16 PROBLEM — R53.1 WEAKNESS: Status: ACTIVE | Noted: 2023-10-16

## 2023-10-16 LAB
HCT VFR BLD AUTO: 30.9 % (ref 36.3–47.1)
HGB BLD-MCNC: 8.8 G/DL (ref 11.9–15.1)

## 2023-10-16 PROCEDURE — 6370000000 HC RX 637 (ALT 250 FOR IP): Performed by: INTERNAL MEDICINE

## 2023-10-16 PROCEDURE — 85014 HEMATOCRIT: CPT

## 2023-10-16 PROCEDURE — 99222 1ST HOSP IP/OBS MODERATE 55: CPT | Performed by: NURSE PRACTITIONER

## 2023-10-16 PROCEDURE — 97530 THERAPEUTIC ACTIVITIES: CPT

## 2023-10-16 PROCEDURE — 99232 SBSQ HOSP IP/OBS MODERATE 35: CPT | Performed by: FAMILY MEDICINE

## 2023-10-16 PROCEDURE — 85018 HEMOGLOBIN: CPT

## 2023-10-16 PROCEDURE — 6370000000 HC RX 637 (ALT 250 FOR IP): Performed by: FAMILY MEDICINE

## 2023-10-16 PROCEDURE — 36415 COLL VENOUS BLD VENIPUNCTURE: CPT

## 2023-10-16 PROCEDURE — 99231 SBSQ HOSP IP/OBS SF/LOW 25: CPT | Performed by: INTERNAL MEDICINE

## 2023-10-16 PROCEDURE — 2060000000 HC ICU INTERMEDIATE R&B

## 2023-10-16 PROCEDURE — 97116 GAIT TRAINING THERAPY: CPT

## 2023-10-16 PROCEDURE — 2580000003 HC RX 258: Performed by: INTERNAL MEDICINE

## 2023-10-16 RX ADMIN — ATORVASTATIN CALCIUM 80 MG: 80 TABLET, FILM COATED ORAL at 20:27

## 2023-10-16 RX ADMIN — ACETAMINOPHEN 650 MG: 325 TABLET ORAL at 09:55

## 2023-10-16 RX ADMIN — METOPROLOL SUCCINATE 25 MG: 25 TABLET, FILM COATED, EXTENDED RELEASE ORAL at 09:45

## 2023-10-16 RX ADMIN — CLOPIDOGREL BISULFATE 75 MG: 75 TABLET ORAL at 09:44

## 2023-10-16 RX ADMIN — CLONIDINE HYDROCHLORIDE 0.1 MG: 0.1 TABLET ORAL at 20:27

## 2023-10-16 RX ADMIN — CLONIDINE HYDROCHLORIDE 0.1 MG: 0.1 TABLET ORAL at 09:45

## 2023-10-16 RX ADMIN — ACETAMINOPHEN 650 MG: 325 TABLET ORAL at 02:30

## 2023-10-16 RX ADMIN — SODIUM CHLORIDE, PRESERVATIVE FREE 10 ML: 5 INJECTION INTRAVENOUS at 09:45

## 2023-10-16 RX ADMIN — PANTOPRAZOLE SODIUM 40 MG: 40 TABLET, DELAYED RELEASE ORAL at 09:44

## 2023-10-16 RX ADMIN — SODIUM CHLORIDE, PRESERVATIVE FREE 10 ML: 5 INJECTION INTRAVENOUS at 20:27

## 2023-10-16 RX ADMIN — ACETAMINOPHEN 650 MG: 325 TABLET ORAL at 16:17

## 2023-10-16 RX ADMIN — DABIGATRAN ETEXILATE MESYLATE 150 MG: 150 CAPSULE ORAL at 20:27

## 2023-10-16 RX ADMIN — DABIGATRAN ETEXILATE MESYLATE 150 MG: 150 CAPSULE ORAL at 11:22

## 2023-10-16 ASSESSMENT — ENCOUNTER SYMPTOMS
CONSTIPATION: 0
ABDOMINAL PAIN: 0
BLOOD IN STOOL: 0
CHEST TIGHTNESS: 0
VOMITING: 0
DIARRHEA: 0
WHEEZING: 0
SHORTNESS OF BREATH: 0
NAUSEA: 0
COUGH: 0

## 2023-10-16 ASSESSMENT — PAIN DESCRIPTION - DESCRIPTORS: DESCRIPTORS: ACHING

## 2023-10-16 ASSESSMENT — PAIN SCALES - GENERAL
PAINLEVEL_OUTOF10: 8
PAINLEVEL_OUTOF10: 8
PAINLEVEL_OUTOF10: 0
PAINLEVEL_OUTOF10: 0
PAINLEVEL_OUTOF10: 3
PAINLEVEL_OUTOF10: 0

## 2023-10-16 ASSESSMENT — PAIN SCALES - WONG BAKER
WONGBAKER_NUMERICALRESPONSE: 0
WONGBAKER_NUMERICALRESPONSE: 0

## 2023-10-16 ASSESSMENT — PAIN DESCRIPTION - FREQUENCY: FREQUENCY: INTERMITTENT

## 2023-10-16 ASSESSMENT — PAIN DESCRIPTION - LOCATION
LOCATION: BACK;HIP
LOCATION: GENERALIZED

## 2023-10-16 ASSESSMENT — PAIN DESCRIPTION - ORIENTATION: ORIENTATION: LEFT

## 2023-10-16 ASSESSMENT — PAIN DESCRIPTION - PAIN TYPE: TYPE: CHRONIC PAIN

## 2023-10-16 ASSESSMENT — PAIN - FUNCTIONAL ASSESSMENT: PAIN_FUNCTIONAL_ASSESSMENT: ACTIVITIES ARE NOT PREVENTED

## 2023-10-16 ASSESSMENT — PAIN DESCRIPTION - ONSET: ONSET: PROGRESSIVE

## 2023-10-16 NOTE — PROGRESS NOTES
>[id number] CFU/ML (A) 03/14/2023 09:00 PM       Radiology:  US LIVER    Result Date: 10/8/2023  No findings of cholelithiasis or evidence of cholecystitis. Common bile duct which is within normal limits. Normal appearance of the liver. Stable indeterminate right adrenal lesion. RECOMMENDATIONS:     CT CHEST PULMONARY EMBOLISM W CONTRAST    Result Date: 10/6/2023  Segmental and subsegmental left lower lobe pulmonary embolism Cardiomegaly and pericardial effusion Mild-to-moderate left pleural effusion and bibasilar atelectasis Stable anasarca and mild pelvic ascites No acute intra-abdominal or intrapelvic abnormalities are noted. Critical results were called by Dr. Scottie Benz MD to Dr. Rosa Isela Acuña on 10/6/2023 at 20:59. Based on MIPS measure 405, incidental abd lesions in this patient population do not warrant F/U W/O a documented medical reason. CT ABDOMEN PELVIS W IV CONTRAST Additional Contrast? None    Result Date: 10/6/2023  Segmental and subsegmental left lower lobe pulmonary embolism Cardiomegaly and pericardial effusion Mild-to-moderate left pleural effusion and bibasilar atelectasis Stable anasarca and mild pelvic ascites No acute intra-abdominal or intrapelvic abnormalities are noted. Critical results were called by Dr. Scottie Benz MD to Dr. Rosa Isela Acuña on 10/6/2023 at 20:59. Based on MIPS measure 405, incidental abd lesions in this patient population do not warrant F/U W/O a documented medical reason. EGD 10/11  Findings:   Mild esophagitis at the GE junction. Otherwise normal esophagus. Mild gastritis at the pylorus. Otherwise normal stomach on forward and retroflexed views. Mild duodenitis in the bulb. Otherwise normal examined duodenum. There is no evidence of bleeding or stigmata of recent bleeding found on today's exam.        Recommendations:  Continue high-dose oral PPI daily. Patient will likely need a colonoscopy.   However due to tenuous status with recent cardiac and pulmonary Assessment:        Hospital Problems             Last Modified POA    * (Principal) Pulmonary embolus, left ([address]) 10/7/2023 Yes    Obesity (BMI 30-39.9) 10/7/2023 Yes    Multiple sclerosis ([address]) (Chronic) 10/7/2023 Yes    CAD (coronary artery disease) (Chronic) 10/7/2023 Yes    Chronic renal insufficiency, stage III (moderate) (HCC) (Chronic) 10/7/2023 Yes    Fibromyalgia 10/7/2023 Yes    Pyloric stenosis 10/7/2023 Yes    H/O: CVA (cerebrovascular accident) (Chronic) 10/7/2023 Yes    Migraine without status migrainosus, not intractable 10/7/2023 Yes    Acute on chronic combined systolic and diastolic CHF (congestive heart failure) ([address]) 10/7/2023 Yes    Paroxysmal A-fib ([address]) (Chronic) 10/7/2023 Yes    Severe pulmonary hypertension ([address]) 10/7/2023 Yes    High anion gap metabolic acidosis 59/5/0589 Yes    Elevated INR 10/7/2023 Yes    Pericardial effusion 10/7/2023 Yes   Plan:        Segmental and subsegmental left lower lobe pulmonary emboli. Vascular surgery consulted. Plan to continue supportive care and pain control but no acute surgical intervention indicated at this time. Hematology/oncology consulted as patient was on Eliquis and developed pulmonary emboli regardless. Discussed with oncology and plan is to start different NOAC after colonoscopy if cleared    Acute on chronic anemia with melena. fobt +ve, had h/o ulcers, GI re consulted, S/P  EGD 10/11 as above, no significant bleed. Continue with holding AC , On PPI drip. S/P colonoscopy 11/13, no bleed and ok for [facility]    Pradaxa was initiated overnight    Hyperbilirubinemia with elevated INR. It is trending down, evaluated by GI recommending to continue to monitor, might need further work-up and biopsy if elevated again     High anion gap metabolic acidosis. Resolved. NSTEMI. Cardiology following. Echocardiogram demonstrated left ventricular systolic ejection fraction 50 to 55%.   Continues on full dose Lovenox 80 mg twice daily, Plavix 75

## 2023-10-16 NOTE — CARE COORDINATION
Received call from Milon Monday from [facility]. They are not able to accept d/t insurance    1044 hemoglobin resulted at 8.8. voicemail left for Karine Martinez from [employer] requesting return call to follow up on acceptance    [phone] spoke to Karine Martinez from [email or web address]. They are able to accept. Precert submitted [employer]. Authorization #[id number]. Voicemail left for patient's sister, Landry Hummel, to update    1400 notified by Madison Dillon NP with palliative that patient would like informational meeting with [facility] [address] [place] [address] Rd.  Referral sent with request for them to arrange meeting with patient's sister, Landry Estephanie

## 2023-10-16 NOTE — CONSULTS
Palliative Care Inpatient Consult    NAME:  Lilli Flores  MEDICAL RECORD NUMBER:  [id number]  AGE: 78 y.o. GENDER: female  : 1945  TODAY'S DATE:  10/16/2023    Reasons for Consultation:    Goals of care     Members of PC team contributing to this consultation are :  Madhavi Cool CNP  Palliative care         Plan      Palliative Interaction:  I went to bedside and spoke with patient and introduced myself and my palliative care role. Patient is  and has daughter Anshul Hodges. She has completed [facility] [place] POA and her sister Phu is her POA and paperwork is in bedside chart. We discussed code status levels and I explained each level completely and patient states she wants to remain a DNRCC-A. Patient is very down due to not being able to live at her apartment any longer due to her weakness and frequent falls. Emotional support provided. We discussed difference between Palliative care versus hospice and patient states she would like a meeting with them so she would understand both services. She states she wants her sister there too. I called and informed patient sister Phu and see was agreeable to meeting. I updated [address] discharge planner and she will set up. Patient with many questions about assisted living and paying for it if she could not afford. Palliative care will continue to follow patient and provide her with emotional support and updates as needed.     Education/support to family  Education/support to patient  Discharge planning/helping to coordinate care  Communications with primary service  Pharmacologic pain management  Providing support for coping/adaptation/distress of family  Providing support for coping/adaptation/distress of patient  Discussing meaning/purpose   Caregiver support/education  Continue with current plan of care  Code status clarified: [facility]  Principle Problem/Diagnosis:  Pulmonary embolus, left ([address])    Additional Assessments:   Principal Problem: pm    TECHNIQUE:  Multiplanar multisequence MRI of the head/brain was performed without and  with the administration of intravenous contrast.    COMPARISON:  CT brain performed 08/04/2021. HISTORY:  ORDERING SYSTEM PROVIDED HISTORY: MS  TECHNOLOGIST PROVIDED HISTORY:  MS  Reason for Exam: patient states hx of ms, recent falls, headaches, visual  disturbance  Acuity: Acute  Type of Exam: Ongoing    FINDINGS:  INTRACRANIAL STRUCTURES/VENTRICLES:  The sellar and suprasellar structures,  optic chiasm, corpus callosum, pineal gland, tectum, and midline brainstem  structures are unremarkable. The craniocervical junction is unremarkable. There is no acute hemorrhage, mass effect, or midline shift. There is  satisfactory overall gray-white matter differentiation. There is chronic  microvascular disease. The ventricular structures are symmetric and  unremarkable. The infratentorial structures including the cerebellopontine  angles and internal auditory canals are unremarkable. There is no abnormal  restricted diffusion. There is no abnormal blooming artifact on  susceptibility weighted imaging. There is no abnormal postcontrast  enhancement. ORBITS: The visualized portion of the orbits demonstrate no acute abnormality. SINUSES: The visualized paranasal sinuses and mastoid air cells are well  aerated. BONES/SOFT TISSUES: The bone marrow signal intensity appears normal. The soft  tissues demonstrate no acute abnormality. Impression  Chronic microvascular disease without acute intracranial abnormality. No  abnormal postcontrast enhancement. CT Result (most recent):  CT ABDOMEN PELVIS W IV CONTRAST 10/06/2023    Narrative  EXAMINATION:  CTA OF THE CHEST; CT OF THE ABDOMEN AND PELVIS WITH CONTRAST 10/6/2023 4:19 pm    TECHNIQUE:  CTA of the chest was performed after the administration of intravenous  contrast.  Multiplanar reformatted images are provided for review.   MIP  images are provided for

## 2023-10-16 NOTE — PROGRESS NOTES
Physical Therapy  Facility/Department: [facility] CAR 2- STEPDOWN  Daily Treatment Note  NAME: Anais Whaley  : 1945  MRN: [id number]    Date of Service: 10/16/2023    Discharge Recommendations:  Patient would benefit from continued therapy after discharge   PT Equipment Recommendations  Equipment Needed: No    Patient Diagnosis(es): The primary encounter diagnosis was Pulmonary embolus, left ([address]). A diagnosis of Pericardial effusion was also pertinent to this visit. Assessment   Activity Tolerance: Patient limited by fatigue;Patient limited by endurance  Equipment Needed: No     Plan    Physcial Therapy Plan  Current Treatment Recommendations: Strengthening;Balance training;Functional mobility training;Transfer training; Endurance training;Gait training;Stair training;Neuromuscular re-education;Home exercise program;Safety education & training;Patient/Caregiver education & training;Equipment evaluation, education, & procurement; Therapeutic activities     Restrictions  Restrictions/Precautions  Restrictions/Precautions: General Precautions  Required Braces or Orthoses?: No  Position Activity Restriction  Other position/activity restrictions: Up w/ assist.     Subjective    Subjective  Pain: 8/10; pain did not impact therapy and patient able to participate. Orientation  Overall Orientation Status: Within Normal Limits  Orientation Level: Oriented X4  Cognition  Overall Cognitive Status: WNL  Arousal/Alertness: Appropriate responses to stimuli  Following Commands: Follows all commands without difficulty  Cognition Comment: Pt is easily distracted through conversation but is able to follow all cues without difficulty. Objective   Vitals  BP Location: Right upper arm  O2 Device: None (Room air)  Bed Mobility Training  Bed Mobility Training: Yes  Overall Level of Assistance: Minimum assistance  Interventions: Safety awareness training; Tactile cues; Verbal cues  Supine to Sit: Contact-guard assistance;Minimum

## 2023-10-16 NOTE — PLAN OF CARE
Problem: Chronic Conditions and Co-morbidities  Goal: Patient's chronic conditions and co-morbidity symptoms are monitored and maintained or improved  10/16/2023 0831 by Jessie Barrett RN  Outcome: Progressing  10/15/2023 2152 by Jimenez Garcia RN  Outcome: Progressing     Problem: Discharge Planning  Goal: Discharge to home or other facility with appropriate resources  10/16/2023 0831 by Jessie Barrett RN  Outcome: Progressing  10/15/2023 2152 by Jimenez Garcia RN  Outcome: Progressing     Problem: Skin/Tissue Integrity  Goal: Absence of new skin breakdown  Description: 1. Monitor for areas of redness and/or skin breakdown  2. Assess vascular access sites hourly  3. Every 4-6 hours minimum:  Change oxygen saturation probe site  4. Every 4-6 hours:  If on nasal continuous positive airway pressure, respiratory therapy assess nares and determine need for appliance change or resting period.   10/16/2023 0831 by Jessie Barrett RN  Outcome: Progressing  10/15/2023 2152 by Jimenez Garcia RN  Outcome: Progressing     Problem: Safety - Adult  Goal: Free from fall injury  10/16/2023 0831 by Jessie Barrett RN  Outcome: Progressing  10/15/2023 2152 by Jimenez Garcia RN  Outcome: Progressing     Problem: ABCDS Injury Assessment  Goal: Absence of physical injury  10/16/2023 0831 by Jessie Barrett RN  Outcome: Progressing  10/15/2023 2152 by Jimenez Garcia RN  Outcome: Progressing     Problem: Pain  Goal: Verbalizes/displays adequate comfort level or baseline comfort level  10/16/2023 0831 by Jessie Barrett RN  Outcome: Progressing  10/15/2023 2152 by Jimenez Garcia RN  Outcome: Progressing     Problem: Nutrition Deficit:  Goal: Optimize nutritional status  10/16/2023 0831 by Jessie Barrett RN  Outcome: Progressing  10/15/2023 2152 by Jimenez Garcia RN  Outcome: Progressing

## 2023-10-16 NOTE — PLAN OF CARE
Problem: Chronic Conditions and Co-morbidities  Goal: Patient's chronic conditions and co-morbidity symptoms are monitored and maintained or improved  10/15/2023 2152 by Rodney Cagle RN  Outcome: Progressing     Problem: Discharge Planning  Goal: Discharge to home or other facility with appropriate resources  10/15/2023 2152 by Rodney Cagle RN  Outcome: Progressing     Problem: Skin/Tissue Integrity  Goal: Absence of new skin breakdown  Description: 1. Monitor for areas of redness and/or skin breakdown  2. Assess vascular access sites hourly  3. Every 4-6 hours minimum:  Change oxygen saturation probe site  4. Every 4-6 hours:  If on nasal continuous positive airway pressure, respiratory therapy assess nares and determine need for appliance change or resting period.   10/15/2023 2152 by Rodney Cagle RN  Outcome: Progressing     Problem: Safety - Adult  Goal: Free from fall injury  10/15/2023 2152 by Rodney Cagle RN  Outcome: Progressing     Problem: ABCDS Injury Assessment  Goal: Absence of physical injury  10/15/2023 2152 by Rodney Cagle RN  Outcome: Progressing     Problem: Pain  Goal: Verbalizes/displays adequate comfort level or baseline comfort level  10/15/2023 2152 by Rodney Cagle RN  Outcome: Progressing     Problem: Nutrition Deficit:  Goal: Optimize nutritional status  10/15/2023 2152 by Rodney Cagle RN  Outcome: Progressing

## 2023-10-17 PROCEDURE — 99232 SBSQ HOSP IP/OBS MODERATE 35: CPT | Performed by: STUDENT IN AN ORGANIZED HEALTH CARE EDUCATION/TRAINING PROGRAM

## 2023-10-17 PROCEDURE — 6370000000 HC RX 637 (ALT 250 FOR IP): Performed by: INTERNAL MEDICINE

## 2023-10-17 PROCEDURE — 92526 ORAL FUNCTION THERAPY: CPT

## 2023-10-17 PROCEDURE — 2580000003 HC RX 258: Performed by: INTERNAL MEDICINE

## 2023-10-17 PROCEDURE — 2060000000 HC ICU INTERMEDIATE R&B

## 2023-10-17 PROCEDURE — 6370000000 HC RX 637 (ALT 250 FOR IP): Performed by: FAMILY MEDICINE

## 2023-10-17 RX ORDER — PANTOPRAZOLE SODIUM 40 MG/1
40 TABLET, DELAYED RELEASE ORAL
Qty: 30 TABLET | Refills: 3 | Status: SHIPPED | OUTPATIENT
Start: 2023-10-18

## 2023-10-17 RX ORDER — METOPROLOL SUCCINATE 25 MG/1
25 TABLET, EXTENDED RELEASE ORAL DAILY
Qty: 30 TABLET | Refills: 3 | Status: SHIPPED | OUTPATIENT
Start: 2023-10-18

## 2023-10-17 RX ORDER — CLONIDINE HYDROCHLORIDE 0.1 MG/1
0.1 TABLET ORAL 2 TIMES DAILY
Qty: 60 TABLET | Refills: 3 | Status: SHIPPED | OUTPATIENT
Start: 2023-10-17

## 2023-10-17 RX ORDER — ATORVASTATIN CALCIUM 80 MG/1
80 TABLET, FILM COATED ORAL NIGHTLY
Qty: 30 TABLET | Refills: 3 | Status: SHIPPED | OUTPATIENT
Start: 2023-10-17

## 2023-10-17 RX ORDER — DABIGATRAN ETEXILATE 150 MG/1
150 CAPSULE ORAL 2 TIMES DAILY
Qty: 60 CAPSULE | Refills: 0 | Status: SHIPPED | OUTPATIENT
Start: 2023-10-17 | End: 2023-11-16

## 2023-10-17 RX ADMIN — METOPROLOL SUCCINATE 25 MG: 25 TABLET, FILM COATED, EXTENDED RELEASE ORAL at 08:46

## 2023-10-17 RX ADMIN — ATORVASTATIN CALCIUM 80 MG: 80 TABLET, FILM COATED ORAL at 21:20

## 2023-10-17 RX ADMIN — SODIUM CHLORIDE, PRESERVATIVE FREE 10 ML: 5 INJECTION INTRAVENOUS at 08:47

## 2023-10-17 RX ADMIN — SODIUM CHLORIDE, PRESERVATIVE FREE 10 ML: 5 INJECTION INTRAVENOUS at 21:20

## 2023-10-17 RX ADMIN — DABIGATRAN ETEXILATE MESYLATE 150 MG: 150 CAPSULE ORAL at 21:20

## 2023-10-17 RX ADMIN — PANTOPRAZOLE SODIUM 40 MG: 40 TABLET, DELAYED RELEASE ORAL at 05:56

## 2023-10-17 RX ADMIN — CLONIDINE HYDROCHLORIDE 0.1 MG: 0.1 TABLET ORAL at 21:19

## 2023-10-17 RX ADMIN — DABIGATRAN ETEXILATE MESYLATE 150 MG: 150 CAPSULE ORAL at 08:47

## 2023-10-17 RX ADMIN — CLOPIDOGREL BISULFATE 75 MG: 75 TABLET ORAL at 08:46

## 2023-10-17 RX ADMIN — ACETAMINOPHEN 650 MG: 325 TABLET ORAL at 08:46

## 2023-10-17 RX ADMIN — CLONIDINE HYDROCHLORIDE 0.1 MG: 0.1 TABLET ORAL at 08:46

## 2023-10-17 ASSESSMENT — PAIN SCALES - GENERAL
PAINLEVEL_OUTOF10: 0
PAINLEVEL_OUTOF10: 3
PAINLEVEL_OUTOF10: 0
PAINLEVEL_OUTOF10: 0

## 2023-10-17 ASSESSMENT — PAIN DESCRIPTION - LOCATION: LOCATION: GENERALIZED

## 2023-10-17 NOTE — PLAN OF CARE
Problem: Chronic Conditions and Co-morbidities  Goal: Patient's chronic conditions and co-morbidity symptoms are monitored and maintained or improved  10/17/2023 0719 by Margaret Salguero RN  Outcome: Progressing  10/17/2023 0026 by Luis Rodriguez RN  Outcome: Progressing     Problem: Discharge Planning  Goal: Discharge to home or other facility with appropriate resources  10/17/2023 0719 by Margaret Salguero RN  Outcome: Progressing  10/17/2023 0026 by Luis Rodriguez RN  Outcome: Progressing     Problem: Skin/Tissue Integrity  Goal: Absence of new skin breakdown  Description: 1. Monitor for areas of redness and/or skin breakdown  2. Assess vascular access sites hourly  3. Every 4-6 hours minimum:  Change oxygen saturation probe site  4. Every 4-6 hours:  If on nasal continuous positive airway pressure, respiratory therapy assess nares and determine need for appliance change or resting period.   10/17/2023 0719 by Margaret Salguero RN  Outcome: Progressing  10/17/2023 0026 by Luis Rodriguez RN  Outcome: Progressing     Problem: Safety - Adult  Goal: Free from fall injury  10/17/2023 0719 by Margaret Salguero RN  Outcome: Progressing  10/17/2023 0026 by Luis Rodriguez RN  Outcome: Progressing     Problem: ABCDS Injury Assessment  Goal: Absence of physical injury  10/17/2023 0719 by Margaret Salguero RN  Outcome: Progressing  10/17/2023 0026 by Luis Rodriguez RN  Outcome: Progressing     Problem: Pain  Goal: Verbalizes/displays adequate comfort level or baseline comfort level  10/17/2023 0719 by Margaret Salguero RN  Outcome: Progressing  10/17/2023 0026 by Luis Rodriguez RN  Outcome: Progressing  Flowsheets  Taken 10/16/2023 1548 by Margaret Salguero RN  Verbalizes/displays adequate comfort level or baseline comfort level:   Encourage patient to monitor pain and request assistance   Assess pain using appropriate pain scale   Administer analgesics based on type and severity of pain and evaluate response  Taken 10/16/2023 1115 by Saba Mathew RN  Verbalizes/displays adequate comfort level or baseline comfort level:   Encourage patient to monitor pain and request assistance   Assess pain using appropriate pain scale   Administer analgesics based on type and severity of pain and evaluate response     Problem: Nutrition Deficit:  Goal: Optimize nutritional status  Outcome: Progressing     Problem: Hematologic - Adult  Goal: Maintains hematologic stability  10/17/2023 0719 by Saba Mathew RN  Outcome: Progressing  10/17/2023 0026 by Stephanie Tripathi RN  Outcome: Progressing

## 2023-10-17 NOTE — DISCHARGE SUMMARY
@[email or web address]@    [address]    Discharge Summary     Patient ID: Irvin Ochoa  :  1945   MRN: [id number]     ACCOUNT:  [de-identified]   Patient's PCP: Leobardo Allen MD  Admit Date: 10/7/2023   Discharge Date: 10/18/2023    Length of Stay: 11  Code Status:  Prior  Admitting Physician: Angela Eli MD  Discharge Physician: Delores Cruz MD     Active Discharge Diagnoses:     Hospital Problem Lists:  Principal Problem:    Pulmonary embolus, left ([address])  Active Problems:    Obesity (BMI 30-39. 9)    Multiple sclerosis (HCC)    Fibromyalgia    Pyloric stenosis    H/O: CVA (cerebrovascular accident)    Migraine without status migrainosus, not intractable    Acute on chronic combined systolic and diastolic CHF (congestive heart failure) (HCC)    CAD (coronary artery disease)    Paroxysmal A-fib (HCC)    Chronic renal insufficiency, stage III (moderate) (HCC)    Severe pulmonary hypertension (HCC)    High anion gap metabolic acidosis    Elevated INR    Pericardial effusion    Goals of care, counseling/discussion    Encounter for palliative care    Pain    Weakness  Resolved Problems:    * No resolved hospital problems. *      Admission Condition:  stable     Discharged Condition: stable    Hospital Stay:     Hospital Course: 72-year-old female who presented to ER with shortness of breath and found to have pulmonary emboli despite long-term anticoagulation with Eliquis, seen by vascular surgery, no intervention planned was seen by heme-onc and changed from Eliquis to Pradaxa. Had EGD and colonoscopy done as well for concern of melena     EGD 10/11  Findings:   Mild esophagitis at the GE junction. Otherwise normal esophagus. Mild gastritis at the pylorus. Otherwise normal stomach on forward and retroflexed views. Mild duodenitis in the bulb. Otherwise normal examined duodenum.   There is no evidence of bleeding or stigmata of recent

## 2023-10-17 NOTE — PLAN OF CARE
Problem: Chronic Conditions and Co-morbidities  Goal: Patient's chronic conditions and co-morbidity symptoms are monitored and maintained or improved  Outcome: Progressing     Problem: Discharge Planning  Goal: Discharge to home or other facility with appropriate resources  Outcome: Progressing     Problem: Skin/Tissue Integrity  Goal: Absence of new skin breakdown  Description: 1. Monitor for areas of redness and/or skin breakdown  2. Assess vascular access sites hourly  3. Every 4-6 hours minimum:  Change oxygen saturation probe site  4. Every 4-6 hours:  If on nasal continuous positive airway pressure, respiratory therapy assess nares and determine need for appliance change or resting period.   Outcome: Progressing     Problem: Safety - Adult  Goal: Free from fall injury  Outcome: Progressing     Problem: ABCDS Injury Assessment  Goal: Absence of physical injury  Outcome: Progressing     Problem: Pain  Goal: Verbalizes/displays adequate comfort level or baseline comfort level  Outcome: Progressing  Flowsheets  Taken 10/16/2023 1548 by Jessie Barrett RN  Verbalizes/displays adequate comfort level or baseline comfort level:   Encourage patient to monitor pain and request assistance   Assess pain using appropriate pain scale   Administer analgesics based on type and severity of pain and evaluate response  Taken 10/16/2023 1115 by Jessie Barrett RN  Verbalizes/displays adequate comfort level or baseline comfort level:   Encourage patient to monitor pain and request assistance   Assess pain using appropriate pain scale   Administer analgesics based on type and severity of pain and evaluate response     Problem: Hematologic - Adult  Goal: Maintains hematologic stability  Outcome: Progressing

## 2023-10-17 NOTE — CARE COORDINATION
[facility] need BINU signed by Dr. Sunitha Mcgrath (PS sent), HENs and copy of auth with blue packet.

## 2023-10-17 NOTE — PROGRESS NOTES
Today's Date: 10/16/2023  Patient Name: Tamra Frederick  Date of admission: 10/7/2023  2:49 AM  Patient's age: 78 y. o., 1945  Admission Dx: Pulmonary embolus, left ([address]) [I26.99]    Reason for Consult: PE while on Eliquis and Plavis, allergy to Heparin  Requesting Physician: Jacquie Grubbs MD    CHIEF COMPLAINT:  sob    History Obtained From:  patient, chart review            Interval history:  Patient is seen and evaluated. She was having some bleeding from her nailbed from a cut. No active bleeding otherwise. Hemoglobin has been stable above 8. Patient seen and examined  Labs and vitals reviewed        HISTORY OF PRESENT ILLNESS:      The patient is a 78 y.o. , Female with a history of chronic GERD with pyloric stenosis s/p dilatation, gastroparesis chronic MS, history of CVA, heart failure with preserved ejection fraction, pulmonary hypertension with cor pulmonale, CAD s/p stent 2023 on 9/2023, Persistent a fib  post AV qamar ablation on eliquis 5mg BID,  initially presented to [facility] ED with increasing shortness of breath. As per patient she had been having worsening respiratory symptoms since she went to EGD in January. She went for EGD for GERD variant With chronic nonproductive cough. Initial CT imaging at [facility] showed left segmental and subsegmental PE with anasarca and bilateral pleural effusion with evidence of ascites. Patient was transferred here for further management. Patient was admitted under Intermed service for acute on chronic hypoxic respiratory failure, multifactorial likely secondary to decompensated CHF, chronic cor pulmonale and PE. Vascular was consulted, recommended no surgical intervention as this point. Today on my evaluation, patient was lying comfortably on the bed. Denies any complaints at this point. Was on room air.   Patient denies any history of blood clots in the past.    Past Medical History:   has a past medical history of Abnormal Pap smear, rashes, no suspicious skin lesions noted ,    DATA:    Labs:   CBC:   Recent Labs     10/15/23  1147 10/16/23  0937   WBC 7.8  --    HGB 7.8* 8.8*   HCT 27.3* 30.9*   *  --      BMP:   No results for input(s): \"NA\", \"K\", \"CO2\", \"BUN\", \"CREATININE\", \"LABGLOM\", \"GLUCOSE\" in the last 72 hours. PT/INR:   No results for input(s): \"PROTIME\", \"INR\" in the last 72 hours. IMAGING DATA:  Vascular duplex lower extremity venous bilateral   Final Result      US LIVER   Final Result   No findings of cholelithiasis or evidence of cholecystitis. Common bile duct which is within normal limits. Normal appearance of the liver. Stable indeterminate right adrenal lesion. RECOMMENDATIONS:             Primary Problem  Pulmonary embolus, left [facility])    Active Hospital Problems    Diagnosis Date Noted    Obesity (BMI 30-39. 9) [E66.9] 08/05/2021     Priority: Low    Goals of care, counseling/discussion [Z71.89] 10/16/2023    Encounter for palliative care [Z51.5] 10/16/2023    Pain [R52] 10/16/2023    Weakness [R53.1] 10/16/2023    Pulmonary embolus, left (HCC) [I26.99] 10/07/2023    High anion gap metabolic acidosis [G79.25] 10/07/2023    Elevated INR [R79.1] 10/07/2023    Pericardial effusion [I31.39] 10/07/2023    Severe pulmonary hypertension ([address]) [I27.20] 03/05/2022    Paroxysmal A-fib ([address]) [I48.0] 08/01/2020    CAD (coronary artery disease) [I25.10] 05/19/2019    Acute on chronic combined systolic and diastolic CHF (congestive heart failure) ([address]) [I50.43] 05/18/2019    Chronic renal insufficiency, stage III (moderate) ([address]) [N18.30] 10/12/2018    Migraine without status migrainosus, not intractable [G43.909]     H/O: CVA (cerebrovascular accident) [Z86.73] 05/24/2015    Fibromyalgia [M79.7]     Pyloric stenosis [K31.1]     Multiple sclerosis ([address]) Aline Rincon 03/25/2013         IMPRESSION:   Left subsegmental/segmental PE, new diagnosis  Acute on chronic exacerbation of heart failure.  HFpEF  Heparin

## 2023-10-18 VITALS
OXYGEN SATURATION: 97 % | DIASTOLIC BLOOD PRESSURE: 60 MMHG | HEIGHT: 61 IN | HEART RATE: 85 BPM | BODY MASS INDEX: 36.21 KG/M2 | TEMPERATURE: 97.9 F | WEIGHT: 191.8 LBS | SYSTOLIC BLOOD PRESSURE: 132 MMHG | RESPIRATION RATE: 19 BRPM

## 2023-10-18 LAB
ANION GAP SERPL CALCULATED.3IONS-SCNC: 11 MMOL/L (ref 9–17)
BASOPHILS # BLD: 0.07 K/UL (ref 0–0.2)
BASOPHILS NFR BLD: 1 % (ref 0–2)
BUN SERPL-MCNC: 20 MG/DL (ref 8–23)
CALCIUM SERPL-MCNC: 8.4 MG/DL (ref 8.6–10.4)
CHLORIDE SERPL-SCNC: 102 MMOL/L (ref 98–107)
CO2 SERPL-SCNC: 21 MMOL/L (ref 20–31)
CREAT SERPL-MCNC: 0.9 MG/DL (ref 0.5–0.9)
EOSINOPHIL # BLD: 0.22 K/UL (ref 0–0.44)
EOSINOPHILS RELATIVE PERCENT: 3 % (ref 1–4)
ERYTHROCYTE [DISTWIDTH] IN BLOOD BY AUTOMATED COUNT: 25.4 % (ref 11.8–14.4)
GFR SERPL CREATININE-BSD FRML MDRD: >60 ML/MIN/1.73M2
GLUCOSE SERPL-MCNC: 96 MG/DL (ref 70–99)
HCT VFR BLD AUTO: 31 % (ref 36.3–47.1)
HGB BLD-MCNC: 8.4 G/DL (ref 11.9–15.1)
IMM GRANULOCYTES # BLD AUTO: 0.07 K/UL (ref 0–0.3)
IMM GRANULOCYTES NFR BLD: 1 %
LYMPHOCYTES NFR BLD: 0.81 K/UL (ref 1.1–3.7)
LYMPHOCYTES RELATIVE PERCENT: 11 % (ref 24–43)
MCH RBC QN AUTO: 27.8 PG (ref 25.2–33.5)
MCHC RBC AUTO-ENTMCNC: 27.1 G/DL (ref 28.4–34.8)
MCV RBC AUTO: 102.6 FL (ref 82.6–102.9)
MONOCYTES NFR BLD: 1.04 K/UL (ref 0.1–1.2)
MONOCYTES NFR BLD: 14 % (ref 3–12)
MORPHOLOGY: ABNORMAL
NEUTROPHILS NFR BLD: 70 % (ref 36–65)
NEUTS SEG NFR BLD: 5.19 K/UL (ref 1.5–8.1)
NRBC BLD-RTO: 0 PER 100 WBC
PLATELET # BLD AUTO: 167 K/UL (ref 138–453)
PMV BLD AUTO: 11.4 FL (ref 8.1–13.5)
POTASSIUM SERPL-SCNC: 4.2 MMOL/L (ref 3.7–5.3)
RBC # BLD AUTO: 3.02 M/UL (ref 3.95–5.11)
REASON FOR REJECTION: NORMAL
SODIUM SERPL-SCNC: 134 MMOL/L (ref 135–144)
SPECIMEN SOURCE: NORMAL
WBC OTHER # BLD: 7.4 K/UL (ref 3.5–11.3)
ZZ NTE CLEAN UP: ORDERED TEST: NORMAL

## 2023-10-18 PROCEDURE — 99239 HOSP IP/OBS DSCHRG MGMT >30: CPT | Performed by: STUDENT IN AN ORGANIZED HEALTH CARE EDUCATION/TRAINING PROGRAM

## 2023-10-18 PROCEDURE — 6370000000 HC RX 637 (ALT 250 FOR IP): Performed by: FAMILY MEDICINE

## 2023-10-18 PROCEDURE — 6370000000 HC RX 637 (ALT 250 FOR IP): Performed by: INTERNAL MEDICINE

## 2023-10-18 PROCEDURE — 80048 BASIC METABOLIC PNL TOTAL CA: CPT

## 2023-10-18 PROCEDURE — 36415 COLL VENOUS BLD VENIPUNCTURE: CPT

## 2023-10-18 PROCEDURE — 85025 COMPLETE CBC W/AUTO DIFF WBC: CPT

## 2023-10-18 RX ADMIN — CLOPIDOGREL BISULFATE 75 MG: 75 TABLET ORAL at 10:02

## 2023-10-18 RX ADMIN — CLONIDINE HYDROCHLORIDE 0.1 MG: 0.1 TABLET ORAL at 10:01

## 2023-10-18 RX ADMIN — PANTOPRAZOLE SODIUM 40 MG: 40 TABLET, DELAYED RELEASE ORAL at 06:50

## 2023-10-18 RX ADMIN — METOPROLOL SUCCINATE 25 MG: 25 TABLET, FILM COATED, EXTENDED RELEASE ORAL at 10:02

## 2023-10-18 RX ADMIN — DABIGATRAN ETEXILATE MESYLATE 150 MG: 150 CAPSULE ORAL at 10:02

## 2023-10-18 RX ADMIN — ACETAMINOPHEN 650 MG: 325 TABLET ORAL at 10:02

## 2023-10-18 ASSESSMENT — PAIN SCALES - GENERAL
PAINLEVEL_OUTOF10: 0
PAINLEVEL_OUTOF10: 0

## 2023-10-18 NOTE — CARE COORDINATION
AVS faxed to [facility]. Robert Jeffers from [facility] updated on transportation.  Patient notified and agreeable    Discharge Report    [facility])  Clinical Case Management Department  Written by: Mark Forrest RN    Patient Name: Ankit Newton  Attending Provider: Rustam Rushing MD  Admit Date: 10/7/2023  2:49 AM  MRN: [id number]  Account: [de-identified]                     : 1945  Discharge Date: 10/18/2023      Disposition: [facility]    Mark Forrest RN

## 2023-10-18 NOTE — PROGRESS NOTES
Report given to López Rosenthal at [facility]. All questions answered. Pt. Enroute to facility.

## 2023-11-15 PROBLEM — R52 PAIN: Status: RESOLVED | Noted: 2023-10-16 | Resolved: 2023-11-15

## 2023-12-08 ENCOUNTER — HOSPITAL ENCOUNTER (OUTPATIENT)
Age: 78
Setting detail: OBSERVATION
Discharge: ANOTHER HOSPITAL | End: 2023-12-09
Attending: EMERGENCY MEDICINE | Admitting: INTERNAL MEDICINE
Payer: MEDICARE

## 2023-12-08 ENCOUNTER — APPOINTMENT (OUTPATIENT)
Dept: GENERAL RADIOLOGY | Age: 78
End: 2023-12-08
Payer: MEDICARE

## 2023-12-08 DIAGNOSIS — R53.81 PHYSICAL DECONDITIONING: ICD-10-CM

## 2023-12-08 DIAGNOSIS — J18.9 COMMUNITY ACQUIRED PNEUMONIA OF RIGHT MIDDLE LOBE OF LUNG: Primary | ICD-10-CM

## 2023-12-08 LAB
ALBUMIN SERPL-MCNC: 4.2 G/DL (ref 3.5–5.2)
ALBUMIN/GLOB SERPL: 1.8 {RATIO} (ref 1–2.5)
ALP SERPL-CCNC: 207 U/L (ref 35–104)
ALT SERPL-CCNC: 15 U/L (ref 5–33)
ANION GAP SERPL CALCULATED.3IONS-SCNC: 16 MMOL/L (ref 9–17)
AST SERPL-CCNC: 24 U/L
BASOPHILS # BLD: 0 K/UL (ref 0–0.2)
BASOPHILS NFR BLD: 0 % (ref 0–2)
BILIRUB SERPL-MCNC: 2.6 MG/DL (ref 0.3–1.2)
BNP SERPL-MCNC: ABNORMAL PG/ML
BUN SERPL-MCNC: 24 MG/DL (ref 8–23)
CALCIUM SERPL-MCNC: 9.8 MG/DL (ref 8.6–10.4)
CHLORIDE SERPL-SCNC: 101 MMOL/L (ref 98–107)
CO2 SERPL-SCNC: 23 MMOL/L (ref 20–31)
CREAT SERPL-MCNC: 1.2 MG/DL (ref 0.5–0.9)
EOSINOPHIL # BLD: 0.13 K/UL (ref 0–0.4)
EOSINOPHILS RELATIVE PERCENT: 2 % (ref 1–4)
ERYTHROCYTE [DISTWIDTH] IN BLOOD BY AUTOMATED COUNT: 23.7 % (ref 12.5–15.4)
GFR SERPL CREATININE-BSD FRML MDRD: 47 ML/MIN/1.73M2
GLUCOSE SERPL-MCNC: 116 MG/DL (ref 70–99)
HCT VFR BLD AUTO: 33.3 % (ref 36–46)
HGB BLD-MCNC: 10.6 G/DL (ref 12–16)
LYMPHOCYTES NFR BLD: 0.98 K/UL (ref 1–4.8)
LYMPHOCYTES RELATIVE PERCENT: 15 % (ref 24–44)
MAGNESIUM SERPL-MCNC: 2 MG/DL (ref 1.6–2.6)
MCH RBC QN AUTO: 28.5 PG (ref 26–34)
MCHC RBC AUTO-ENTMCNC: 31.7 G/DL (ref 31–37)
MCV RBC AUTO: 90 FL (ref 80–100)
MONOCYTES NFR BLD: 0.72 K/UL (ref 0.1–0.8)
MONOCYTES NFR BLD: 11 % (ref 1–7)
MORPHOLOGY: ABNORMAL
NEUTROPHILS NFR BLD: 72 % (ref 36–66)
NEUTS SEG NFR BLD: 4.67 K/UL (ref 1.8–7.7)
PLATELET # BLD AUTO: 237 K/UL (ref 140–450)
PMV BLD AUTO: 8.7 FL (ref 6–12)
POTASSIUM SERPL-SCNC: 3.8 MMOL/L (ref 3.7–5.3)
PROT SERPL-MCNC: 6.5 G/DL (ref 6.4–8.3)
RBC # BLD AUTO: 3.7 M/UL (ref 4–5.2)
SODIUM SERPL-SCNC: 140 MMOL/L (ref 135–144)
TROPONIN I SERPL HS-MCNC: 48 NG/L (ref 0–14)
WBC OTHER # BLD: 6.5 K/UL (ref 3.5–11)

## 2023-12-08 PROCEDURE — 2580000003 HC RX 258

## 2023-12-08 PROCEDURE — 96375 TX/PRO/DX INJ NEW DRUG ADDON: CPT

## 2023-12-08 PROCEDURE — 99285 EMERGENCY DEPT VISIT HI MDM: CPT

## 2023-12-08 PROCEDURE — 83735 ASSAY OF MAGNESIUM: CPT

## 2023-12-08 PROCEDURE — 2500000003 HC RX 250 WO HCPCS

## 2023-12-08 PROCEDURE — 96365 THER/PROPH/DIAG IV INF INIT: CPT

## 2023-12-08 PROCEDURE — 85025 COMPLETE CBC W/AUTO DIFF WBC: CPT

## 2023-12-08 PROCEDURE — 71045 X-RAY EXAM CHEST 1 VIEW: CPT

## 2023-12-08 PROCEDURE — 93005 ELECTROCARDIOGRAM TRACING: CPT

## 2023-12-08 PROCEDURE — 80053 COMPREHEN METABOLIC PANEL: CPT

## 2023-12-08 PROCEDURE — 36415 COLL VENOUS BLD VENIPUNCTURE: CPT

## 2023-12-08 PROCEDURE — 83880 ASSAY OF NATRIURETIC PEPTIDE: CPT

## 2023-12-08 PROCEDURE — 6360000002 HC RX W HCPCS

## 2023-12-08 PROCEDURE — 87040 BLOOD CULTURE FOR BACTERIA: CPT

## 2023-12-08 PROCEDURE — 96374 THER/PROPH/DIAG INJ IV PUSH: CPT

## 2023-12-08 PROCEDURE — 84484 ASSAY OF TROPONIN QUANT: CPT

## 2023-12-08 RX ORDER — ACETAMINOPHEN 500 MG
1000 TABLET ORAL ONCE
Status: COMPLETED | OUTPATIENT
Start: 2023-12-08 | End: 2023-12-09

## 2023-12-08 RX ORDER — FUROSEMIDE 10 MG/ML
40 INJECTION INTRAMUSCULAR; INTRAVENOUS ONCE
Status: COMPLETED | OUTPATIENT
Start: 2023-12-08 | End: 2023-12-08

## 2023-12-08 RX ADMIN — DOXYCYCLINE 100 MG: 100 INJECTION, POWDER, LYOPHILIZED, FOR SOLUTION INTRAVENOUS at 19:03

## 2023-12-08 RX ADMIN — FUROSEMIDE 40 MG: 10 INJECTION, SOLUTION INTRAMUSCULAR; INTRAVENOUS at 18:37

## 2023-12-08 ASSESSMENT — PAIN SCALES - GENERAL: PAINLEVEL_OUTOF10: 10

## 2023-12-08 ASSESSMENT — PAIN DESCRIPTION - PAIN TYPE: TYPE: CHRONIC PAIN

## 2023-12-08 ASSESSMENT — ENCOUNTER SYMPTOMS
SHORTNESS OF BREATH: 1
CHEST TIGHTNESS: 1

## 2023-12-08 ASSESSMENT — PAIN - FUNCTIONAL ASSESSMENT: PAIN_FUNCTIONAL_ASSESSMENT: 0-10

## 2023-12-08 NOTE — ED PROVIDER NOTES
[facility]  Emergency Department Encounter  Mid Level Provider     Pt Name: Kali Hernandez  MRN: [id number]  [address] [facility] 1945  Date of evaluation: 12/8/23  PCP:  Winston Jacobs MD    CHIEF COMPLAINT       No chief complaint on file. HISTORY OF PRESENT ILLNESS  (Location/Symptom, Timing/Onset,Context/Setting, Quality, Duration, Modifying Factors, Severity.)      Kali Hernandez is a 78 y.o. female who presents with complaints of weakness, shortness of breath, bilateral lower extremity swelling. She reports that she has a history of CHF, A-fib, coronary artery disease, stroke and has not been taking her Bumex over the last 2 days as \" Bumex does nothing for me\". She was seen by her home health aide today who noticed that she was requiring supplemental oxygen. On arrival she is 99% on room air. She denies any chest pain, abdominal pain, nausea, vomiting. She does endorse worsening exertional dyspnea without chest pain. 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 has a past surgical history that includes Dilation & curettage (1982); Breast biopsy (1990); Appendectomy (1955); Septoplasty (1985); Hammer toe surgery (1989); Sinus endoscopy; sinus surgery; Shoulder arthroscopy (1998); Esophagoscopy (1966, 1969); Colonoscopy (2010); Foreign Body Removal (1962); laparoscopy (2926); Coronary angioplasty with stent (05/2011);  Coronary

## 2023-12-08 NOTE — ED NOTES
Pt ambulated to bathroom, required two assist, oxygen saturation decreased to 90%. Pt's gait unsteady. Pt wheeled back to her room in wheelchair.      Karen Esposito RN  12/08/23 1752

## 2023-12-08 NOTE — ED NOTES
Pt's oxygen saturation is 90% on room air. Pt placed on oxygen at 2l/nc and oxygen saturation increased to 99%.      Varun Kruger RN  12/08/23 2390

## 2023-12-09 VITALS
SYSTOLIC BLOOD PRESSURE: 153 MMHG | BODY MASS INDEX: 37.66 KG/M2 | RESPIRATION RATE: 18 BRPM | WEIGHT: 191.8 LBS | OXYGEN SATURATION: 96 % | TEMPERATURE: 97.7 F | DIASTOLIC BLOOD PRESSURE: 73 MMHG | HEIGHT: 60 IN | HEART RATE: 84 BPM

## 2023-12-09 PROBLEM — D64.9 NORMOCYTIC ANEMIA: Status: ACTIVE | Noted: 2023-12-09

## 2023-12-09 PROBLEM — J15.9 COMMUNITY ACQUIRED BACTERIAL PNEUMONIA: Status: ACTIVE | Noted: 2023-12-09

## 2023-12-09 LAB
ANION GAP SERPL CALCULATED.3IONS-SCNC: 16 MMOL/L (ref 9–17)
BUN SERPL-MCNC: 26 MG/DL (ref 8–23)
CALCIUM SERPL-MCNC: 9.4 MG/DL (ref 8.6–10.4)
CHLORIDE SERPL-SCNC: 103 MMOL/L (ref 98–107)
CO2 SERPL-SCNC: 22 MMOL/L (ref 20–31)
CREAT SERPL-MCNC: 1.3 MG/DL (ref 0.5–0.9)
ERYTHROCYTE [DISTWIDTH] IN BLOOD BY AUTOMATED COUNT: 23.1 % (ref 12.5–15.4)
GFR SERPL CREATININE-BSD FRML MDRD: 43 ML/MIN/1.73M2
GLUCOSE SERPL-MCNC: 96 MG/DL (ref 70–99)
HCT VFR BLD AUTO: 30.8 % (ref 36–46)
HGB BLD-MCNC: 10 G/DL (ref 12–16)
MAGNESIUM SERPL-MCNC: 1.9 MG/DL (ref 1.6–2.6)
MCH RBC QN AUTO: 28.9 PG (ref 26–34)
MCHC RBC AUTO-ENTMCNC: 32.5 G/DL (ref 31–37)
MCV RBC AUTO: 89 FL (ref 80–100)
PLATELET # BLD AUTO: 229 K/UL (ref 140–450)
PMV BLD AUTO: 8.5 FL (ref 6–12)
POTASSIUM SERPL-SCNC: 3.4 MMOL/L (ref 3.7–5.3)
PROCALCITONIN SERPL-MCNC: 0.11 NG/ML
RBC # BLD AUTO: 3.46 M/UL (ref 4–5.2)
SODIUM SERPL-SCNC: 141 MMOL/L (ref 135–144)
WBC OTHER # BLD: 7 K/UL (ref 3.5–11)

## 2023-12-09 PROCEDURE — 6370000000 HC RX 637 (ALT 250 FOR IP): Performed by: INTERNAL MEDICINE

## 2023-12-09 PROCEDURE — 6370000000 HC RX 637 (ALT 250 FOR IP): Performed by: NURSE PRACTITIONER

## 2023-12-09 PROCEDURE — 83735 ASSAY OF MAGNESIUM: CPT

## 2023-12-09 PROCEDURE — 80048 BASIC METABOLIC PNL TOTAL CA: CPT

## 2023-12-09 PROCEDURE — G0378 HOSPITAL OBSERVATION PER HR: HCPCS

## 2023-12-09 PROCEDURE — 6370000000 HC RX 637 (ALT 250 FOR IP)

## 2023-12-09 PROCEDURE — 2580000003 HC RX 258: Performed by: NURSE PRACTITIONER

## 2023-12-09 PROCEDURE — 85027 COMPLETE CBC AUTOMATED: CPT

## 2023-12-09 PROCEDURE — 84145 PROCALCITONIN (PCT): CPT

## 2023-12-09 PROCEDURE — 99222 1ST HOSP IP/OBS MODERATE 55: CPT | Performed by: INTERNAL MEDICINE

## 2023-12-09 RX ORDER — SODIUM CHLORIDE 9 MG/ML
INJECTION, SOLUTION INTRAVENOUS PRN
Status: DISCONTINUED | OUTPATIENT
Start: 2023-12-09 | End: 2023-12-09 | Stop reason: HOSPADM

## 2023-12-09 RX ORDER — FLUTICASONE FUROATE, UMECLIDINIUM BROMIDE AND VILANTEROL TRIFENATATE 100; 62.5; 25 UG/1; UG/1; UG/1
1 POWDER RESPIRATORY (INHALATION) DAILY
COMMUNITY

## 2023-12-09 RX ORDER — CLONIDINE HYDROCHLORIDE 0.1 MG/1
0.1 TABLET ORAL 2 TIMES DAILY
Status: DISCONTINUED | OUTPATIENT
Start: 2023-12-09 | End: 2023-12-09 | Stop reason: HOSPADM

## 2023-12-09 RX ORDER — DOXYCYCLINE HYCLATE 100 MG
100 TABLET ORAL EVERY 12 HOURS SCHEDULED
Status: DISCONTINUED | OUTPATIENT
Start: 2023-12-09 | End: 2023-12-09 | Stop reason: HOSPADM

## 2023-12-09 RX ORDER — SODIUM CHLORIDE 0.9 % (FLUSH) 0.9 %
5-40 SYRINGE (ML) INJECTION EVERY 12 HOURS SCHEDULED
Status: DISCONTINUED | OUTPATIENT
Start: 2023-12-09 | End: 2023-12-09 | Stop reason: HOSPADM

## 2023-12-09 RX ORDER — ATORVASTATIN CALCIUM 40 MG/1
80 TABLET, FILM COATED ORAL NIGHTLY
Status: DISCONTINUED | OUTPATIENT
Start: 2023-12-09 | End: 2023-12-09 | Stop reason: HOSPADM

## 2023-12-09 RX ORDER — SODIUM CHLORIDE 0.9 % (FLUSH) 0.9 %
10 SYRINGE (ML) INJECTION PRN
Status: DISCONTINUED | OUTPATIENT
Start: 2023-12-09 | End: 2023-12-09 | Stop reason: HOSPADM

## 2023-12-09 RX ORDER — METOPROLOL SUCCINATE 25 MG/1
25 TABLET, EXTENDED RELEASE ORAL DAILY
Status: DISCONTINUED | OUTPATIENT
Start: 2023-12-10 | End: 2023-12-09 | Stop reason: HOSPADM

## 2023-12-09 RX ORDER — ACETAMINOPHEN 325 MG/1
650 TABLET ORAL EVERY 6 HOURS PRN
Status: DISCONTINUED | OUTPATIENT
Start: 2023-12-09 | End: 2023-12-09 | Stop reason: HOSPADM

## 2023-12-09 RX ORDER — ACETAMINOPHEN 650 MG/1
650 SUPPOSITORY RECTAL EVERY 6 HOURS PRN
Status: DISCONTINUED | OUTPATIENT
Start: 2023-12-09 | End: 2023-12-09 | Stop reason: HOSPADM

## 2023-12-09 RX ORDER — PAROXETINE HYDROCHLORIDE 20 MG/1
20 TABLET, FILM COATED ORAL EVERY MORNING
Status: DISCONTINUED | OUTPATIENT
Start: 2023-12-10 | End: 2023-12-09 | Stop reason: HOSPADM

## 2023-12-09 RX ORDER — POTASSIUM CHLORIDE 20 MEQ/1
40 TABLET, EXTENDED RELEASE ORAL ONCE
Status: COMPLETED | OUTPATIENT
Start: 2023-12-09 | End: 2023-12-09

## 2023-12-09 RX ORDER — PANTOPRAZOLE SODIUM 40 MG/1
40 TABLET, DELAYED RELEASE ORAL
Status: DISCONTINUED | OUTPATIENT
Start: 2023-12-09 | End: 2023-12-09 | Stop reason: HOSPADM

## 2023-12-09 RX ORDER — PANTOPRAZOLE SODIUM 40 MG/1
40 TABLET, DELAYED RELEASE ORAL
Status: DISCONTINUED | OUTPATIENT
Start: 2023-12-10 | End: 2023-12-09

## 2023-12-09 RX ORDER — ONDANSETRON 4 MG/1
4 TABLET, ORALLY DISINTEGRATING ORAL EVERY 8 HOURS PRN
Status: DISCONTINUED | OUTPATIENT
Start: 2023-12-09 | End: 2023-12-09 | Stop reason: HOSPADM

## 2023-12-09 RX ORDER — CLOPIDOGREL BISULFATE 75 MG/1
75 TABLET ORAL DAILY
Status: DISCONTINUED | OUTPATIENT
Start: 2023-12-10 | End: 2023-12-09 | Stop reason: HOSPADM

## 2023-12-09 RX ORDER — ALBUTEROL SULFATE 2.5 MG/3ML
2.5 SOLUTION RESPIRATORY (INHALATION)
Status: DISCONTINUED | OUTPATIENT
Start: 2023-12-09 | End: 2023-12-09 | Stop reason: HOSPADM

## 2023-12-09 RX ORDER — ONDANSETRON 2 MG/ML
4 INJECTION INTRAMUSCULAR; INTRAVENOUS EVERY 6 HOURS PRN
Status: DISCONTINUED | OUTPATIENT
Start: 2023-12-09 | End: 2023-12-09 | Stop reason: HOSPADM

## 2023-12-09 RX ADMIN — SODIUM CHLORIDE, PRESERVATIVE FREE 10 ML: 5 INJECTION INTRAVENOUS at 21:06

## 2023-12-09 RX ADMIN — DOXYCYCLINE HYCLATE 100 MG: 100 TABLET, COATED ORAL at 21:02

## 2023-12-09 RX ADMIN — DOXYCYCLINE HYCLATE 100 MG: 100 TABLET, COATED ORAL at 09:05

## 2023-12-09 RX ADMIN — POTASSIUM CHLORIDE 40 MEQ: 1500 TABLET, EXTENDED RELEASE ORAL at 14:02

## 2023-12-09 RX ADMIN — ATORVASTATIN CALCIUM 80 MG: 40 TABLET, FILM COATED ORAL at 21:02

## 2023-12-09 RX ADMIN — PANTOPRAZOLE SODIUM 40 MG: 40 TABLET, DELAYED RELEASE ORAL at 21:06

## 2023-12-09 RX ADMIN — CLONIDINE HYDROCHLORIDE 0.1 MG: 0.1 TABLET ORAL at 21:02

## 2023-12-09 RX ADMIN — ACETAMINOPHEN 1000 MG: 500 TABLET ORAL at 00:07

## 2023-12-09 ASSESSMENT — PAIN DESCRIPTION - LOCATION: LOCATION: GENERALIZED

## 2023-12-09 ASSESSMENT — PAIN SCALES - GENERAL: PAINLEVEL_OUTOF10: 3

## 2023-12-09 NOTE — ED NOTES
Writer gave nurse to nurse report to [facility].      Bety Messina, ALIZA  07/16[birth year]62 [id number]

## 2023-12-09 NOTE — H&P
[facility]  Office: 7900  1826, DO, Courtney Rubio, DO, Akash Andres, DO, Glendell Bence Blood, DO, Miladys Voss MD, Susan Cramer MD, Mekhi Delarosa MD, Radha Clinton MD,  Leann Starr MD, Tootie Hernandez MD, Tavares Friedman MD,  Radha Bill MD, Zafar Hart MD, Ifrah Gilliland DO, Willow Snellen, MD,  Zeinab John DO, Ai Farley MD, Karin Garcia MD, Soy Santiago MD, Anhsul Fried MD,  Yuki Tate MD, Maximino San MD, Deisi Joshua MD, Jimmy Myles MD, Wilfredo Ferrara MD, Joaquin Sheriff MD, Angel Nguyễn DO, Tiffanie Johns DO, Rosie Mendez MD,  Espinoza Cehung MD, Courtney Vigil, CNP,  Garcia Escamilla, CNP, Tacos Shanks, CNP,  Lyubov Pham, DNP, Ezequiel Meeks, CNP, Yohana Ahumada, CNP, Lulu Koroma, CNP, Doristine Olszewski, CNP, Leatha Mcfadden, CNP, Alona Almaraz PA-C, Brenda Mariee PA-C, Justin Pacheco, CNP, Jacqui Dinero, CNS, Hugo Frank, CNP, Mikhail Bergeron, CNP, Francisco [facility]    HISTORY AND PHYSICAL EXAMINATION            Date:   12/9/2023  Patient name:  Lula Holliday  Date of admission:  12/8/2023  3:08 PM  MRN:   [id number]  Account:  [de-identified]  YOB: 1945  PCP:    Deloris Lyon MD  Room:   SINDHU/SINDHU  Code Status:    Full Code    Chief Complaint:     Chief Complaint   Patient presents with    Fatigue       History Obtained From:     patient    History of Present Illness:     Lula Holliday is a 78 y.o. Non- / non  female who presents with Fatigue   and is admitted to the hospital for the management of Community acquired bacterial pneumonia. This is a 79-year-old female with history of multiple sclerosis who presents with a complaint of fatigue, shortness of breath and cough. Upon evaluation was found to have infiltrate on chest x-ray concerning for community-acquired pneumonia.   She also reports lower

## 2023-12-09 NOTE — ED NOTES
Call from access reports no bed at [facility] at this time     [place], [place]  12/09/23 7184

## 2023-12-10 NOTE — PROGRESS NOTES
2057: Report given to [facility], RN at [phone]. All questions addressed at this time. [facility] ETA 2100 per access center. 2129: Patient left via stretcher with all belongings and in stable condition. Full report given to transport team, all questions addressed. Patient to be transferred to [facility] room 5122.

## 2023-12-12 LAB
EKG ATRIAL RATE: 202 BPM
EKG Q-T INTERVAL: 458 MS
EKG QRS DURATION: 136 MS
EKG QTC CALCULATION (BAZETT): 541 MS
EKG R AXIS: 11 DEGREES
EKG T AXIS: -156 DEGREES
EKG VENTRICULAR RATE: 84 BPM

## 2024-01-10 NOTE — ED NOTES
Audrey Soliz MD at bedside for evaluation.      Haze Harada, RN  06/13/21 6432 [FreeTextEntry1] : Regular DPM OFELIA Tubbs shaved callous about 2 months ago and site turned into an open wound. DPM prescribed Cephalexin that he is currently taking and recommended him to come to [facility].  HPI: Patient seen for right foot submet had to ulcer down to skin, subcutaneous tissue, fat.  Patient has been seen by Dr. Tubbs as an outpatient who noticed that she had a callus in the area approximately 2 months prior.  Patient relates that he has finished a course of oral cephalexin and was recommended to come to the wound care center for further care.  Of note is that patient is status post right hallux and first metatarsal amputation which has healed at this time.  Denies any other complaints at this time.  1/5/23: Patient seen for follow-up status post right transmetatarsal amputation with Achilles tendon lengthening (DOS: 11/27/2023), healed.  Patient awaiting fabrication of multidensity inserts with partial foot prosthesis for orthotist at this time.  Patient relates that he has been doing well and denies any other complaints at this time.  Patient remains a very high risk for infection, sepsis, limb loss, death.

## 2024-01-13 NOTE — TELEPHONE ENCOUNTER
received call from [facility] forwarding patient. Patient states she spoke with someone at the hospital while admitted about housing but never received a follow up.  reached out to ED  who states she had not spoken with patient.  reached out to [facility] office where patient had received a Social Work referral on housing but they did not have information on referral. Patient is adamant that she spoke with someone at the hospital about housing.  reached out to  to see if they know who patient had worked with.
none...

## 2024-02-16 ENCOUNTER — HOSPITAL ENCOUNTER (EMERGENCY)
Age: 79
Discharge: HOME | End: 2024-02-16
Attending: EMERGENCY MEDICINE
Payer: MEDICARE

## 2024-02-16 ENCOUNTER — APPOINTMENT (OUTPATIENT)
Dept: GENERAL RADIOLOGY | Age: 79
End: 2024-02-16
Payer: MEDICARE

## 2024-02-16 VITALS
RESPIRATION RATE: 26 BRPM | TEMPERATURE: 97.5 F | SYSTOLIC BLOOD PRESSURE: 128 MMHG | BODY MASS INDEX: 33.59 KG/M2 | OXYGEN SATURATION: 93 % | DIASTOLIC BLOOD PRESSURE: 66 MMHG | WEIGHT: 172 LBS | HEART RATE: 85 BPM

## 2024-02-16 DIAGNOSIS — I50.33 ACUTE ON CHRONIC DIASTOLIC HEART FAILURE (HCC): Primary | ICD-10-CM

## 2024-02-16 LAB
ANION GAP SERPL CALCULATED.3IONS-SCNC: 14 MMOL/L (ref 9–17)
BASOPHILS # BLD: 0 K/UL (ref 0–0.2)
BASOPHILS NFR BLD: 0 % (ref 0–2)
BUN SERPL-MCNC: 25 MG/DL (ref 8–23)
CALCIUM SERPL-MCNC: 8.7 MG/DL (ref 8.6–10.4)
CHLORIDE SERPL-SCNC: 105 MMOL/L (ref 98–107)
CO2 SERPL-SCNC: 23 MMOL/L (ref 20–31)
CREAT SERPL-MCNC: 1 MG/DL (ref 0.5–0.9)
EOSINOPHIL # BLD: 0.19 K/UL (ref 0–0.4)
EOSINOPHILS RELATIVE PERCENT: 3 % (ref 1–4)
ERYTHROCYTE [DISTWIDTH] IN BLOOD BY AUTOMATED COUNT: 20.6 % (ref 12.5–15.4)
GFR SERPL CREATININE-BSD FRML MDRD: 58 ML/MIN/1.73M2
GLUCOSE SERPL-MCNC: 111 MG/DL (ref 70–99)
HCT VFR BLD AUTO: 31.1 % (ref 36–46)
HGB BLD-MCNC: 10.3 G/DL (ref 12–16)
LYMPHOCYTES NFR BLD: 0.58 K/UL (ref 1–4.8)
LYMPHOCYTES RELATIVE PERCENT: 9 % (ref 24–44)
MCH RBC QN AUTO: 31.6 PG (ref 26–34)
MCHC RBC AUTO-ENTMCNC: 33.1 G/DL (ref 31–37)
MCV RBC AUTO: 95.6 FL (ref 80–100)
MONOCYTES NFR BLD: 0.51 K/UL (ref 0.1–0.8)
MONOCYTES NFR BLD: 8 % (ref 1–7)
MORPHOLOGY: ABNORMAL
NEUTROPHILS NFR BLD: 80 % (ref 36–66)
NEUTS SEG NFR BLD: 5.12 K/UL (ref 1.8–7.7)
PLATELET # BLD AUTO: 186 K/UL (ref 140–450)
PMV BLD AUTO: 10.1 FL (ref 6–12)
POTASSIUM SERPL-SCNC: 3.5 MMOL/L (ref 3.7–5.3)
RBC # BLD AUTO: 3.26 M/UL (ref 4–5.2)
SODIUM SERPL-SCNC: 142 MMOL/L (ref 135–144)
TROPONIN I SERPL HS-MCNC: 33 NG/L (ref 0–14)
WBC OTHER # BLD: 6.4 K/UL (ref 3.5–11)

## 2024-02-16 PROCEDURE — 6370000000 HC RX 637 (ALT 250 FOR IP): Performed by: NURSE PRACTITIONER

## 2024-02-16 PROCEDURE — 99284 EMERGENCY DEPT VISIT MOD MDM: CPT

## 2024-02-16 PROCEDURE — 80048 BASIC METABOLIC PNL TOTAL CA: CPT

## 2024-02-16 PROCEDURE — 96374 THER/PROPH/DIAG INJ IV PUSH: CPT

## 2024-02-16 PROCEDURE — 71045 X-RAY EXAM CHEST 1 VIEW: CPT

## 2024-02-16 PROCEDURE — 36415 COLL VENOUS BLD VENIPUNCTURE: CPT

## 2024-02-16 PROCEDURE — 6360000002 HC RX W HCPCS: Performed by: NURSE PRACTITIONER

## 2024-02-16 PROCEDURE — 84484 ASSAY OF TROPONIN QUANT: CPT

## 2024-02-16 PROCEDURE — 85025 COMPLETE CBC W/AUTO DIFF WBC: CPT

## 2024-02-16 RX ORDER — FUROSEMIDE 10 MG/ML
60 INJECTION INTRAMUSCULAR; INTRAVENOUS ONCE
Status: COMPLETED | OUTPATIENT
Start: 2024-02-16 | End: 2024-02-16

## 2024-02-16 RX ORDER — FUROSEMIDE 40 MG/1
40 TABLET ORAL 2 TIMES DAILY
Qty: 60 TABLET | Refills: 0 | Status: SHIPPED | OUTPATIENT
Start: 2024-02-16

## 2024-02-16 RX ORDER — POTASSIUM CHLORIDE 20 MEQ/1
40 TABLET, EXTENDED RELEASE ORAL ONCE
Status: COMPLETED | OUTPATIENT
Start: 2024-02-16 | End: 2024-02-16

## 2024-02-16 RX ADMIN — FUROSEMIDE 60 MG: 10 INJECTION, SOLUTION INTRAMUSCULAR; INTRAVENOUS at 16:53

## 2024-02-16 RX ADMIN — POTASSIUM CHLORIDE 40 MEQ: 1500 TABLET, EXTENDED RELEASE ORAL at 17:10

## 2024-02-16 NOTE — ED PROVIDER NOTES
[facility] EMERGENCY DEPARTMENT  EMERGENCY DEPARTMENT ENCOUNTER      Pt Name: Jessie Frederick  MRN: [id number]  Birthdate 1945  Date of evaluation: 2/16/2024  Provider: NENA Blackwood NP  6:22 PM    CHIEF COMPLAINT       Chief Complaint   Patient presents with    Chest Pain         HISTORY OF PRESENT ILLNESS    Jessie Frederick is a 78 y.o. female who presents to the emergency department     Patient has a known history of diastolic heart failure.  She advised that over the past 5 to 7 days she has had increased peripheral edema.  She called an ambulance today because she could not get into her cardiologist.  According to the patient she was recently taken off her nighttime dosage of Lasix.  She also does not wear her compression stockings as she states she cannot get them on.  Patient denies any shortness of breath or cough.        Nursing Notes were reviewed.  REVIEW OF SYSTEMS       Review of Systems   Constitutional:  Negative for activity change, chills, fatigue and fever.   HENT:  Negative for sinus pressure and sinus pain.    Eyes:  Negative for photophobia and visual disturbance.   Respiratory:  Negative for cough, shortness of breath and wheezing.    Cardiovascular:  Positive for leg swelling. Negative for chest pain and palpitations.   Gastrointestinal:  Negative for abdominal pain, constipation, diarrhea, nausea and vomiting.   Endocrine: Negative for cold intolerance, heat intolerance and polyuria.   Genitourinary:  Negative for difficulty urinating and urgency.   Musculoskeletal:  Negative for arthralgias and myalgias.   Skin: Negative.  Negative for wound.   Neurological:  Positive for weakness. Negative for dizziness, syncope and light-headedness.   Hematological:  Negative for adenopathy. Does not bruise/bleed easily.   Psychiatric/Behavioral:  Negative for agitation and confusion. The patient is not nervous/anxious.        Except as noted above the remainder of the review of

## 2024-02-16 NOTE — ED PROVIDER NOTES
[facility] Emergency Department  [address]  [place] [address]  Phone: [phone]  Fax: [phone]      Attending Physician Attestation          CHIEF COMPLAINT       Chief Complaint   Patient presents with    Chest Pain       DIAGNOSTIC RESULTS     LABS:  Labs Reviewed - No data to display    All other labs were within normal range or not returned as of this dictation.    RADIOLOGY:  No orders to display         EMERGENCY DEPARTMENT COURSE:   Vitals:    Vitals:    02/16/24 1616   BP: 128/66   Pulse: 89   Resp: 20   Temp: 97.5 °F (36.4 °C)   SpO2: 98%   Weight: 78 kg (172 lb)     -------------------------  BP: 128/66, Temp: 97.5 °F (36.4 °C), Pulse: 89, Respirations: 20             PERTINENT ATTENDING PHYSICIAN COMMENTS:    I performed a history and physical examination of the patient and discussed management with the mid level provider. I reviewed the mid level provider's note and agree with the documented findings and plan of care. Any areas of disagreement are noted on the chart. I was personally present for the key portions of any procedures. I have documented in the chart those procedures where I was not present during the key portions. I have reviewed the emergency nurses triage note. I agree with the chief complaint, past medical history, past surgical history, allergies, medications, social and family history as documented unless otherwise noted below. Documentation of the HPI, Physical Exam and Medical Decision Making performed by mid level providers is based on my personal performance of the HPI, PE and MDM. For Residents, Physician Assistant/ Nurse Practitioner cases/documentation I have personally evaluated this patient and have completed at least one if not all key elements of the E/M (history, physical exam, and MDM). Additional findings are as noted.    Twelve lead EKG interpreted by myself:  A 12 lead EKG done at 1613, interpreted by myself, showed a atrial

## 2024-02-16 NOTE — ED TRIAGE NOTES
Pt reports feeling as if someone is sitting on her chest that started 5 days ago, reports shortness of breath. Hx of heart cath in September with stents. Pt of Dr. Granados

## 2024-02-16 NOTE — DISCHARGE INSTRUCTIONS
Take the diuretics at the increased dose as I have directed.  Follow-up with your primary care provider and your cardiologist as recommended.  You need to wear your compression stockings if you want your swelling to go down in your feet.

## 2024-02-24 LAB
EKG ATRIAL RATE: 87 BPM
EKG P-R INTERVAL: 368 MS
EKG Q-T INTERVAL: 296 MS
EKG QRS DURATION: 130 MS
EKG QTC CALCULATION (BAZETT): 356 MS
EKG R AXIS: -2 DEGREES
EKG T AXIS: -154 DEGREES
EKG VENTRICULAR RATE: 87 BPM

## 2024-06-07 LAB — CENTROMERE ANTIBODY: NEGATIVE

## 2024-06-12 LAB
ENA TO SSA (RO) ANTIBODY: NEGATIVE
ENA TO SSB (LA) ANTIBODY: NEGATIVE
Lab: NEGATIVE
SMITH ABS: NEGATIVE

## 2024-06-14 ENCOUNTER — HOSPITAL ENCOUNTER (OUTPATIENT)
Age: 79
Setting detail: SPECIMEN
Discharge: HOME | End: 2024-06-14
Payer: MEDICARE

## 2024-06-14 LAB
INR PPP: 3.6
PROTHROMBIN TIME: 34.3 SEC (ref 9.4–12.6)

## 2024-06-14 PROCEDURE — 85610 PROTHROMBIN TIME: CPT

## 2024-07-26 ENCOUNTER — HOSPITAL ENCOUNTER (EMERGENCY)
Age: 79
Discharge: HOME | End: 2024-07-26
Attending: EMERGENCY MEDICINE
Payer: MEDICARE

## 2024-07-26 VITALS
SYSTOLIC BLOOD PRESSURE: 148 MMHG | HEART RATE: 85 BPM | BODY MASS INDEX: 31.91 KG/M2 | DIASTOLIC BLOOD PRESSURE: 64 MMHG | TEMPERATURE: 98.7 F | OXYGEN SATURATION: 96 % | WEIGHT: 169 LBS | HEIGHT: 61 IN | RESPIRATION RATE: 18 BRPM

## 2024-07-26 DIAGNOSIS — L03.116 CELLULITIS OF LEFT LOWER EXTREMITY: Primary | ICD-10-CM

## 2024-07-26 LAB
ALBUMIN SERPL-MCNC: 4.3 G/DL (ref 3.5–5.2)
ALBUMIN/GLOB SERPL: 1.8 {RATIO} (ref 1–2.5)
ALP SERPL-CCNC: 232 U/L (ref 35–104)
ALT SERPL-CCNC: 11 U/L (ref 5–33)
ANION GAP SERPL CALCULATED.3IONS-SCNC: 14 MMOL/L (ref 9–17)
AST SERPL-CCNC: 19 U/L
BASOPHILS # BLD: 0.13 K/UL (ref 0–0.2)
BASOPHILS NFR BLD: 1 % (ref 0–2)
BILIRUB SERPL-MCNC: 0.6 MG/DL (ref 0.3–1.2)
BUN SERPL-MCNC: 39 MG/DL (ref 8–23)
CALCIUM SERPL-MCNC: 9.5 MG/DL (ref 8.6–10.4)
CHLORIDE SERPL-SCNC: 104 MMOL/L (ref 98–107)
CO2 SERPL-SCNC: 21 MMOL/L (ref 20–31)
CREAT SERPL-MCNC: 1 MG/DL (ref 0.5–0.9)
EOSINOPHIL # BLD: 0.4 K/UL (ref 0–0.4)
EOSINOPHILS RELATIVE PERCENT: 3 % (ref 1–4)
ERYTHROCYTE [DISTWIDTH] IN BLOOD BY AUTOMATED COUNT: 23.4 % (ref 12.5–15.4)
GFR, ESTIMATED: 58 ML/MIN/1.73M2
GLUCOSE SERPL-MCNC: 99 MG/DL (ref 70–99)
HCT VFR BLD AUTO: 35.1 % (ref 36–46)
HGB BLD-MCNC: 10.8 G/DL (ref 12–16)
LACTATE BLDV-SCNC: 1.2 MMOL/L (ref 0.5–2.2)
LYMPHOCYTES NFR BLD: 2 K/UL (ref 1–4.8)
LYMPHOCYTES RELATIVE PERCENT: 15 % (ref 24–44)
MCH RBC QN AUTO: 23.5 PG (ref 26–34)
MCHC RBC AUTO-ENTMCNC: 30.8 G/DL (ref 31–37)
MCV RBC AUTO: 76.6 FL (ref 80–100)
MONOCYTES NFR BLD: 1.2 K/UL (ref 0.1–1.2)
MONOCYTES NFR BLD: 9 % (ref 2–11)
MORPHOLOGY: ABNORMAL
NEUTROPHILS NFR BLD: 72 % (ref 36–66)
NEUTS SEG NFR BLD: 9.57 K/UL (ref 1.8–7.7)
PLATELET # BLD AUTO: 336 K/UL (ref 140–450)
PMV BLD AUTO: 8.6 FL (ref 6–12)
POTASSIUM SERPL-SCNC: 4 MMOL/L (ref 3.7–5.3)
PROT SERPL-MCNC: 6.7 G/DL (ref 6.4–8.3)
RBC # BLD AUTO: 4.58 M/UL (ref 4–5.2)
SODIUM SERPL-SCNC: 139 MMOL/L (ref 135–144)
WBC OTHER # BLD: 13.3 K/UL (ref 3.5–11)

## 2024-07-26 PROCEDURE — 96365 THER/PROPH/DIAG IV INF INIT: CPT

## 2024-07-26 PROCEDURE — 80053 COMPREHEN METABOLIC PANEL: CPT

## 2024-07-26 PROCEDURE — 83605 ASSAY OF LACTIC ACID: CPT

## 2024-07-26 PROCEDURE — 99284 EMERGENCY DEPT VISIT MOD MDM: CPT

## 2024-07-26 PROCEDURE — 36415 COLL VENOUS BLD VENIPUNCTURE: CPT

## 2024-07-26 PROCEDURE — 6370000000 HC RX 637 (ALT 250 FOR IP): Performed by: EMERGENCY MEDICINE

## 2024-07-26 PROCEDURE — 6360000002 HC RX W HCPCS: Performed by: EMERGENCY MEDICINE

## 2024-07-26 PROCEDURE — 2580000003 HC RX 258: Performed by: EMERGENCY MEDICINE

## 2024-07-26 PROCEDURE — 85025 COMPLETE CBC W/AUTO DIFF WBC: CPT

## 2024-07-26 RX ORDER — SODIUM CHLORIDE 0.9 % (FLUSH) 0.9 %
3 SYRINGE (ML) INJECTION EVERY 8 HOURS
Status: DISCONTINUED | OUTPATIENT
Start: 2024-07-26 | End: 2024-07-27 | Stop reason: HOSPADM

## 2024-07-26 RX ORDER — CLINDAMYCIN PHOSPHATE 900 MG/50ML
900 INJECTION, SOLUTION INTRAVENOUS ONCE
Status: COMPLETED | OUTPATIENT
Start: 2024-07-26 | End: 2024-07-26

## 2024-07-26 RX ORDER — CLINDAMYCIN HYDROCHLORIDE 300 MG/1
300 CAPSULE ORAL 3 TIMES DAILY
Qty: 21 CAPSULE | Refills: 0 | Status: SHIPPED | OUTPATIENT
Start: 2024-07-26 | End: 2024-08-02

## 2024-07-26 RX ORDER — HYDROXYZINE HYDROCHLORIDE 25 MG/1
25 TABLET, FILM COATED ORAL EVERY 8 HOURS PRN
Qty: 30 TABLET | Refills: 0 | Status: SHIPPED | OUTPATIENT
Start: 2024-07-26 | End: 2024-08-05

## 2024-07-26 RX ORDER — HYDROXYZINE HYDROCHLORIDE 25 MG/1
25 TABLET, FILM COATED ORAL ONCE
Status: COMPLETED | OUTPATIENT
Start: 2024-07-26 | End: 2024-07-26

## 2024-07-26 RX ADMIN — HYDROXYZINE HYDROCHLORIDE 25 MG: 25 TABLET, FILM COATED ORAL at 20:41

## 2024-07-26 RX ADMIN — SODIUM CHLORIDE, PRESERVATIVE FREE 3 ML: 5 INJECTION INTRAVENOUS at 20:20

## 2024-07-26 RX ADMIN — CLINDAMYCIN PHOSPHATE 900 MG: 900 INJECTION, SOLUTION INTRAVENOUS at 20:55

## 2024-07-26 ASSESSMENT — PAIN SCALES - GENERAL: PAINLEVEL_OUTOF10: 9

## 2024-07-26 ASSESSMENT — PAIN - FUNCTIONAL ASSESSMENT: PAIN_FUNCTIONAL_ASSESSMENT: 0-10

## 2024-07-26 NOTE — ED PROVIDER NOTES
I walked into the patient's room, introduced myself.  The patient states that she will not see nurse particular.  She asks \"what is the doctor?\"  I explained to her that the doctor was in the main part of the emergency department.  Patient states that she will not be seen by me, she states that \"it was a nurse practitioner that caused this problem in the first place\" and states that she will only see the doctor.  I excuse myself from the room, informed the nurse that I would not be caring for the patient, and informed the doctor that the patient would only be amenable to being cared for by a physician     Charlee Yee, APRN - CNP  07/26/24 1914

## 2024-07-27 NOTE — ED NOTES
Pt assist in ambulating to restroom; gait steady; returns from restroom stating she was incontinent of urine on pants and has pants off and standing in brief; blanket provided for coverage while sitting on chair; continues to \"moan\" breathe while resting but upon entering room will be talkative. Pants sitting to dry at present. Stating she has no one to pick her up if she goes home; her daughter is in town and probably will not pick her up.

## 2024-07-27 NOTE — ED PROVIDER NOTES
Absolute 0.40 0.0 - 0.4 k/uL    Basophils Absolute 0.13 0.0 - 0.2 k/uL    Morphology ANISOCYTOSIS PRESENT     Morphology MICROCYTOSIS PRESENT     Morphology HYPOCHROMIA PRESENT    CMP   Result Value Ref Range    Sodium 139 135 - 144 mmol/L    Potassium 4.0 3.7 - 5.3 mmol/L    Chloride 104 98 - 107 mmol/L    CO2 21 20 - 31 mmol/L    Anion Gap 14 9 - 17 mmol/L    Glucose 99 70 - 99 mg/dL    BUN 39 (H) 8 - 23 mg/dL    Creatinine 1.0 (H) 0.5 - 0.9 mg/dL    Est, Glom Filt Rate 58 (L) >60 mL/min/1.73m2    Calcium 9.5 8.6 - 10.4 mg/dL    Total Protein 6.7 6.4 - 8.3 g/dL    Albumin 4.3 3.5 - 5.2 g/dL    Albumin/Globulin Ratio 1.8 1.0 - 2.5    Total Bilirubin 0.6 0.3 - 1.2 mg/dL    Alkaline Phosphatase 232 (H) 35 - 104 U/L    ALT 11 5 - 33 U/L    AST 19 <32 U/L   Lactic Acid (Select if patient is over 65 to rule out mesenteric ischemia)   Result Value Ref Range    Lactic Acid 1.2 0.5 - 2.2 mmol/L       Not indicated unless otherwise documented above    RADIOLOGY:   I reviewed the radiologist interpretations:    No orders to display       Not indicated unless otherwise documented above    EMERGENCY DEPARTMENT COURSE:     The patient was given the following medications:  Orders Placed This Encounter   Medications    sodium chloride flush 0.9 % injection 3 mL    hydrOXYzine HCl (ATARAX) tablet 25 mg    clindamycin (CLEOCIN) 900 mg in dextrose 5 % 50 mL IVPB     Order Specific Question:   Antimicrobial Indications     Answer:   Skin and Soft Tissue Infection    clindamycin (CLEOCIN) 300 MG capsule     Sig: Take 1 capsule by mouth 3 times daily for 7 days     Dispense:  21 capsule     Refill:  0    hydrOXYzine HCl (ATARAX) 25 MG tablet     Sig: Take 1 tablet by mouth every 8 hours as needed for Itching     Dispense:  30 tablet     Refill:  0    mupirocin (BACTROBAN) 2 % ointment     Sig: Apply topically 3 times daily.     Dispense:  15 g     Refill:  0        Vitals:   -------------------------  BP (!) 148/64   Pulse 85   Temp  discharged): considered admission, final decision will be based on test results and patient status    Prescription considerations: See below    Critical Care note written: See below    Sepsis considered: Considered, no criteria met        DIAGNOSTIC RESULTS     EKG: All EKG's are interpreted by the Emergency Department Physician who either signs or Co-signs this chart in the absence of a cardiologist.        Not indicated unless otherwise documented above    LABS:  Results for orders placed or performed during the hospital encounter of 07/26/24   CBC with Diff   Result Value Ref Range    WBC 13.3 (H) 3.5 - 11.0 k/uL    RBC 4.58 4.0 - 5.2 m/uL    Hemoglobin 10.8 (L) 12.0 - 16.0 g/dL    Hematocrit 35.1 (L) 36 - 46 %    MCV 76.6 (L) 80 - 100 fL    MCH 23.5 (L) 26 - 34 pg    MCHC 30.8 (L) 31 - 37 g/dL    RDW 23.4 (H) 12.5 - 15.4 %    Platelets 336 140 - 450 k/uL    MPV 8.6 6.0 - 12.0 fL    Neutrophils % 72 (H) 36 - 66 %    Lymphocytes % 15 (L) 24 - 44 %    Monocytes % 9 2 - 11 %    Eosinophils % 3 1 - 4 %    Basophils % 1 0 - 2 %    Neutrophils Absolute 9.57 (H) 1.8 - 7.7 k/uL    Lymphocytes Absolute 2.00 1.0 - 4.8 k/uL    Monocytes Absolute 1.20 0.1 - 1.2 k/uL    Eosinophils Absolute 0.40 0.0 - 0.4 k/uL    Basophils Absolute 0.13 0.0 - 0.2 k/uL    Morphology ANISOCYTOSIS PRESENT     Morphology MICROCYTOSIS PRESENT     Morphology HYPOCHROMIA PRESENT    CMP   Result Value Ref Range    Sodium 139 135 - 144 mmol/L    Potassium 4.0 3.7 - 5.3 mmol/L    Chloride 104 98 - 107 mmol/L    CO2 21 20 - 31 mmol/L    Anion Gap 14 9 - 17 mmol/L    Glucose 99 70 - 99 mg/dL    BUN 39 (H) 8 - 23 mg/dL    Creatinine 1.0 (H) 0.5 - 0.9 mg/dL    Est, Glom Filt Rate 58 (L) >60 mL/min/1.73m2    Calcium 9.5 8.6 - 10.4 mg/dL    Total Protein 6.7 6.4 - 8.3 g/dL    Albumin 4.3 3.5 - 5.2 g/dL    Albumin/Globulin Ratio 1.8 1.0 - 2.5    Total Bilirubin 0.6 0.3 - 1.2 mg/dL    Alkaline Phosphatase 232 (H) 35 - 104 U/L    ALT 11 5 - 33 U/L    AST 19 <32

## 2024-07-27 NOTE — ED NOTES
Pt found picking off scab on Rt calf - when noted and addressed by this writer indicating the risk for further infection pt states she will do that and \"can put cream on it\". Bandaid applied to keep covered.

## 2024-07-27 NOTE — DISCHARGE INSTRUCTIONS
Take the medication as instructed.  Follow-up with your primary care doctor Monday morning for reevaluation.  Return to the emergency department with any problems or concerns as discussed.  
Skin normal color for race, warm, dry and intact. No evidence of rash.

## 2024-09-09 ENCOUNTER — HOSPITAL ENCOUNTER (EMERGENCY)
Age: 79
Discharge: HOME | End: 2024-09-09
Attending: EMERGENCY MEDICINE
Payer: MEDICARE

## 2024-09-09 ENCOUNTER — APPOINTMENT (OUTPATIENT)
Dept: CT IMAGING | Age: 79
End: 2024-09-09
Payer: MEDICARE

## 2024-09-09 VITALS
HEIGHT: 61 IN | OXYGEN SATURATION: 93 % | SYSTOLIC BLOOD PRESSURE: 129 MMHG | TEMPERATURE: 98.1 F | WEIGHT: 170 LBS | HEART RATE: 84 BPM | RESPIRATION RATE: 29 BRPM | BODY MASS INDEX: 32.1 KG/M2 | DIASTOLIC BLOOD PRESSURE: 45 MMHG

## 2024-09-09 DIAGNOSIS — D50.9 MICROCYTIC ANEMIA: ICD-10-CM

## 2024-09-09 DIAGNOSIS — R07.9 CHEST PAIN, UNSPECIFIED TYPE: Primary | ICD-10-CM

## 2024-09-09 DIAGNOSIS — N18.9 CHRONIC KIDNEY DISEASE, UNSPECIFIED CKD STAGE: ICD-10-CM

## 2024-09-09 LAB
ALBUMIN SERPL-MCNC: 4.2 G/DL (ref 3.5–5.2)
ALBUMIN/GLOB SERPL: 1.9 {RATIO} (ref 1–2.5)
ALP SERPL-CCNC: 156 U/L (ref 35–104)
ALT SERPL-CCNC: 13 U/L (ref 5–33)
ANION GAP SERPL CALCULATED.3IONS-SCNC: 16 MMOL/L (ref 9–17)
AST SERPL-CCNC: 21 U/L
BASOPHILS # BLD: 0 K/UL (ref 0–0.2)
BASOPHILS NFR BLD: 0 % (ref 0–2)
BILIRUB SERPL-MCNC: 0.8 MG/DL (ref 0.3–1.2)
BNP SERPL-MCNC: 4450 PG/ML
BUN SERPL-MCNC: 34 MG/DL (ref 8–23)
CALCIUM SERPL-MCNC: 9.6 MG/DL (ref 8.6–10.4)
CHLORIDE SERPL-SCNC: 102 MMOL/L (ref 98–107)
CO2 SERPL-SCNC: 23 MMOL/L (ref 20–31)
CREAT SERPL-MCNC: 1.3 MG/DL (ref 0.5–0.9)
D DIMER PPP FEU-MCNC: 0.39 UG/ML FEU
EOSINOPHIL # BLD: 0.07 K/UL (ref 0–0.4)
EOSINOPHILS RELATIVE PERCENT: 1 % (ref 1–4)
ERYTHROCYTE [DISTWIDTH] IN BLOOD BY AUTOMATED COUNT: 21.7 % (ref 12.5–15.4)
GFR, ESTIMATED: 43 ML/MIN/1.73M2
GLUCOSE SERPL-MCNC: 99 MG/DL (ref 70–99)
HCT VFR BLD AUTO: 32.7 % (ref 36–46)
HGB BLD-MCNC: 10.2 G/DL (ref 12–16)
INR PPP: 1
LYMPHOCYTES NFR BLD: 1.43 K/UL (ref 1–4.8)
LYMPHOCYTES RELATIVE PERCENT: 21 % (ref 24–44)
MCH RBC QN AUTO: 24 PG (ref 26–34)
MCHC RBC AUTO-ENTMCNC: 31.3 G/DL (ref 31–37)
MCV RBC AUTO: 76.7 FL (ref 80–100)
MONOCYTES NFR BLD: 0.61 K/UL (ref 0.1–0.8)
MONOCYTES NFR BLD: 9 % (ref 1–7)
MORPHOLOGY: ABNORMAL
NEUTROPHILS NFR BLD: 69 % (ref 36–66)
NEUTS SEG NFR BLD: 4.69 K/UL (ref 1.8–7.7)
PARTIAL THROMBOPLASTIN TIME: 23.6 SEC (ref 21.3–31.3)
PLATELET # BLD AUTO: 207 K/UL (ref 140–450)
PMV BLD AUTO: 8.6 FL (ref 6–12)
POTASSIUM SERPL-SCNC: 4 MMOL/L (ref 3.7–5.3)
PROT SERPL-MCNC: 6.4 G/DL (ref 6.4–8.3)
PROTHROMBIN TIME: 10.4 SEC (ref 9.4–12.6)
RBC # BLD AUTO: 4.27 M/UL (ref 4–5.2)
SODIUM SERPL-SCNC: 141 MMOL/L (ref 135–144)
TROPONIN I SERPL HS-MCNC: 45 NG/L (ref 0–14)
TROPONIN I SERPL HS-MCNC: 49 NG/L (ref 0–14)
WBC OTHER # BLD: 6.8 K/UL (ref 3.5–11)

## 2024-09-09 PROCEDURE — 84484 ASSAY OF TROPONIN QUANT: CPT

## 2024-09-09 PROCEDURE — 85730 THROMBOPLASTIN TIME PARTIAL: CPT

## 2024-09-09 PROCEDURE — 36415 COLL VENOUS BLD VENIPUNCTURE: CPT

## 2024-09-09 PROCEDURE — 85610 PROTHROMBIN TIME: CPT

## 2024-09-09 PROCEDURE — 96375 TX/PRO/DX INJ NEW DRUG ADDON: CPT

## 2024-09-09 PROCEDURE — 83880 ASSAY OF NATRIURETIC PEPTIDE: CPT

## 2024-09-09 PROCEDURE — 6360000004 HC RX CONTRAST MEDICATION: Performed by: EMERGENCY MEDICINE

## 2024-09-09 PROCEDURE — 93005 ELECTROCARDIOGRAM TRACING: CPT | Performed by: EMERGENCY MEDICINE

## 2024-09-09 PROCEDURE — 6360000002 HC RX W HCPCS: Performed by: EMERGENCY MEDICINE

## 2024-09-09 PROCEDURE — 99285 EMERGENCY DEPT VISIT HI MDM: CPT

## 2024-09-09 PROCEDURE — 2580000003 HC RX 258: Performed by: EMERGENCY MEDICINE

## 2024-09-09 PROCEDURE — 80053 COMPREHEN METABOLIC PANEL: CPT

## 2024-09-09 PROCEDURE — 71260 CT THORAX DX C+: CPT

## 2024-09-09 PROCEDURE — 85025 COMPLETE CBC W/AUTO DIFF WBC: CPT

## 2024-09-09 PROCEDURE — 96374 THER/PROPH/DIAG INJ IV PUSH: CPT

## 2024-09-09 PROCEDURE — 85379 FIBRIN DEGRADATION QUANT: CPT

## 2024-09-09 RX ORDER — 0.9 % SODIUM CHLORIDE 0.9 %
80 INTRAVENOUS SOLUTION INTRAVENOUS ONCE
Status: DISCONTINUED | OUTPATIENT
Start: 2024-09-09 | End: 2024-09-09 | Stop reason: HOSPADM

## 2024-09-09 RX ORDER — ONDANSETRON 2 MG/ML
4 INJECTION INTRAMUSCULAR; INTRAVENOUS ONCE
Status: COMPLETED | OUTPATIENT
Start: 2024-09-09 | End: 2024-09-09

## 2024-09-09 RX ORDER — SODIUM CHLORIDE 0.9 % (FLUSH) 0.9 %
10 SYRINGE (ML) INJECTION ONCE
Status: COMPLETED | OUTPATIENT
Start: 2024-09-09 | End: 2024-09-09

## 2024-09-09 RX ORDER — IOPAMIDOL 755 MG/ML
75 INJECTION, SOLUTION INTRAVASCULAR
Status: COMPLETED | OUTPATIENT
Start: 2024-09-09 | End: 2024-09-09

## 2024-09-09 RX ADMIN — HYDROMORPHONE HYDROCHLORIDE 0.25 MG: 1 INJECTION, SOLUTION INTRAMUSCULAR; INTRAVENOUS; SUBCUTANEOUS at 08:05

## 2024-09-09 RX ADMIN — Medication 80 ML: at 11:51

## 2024-09-09 RX ADMIN — ONDANSETRON 4 MG: 2 INJECTION INTRAMUSCULAR; INTRAVENOUS at 08:05

## 2024-09-09 RX ADMIN — SODIUM CHLORIDE, PRESERVATIVE FREE 10 ML: 5 INJECTION INTRAVENOUS at 11:43

## 2024-09-09 RX ADMIN — IOPAMIDOL 75 ML: 755 INJECTION, SOLUTION INTRAVENOUS at 11:43

## 2024-09-09 ASSESSMENT — ENCOUNTER SYMPTOMS
SHORTNESS OF BREATH: 1
VOMITING: 0
BACK PAIN: 0
DIARRHEA: 1

## 2024-09-09 ASSESSMENT — PAIN - FUNCTIONAL ASSESSMENT: PAIN_FUNCTIONAL_ASSESSMENT: 0-10

## 2024-09-09 ASSESSMENT — PAIN SCALES - GENERAL: PAINLEVEL_OUTOF10: 4

## 2024-09-09 ASSESSMENT — PAIN DESCRIPTION - LOCATION
LOCATION: CHEST
LOCATION: CHEST

## 2024-09-10 LAB
EKG ATRIAL RATE: 63 BPM
EKG Q-T INTERVAL: 474 MS
EKG QRS DURATION: 128 MS
EKG QTC CALCULATION (BAZETT): 567 MS
EKG R AXIS: 0 DEGREES
EKG T AXIS: -138 DEGREES
EKG VENTRICULAR RATE: 86 BPM

## 2024-09-10 PROCEDURE — 93010 ELECTROCARDIOGRAM REPORT: CPT | Performed by: INTERNAL MEDICINE

## 2025-01-27 ENCOUNTER — HOSPITAL ENCOUNTER (INPATIENT)
Age: 80
LOS: 4 days | Discharge: NURSING, REHAB OR LONG-TERM CARE | End: 2025-01-31
Attending: EMERGENCY MEDICINE
Payer: MEDICARE

## 2025-01-27 ENCOUNTER — APPOINTMENT (OUTPATIENT)
Dept: CT IMAGING | Age: 80
End: 2025-01-27
Payer: MEDICARE

## 2025-01-27 DIAGNOSIS — S09.90XA INJURY OF HEAD, INITIAL ENCOUNTER: Primary | ICD-10-CM

## 2025-01-27 DIAGNOSIS — R31.9 URINARY TRACT INFECTION WITH HEMATURIA, SITE UNSPECIFIED: ICD-10-CM

## 2025-01-27 DIAGNOSIS — R19.7 DIARRHEA, UNSPECIFIED TYPE: ICD-10-CM
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
## 2025-01-27 DIAGNOSIS — N39.0 URINARY TRACT INFECTION WITH HEMATURIA, SITE UNSPECIFIED: ICD-10-CM

## 2025-01-27 PROBLEM — N30.00 ACUTE CYSTITIS WITHOUT HEMATURIA: Status: ACTIVE | Noted: 2025-01-27

## 2025-01-27 LAB
ALBUMIN SERPL-MCNC: 4.1 G/DL (ref 3.5–5.2)
ALBUMIN/GLOB SERPL: 2 {RATIO} (ref 1–2.5)
ALP SERPL-CCNC: 185 U/L (ref 35–104)
ALT SERPL-CCNC: 25 U/L (ref 5–33)
ANION GAP SERPL CALCULATED.3IONS-SCNC: 14 MMOL/L (ref 9–17)
AST SERPL-CCNC: 44 U/L
BACTERIA URNS QL MICRO: ABNORMAL
BASOPHILS # BLD: 0 K/UL (ref 0–0.2)
BASOPHILS NFR BLD: 0 % (ref 0–2)
BILIRUB SERPL-MCNC: 0.7 MG/DL (ref 0.3–1.2)
BILIRUB UR QL STRIP: ABNORMAL
BUN SERPL-MCNC: 33 MG/DL (ref 8–23)
CALCIUM SERPL-MCNC: 9.5 MG/DL (ref 8.6–10.4)
CHARACTER UR: ABNORMAL
CHLORIDE SERPL-SCNC: 104 MMOL/L (ref 98–107)
CLARITY UR: ABNORMAL
CO2 SERPL-SCNC: 23 MMOL/L (ref 20–31)
COLOR UR: YELLOW
COMMENT: ABNORMAL
CREAT SERPL-MCNC: 1.5 MG/DL (ref 0.5–0.9)
EOSINOPHIL # BLD: 0 K/UL (ref 0–0.4)
EOSINOPHILS RELATIVE PERCENT: 0 % (ref 1–4)
EPI CELLS #/AREA URNS HPF: ABNORMAL /HPF (ref 0–5)
ERYTHROCYTE [DISTWIDTH] IN BLOOD BY AUTOMATED COUNT: 24 % (ref 12.5–15.4)
GFR, ESTIMATED: 36 ML/MIN/1.73M2
GLUCOSE SERPL-MCNC: 117 MG/DL (ref 70–99)
GLUCOSE UR STRIP-MCNC: NEGATIVE MG/DL
HCT VFR BLD AUTO: 33.6 % (ref 36–46)
HGB BLD-MCNC: 10.9 G/DL (ref 12–16)
HGB UR QL STRIP.AUTO: ABNORMAL
KETONES UR STRIP-MCNC: NEGATIVE MG/DL
LACTATE BLDV-SCNC: 1.6 MMOL/L (ref 0.5–2.2)
LEUKOCYTE ESTERASE UR QL STRIP: ABNORMAL
LIPASE SERPL-CCNC: 49 U/L (ref 13–60)
LYMPHOCYTES NFR BLD: 0.98 K/UL (ref 1–4.8)
LYMPHOCYTES RELATIVE PERCENT: 11 % (ref 24–44)
MCH RBC QN AUTO: 27.3 PG (ref 26–34)
MCHC RBC AUTO-ENTMCNC: 32.3 G/DL (ref 31–37)
MCV RBC AUTO: 84.5 FL (ref 80–100)
MONOCYTES NFR BLD: 0.71 K/UL (ref 0.1–1.2)
MONOCYTES NFR BLD: 8 % (ref 2–11)
MORPHOLOGY: ABNORMAL
MUCOUS THREADS URNS QL MICRO: ABNORMAL
NEUTROPHILS NFR BLD: 81 % (ref 36–66)
NEUTS SEG NFR BLD: 7.21 K/UL (ref 1.8–7.7)
NITRITE UR QL STRIP: NEGATIVE
PH UR STRIP: 6 [PH] (ref 5–8)
PLATELET # BLD AUTO: 163 K/UL (ref 140–450)
PMV BLD AUTO: 10.1 FL (ref 6–12)
POTASSIUM SERPL-SCNC: 4.4 MMOL/L (ref 3.7–5.3)
PROT SERPL-MCNC: 6.2 G/DL (ref 6.4–8.3)
PROT UR STRIP-MCNC: ABNORMAL MG/DL
RBC # BLD AUTO: 3.98 M/UL (ref 4–5.2)
RBC #/AREA URNS HPF: ABNORMAL /HPF (ref 0–2)
SODIUM SERPL-SCNC: 141 MMOL/L (ref 135–144)
SP GR UR STRIP: 1.03 (ref 1–1.03)
UROBILINOGEN UR STRIP-ACNC: NORMAL EU/DL (ref 0–1)
WBC #/AREA URNS HPF: ABNORMAL /HPF (ref 0–5)
WBC OTHER # BLD: 8.9 K/UL (ref 3.5–11)

## 2025-01-27 PROCEDURE — 87086 URINE CULTURE/COLONY COUNT: CPT

## 2025-01-27 PROCEDURE — 97162 PT EVAL MOD COMPLEX 30 MIN: CPT

## 2025-01-27 PROCEDURE — 1200000000 HC SEMI PRIVATE

## 2025-01-27 PROCEDURE — 83605 ASSAY OF LACTIC ACID: CPT

## 2025-01-27 PROCEDURE — 87088 URINE BACTERIA CULTURE: CPT

## 2025-01-27 PROCEDURE — 80053 COMPREHEN METABOLIC PANEL: CPT

## 2025-01-27 PROCEDURE — 97116 GAIT TRAINING THERAPY: CPT

## 2025-01-27 PROCEDURE — 81001 URINALYSIS AUTO W/SCOPE: CPT

## 2025-01-27 PROCEDURE — 85025 COMPLETE CBC W/AUTO DIFF WBC: CPT

## 2025-01-27 PROCEDURE — 70450 CT HEAD/BRAIN W/O DYE: CPT

## 2025-01-27 PROCEDURE — 6370000000 HC RX 637 (ALT 250 FOR IP): Performed by: EMERGENCY MEDICINE

## 2025-01-27 PROCEDURE — 97166 OT EVAL MOD COMPLEX 45 MIN: CPT

## 2025-01-27 PROCEDURE — 83690 ASSAY OF LIPASE: CPT

## 2025-01-27 PROCEDURE — 6370000000 HC RX 637 (ALT 250 FOR IP): Performed by: STUDENT IN AN ORGANIZED HEALTH CARE EDUCATION/TRAINING PROGRAM

## 2025-01-27 PROCEDURE — 2580000003 HC RX 258: Performed by: STUDENT IN AN ORGANIZED HEALTH CARE EDUCATION/TRAINING PROGRAM

## 2025-01-27 PROCEDURE — 87186 SC STD MICRODIL/AGAR DIL: CPT

## 2025-01-27 PROCEDURE — 97535 SELF CARE MNGMENT TRAINING: CPT

## 2025-01-27 PROCEDURE — 99222 1ST HOSP IP/OBS MODERATE 55: CPT | Performed by: STUDENT IN AN ORGANIZED HEALTH CARE EDUCATION/TRAINING PROGRAM

## 2025-01-27 PROCEDURE — 2500000003 HC RX 250 WO HCPCS: Performed by: STUDENT IN AN ORGANIZED HEALTH CARE EDUCATION/TRAINING PROGRAM

## 2025-01-27 PROCEDURE — 74176 CT ABD & PELVIS W/O CONTRAST: CPT

## 2025-01-27 PROCEDURE — 99285 EMERGENCY DEPT VISIT HI MDM: CPT

## 2025-01-27 RX ORDER — CLONIDINE HYDROCHLORIDE 0.1 MG/1
0.1 TABLET ORAL DAILY
Status: DISCONTINUED | OUTPATIENT
Start: 2025-01-27 | End: 2025-01-31 | Stop reason: HOSPADM

## 2025-01-27 RX ORDER — SODIUM CHLORIDE 9 MG/ML
INJECTION, SOLUTION INTRAVENOUS PRN
Status: DISCONTINUED | OUTPATIENT
Start: 2025-01-27 | End: 2025-01-31 | Stop reason: HOSPADM

## 2025-01-27 RX ORDER — ATORVASTATIN CALCIUM 40 MG/1
80 TABLET, FILM COATED ORAL NIGHTLY
Status: DISCONTINUED | OUTPATIENT
Start: 2025-01-27 | End: 2025-01-31 | Stop reason: HOSPADM

## 2025-01-27 RX ORDER — IOPAMIDOL 755 MG/ML
75 INJECTION, SOLUTION INTRAVASCULAR
Status: DISCONTINUED | OUTPATIENT
Start: 2025-01-27 | End: 2025-01-27

## 2025-01-27 RX ORDER — SODIUM CHLORIDE 0.9 % (FLUSH) 0.9 %
5-40 SYRINGE (ML) INJECTION EVERY 12 HOURS SCHEDULED
Status: DISCONTINUED | OUTPATIENT
Start: 2025-01-27 | End: 2025-01-31 | Stop reason: HOSPADM

## 2025-01-27 RX ORDER — ONDANSETRON 4 MG/1
4 TABLET, ORALLY DISINTEGRATING ORAL EVERY 8 HOURS PRN
Status: DISCONTINUED | OUTPATIENT
Start: 2025-01-27 | End: 2025-01-31 | Stop reason: HOSPADM

## 2025-01-27 RX ORDER — POTASSIUM CHLORIDE 7.45 MG/ML
10 INJECTION INTRAVENOUS PRN
Status: DISCONTINUED | OUTPATIENT
Start: 2025-01-27 | End: 2025-01-30

## 2025-01-27 RX ORDER — SPIRONOLACTONE 25 MG/1
25 TABLET ORAL DAILY
COMMUNITY

## 2025-01-27 RX ORDER — 0.9 % SODIUM CHLORIDE 0.9 %
80 INTRAVENOUS SOLUTION INTRAVENOUS ONCE
Status: DISCONTINUED | OUTPATIENT
Start: 2025-01-27 | End: 2025-01-27

## 2025-01-27 RX ORDER — POTASSIUM CHLORIDE 1500 MG/1
20 TABLET, EXTENDED RELEASE ORAL DAILY
COMMUNITY
Start: 2024-12-09 | End: 2025-12-09

## 2025-01-27 RX ORDER — SODIUM CHLORIDE 0.9 % (FLUSH) 0.9 %
10 SYRINGE (ML) INJECTION PRN
Status: DISCONTINUED | OUTPATIENT
Start: 2025-01-27 | End: 2025-01-27

## 2025-01-27 RX ORDER — POLYETHYLENE GLYCOL 3350 17 G/17G
17 POWDER, FOR SOLUTION ORAL DAILY PRN
Status: DISCONTINUED | OUTPATIENT
Start: 2025-01-27 | End: 2025-01-31 | Stop reason: HOSPADM

## 2025-01-27 RX ORDER — ASPIRIN 81 MG/1
81 TABLET ORAL EVERY MORNING
COMMUNITY
Start: 2024-11-27

## 2025-01-27 RX ORDER — ENOXAPARIN SODIUM 100 MG/ML
40 INJECTION SUBCUTANEOUS DAILY
Status: DISCONTINUED | OUTPATIENT
Start: 2025-01-27 | End: 2025-01-29

## 2025-01-27 RX ORDER — SODIUM CHLORIDE 0.9 % (FLUSH) 0.9 %
3 SYRINGE (ML) INJECTION EVERY 8 HOURS
Status: DISCONTINUED | OUTPATIENT
Start: 2025-01-27 | End: 2025-01-31 | Stop reason: HOSPADM

## 2025-01-27 RX ORDER — FUROSEMIDE 20 MG/1
40 TABLET ORAL 2 TIMES DAILY
Status: CANCELLED | OUTPATIENT
Start: 2025-01-27

## 2025-01-27 RX ORDER — NITROFURANTOIN 25; 75 MG/1; MG/1
100 CAPSULE ORAL ONCE
Status: COMPLETED | OUTPATIENT
Start: 2025-01-27 | End: 2025-01-27

## 2025-01-27 RX ORDER — CIPROFLOXACIN 250 MG/1
500 TABLET, FILM COATED ORAL EVERY 12 HOURS SCHEDULED
Status: DISCONTINUED | OUTPATIENT
Start: 2025-01-27 | End: 2025-01-29

## 2025-01-27 RX ORDER — SPIRONOLACTONE 25 MG/1
25 TABLET ORAL DAILY
Status: DISCONTINUED | OUTPATIENT
Start: 2025-01-28 | End: 2025-01-31 | Stop reason: HOSPADM

## 2025-01-27 RX ORDER — ONDANSETRON 2 MG/ML
4 INJECTION INTRAMUSCULAR; INTRAVENOUS EVERY 6 HOURS PRN
Status: DISCONTINUED | OUTPATIENT
Start: 2025-01-27 | End: 2025-01-31 | Stop reason: HOSPADM

## 2025-01-27 RX ORDER — SODIUM CHLORIDE 9 MG/ML
INJECTION, SOLUTION INTRAVENOUS CONTINUOUS
Status: ACTIVE | OUTPATIENT
Start: 2025-01-27 | End: 2025-01-27

## 2025-01-27 RX ORDER — FUROSEMIDE 20 MG/1
40 TABLET ORAL DAILY
Status: DISCONTINUED | OUTPATIENT
Start: 2025-01-28 | End: 2025-01-31 | Stop reason: HOSPADM

## 2025-01-27 RX ORDER — POTASSIUM CHLORIDE 1500 MG/1
40 TABLET, EXTENDED RELEASE ORAL PRN
Status: DISCONTINUED | OUTPATIENT
Start: 2025-01-27 | End: 2025-01-30

## 2025-01-27 RX ORDER — SODIUM CHLORIDE 0.9 % (FLUSH) 0.9 %
5-40 SYRINGE (ML) INJECTION PRN
Status: DISCONTINUED | OUTPATIENT
Start: 2025-01-27 | End: 2025-01-31 | Stop reason: HOSPADM

## 2025-01-27 RX ORDER — ACETAMINOPHEN 325 MG/1
650 TABLET ORAL EVERY 6 HOURS PRN
Status: DISCONTINUED | OUTPATIENT
Start: 2025-01-27 | End: 2025-01-31 | Stop reason: HOSPADM

## 2025-01-27 RX ORDER — CLOPIDOGREL BISULFATE 75 MG/1
75 TABLET ORAL DAILY
Status: DISCONTINUED | OUTPATIENT
Start: 2025-01-27 | End: 2025-01-31 | Stop reason: HOSPADM

## 2025-01-27 RX ORDER — ACETAMINOPHEN 500 MG
1000 TABLET ORAL ONCE
Status: COMPLETED | OUTPATIENT
Start: 2025-01-27 | End: 2025-01-27

## 2025-01-27 RX ORDER — FERROUS SULFATE 325(65) MG
325 TABLET ORAL
COMMUNITY
Start: 2025-01-18 | End: 2025-02-17

## 2025-01-27 RX ORDER — ACETAMINOPHEN 650 MG/1
650 SUPPOSITORY RECTAL EVERY 6 HOURS PRN
Status: DISCONTINUED | OUTPATIENT
Start: 2025-01-27 | End: 2025-01-31 | Stop reason: HOSPADM

## 2025-01-27 RX ORDER — METOPROLOL SUCCINATE 25 MG/1
25 TABLET, EXTENDED RELEASE ORAL DAILY
Status: DISCONTINUED | OUTPATIENT
Start: 2025-01-27 | End: 2025-01-31 | Stop reason: HOSPADM

## 2025-01-27 RX ORDER — PAROXETINE 20 MG/1
20 TABLET, FILM COATED ORAL DAILY
Status: DISCONTINUED | OUTPATIENT
Start: 2025-01-27 | End: 2025-01-31 | Stop reason: HOSPADM

## 2025-01-27 RX ORDER — CLONIDINE HYDROCHLORIDE 0.1 MG/1
0.1 TABLET ORAL 2 TIMES DAILY
Status: DISCONTINUED | OUTPATIENT
Start: 2025-01-27 | End: 2025-01-27 | Stop reason: DRUGHIGH

## 2025-01-27 RX ORDER — MAGNESIUM SULFATE IN WATER 40 MG/ML
2000 INJECTION, SOLUTION INTRAVENOUS PRN
Status: DISCONTINUED | OUTPATIENT
Start: 2025-01-27 | End: 2025-01-30

## 2025-01-27 RX ADMIN — METOPROLOL SUCCINATE 25 MG: 25 TABLET, EXTENDED RELEASE ORAL at 08:59

## 2025-01-27 RX ADMIN — SODIUM CHLORIDE, PRESERVATIVE FREE 10 ML: 5 INJECTION INTRAVENOUS at 09:00

## 2025-01-27 RX ADMIN — PAROXETINE HYDROCHLORIDE 20 MG: 20 TABLET, FILM COATED ORAL at 09:00

## 2025-01-27 RX ADMIN — ATORVASTATIN CALCIUM 80 MG: 40 TABLET, FILM COATED ORAL at 22:35

## 2025-01-27 RX ADMIN — NITROFURANTOIN MONOHYDRATE/MACROCRYSTALS 100 MG: 75; 25 CAPSULE ORAL at 07:05

## 2025-01-27 RX ADMIN — CLONIDINE HYDROCHLORIDE 0.1 MG: 0.1 TABLET ORAL at 09:00

## 2025-01-27 RX ADMIN — ACETAMINOPHEN 650 MG: 325 TABLET ORAL at 22:48

## 2025-01-27 RX ADMIN — CLOPIDOGREL BISULFATE 75 MG: 75 TABLET ORAL at 09:00

## 2025-01-27 RX ADMIN — ACETAMINOPHEN 1000 MG: 500 TABLET ORAL at 02:45

## 2025-01-27 RX ADMIN — CIPROFLOXACIN HYDROCHLORIDE 500 MG: 250 TABLET, FILM COATED ORAL at 22:35

## 2025-01-27 RX ADMIN — SODIUM CHLORIDE: 9 INJECTION, SOLUTION INTRAVENOUS at 09:06

## 2025-01-27 ASSESSMENT — PAIN SCALES - GENERAL
PAINLEVEL_OUTOF10: 8
PAINLEVEL_OUTOF10: 7
PAINLEVEL_OUTOF10: 5

## 2025-01-27 ASSESSMENT — PAIN DESCRIPTION - LOCATION
LOCATION: GENERALIZED
LOCATION: HEAD
LOCATION: OTHER (COMMENT)

## 2025-01-27 ASSESSMENT — PAIN - FUNCTIONAL ASSESSMENT: PAIN_FUNCTIONAL_ASSESSMENT: 0-10

## 2025-01-27 NOTE — ED PROVIDER NOTES
[facility] EMERGENCY DEPARTMENT  EMERGENCY DEPARTMENT ENCOUNTER      Pt Name: Jessie Frederick  MRN: [id number]  Birthdate 1945  Date of evaluation: 1/27/2025  Provider: Gisele Irene MD    CHIEF COMPLAINT       Chief Complaint   Patient presents with    Diarrhea     Patient brought in [facility] from home. Patient states having weakness and diarrhea this evening. Patient fell hitting her head. Patient states having dinner at a restaurant just hours before     Fall     Patient states getting up to go to the bathroom when he felt her knee buckle and fell hitting the back of her head.        CRITICAL CARE TIME   Total Critical Care time was *** minutes, excluding separately reportable procedures.  There was a high probability of clinically significant/life threatening deterioration in the patient's condition which required my urgent intervention.  ***      HISTORY OF PRESENT ILLNESS  (Location/Symptom, Timing/Onset, Context/Setting, Quality, Duration, Modifying Factors, Severity.)   Jessie Frederick is a 79 y.o. female who presents to the emergency department ***       Nursing Notes were reviewed.    REVIEW OF SYSTEMS    (2-9 systems for level 4, 10 or more for level 5)     ***   Except as noted above the remainder of the review of systems was reviewed and negative.       PAST MEDICAL HISTORY     Past Medical History:   Diagnosis Date    Abnormal Pap smear 10/2010    paucity or absence of TZx 3 years in a row.  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        Other Observations UA Culture ordered based on defined criteria. (*)     All other components within normal limits   CULTURE, URINE   LIPASE   LACTIC ACID       All other labs were within normal range or not returned as of this dictation.    EMERGENCY DEPARTMENT COURSE and DIFFERENTIAL DIAGNOSIS/MDM:   Vitals:    Vitals:    01/27/25 0155 01/27/25 0628   BP: (!) 131/58    Pulse: 87    Resp: 20    Temp:  98 °F (36.7 °C)   TempSrc:  Oral   SpO2: 99%    Weight: 78 kg (172 lb)    Height: 1.549 m (5' 1\")        ***    CONSULTS:  None    PROCEDURES:  None    FINAL IMPRESSION      1. Injury of head, initial encounter    2. Fall, initial encounter    3. Diarrhea, unspecified type    4. Urinary tract infection with hematuria, site unspecified          DISPOSITION/PLAN   DISPOSITION Admitted 01/27/2025 06:53:01 AM               PATIENT REFERRED TO:  No follow-up provider specified.    DISCHARGE MEDICATIONS:  New Prescriptions    No medications on file       (Please note that portions of this note were completed with a voice recognition program.  Efforts were made to edit the dictations but occasionally words are mis-transcribed.)    Gisele Irene MD  Attending Emergency Physician

## 2025-01-27 NOTE — ED NOTES
Writer assist patient to the bedside commode. Patient states she is unable to urinate at this time. Writer educated patient to use the call light when able to urinate for urine specimen. Patient verbalize understanding

## 2025-01-27 NOTE — ED NOTES
ED to inpatient nurses report      Chief Complaint:  Chief Complaint   Patient presents with    Diarrhea     Patient brought in [facility] from home. Patient states having weakness and diarrhea this evening. Patient fell hitting her head. Patient states having dinner at a restaurant just hours before     Fall     Patient states getting up to go to the bathroom when he felt her knee buckle and fell hitting the back of her head.      Present to ED from:  Home    MOA:     LOC: alert and orientated to name, place, date  Mobility: Fully dependent  Oxygen Baseline: Room Air     Current needs required: assist with ADL    Pending ED orders: complete  Present condition: Stablr    Why did the patient come to the ED? Patient states getting out of her chair when she feel hitting head, patient was unable to stand on her feet. Patient states having one episode   What is the plan? ABX  Any procedures or intervention occur? CT Head - clear CT abd pelvis, urinalysis (+) for UTI   Any safety concerns?? Fall Risk   CODE STATUS Prior  Diet Diet NPO    Mental Status:  Level of Consciousness: Alert (0)    Psych Assessment:      Vital signs   Vitals:    01/27/25 0155 01/27/25 0628   BP: (!) 131/58    Pulse: 87    Resp: 20    Temp:  98 °F (36.7 °C)   TempSrc:  Oral   SpO2: 99%    Weight: 78 kg (172 lb)    Height: 1.549 m (5' 1\")         Vitals:  Patient Vitals for the past 24 hrs:   BP Temp Temp src Pulse Resp SpO2 Height Weight   01/27/25 0628 -- 98 °F (36.7 °C) Oral -- -- -- -- --   01/27/25 0155 (!) 131/58 -- -- 87 20 99 % 1.549 m (5' 1\") 78 kg (172 lb)      Visit Vitals  BP (!) 131/58   Pulse 87   Temp 98 °F (36.7 °C) (Oral)   Resp 20   Ht 1.549 m (5' 1\")   Wt 78 kg (172 lb)   SpO2 99%   BMI 32.50 kg/m²        LDAs:   Peripheral IV 01/27/25 Right Antecubital (Active)   Site Assessment Clean, dry & intact 01/27/25 0246   Line Status Blood return noted;Flushed;Specimen collected 01/27/25 0246       Meds:  Medications   sodium chloride flush  0.9 % injection 3 mL (has no administration in time range)   nitrofurantoin (macrocrystal-monohydrate) (MACROBID) capsule 100 mg (has no administration in time range)   acetaminophen (TYLENOL) tablet 1,000 mg (1,000 mg Oral Given 1/27/25 0245)          Ambulatory Status:  No data recorded    Diagnosis:  DISPOSITION Decision To Admit 01/27/2025 06:36:56 AM   Final diagnoses:   Injury of head, initial encounter   Fall, initial encounter   Diarrhea, unspecified type   Urinary tract infection with hematuria, site unspecified            Assessment Abnormalities : (List)  Neuro: Confused   Yes[] No[x]    Respiratory : Labored  Yes[] No[x]  Lung Sounds Clear     Cardiac:   Regular  Yes[x] No[]  Irregular Yes[] No[x]    Rhythm    :  Incontinent  Yes[] No[x]     Assessment Abnormalities Lower extremity weakness       Skin Assessment: WDL       Pain Score:  Pain Assessment  Pain Assessment: 0-10  Pain Level: 7  Patient's Stated Pain Goal: 7  Pain Location: Head    ISOLATION Status  No active isolations    Labs:  Labs Reviewed   CBC WITH AUTO DIFFERENTIAL - Abnormal; Notable for the following components:       Result Value    RBC 3.98 (*)     Hemoglobin 10.9 (*)     Hematocrit 33.6 (*)     RDW 24.0 (*)     Neutrophils % 81 (*)     Lymphocytes % 11 (*)     Eosinophils % 0 (*)     Lymphocytes Absolute 0.98 (*)     All other components within normal limits   COMPREHENSIVE METABOLIC PANEL - Abnormal; Notable for the following components:    Glucose 117 (*)     BUN 33 (*)     Creatinine 1.5 (*)     Est, Glom Filt Rate 36 (*)     Total Protein 6.2 (*)     Alkaline Phosphatase 185 (*)     AST 44 (*)     All other components within normal limits   URINALYSIS WITH REFLEX TO CULTURE - Abnormal; Notable for the following components:    Turbidity UA Cloudy (*)     Bilirubin, Urine NEGATIVE  Verified by ictotest. (*)     Urine Hgb TRACE (*)     Protein, UA NEGATIVE  Verified by sulfosalicylic acid test. (*)     Leukocyte Esterase, Urine

## 2025-01-27 NOTE — PROGRESS NOTES
Physical Therapy  Facility/Department: [address]   Physical Therapy Initial Evaluation    Patient Name: Jessie Frederick        MRN: [id number]    : 1945    Date of Service: 2025    Chief Complaint   Patient presents with    Diarrhea     Patient brought in [facility] from home. Patient states having weakness and diarrhea this evening. Patient fell hitting her head. Patient states having dinner at a restaurant just hours before     Fall     Patient states getting up to go to the bathroom when he felt her knee buckle and fell hitting the back of her head.      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  Past Surgical History:  has a past surgical history that includes Dilation & curettage (); Breast biopsy (); Appendectomy (); Septoplasty (); Hammer toe surgery (); Sinus endoscopy; sinus surgery; Shoulder arthroscopy (); Esophagoscopy (, ); Colonoscopy (); Foreign Body Removal (); laparoscopy (); Coronary angioplasty with stent (2011); Coronary angioplasty with stent (2011); Upper gastrointestinal endoscopy; eye surgery; pr colonoscopy w/biopsy single/multiple (N/A, 2017); pr egd transoral biopsy single/multiple (2017); Upper gastrointestinal endoscopy (2017); Upper gastrointestinal endoscopy (2018); Cardiac surgery; Cardiac catheterization (2020); Cardioversion

## 2025-01-27 NOTE — PROGRESS NOTES
Occupational Therapy Initial Evaluation  Facility/Department: [address]   Patient Name: Jessie Frederick        MRN: [id number]    : 1945    Date of Service: 2025    Chief Complaint   Patient presents with    Diarrhea     Patient brought in [facility] from home. Patient states having weakness and diarrhea this evening. Patient fell hitting her head. Patient states having dinner at a restaurant just hours before     Fall     Patient states getting up to go to the bathroom when he felt her knee buckle and fell hitting the back of her head.        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  Past Surgical History:  has a past surgical history that includes Dilation & curettage (); Breast biopsy (); Appendectomy (); Septoplasty (); Hammer toe surgery (); Sinus endoscopy; sinus surgery; Shoulder arthroscopy (); Esophagoscopy (, ); Colonoscopy (); Foreign Body Removal (); laparoscopy (); Coronary angioplasty with stent (2011); Coronary angioplasty with stent (2011); Upper gastrointestinal endoscopy; eye surgery; pr colonoscopy w/biopsy single/multiple (N/A, 2017); pr egd transoral biopsy single/multiple (2017); Upper gastrointestinal endoscopy (2017); Upper gastrointestinal endoscopy (2018); Cardiac surgery; Cardiac catheterization (2020); Cardioversion (2020);  Thank you for completing the visit with me    We will perform hormonal work up to see if your adrenal mass is producing extra hormones    In order for us to check your cortisol, we need to do the follow lab work:    For this, you will need to take 1 mg of dexamethasone at 11 PM and return the following morning at 8 AM for labs. The prescription is included with this letter/sent to your pharmacy.      It is important that you take the medication at the exact time, and get lab work done at  8 a.m the next day    The timing is important for these!  ---------------------------------------------------------------------------    Adrenal Incidentaloma  An adrenal incidentaloma is an adrenal tumor that is discovered on an imaging test that is being done for a problem unrelated to adrenal disease. Adrenal tumors found as part of the work-up or follow-up of cancer are very likely to be adrenal metastases and do not count as adrenal incidentalomas. As imaging techniques have improved and become more commonly used, doctors are finding more and more adrenal incidentalomas.     The chance of having an adrenal incidentaloma increases with increasing age. At the age of 50, there is a 3% risk of having an adrenal incidentaloma and this goes up to 7% by the age of 70.     While most adrenal incidentalomas do not cause health problems, they must be evaluated because a small percentage can lead to significant disease.      Adrenal incidentalomas fall into one of three categories:  Benign, non-functional tumors - these tumors include adenomas, myelolipomas, ganglioneuromas, adrenal cysts, and hematomas. Most common  Functioning tumors - these tumors make too much of any of the hormones that the adrenal glands  Malignant tumors - these tumors include adrenocortical cancer and metastatic disease. Very Rare      Signs and Symptoms  By definition, adrenal incidentalomas are asymptomatic. However, once an adrenal incidentaloma is discovered,  closer questioning and examination may reveal symptoms.    Work-up of Adrenal Incidentaloma  In determining the treatment of adrenal incidentaloma, two questions must be asked: 1) is it functional and 2) is it cancer    Is it functional?  We will check your hormones with blood work  We will consider repeat imaging 1 year year to monitor the size  Repeat lab work (if the initial screening lab work ordered today are normal) in 1 year as well.  Functional adrenal incidentalomas should be removed. As well as very large non-functional tumors should be removed. Otherwise most inactive tumors do not required surgery.        Sherif Carlton M.D  [facility] [address], [address]  [place] [address]    Office:  [phone]  Fax:  [phone]

## 2025-01-27 NOTE — PROGRESS NOTES
Spiritual Health History and Assessment/Progress Note  [facility]    (P) Initial Encounter,  ,  ,      Name: Jessie Frederick MRN: [id number]    Age: 79 y.o.     Sex: female   Language: English   Sikh: Quaker   Acute cystitis without hematuria     Date: 1/27/2025            Total Time Calculated: (P) 7 min              Spiritual Assessment began in [address]        Referral/Consult From: (P) Nurse   Encounter Overview/Reason: (P) Initial Encounter  Service Provided For: (P) Patient    PT was very busy all morning with nurses and aides in and out of her room.  By the time writer got to her room she was very tired and requested to sleep instead of visit.  She shared with writer that she had fell at home last night and was up all night trying to get someone to help.  She requested a visit either later tonight or tomorrow morning.      Melina, Belief, Meaning:   Patient unable to assess at this time  Family/Friends No family/friends present      Importance and Influence:  Patient has spiritual/personal beliefs that influence decisions regarding their health  Family/Friends No family/friends present    Community:  Patient feels well-supported. Support system includes: Unknown  Family/Friends No family/friends present    Assessment and Plan of Care:     Patient Interventions include: Facilitated expression of thoughts and feelings  Family/Friends Interventions include: No family/friends present    Patient Plan of Care: Spiritual Care available upon further referral  Family/Friends Plan of Care: No family/friends present    Electronically signed by Saran Patel  Intern on 1/27/2025 at 10:58 AM

## 2025-01-27 NOTE — PLAN OF CARE
Problem: Chronic Conditions and Co-morbidities  Goal: Patient's chronic conditions and co-morbidity symptoms are monitored and maintained or improved  Outcome: Progressing     Problem: Discharge Planning  Goal: Discharge to home or other facility with appropriate resources  Outcome: Progressing  Flowsheets (Taken 1/27/2025 0835)  Discharge to home or other facility with appropriate resources:   Identify barriers to discharge with patient and caregiver   Identify discharge learning needs (meds, wound care, etc)   Arrange for needed discharge resources and transportation as appropriate   Arrange for interpreters to assist at discharge as needed   Refer to discharge planning if patient needs post-hospital services based on physician order or complex needs related to functional status, cognitive ability or social support system     Problem: Pain  Goal: Verbalizes/displays adequate comfort level or baseline comfort level  Outcome: Progressing     Problem: Safety - Adult  Goal: Free from fall injury  Outcome: Progressing  Flowsheets (Taken 1/27/2025 0827)  Free From Fall Injury: Instruct family/caregiver on patient safety     Problem: Skin/Tissue Integrity  Goal: Skin integrity remains intact  Description: 1.  Monitor for areas of redness and/or skin breakdown  2.  Assess vascular access sites hourly  3.  Every 4-6 hours minimum:  Change oxygen saturation probe site  4.  Every 4-6 hours:  If on nasal continuous positive airway pressure, respiratory therapy assess nares and determine need for appliance change or resting period  Outcome: Progressing  Flowsheets (Taken 1/27/2025 0827)  Skin Integrity Remains Intact:   Monitor for areas of redness and/or skin breakdown   Assess vascular access sites hourly

## 2025-01-27 NOTE — H&P
[facility]  Office: [phone]  Nura Mccall DO, Tu Horton DO, Jesus Madison DO, Galdino Chowdhury DO, Laurie Gutierrez MD, Lakeshia Wong MD, Lupe Campos MD, Christina Flores MD,  Archie Cordova MD, Oliverio Peter MD, Ny Urbina MD,  Rubio Rocha DO, Dayana Webb MD, Miguel Sales MD, Michael Mccall DO, Sherly Zaidi MD,  Miguelito Angel DO, Cristela Camarena MD, Terri Harding MD, Alyx Jones MD, Nichole Patel MD,  Mike Davalos MD, Yue Khan MD, Margaret Rocha MD, Kassie Gonsalez MD, Lucien Nassar MD, Raymundo Valdez MD, Yogesh Blake DO, Willem Hoffman MD, Rubio Page MD, Mohsin Reza, MD, Shirley Waterhouse, CNP,  Daisy Nolasco, CNP, Yogesh Cabrera, CNP,  Smita Rivero, DNP, Sanjana Aranda, CNP, Carlyn Parekh, CNP, Paige Pacheco, CNP, Jen Castro, CNP, Maria Platt, PA-C, Brenda Mariee PA-C, Kiya Brothers, CNP, Bebo Mahajan, CNP,  Kristal Smith, CNP, Sharee Easton, CNP, Ayah Hardin, CNP,  Rea Bustillo, CNS, Danay Carrasco, CNP, Dionna Pride, CNP,   Salud Thomas, CNP         [facility]   IN-PATIENT SERVICE   [facility]    HISTORY AND PHYSICAL EXAMINATION            Date:   1/27/2025  Patient name:  Jessie Frederick  Date of admission:  1/27/2025  1:58 AM  MRN:   [id number]  Account:  [id number]  YOB: 1945  PCP:    Ashwin Cheney MD  Room:   2TRAUMA/2TRAUMA  Code Status:    Prior      History Obtained From:     patient    History of Present Illness:     Jessie Frederick is a 79 y.o. female with a past medical history of multiple sclerosis, CVA, paroxsymal atrial fibrillation (s/p Watchman), hypertension and hyperlipidemia who presented to the emergency department on 1/27/2025 complaining of diarrhea and weakness. The patient states that she had a diarrheal illness over the weekend and has become increasingly weak since then. She states that the diarrhea has resolved but states that she is now so weak that she is unable to  care for herself and has been falling at home. In the ED, the patient was afebrile and nontoxic appearing. CT abdomen and pelvis was negative for an acute process. Urinalysis was suggestive of infection. CMP was remarkable for ISABELLA with a creatinine of 1.5. The patient is admitted to internal medicine for further management of acute cystitis and ISABELLA.     Past Medical History:     Past Medical History:   Diagnosis Date    Abnormal Pap smear 10/2010    paucity or absence of TZx 3 years in a row.  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    History of bone density study 07/2012    penia    History of migraines     optic migraines    HTN (hypertension)     IgG deficiency (HCC)     Multiple sclerosis (HCC)     progressive    Myoclonic seizures (HCC)     Optic neuritis     Mazin Anirudh Pupil    Osteopenia     Pyloric stenosis     Raynaud's disease     Vasomotor rhinitis         Past Surgical History:     Past Surgical History:   Procedure Laterality Date    APPENDECTOMY  1955    sponges left in abdomen, at [facility].    BREAST BIOPSY  1990    stereotactic type, right breast, benign    CARDIAC CATHETERIZATION  07/31/2020    POBA lad    CARDIAC SURGERY      CARDIOVERSION  08/14/2020    [facility]     CARDIOVERSION  07/31/2020    COLONOSCOPY  2010    Polyp    COLONOSCOPY  10/13/2023    COLONOSCOPY N/A 10/13/2023    COLONOSCOPY DIAGNOSTIC performed by Gerson Souza MD at [facility] OR    CORONARY ANGIOPLASTY WITH STENT PLACEMENT  05/2011    failed attempt to place stent

## 2025-01-27 NOTE — PROGRESS NOTES
Spiritual Health History and Assessment/Progress Note  [facility]    (P) Spiritual/Emotional Needs, (P) Emotional distress,  ,      Name: Jessie Frederick MRN: [id number]    Age: 79 y.o.     Sex: female   Language: English   Religious: Temple   Acute cystitis without hematuria     Date: 1/27/2025            Total Time Calculated: (P) 25 min              Spiritual Assessment began in [address]        Referral/Consult From: (P) Nurse   Encounter Overview/Reason: (P) Spiritual/Emotional Needs  Service Provided For: (P) Patient    PT is very discouraged because she feels as though she has no support; no one who is willing to help her.  She has a daughter but according to PT she is too busy to offer help.  She has siblings but they too who are not supportive.  PT currently lives alone, but has had many falls.  PT believes she may need to be in assisted living but has no one who can help her make arrangements.  PT is exteremly spiritual and has had many wonderful experiences in her lifetime.  She is truly blessed.   prayed with her before leaving.  It was a tracy visit.    Melina, Belief, Meaning:   Patient identifies as spiritual, is connected with a melina tradition or spiritual practice, and has beliefs or practices that help with coping during difficult times  Family/Friends No family/friends present      Importance and Influence:  Patient has spiritual/personal beliefs that influence decisions regarding their health  Family/Friends No family/friends present    Community:  Patient is connected with a spiritual community and expresses feelings of isolation: disconnected from family/friends, feeling there is no one to turn to for help, and feeling no one understands  Family/Friends No family/friends present    Assessment and Plan of Care:     Patient Interventions include: Facilitated expression of thoughts and feelings  Family/Friends Interventions include: No family/friends present    Patient

## 2025-01-28 LAB
ANION GAP SERPL CALCULATED.3IONS-SCNC: 13 MMOL/L (ref 9–17)
BASOPHILS # BLD: 0 K/UL (ref 0–0.2)
BASOPHILS NFR BLD: 0 % (ref 0–2)
BUN SERPL-MCNC: 29 MG/DL (ref 8–23)
CALCIUM SERPL-MCNC: 8.9 MG/DL (ref 8.6–10.4)
CHLORIDE SERPL-SCNC: 104 MMOL/L (ref 98–107)
CO2 SERPL-SCNC: 19 MMOL/L (ref 20–31)
CREAT SERPL-MCNC: 1.3 MG/DL (ref 0.5–0.9)
EOSINOPHIL # BLD: 0 K/UL (ref 0–0.4)
EOSINOPHILS RELATIVE PERCENT: 0 % (ref 1–4)
ERYTHROCYTE [DISTWIDTH] IN BLOOD BY AUTOMATED COUNT: 24.9 % (ref 12.5–15.4)
GFR, ESTIMATED: 42 ML/MIN/1.73M2
GLUCOSE SERPL-MCNC: 123 MG/DL (ref 70–99)
HCT VFR BLD AUTO: 35.1 % (ref 36–46)
HGB BLD-MCNC: 11.4 G/DL (ref 12–16)
LYMPHOCYTES NFR BLD: 1.54 K/UL (ref 1–4.8)
LYMPHOCYTES RELATIVE PERCENT: 12 % (ref 24–44)
MCH RBC QN AUTO: 27.7 PG (ref 26–34)
MCHC RBC AUTO-ENTMCNC: 32.6 G/DL (ref 31–37)
MCV RBC AUTO: 85 FL (ref 80–100)
MICROORGANISM SPEC CULT: ABNORMAL
MONOCYTES NFR BLD: 0.51 K/UL (ref 0.1–0.8)
MONOCYTES NFR BLD: 4 % (ref 1–7)
MORPHOLOGY: ABNORMAL
NEUTROPHILS NFR BLD: 84 % (ref 36–66)
NEUTS SEG NFR BLD: 10.75 K/UL (ref 1.8–7.7)
PLATELET # BLD AUTO: 159 K/UL (ref 140–450)
PMV BLD AUTO: 10.7 FL (ref 6–12)
POTASSIUM SERPL-SCNC: 4.4 MMOL/L (ref 3.7–5.3)
RBC # BLD AUTO: 4.13 M/UL (ref 4–5.2)
SODIUM SERPL-SCNC: 136 MMOL/L (ref 135–144)
SPECIMEN DESCRIPTION: ABNORMAL
WBC OTHER # BLD: 12.8 K/UL (ref 3.5–11)

## 2025-01-28 PROCEDURE — 80048 BASIC METABOLIC PNL TOTAL CA: CPT

## 2025-01-28 PROCEDURE — 36415 COLL VENOUS BLD VENIPUNCTURE: CPT

## 2025-01-28 PROCEDURE — 97530 THERAPEUTIC ACTIVITIES: CPT

## 2025-01-28 PROCEDURE — 99232 SBSQ HOSP IP/OBS MODERATE 35: CPT | Performed by: STUDENT IN AN ORGANIZED HEALTH CARE EDUCATION/TRAINING PROGRAM

## 2025-01-28 PROCEDURE — 97110 THERAPEUTIC EXERCISES: CPT

## 2025-01-28 PROCEDURE — 85025 COMPLETE CBC W/AUTO DIFF WBC: CPT

## 2025-01-28 PROCEDURE — 2500000003 HC RX 250 WO HCPCS: Performed by: STUDENT IN AN ORGANIZED HEALTH CARE EDUCATION/TRAINING PROGRAM

## 2025-01-28 PROCEDURE — 6370000000 HC RX 637 (ALT 250 FOR IP): Performed by: STUDENT IN AN ORGANIZED HEALTH CARE EDUCATION/TRAINING PROGRAM

## 2025-01-28 PROCEDURE — 1200000000 HC SEMI PRIVATE

## 2025-01-28 RX ADMIN — METOPROLOL SUCCINATE 25 MG: 25 TABLET, EXTENDED RELEASE ORAL at 09:15

## 2025-01-28 RX ADMIN — ATORVASTATIN CALCIUM 80 MG: 40 TABLET, FILM COATED ORAL at 22:42

## 2025-01-28 RX ADMIN — SODIUM CHLORIDE, PRESERVATIVE FREE 3 ML: 5 INJECTION INTRAVENOUS at 18:02

## 2025-01-28 RX ADMIN — CIPROFLOXACIN HYDROCHLORIDE 500 MG: 250 TABLET, FILM COATED ORAL at 22:43

## 2025-01-28 RX ADMIN — SODIUM CHLORIDE, PRESERVATIVE FREE 10 ML: 5 INJECTION INTRAVENOUS at 22:43

## 2025-01-28 RX ADMIN — CIPROFLOXACIN HYDROCHLORIDE 500 MG: 250 TABLET, FILM COATED ORAL at 09:15

## 2025-01-28 RX ADMIN — PAROXETINE HYDROCHLORIDE 20 MG: 20 TABLET, FILM COATED ORAL at 09:15

## 2025-01-28 RX ADMIN — CLONIDINE HYDROCHLORIDE 0.1 MG: 0.1 TABLET ORAL at 09:15

## 2025-01-28 RX ADMIN — CLOPIDOGREL BISULFATE 75 MG: 75 TABLET ORAL at 09:15

## 2025-01-28 RX ADMIN — SODIUM CHLORIDE, PRESERVATIVE FREE 10 ML: 5 INJECTION INTRAVENOUS at 09:14

## 2025-01-28 RX ADMIN — FUROSEMIDE 40 MG: 20 TABLET ORAL at 09:15

## 2025-01-28 RX ADMIN — SODIUM CHLORIDE, PRESERVATIVE FREE 3 ML: 5 INJECTION INTRAVENOUS at 02:23

## 2025-01-28 RX ADMIN — SPIRONOLACTONE 25 MG: 25 TABLET, FILM COATED ORAL at 09:15

## 2025-01-28 NOTE — PROGRESS NOTES
Spiritual Health History and Assessment/Progress Note  [facility]    (P) Loneliness/Social Isolation, (P) Emotional distress, (P) Life Adjustments, Adjustment to illness, Anticipatory Grief,      Name: Jessie Frederick MRN: [id number]    Age: 79 y.o.     Sex: female   Language: English   Oriental orthodox: Caodaism   Acute cystitis without hematuria     Date: 1/27/2025            Total Time Calculated: (P) 45 min              Spiritual Assessment continued in [address]        Referral/Consult From: (P) Nursing Supervisor/Manager   Encounter Overview/Reason: (P) Loneliness/Social Isolation  Service Provided For: (P) Patient       provided empathic listening and  pastoral support and care. Pt. Was open to engaging conversation and and was very welcoming during visit. Pt. Expressed concern over lifetime trials and pain.  was able to offer insight and perspective into God's plan and purpose.  provided prayer for comfort, support and encouragement. Good visit.    Melina, Belief, Meaning:   Patient has beliefs or practices that help with coping during difficult times  Family/Friends No family/friends present      Importance and Influence:  Patient has spiritual/personal beliefs that influence decisions regarding their health  Family/Friends No family/friends present    Community:  Patient feels well-supported. Support system includes: Extended family  Family/Friends No family/friends present    Assessment and Plan of Care:     Patient Interventions include: Facilitated expression of thoughts and feelings, Explored spiritual coping/struggle/distress, Engaged in theological reflection, and Facilitated life review and/ or legacy  Family/Friends Interventions include: No family/friends present    Patient Plan of Care: Spiritual Care available upon further referral  Family/Friends Plan of Care: No family/friends present    Electronically signed by Chaplain SAMY on 1/27/2025 at 8:32

## 2025-01-28 NOTE — PLAN OF CARE
Problem: Discharge Planning  Goal: Discharge to home or other facility with appropriate resources  1/28/2025 0156 by Tiffany Fernandez LPN  Outcome: Progressing     Problem: Pain  Goal: Verbalizes/displays adequate comfort level or baseline comfort level  1/28/2025 0156 by Tiffany Fernandez LPN  Outcome: Progressing     Problem: Safety - Adult  Goal: Free from fall injury  1/28/2025 0156 by Tiffany Fernandez LPN  Outcome: Progressing      Admission

## 2025-01-28 NOTE — PROGRESS NOTES
Physical Therapy  Facility/Department: [address]   Physical Therapy Daily Treatment Note    Patient Name: Jessie Frederick        MRN: [id number]    : 1945    Date of Service: 2025    Chief Complaint   Patient presents with    Diarrhea     Patient brought in [facility] from home. Patient states having weakness and diarrhea this evening. Patient fell hitting her head. Patient states having dinner at a restaurant just hours before     Fall     Patient states getting up to go to the bathroom when he felt her knee buckle and fell hitting the back of her head.      Past Medical History:  has a past medical history of Abnormal Pap smear, Asthma, Atrial fibrillation ([facility]), Bloody discharge from nipple, CAD (coronary artery disease), Cataracts, bilateral, CHF (congestive heart failure) (HCC), Chronic kidney disease, Colon polyp, CVA (cerebral vascular accident) ([facility]), Demyelinating disease ([facility]), Fibromyalgia, Headache, High-risk sexual behavior, History of bone density study, History of migraines, HTN (hypertension), IgG deficiency ([facility]), Multiple sclerosis ([facility]), Myoclonic seizures ([facility]), Optic neuritis, Osteopenia, Pyloric stenosis, Raynaud's disease, and Vasomotor rhinitis.  Past Surgical History:  has a past surgical history that includes Dilation & curettage (); Breast biopsy (); Appendectomy (); Septoplasty (); Hammer toe surgery (); Sinus endoscopy; sinus surgery; Shoulder arthroscopy (); Esophagoscopy (, ); Colonoscopy (); Foreign Body Removal (); laparoscopy (); Coronary angioplasty with stent (2011); Coronary angioplasty with stent (2011); Upper gastrointestinal endoscopy; eye surgery; pr colonoscopy w/biopsy single/multiple (N/A, 2017); pr egd transoral biopsy single/multiple (2017); Upper gastrointestinal endoscopy (2017); Upper gastrointestinal endoscopy (2018); Cardiac surgery; Cardiac catheterization (2020);

## 2025-01-28 NOTE — PROGRESS NOTES
[facility]  Office: [phone]  Nura Mccall DO, Tu Horton DO, Jesus Madison DO, Galdino Chowdhury DO, Laurie Gutierrez MD, Lakeshia Wong MD, Lupe Campos MD, Christina Flores MD,  Archie Cordova MD, Oliverio Peter MD, Gopi Taylor DO, Ny Urbina MD,  Rubio Rocha DO, Dayana Webb MD, Miguel Sales MD, Michael Mccall DO, Sherly Zaidi MD,  Miguelito Angel DO, Cristela Camarena MD, Terri Harding MD, Alyx Jones MD, Nichole Patel MD,  Mike Davalos MD, Yue Khan MD, Margaret Rocha MD, Hernando Wallace DO, Michele Matos MD,  Willem Hoffman MD, Shirley Waterhouse, CNP,  Daisy Nolasco, CNP, Ayah Hardin, CNP, Yogesh Cabrera, CNP,  Smita Rivero, DNP, Sanjana Aranda, CNP, Carlyn Parekh, CNP, Danay Carrasco, CNP, Paige Pacheco, CNP, Jen Castro, CNP, Doyle Boykin PA-C, Rea Bustillo, CNS, Pam Celeste, CNP, Dionna Pride, CNP         [facility]   IN-PATIENT SERVICE   [facility]    Progress Note    1/28/2025    4:44 PM    Name:   Jessie Frederick  MRN:     [id number]     Acct:      [id number]   Room:   [address] [place] [address] Day:  1  Admit Date:  1/27/2025  1:58 AM    PCP:   Ashwin Cheney MD  Code Status:  DNR-CCA    Subjective:     Patient seen and examined this morning, sitting in the recliner, stated that she is feeling quite weak.  Remained afebrile, vitals are normal range  Lab work reviewed, creatinine trended down to 1 point, glucose 123.  White count up to 12.8, hemoglobin 11.4.  Urine culture growing E. coli, sensitivity pending.  PT/OT working with the patient and patient seems lethargic and confused today.    Medications:     Allergies:    Allergies   Allergen Reactions    Dofetilide Other (See Comments)    Lisinopril-Hydrochlorothiazide Anaphylaxis and Hives    Sulfa Antibiotics Anaphylaxis    Penicillins Hives    Polyethylene Glycol Hives    Actifed Cold-Allergy [Chlorpheniramine-Phenylephrine]     Altace [Ramipril]      Chronic cough

## 2025-01-28 NOTE — PLAN OF CARE
Problem: Chronic Conditions and Co-morbidities  Goal: Patient's chronic conditions and co-morbidity symptoms are monitored and maintained or improved  Outcome: Progressing     Problem: Discharge Planning  Goal: Discharge to home or other facility with appropriate resources  1/28/2025 1258 by Charlee Alfred RN  Outcome: Progressing  1/28/2025 0156 by Tiffany Fernandez LPN  Outcome: Progressing     Problem: Pain  Goal: Verbalizes/displays adequate comfort level or baseline comfort level  1/28/2025 1258 by Charlee Alfred RN  Outcome: Progressing  1/28/2025 0156 by Tiffany Fernandez LPN  Outcome: Progressing     Problem: Safety - Adult  Goal: Free from fall injury  1/28/2025 1258 by Charlee Alfred RN  Outcome: Progressing  1/28/2025 0156 by Tiffany Fernandez LPN  Outcome: Progressing     Problem: Skin/Tissue Integrity  Goal: Skin integrity remains intact  Description: 1.  Monitor for areas of redness and/or skin breakdown  2.  Assess vascular access sites hourly  3.  Every 4-6 hours minimum:  Change oxygen saturation probe site  4.  Every 4-6 hours:  If on nasal continuous positive airway pressure, respiratory therapy assess nares and determine need for appliance change or resting period  Outcome: Progressing

## 2025-01-28 NOTE — CARE COORDINATION
Case Management Assessment  Initial Evaluation    Date/Time of Evaluation: 1/28/2025 4:17 PM  Assessment Completed by: Ita Leon RN    If patient is discharged prior to next notation, then this note serves as note for discharge by case management.    Patient Name: Jessie Frederick                   YOB: 1945  Diagnosis: Acute cystitis without hematuria [N30.00]  Injury of head, initial encounter [S09.90XA]  Fall, initial encounter [W19.XXXA]  Urinary tract infection with hematuria, site unspecified [N39.0, R31.9]  Diarrhea, unspecified type [R19.7]                   Date / Time: 1/27/2025  1:58 AM    Patient Admission Status: Inpatient   Readmission Risk (Low < 19, Mod (19-27), High > 27): Readmission Risk Score: 14.7    Current PCP: Ashwin Cheney MD  PCP verified by CM? Yes    Chart Reviewed: Yes      History Provided by: Patient  Patient Orientation: Alert and Oriented, Person    Patient Cognition: Alert    Hospitalization in the last 30 days (Readmission):  No    If yes, Readmission Assessment in  Navigator will be completed.    Advance Directives:      Code Status: DNR-CCA   Patient's Primary Decision Maker is: Legal Next of Kin    Primary Decision Maker: Sosa Souza - Brother/Sister - [phone]    Discharge Planning:    Patient lives with: Alone Type of Home: Apartment  Primary Care Giver: Self  Patient Support Systems include: None   Current Financial resources: None  Current community resources: None  Current services prior to admission: Durable Medical Equipment            Current DME: Shower Chair ([employer], Grab bars in shower)            Type of Home Care services:  None    ADLS  Prior functional level: Independent in ADLs/IADLs  Current functional level: Other (see comment), Assistance with the following:, Mobility    PT AM-PAC: 16 /24  OT AM-PAC: 15 /24    Family can provide assistance at DC: No  Would you like Case Management to discuss the discharge plan with any other

## 2025-01-28 NOTE — PROGRESS NOTES
Occupational Therapy  Occupational Therapy Daily Treatment Note  Facility/Department: [address]   Patient Name: Jessie Frederick        MRN: [id number]    : 1945    Date of Service: 2025    Chief Complaint   Patient presents with    Diarrhea     Patient brought in [facility] from home. Patient states having weakness and diarrhea this evening. Patient fell hitting her head. Patient states having dinner at a restaurant just hours before     Fall     Patient states getting up to go to the bathroom when he felt her knee buckle and fell hitting the back of her head.      Past Medical History:  has a past medical history of Abnormal Pap smear, Asthma, Atrial fibrillation ([facility]), Bloody discharge from nipple, CAD (coronary artery disease), Cataracts, bilateral, CHF (congestive heart failure) (HCC), Chronic kidney disease, Colon polyp, CVA (cerebral vascular accident) ([facility]), Demyelinating disease ([facility]), Fibromyalgia, Headache, High-risk sexual behavior, History of bone density study, History of migraines, HTN (hypertension), IgG deficiency ([facility]), Multiple sclerosis ([facility]), Myoclonic seizures ([facility]), Optic neuritis, Osteopenia, Pyloric stenosis, Raynaud's disease, and Vasomotor rhinitis.  Past Surgical History:  has a past surgical history that includes Dilation & curettage (); Breast biopsy (); Appendectomy (); Septoplasty (); Hammer toe surgery (); Sinus endoscopy; sinus surgery; Shoulder arthroscopy (); Esophagoscopy (, ); Colonoscopy (); Foreign Body Removal (); laparoscopy (); Coronary angioplasty with stent (2011); Coronary angioplasty with stent (2011); Upper gastrointestinal endoscopy; eye surgery; pr colonoscopy w/biopsy single/multiple (N/A, 2017); pr egd transoral biopsy single/multiple (2017); Upper gastrointestinal endoscopy (2017); Upper gastrointestinal endoscopy (2018); Cardiac surgery; Cardiac catheterization (2020);

## 2025-01-29 ENCOUNTER — APPOINTMENT (OUTPATIENT)
Dept: GENERAL RADIOLOGY | Age: 80
End: 2025-01-29
Payer: MEDICARE

## 2025-01-29 LAB
ANION GAP SERPL CALCULATED.3IONS-SCNC: 15 MMOL/L (ref 9–17)
BASOPHILS # BLD: 0 K/UL (ref 0–0.2)
BASOPHILS NFR BLD: 0 % (ref 0–2)
BUN SERPL-MCNC: 34 MG/DL (ref 8–23)
CALCIUM SERPL-MCNC: 8.7 MG/DL (ref 8.6–10.4)
CHLORIDE SERPL-SCNC: 102 MMOL/L (ref 98–107)
CO2 SERPL-SCNC: 19 MMOL/L (ref 20–31)
CREAT SERPL-MCNC: 1.7 MG/DL (ref 0.5–0.9)
EOSINOPHIL # BLD: 0 K/UL (ref 0–0.4)
EOSINOPHILS RELATIVE PERCENT: 0 % (ref 1–4)
ERYTHROCYTE [DISTWIDTH] IN BLOOD BY AUTOMATED COUNT: 25.9 % (ref 12.5–15.4)
GFR, ESTIMATED: 31 ML/MIN/1.73M2
GLUCOSE SERPL-MCNC: 110 MG/DL (ref 70–99)
HCT VFR BLD AUTO: 33.4 % (ref 36–46)
HGB BLD-MCNC: 11 G/DL (ref 12–16)
LYMPHOCYTES NFR BLD: 1.68 K/UL (ref 1–4.8)
LYMPHOCYTES RELATIVE PERCENT: 11 % (ref 24–44)
MCH RBC QN AUTO: 28 PG (ref 26–34)
MCHC RBC AUTO-ENTMCNC: 32.9 G/DL (ref 31–37)
MCV RBC AUTO: 85 FL (ref 80–100)
MONOCYTES NFR BLD: 0.15 K/UL (ref 0.1–0.8)
MONOCYTES NFR BLD: 1 % (ref 1–7)
MORPHOLOGY: ABNORMAL
NEUTROPHILS NFR BLD: 88 % (ref 36–66)
NEUTS SEG NFR BLD: 13.47 K/UL (ref 1.8–7.7)
PLATELET # BLD AUTO: 140 K/UL (ref 140–450)
PMV BLD AUTO: 10.3 FL (ref 6–12)
POTASSIUM SERPL-SCNC: 4.1 MMOL/L (ref 3.7–5.3)
RBC # BLD AUTO: 3.93 M/UL (ref 4–5.2)
SODIUM SERPL-SCNC: 136 MMOL/L (ref 135–144)
WBC OTHER # BLD: 15.3 K/UL (ref 3.5–11)

## 2025-01-29 PROCEDURE — 94761 N-INVAS EAR/PLS OXIMETRY MLT: CPT

## 2025-01-29 PROCEDURE — 97530 THERAPEUTIC ACTIVITIES: CPT

## 2025-01-29 PROCEDURE — 6370000000 HC RX 637 (ALT 250 FOR IP): Performed by: STUDENT IN AN ORGANIZED HEALTH CARE EDUCATION/TRAINING PROGRAM

## 2025-01-29 PROCEDURE — 36415 COLL VENOUS BLD VENIPUNCTURE: CPT

## 2025-01-29 PROCEDURE — 97535 SELF CARE MNGMENT TRAINING: CPT

## 2025-01-29 PROCEDURE — 97110 THERAPEUTIC EXERCISES: CPT

## 2025-01-29 PROCEDURE — 2580000003 HC RX 258: Performed by: STUDENT IN AN ORGANIZED HEALTH CARE EDUCATION/TRAINING PROGRAM

## 2025-01-29 PROCEDURE — 2700000000 HC OXYGEN THERAPY PER DAY

## 2025-01-29 PROCEDURE — 99232 SBSQ HOSP IP/OBS MODERATE 35: CPT | Performed by: STUDENT IN AN ORGANIZED HEALTH CARE EDUCATION/TRAINING PROGRAM

## 2025-01-29 PROCEDURE — 71045 X-RAY EXAM CHEST 1 VIEW: CPT

## 2025-01-29 PROCEDURE — 85025 COMPLETE CBC W/AUTO DIFF WBC: CPT

## 2025-01-29 PROCEDURE — 1200000000 HC SEMI PRIVATE

## 2025-01-29 PROCEDURE — 80048 BASIC METABOLIC PNL TOTAL CA: CPT

## 2025-01-29 PROCEDURE — 2500000003 HC RX 250 WO HCPCS: Performed by: STUDENT IN AN ORGANIZED HEALTH CARE EDUCATION/TRAINING PROGRAM

## 2025-01-29 RX ORDER — CIPROFLOXACIN 250 MG/1
500 TABLET, FILM COATED ORAL
Status: DISCONTINUED | OUTPATIENT
Start: 2025-01-30 | End: 2025-01-29

## 2025-01-29 RX ORDER — NITROFURANTOIN 25; 75 MG/1; MG/1
100 CAPSULE ORAL EVERY 12 HOURS SCHEDULED
Status: DISCONTINUED | OUTPATIENT
Start: 2025-01-29 | End: 2025-01-31 | Stop reason: HOSPADM

## 2025-01-29 RX ORDER — ENOXAPARIN SODIUM 100 MG/ML
30 INJECTION SUBCUTANEOUS DAILY
Status: DISCONTINUED | OUTPATIENT
Start: 2025-01-29 | End: 2025-01-31 | Stop reason: HOSPADM

## 2025-01-29 RX ORDER — SODIUM CHLORIDE 9 MG/ML
INJECTION, SOLUTION INTRAVENOUS CONTINUOUS
Status: DISCONTINUED | OUTPATIENT
Start: 2025-01-29 | End: 2025-01-31 | Stop reason: HOSPADM

## 2025-01-29 RX ADMIN — NITROFURANTOIN MONOHYDRATE/MACROCRYSTALS 100 MG: 25; 75 CAPSULE ORAL at 21:22

## 2025-01-29 RX ADMIN — CLONIDINE HYDROCHLORIDE 0.1 MG: 0.1 TABLET ORAL at 08:25

## 2025-01-29 RX ADMIN — CLOPIDOGREL BISULFATE 75 MG: 75 TABLET ORAL at 08:25

## 2025-01-29 RX ADMIN — ACETAMINOPHEN 650 MG: 325 TABLET ORAL at 09:58

## 2025-01-29 RX ADMIN — PAROXETINE HYDROCHLORIDE 20 MG: 20 TABLET, FILM COATED ORAL at 08:25

## 2025-01-29 RX ADMIN — METOPROLOL SUCCINATE 25 MG: 25 TABLET, EXTENDED RELEASE ORAL at 08:25

## 2025-01-29 RX ADMIN — SODIUM CHLORIDE, PRESERVATIVE FREE 10 ML: 5 INJECTION INTRAVENOUS at 08:26

## 2025-01-29 RX ADMIN — SODIUM CHLORIDE: 9 INJECTION, SOLUTION INTRAVENOUS at 14:43

## 2025-01-29 RX ADMIN — ATORVASTATIN CALCIUM 80 MG: 40 TABLET, FILM COATED ORAL at 21:22

## 2025-01-29 RX ADMIN — ACETAMINOPHEN 650 MG: 325 TABLET ORAL at 21:21

## 2025-01-29 ASSESSMENT — PAIN DESCRIPTION - PAIN TYPE
TYPE: CHRONIC PAIN
TYPE: CHRONIC PAIN

## 2025-01-29 ASSESSMENT — PAIN DESCRIPTION - LOCATION
LOCATION: GENERALIZED
LOCATION: OTHER (COMMENT)

## 2025-01-29 ASSESSMENT — PAIN SCALES - GENERAL
PAINLEVEL_OUTOF10: 1
PAINLEVEL_OUTOF10: 3
PAINLEVEL_OUTOF10: 8
PAINLEVEL_OUTOF10: 8

## 2025-01-29 ASSESSMENT — PAIN DESCRIPTION - DESCRIPTORS
DESCRIPTORS: ACHING
DESCRIPTORS: ACHING

## 2025-01-29 ASSESSMENT — PAIN - FUNCTIONAL ASSESSMENT: PAIN_FUNCTIONAL_ASSESSMENT: ACTIVITIES ARE NOT PREVENTED

## 2025-01-29 ASSESSMENT — PAIN DESCRIPTION - FREQUENCY: FREQUENCY: INTERMITTENT

## 2025-01-29 ASSESSMENT — PAIN DESCRIPTION - ONSET: ONSET: ON-GOING

## 2025-01-29 NOTE — PROGRESS NOTES
Pharmacy Note     Renal Dose Adjustment    Jessie Frederick is a 79 y.o. female. Pharmacist assessment of renally cleared medications.    Recent Labs     01/28/25  0642 01/29/25  0613   BUN 29* 34*       Recent Labs     01/28/25  0642 01/29/25  0613   CREATININE 1.3* 1.7*       Estimated Creatinine Clearance: 26 mL/min (A) (based on SCr of 1.7 mg/dL (H)).      Height:   Ht Readings from Last 1 Encounters:   01/27/25 1.549 m (5' 1\")     Weight:  Wt Readings from Last 1 Encounters:   01/27/25 78 kg (172 lb)       The following medication dose has been adjusted based upon renal function per P&T Guidelines:             Cipro 500 mg twice daily changed to cipro 500 once daily  Lovenox 40 mg once daily changed 30 mg daily    Maryann Frederick, PharmD 1/29/2025 7:25 AM

## 2025-01-29 NOTE — PLAN OF CARE
Problem: Chronic Conditions and Co-morbidities  Goal: Patient's chronic conditions and co-morbidity symptoms are monitored and maintained or improved  1/29/2025 0134 by Bailee De Los Santos RN  Outcome: Progressing  Flowsheets (Taken 1/28/2025 2020)  Care Plan - Patient's Chronic Conditions and Co-Morbidity Symptoms are Monitored and Maintained or Improved:   Monitor and assess patient's chronic conditions and comorbid symptoms for stability, deterioration, or improvement   Collaborate with multidisciplinary team to address chronic and comorbid conditions and prevent exacerbation or deterioration     Problem: Discharge Planning  Goal: Discharge to home or other facility with appropriate resources  1/29/2025 0134 by Bailee De Los Santos RN  Outcome: Progressing  Flowsheets (Taken 1/28/2025 2020)  Discharge to home or other facility with appropriate resources:   Identify barriers to discharge with patient and caregiver   Arrange for needed discharge resources and transportation as appropriate   Identify discharge learning needs (meds, wound care, etc)     Problem: Pain  Goal: Verbalizes/displays adequate comfort level or baseline comfort level  1/29/2025 0134 by Bailee De Los Santos RN  Outcome: Progressing     Problem: Safety - Adult  Goal: Free from fall injury  1/29/2025 0134 by Bailee De Los Santos RN  Outcome: Progressing     Problem: Skin/Tissue Integrity  Goal: Skin integrity remains intact  Description: 1.  Monitor for areas of redness and/or skin breakdown  2.  Assess vascular access sites hourly  3.  Every 4-6 hours minimum:  Change oxygen saturation probe site  4.  Every 4-6 hours:  If on nasal continuous positive airway pressure, respiratory therapy assess nares and determine need for appliance change or resting period  1/29/2025 0134 by Bailee De Los Santos RN  Outcome: Progressing  Flowsheets (Taken 1/28/2025 2020)  Skin Integrity Remains Intact: Monitor for areas of redness and/or skin breakdown

## 2025-01-29 NOTE — PLAN OF CARE
Pt seems to be doing better today, more awake and less lethargic than yesterday, pt admitted for fall and UTI, abx switched to macrobid, pt started on IV fluids today, lasix and aldactone put on hold due to elevated creatinine, portable chest xray negative, pt sat in chair for most of day, pt will need placement at discharge     Problem: Chronic Conditions and Co-morbidities  Goal: Patient's chronic conditions and co-morbidity symptoms are monitored and maintained or improved  Outcome: Progressing       Problem: Safety - Adult  Goal: Free from fall injury  Outcome: Progressing     Patient has remained free from falls this shift. Patient is alert and oriented times four. Bed to lowest position with door open. Patient care items and call light in reach. Patient uses call light appropriately for assist. Will continue to monitor. Please see fall assessment.

## 2025-01-29 NOTE — CARE COORDINATION
Laureano spoke with Hugh @ [facility], they are out of network.    [id number] Laureano spoke with patient updated on [facility],  She requests referral be sent to [facility], [facility] and [facility]. Referrals sent.    Will go to any of the 3 facilities that will    Okay to orders    Please call    Kevin Esquivel MD

## 2025-01-29 NOTE — PROGRESS NOTES
Occupational Therapy Daily Treatment Note  Facility/Department: [address]     Patient Name: Jessie Frederick        MRN: [id number]    : 1945    Date of Service: 2025    Chief Complaint   Patient presents with    Diarrhea     Patient brought in [facility] from home. Patient states having weakness and diarrhea this evening. Patient fell hitting her head. Patient states having dinner at a restaurant just hours before     Fall     Patient states getting up to go to the bathroom when he felt her knee buckle and fell hitting the back of her head.        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  Past Surgical History:  has a past surgical history that includes Dilation & curettage (); Breast biopsy (); Appendectomy (); Septoplasty (); Hammer toe surgery (); Sinus endoscopy; sinus surgery; Shoulder arthroscopy (); Esophagoscopy (, ); Colonoscopy (); Foreign Body Removal (); laparoscopy (); Coronary angioplasty with stent (2011); Coronary angioplasty with stent (2011); Upper gastrointestinal endoscopy; eye surgery; pr colonoscopy w/biopsy single/multiple (N/A, 2017); pr egd transoral biopsy single/multiple (2017); Upper gastrointestinal endoscopy (2017); Upper gastrointestinal endoscopy (2018); Cardiac surgery; Cardiac catheterization (2020); Cardioversion  cues. Once in recliner chair, pt able to assist in scooting hips back into chair with extra time.         Transfers  Sit to stand: Minimal assistance  Stand to sit: Minimal assistance  Transfer Comments: MIN A for sit<>stand from EOB and recliner chair with extra time and effort; vc's for proper hand placement and to increase upright posture.         Functional Mobility: Minimal assistance  Functional Mobility Skilled Clinical Factors: MIN x2 for ~3-4 steps EOB > recliner chair with extra time and effort required. MIN A for mgmt of RW to safely access chair and MIN A for standing balance throughout. MOD directional cues provided throughout and cues to maintain upright posture in standing with forward flexed posture noted at times.          Patient Education  Patient Education  Education Given To: Patient  Education Provided: Transfer Training;Energy Conservation;Fall Prevention Strategies;Role of Therapy;Plan of Care  Education Provided Comments: Role of OT and continued OT POC, safety during transfer training and functional mobility with RW mgmt, techniques to increase upright posture in standing/sitting, implementation of rest breaks during session  Education Method: Verbal;Demonstration  Barriers to Learning: Cognition  Education Outcome: Continued education needed;Verbalized understanding    Goals  Short Term Goals  Time Frame for Short Term Goals: By 14 visits, pt will  Short Term Goal 1: demo ability to perform LB ADLs/toileting tasks with SBA with use of AE/compensatory techniques in order to return to PLOF.  Short Term Goal 2: demo ability to perform functional transfers/functional mobility with MOD I with use of LRAD PRN with no vcs for safety in order to increase ADL/IADL IND.  Short Term Goal 3: demo 10+ minutes of static/dynamic standing with unilateral/no UE support during functional tasks in order to increase safety and IND.  Short Term Goal 4: verbalize 2-3 fall prevention technques and/or AD to be

## 2025-01-29 NOTE — PROGRESS NOTES
Comments: Pt falling asleep during exs and required cues for alertness    Safety Devices  Type of Devices: Left in bed;Call light within reach;Bed alarm in place  Restraints  Restraints Initially in Place: No    AM-PAC  AM-[facility] Basic Mobility - Inpatient   How much help is needed turning from your back to your side while in a flat bed without using bedrails?: A Lot  How much help is needed moving from lying on your back to sitting on the side of a flat bed without using bedrails?: Total  How much help is needed moving to and from a bed to a chair?: Total  How much help is needed standing up from a chair using your arms?: Total  How much help is needed walking in hospital room?: Total  How much help is needed climbing 3-5 steps with a railing?: Total  AM-PAC Inpatient Mobility Raw Score : 7  AM-PAC Inpatient T-Scale Score : 26.42  Mobility Inpatient CMS 0-100% Score: 92.36  Mobility Inpatient CMS G-Code Modifier : CM    Restrictions/Precautions  Restrictions/Precautions  Restrictions/Precautions: General Precautions;Bed Alarm;Fall Risk  Activity Level: Up with Assist  Required Braces or Orthoses?: No  Implants Present? : Pacemaker (Watchman and pacemaker)  Position Activity Restriction  Other Position/Activity Restrictions: hx of CVA with reported L sided weakness, hx MS  O2 Device: Nasal cannula (1L o2)    Subjective  General  Patient assessed for rehabilitation services?: Yes  Family/Caregiver Present: No  Follows Commands: Within Functional Limits    General  General Comments: RN stated, pt up most of day. Participated in OT, stayed up in recliner, used bathroom and now back to bed    Subjective  Subjective: Pt denies pain, however, c/o increase fatigue. Pt requested to stay in bed after PT session due to fatigue and wanting to sleep       Objective  Orientation  Overall Orientation Status: Within Functional Limits  Cognition  Overall Cognitive Status: Exceptions  Arousal/Alertness: Appears intact (Pt falling  training, Functional mobility training, Transfer training, Gait training, Endurance training, Safety education & training, Therapeutic activities, Home exercise program, Pain management    Goals  Patient Goals   Patient Goals : return to rehab then home  Short Term Goals  Time Frame for Short Term Goals: 14  Short Term Goal 1: Pt will be modified indep bed mobility.  Short Term Goal 2: Pt will be modified indep transfers with RW.  Short Term Goal 3: Pt will be modified indep 150ft with RW.  Short Term Goal 4: Pt will go up/down 1 step with RW modified indep.  Short Term Goal 5: Pt will walk 50ft w/out device modified indep for household distances.    Minutes  PT Individual Minutes  Time In: 1531  Time Out: 1556  Minutes: 25  Time Code Minutes  Timed Code Treatment Minutes: 23 Minutes    Electronically signed by MARCI RAIN PT on 1/29/25 at 4:17 PM EST

## 2025-01-29 NOTE — PROGRESS NOTES
[facility]  Office: [phone]  Nura Mccall DO, Tu Horton DO, Jesus Madison DO, Galdino Chowdhury, DO, Laurie Gutierrez MD, Lakeshia Wong MD, Lupe Campos MD, Christina Flores MD,  Archie Cordova MD, Oliverio Peter MD, Gopi Taylor DO, Ny Urbina MD,  Rubio Rocha DO, Dayana Webb MD, Miguel Sales MD, Michael Mccall DO, Sherly Zaidi MD,  Miguelito Angel DO, Cristela Camarena MD, Terri Harding MD, Alyx Jones MD, Nichole Patel MD,  Mike Davalos MD, Yue Khan MD, Margaret Rocha MD, Hernando Wallace DO, Michele Matos MD,  Willem Hoffman MD, Shirley Waterhouse, CNP,  Daisy Nolasco, CNP, Ayah Hardin, CNP, Yogesh Cabrera, CNP,  Smita Rivero, DNP, Sanjana Aranda, CNP, Carlyn Parekh, CNP, Danay Carrasco, CNP, Paige Pacheco, CNP, Jen Castro, CNP, Doyle Boykin PA-C, Rea Bustillo, CNS, Pam Celeste, CNP, Dionna Pride, CNP         [facility]   IN-PATIENT SERVICE   [facility]    Progress Note    1/29/2025    1:16 PM    Name:   Jessie Frederick  MRN:     [id number]     Acct:      [id number]   Room:   [facility] Day:  2  Admit Date:  1/27/2025  1:58 AM    PCP:   Ashwin Cheney MD  Code Status:  DNR-CCA    Subjective:     Patient seen and examined this morning, she was sitting in the recliner.  RN reported that patient is confused and lethargic.  On my evaluation, initially patient was sitting lethargic in the recliner, head hanging to the side and not communicating well.  Writer tried to engage her in conversation, asked her orientation questions.  Patient was up and awake and very alert, responding to all the questions and following commands.  Writer did call the nurse bedside to witness how she is interacting.  She seen very pleasant and back to her baseline.  Upon questioning about her family, patient stated that nobody cares for her and she does not like family members to direct her what to do and what not to do.    Requested RN to get therapy    Component Value Date/Time    SPECIAL left wrist 10 ml 12/08/2023 06:19 PM     Lab Results   Component Value Date/Time    CULTURE ESCHERICHIA COLI >100,000 CFU/ML (A) 01/27/2025 05:40 AM       Radiology:  CT HEAD WO CONTRAST    Result Date: 1/27/2025  Atrophy and chronic microvascular disease without acute intracranial abnormality.     CT ABDOMEN PELVIS WO CONTRAST Additional Contrast? None    Result Date: 1/27/2025  1.  Mild thickening of the wall at the rectum and rectosigmoid junction suggesting proctitis.  There is no bowel obstruction, ascites, or pneumoperitoneum. 2.  Small pericardial effusion is noted.  Thickening of the interstitium and interlobular septa thickening in the lower lungs could relate to congestive changes and or interstitial disease. 3.  Stable right adrenal nodule suggesting a adenoma.  No follow-up imaging is recommended. 4.  Borderline thickness of the urinary bladder wall.  Correlate for urinary symptoms and or urinalysis.       Physical Examination:     General appearance:  alert, cooperative and no distress  Mental Status:  oriented to person, place and time and normal affect  Lungs:  clear to auscultation bilaterally, normal effort  Heart:  regular rate and rhythm, no murmur  Abdomen:  soft, nontender, nondistended, normal bowel sounds, no masses, hepatomegaly, splenomegaly  Extremities:  no edema, redness, tenderness in the calves  Skin:  no gross lesions, rashes, induration    Assessment:     Hospital Problems             Last Modified POA    * (Principal) Acute cystitis without hematuria 1/27/2025 Yes    Physical debility 1/27/2025 Yes    Multiple sclerosis (HCC) (Chronic) 1/27/2025 Yes    Essential hypertension (Chronic) 1/27/2025 Yes    Chronic diastolic heart failure (HCC) 1/27/2025 Yes    Paroxysmal A-fib (HCC) (Chronic) 1/27/2025 Yes       Plan:      Acute cystitis without hematuria   -Ciprofloxacin 500 mg q12h, switch to nitrofurantoin 10 Mg twice daily for 5 days.  -Urine

## 2025-01-30 LAB
ANION GAP SERPL CALCULATED.3IONS-SCNC: 12 MMOL/L (ref 9–17)
BASOPHILS # BLD: 0 K/UL (ref 0–0.2)
BASOPHILS NFR BLD: 0 % (ref 0–2)
BUN SERPL-MCNC: 36 MG/DL (ref 8–23)
CALCIUM SERPL-MCNC: 8.7 MG/DL (ref 8.6–10.4)
CHLORIDE SERPL-SCNC: 103 MMOL/L (ref 98–107)
CO2 SERPL-SCNC: 21 MMOL/L (ref 20–31)
CREAT SERPL-MCNC: 1.4 MG/DL (ref 0.5–0.9)
EOSINOPHIL # BLD: 0 K/UL (ref 0–0.4)
EOSINOPHILS RELATIVE PERCENT: 0 % (ref 1–4)
ERYTHROCYTE [DISTWIDTH] IN BLOOD BY AUTOMATED COUNT: 25.8 % (ref 12.5–15.4)
GFR, ESTIMATED: 39 ML/MIN/1.73M2
GLUCOSE SERPL-MCNC: 78 MG/DL (ref 70–99)
HCT VFR BLD AUTO: 32.1 % (ref 36–46)
HGB BLD-MCNC: 10.4 G/DL (ref 12–16)
LYMPHOCYTES NFR BLD: 1.29 K/UL (ref 1–4.8)
LYMPHOCYTES RELATIVE PERCENT: 13 % (ref 24–44)
MAGNESIUM SERPL-MCNC: 2 MG/DL (ref 1.6–2.6)
MCH RBC QN AUTO: 27.8 PG (ref 26–34)
MCHC RBC AUTO-ENTMCNC: 32.4 G/DL (ref 31–37)
MCV RBC AUTO: 86 FL (ref 80–100)
MONOCYTES NFR BLD: 0.3 K/UL (ref 0.1–0.8)
MONOCYTES NFR BLD: 3 % (ref 1–7)
MORPHOLOGY: NORMAL
NEUTROPHILS NFR BLD: 84 % (ref 36–66)
NEUTS SEG NFR BLD: 8.31 K/UL (ref 1.8–7.7)
PLATELET # BLD AUTO: 145 K/UL (ref 140–450)
PMV BLD AUTO: 10.2 FL (ref 6–12)
POTASSIUM SERPL-SCNC: 3.5 MMOL/L (ref 3.7–5.3)
RBC # BLD AUTO: 3.73 M/UL (ref 4–5.2)
SODIUM SERPL-SCNC: 136 MMOL/L (ref 135–144)
WBC OTHER # BLD: 9.9 K/UL (ref 3.5–11)

## 2025-01-30 PROCEDURE — 2580000003 HC RX 258: Performed by: STUDENT IN AN ORGANIZED HEALTH CARE EDUCATION/TRAINING PROGRAM

## 2025-01-30 PROCEDURE — 97530 THERAPEUTIC ACTIVITIES: CPT

## 2025-01-30 PROCEDURE — 80048 BASIC METABOLIC PNL TOTAL CA: CPT

## 2025-01-30 PROCEDURE — 1200000000 HC SEMI PRIVATE

## 2025-01-30 PROCEDURE — 97116 GAIT TRAINING THERAPY: CPT

## 2025-01-30 PROCEDURE — 83735 ASSAY OF MAGNESIUM: CPT

## 2025-01-30 PROCEDURE — 6360000002 HC RX W HCPCS: Performed by: STUDENT IN AN ORGANIZED HEALTH CARE EDUCATION/TRAINING PROGRAM

## 2025-01-30 PROCEDURE — 97110 THERAPEUTIC EXERCISES: CPT

## 2025-01-30 PROCEDURE — 6370000000 HC RX 637 (ALT 250 FOR IP): Performed by: STUDENT IN AN ORGANIZED HEALTH CARE EDUCATION/TRAINING PROGRAM

## 2025-01-30 PROCEDURE — 36415 COLL VENOUS BLD VENIPUNCTURE: CPT

## 2025-01-30 PROCEDURE — 99232 SBSQ HOSP IP/OBS MODERATE 35: CPT | Performed by: STUDENT IN AN ORGANIZED HEALTH CARE EDUCATION/TRAINING PROGRAM

## 2025-01-30 PROCEDURE — 85025 COMPLETE CBC W/AUTO DIFF WBC: CPT

## 2025-01-30 PROCEDURE — 97535 SELF CARE MNGMENT TRAINING: CPT

## 2025-01-30 RX ORDER — POTASSIUM CHLORIDE 1500 MG/1
40 TABLET, EXTENDED RELEASE ORAL ONCE
Status: COMPLETED | OUTPATIENT
Start: 2025-01-30 | End: 2025-01-30

## 2025-01-30 RX ADMIN — CLONIDINE HYDROCHLORIDE 0.1 MG: 0.1 TABLET ORAL at 08:36

## 2025-01-30 RX ADMIN — METOPROLOL SUCCINATE 25 MG: 25 TABLET, EXTENDED RELEASE ORAL at 08:35

## 2025-01-30 RX ADMIN — ACETAMINOPHEN 650 MG: 325 TABLET ORAL at 03:57

## 2025-01-30 RX ADMIN — NITROFURANTOIN MONOHYDRATE/MACROCRYSTALS 100 MG: 25; 75 CAPSULE ORAL at 08:37

## 2025-01-30 RX ADMIN — ENOXAPARIN SODIUM 30 MG: 100 INJECTION SUBCUTANEOUS at 08:37

## 2025-01-30 RX ADMIN — SODIUM CHLORIDE: 9 INJECTION, SOLUTION INTRAVENOUS at 23:36

## 2025-01-30 RX ADMIN — POTASSIUM CHLORIDE 40 MEQ: 1500 TABLET, EXTENDED RELEASE ORAL at 11:00

## 2025-01-30 RX ADMIN — NITROFURANTOIN MONOHYDRATE/MACROCRYSTALS 100 MG: 25; 75 CAPSULE ORAL at 20:27

## 2025-01-30 RX ADMIN — CLOPIDOGREL BISULFATE 75 MG: 75 TABLET ORAL at 08:36

## 2025-01-30 RX ADMIN — PAROXETINE HYDROCHLORIDE 20 MG: 20 TABLET, FILM COATED ORAL at 08:36

## 2025-01-30 RX ADMIN — ACETAMINOPHEN 650 MG: 325 TABLET ORAL at 15:20

## 2025-01-30 RX ADMIN — SODIUM CHLORIDE: 9 INJECTION, SOLUTION INTRAVENOUS at 04:26

## 2025-01-30 RX ADMIN — ATORVASTATIN CALCIUM 80 MG: 40 TABLET, FILM COATED ORAL at 20:27

## 2025-01-30 ASSESSMENT — PAIN DESCRIPTION - LOCATION: LOCATION: GENERALIZED

## 2025-01-30 ASSESSMENT — PAIN SCALES - GENERAL: PAINLEVEL_OUTOF10: 4

## 2025-01-30 NOTE — PROGRESS NOTES
Physical Therapy  Facility/Department: [address]   Physical Therapy Daily Treatment Note    Patient Name: Jessie Frederick        MRN: [id number]    : 1945    Date of Service: 2025    Chief Complaint   Patient presents with    Diarrhea     Patient brought in [facility] from home. Patient states having weakness and diarrhea this evening. Patient fell hitting her head. Patient states having dinner at a restaurant just hours before     Fall     Patient states getting up to go to the bathroom when he felt her knee buckle and fell hitting the back of her head.      Past Medical History:  has a past medical history of Abnormal Pap smear, Asthma, Atrial fibrillation ([facility]), Bloody discharge from nipple, CAD (coronary artery disease), Cataracts, bilateral, CHF (congestive heart failure) (HCC), Chronic kidney disease, Colon polyp, CVA (cerebral vascular accident) ([facility]), Demyelinating disease ([facility]), Fibromyalgia, Headache, High-risk sexual behavior, History of bone density study, History of migraines, HTN (hypertension), IgG deficiency ([facility]), Multiple sclerosis ([facility]), 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  Past Surgical History:  has a past surgical history that includes Dilation & curettage (); Breast biopsy (); Appendectomy (); Septoplasty (); Hammer toe surgery (); Sinus endoscopy; sinus surgery; Shoulder arthroscopy (); Esophagoscopy (, ); Colonoscopy (); Foreign Body Removal (); laparoscopy (); Coronary angioplasty with stent (2011); Coronary angioplasty with stent (2011); Upper gastrointestinal endoscopy; eye surgery; pr colonoscopy w/biopsy single/multiple (N/A, 2017); pr egd transoral biopsy single/multiple (2017); Upper gastrointestinal endoscopy (2017); Upper gastrointestinal endoscopy (2018); Cardiac surgery; Cardiac catheterization (2020);

## 2025-01-30 NOTE — PROGRESS NOTES
[facility]  Office: [phone]  Nura Mccall DO, Tu Horton DO, Jesus Madison DO, Galdino Chowdhury DO, Laurie Gutierrez MD, Lakeshia Wong MD, Lupe Campos MD, Christina Flores MD,  Archie Cordova MD, Oliverio Peter MD, Gopi Taylor DO, Ny Urbina MD,  Rubio Rocha DO, Dayana Webb MD, Miguel Sales MD, Michael Mccall DO, Sherly Zaidi MD,  Miguelito Anegl DO, Cristela Camarena MD, Terri Harding MD, Alyx Jones MD, Nichole Patel MD,  Mike Davalos MD, Yue Khan MD, Margaret Rocha MD, Hernando Wallace DO, Michele Matos MD,  Willem Hoffman MD, Shirley Waterhouse, CNP,  Daisy Nolasco, CNP, Ayah aHrdin, CNP, Yogesh Cabrera, CNP,  Smita Rivero, DNP, Sanjana Aranda, CNP, Carlyn Parekh, CNP, Danay Carrasco, CNP, Paige Pacheco, CNP, Jen Castro, CNP, Doyle Boykin PA-C, Rea Bustillo, CNS, Pam Celeste, CNP, Dionna Pride, CNP         [facility]   IN-PATIENT SERVICE   [facility]    Progress Note    1/30/2025    12:07 PM    Name:   Jessie Frederick  MRN:     [id number]     Acct:      [id number]   Room:   [address] [place] [address] Day:  3  Admit Date:  1/27/2025  1:58 AM    PCP:   Ashwin Cheney MD  Code Status:  DNR-CCA    Subjective:     Patient seen and examined, sitting in the recliner, combing her hair.  No acute events overnight.  She is much more alert and oriented.  Labs reviewed, creatinine trended down to 1.4, potassium 3.5, replaced.  No leukocytosis, hemoglobin stable 10.4.  Chest x-ray reviewed.  PT/OT on board,  working on placement    Medications:     Allergies:    Allergies   Allergen Reactions    Dofetilide Other (See Comments)    Lisinopril-Hydrochlorothiazide Anaphylaxis and Hives    Sulfa Antibiotics Anaphylaxis    Penicillins Hives    Polyethylene Glycol Hives    Actifed Cold-Allergy [Chlorpheniramine-Phenylephrine]     Altace [Ramipril]      Chronic cough    Cephalosporins Hives    Coreg [Carvedilol]      bloating    Dml Facial  kidney function.     HFpEF   -Not in acute exacerbation   -Resumed home Lasix and Aldactone.     History of CVA   -Continue home aspirin and Plavix      Paroxysmal atrial fibrillation   -S/P Watchman procedure      HTN   -Continue home metoprolol   -Continue home clonidine      HLD   -Continue home atorvastatin      Advanced care planning   -Code status is DNR-CCA   -PT/OT on board.    Discharge planning: PT OT on board, plan for discharge to facility versus home with home care.   on board.  Continue to monitor kidney functions and mentation.    Medical Decision Making: Medium      Lucien Nassar MD  1/30/2025  12:07 PM

## 2025-01-30 NOTE — PLAN OF CARE
Problem: Chronic Conditions and Co-morbidities  Goal: Patient's chronic conditions and co-morbidity symptoms are monitored and maintained or improved  1/30/2025 1611 by Rafi Crawford LPN  Outcome: Progressing  1/30/2025 0224 by Bailee De Los Santos RN  Outcome: Progressing  Flowsheets (Taken 1/29/2025 2000)  Care Plan - Patient's Chronic Conditions and Co-Morbidity Symptoms are Monitored and Maintained or Improved:   Monitor and assess patient's chronic conditions and comorbid symptoms for stability, deterioration, or improvement   Collaborate with multidisciplinary team to address chronic and comorbid conditions and prevent exacerbation or deterioration     Problem: Discharge Planning  Goal: Discharge to home or other facility with appropriate resources  1/30/2025 1611 by Rafi Crawford LPN  Outcome: Progressing  1/30/2025 0224 by Bailee De Los Santos RN  Outcome: Progressing  Flowsheets (Taken 1/29/2025 2000)  Discharge to home or other facility with appropriate resources:   Identify barriers to discharge with patient and caregiver   Arrange for needed discharge resources and transportation as appropriate   Identify discharge learning needs (meds, wound care, etc)     Problem: Pain  Goal: Verbalizes/displays adequate comfort level or baseline comfort level  1/30/2025 1611 by Rafi Crawford LPN  Outcome: Progressing  1/30/2025 0224 by Bailee De Los Santos RN  Outcome: Progressing     Problem: Safety - Adult  Goal: Free from fall injury  1/30/2025 1611 by Rafi Crawford LPN  Outcome: Progressing  1/30/2025 0224 by Bailee De Los Santos RN  Outcome: Progressing     Problem: Skin/Tissue Integrity  Goal: Skin integrity remains intact  Description: 1.  Monitor for areas of redness and/or skin breakdown  2.  Assess vascular access sites hourly  3.  Every 4-6 hours minimum:  Change oxygen saturation probe site  4.  Every 4-6 hours:  If on nasal continuous positive airway pressure, respiratory therapy assess nares and

## 2025-01-30 NOTE — PROGRESS NOTES
Occupational Therapy  Occupational Therapy Daily Treatment Note  Facility/Department: [address]   Patient Name: Jessie Frederick        MRN: [id number]    : 1945    Date of Service: 2025    Chief Complaint   Patient presents with    Diarrhea     Patient brought in [facility] from home. Patient states having weakness and diarrhea this evening. Patient fell hitting her head. Patient states having dinner at a restaurant just hours before     Fall     Patient states getting up to go to the bathroom when he felt her knee buckle and fell hitting the back of her head.      Past Medical History:  has a past medical history of Abnormal Pap smear, Asthma, Atrial fibrillation ([facility]), Bloody discharge from nipple, CAD (coronary artery disease), Cataracts, bilateral, CHF (congestive heart failure) (HCC), Chronic kidney disease, Colon polyp, CVA (cerebral vascular accident) (Formerly [facility]), Demyelinating disease ([facility]), Fibromyalgia, Headache, High-risk sexual behavior, History of bone density study, History of migraines, HTN (hypertension), IgG deficiency ([facility]), Multiple sclerosis ([facility]), 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  Past Surgical History:  has a past surgical history that includes Dilation & curettage (); Breast biopsy (); Appendectomy (); Septoplasty (); Hammer toe surgery (); Sinus endoscopy; sinus surgery; Shoulder arthroscopy (); Esophagoscopy (, ); Colonoscopy (); Foreign Body Removal (); laparoscopy (); Coronary angioplasty with stent (2011); Coronary angioplasty with stent (2011); Upper gastrointestinal endoscopy; eye surgery; pr colonoscopy w/biopsy single/multiple (N/A, 2017); pr egd transoral biopsy single/multiple (2017); Upper gastrointestinal endoscopy (2017); Upper gastrointestinal endoscopy (2018); Cardiac surgery; Cardiac catheterization (2020);

## 2025-01-30 NOTE — CARE COORDINATION
SNF referrals did not send yesterday. CM sent referrals to [facility] and [facility].    0845- CM called to follow up on referrals and [facility] and [facility] all do not have bed availability. Additional SNF choices needed.    0900- CM updated patient and she requests additional SNF referrals to [facility], [facility] and [facility]. Referrals sent.    1045- CM called and spoke with Pam from [facility] and she states that they have beds available at both [facility] and [facility]. Pam to review referral then update CM when decision on acceptance has been made.    1215- CM called and left message requesting call back for Jessica with [facility].    1520- CM called and spoke with Pam from [facility] and they are able to accept.    1700- [facility] precert initiated for [facility] and listed as pending.

## 2025-01-30 NOTE — PROGRESS NOTES
PHARMACY NOTE:    The electrolyte replacement protocol for potassium/magnesium has been discontinued per P&T guidelines because the patient has reduced renal function (CrCl < 30 mL/min).      The patient's most recent potassium & magnesium levels are:  Recent Labs     01/28/25  0642 01/29/25  0613   K 4.4 4.1     Estimated Creatinine Clearance: 26 mL/min (A) (based on SCr of 1.7 mg/dL (H)).    For patients with decreased renal function (below 30ml/min) needing potassium/magnesium supplementation, please order individual bolus doses with appropriate monitoring.      Please contact the inpatient pharmacy with any concerns.  Thank you.

## 2025-01-30 NOTE — PLAN OF CARE
Problem: Chronic Conditions and Co-morbidities  Goal: Patient's chronic conditions and co-morbidity symptoms are monitored and maintained or improved  1/30/2025 0224 by Bailee De Los Santos RN  Outcome: Progressing  Flowsheets (Taken 1/29/2025 2000)  Care Plan - Patient's Chronic Conditions and Co-Morbidity Symptoms are Monitored and Maintained or Improved:   Monitor and assess patient's chronic conditions and comorbid symptoms for stability, deterioration, or improvement   Collaborate with multidisciplinary team to address chronic and comorbid conditions and prevent exacerbation or deterioration     Problem: Discharge Planning  Goal: Discharge to home or other facility with appropriate resources  Outcome: Progressing  Flowsheets (Taken 1/29/2025 2000)  Discharge to home or other facility with appropriate resources:   Identify barriers to discharge with patient and caregiver   Arrange for needed discharge resources and transportation as appropriate   Identify discharge learning needs (meds, wound care, etc)     Problem: Pain  Goal: Verbalizes/displays adequate comfort level or baseline comfort level  Outcome: Progressing     Problem: Safety - Adult  Goal: Free from fall injury  1/30/2025 0224 by Bailee De Los Santos RN  Outcome: Progressing     Problem: Skin/Tissue Integrity  Goal: Skin integrity remains intact  Description: 1.  Monitor for areas of redness and/or skin breakdown  2.  Assess vascular access sites hourly  3.  Every 4-6 hours minimum:  Change oxygen saturation probe site  4.  Every 4-6 hours:  If on nasal continuous positive airway pressure, respiratory therapy assess nares and determine need for appliance change or resting period  Outcome: Progressing

## 2025-01-31 VITALS
TEMPERATURE: 98.2 F | HEART RATE: 84 BPM | BODY MASS INDEX: 32.47 KG/M2 | WEIGHT: 172 LBS | HEIGHT: 61 IN | DIASTOLIC BLOOD PRESSURE: 63 MMHG | RESPIRATION RATE: 18 BRPM | SYSTOLIC BLOOD PRESSURE: 127 MMHG | OXYGEN SATURATION: 93 %

## 2025-01-31 PROBLEM — N30.00 ACUTE CYSTITIS WITHOUT HEMATURIA: Status: RESOLVED | Noted: 2025-01-27 | Resolved: 2025-01-31

## 2025-01-31 PROCEDURE — 6370000000 HC RX 637 (ALT 250 FOR IP): Performed by: STUDENT IN AN ORGANIZED HEALTH CARE EDUCATION/TRAINING PROGRAM

## 2025-01-31 PROCEDURE — 99239 HOSP IP/OBS DSCHRG MGMT >30: CPT | Performed by: STUDENT IN AN ORGANIZED HEALTH CARE EDUCATION/TRAINING PROGRAM

## 2025-01-31 RX ORDER — NITROFURANTOIN 25; 75 MG/1; MG/1
100 CAPSULE ORAL EVERY 12 HOURS SCHEDULED
Qty: 6 CAPSULE | Refills: 0 | Status: SHIPPED | OUTPATIENT
Start: 2025-01-31 | End: 2025-02-03

## 2025-01-31 RX ADMIN — CLONIDINE HYDROCHLORIDE 0.1 MG: 0.1 TABLET ORAL at 09:23

## 2025-01-31 RX ADMIN — SPIRONOLACTONE 25 MG: 25 TABLET, FILM COATED ORAL at 09:23

## 2025-01-31 RX ADMIN — CLOPIDOGREL BISULFATE 75 MG: 75 TABLET ORAL at 09:23

## 2025-01-31 RX ADMIN — NITROFURANTOIN MONOHYDRATE/MACROCRYSTALS 100 MG: 25; 75 CAPSULE ORAL at 09:23

## 2025-01-31 RX ADMIN — PAROXETINE HYDROCHLORIDE 20 MG: 20 TABLET, FILM COATED ORAL at 09:23

## 2025-01-31 RX ADMIN — METOPROLOL SUCCINATE 25 MG: 25 TABLET, EXTENDED RELEASE ORAL at 09:23

## 2025-01-31 RX ADMIN — FUROSEMIDE 40 MG: 20 TABLET ORAL at 09:23

## 2025-01-31 ASSESSMENT — PAIN SCALES - GENERAL
PAINLEVEL_OUTOF10: 0
PAINLEVEL_OUTOF10: 7

## 2025-01-31 ASSESSMENT — PAIN DESCRIPTION - LOCATION: LOCATION: GENERALIZED

## 2025-01-31 NOTE — PROGRESS NOTES
Spiritual Health History and Assessment/Progress Note  [facility]    (P) Follow-up, Spiritual/Emotional Needs, (P) Emotional distress, (P) Adjustment to illness, Life Adjustments,      Name: Jessie Frederick MRN: [id number]    Age: 79 y.o.     Sex: female   Language: English   Congregation: [employer]   Acute cystitis without hematuria     Date: 1/30/2025            Total Time Calculated: (P) 20 min              Spiritual Assessment continued in [address]        Referral/Consult From: (P) Rounding   Encounter Overview/Reason: (P) Follow-up, Spiritual/Emotional Needs  Service Provided For: (P) Patient        followed up on Pt. Rounding. Pt. Was open to conversation and welcoming during visit.  provided support and advocated for Pt. Pt. Expressed concern about rehab and was grateful for  visit. Prayer was offered  for comfort, encouragement and strength. Good visit.     Melina, Belief, Meaning:   Patient is connected with a melina tradition or spiritual practice and has beliefs or practices that help with coping during difficult times  Family/Friends No family/friends present      Importance and Influence:  Patient has spiritual/personal beliefs that influence decisions regarding their health  Family/Friends No family/friends present    Community:  Patient feels well-supported. Support system includes: Children and Extended family  Family/Friends No family/friends present    Assessment and Plan of Care:     Patient Interventions include: Facilitated expression of thoughts and feelings and Explored spiritual coping/struggle/distress  Family/Friends Interventions include: No family/friends present    Patient Plan of Care: Spiritual Care available upon further referral  Family/Friends Plan of Care: No family/friends present    Electronically signed by Chaplain SAMY on 1/30/2025 at 8:35 PM    01/30/25 2032   Encounter Summary   Encounter Overview/Reason

## 2025-01-31 NOTE — CARE COORDINATION
[facility] precert for patient to admit to [facility] still listed as pending at this time. Will [facility] LETITIA started.    1000- Patient has approved auth to admit to [facility]. CM spoke with [facility] and they are able to provide transportation at 12p. Pam with [facility] confirmed they are able to accept patient with 12p transport. Patient updated and she verbalizes being agreeable with discharging to [facility] today.    1120- Dr. Nassar notified that signed DNR-CCA is needed for transportation. BINU and LETITIA faxed to [facility].    RN to call report to [phone]    Discharge Report    [facility]  Clinical Case Management Department  Written by: Perla Gonzalez RN    Patient Name: Jessie Frederick  Attending Provider: Lucien Nassar MD  Admit Date: 2025  1:58 AM  MRN: [id number]  Account: [id number]                     : 1945  Discharge Date: 25      Disposition: SNF- [facility]    Perla Gonzalez RN

## 2025-01-31 NOTE — DISCHARGE INSTR - DIET
Good nutrition is important when healing from an illness, injury, or surgery.  Follow any nutrition recommendations given to you during your hospital stay.   If you were given an oral nutrition supplement while in the hospital, continue to take this supplement at home.  You can take it with meals, in-between meals, and/or before bedtime. These supplements can be purchased at most local grocery stores, pharmacies, and chain [facility]-stores.   If you have any questions about your diet or nutrition, call the hospital and ask for the dietitian.

## 2025-01-31 NOTE — PROGRESS NOTES
Physician Progress Note      PATIENT:               YONY PACHECO  CSN #:                  [id number]  :                       1945  ADMIT DATE:       2025 1:58 AM  DISCH DATE:  RESPONDING  PROVIDER #:        Lucien Nassar MD          QUERY TEXT:    Pt admitted with UTI. Pt noted to have souleymane and volume depletion on admission   per progress notes on  and  noted to be confused and lethargic.   nursing flow sheets notes oriented at times.  progress notes states   mentation much improved . If possible, please document in the progress notes   and discharge summary if you are evaluating and / or treating any of the   following:    The medical record reflects the following:  Risk Factors: age of 79, history of CVA  Clinical Indicators:admitted with UTI. Pt noted to have souleymane and volume   depletion on admission per progress notes on  and  noted to be   confused and lethargic. nursing flow sheets notes oriented at times.    progress notes states mentation much improved  Treatment: iv antibiotics, ivf 75 per hour lab monitoring of BMP, pending   placement SNF      Thank You Angel GALAN BSN CCDS  Options provided:  -- Metabolic encephalopathy  -- Delirium due to, Please specify cause.  -- Delirium  -- Other - I will add my own diagnosis  -- Disagree - Not applicable / Not valid  -- Disagree - Clinically unable to determine / Unknown  -- Refer to Clinical Documentation Reviewer    PROVIDER RESPONSE TEXT:    This patient has metabolic encephalopathy.    Query created by: Karen Martinez on 2025 1:16 PM      Electronically signed by:  Lucien Nassar MD 2025 9:56 AM

## 2025-01-31 NOTE — PROGRESS NOTES
bowel obstruction, ascites, or pneumoperitoneum. 2.  Small pericardial effusion is noted.  Thickening of the interstitium and interlobular septa thickening in the lower lungs could relate to congestive changes and or interstitial disease. 3.  Stable right adrenal nodule suggesting a adenoma.  No follow-up imaging is recommended. 4.  Borderline thickness of the urinary bladder wall.  Correlate for urinary symptoms and or urinalysis.       Physical Examination:     General appearance:  alert, cooperative and no distress  Mental Status:  oriented to person, place and time and normal affect  Lungs:  clear to auscultation bilaterally, normal effort  Heart:  regular rate and rhythm, no murmur  Abdomen:  soft, nontender, nondistended, normal bowel sounds, no masses, hepatomegaly, splenomegaly  Extremities:  no edema, redness, tenderness in the calves  Skin:  no gross lesions, rashes, induration    Assessment:     Hospital Problems             Last Modified POA    * (Principal) Acute cystitis without hematuria 1/27/2025 Yes    Physical debility 1/27/2025 Yes    Multiple sclerosis (HCC) (Chronic) 1/27/2025 Yes    Essential hypertension (Chronic) 1/27/2025 Yes    Chronic diastolic heart failure (HCC) 1/27/2025 Yes    Paroxysmal A-fib (HCC) (Chronic) 1/27/2025 Yes       Plan:      Acute cystitis without hematuria   -Ciprofloxacin 500 mg q12h, switch to nitrofurantoin 10 Mg twice daily for 5 days.  -Urine culture growing E. coli, antibiotics adjusted  -The patient's allergy to fluoroquinolones is \"achiness\" and this is not a true allergy      Metabolic encephalopathy   -Likely secondary to UTI.  Resolved    ISABELLA   -Secondary to volume depletion in the setting of gastroenteritis   -Gentle hydration, will discontinue fluid once she is having good oral intake.  -Creatinine trending down 1.4 this morning.  Will cut down on fluids.  Lasix and Aldactone resumed.  Continue to monitor kidney function.     HFpEF   -Not in acute  exacerbation   -Resumed home Lasix and Aldactone.     History of CVA   -Continue home aspirin and Plavix      Paroxysmal atrial fibrillation   -S/P Watchman procedure      HTN   -Continue home metoprolol   -Continue home clonidine      HLD   -Continue home atorvastatin      Advanced care planning   -Code status is DNR-CCA   -PT/OT on board.    Discharge planning: PT OT on board, plan to go to facility, [facility] possibly today.   working transportation.    Medical Decision Making: Medium      Lucien Nassar MD  1/31/2025  10:48 AM

## 2025-01-31 NOTE — PLAN OF CARE
Problem: Chronic Conditions and Co-morbidities  Goal: Patient's chronic conditions and co-morbidity symptoms are monitored and maintained or improved  1/31/2025 1153 by Sahara Humphreys RN  Outcome: Adequate for Discharge  1/31/2025 1153 by Sahara Humphreys RN  Outcome: Adequate for Discharge  1/31/2025 1018 by Sahara Humphreys RN  Outcome: Progressing  1/31/2025 0136 by Liliane Noriega LPN  Outcome: Progressing  Flowsheets (Taken 1/30/2025 2030 by Marci Thacker, RN)  Care Plan - Patient's Chronic Conditions and Co-Morbidity Symptoms are Monitored and Maintained or Improved:   Monitor and assess patient's chronic conditions and comorbid symptoms for stability, deterioration, or improvement   Collaborate with multidisciplinary team to address chronic and comorbid conditions and prevent exacerbation or deterioration   Update acute care plan with appropriate goals if chronic or comorbid symptoms are exacerbated and prevent overall improvement and discharge     Problem: Discharge Planning  Goal: Discharge to home or other facility with appropriate resources  1/31/2025 1153 by Sahara Humphreys RN  Outcome: Adequate for Discharge  1/31/2025 1018 by Sahara Humphreys RN  Outcome: Progressing  1/31/2025 0136 by Liliane Noriega LPN  Outcome: Progressing  Flowsheets (Taken 1/30/2025 2030 by Marci Thacker, RN)  Discharge to home or other facility with appropriate resources:   Identify barriers to discharge with patient and caregiver   Arrange for needed discharge resources and transportation as appropriate   Identify discharge learning needs (meds, wound care, etc)     Problem: Pain  Goal: Verbalizes/displays adequate comfort level or baseline comfort level  1/31/2025 1153 by Sahara Humphreys RN  Outcome: Adequate for Discharge  1/31/2025 1018 by Sahara Humphreys RN  Outcome: Progressing  1/31/2025 0136 by Liliane Noriega LPN  Outcome: Progressing     Problem: Safety - Adult  Goal: Free from fall injury  1/31/2025 1153 by Sahara Humphreys

## 2025-01-31 NOTE — PLAN OF CARE
Problem: Chronic Conditions and Co-morbidities  Goal: Patient's chronic conditions and co-morbidity symptoms are monitored and maintained or improved  1/31/2025 1018 by Sahara Humphreys RN  Outcome: Progressing  1/31/2025 0136 by Liliane Noriega LPN  Outcome: Progressing  Flowsheets (Taken 1/30/2025 2030 by Marci Thacker, RN)  Care Plan - Patient's Chronic Conditions and Co-Morbidity Symptoms are Monitored and Maintained or Improved:   Monitor and assess patient's chronic conditions and comorbid symptoms for stability, deterioration, or improvement   Collaborate with multidisciplinary team to address chronic and comorbid conditions and prevent exacerbation or deterioration   Update acute care plan with appropriate goals if chronic or comorbid symptoms are exacerbated and prevent overall improvement and discharge     Problem: Discharge Planning  Goal: Discharge to home or other facility with appropriate resources  1/31/2025 1018 by Sahara Humphreys RN  Outcome: Progressing  1/31/2025 0136 by Liliane Noriega LPN  Outcome: Progressing  Flowsheets (Taken 1/30/2025 2030 by Marci Thacker, RN)  Discharge to home or other facility with appropriate resources:   Identify barriers to discharge with patient and caregiver   Arrange for needed discharge resources and transportation as appropriate   Identify discharge learning needs (meds, wound care, etc)     Problem: Pain  Goal: Verbalizes/displays adequate comfort level or baseline comfort level  1/31/2025 1018 by Sahara Humphreys RN  Outcome: Progressing  1/31/2025 0136 by Liliane Noriega LPN  Outcome: Progressing     Problem: Safety - Adult  Goal: Free from fall injury  1/31/2025 1018 by Sahara Humphreys RN  Outcome: Progressing  1/31/2025 0136 by Liliane Noriega LPN  Outcome: Progressing     Problem: Skin/Tissue Integrity  Goal: Skin integrity remains intact  Description: 1.  Monitor for areas of redness and/or skin breakdown  2.  Assess vascular access sites hourly  3.  Every

## 2025-01-31 NOTE — DISCHARGE INSTR - COC
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  Ventilator:    - No ventilator support    Rehab Therapies: Physical Therapy and Occupational Therapy  Weight Bearing Status/Restrictions: No weight bearing restrictions  Other Medical Equipment (for information only, NOT a DME order):  walker  Other Treatments: none    Patient's personal belongings (please select all that are sent with patient):  Glasses    RN SIGNATURE:  Electronically signed by Sahara Humphreys RN on 1/31/25 at 10:22 AM EST    CASE MANAGEMENT/SOCIAL WORK SECTION    Inpatient Status Date: 1/27/25    Readmission Risk Assessment Score:  [facility] RISK OF UNPLANNED READMISSION 2.0             13.8 Total Score        Discharging to Facility/ Agency   [facility]  / signature: Electronically signed by Perla Gonzalez RN on 1/31/25 at 7:46 AM EST    PHYSICIAN SECTION    Prognosis: Good    Condition at Discharge: Stable    Rehab Potential (if transferring to Rehab): Good    Recommended Labs or Other Treatments After Discharge: PT/OT    Physician Certification: I certify the above information and transfer of Jessie Frederick  is necessary for the continuing treatment of the diagnosis listed and that she requires Skilled Nursing Facility for  greater 30 days.     Update Admission H&P: No change in H&P    PHYSICIAN SIGNATURE:  Electronically signed by Lucien Nassar MD on 1/31/25 at 11:03 AM EST

## 2025-01-31 NOTE — DISCHARGE SUMMARY
[facility]  Office: [phone]  Nura Mccall DO, Tu Horton DO, Jesus Madison DO, Galdino Chowdhury DO, Laurie Gutierrez MD, Lakeshia Wong MD, Lupe Campos MD, Christina Flores MD,  Archie Cordova MD, Oliverio Peter MD, Gopi Taylor DO, Ny Urbina MD,  Rubio Rocha DO, Dayana Webb MD, Miguel Sales MD, Micahel Mccall DO, Sherly Zaidi MD,  Miguelito Angel DO, Cristela Camarena MD, Terri Harding MD, Alyx Jones MD, Nichole Patel MD,  Mike Davalos MD, Yue Khan MD, Margaret Rocha MD, Hernando Wallace DO, Michele Matos MD,  Willem Hoffman MD, Shirley Waterhouse, CNP,  Daisy Nolasco, CNP, Ayah Hardin, CNP, Yogesh Cabrera, CNP,  Smita Rivero, DNP, Sanjana Aranda, CNP, Carlyn Parekh, CNP, Danay Carrasco, CNP, Paige Pacheco, CNP, Jen Castro, CNP, Doyle Boykin PA-C, Rea Bustillo, CNS, Pam Celeste, CNP, Dionna Pride, CNP         [facility]   IN-PATIENT SERVICE   [facility]    Discharge Summary     Patient ID: Jessie Frederick  :  1945   MRN: [id number]     ACCOUNT:  [id number]   Patient's PCP: Ashwin Cheney MD  Admit Date: 2025   Discharge Date: 2025  Length of Stay: 4  Code Status:  DNR-CCA  Admitting Physician: No admitting provider for patient encounter.  Discharge Physician: Lucien Nassar MD     Active Discharge Diagnoses:     Hospital Problem Lists:  Principal Problem (Resolved):    Acute cystitis without hematuria  Active Problems:    Physical debility    Multiple sclerosis (HCC)    Essential hypertension    Chronic diastolic heart failure (HCC)    Paroxysmal A-fib (HCC)      Admission Condition:  fair     Discharged Condition: good    Hospital Stay:     Hospital Course:  Jessie Frederick is a 79 y.o. female who was admitted for the management of Acute cystitis without hematuria , presented to ER with Diarrhea (Patient brought in VIA EMS from home. Patient states having weakness and diarrhea this evening. Patient fell  Ashwin Cheney MD for the opportunity to be involved in this patient's care.

## 2025-02-14 ASSESSMENT — ENCOUNTER SYMPTOMS
CONSTIPATION: 0
NAUSEA: 0
COUGH: 0
WHEEZING: 0
SHORTNESS OF BREATH: 0
EYE DISCHARGE: 0
COLOR CHANGE: 0
SORE THROAT: 0
STRIDOR: 0
ABDOMINAL PAIN: 0
DIARRHEA: 1
VOMITING: 0
EYE REDNESS: 0
EYE PAIN: 0

## 2025-03-23 ENCOUNTER — HOSPITAL ENCOUNTER (EMERGENCY)
Age: 80
Discharge: ANOTHER HOSPITAL | End: 2025-03-23
Attending: EMERGENCY MEDICINE
Payer: MEDICARE

## 2025-03-23 ENCOUNTER — APPOINTMENT (OUTPATIENT)
Dept: GENERAL RADIOLOGY | Age: 80
End: 2025-03-23
Payer: MEDICARE

## 2025-03-23 VITALS
BODY MASS INDEX: 32.1 KG/M2 | DIASTOLIC BLOOD PRESSURE: 63 MMHG | RESPIRATION RATE: 16 BRPM | TEMPERATURE: 97.5 F | HEART RATE: 85 BPM | HEIGHT: 61 IN | SYSTOLIC BLOOD PRESSURE: 148 MMHG | OXYGEN SATURATION: 97 % | WEIGHT: 170 LBS

## 2025-03-23 DIAGNOSIS — R07.9 CHEST PAIN, UNSPECIFIED TYPE: Primary | ICD-10-CM

## 2025-03-23 LAB
ALBUMIN SERPL-MCNC: 4 G/DL (ref 3.5–5.2)
ALBUMIN/GLOB SERPL: 1.8 {RATIO} (ref 1–2.5)
ALP SERPL-CCNC: 149 U/L (ref 35–104)
ALT SERPL-CCNC: 15 U/L (ref 5–33)
ANION GAP SERPL CALCULATED.3IONS-SCNC: 14 MMOL/L (ref 9–17)
AST SERPL-CCNC: 21 U/L
BASOPHILS # BLD: 0 K/UL (ref 0–0.2)
BASOPHILS NFR BLD: 0 % (ref 0–2)
BILIRUB SERPL-MCNC: 0.7 MG/DL (ref 0.3–1.2)
BNP SERPL-MCNC: 3369 PG/ML
BUN SERPL-MCNC: 19 MG/DL (ref 8–23)
CALCIUM SERPL-MCNC: 9.1 MG/DL (ref 8.6–10.4)
CHLORIDE SERPL-SCNC: 100 MMOL/L (ref 98–107)
CO2 SERPL-SCNC: 25 MMOL/L (ref 20–31)
CREAT SERPL-MCNC: 1.4 MG/DL (ref 0.5–0.9)
EOSINOPHIL # BLD: 0.13 K/UL (ref 0–0.4)
EOSINOPHILS RELATIVE PERCENT: 2 % (ref 1–4)
ERYTHROCYTE [DISTWIDTH] IN BLOOD BY AUTOMATED COUNT: 27.4 % (ref 12.5–15.4)
GFR, ESTIMATED: 39 ML/MIN/1.73M2
GLUCOSE SERPL-MCNC: 88 MG/DL (ref 70–99)
HCT VFR BLD AUTO: 35.2 % (ref 36–46)
HGB BLD-MCNC: 12 G/DL (ref 12–16)
LIPASE SERPL-CCNC: 51 U/L (ref 13–60)
LYMPHOCYTES NFR BLD: 1.47 K/UL (ref 1–4.8)
LYMPHOCYTES RELATIVE PERCENT: 22 % (ref 24–44)
MAGNESIUM SERPL-MCNC: 1.8 MG/DL (ref 1.6–2.6)
MCH RBC QN AUTO: 31.6 PG (ref 26–34)
MCHC RBC AUTO-ENTMCNC: 34 G/DL (ref 31–37)
MCV RBC AUTO: 93.1 FL (ref 80–100)
MONOCYTES NFR BLD: 0.47 K/UL (ref 0.1–0.8)
MONOCYTES NFR BLD: 7 % (ref 1–7)
MORPHOLOGY: ABNORMAL
NEUTROPHILS NFR BLD: 69 % (ref 36–66)
NEUTS SEG NFR BLD: 4.63 K/UL (ref 1.8–7.7)
PLATELET # BLD AUTO: 201 K/UL (ref 140–450)
PMV BLD AUTO: 9.8 FL (ref 6–12)
POTASSIUM SERPL-SCNC: 3.3 MMOL/L (ref 3.7–5.3)
PROT SERPL-MCNC: 6.2 G/DL (ref 6.4–8.3)
RBC # BLD AUTO: 3.78 M/UL (ref 4–5.2)
SODIUM SERPL-SCNC: 139 MMOL/L (ref 135–144)
TROPONIN I SERPL HS-MCNC: 53 NG/L (ref 0–14)
TROPONIN I SERPL HS-MCNC: 63 NG/L (ref 0–14)
WBC OTHER # BLD: 6.7 K/UL (ref 3.5–11)

## 2025-03-23 PROCEDURE — 96374 THER/PROPH/DIAG INJ IV PUSH: CPT

## 2025-03-23 PROCEDURE — 83690 ASSAY OF LIPASE: CPT

## 2025-03-23 PROCEDURE — 99285 EMERGENCY DEPT VISIT HI MDM: CPT

## 2025-03-23 PROCEDURE — 36415 COLL VENOUS BLD VENIPUNCTURE: CPT

## 2025-03-23 PROCEDURE — 84484 ASSAY OF TROPONIN QUANT: CPT

## 2025-03-23 PROCEDURE — 93005 ELECTROCARDIOGRAM TRACING: CPT | Performed by: NURSE PRACTITIONER

## 2025-03-23 PROCEDURE — 83880 ASSAY OF NATRIURETIC PEPTIDE: CPT

## 2025-03-23 PROCEDURE — 71045 X-RAY EXAM CHEST 1 VIEW: CPT

## 2025-03-23 PROCEDURE — 80053 COMPREHEN METABOLIC PANEL: CPT

## 2025-03-23 PROCEDURE — 83735 ASSAY OF MAGNESIUM: CPT

## 2025-03-23 PROCEDURE — 85025 COMPLETE CBC W/AUTO DIFF WBC: CPT

## 2025-03-23 PROCEDURE — 6360000002 HC RX W HCPCS: Performed by: EMERGENCY MEDICINE

## 2025-03-23 RX ORDER — FUROSEMIDE 10 MG/ML
40 INJECTION INTRAMUSCULAR; INTRAVENOUS ONCE
Status: COMPLETED | OUTPATIENT
Start: 2025-03-23 | End: 2025-03-23

## 2025-03-23 RX ADMIN — FUROSEMIDE 40 MG: 10 INJECTION, SOLUTION INTRAMUSCULAR; INTRAVENOUS at 12:53

## 2025-03-23 ASSESSMENT — PAIN DESCRIPTION - PAIN TYPE: TYPE: ACUTE PAIN

## 2025-03-23 ASSESSMENT — PAIN DESCRIPTION - LOCATION
LOCATION: BACK
LOCATION: CHEST

## 2025-03-23 ASSESSMENT — PAIN SCALES - GENERAL
PAINLEVEL_OUTOF10: 8
PAINLEVEL_OUTOF10: 10

## 2025-03-23 ASSESSMENT — PAIN DESCRIPTION - DESCRIPTORS: DESCRIPTORS: HEAVINESS

## 2025-03-23 ASSESSMENT — PAIN DESCRIPTION - DIRECTION: RADIATING_TOWARDS: LEFT ARM

## 2025-03-23 ASSESSMENT — PAIN - FUNCTIONAL ASSESSMENT
PAIN_FUNCTIONAL_ASSESSMENT: 0-10
PAIN_FUNCTIONAL_ASSESSMENT: 0-10

## 2025-03-23 NOTE — ED NOTES
Mode of arrival (squad #, walk in, police, etc) : [facility], from home      Arrival Note (brief scenario, treatment PTA, etc).: patient reports she called EMS because she started having chest pain after waking up this morning.   EMS administered Aspirin 324 mg PO, PTA.   Patient states that she wants to see a cardiologist, informed her that our ED physician will in to see and evaluate her. She then states she did not want to come to this hospital and she had asked to go to [facility] but EMS would not transport her there.

## 2025-03-23 NOTE — ED PROVIDER NOTES
I walked into the room to help to take care of the patient is introduced myself and she refused to see a nurse practitioner would only see a doctor.  I informed her that it will be quite some time we have several patients in the department that are critical.  She understands and will wait.     Charlee Yee, NENA - CNP  03/23/25 1030

## 2025-03-23 NOTE — ED PROVIDER NOTES
[facility] Emergency Department  [address]  [facility] [address]  Phone: [phone]  Fax: [phone]    EMERGENCY DEPARTMENT ENCOUNTER          Pt Name: Jessie Frederick  MRN: [id number]  Birthdate 1945  Date of evaluation: 3/23/2025      CHIEF COMPLAINT       Chief Complaint   Patient presents with    Chest Pain     Reports developing today after waking up. EMS administered Aspirin 324 mg PO, PTA    Headache       HISTORY OF PRESENT ILLNESS       Jessie Frederick is a 79 y.o. female who presents with chest discomfort that she woke with this morning.  She does have a pacemaker and Watchman device.  Her cardiologist is at [facility].  She arrived via EMS.  She wanted to go to [place] however EMS brought her here instead.  Nothing otherwise makes the symptoms better or worse.  She saw her cardiologist recently and an echo was ordered but she has not yet had that done.  She is unsure what her ejection fraction is.  Denies other symptoms currently    REVIEW OF SYSTEMS       Review of Systems   Constitutional:  Negative for chills, fatigue and fever.   HENT:  Negative for rhinorrhea and sore throat.    Eyes:  Negative for pain.   Respiratory:  Positive for shortness of breath. Negative for cough.    Cardiovascular:  Positive for chest pain.   Gastrointestinal:  Negative for abdominal pain, diarrhea, nausea and vomiting.   Genitourinary:  Negative for difficulty urinating.   Musculoskeletal:  Negative for back pain and neck pain.   Skin:  Negative for rash.   Neurological:  Negative for weakness and headaches.        PAST MEDICAL HISTORY    has a past medical history of Abnormal Pap smear, Asthma, Atrial fibrillation (HCC), Bloody discharge from nipple, CAD (coronary artery disease), Cataracts, bilateral, CHF (congestive heart failure) ([facility]), Chronic kidney disease, Colon polyp, CVA (cerebral vascular accident) ([facility]), Demyelinating disease ([facility]), Fibromyalgia,

## 2025-03-23 NOTE — PROGRESS NOTES
Spiritual Health History and Assessment/Progress Note  [facility]    (P) Spiritual/Emotional Needs, Crisis, Initial Encounter, (P) Trauma, Emotional distress, Relationship concerns, (P) Life Adjustments, Adjustment to illness, Anticipatory Grief, Grief and loss,      Name: Jessie Frederick MRN: [id number]    Age: 79 y.o.     Sex: female   Language: English   Jain: Sikhism   <principal problem not specified>     Date: 3/23/2025            Total Time Calculated: (P) 30 min              Spiritual Assessment began in [facility] EMERGENCY DEPARTMENT        Referral/Consult From: (P) Rounding   Encounter Overview/Reason: (P) Spiritual/Emotional Needs, Crisis, Initial Encounter  Service Provided For: (P) Patient       visited Pt. In ER. Pt. Was seen before. Pt. Was open to conversation. Pt. Was open to discussing life legacy with illness. Pt. Wanted to talk about pain and family dysfunction.  provided support and pastoral presence and comfort. Provided empathic listening and prayer was offered for comfort, strength and encouragement . Pt. Will be transferred to another facility. Good visit.    Melina, Belief, Meaning:   Patient has beliefs or practices that help with coping during difficult times  Family/Friends No family/friends present      Importance and Influence:  Patient has spiritual/personal beliefs that influence decisions regarding their health  Family/Friends No family/friends present    Community:  Patient feels well-supported. Support system includes: Extended family  Family/Friends No family/friends present    Assessment and Plan of Care:     Patient Interventions include: Facilitated expression of thoughts and feelings, Explored spiritual coping/struggle/distress, Engaged in theological reflection, and Facilitated life review and/ or legacy  Family/Friends Interventions include: No family/friends present    Patient Plan of Care: Spiritual Care available upon further

## 2025-03-23 NOTE — PROGRESS NOTES
Pt observed dropping spo2 into the 80's x3 (low of 80%) Pt placed on 2lpm nasal cannula.  Continuous oximetry monitoring in place.

## 2025-03-24 LAB
EKG ATRIAL RATE: 87 BPM
EKG P AXIS: 38 DEGREES
EKG Q-T INTERVAL: 450 MS
EKG QRS DURATION: 136 MS
EKG QTC CALCULATION (BAZETT): 538 MS
EKG R AXIS: 2 DEGREES
EKG T AXIS: -140 DEGREES
EKG VENTRICULAR RATE: 86 BPM

## 2025-03-24 PROCEDURE — 93010 ELECTROCARDIOGRAM REPORT: CPT | Performed by: INTERNAL MEDICINE

## (undated) DEVICE — FORCEP BX MESH TOOTH MIC 2.8 MMX240 CM NDL STRL RADIAL JAW 4

## (undated) DEVICE — SNARE ENDOSCP M L240CM LOOP W27MM SHTH DIA2.4MM OVL FLX

## (undated) DEVICE — FORCEPS BX L240CM WRK CHN 2.8MM STD CAP W/ NDL MIC MESH

## (undated) DEVICE — ESOPHAGEAL/PYLORIC/COLONIC/BILIARY WIREGUIDED BALLOON DILATATION CATHETER: Brand: CRE™ PRO